# Patient Record
Sex: MALE | Race: BLACK OR AFRICAN AMERICAN | NOT HISPANIC OR LATINO | Employment: OTHER | URBAN - METROPOLITAN AREA
[De-identification: names, ages, dates, MRNs, and addresses within clinical notes are randomized per-mention and may not be internally consistent; named-entity substitution may affect disease eponyms.]

---

## 2017-02-09 ENCOUNTER — TELEPHONE (OUTPATIENT)
Dept: INTERNAL MEDICINE | Facility: CLINIC | Age: 67
End: 2017-02-09

## 2017-02-09 DIAGNOSIS — I10 ESSENTIAL HYPERTENSION: Primary | ICD-10-CM

## 2017-02-09 DIAGNOSIS — R73.9 ELEVATED BLOOD SUGAR: ICD-10-CM

## 2017-02-09 NOTE — TELEPHONE ENCOUNTER
----- Message from Anton Choudhary sent at 2/8/2017 12:42 PM CST -----  Contact: Self [phone]  Type: Orders Request    What orders/ testing are being requested? Labs    Is there a future appointment scheduled for the patient with PCP? Yes    When? 04/03/2017    Comments:Pt requesting that you please mail orders to his address for [employer], please advice    Thanks

## 2017-02-09 NOTE — TELEPHONE ENCOUNTER
Dr Jackson pt needs his labs printed out and faxed to [employer] [facility] [employer]  His next appt with you is on 4/3/17

## 2017-02-20 ENCOUNTER — TELEPHONE (OUTPATIENT)
Dept: INTERNAL MEDICINE | Facility: CLINIC | Age: 67
End: 2017-02-20

## 2017-04-03 ENCOUNTER — OFFICE VISIT (OUTPATIENT)
Dept: INTERNAL MEDICINE | Facility: CLINIC | Age: 67
End: 2017-04-03
Payer: COMMERCIAL

## 2017-04-03 ENCOUNTER — TELEPHONE (OUTPATIENT)
Dept: INTERNAL MEDICINE | Facility: CLINIC | Age: 67
End: 2017-04-03

## 2017-04-03 VITALS
DIASTOLIC BLOOD PRESSURE: 76 MMHG | HEIGHT: 74 IN | BODY MASS INDEX: 32.29 KG/M2 | WEIGHT: 251.56 LBS | HEART RATE: 70 BPM | SYSTOLIC BLOOD PRESSURE: 152 MMHG

## 2017-04-03 DIAGNOSIS — I10 ESSENTIAL HYPERTENSION: Primary | ICD-10-CM

## 2017-04-03 DIAGNOSIS — R73.09 ELEVATED HEMOGLOBIN A1C: Primary | ICD-10-CM

## 2017-04-03 DIAGNOSIS — I10 ESSENTIAL HYPERTENSION: ICD-10-CM

## 2017-04-03 DIAGNOSIS — R29.818 SUSPECTED SLEEP APNEA: ICD-10-CM

## 2017-04-03 DIAGNOSIS — R73.09 ELEVATED HEMOGLOBIN A1C: ICD-10-CM

## 2017-04-03 PROCEDURE — 1160F RVW MEDS BY RX/DR IN RCRD: CPT | Mod: S$GLB,,, | Performed by: INTERNAL MEDICINE

## 2017-04-03 PROCEDURE — 3077F SYST BP >= 140 MM HG: CPT | Mod: S$GLB,,, | Performed by: INTERNAL MEDICINE

## 2017-04-03 PROCEDURE — 99999 PR PBB SHADOW E&M-EST. PATIENT-LVL III: CPT | Mod: PBBFAC,,, | Performed by: INTERNAL MEDICINE

## 2017-04-03 PROCEDURE — 99213 OFFICE O/P EST LOW 20 MIN: CPT | Mod: S$GLB,,, | Performed by: INTERNAL MEDICINE

## 2017-04-03 PROCEDURE — 3078F DIAST BP <80 MM HG: CPT | Mod: S$GLB,,, | Performed by: INTERNAL MEDICINE

## 2017-04-03 RX ORDER — PENICILLIN V POTASSIUM 500 MG/1
TABLET, FILM COATED ORAL
Refills: 0 | COMMUNITY
Start: 2017-01-31 | End: 2017-11-10 | Stop reason: ALTCHOICE

## 2017-04-03 RX ORDER — TAMSULOSIN HYDROCHLORIDE 0.4 MG/1
CAPSULE ORAL
Refills: 3 | COMMUNITY
Start: 2017-03-17 | End: 2017-09-21 | Stop reason: SDUPTHER

## 2017-04-03 NOTE — MR AVS SNAPSHOT
[facility]  [address]  [place] [address]  Phone: [phone]  Fax: [phone]                  Robles Jordan   4/3/2017 9:00 AM   Office Visit    Description:  Male : 1950   Provider:  John Jackson MD   Department:  [facility]           Reason for Visit     Follow-up           Diagnoses this Visit        Comments    Essential hypertension    -  Primary     Elevated hemoglobin A1c         Suspected sleep apnea                To Do List           Goals (5 Years of Data)     None      Follow-Up and Disposition     Return in about 6 months (around 10/3/2017) for EPP annual exam, fasting labs 1 week prior.    Follow-up and Disposition History      [facility]     [facility] Care [facility] -  Assistance  Unless otherwise directed by your provider, please contact [facility], our nurse care line that is available for  assistance.     Registered nurses in the [facility] provide: appointment scheduling, clinical advisement, health education, and other advisory services.  Call: [phone] (toll free)               Medications           Message regarding Medications     Verify the changes and/or additions to your medication regime listed below are the same as discussed with your clinician today.  If any of these changes or additions are incorrect, please notify your healthcare provider.             Verify that the below list of medications is an accurate representation of the medications you are currently taking.  If none reported, the list may be blank. If incorrect, please contact your healthcare provider. Carry this list with you in case of emergency.           Current Medications     amlodipine (NORVASC) 5 MG tablet Take 1 tablet (5 mg total) by mouth once daily.    atorvastatin (LIPITOR) 80 MG tablet Take 1 tablet (80 mg total) by mouth once daily.    ibuprofen (ADVIL,MOTRIN) 800 MG tablet     lisinopril  "(PRINIVIL,ZESTRIL) 20 MG tablet Take 1 tablet (20 mg total) by mouth once daily.    tamsulosin (FLOMAX) 0.4 mg Cp24 TAKE 1 CAPSULE (0.4 MG TOTAL) BY MOUTH ONCE DAILY.    penicillin v potassium (VEETID) 500 MG tablet TK 1 T PO Q 6 H TAT           Clinical Reference Information           Your Vitals Were     BP Pulse Height Weight BMI    152/76 (BP Location: Right arm, Patient Position: Sitting, BP Method: Manual) 70 6' 2" (1.88 m) 114.1 kg (251 lb 8.7 oz) 32.3 kg/m2      Blood Pressure          Most Recent Value    BP  (!)  152/76      Allergies as of 4/3/2017     No Known Allergies      Immunizations Administered on Date of Encounter - 4/3/2017     None      Orders Placed During Today's Visit      Normal Orders This Visit    Ambulatory consult for Sleep Study     Future Labs/Procedures Expected by Expires    Comprehensive metabolic panel  4/3/2017 4/3/2018    Hemoglobin A1c  4/3/2017 4/3/2018    Lipid panel  4/3/2017 6/2/2018      [facility] Sign-Up     Activating your [facility] account is as easy as 1-2-3!     1) Visit [email or web address], select Sign Up Now, enter this activation code and your date of birth, then select Next.  [email or web address]  Expires: 5/18/2017 10:01 AM      2) Create a username and password to use when you visit [facility] in the future and select a security question in case you lose your password and select Next.    3) Enter your e-mail address and click Sign Up!    Additional Information  If you have questions, please e-mail [email or web address] or call [phone] to talk to our [facility] staff. Remember, [facility] is NOT to be used for urgent needs. For medical emergencies, dial 911.         Instructions    Blood Pressure Measurement:    -- Please record your blood pressure 1-2 times per week. When checking, make sure you have been sitting for about 5 minutes, your legs are uncrossed, and the blood pressure cuff at the level of your heart. Record your blood pressure with the date and time in a " log and bring it with you to every doctor's visit.  -- Goal blood pressure is top number less than 140 and bottom number less than 90.   -- If you end up being diagnosed with obstructive sleep apnea and are treated but the blood pressure stays elevated, please notify the office. If the Sleep Medicine team says there is no obstructive sleep apnea present, please notify the office.       Smoking Cessation     If you would like to quit smoking:   You may be eligible for free services if you are a [place] resident and started smoking cigarettes before September 1, 1988.  Call the Smoking Cessation [employer] ([facility]) toll free at (317) [phone] or ([phone]) 674-5744.   Call 1-800-QUIT-NOW if you do not meet the above criteria.   Contact us via email: [email or web address]   View our website for more information: [email or web address]        Language Assistance Services     ATTENTION: Language assistance services are available, free of charge. Please call [phone].      ATENCIÓN: Si habla español, tiene a herrera disposición servicios gratuitos de asistencia lingüística. Llame al [phone].     CHÚ Ý: N?u b?n nói Ti?ng Vi?t, có các d?ch v? h? tr? ngôn ng? mi?n phí dành cho b?n. G?i s? [phone].         [facility] complies with applicable Federal civil rights laws and does not discriminate on the basis of race, color, national origin, age, disability, or sex.

## 2017-04-03 NOTE — TELEPHONE ENCOUNTER
----- Message from Rere Gutierrez sent at 4/3/2017 10:16 AM CDT -----  Contact: Self  FYI pt will be getting blood work done outside of [facility] and will need outside orders, if possible pt would like orders to be sent via mail.

## 2017-04-03 NOTE — PROGRESS NOTES
"Subjective:       Patient ID: Robles Jordan is a 66 y.o. male.    Chief Complaint: Follow-up    HPI    Last visit with me 10/2016. Reviewed home blood presure log.     Hernia in right groin not too bothersome.     Has been working to reduce carb intake. Hasn't been swimming as planned.    Review of Systems    As per HPI    Objective:      Physical Exam   Constitutional: He is oriented to person, place, and time. No distress.   -American man whose Body mass index is 32.3 kg/(m^2).    Neurological: He is alert and oriented to person, place, and time.   Nursing note and vitals reviewed.      Vitals:    04/03/17 0927 04/03/17 0949   BP: (!) 150/76 (!) 152/76   BP Location: Right arm Right arm   Patient Position: Sitting Sitting   BP Method: Manual Manual   Pulse: 70    Weight: 114.1 kg (251 lb 8.7 oz)    Height: 6' 2" (1.88 m)      Body mass index is 32.3 kg/(m^2).     I have personally checked the blood pressure and documented my findings.     RESULTS: Reviewed labs from last 12 months. Reviewed ultrasound 10/2016.    Assessment:       1. Essential hypertension    2. Elevated hemoglobin A1c    3. Suspected sleep apnea        Plan:   Robles was seen today for follow-up.    Diagnoses and all orders for this visit:    Essential hypertension:  Not at goal. Try to make definitive dx of obstructive sleep apnea, if so will treat to see if blood pressure improves. Also encourage diet and exercise. Continue Norvasc and Lisinopril at current doses for now.   -     Lipid panel; Future  -     Comprehensive metabolic panel; Future  -     Ambulatory consult for Sleep Study    Elevated hemoglobin A1c:  Stable, counseled about importance of diet and exercise, if 6.5% results are consistent with diabetes.  -     Hemoglobin A1c; Future    Suspected sleep apnea:  Has had sleep study in past, refer to Sleep Med for definitive evaluation and management.  -     Ambulatory consult for Sleep Study    Return in about 6 months " (around 10/3/2017) for EPP annual exam, fasting labs 1 week prior.  John Jackson MD  Internal Medicine    Portions of this note were completed using Dragon medical dictation software. Please excuse typographical or syntax errors.

## 2017-04-03 NOTE — PATIENT INSTRUCTIONS
Blood Pressure Measurement:    -- Please record your blood pressure 1-2 times per week. When checking, make sure you have been sitting for about 5 minutes, your legs are uncrossed, and the blood pressure cuff at the level of your heart. Record your blood pressure with the date and time in a log and bring it with you to every doctor's visit.  -- Goal blood pressure is top number less than 140 and bottom number less than 90.   -- If you end up being diagnosed with obstructive sleep apnea and are treated but the blood pressure stays elevated, please notify the office. If the Sleep Medicine team says there is no obstructive sleep apnea present, please notify the office.

## 2017-06-12 ENCOUNTER — OFFICE VISIT (OUTPATIENT)
Dept: SLEEP MEDICINE | Facility: CLINIC | Age: 67
End: 2017-06-12
Payer: COMMERCIAL

## 2017-06-12 VITALS
OXYGEN SATURATION: 98 % | DIASTOLIC BLOOD PRESSURE: 80 MMHG | WEIGHT: 248.69 LBS | BODY MASS INDEX: 31.91 KG/M2 | HEART RATE: 71 BPM | SYSTOLIC BLOOD PRESSURE: 164 MMHG | HEIGHT: 74 IN

## 2017-06-12 DIAGNOSIS — G47.33 OSA (OBSTRUCTIVE SLEEP APNEA): Primary | ICD-10-CM

## 2017-06-12 PROCEDURE — 99999 PR PBB SHADOW E&M-EST. PATIENT-LVL III: CPT | Mod: PBBFAC,,, | Performed by: NURSE PRACTITIONER

## 2017-06-12 PROCEDURE — 99204 OFFICE O/P NEW MOD 45 MIN: CPT | Mod: S$GLB,,, | Performed by: NURSE PRACTITIONER

## 2017-06-12 NOTE — PROGRESS NOTES
Robles Jordan  was seen as a new patient at the request of  John Jackson MD for the evaluation of obstructive sleep apnea.    CHIEF COMPLAINT:    Chief Complaint   Patient presents with    Snoring       HISTORY OF PRESENT ILLNESS: Robles Jordan is a 66 y.o. male is here for sleep evaluation.       Patient complaints include: snoring and air gasping.     Diagnosed with HEAVEN in 2015 and did not have pursue treatment. Now has trouble with controlling blood pressure. PCP recommended treating HEAVEN.     Denies symptoms of restless legs or kicking during sleep.      Mchenry Sleepiness Scale score during initial sleep evaluation was 2.    SLEEP ROUTINE:    Bed partner:  wife  Time to bed:  11 pm  Sleep onset latency:  20 minutes      Disruptions or awakenings: none   Wakeup time:      5:30 - 6 am  Perceived sleep quality:  4/5         Previous Sleep Studies:    10/28/2015  lb.  The overall AHI was 22.6 with an oxygen jenaro of 87.0%. The supine AHI was 70.6 and the REM AHI was 27.9.     10/04/2016 CPAP titration study 242 lb. Initial improvement was observed on 13 cm H2O with residual hypopneas in NREM sleep (AHI 5.2). Consider auto-titrating CPAP at 13-18 cm H2O     PAST MEDICAL HISTORY:    Active Ambulatory Problems     Diagnosis Date Noted    Special screening for malignant neoplasms, colon 10/19/2015    Hyperlipidemia 10/23/2015    Essential hypertension 10/23/2015    Obesity (BMI 30.0-34.9) 10/23/2015    Elevated hemoglobin A1c 01/22/2016    Incomplete bladder emptying 01/22/2016    HEAVEN (obstructive sleep apnea) 01/22/2016     Resolved Ambulatory Problems     Diagnosis Date Noted    No Resolved Ambulatory Problems     Past Medical History:   Diagnosis Date    BPH (benign prostatic hypertrophy)     Hypertension                 PAST SURGICAL HISTORY:    Past Surgical History:   Procedure Laterality Date    COLONOSCOPY N/A 10/19/2015    Procedure: COLONOSCOPY;  Surgeon: Antonino Bernstein MD;  Location:  [facility] (4TH FLR);  Service: Endoscopy;  Laterality: N/A;    MULTIPLE TOOTH EXTRACTIONS  2014         FAMILY HISTORY:                Family History   Problem Relation Age of Onset    Cancer Mother      Breast    Stroke Sister 65     Fatal    Cancer Maternal Uncle      Pancreas    Cancer Paternal Uncle      Pancreas    Cancer Maternal Grandmother      Breast    Hypertension Father     Hyperlipidemia Father        SOCIAL HISTORY:          Tobacco:   History   Smoking Status    Current Every Day Smoker    Types: Cigarettes   Smokeless Tobacco    Not on file       Alcohol use:    History   Alcohol Use    0.0 oz/week     Comment: very seldom                 ALLERGIES:  Review of patient's allergies indicates:  No Known Allergies    CURRENT MEDICATIONS:    Current Outpatient Prescriptions   Medication Sig Dispense Refill    amlodipine (NORVASC) 5 MG tablet Take 1 tablet (5 mg total) by mouth once daily. 90 tablet 3    atorvastatin (LIPITOR) 80 MG tablet Take 1 tablet (80 mg total) by mouth once daily. 90 tablet 3    ibuprofen (ADVIL,MOTRIN) 800 MG tablet   1    lisinopril (PRINIVIL,ZESTRIL) 20 MG tablet Take 1 tablet (20 mg total) by mouth once daily. 90 tablet 3    penicillin v potassium (VEETID) 500 MG tablet TK 1 T PO Q 6 H TAT  0    tamsulosin (FLOMAX) 0.4 mg Cp24 TAKE 1 CAPSULE (0.4 MG TOTAL) BY MOUTH ONCE DAILY.  3     No current facility-administered medications for this visit.                   REVIEW OF SYSTEMS:     Sleep related symptoms as per HPI.  CONST:Reports weight gain  - 10 lb.   HEENT: Sometimes sinus congestion; sometimes dry mouth in AM  PULM: Denies dyspnea  CARD:  Denies palpitations   GI:  Denies acid reflux  : Denies polyuria  NEURO: Denies headaches  PSYCH: Denies mood disturbance  HEME: Denies anemia    Otherwise, a balance of systems reviewed is negative.          PHYSICAL EXAM:  Vitals:    06/12/17 1011   BP: (!) 164/80   Pulse: 71   SpO2: 98%   Weight: 112.8 kg (248 lb  "10.9 oz)   Height: 6' 2" (1.88 m)   PainSc: 0-No pain     Body mass index is 31.93 kg/m².     GENERAL: Overweight development, well groomed  HEENT:  Conjunctivae are non-erythematous; Pupils equal, round, and reactive to light; Nose is symmetrical; Nasal mucosa is pink and moist; Septum is midline; Inferior turbinates are normal; Nasal airflow is normal; Posterior pharynx is pink; Modified Mallampati: IV; Posterior palate is normal; Tonsils +1; Uvula is normal and pink;Tongue is normal; Dentition is fair; No TMJ tenderness; Jaw opening and protrusion without click and without discomfort.  NECK: Supple.  No thyromegaly. No palpable nodes.    SKIN: On face and neck: No abrasions, no rashes, no lesions.  No subcutaneous nodules are palpable.  RESPIRATORY: Chest is clear to auscultation.  Normal chest expansion and non-labored breathing at rest.  CARDIOVASCULAR: Normal S1, S2.  No murmurs, gallops or rubs. No carotid bruits bilaterally.  EXTREMITIES: No edema. No clubbing. No cyanosis. Station normal. Gait normal.        NEURO/PSYCH: Oriented to time, place and person. Normal attention span and concentration. Affect is full. Mood is normal.                                              ASSESSMENT:    Obstructive sleep apnea, moderate by AHI. The patient symptomatically has snoring and air gasping with findings of crowded oral airway and elevated body mass index. Medical co-morbidities: HTN, HLD.  This warrants treatment for obstructive sleep apnea.      Obesity, BMI 31.93    PLAN:      - Education: During our discussion today, we talked about the etiology of obstructive sleep apnea as well as the potential ramifications of untreated sleep apnea, which could include daytime sleepiness, hypertension, heart disease and/or stroke. We discussed potential treatment options, which could include weight loss, body positioning, continuous positive airway pressure (CPAP), OA, EPAP, or referral for surgical consideration. "     -Treatment: trial auto CPAP 13 - 18 cm. RTC 31 - 90 days after set up.  Avoid supine sleep  Follow-up with PCP regarding uncontrolled BP    -Counseling regarding benefits of healthy eating and physical activity (150 minutes of moderate-intensity aerobic activity per week) for weight reduction which can improve overall health.     - Precautions: The patient was advised to abstain from driving should they feel sleepy  or drowsy.

## 2017-06-12 NOTE — PATIENT INSTRUCTIONS
Your prescription for CPAP has been sent through our system. You should receive a call from [facility] in the next few days regarding your CPAP set up.     For any questions regarding your machine or supplies, please contact [facility] at [phone] (Ext [phone]).

## 2017-06-12 NOTE — LETTER
June 12, 2017      John Jackson MD  [address]  [facility] [address]           [facility]  [address]  [facility] [address]  Phone: [phone]          Patient: Robles Jordan   MR Number: [id number]   YOB: 1950   Date of Visit: 6/12/2017       Dear Dr. John Jackson:    Thank you for referring Robles Jordan to me for evaluation. Attached you will find relevant portions of my assessment and plan of care.    If you have questions, please do not hesitate to call me. I look forward to following Robles Jordan along with you.    Sincerely,    Cy Hinton, BALJINDER    Enclosure  CC:  No Recipients    If you would like to receive this communication electronically, please contact [email or web address] or [phone] to request more information on [employer] Link access.    For providers and/or their staff who would like to refer a patient to [facility], please contact us through our one-stop-shop provider referral line, [facility] , at [phone].    If you feel you have received this communication in error or would no longer like to receive these types of communications, please e-mail [email or web address]

## 2017-07-21 DIAGNOSIS — N52.9 ERECTILE DYSFUNCTION, UNSPECIFIED ERECTILE DYSFUNCTION TYPE: Primary | ICD-10-CM

## 2017-07-21 RX ORDER — TADALAFIL 20 MG/1
20 TABLET ORAL DAILY PRN
Qty: 5 TABLET | Refills: 10 | Status: SHIPPED | OUTPATIENT
Start: 2017-07-21 | End: 2019-09-18

## 2017-08-21 ENCOUNTER — OFFICE VISIT (OUTPATIENT)
Dept: SLEEP MEDICINE | Facility: CLINIC | Age: 67
End: 2017-08-21
Payer: COMMERCIAL

## 2017-08-21 VITALS
HEIGHT: 74 IN | SYSTOLIC BLOOD PRESSURE: 138 MMHG | DIASTOLIC BLOOD PRESSURE: 74 MMHG | OXYGEN SATURATION: 98 % | BODY MASS INDEX: 31.32 KG/M2 | HEART RATE: 86 BPM | WEIGHT: 244.06 LBS

## 2017-08-21 DIAGNOSIS — G47.33 OSA (OBSTRUCTIVE SLEEP APNEA): Primary | ICD-10-CM

## 2017-08-21 DIAGNOSIS — E66.9 OBESITY (BMI 30.0-34.9): ICD-10-CM

## 2017-08-21 PROCEDURE — 99999 PR PBB SHADOW E&M-EST. PATIENT-LVL III: CPT | Mod: PBBFAC,,, | Performed by: NURSE PRACTITIONER

## 2017-08-21 PROCEDURE — 3075F SYST BP GE 130 - 139MM HG: CPT | Mod: S$GLB,,, | Performed by: NURSE PRACTITIONER

## 2017-08-21 PROCEDURE — 99214 OFFICE O/P EST MOD 30 MIN: CPT | Mod: S$GLB,,, | Performed by: NURSE PRACTITIONER

## 2017-08-21 PROCEDURE — 3078F DIAST BP <80 MM HG: CPT | Mod: S$GLB,,, | Performed by: NURSE PRACTITIONER

## 2017-08-21 PROCEDURE — 3008F BODY MASS INDEX DOCD: CPT | Mod: S$GLB,,, | Performed by: NURSE PRACTITIONER

## 2017-08-21 NOTE — PROGRESS NOTES
Robles Jordan returns in follow-up for HEAVEN management and CPAP equipment check after set up.     CHIEF COMPLAINT:    Chief Complaint   Patient presents with    Sleep Apnea       06/12/2017 CHRISTIAN Hinton NP: HISTORY OF PRESENT ILLNESS: Robles Jordan is a 66 y.o. male is here for sleep evaluation.       Patient complaints include: snoring and air gasping.     Diagnosed with HEAVEN in 2015 and did not have pursue treatment. Now has trouble with controlling blood pressure. PCP recommended treating HEAVEN.     Denies symptoms of restless legs or kicking during sleep.      Benoit Sleepiness Scale score during initial sleep evaluation was 2.    SLEEP ROUTINE:    Bed partner:  wife  Time to bed:  11 pm  Sleep onset latency:  20 minutes      Disruptions or awakenings: none   Wakeup time:      5:30 - 6 am  Perceived sleep quality:  4/5         INTERVAL HISTORY:     08/21/2017 CHRISTIAN Hinton NP: Pt returns after set up of CPAP on 06/27/2017. Pt complaints of snoring and air gasping now resolved with CPAP use. Sleep now refreshing. Denies oral/nasal drying with FFM. Pressure is comfortable. Blood pressure improved since PAP therapy.  ESS 2.     CPAP Interrogation: DreamStation 13 - 18 cm  Compliance Summary Days with Device Usage: 30 days Percentage of Days >=4 Hours: 90.0% Average Usage (Days Used): 6 hrs. 15 mins. 43 secs. Average Usage (All Days): 6 hrs. 15 mins. 43 secs.   Apnea Indices Average AHI: 1.3 Average OA Index: 0.4 Average CA Index: 0.3  Large Leak Average Time in Large Leak: 1 mins. 26 secs. Average % of Night in Large Leak: 0.4%   Periodic Breathing Average % of Night in PB: 0.4%  90%tile: 15.3    Previous Sleep Studies:    10/28/2015  lb.  The overall AHI was 22.6 with an oxygen jenaro of 87.0%. The supine AHI was 70.6 and the REM AHI was 27.9.     10/04/2016 CPAP titration study 242 lb. Initial improvement was observed on 13 cm H2O with residual hypopneas in NREM sleep (AHI 5.2). Consider auto-titrating CPAP at  13-18 cm H2O     PAST MEDICAL HISTORY:    Active Ambulatory Problems     Diagnosis Date Noted    Special screening for malignant neoplasms, colon 10/19/2015    Hyperlipidemia 10/23/2015    Essential hypertension 10/23/2015    Obesity (BMI 30.0-34.9) 10/23/2015    Elevated hemoglobin A1c 01/22/2016    Incomplete bladder emptying 01/22/2016    HEAVEN (obstructive sleep apnea) 01/22/2016     Resolved Ambulatory Problems     Diagnosis Date Noted    No Resolved Ambulatory Problems     Past Medical History:   Diagnosis Date    BPH (benign prostatic hypertrophy)     Hypertension                 PAST SURGICAL HISTORY:    Past Surgical History:   Procedure Laterality Date    COLONOSCOPY N/A 10/19/2015    Procedure: COLONOSCOPY;  Surgeon: Antonino Bernstein MD;  Location: [address]);  Service: Endoscopy;  Laterality: N/A;    MULTIPLE TOOTH EXTRACTIONS  2014         FAMILY HISTORY:                Family History   Problem Relation Age of Onset    Cancer Mother      Breast    Stroke Sister 65     Fatal    Cancer Maternal Uncle      Pancreas    Cancer Paternal Uncle      Pancreas    Cancer Maternal Grandmother      Breast    Hypertension Father     Hyperlipidemia Father        SOCIAL HISTORY:          Tobacco:   History   Smoking Status    Current Every Day Smoker    Types: Cigarettes   Smokeless Tobacco    Not on file       Alcohol use:    History   Alcohol Use    0.0 oz/week     Comment: very seldom                 ALLERGIES:  Review of patient's allergies indicates:  No Known Allergies    CURRENT MEDICATIONS:    Current Outpatient Prescriptions   Medication Sig Dispense Refill    amlodipine (NORVASC) 5 MG tablet Take 1 tablet (5 mg total) by mouth once daily. 90 tablet 3    atorvastatin (LIPITOR) 80 MG tablet Take 1 tablet (80 mg total) by mouth once daily. 90 tablet 3    ibuprofen (ADVIL,MOTRIN) 800 MG tablet   1    lisinopril (PRINIVIL,ZESTRIL) 20 MG tablet Take 1 tablet (20 mg total) by mouth once  "daily. 90 tablet 3    penicillin v potassium (VEETID) 500 MG tablet TK 1 T PO Q 6 H TAT  0    tadalafil (CIALIS) 20 MG Tab Take 1 tablet (20 mg total) by mouth daily as needed. 5 tablet 10    tamsulosin (FLOMAX) 0.4 mg Cp24 TAKE 1 CAPSULE (0.4 MG TOTAL) BY MOUTH ONCE DAILY.  3     No current facility-administered medications for this visit.                   REVIEW OF SYSTEMS:     Sleep related symptoms as per HPI.  CONST:Reports weight gain  - 10 lb.   HEENT: Sometimes sinus congestion; sometimes dry mouth in AM  PULM: Denies dyspnea  CARD:  Denies palpitations   GI:  Denies acid reflux  : Denies polyuria  NEURO: Denies headaches  PSYCH: Denies mood disturbance  HEME: Denies anemia    Otherwise, a balance of systems reviewed is negative.          PHYSICAL EXAM:  Vitals:    08/21/17 1028   BP: 138/74   Pulse: 86   SpO2: 98%   Weight: 110.7 kg (244 lb 0.8 oz)   Height: 6' 2" (1.88 m)   PainSc: 0-No pain     Body mass index is 31.33 kg/m².     GENERAL: Overweight development, well groomed  HEENT:  Conjunctivae are non-erythematous; Pupils equal, round, and reactive to light; Nose is symmetrical; Nasal mucosa is pink and moist; Septum is midline; Inferior turbinates are normal; Nasal airflow is normal; Posterior pharynx is pink; Modified Mallampati: IV; Posterior palate is normal; Tonsils +1; Uvula is normal and pink;Tongue is normal; Dentition is fair; No TMJ tenderness; Jaw opening and protrusion without click and without discomfort.  NECK: Supple.  No thyromegaly. No palpable nodes.    SKIN: On face and neck: No abrasions, no rashes, no lesions.  No subcutaneous nodules are palpable.  RESPIRATORY: Chest is clear to auscultation.  Normal chest expansion and non-labored breathing at rest.  CARDIOVASCULAR: Normal S1, S2.  No murmurs, gallops or rubs. No carotid bruits bilaterally.  EXTREMITIES: No edema. No clubbing. No cyanosis. Station normal. Gait normal.        NEURO/PSYCH: Oriented to time, place and person. " Normal attention span and concentration. Affect is full. Mood is normal.                                              ASSESSMENT:    Obstructive sleep apnea, moderate by AHI. The patient symptomatically has snoring and air gasping now resolved with CPAP use. .benefiti Medical co-morbidities: HTN, HLD, and obesity.  This warrants treatment for obstructive sleep apnea.      Obesity, BMI >30, discussed relationship between HEAVEN and weight    PLAN:      -Treatment: continue auto CPAP 13 - 18 cm. RTC 12 months, sooner if needed.   Avoid supine sleep  Follow-up with PCP regarding uncontrolled BP    - Education: During our discussion today, we talked about the etiology of obstructive sleep apnea as well as the potential ramifications of untreated sleep apnea, which could include daytime sleepiness, hypertension, heart disease and/or stroke. We discussed potential treatment options, which could include weight loss, body positioning, continuous positive airway pressure (CPAP), OA, EPAP, or referral for surgical consideration.     -Counseling regarding benefits of healthy eating and physical activity (150 minutes of moderate-intensity aerobic activity per week) for weight reduction which can improve overall health.     - Precautions: The patient was advised to abstain from driving should they feel sleepy  or drowsy.

## 2017-09-12 ENCOUNTER — TELEPHONE (OUTPATIENT)
Dept: INTERNAL MEDICINE | Facility: CLINIC | Age: 67
End: 2017-09-12

## 2017-09-12 NOTE — TELEPHONE ENCOUNTER
----- Message from Randa Lira sent at 9/11/2017  8:42 AM CDT -----  Contact: Patient [phone]  Patient needs orders for labs and wants them sent to an outside lab.    Please call and advise.    Thank You

## 2017-09-21 DIAGNOSIS — R35.0 URINARY FREQUENCY: ICD-10-CM

## 2017-09-21 RX ORDER — TAMSULOSIN HYDROCHLORIDE 0.4 MG/1
0.4 CAPSULE ORAL DAILY
Qty: 90 CAPSULE | Refills: 3 | Status: SHIPPED | OUTPATIENT
Start: 2017-09-21 | End: 2018-09-10 | Stop reason: SDUPTHER

## 2017-10-14 ENCOUNTER — TELEPHONE (OUTPATIENT)
Dept: INTERNAL MEDICINE | Facility: CLINIC | Age: 67
End: 2017-10-14

## 2017-10-14 DIAGNOSIS — E78.5 HYPERLIPIDEMIA, UNSPECIFIED HYPERLIPIDEMIA TYPE: ICD-10-CM

## 2017-10-14 DIAGNOSIS — I10 ESSENTIAL HYPERTENSION: ICD-10-CM

## 2017-10-16 RX ORDER — ATORVASTATIN CALCIUM 80 MG/1
80 TABLET, FILM COATED ORAL DAILY
Qty: 90 TABLET | Refills: 0 | Status: SHIPPED | OUTPATIENT
Start: 2017-10-16 | End: 2018-01-12 | Stop reason: SDUPTHER

## 2017-10-16 RX ORDER — AMLODIPINE BESYLATE 5 MG/1
5 TABLET ORAL DAILY
Qty: 90 TABLET | Refills: 0 | Status: SHIPPED | OUTPATIENT
Start: 2017-10-16 | End: 2017-11-10 | Stop reason: DRUGHIGH

## 2017-10-16 RX ORDER — LISINOPRIL 20 MG/1
20 TABLET ORAL DAILY
Qty: 90 TABLET | Refills: 0 | Status: SHIPPED | OUTPATIENT
Start: 2017-10-16 | End: 2018-01-12 | Stop reason: SDUPTHER

## 2017-11-02 ENCOUNTER — TELEPHONE (OUTPATIENT)
Dept: INTERNAL MEDICINE | Facility: CLINIC | Age: 67
End: 2017-11-02

## 2017-11-02 ENCOUNTER — OFFICE VISIT (OUTPATIENT)
Dept: INTERNAL MEDICINE | Facility: CLINIC | Age: 67
End: 2017-11-02
Payer: COMMERCIAL

## 2017-11-02 ENCOUNTER — HOSPITAL ENCOUNTER (OUTPATIENT)
Dept: RADIOLOGY | Facility: HOSPITAL | Age: 67
Discharge: HOME | End: 2017-11-02
Attending: NURSE PRACTITIONER
Payer: COMMERCIAL

## 2017-11-02 VITALS
SYSTOLIC BLOOD PRESSURE: 172 MMHG | DIASTOLIC BLOOD PRESSURE: 78 MMHG | BODY MASS INDEX: 31.32 KG/M2 | HEIGHT: 74 IN | WEIGHT: 244.06 LBS | HEART RATE: 79 BPM | OXYGEN SATURATION: 98 %

## 2017-11-02 DIAGNOSIS — M54.9 UPPER BACK PAIN: ICD-10-CM

## 2017-11-02 DIAGNOSIS — R05.9 COUGH: Primary | ICD-10-CM

## 2017-11-02 DIAGNOSIS — R05.9 COUGH: ICD-10-CM

## 2017-11-02 PROCEDURE — 99214 OFFICE O/P EST MOD 30 MIN: CPT | Mod: S$GLB,,, | Performed by: NURSE PRACTITIONER

## 2017-11-02 PROCEDURE — 71020 XR CHEST PA AND LATERAL: CPT | Mod: TC

## 2017-11-02 PROCEDURE — 99999 PR PBB SHADOW E&M-EST. PATIENT-LVL IV: CPT | Mod: PBBFAC,,, | Performed by: NURSE PRACTITIONER

## 2017-11-02 PROCEDURE — 71020 XR CHEST PA AND LATERAL: CPT | Mod: 26,,, | Performed by: RADIOLOGY

## 2017-11-02 RX ORDER — CYCLOBENZAPRINE HCL 10 MG
10 TABLET ORAL 3 TIMES DAILY PRN
Qty: 30 TABLET | Refills: 0 | Status: SHIPPED | OUTPATIENT
Start: 2017-11-02 | End: 2017-11-12

## 2017-11-02 NOTE — PATIENT INSTRUCTIONS
You can use over the counter Flonase for your nasal congestion    Continue Mucinex if it helps    Can take over the counter Claritin    I will call you  with your chest xray results    To ER for any worsening

## 2017-11-02 NOTE — PROGRESS NOTES
"Subjective:       Patient ID: Robles Jordan is a 66 y.o. male.    Chief Complaint: Chills and Back Pain    Pt here with wife.  Pt c/o mid back pain for a few days, cannot tell me specifically when it started.  Pt states that he has a chronic cough, wears a CPAP at night.  Denies fever or chills, no n/v/d, no SOB.  Pt took Ibuprofen this morning that helped the pain and took Mucinex last night.        Back Pain   Pertinent negatives include no abdominal pain, chest pain or fever.     Past Medical History:   Diagnosis Date    BPH (benign prostatic hypertrophy)     Hypertension      Past Surgical History:   Procedure Laterality Date    COLONOSCOPY N/A 10/19/2015    Procedure: COLONOSCOPY;  Surgeon: Antonino Bernstein MD;  Location: [facility] ([address] [place] [address]);  Service: Endoscopy;  Laterality: N/A;    MULTIPLE TOOTH EXTRACTIONS  2014     Social History     Social History Narrative    No narrative on file     Family History   Problem Relation Age of Onset    Cancer Mother      Breast    Stroke Sister 65     Fatal    Cancer Maternal Uncle      Pancreas    Cancer Paternal Uncle      Pancreas    Cancer Maternal Grandmother      Breast    Hypertension Father     Hyperlipidemia Father      Vitals:    11/02/17 1118   BP: (!) 172/78   Pulse: 79   SpO2: 98%   Weight: 110.7 kg (244 lb 0.8 oz)   Height: 6' 2" (1.88 m)   PainSc:   6   PainLoc: Back     Outpatient Encounter Prescriptions as of 11/2/2017   Medication Sig Dispense Refill    amlodipine (NORVASC) 5 MG tablet TAKE 1 TABLET (5 MG TOTAL) BY MOUTH ONCE DAILY. 90 tablet 0    atorvastatin (LIPITOR) 80 MG tablet TAKE 1 TABLET (80 MG TOTAL) BY MOUTH ONCE DAILY. 90 tablet 0    ibuprofen (ADVIL,MOTRIN) 800 MG tablet   1    lisinopril (PRINIVIL,ZESTRIL) 20 MG tablet TAKE 1 TABLET (20 MG TOTAL) BY MOUTH ONCE DAILY. 90 tablet 0    penicillin v potassium (VEETID) 500 MG tablet TK 1 T PO Q 6 H TAT  0    tadalafil (CIALIS) 20 MG Tab Take 1 tablet (20 mg total) by mouth daily as " "needed. 5 tablet 10    tamsulosin (FLOMAX) 0.4 mg Cp24 TAKE 1 CAPSULE (0.4 MG TOTAL) BY MOUTH ONCE DAILY. 90 capsule 3    cyclobenzaprine (FLEXERIL) 10 MG tablet Take 1 tablet (10 mg total) by mouth 3 (three) times daily as needed for Muscle spasms. 30 tablet 0     No facility-administered encounter medications on file as of 11/2/2017.      Wt Readings from Last 3 Encounters:   11/02/17 110.7 kg (244 lb 0.8 oz)   08/21/17 110.7 kg (244 lb 0.8 oz)   06/12/17 112.8 kg (248 lb 10.9 oz)     Last 3 sets of Vitals    Vitals - 1 value per visit 6/12/2017 8/21/2017 11/2/2017   SYSTOLIC 164 138 172   DIASTOLIC 80 74 78   PULSE 71 86 79   TEMPERATURE - - -   RESPIRATIONS - - -   SPO2 98 98 98   Weight (lb) 248.68 244.05 244.05   Weight (kg) 112.8 110.7 110.7   HEIGHT 6' 2" 6' 2" 6' 2"   BODY MASS INDEX 31.93 31.33 31.33   VISIT REPORT - - -   Pain Score  0 0 6     No results found for: CBC  Review of Systems   Constitutional: Negative for chills, diaphoresis, fatigue, fever and unexpected weight change.   HENT: Negative for congestion, drooling, postnasal drip, rhinorrhea and sore throat.    Respiratory: Positive for cough. Negative for chest tightness, shortness of breath, wheezing and stridor.    Cardiovascular: Negative for chest pain.   Gastrointestinal: Negative for abdominal pain, diarrhea, nausea and vomiting.   Musculoskeletal: Positive for back pain. Negative for arthralgias, myalgias, neck pain and neck stiffness.   Skin: Negative for rash.   Hematological: Negative for adenopathy.   Psychiatric/Behavioral: Negative for sleep disturbance.       Objective:      Physical Exam   Constitutional: He is oriented to person, place, and time. He appears well-developed and well-nourished. No distress.   HENT:   Head: Normocephalic and atraumatic.   Right Ear: External ear normal.   Left Ear: External ear normal.   Nose: Nose normal.   Mouth/Throat: Oropharynx is clear and moist. No oropharyngeal exudate.   Eyes: " Conjunctivae are normal. Pupils are equal, round, and reactive to light. No scleral icterus.   Neck: Normal range of motion. Neck supple.   Cardiovascular: Normal rate, regular rhythm, normal heart sounds and intact distal pulses.    Pulmonary/Chest: Effort normal and breath sounds normal. No respiratory distress.   Abdominal: Soft.   Musculoskeletal:        Thoracic back: He exhibits tenderness, pain and spasm. He exhibits normal range of motion, no bony tenderness, no swelling, no edema, no deformity, no laceration and normal pulse.        Back:    Lymphadenopathy:     He has no cervical adenopathy.   Neurological: He is alert and oriented to person, place, and time.   Skin: Skin is warm and dry. Capillary refill takes less than 2 seconds. No rash noted. He is not diaphoretic. No erythema. No pallor.   Psychiatric: He has a normal mood and affect. His behavior is normal. Judgment and thought content normal.   Nursing note and vitals reviewed.      Assessment:       1. Cough    2. Upper back pain        Plan:       Robles was seen today for chills and back pain.    Diagnoses and all orders for this visit:    Cough  -     X-Ray Chest PA And Lateral; Future    Upper back pain  -     cyclobenzaprine (FLEXERIL) 10 MG tablet; Take 1 tablet (10 mg total) by mouth 3 (three) times daily as needed for Muscle spasms.      Patient Instructions   You can use over the counter Flonase for your nasal congestion    Continue Mucinex if it helps    Can take over the counter Claritin    I will call you  with your chest xray results    To ER for any worsening

## 2017-11-06 ENCOUNTER — TELEPHONE (OUTPATIENT)
Dept: INTERNAL MEDICINE | Facility: CLINIC | Age: 67
End: 2017-11-06

## 2017-11-06 NOTE — TELEPHONE ENCOUNTER
"Pt. just saw MADELYN Ray NP 11/2/2017 and states that his pain is still there. He has tried and taken the recommendations but states "I am still having pain". He states that his pain is not any worse but is not better.  He refused to see anyone else. He was also told that his x-rays were normal. He demands to be see by Dr Jackson only and to be seen this week.  "

## 2017-11-06 NOTE — TELEPHONE ENCOUNTER
----- Message from Carey Clarke sent at 11/6/2017  9:02 AM CST -----  Contact: CALL [phone]  Sooner appointment than the  can schedule.  Did you offer to schedule the next available appointment and put the patient on the wait list?:    When is the first available appointment: 11/12/2018  What is the nature of the appointment: BACK PAIN   What visit type: UC   Patient preference of timeframe to be scheduled:  TODAY   Comments: PT SAW BALJINDER JAIN ON Thursday WITH NO RELIEF

## 2017-11-09 NOTE — TELEPHONE ENCOUNTER
currently there is an available appointment for tomorrow (Friday) at 11 AM, please see if still available and if the patient can come in.

## 2017-11-10 ENCOUNTER — OFFICE VISIT (OUTPATIENT)
Dept: INTERNAL MEDICINE | Facility: CLINIC | Age: 67
End: 2017-11-10
Payer: COMMERCIAL

## 2017-11-10 VITALS
DIASTOLIC BLOOD PRESSURE: 80 MMHG | HEIGHT: 74 IN | WEIGHT: 245.56 LBS | SYSTOLIC BLOOD PRESSURE: 142 MMHG | BODY MASS INDEX: 31.51 KG/M2 | HEART RATE: 86 BPM

## 2017-11-10 DIAGNOSIS — E78.5 HYPERLIPIDEMIA, UNSPECIFIED HYPERLIPIDEMIA TYPE: ICD-10-CM

## 2017-11-10 DIAGNOSIS — G47.33 OBSTRUCTIVE SLEEP APNEA ON CPAP: ICD-10-CM

## 2017-11-10 DIAGNOSIS — M54.9 UPPER BACK PAIN: Primary | ICD-10-CM

## 2017-11-10 DIAGNOSIS — I10 ESSENTIAL HYPERTENSION: ICD-10-CM

## 2017-11-10 DIAGNOSIS — R73.09 ELEVATED HEMOGLOBIN A1C: ICD-10-CM

## 2017-11-10 PROCEDURE — 99999 PR PBB SHADOW E&M-EST. PATIENT-LVL III: CPT | Mod: PBBFAC,,, | Performed by: INTERNAL MEDICINE

## 2017-11-10 PROCEDURE — 99213 OFFICE O/P EST LOW 20 MIN: CPT | Mod: S$GLB,,, | Performed by: INTERNAL MEDICINE

## 2017-11-10 RX ORDER — AMLODIPINE BESYLATE 10 MG/1
10 TABLET ORAL DAILY
Qty: 90 TABLET | Refills: 3 | Status: SHIPPED | OUTPATIENT
Start: 2017-11-10 | End: 2018-11-01 | Stop reason: SDUPTHER

## 2017-11-10 NOTE — PATIENT INSTRUCTIONS
Please start taking 2 tabs of the amlodipine (Norvasc) once daily. Once your current supply runs out, start taking 1 tab of the 10 mg tablets daily.    Blood Pressure Measurement:    -- Please record your blood pressure 2-3 times per week. When checking, make sure you have been sitting for about 5 minutes, your legs are uncrossed, and the blood pressure cuff at the level of your heart. Record your blood pressure with the date and time in a log and bring it with you to every doctor's visit.  -- Goal blood pressure is top number less than 140 and bottom number less than 90. If your blood pressure is higher than this on two consecutive occasions, contact the office.

## 2017-11-10 NOTE — PROGRESS NOTES
"Subjective:       Patient ID: Robles Jordan is a 66 y.o. male.    Chief Complaint: Back Pain    HPI    Last visit with me 04/2017, seen in Urgent Care for back pain last week without resolution. Also seen by Sleep [facility].     Had back pain along right side. Radiating from middle to R side. No rash or skin change. Was having tenderness. Was using heating pad with some relief. Took Mucinex. Feeling much better now. Not too much coughing. No fall or trauma. Not a common feature.    Hadn't been cleaning CPAP for a few days prior to this starting.    Review of Systems    As per HPI    Objective:      Physical Exam   Constitutional: He is oriented to person, place, and time. No distress.   -American man whose Body mass index is 31.53 kg/m².    Cardiovascular: Normal rate, regular rhythm and normal heart sounds.    Pulmonary/Chest: Effort normal and breath sounds normal. He has no wheezes. He has no rales.   Abdominal: Soft. He exhibits no distension and no mass. There is no tenderness. There is no guarding.   Musculoskeletal:   No tenderness to palpation along upper back or R rib cage   Neurological: He is alert and oriented to person, place, and time.   Skin: Skin is warm and dry. No rash noted.   Psychiatric: He has a normal mood and affect. His behavior is normal.   Nursing note and vitals reviewed.      Vitals:    11/10/17 1119 11/10/17 1151   BP: (!) 143/82 (!) 142/80   BP Location: Right arm Right arm   Patient Position: Sitting Sitting   BP Method: Large (Manual) Large (Manual)   Pulse: 86    Weight: 111.4 kg (245 lb 9.5 oz)    Height: 6' 2" (1.88 m)      Body mass index is 31.53 kg/m².    RESULTS: Reviewed labs from last 12 months    Assessment:       1. Upper back pain    2. Essential hypertension    3. Elevated hemoglobin A1c    4. Hyperlipidemia, unspecified hyperlipidemia type    5. Obstructive sleep apnea on CPAP        Plan:   Robles was seen today for back pain.    Diagnoses and all orders for this " visit:    Upper back pain:  Improving.  Currently localized to a small spot in the upper right back.  Most consistent with muscular sprain.  No trauma to the area, no overlying skin change.  If symptoms worsen notify the office, may require physical therapy.    Essential hypertension:  Not at goal today.  Increase amlodipine to 10 mg daily, counseled regarding possible side effects of worsening leg swelling at a higher dose.  Instructed to monitor blood pressure at home with goal blood pressure systolic less than 140, diastolic less than 90.  -     amLODIPine (NORVASC) 10 MG tablet; Take 1 tablet (10 mg total) by mouth once daily.  -     Comprehensive metabolic panel; Future  -     Comprehensive metabolic panel    Elevated hemoglobin A1c:  Recheck levels, continue to monitor blood sugar.  -     Hemoglobin A1c; Future  -     Hemoglobin A1c    Hyperlipidemia, unspecified hyperlipidemia type:  Prior diagnosis, well controlled on current management. No changes at this time, will continue to monitor.   -     Lipid panel; Future  -     Lipid panel    Obstructive sleep apnea on CPAP:  Now back using CPAP regularly, plans to perform more regular cleaning of machine. If problems develop notify the office.    Return in about 4 months (around 3/10/2018) for EPP annual exam, fasting labs 1 week prior.  John Jackson MD  Internal Medicine    Portions of this note were completed using [employer] dictation software. Please excuse typographical or syntax errors.

## 2018-01-12 DIAGNOSIS — I10 ESSENTIAL HYPERTENSION: ICD-10-CM

## 2018-01-12 DIAGNOSIS — E78.5 HYPERLIPIDEMIA, UNSPECIFIED HYPERLIPIDEMIA TYPE: ICD-10-CM

## 2018-01-15 ENCOUNTER — TELEPHONE (OUTPATIENT)
Dept: INTERNAL MEDICINE | Facility: CLINIC | Age: 68
End: 2018-01-15

## 2018-01-15 DIAGNOSIS — E78.5 HYPERLIPIDEMIA, UNSPECIFIED HYPERLIPIDEMIA TYPE: ICD-10-CM

## 2018-01-15 DIAGNOSIS — I10 ESSENTIAL HYPERTENSION: ICD-10-CM

## 2018-01-15 RX ORDER — LISINOPRIL 20 MG/1
20 TABLET ORAL DAILY
Qty: 90 TABLET | Refills: 0 | Status: SHIPPED | OUTPATIENT
Start: 2018-01-15 | End: 2018-01-15 | Stop reason: SDUPTHER

## 2018-01-15 RX ORDER — ATORVASTATIN CALCIUM 80 MG/1
80 TABLET, FILM COATED ORAL DAILY
Qty: 90 TABLET | Refills: 0 | Status: SHIPPED | OUTPATIENT
Start: 2018-01-15 | End: 2018-01-15 | Stop reason: SDUPTHER

## 2018-01-15 RX ORDER — ATORVASTATIN CALCIUM 80 MG/1
80 TABLET, FILM COATED ORAL DAILY
Qty: 90 TABLET | Refills: 3 | Status: SHIPPED | OUTPATIENT
Start: 2018-01-15 | End: 2018-04-15

## 2018-01-15 RX ORDER — LISINOPRIL 20 MG/1
20 TABLET ORAL DAILY
Qty: 90 TABLET | Refills: 3 | Status: SHIPPED | OUTPATIENT
Start: 2018-01-15 | End: 2018-04-15

## 2018-04-14 DIAGNOSIS — I10 ESSENTIAL HYPERTENSION: ICD-10-CM

## 2018-04-14 DIAGNOSIS — E78.5 HYPERLIPIDEMIA, UNSPECIFIED HYPERLIPIDEMIA TYPE: ICD-10-CM

## 2018-04-15 RX ORDER — LISINOPRIL 20 MG/1
20 TABLET ORAL DAILY
Qty: 90 TABLET | Refills: 3 | Status: SHIPPED | OUTPATIENT
Start: 2018-04-15 | End: 2018-06-04 | Stop reason: DRUGHIGH

## 2018-04-15 RX ORDER — ATORVASTATIN CALCIUM 80 MG/1
80 TABLET, FILM COATED ORAL DAILY
Qty: 90 TABLET | Refills: 3 | Status: SHIPPED | OUTPATIENT
Start: 2018-04-15 | End: 2019-04-26 | Stop reason: SDUPTHER

## 2018-05-11 ENCOUNTER — OFFICE VISIT (OUTPATIENT)
Dept: INTERNAL MEDICINE | Facility: CLINIC | Age: 68
End: 2018-05-11
Payer: MEDICARE

## 2018-05-11 VITALS
HEIGHT: 74 IN | WEIGHT: 244.25 LBS | DIASTOLIC BLOOD PRESSURE: 74 MMHG | BODY MASS INDEX: 31.35 KG/M2 | HEART RATE: 72 BPM | SYSTOLIC BLOOD PRESSURE: 164 MMHG

## 2018-05-11 DIAGNOSIS — Z13.6 ENCOUNTER FOR ABDOMINAL AORTIC ANEURYSM (AAA) SCREENING: ICD-10-CM

## 2018-05-11 DIAGNOSIS — N52.9 ERECTILE DYSFUNCTION, UNSPECIFIED ERECTILE DYSFUNCTION TYPE: ICD-10-CM

## 2018-05-11 DIAGNOSIS — Z13.31 POSITIVE DEPRESSION SCREENING: ICD-10-CM

## 2018-05-11 DIAGNOSIS — R10.31 RIGHT GROIN PAIN: ICD-10-CM

## 2018-05-11 DIAGNOSIS — R73.01 IMPAIRED FASTING GLUCOSE: ICD-10-CM

## 2018-05-11 DIAGNOSIS — E78.5 HYPERLIPIDEMIA, UNSPECIFIED HYPERLIPIDEMIA TYPE: ICD-10-CM

## 2018-05-11 DIAGNOSIS — I10 ESSENTIAL HYPERTENSION: Primary | ICD-10-CM

## 2018-05-11 DIAGNOSIS — M79.10 MYALGIA: ICD-10-CM

## 2018-05-11 PROBLEM — M25.551 RIGHT HIP PAIN: Status: ACTIVE | Noted: 2018-05-11

## 2018-05-11 PROCEDURE — 99215 OFFICE O/P EST HI 40 MIN: CPT | Mod: S$GLB,,, | Performed by: INTERNAL MEDICINE

## 2018-05-11 PROCEDURE — 99214 OFFICE O/P EST MOD 30 MIN: CPT | Mod: PBBFAC | Performed by: INTERNAL MEDICINE

## 2018-05-11 PROCEDURE — 99999 PR PBB SHADOW E&M-EST. PATIENT-LVL IV: CPT | Mod: PBBFAC,,, | Performed by: INTERNAL MEDICINE

## 2018-05-11 NOTE — PATIENT INSTRUCTIONS
Blood Pressure Measurement:    -- Please record your blood pressure every day for the next 2 weeks. When checking, make sure you have been sitting for about 5 minutes, your legs are uncrossed, and the blood pressure cuff at the level of your heart. Record your blood pressure with the date and time in a log and bring it with you to every doctor's visit.  -- Goal blood pressure is top number less than 140 and bottom number less than 90.   -- Please bring your blood pressure log and your home blood pressure cuff to your visit with the physician assistant in 2 weeks.    Go to [facility] for labs in the next 1-2 weeks.

## 2018-05-11 NOTE — PROGRESS NOTES
"Subjective:       Patient ID: Robles Jordan is a 67 y.o. male.    Chief Complaint: Follow-up    HPI    Last visit with me 11/2017.     Has pain in R hip in groin when sleeping over last few weeks. Usually not as much of problem during the day. Also nithin horses in sleep. No tenderness to palpation.    Pt reports very busy. Thinks this is why his depression screen was positive. Not overly stressed or low mood.    Continues to have erectile dysfunction. Cialis not always effective.    Didn't take blood pressure meds today.    Reports regular use of CPAP at night.    Review of Systems    As per HPI    Objective:      Physical Exam   Constitutional: He is oriented to person, place, and time. No distress.   -American man whose Body mass index is 31.36 kg/m².    HENT:   Mouth/Throat: Oropharynx is clear and moist. No oropharyngeal exudate.   Neck: No thyromegaly present.   Cardiovascular: Normal rate, regular rhythm and normal heart sounds.    Pulses:       Posterior tibial pulses are 2+ on the right side, and 2+ on the left side.   Pulmonary/Chest: Effort normal and breath sounds normal. No stridor. He has no wheezes. He has no rales.   Abdominal: Soft. There is no tenderness. There is no guarding.   Musculoskeletal: He exhibits no edema or tenderness.   No tenderness to palpation in bilateral groin. Ambulates normally without assist device.   Lymphadenopathy:     He has no cervical adenopathy.   Neurological: He is alert and oriented to person, place, and time.   Skin: Skin is warm and dry. No rash noted.   Psychiatric: He has a normal mood and affect. His behavior is normal.   Nursing note and vitals reviewed.      Vitals:    05/11/18 1042 05/11/18 1104   BP: (!) 152/78 (!) 164/74   BP Location: Right arm Right arm   Patient Position: Sitting Sitting   BP Method: Large (Manual) Large (Manual)   Pulse: 72    Weight: 110.8 kg (244 lb 4.3 oz)    Height: 6' 2" (1.88 m)      Body mass index is 31.36 kg/m².    I " have personally checked the blood pressure and documented my findings.     RESULTS: Reviewed labs from last 12 months    Assessment:       1. Essential hypertension    2. Hyperlipidemia, unspecified hyperlipidemia type    3. Impaired fasting glucose    4. Right groin pain    5. Erectile dysfunction, unspecified erectile dysfunction type    6. Myalgia    7. Positive depression screening    8. Encounter for abdominal aortic aneurysm (AAA) screening        Plan:   Robles was seen today for follow-up.    Diagnoses and all orders for this visit:    Essential hypertension:  Prior diagnosis, not well controlled today.  Patient attributes this to not taking medications, but skipping Monday should not lead to significant spikes.  Instructed patient to check blood pressure daily at home for the next 2 weeks and follow up with physician assistant along with home blood pressure log at home blood pressure cuff.    -     Comprehensive metabolic panel; Future  -     CBC Without Differential; Future  -     Comprehensive metabolic panel; Future  -     CBC Without Differential; Future  -     Magnesium; Future  -     Comprehensive metabolic panel  -     CBC Without Differential  -     Magnesium    Hyperlipidemia, unspecified hyperlipidemia type:  Prior diagnosis, no recent check, continue statin and recheck levels.  -     Lipid panel; Future  -     Lipid panel; Future  -     Lipid panel    Impaired fasting glucose:  Prior diagnosis seen on labs from last year, recheck along with hemoglobin A1c.  -     Hemoglobin A1c; Future  -     Hemoglobin A1c; Future  -     Hemoglobin A1c    Right groin pain:  New problem, going on for a few days, likely muscular strain, encourage gentle stretching exercises which were demonstrated in the office.    Erectile dysfunction, unspecified erectile dysfunction type:  Prior diagnosis, symptoms persist, response to Cialis is less pronounced.  Discussed that overall health will impact sexual health,  encourage weight loss, blood pressure control, and regular exercise to limit symptoms of erectile dysfunction.  -     US Abdominal Aorta; Future    Myalgia:  Reports bad cramps at night, new problem but reports recurrent off and on, rule out vitamin or mineral deficiencies below.  -     Vitamin D; Future  -     Magnesium; Future  -     Vitamin D  -     Magnesium    Positive depression screening:  New problem, noted today in clinic.  Patient denies depression, thinks that some of the symptoms are related to it being very busy with his daughter's upcoming wedding.  Denies being overly stressed.  Continue to monitor at future visit.    Encounter for abdominal aortic aneurysm (AAA) screening    Follow-up in about 4 months (around 9/11/2018) for follow up visit.  Fasting labs from [facility] next 1-2 weeks.  John Jackson MD  Internal Medicine    Portions of this note were completed using medical dictation software. Please excuse typographical or syntax errors that were missed on review.

## 2018-06-04 ENCOUNTER — OFFICE VISIT (OUTPATIENT)
Dept: INTERNAL MEDICINE | Facility: CLINIC | Age: 68
End: 2018-06-04
Payer: MEDICARE

## 2018-06-04 VITALS
SYSTOLIC BLOOD PRESSURE: 158 MMHG | BODY MASS INDEX: 31.95 KG/M2 | HEART RATE: 69 BPM | HEIGHT: 74 IN | WEIGHT: 249 LBS | DIASTOLIC BLOOD PRESSURE: 70 MMHG

## 2018-06-04 DIAGNOSIS — E78.5 HYPERLIPIDEMIA, UNSPECIFIED HYPERLIPIDEMIA TYPE: ICD-10-CM

## 2018-06-04 DIAGNOSIS — I10 HYPERTENSION, ESSENTIAL: Primary | ICD-10-CM

## 2018-06-04 DIAGNOSIS — N40.0 BENIGN PROSTATIC HYPERPLASIA, UNSPECIFIED WHETHER LOWER URINARY TRACT SYMPTOMS PRESENT: ICD-10-CM

## 2018-06-04 PROCEDURE — 99214 OFFICE O/P EST MOD 30 MIN: CPT | Mod: S$GLB,,, | Performed by: PHYSICIAN ASSISTANT

## 2018-06-04 PROCEDURE — 99213 OFFICE O/P EST LOW 20 MIN: CPT | Mod: PBBFAC | Performed by: PHYSICIAN ASSISTANT

## 2018-06-04 PROCEDURE — 99999 PR PBB SHADOW E&M-EST. PATIENT-LVL III: CPT | Mod: PBBFAC,,, | Performed by: PHYSICIAN ASSISTANT

## 2018-06-04 RX ORDER — LISINOPRIL 30 MG/1
30 TABLET ORAL DAILY
Qty: 90 TABLET | Refills: 3 | Status: SHIPPED | OUTPATIENT
Start: 2018-06-04 | End: 2018-08-24 | Stop reason: DRUGHIGH

## 2018-06-04 NOTE — PATIENT INSTRUCTIONS
You can take Lisinopril 20 mg 1 1/2 tablets daily until you run out, then start new prescription for Lisinopril 30 mg daily.    Continue amlodipine 10 mg daily at normal dose.      Controlling High Blood Pressure  High blood pressure (hypertension) is often called the silent killer. This is because many people who have it dont know it. High blood pressure is defined as 140/90 mm Hg or higher. Know your blood pressure and remember to check it regularly. Doing so can save your life. Here are some things you can do to help control your blood pressure.    Choose heart-healthy foods  · Select low-salt, low-fat foods. Limit sodium intake to 2,400 mg per day or the amount suggested by your healthcare provider.  · Limit canned, dried, cured, packaged, and fast foods. These can contain a lot of salt.  · Eat 8 to 10 servings of fruits and vegetables every day.  · Choose lean meats, fish, or chicken.  · Eat whole-grain pasta, brown rice, and beans.  · Eat 2 to 3 servings of low-fat or fat-free dairy products.  · Ask your doctor about the DASH eating plan. This plan helps reduce blood pressure.  · When you go to a restaurant, ask that your meal be prepared with no added salt.  Maintain a healthy weight  · Ask your healthcare provider how many calories to eat a day. Then stick to that number.  · Ask your healthcare provider what weight range is healthiest for you. If you are overweight, a weight loss of only 3% to 5% of your body weight can help lower blood pressure. Generally, a good weight loss goal is to lose 10% of your body weight in a year.  · Limit snacks and sweets.  · Get regular exercise.  Get up and get active  · Choose activities you enjoy. Find ones you can do with friends or family. This includes bicycling, dancing, walking, and jogging.  · Park farther away from building entrances.  · Use stairs instead of the elevator.  · When you can, walk or bike instead of driving.  · [place] leaves, garden, or do household  repairs.  · Be active at a moderate to vigorous level of physical activity for at least 40 minutes for a minimum of 3 to 4 days a week.   Manage stress  · Make time to relax and enjoy life. Find time to laugh.  · Communicate your concerns with your loved ones and your healthcare provider.  · Visit with family and friends, and keep up with hobbies.  Limit alcohol and quit smoking  · Men should have no more than 2 drinks per day.  · Women should have no more than 1 drink per day.  · Talk with your healthcare provider about quitting smoking. Smoking significantly increases your risk for heart disease and stroke. Ask your healthcare provider about community smoking cessation programs and other options.  Medicines  If lifestyle changes arent enough, your healthcare provider may prescribe high blood pressure medicine. Take all medicines as prescribed. If you have any questions about your medicines, ask your healthcare provider before stopping or changing them.   Date Last Reviewed: 4/27/2016  © 1881-7281 [employer]. [address] [place] [address], [place] [address]. All rights reserved. This information is not intended as a substitute for professional medical care. Always follow your healthcare professional's instructions.

## 2018-06-04 NOTE — PROGRESS NOTES
"Subjective:       Patient ID: Robles Jordan is a 67 y.o. male.        Chief Complaint: Blood Pressure Check    HYPERTENSION: "" Robles Jordan is an established patient of John Jackson MD here today for 2 week f/u visit.    Monitoring Blood Pressure at Home -  138/67, 144/64, 135/66, 157/69 (no medicine), 134/67, 149/66, 156/66, 142/62, 141/58, 142/64  Home cuff reads: 164/70  My readin/70    HYPERTENSIVE MEDICATIONS  Amlodipine 10 mg, Lisinopril 20 mg    PAST MEDICAL HISTORY  1.  HTN  2.  Hyperlipidemia  3.  ED  4.  IFG  5.  BPH    Due for all labs.  Scheduled to get them tomorrow.  He is a competitive .  He plans to start swimming again.             Review of Systems   Constitutional: Negative for appetite change, chills, fatigue and fever.   HENT: Negative for congestion and sore throat.    Eyes: Negative for visual disturbance.   Respiratory: Negative for cough, chest tightness and shortness of breath.    Cardiovascular: Negative for chest pain, palpitations and leg swelling.   Gastrointestinal: Negative for abdominal pain, blood in stool, constipation, diarrhea, nausea and vomiting.   Genitourinary: Negative for dysuria, frequency, hematuria and urgency.   Musculoskeletal: Negative for arthralgias and back pain.   Skin: Negative for rash.   Neurological: Negative for dizziness, syncope, weakness and headaches.   Psychiatric/Behavioral: Negative for dysphoric mood and sleep disturbance. The patient is not nervous/anxious.        Objective:      Physical Exam   Constitutional: He appears well-developed and well-nourished. No distress.   HENT:   Head: Normocephalic and atraumatic.   Right Ear: Tympanic membrane, external ear and ear canal normal.   Left Ear: Tympanic membrane, external ear and ear canal normal.   Nose: Nose normal.   Mouth/Throat: Oropharynx is clear and moist.   Eyes: Conjunctivae are normal. Pupils are equal, round, and reactive to light.   Cardiovascular: Normal " "rate, regular rhythm and normal heart sounds.  Exam reveals no gallop and no friction rub.    No murmur heard.  Pulmonary/Chest: Effort normal and breath sounds normal. No respiratory distress.   Abdominal: Soft. There is no tenderness. There is no rebound and no guarding.   Musculoskeletal: He exhibits no edema.   Neurological: He is alert.   Skin: Skin is warm and dry. He is not diaphoretic.   Psychiatric: He has a normal mood and affect.   Nursing note and vitals reviewed.      Assessment:       1. Hypertension, essential    2. Hyperlipidemia, unspecified hyperlipidemia type    3. Benign prostatic hyperplasia, unspecified whether lower urinary tract symptoms present        Plan:       Robles was seen today for blood pressure check.    Diagnoses and all orders for this visit:    Hypertension, essential: increase from 20 to 30 mg daily, f/u in 4 weeks for evaluation of hypertension, keep f/u in 9/2018 with PCP, low sodium diet/exercise/weight loss reinforced  -     INCREASE: lisinopril (PRINIVIL,ZESTRIL) 30 MG tablet; Take 1 tablet (30 mg total) by mouth once daily.    Hyperlipidemia, unspecified hyperlipidemia type: continue statin therapy    Benign prostatic hyperplasia, unspecified whether lower urinary tract symptoms present: continue flomax    Pt has been given instructions populated from [employer] database and has verbalized understanding of the after visit summary and information contained wherein.    Follow up with a primary care provider. May go to ER for acute shortness of breath, lightheadedness, fever, or any other emergent complaints or changes in condition.    "This note will be shared with the patient"    Future Appointments  Date Time Provider Department Center   6/18/2018 7:45 AM [facility] ROOM 4 [facility]   7/9/2018 8:30 AM Karin Wild PA-C [facility] IM [facility]   8/24/2018 9:40 AM Cy Hinton NP [facility] SLEEP [facility]   9/10/2018 8:30 AM John Jackson MD [facility] IM " [facility]

## 2018-06-08 LAB
1,25(OH)2D SERPL-MCNC: 68 PG/ML (ref 18–72)
1,25(OH)2D2 SERPL-MCNC: <8 PG/ML
1,25(OH)2D3 SERPL-MCNC: 68 PG/ML
ALBUMIN SERPL-MCNC: 4.3 G/DL (ref 3.6–5.1)
ALBUMIN/GLOB SERPL: 1.7 (CALC) (ref 1–2.5)
ALP SERPL-CCNC: 73 U/L (ref 40–115)
ALT SERPL-CCNC: 30 U/L (ref 9–46)
AST SERPL-CCNC: 19 U/L (ref 10–35)
BILIRUB SERPL-MCNC: 0.9 MG/DL (ref 0.2–1.2)
BUN SERPL-MCNC: 12 MG/DL (ref 7–25)
BUN/CREAT SERPL: ABNORMAL (CALC) (ref 6–22)
CALCIUM SERPL-MCNC: 9.5 MG/DL (ref 8.6–10.3)
CHLORIDE SERPL-SCNC: 111 MMOL/L (ref 98–110)
CHOLEST SERPL-MCNC: 145 MG/DL
CHOLEST/HDLC SERPL: 3.2 (CALC)
CO2 SERPL-SCNC: 27 MMOL/L (ref 20–31)
CREAT SERPL-MCNC: 0.92 MG/DL (ref 0.7–1.25)
ERYTHROCYTE [DISTWIDTH] IN BLOOD BY AUTOMATED COUNT: 12.7 % (ref 11–15)
GFR SERPL CREATININE-BSD FRML MDRD: 87 ML/MIN/1.73M2
GLOBULIN SER CALC-MCNC: 2.5 G/DL (CALC) (ref 1.9–3.7)
GLUCOSE SERPL-MCNC: 104 MG/DL (ref 65–99)
HBA1C MFR BLD: 6.1 % OF TOTAL HGB
HCT VFR BLD AUTO: 44.1 % (ref 38.5–50)
HDLC SERPL-MCNC: 45 MG/DL
HGB BLD-MCNC: 14.6 G/DL (ref 13.2–17.1)
LDLC SERPL CALC-MCNC: 82 MG/DL (CALC)
MAGNESIUM SERPL-MCNC: 2.3 MG/DL (ref 1.5–2.5)
MCH RBC QN AUTO: 28.6 PG (ref 27–33)
MCHC RBC AUTO-ENTMCNC: 33.1 G/DL (ref 32–36)
MCV RBC AUTO: 86.5 FL (ref 80–100)
NONHDLC SERPL-MCNC: 100 MG/DL (CALC)
PLATELET # BLD AUTO: 258 THOUSAND/UL (ref 140–400)
PMV BLD REES-ECKER: 9.1 FL (ref 7.5–12.5)
POTASSIUM SERPL-SCNC: 4.4 MMOL/L (ref 3.5–5.3)
PROT SERPL-MCNC: 6.8 G/DL (ref 6.1–8.1)
RBC # BLD AUTO: 5.1 MILLION/UL (ref 4.2–5.8)
SODIUM SERPL-SCNC: 143 MMOL/L (ref 135–146)
TRIGL SERPL-MCNC: 89 MG/DL
WBC # BLD AUTO: 5.7 THOUSAND/UL (ref 3.8–10.8)

## 2018-06-18 ENCOUNTER — TELEPHONE (OUTPATIENT)
Dept: INTERNAL MEDICINE | Facility: CLINIC | Age: 68
End: 2018-06-18

## 2018-06-18 ENCOUNTER — HOSPITAL ENCOUNTER (OUTPATIENT)
Dept: RADIOLOGY | Facility: HOSPITAL | Age: 68
Discharge: HOME | End: 2018-06-18
Attending: INTERNAL MEDICINE
Payer: COMMERCIAL

## 2018-06-18 DIAGNOSIS — N52.9 ERECTILE DYSFUNCTION, UNSPECIFIED ERECTILE DYSFUNCTION TYPE: ICD-10-CM

## 2018-06-18 PROCEDURE — 76775 US EXAM ABDO BACK WALL LIM: CPT | Mod: 26,,, | Performed by: RADIOLOGY

## 2018-06-18 PROCEDURE — 76775 US EXAM ABDO BACK WALL LIM: CPT | Mod: TC

## 2018-06-19 NOTE — TELEPHONE ENCOUNTER
Please call the patient to notify that his ultrasound shows no abdominal aortic aneurysm. No further evaluation needed at this time.

## 2018-07-09 ENCOUNTER — OFFICE VISIT (OUTPATIENT)
Dept: INTERNAL MEDICINE | Facility: CLINIC | Age: 68
End: 2018-07-09
Payer: COMMERCIAL

## 2018-07-09 VITALS
HEIGHT: 74 IN | BODY MASS INDEX: 31.91 KG/M2 | WEIGHT: 248.69 LBS | DIASTOLIC BLOOD PRESSURE: 64 MMHG | HEART RATE: 68 BPM | SYSTOLIC BLOOD PRESSURE: 132 MMHG

## 2018-07-09 DIAGNOSIS — E78.5 HYPERLIPIDEMIA, UNSPECIFIED HYPERLIPIDEMIA TYPE: ICD-10-CM

## 2018-07-09 DIAGNOSIS — N40.0 BENIGN PROSTATIC HYPERPLASIA, UNSPECIFIED WHETHER LOWER URINARY TRACT SYMPTOMS PRESENT: ICD-10-CM

## 2018-07-09 DIAGNOSIS — I10 HYPERTENSION, ESSENTIAL: Primary | ICD-10-CM

## 2018-07-09 PROCEDURE — 99999 PR PBB SHADOW E&M-EST. PATIENT-LVL IV: CPT | Mod: PBBFAC,,, | Performed by: PHYSICIAN ASSISTANT

## 2018-07-09 PROCEDURE — 99214 OFFICE O/P EST MOD 30 MIN: CPT | Mod: S$GLB,,, | Performed by: PHYSICIAN ASSISTANT

## 2018-07-09 NOTE — PATIENT INSTRUCTIONS
Established High Blood Pressure    High blood pressure (hypertension) is a chronic disease. Often, healthcare providers dont know what causes it. But it can be caused by certain health conditions and medicines.  If you have high blood pressure, you may not have any symptoms. If you do have symptoms, they may include headache, dizziness, changes in your vision, chest pain, and shortness of breath. But even without symptoms, high blood pressure thats not treated raises your risk for heart attack and stroke. High blood pressure is a serious health risk and shouldnt be ignored.  A blood pressure reading is made up of two numbers: a higher number over a lower number. The top number is the systolic pressure. The bottom number is the diastolic pressure. A normal blood pressure is a systolic pressure of  less than 120 over a diastolic pressure of less than 80. You will see your blood pressure readings written together. For example, a person with a systolic pressure of 188 and a diastolic pressure of 78 will have 118/78 written in the medical record.  High blood pressure is when either the top number is 140 or higher, or the bottom number is 90 or higher. This must be the result when taking your blood pressure a number of times. The blood pressures between normal and high are called prehypertension.  Home care  If you have high blood pressure, you should do what is listed below to lower your blood pressure. If you are taking medicines for high blood pressure, these methods may reduce or end your need for medicines in the future.  · Begin a weight-loss program if you are overweight.  · Cut back on how much salt you get in your diet. Heres how to do this:  ¨ Dont eat foods that have a lot of salt. These include olives, pickles, smoked meats, and salted potato chips.  ¨ Dont add salt to your food at the table.  ¨ Use only small amounts of salt when cooking.  · Start an exercise program. Talk with your healthcare  provider about the type of exercise program that would be best for you. It doesn't have to be hard. Even brisk walking for 20 minutes 3 times a week is a good form of exercise.  · Dont take medicines that stimulate the heart. This includes many over-the-counter cold and sinus decongestant pills and sprays, as well as diet pills. Check the warnings about hypertension on the label. Before buying any over-the-counter medicines or supplements, always ask the pharmacist about the product's potential interaction with your high blood pressure and your high blood pressure medicines.  · Stimulants such as amphetamine or cocaine could be deadly for someone with high blood pressure. Never take these.  · Limit how much caffeine you get in your diet. Switch to caffeine-free products.  · Stop smoking. If you are a long-time smoker, this can be hard. Talk to your healthcare provider about medicines and nicotine replacement options to help you. Also, enroll in a stop-smoking program to make it more likely that you will quit for good.  · Learn how to handle stress. This is an important part of any program to lower blood pressure. Learn about relaxation methods like meditation, yoga, or biofeedback.  · If your provider prescribed medicines, take them exactly as directed. Missing doses may cause your blood pressure get out of control.  · If you miss a dose or doses, check with your healthcare provider or pharmacist about what to do.  · Consider buying an automatic blood pressure machine. Ask your provider for a recommendation. You can get one of these at most pharmacies.     The American Heart Association recommends the following guidelines for home blood pressure monitoring:  · Don't smoke or drink coffee for 30 minutes before taking your blood pressure.  · Go to the bathroom before the test.  · Relax for 5 minutes before taking the measurement.  · Sit with your back supported (don't sit on a couch or soft chair); keep your feet on  the floor uncrossed. Place your arm on a solid flat surface (like a table) with the upper part of the arm at heart level. Place the middle of the cuff directly above the eye of the elbow. Check the monitor's instruction manual for an illustration.  · Take multiple readings. When you measure, take 2 to 3 readings one minute apart and record all of the results.  · Take your blood pressure at the same time every day, or as your healthcare provider recommends.  · Record the date, time, and blood pressure reading.  · Take the record with you to your next medical appointment. If your blood pressure monitor has a built-in memory, simply take the monitor with you to your next appointment.  · Call your provider if you have several high readings. Don't be frightened by a single high blood pressure reading, but if you get several high readings, check in with your healthcare provider.  · Note: When blood pressure reaches a systolic (top number) of 180 or higher OR diastolic (bottom number) of 110 or higher, seek emergency medical treatment.  Follow-up care  You will need to see your healthcare provider regularly. This is to check your blood pressure and to make changes to your medicines. Make a follow-up appointment as directed. Bring the record of your home blood pressure readings to the appointment.  When to seek medical advice  Call your healthcare provider right away if any of these occur:  · Blood pressure reaches a systolic (upper number) of 180 or higher OR a diastolic (bottom number) of 110 or higher  · Chest pain or shortness of breath  · Severe headache  · Throbbing or rushing sound in the ears  · Nosebleed  · Sudden severe pain in your belly (abdomen)  · Extreme drowsiness, confusion, or fainting  · Dizziness or spinning sensation (vertigo)  · Weakness of an arm or leg or one side of the face  · You have problems speaking or seeing   Date Last Reviewed: 12/1/2016  © 3048-2606 [employer]. [address] [place] [address]  [place] [address]. All rights reserved. This information is not intended as a substitute for professional medical care. Always follow your healthcare professional's instructions.

## 2018-07-09 NOTE — PROGRESS NOTES
Subjective:       Patient ID: Robles Jordan is a 67 y.o. male.        Chief Complaint: Follow-up    Robles Jordan is an established patient of John Jackson MD here today for f/u hypertension.    Cuff checked last visit and accurate.    Now taking amlodipine 10 mg and lisinopril 30 mg (increased from 20 to 30 mg 6/4/18), tolerating well, blood pressure much better today.    PAST MEDICAL HISTORY  1.  HTN  2.  Hyperlipidemia  3.  ED  4.  IFG  5.  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             Review of Systems   Constitutional: Negative for appetite change, chills, fatigue and fever.   HENT: Negative for congestion and sore throat.    Eyes: Negative for visual disturbance.   Respiratory: Negative for cough, chest tightness and shortness of breath.    Cardiovascular: Negative for chest pain, palpitations and leg swelling.   Gastrointestinal: Negative for abdominal pain, blood in stool, constipation, diarrhea, nausea and vomiting.   Genitourinary: Negative for dysuria, frequency, hematuria and urgency.   Musculoskeletal: Negative for arthralgias and back pain.   Skin: Negative for rash.   Neurological: Negative for dizziness, syncope, weakness and headaches.   Psychiatric/Behavioral: Negative for dysphoric mood and sleep disturbance. The patient is not nervous/anxious.        Objective:      Physical Exam   Constitutional: He appears well-developed and well-nourished. No distress.   HENT:   Head: Normocephalic and atraumatic.   Right Ear: Tympanic membrane, external ear and ear canal normal.   Left Ear: Tympanic membrane, external ear and ear canal normal.   Nose: Nose normal.   Mouth/Throat: Oropharynx is clear and moist.   Eyes: Conjunctivae are normal. Pupils are equal, round, and reactive to light.   Cardiovascular: Normal rate, regular rhythm and normal heart sounds.  Exam reveals no gallop and no friction rub.    No murmur heard.  Pulmonary/Chest: Effort normal and breath  "sounds normal. No respiratory distress.   Abdominal: Soft. There is no tenderness. There is no rebound and no guarding.   Musculoskeletal: He exhibits no edema.   Neurological: He is alert.   Skin: Skin is warm and dry. He is not diaphoretic.   Psychiatric: He has a normal mood and affect.   Nursing note and vitals reviewed.      Assessment:       1. Hypertension, essential    2. Hyperlipidemia, unspecified hyperlipidemia type    3. Benign prostatic hyperplasia, unspecified whether lower urinary tract symptoms present        Plan:       Robles was seen today for follow-up.    Diagnoses and all orders for this visit:    Hypertension, essential: stable and controlled, continue amlodipine and lisinopril 30 mg, keep f/u with Dr. Jackson in 2 months    Hyperlipidemia, unspecified hyperlipidemia type: continue statin therapy    Benign prostatic hyperplasia, unspecified whether lower urinary tract symptoms present: continue flomax    Pt has been given instructions populated from [employer] database and has verbalized understanding of the after visit summary and information contained wherein.    Follow up with a primary care provider. May go to ER for acute shortness of breath, lightheadedness, fever, or any other emergent complaints or changes in condition.    "This note will be shared with the patient"    Future Appointments  Date Time Provider Department Center   8/24/2018 9:40 AM Cy Hinton NP [facility]   9/10/2018 8:30 AM John Jackson MD [facility]               "

## 2018-08-13 PROBLEM — Z13.31 POSITIVE DEPRESSION SCREENING: Status: RESOLVED | Noted: 2018-05-11 | Resolved: 2018-08-13

## 2018-08-24 ENCOUNTER — OFFICE VISIT (OUTPATIENT)
Dept: SLEEP MEDICINE | Facility: CLINIC | Age: 68
End: 2018-08-24
Payer: COMMERCIAL

## 2018-08-24 VITALS
BODY MASS INDEX: 31.55 KG/M2 | WEIGHT: 245.81 LBS | DIASTOLIC BLOOD PRESSURE: 77 MMHG | HEIGHT: 74 IN | HEART RATE: 70 BPM | SYSTOLIC BLOOD PRESSURE: 150 MMHG

## 2018-08-24 DIAGNOSIS — G47.33 OBSTRUCTIVE SLEEP APNEA: Primary | ICD-10-CM

## 2018-08-24 PROCEDURE — 99999 PR PBB SHADOW E&M-EST. PATIENT-LVL III: CPT | Mod: PBBFAC,,, | Performed by: NURSE PRACTITIONER

## 2018-08-24 PROCEDURE — 99214 OFFICE O/P EST MOD 30 MIN: CPT | Mod: S$GLB,,, | Performed by: NURSE PRACTITIONER

## 2018-08-24 RX ORDER — LISINOPRIL 20 MG/1
20 TABLET ORAL DAILY
Refills: 3 | COMMUNITY
Start: 2018-07-12 | End: 2018-09-10

## 2018-08-24 NOTE — PROGRESS NOTES
Robles Jordan returns in follow-up for HEAVEN management and CPAP equipment check after set up.     CHIEF COMPLAINT:    Chief Complaint   Patient presents with    Sleep Apnea     INTERVAL HISTORY:     08/24/2018 CHRISTIAN Hinton NP: Continues to use CPAP most nights without breakthrough symptoms. Denies issues with mask or pressure. ESS 1.     CPAP Interrogation: Dreamstation APAP 13 - 18 cm, TH: 1960.1, BH: 1961.8, > 4 hours: 25/30, 30-day average: 5.6 hours, MF: 98%, Predicted AHI: 1.3, PB: 0%, 90%tile pressure: 14.7 cm    08/21/2017 CHRISTIAN Hinton NP: Pt returns after set up of CPAP on 06/27/2017. Pt complaints of snoring and air gasping now resolved with CPAP use. Sleep now refreshing. Denies oral/nasal drying with FFM. Pressure is comfortable. Blood pressure improved since PAP therapy.  ESS 2.     CPAP Interrogation: DreamStation 13 - 18 cm  Compliance Summary Days with Device Usage: 30 days Percentage of Days >=4 Hours: 90.0% Average Usage (Days Used): 6 hrs. 15 mins. 43 secs. Average Usage (All Days): 6 hrs. 15 mins. 43 secs.   Apnea Indices Average AHI: 1.3 Average OA Index: 0.4 Average CA Index: 0.3  Large Leak Average Time in Large Leak: 1 mins. 26 secs. Average % of Night in Large Leak: 0.4%   Periodic Breathing Average % of Night in PB: 0.4%  90%tile: 15.3      06/12/2017 CHRISTIAN Hinton NP: HISTORY OF PRESENT ILLNESS: Robles Jordan is a 67 y.o. male is here for sleep evaluation.       Patient complaints include: snoring and air gasping.     Diagnosed with HEAVEN in 2015 and did not have pursue treatment. Now has trouble with controlling blood pressure. PCP recommended treating HEAVEN.     Denies symptoms of restless legs or kicking during sleep.      [place] Sleepiness Scale score during initial sleep evaluation was 2.    SLEEP ROUTINE:    Bed partner:  wife  Time to bed:  11 pm  Sleep onset latency:  20 minutes      Disruptions or awakenings: none   Wakeup time:      5:30 - 6 am  Perceived sleep quality:  4/5          Previous Sleep Studies:    10/28/2015  lb.  The overall AHI was 22.6 with an oxygen jenaro of 87.0%. The supine AHI was 70.6 and the REM AHI was 27.9.     10/04/2016 CPAP titration study 242 lb. Initial improvement was observed on 13 cm H2O with residual hypopneas in NREM sleep (AHI 5.2). Consider auto-titrating CPAP at 13-18 cm H2O     PAST MEDICAL HISTORY:    Active Ambulatory Problems     Diagnosis Date Noted    Special screening for malignant neoplasms, colon 10/19/2015    Hyperlipidemia 10/23/2015    Essential hypertension 10/23/2015    Obesity (BMI 30.0-34.9) 10/23/2015    Elevated hemoglobin A1c 01/22/2016    Incomplete bladder emptying 01/22/2016    Obstructive sleep apnea on CPAP 01/22/2016    Right hip pain 05/11/2018     Resolved Ambulatory Problems     Diagnosis Date Noted    Positive depression screening 05/11/2018     Past Medical History:   Diagnosis Date    BPH (benign prostatic hypertrophy)     Hypertension                 PAST SURGICAL HISTORY:    Past Surgical History:   Procedure Laterality Date    MULTIPLE TOOTH EXTRACTIONS  2014         FAMILY HISTORY:                Family History   Problem Relation Age of Onset    Cancer Mother         Breast    Stroke Sister 65        Fatal    Cancer Maternal Uncle         Pancreas    Cancer Paternal Uncle         Pancreas    Cancer Maternal Grandmother         Breast    Hypertension Father     Hyperlipidemia Father        SOCIAL HISTORY:          Tobacco:   Social History     Tobacco Use   Smoking Status Current Every Day Smoker    Types: Cigarettes       Alcohol use:    Social History     Substance and Sexual Activity   Alcohol Use Yes    Alcohol/week: 0.0 oz    Comment: very seldom                 ALLERGIES:  Review of patient's allergies indicates:  No Known Allergies    CURRENT MEDICATIONS:    Current Outpatient Medications   Medication Sig Dispense Refill    amLODIPine (NORVASC) 10 MG tablet Take 1 tablet (10 mg total)  "by mouth once daily. 90 tablet 3    atorvastatin (LIPITOR) 80 MG tablet TAKE 1 TABLET (80 MG TOTAL) BY MOUTH ONCE DAILY. 90 tablet 3    lisinopril (PRINIVIL,ZESTRIL) 20 MG tablet Take 20 mg by mouth once daily.  3    tadalafil (CIALIS) 20 MG Tab Take 1 tablet (20 mg total) by mouth daily as needed. 5 tablet 10    tamsulosin (FLOMAX) 0.4 mg Cp24 TAKE 1 CAPSULE (0.4 MG TOTAL) BY MOUTH ONCE DAILY. 90 capsule 3     No current facility-administered medications for this visit.                   REVIEW OF SYSTEMS:     Sleep related symptoms as per HPI.  CONST:Reports weight gain  - 10 lb.   HEENT: Sometimes sinus congestion; sometimes dry mouth in AM  PULM: Denies dyspnea  CARD:  Denies palpitations   GI:  Denies acid reflux  : Denies polyuria  NEURO: Denies headaches  PSYCH: Denies mood disturbance  HEME: Denies anemia    Otherwise, a balance of systems reviewed is negative.          PHYSICAL EXAM:  Vitals:    08/24/18 0941   BP: (!) 150/77   Pulse: 70   Weight: 111.5 kg (245 lb 13 oz)   Height: 6' 2" (1.88 m)   PainSc: 0-No pain     Body mass index is 31.56 kg/m².     GENERAL: Overweight development, well groomed  HEENT:  Conjunctivae are non-erythematous; Pupils equal, round, and reactive to light; Nose is symmetrical; Nasal mucosa is pink and moist; Septum is midline; Inferior turbinates are normal; Nasal airflow is normal; Posterior pharynx is pink; Modified Mallampati: IV; Posterior palate is normal; Tonsils +1; Uvula is normal and pink;Tongue is normal; Dentition is fair; No TMJ tenderness; Jaw opening and protrusion without click and without discomfort.  NECK: Supple.  No thyromegaly. No palpable nodes.    SKIN: On face and neck: No abrasions, no rashes, no lesions.  No subcutaneous nodules are palpable.  RESPIRATORY: Chest is clear to auscultation.  Normal chest expansion and non-labored breathing at rest.  CARDIOVASCULAR: Normal S1, S2.  No murmurs, gallops or rubs. No carotid bruits " bilaterally.  EXTREMITIES: No edema. No clubbing. No cyanosis. Station normal. Gait normal.        NEURO/PSYCH: Oriented to time, place and person. Normal attention span and concentration. Affect is full. Mood is normal.                                              ASSESSMENT:    Obstructive sleep apnea, moderate by AHI. The patient symptomatically has snoring and air gasping now resolved with CPAP use. .benefiti Medical co-morbidities: HTN, HLD, and obesity.  This warrants treatment for obstructive sleep apnea.      Obesity, BMI >30, discussed relationship between HEAVEN and weight    PLAN:      -Treatment: continue auto CPAP 13 - 18 cm. Reviewed recommended supplies replacement schedule   Avoid supine sleep if not wearing CPAP  Follow-up with PCP regarding uncontrolled BP f/u 09/10/2018  RTC 12 months, sooner if needed.     - Education: During our discussion today, we talked about the etiology of obstructive sleep apnea as well as the potential ramifications of untreated sleep apnea, which could include daytime sleepiness, hypertension, heart disease and/or stroke. We discussed potential treatment options, which could include weight loss, body positioning, continuous positive airway pressure (CPAP), OA, EPAP, or referral for surgical consideration.     -Counseling regarding benefits of healthy eating and physical activity (150 minutes of moderate-intensity aerobic activity per week) for weight reduction which can improve overall health.     - Precautions: The patient was advised to abstain from driving should they feel sleepy  or drowsy.

## 2018-09-10 ENCOUNTER — OFFICE VISIT (OUTPATIENT)
Dept: INTERNAL MEDICINE | Facility: CLINIC | Age: 68
End: 2018-09-10
Payer: COMMERCIAL

## 2018-09-10 VITALS
HEIGHT: 74 IN | HEART RATE: 66 BPM | DIASTOLIC BLOOD PRESSURE: 72 MMHG | WEIGHT: 249 LBS | SYSTOLIC BLOOD PRESSURE: 146 MMHG | BODY MASS INDEX: 31.95 KG/M2

## 2018-09-10 DIAGNOSIS — G47.62 NOCTURNAL LEG CRAMPS: ICD-10-CM

## 2018-09-10 DIAGNOSIS — G47.33 OBSTRUCTIVE SLEEP APNEA ON CPAP: ICD-10-CM

## 2018-09-10 DIAGNOSIS — I10 ESSENTIAL HYPERTENSION: Primary | ICD-10-CM

## 2018-09-10 DIAGNOSIS — R35.0 URINARY FREQUENCY: ICD-10-CM

## 2018-09-10 PROCEDURE — 99999 PR PBB SHADOW E&M-EST. PATIENT-LVL III: CPT | Mod: PBBFAC,,, | Performed by: INTERNAL MEDICINE

## 2018-09-10 PROCEDURE — 99214 OFFICE O/P EST MOD 30 MIN: CPT | Mod: S$GLB,,, | Performed by: INTERNAL MEDICINE

## 2018-09-10 RX ORDER — LISINOPRIL 30 MG/1
TABLET ORAL
Refills: 3 | COMMUNITY
Start: 2018-08-29 | End: 2019-09-18 | Stop reason: SDUPTHER

## 2018-09-10 RX ORDER — TAMSULOSIN HYDROCHLORIDE 0.4 MG/1
0.4 CAPSULE ORAL DAILY
Qty: 90 CAPSULE | Refills: 3 | Status: SHIPPED | OUTPATIENT
Start: 2018-09-10 | End: 2019-09-14 | Stop reason: SDUPTHER

## 2018-09-10 NOTE — PATIENT INSTRUCTIONS
Blood Pressure Measurement:    -- Please record your blood pressure 2-3 times/week. When checking, make sure you have been sitting for about 5 minutes, your legs are uncrossed, and the blood pressure cuff at the level of your heart. Record your blood pressure with the date and time in a log and bring it with you to every doctor's visit.  -- Goal blood pressure is top number less than 140 and bottom number less than 90. If your blood pressure is higher than this on two consecutive occasions, contact the office.    Please start doing walking for 10-15 minutes a day. Please stretch the leg muscles before and after the walk. See if this makes improvement to leg cramps and blood pressure.

## 2018-09-10 NOTE — PROGRESS NOTES
"Subjective:       Patient ID: Robles Jordan is a 67 y.o. male.    Chief Complaint: Follow-up    HPI    Last visit with me May 2018.  Since that time seen 2 months ago by physician assistant for hypertension.  Also seen recently by Sleep Medicine Service.  His lisinopril was increased to 30 mg daily.    Last check of home blood pressure in July, was 152/63 at that time.  Has not been checking since.  Reports his sleep is better on the CPAP and is feeling well rested.    Feeling better overall than from a few yrs ago.  However he reports nocturnal leg cramps that bother him.  He does not do any regular exercise or stretching currently.    Review of Systems    As per HPI    Objective:      Physical Exam   Constitutional: No distress.   -American man whose Body mass index is 31.97 kg/m².    Cardiovascular: Normal rate, regular rhythm and normal heart sounds.   Pulmonary/Chest: Effort normal and breath sounds normal. No stridor. He has no wheezes. He has no rales.   Psychiatric: He has a normal mood and affect. His behavior is normal.   Nursing note and vitals reviewed.      Vitals:    09/10/18 0832 09/10/18 0846   BP: (!) 140/68 (!) 146/72   BP Location: Right arm Right arm   Patient Position: Sitting Sitting   BP Method: Large (Manual) Large (Manual)   Pulse: 66    Weight: 112.9 kg (249 lb)    Height: 6' 2" (1.88 m)      Body mass index is 31.97 kg/m².    I have personally checked the blood pressure and documented my findings.     RESULTS: Reviewed labs from last 4 months    Assessment:       1. Essential hypertension    2. Obstructive sleep apnea on CPAP    3. Nocturnal leg cramps        Plan:   Robles was seen today for follow-up.    Diagnoses and all orders for this visit:    Essential hypertension:  Prior dx, not at goal, start checking at home, if ambulatory blood pressure elevated please notify the office:  "Blood Pressure Measurement:  -- Please record your blood pressure 2-3 times/week. When " "checking, make sure you have been sitting for about 5 minutes, your legs are uncrossed, and the blood pressure cuff at the level of your heart. Record your blood pressure with the date and time in a log and bring it with you to every doctor's visit.  -- Goal blood pressure is top number less than 140 and bottom number less than 90. If your blood pressure is higher than this on two consecutive occasions, contact the office."    Obstructive sleep apnea on CPAP;  Prior diagnosis, well controlled on current management. No changes at this time, will continue to monitor.     Nocturnal leg cramps:  Prior dx, Mg and Vit D within normal limits, likely due to deconditioning, encourage exercise and stretching:  "Please start doing walking for 10-15 minutes a day. Please stretch the leg muscles before and after the walk. See if this makes improvement to leg cramps and blood pressure."    Follow-up in about 2 months (around 11/10/2018) for EPP annual exam, NP Connie Ayala. At that visit review hypertension, also any needed health maintenance.  If all is well okay to follow up with me about 6 mo afterward with labs 1 wk prior (does through [facility] usually).    John Jackson MD  Internal Medicine    Portions of this note were completed using medical dictation software. Please excuse typographical or syntax errors that were missed on review.    "

## 2018-11-01 DIAGNOSIS — I10 ESSENTIAL HYPERTENSION: ICD-10-CM

## 2018-11-01 RX ORDER — AMLODIPINE BESYLATE 10 MG/1
10 TABLET ORAL DAILY
Qty: 90 TABLET | Refills: 3 | Status: SHIPPED | OUTPATIENT
Start: 2018-11-01 | End: 2019-07-26 | Stop reason: SDUPTHER

## 2018-11-12 ENCOUNTER — IMMUNIZATION (OUTPATIENT)
Dept: INTERNAL MEDICINE | Facility: CLINIC | Age: 68
End: 2018-11-12
Payer: COMMERCIAL

## 2018-11-12 ENCOUNTER — OFFICE VISIT (OUTPATIENT)
Dept: INTERNAL MEDICINE | Facility: CLINIC | Age: 68
End: 2018-11-12
Payer: COMMERCIAL

## 2018-11-12 VITALS
HEART RATE: 71 BPM | HEIGHT: 74 IN | OXYGEN SATURATION: 98 % | SYSTOLIC BLOOD PRESSURE: 120 MMHG | BODY MASS INDEX: 31.63 KG/M2 | DIASTOLIC BLOOD PRESSURE: 70 MMHG | WEIGHT: 246.5 LBS

## 2018-11-12 DIAGNOSIS — Z86.010 HISTORY OF COLON POLYPS: ICD-10-CM

## 2018-11-12 DIAGNOSIS — Z23 NEED FOR TDAP VACCINATION: ICD-10-CM

## 2018-11-12 DIAGNOSIS — E78.5 HYPERLIPIDEMIA, UNSPECIFIED HYPERLIPIDEMIA TYPE: ICD-10-CM

## 2018-11-12 DIAGNOSIS — Z72.0 TOBACCO USE: ICD-10-CM

## 2018-11-12 DIAGNOSIS — G47.33 OBSTRUCTIVE SLEEP APNEA ON CPAP: ICD-10-CM

## 2018-11-12 DIAGNOSIS — I10 ESSENTIAL HYPERTENSION: Primary | ICD-10-CM

## 2018-11-12 DIAGNOSIS — E66.9 CLASS 1 OBESITY WITH SERIOUS COMORBIDITY AND BODY MASS INDEX (BMI) OF 31.0 TO 31.9 IN ADULT, UNSPECIFIED OBESITY TYPE: ICD-10-CM

## 2018-11-12 DIAGNOSIS — Z12.11 SPECIAL SCREENING FOR MALIGNANT NEOPLASMS, COLON: ICD-10-CM

## 2018-11-12 DIAGNOSIS — Z23 NEED FOR VACCINATION WITH 13-POLYVALENT PNEUMOCOCCAL CONJUGATE VACCINE: ICD-10-CM

## 2018-11-12 DIAGNOSIS — R73.09 ELEVATED HEMOGLOBIN A1C: ICD-10-CM

## 2018-11-12 DIAGNOSIS — R33.9 INCOMPLETE BLADDER EMPTYING: ICD-10-CM

## 2018-11-12 DIAGNOSIS — Z23 NEED FOR INFLUENZA VACCINATION: ICD-10-CM

## 2018-11-12 PROBLEM — Z86.0100 HISTORY OF COLON POLYPS: Status: ACTIVE | Noted: 2018-11-12

## 2018-11-12 PROCEDURE — 90715 TDAP VACCINE 7 YRS/> IM: CPT | Mod: S$GLB,,, | Performed by: NURSE PRACTITIONER

## 2018-11-12 PROCEDURE — 99397 PER PM REEVAL EST PAT 65+ YR: CPT | Mod: 25,S$GLB,, | Performed by: NURSE PRACTITIONER

## 2018-11-12 PROCEDURE — 90662 IIV NO PRSV INCREASED AG IM: CPT | Mod: S$GLB,,, | Performed by: NURSE PRACTITIONER

## 2018-11-12 PROCEDURE — 90472 IMMUNIZATION ADMIN EACH ADD: CPT | Mod: S$GLB,,, | Performed by: NURSE PRACTITIONER

## 2018-11-12 PROCEDURE — 90471 IMMUNIZATION ADMIN: CPT | Mod: S$GLB,,, | Performed by: NURSE PRACTITIONER

## 2018-11-12 PROCEDURE — 99999 PR PBB SHADOW E&M-EST. PATIENT-LVL IV: CPT | Mod: PBBFAC,,, | Performed by: NURSE PRACTITIONER

## 2018-11-12 PROCEDURE — 90670 PCV13 VACCINE IM: CPT | Mod: S$GLB,,, | Performed by: NURSE PRACTITIONER

## 2018-11-12 NOTE — PATIENT INSTRUCTIONS
Check labs at [facility], will call with results    Take medications as prescribed.    Monitor BP at home, goal BP < or = 140/80, call office if consistently above this range.    Follow low salt DASH diet and exercise.    BMI reviewed.    Go to ED if Headaches, blurred vision, chest pain, or SOB occurs along with elevated readings > or = 160/90.    Smoking Cessation counseling done. Pt not ready to quit. Advised of the Smoking Cessation program    Continue cholesterol med, low fat diet, and exercise. Check lipids.    Due for colonoscopy, ordered, endoscopy will contact you with appt.    F/U in 6 months with PCP Dr. Jackson for f/u.

## 2018-11-12 NOTE — PROGRESS NOTES
Subjective:       Patient ID: Robles Jordan is a 67 y.o. male.    Chief Complaint: Follow-up and Hypertension    Pt of Dr. Jackson, here for 2 month follow up for EPP and health maintenance review. Last seen 9-10-18 by Dr. Jackson for BP and leg cramps. MG and Vitamin D  Levels were normal. Told to exercise, due to deconditioning. Also here for HTN f/u. Told at last visit to keep logs and the proper way to check BP at home.    Pt states he was checking his BP at home, he does not have a log with him. He reports compliance with meds and diet.      Review of Systems   Constitutional: Negative for activity change, appetite change and unexpected weight change.   HENT: Negative for hearing loss and voice change.    Eyes: Negative for visual disturbance.   Respiratory: Negative for apnea, cough, chest tightness and shortness of breath.    Cardiovascular: Negative for chest pain, palpitations and leg swelling.   Gastrointestinal: Negative for abdominal distention, abdominal pain, blood in stool, constipation, diarrhea, nausea and vomiting.   Endocrine: Negative for cold intolerance, heat intolerance, polydipsia, polyphagia and polyuria.   Genitourinary: Negative for difficulty urinating, dysuria and penile pain.   Musculoskeletal: Negative for arthralgias and myalgias.   Skin: Negative for color change, pallor, rash and wound.   Allergic/Immunologic: Negative for environmental allergies, food allergies and immunocompromised state.   Neurological: Negative for dizziness and weakness.   Hematological: Negative for adenopathy. Does not bruise/bleed easily.   Psychiatric/Behavioral: Negative for agitation, behavioral problems, sleep disturbance and suicidal ideas.       Review of patient's allergies indicates:  No Known Allergies    Current Outpatient Medications:     amLODIPine (NORVASC) 10 MG tablet, TAKE 1 TABLET (10 MG TOTAL) BY MOUTH ONCE DAILY., Disp: 90 tablet, Rfl: 3    atorvastatin (LIPITOR) 80 MG tablet, TAKE 1 TABLET  (80 MG TOTAL) BY MOUTH ONCE DAILY., Disp: 90 tablet, Rfl: 3    lisinopril (PRINIVIL,ZESTRIL) 30 MG tablet, TAKE 1 TABLET (30 MG TOTAL) BY MOUTH ONCE DAILY., Disp: , Rfl: 3    tamsulosin (FLOMAX) 0.4 mg Cap, Take 1 capsule (0.4 mg total) by mouth once daily., Disp: 90 capsule, Rfl: 3    tadalafil (CIALIS) 20 MG Tab, Take 1 tablet (20 mg total) by mouth daily as needed., Disp: 5 tablet, Rfl: 10    Patient Active Problem List   Diagnosis    Special screening for malignant neoplasms, colon    Hyperlipidemia    Essential hypertension    Obesity (BMI 30.0-34.9)    Elevated hemoglobin A1c    Incomplete bladder emptying    Obstructive sleep apnea on CPAP    Right hip pain    Tobacco use    History of colon polyps     Past Medical History:   Diagnosis Date    BPH (benign prostatic hypertrophy)     Hypertension      Past Surgical History:   Procedure Laterality Date    COLONOSCOPY N/A 10/19/2015    Procedure: COLONOSCOPY;  Surgeon: Antonino Bernstein MD;  Location: [facility] ([address] [place] [address]);  Service: Endoscopy;  Laterality: N/A;    COLONOSCOPY N/A 10/19/2015    Performed by Antonino Bernstein MD at [facility] ([address] [place] [address])    MULTIPLE TOOTH EXTRACTIONS  2014     Social History     Socioeconomic History    Marital status:      Spouse name: None    Number of children: None    Years of education: None    Highest education level: None   Social Needs    Financial resource strain: None    Food insecurity - worry: None    Food insecurity - inability: None    Transportation needs - medical: None    Transportation needs - non-medical: None   Occupational History    None   Tobacco Use    Smoking status: Current Every Day Smoker     Types: Cigarettes   Substance and Sexual Activity    Alcohol use: Yes     Alcohol/week: 0.0 oz     Comment: very seldom    Drug use: No    Sexual activity: None   Other Topics Concern    None   Social History Narrative    None     Family History   Problem Relation Age of Onset    Cancer  "Mother         Breast    Stroke Sister 65        Fatal    Cancer Maternal Uncle         Pancreas    Cancer Paternal Uncle         Pancreas    Cancer Maternal Grandmother         Breast    Hypertension Father     Hyperlipidemia Father        Objective:       Vitals:    11/12/18 0833 11/12/18 0852   BP: (!) 146/60 120/70--rechecked at end of exam   Pulse: 71    SpO2: 98%    Weight: 111.8 kg (246 lb 7.6 oz)    Height: 6' 2" (1.88 m)    PainSc: 0-No pain      Body mass index is 31.65 kg/m².    Physical Exam   Constitutional: He is oriented to person, place, and time. He appears well-developed and well-nourished.   obese   HENT:   Head: Normocephalic.   Eyes: Conjunctivae, EOM and lids are normal. Pupils are equal, round, and reactive to light. Lids are everted and swept, no foreign bodies found.   Neck: Trachea normal, normal range of motion and full passive range of motion without pain. Neck supple. No JVD present. Carotid bruit is not present.   Cardiovascular: Normal rate, regular rhythm, normal heart sounds, intact distal pulses and normal pulses.   Pulmonary/Chest: Effort normal and breath sounds normal.   Abdominal: Soft. Normal appearance and bowel sounds are normal. There is no hepatosplenomegaly. There is no CVA tenderness.   obese   Musculoskeletal: Normal range of motion.   Neurological: He is alert and oriented to person, place, and time.   Skin: Skin is warm, dry and intact. Capillary refill takes less than 2 seconds.   Psychiatric: He has a normal mood and affect. His speech is normal and behavior is normal. Judgment and thought content normal. Cognition and memory are normal.   Nursing note and vitals reviewed.      Assessment:       1. Essential hypertension    2. Hyperlipidemia, unspecified hyperlipidemia type    3. Elevated hemoglobin A1c    4. Special screening for malignant neoplasms, colon    5. Obstructive sleep apnea on CPAP    6. BMI 31.0-31.9,adult    7. Class 1 obesity with serious " comorbidity and body mass index (BMI) of 31.0 to 31.9 in adult, unspecified obesity type    8. Need for influenza vaccination    9. Need for vaccination with 13-polyvalent pneumococcal conjugate vaccine    10. Need for Tdap vaccination    11. Tobacco use    12. History of colon polyps    13. Incomplete bladder emptying        Plan:     Robles was seen today for follow-up and hypertension.    Diagnoses and all orders for this visit:    Essential hypertension  Improved today.    Take medications as prescribed.    Monitor BP at home, goal BP < or = 140/80, call office if consistently above this range.    Follow low salt DASH diet and exercise.    BMI reviewed.    Go to ED if Headaches, blurred vision, chest pain, or SOB occurs along with elevated readings > or = 160/90.    -     CBC auto differential; Future  -     Comprehensive metabolic panel; Future  -     TSH; Future  -     Urinalysis; Future    Hyperlipidemia, unspecified hyperlipidemia type  Continue cholesterol med, low fat diet, and exercise. Check lipids.  -     Lipid panel; Future      Elevated hemoglobin A1c   Follow low carb, low fat, high fiber diet and exercise to prevent the development of T2DM  -     Hemoglobin A1c; Future      Special screening for malignant neoplasms, colon  -     Case request GI: COLONOSCOPY    Obstructive sleep apnea on CPAP  On CPAP, no changes, stable    BMI 31.0-31.9,adult  BMI reviewed    Class 1 obesity with serious comorbidity and body mass index (BMI) of 31.0 to 31.9 in adult, unspecified obesity type  BMI reviewed.    Diet and exercise to lose weight.    Need for influenza vaccination  Given today    Need for vaccination with 13-polyvalent pneumococcal conjugate vaccine  Given today    Need for Tdap vaccination  Given today    Tobacco use  Smoking Cessation counseling done. Pt not ready to quit. Advised of the Smoking Cessation program    History of colon polyps  -     Case request GI: COLONOSCOPY    Incomplete bladder  emptying  -     PSA, Screening; Future    Follow-up in about 6 months (around 5/12/2019), or with PCP Dr. Jackson for f/u.

## 2018-11-21 ENCOUNTER — TELEPHONE (OUTPATIENT)
Dept: INTERNAL MEDICINE | Facility: CLINIC | Age: 68
End: 2018-11-21

## 2018-11-21 LAB
ALBUMIN SERPL-MCNC: 4.5 G/DL (ref 3.6–5.1)
ALBUMIN/GLOB SERPL: 2 (CALC) (ref 1–2.5)
ALP SERPL-CCNC: 76 U/L (ref 40–115)
ALT SERPL-CCNC: 33 U/L (ref 9–46)
APPEARANCE UR: CLEAR
AST SERPL-CCNC: 31 U/L (ref 10–35)
BASOPHILS # BLD AUTO: 30 CELLS/UL (ref 0–200)
BASOPHILS NFR BLD AUTO: 0.6 %
BILIRUB SERPL-MCNC: 0.7 MG/DL (ref 0.2–1.2)
BILIRUB UR QL STRIP: NEGATIVE
BUN SERPL-MCNC: 12 MG/DL (ref 7–25)
BUN/CREAT SERPL: ABNORMAL (CALC) (ref 6–22)
CALCIUM SERPL-MCNC: 9.4 MG/DL (ref 8.6–10.3)
CHLORIDE SERPL-SCNC: 109 MMOL/L (ref 98–110)
CHOLEST SERPL-MCNC: 123 MG/DL
CHOLEST/HDLC SERPL: 3.1 (CALC)
CO2 SERPL-SCNC: 27 MMOL/L (ref 20–32)
COLOR UR: YELLOW
CREAT SERPL-MCNC: 0.95 MG/DL (ref 0.7–1.25)
EOSINOPHIL # BLD AUTO: 150 CELLS/UL (ref 15–500)
EOSINOPHIL NFR BLD AUTO: 3 %
ERYTHROCYTE [DISTWIDTH] IN BLOOD BY AUTOMATED COUNT: 12.3 % (ref 11–15)
GFR SERPL CREATININE-BSD FRML MDRD: 84 ML/MIN/1.73M2
GLOBULIN SER CALC-MCNC: 2.3 G/DL (CALC) (ref 1.9–3.7)
GLUCOSE SERPL-MCNC: 110 MG/DL (ref 65–99)
GLUCOSE UR QL STRIP: NEGATIVE
HBA1C MFR BLD: 6.1 % OF TOTAL HGB
HCT VFR BLD AUTO: 42.4 % (ref 38.5–50)
HDLC SERPL-MCNC: 40 MG/DL
HGB BLD-MCNC: 13.8 G/DL (ref 13.2–17.1)
HGB UR QL STRIP: NEGATIVE
KETONES UR QL STRIP: NEGATIVE
LDLC SERPL CALC-MCNC: 68 MG/DL (CALC)
LEUKOCYTE ESTERASE UR QL STRIP: NEGATIVE
LYMPHOCYTES # BLD AUTO: 2210 CELLS/UL (ref 850–3900)
LYMPHOCYTES NFR BLD AUTO: 44.2 %
MCH RBC QN AUTO: 28 PG (ref 27–33)
MCHC RBC AUTO-ENTMCNC: 32.5 G/DL (ref 32–36)
MCV RBC AUTO: 86 FL (ref 80–100)
MONOCYTES # BLD AUTO: 430 CELLS/UL (ref 200–950)
MONOCYTES NFR BLD AUTO: 8.6 %
NEUTROPHILS # BLD AUTO: 2180 CELLS/UL (ref 1500–7800)
NEUTROPHILS NFR BLD AUTO: 43.6 %
NITRITE UR QL STRIP: NEGATIVE
NONHDLC SERPL-MCNC: 83 MG/DL (CALC)
PH UR STRIP: 6 [PH] (ref 5–8)
PLATELET # BLD AUTO: 290 THOUSAND/UL (ref 140–400)
PMV BLD REES-ECKER: 9.3 FL (ref 7.5–12.5)
POTASSIUM SERPL-SCNC: 4.5 MMOL/L (ref 3.5–5.3)
PROT SERPL-MCNC: 6.8 G/DL (ref 6.1–8.1)
PROT UR QL STRIP: NEGATIVE
PSA SERPL-MCNC: 2.6 NG/ML
RBC # BLD AUTO: 4.93 MILLION/UL (ref 4.2–5.8)
SODIUM SERPL-SCNC: 141 MMOL/L (ref 135–146)
SP GR UR STRIP: 1.02 (ref 1–1.03)
TRIGL SERPL-MCNC: 73 MG/DL
TSH SERPL-ACNC: 1.33 MIU/L (ref 0.4–4.5)
WBC # BLD AUTO: 5 THOUSAND/UL (ref 3.8–10.8)

## 2018-11-21 NOTE — TELEPHONE ENCOUNTER
----- Message from Connie Ayala DNP sent at 11/21/2018 10:42 AM CST -----  Labs are stable, keep f/u with PCP as scheduled

## 2019-04-26 DIAGNOSIS — E78.5 HYPERLIPIDEMIA, UNSPECIFIED HYPERLIPIDEMIA TYPE: ICD-10-CM

## 2019-04-26 RX ORDER — ATORVASTATIN CALCIUM 80 MG/1
80 TABLET, FILM COATED ORAL DAILY
Qty: 90 TABLET | Refills: 3 | Status: SHIPPED | OUTPATIENT
Start: 2019-04-26 | End: 2020-04-14

## 2019-07-26 DIAGNOSIS — I10 ESSENTIAL HYPERTENSION: ICD-10-CM

## 2019-07-26 DIAGNOSIS — I10 HYPERTENSION, ESSENTIAL: ICD-10-CM

## 2019-07-26 RX ORDER — AMLODIPINE BESYLATE 10 MG/1
10 TABLET ORAL DAILY
Qty: 90 TABLET | Refills: 0 | Status: SHIPPED | OUTPATIENT
Start: 2019-07-26 | End: 2019-10-30 | Stop reason: SDUPTHER

## 2019-07-29 RX ORDER — LISINOPRIL 30 MG/1
30 TABLET ORAL DAILY
Qty: 90 TABLET | Refills: 0 | Status: SHIPPED | OUTPATIENT
Start: 2019-07-29 | End: 2019-10-30 | Stop reason: SDUPTHER

## 2019-09-14 DIAGNOSIS — R35.0 URINARY FREQUENCY: ICD-10-CM

## 2019-09-16 RX ORDER — TAMSULOSIN HYDROCHLORIDE 0.4 MG/1
0.4 CAPSULE ORAL DAILY
Qty: 90 CAPSULE | Refills: 3 | Status: SHIPPED | OUTPATIENT
Start: 2019-09-16 | End: 2021-05-19 | Stop reason: SDUPTHER

## 2019-09-18 ENCOUNTER — OFFICE VISIT (OUTPATIENT)
Dept: INTERNAL MEDICINE | Facility: CLINIC | Age: 69
End: 2019-09-18
Payer: COMMERCIAL

## 2019-09-18 VITALS
DIASTOLIC BLOOD PRESSURE: 72 MMHG | WEIGHT: 246.69 LBS | HEIGHT: 74 IN | BODY MASS INDEX: 31.66 KG/M2 | HEART RATE: 72 BPM | SYSTOLIC BLOOD PRESSURE: 140 MMHG

## 2019-09-18 DIAGNOSIS — Z12.5 PROSTATE CANCER SCREENING: ICD-10-CM

## 2019-09-18 DIAGNOSIS — R42 LIGHTHEADEDNESS: Primary | ICD-10-CM

## 2019-09-18 DIAGNOSIS — R20.9 DISTURBANCE OF SKIN SENSATION: ICD-10-CM

## 2019-09-18 DIAGNOSIS — R10.31 RIGHT GROIN PAIN: ICD-10-CM

## 2019-09-18 DIAGNOSIS — R29.898 RIGHT LEG WEAKNESS: ICD-10-CM

## 2019-09-18 DIAGNOSIS — I10 ESSENTIAL HYPERTENSION: ICD-10-CM

## 2019-09-18 DIAGNOSIS — R73.09 ELEVATED HEMOGLOBIN A1C: ICD-10-CM

## 2019-09-18 DIAGNOSIS — E78.5 HYPERLIPIDEMIA, UNSPECIFIED HYPERLIPIDEMIA TYPE: ICD-10-CM

## 2019-09-18 DIAGNOSIS — N52.9 ERECTILE DYSFUNCTION, UNSPECIFIED ERECTILE DYSFUNCTION TYPE: ICD-10-CM

## 2019-09-18 PROCEDURE — 99215 PR OFFICE/OUTPT VISIT, EST, LEVL V, 40-54 MIN: ICD-10-PCS | Mod: S$GLB,,, | Performed by: INTERNAL MEDICINE

## 2019-09-18 PROCEDURE — 99999 PR PBB SHADOW E&M-EST. PATIENT-LVL IV: CPT | Mod: PBBFAC,,, | Performed by: INTERNAL MEDICINE

## 2019-09-18 PROCEDURE — 99215 OFFICE O/P EST HI 40 MIN: CPT | Mod: S$GLB,,, | Performed by: INTERNAL MEDICINE

## 2019-09-18 PROCEDURE — 99999 PR PBB SHADOW E&M-EST. PATIENT-LVL IV: ICD-10-PCS | Mod: PBBFAC,,, | Performed by: INTERNAL MEDICINE

## 2019-09-18 RX ORDER — SILDENAFIL 100 MG/1
100 TABLET, FILM COATED ORAL DAILY PRN
Qty: 30 TABLET | Refills: 2 | Status: SHIPPED | OUTPATIENT
Start: 2019-09-18 | End: 2023-02-28

## 2019-09-18 NOTE — PATIENT INSTRUCTIONS
Lab orders were sent to [facility], please call them to schedule fasting lab work sometime in the next few days.  For fasting labs, please avoid any food or drinks besides water for 6 hours prior to the labs, but make sure to drink at least 1 glass of water beforehand so that you are not dehydrated.      The schedulers will make an appointment for your ultrasound and nerve conduction studies.    Please go to the pharmacy to  prescription for sildenafil (Viagra).    Please use CPAP regularly every night to see if this improves the lightheadedness.  Please follow up with the Sleep Medicine team as planned.

## 2019-09-18 NOTE — PROGRESS NOTES
Subjective:       Patient ID: Robles Jordan is a 68 y.o. male.    Chief Complaint: Follow-up    HPI    Last visit with me 1 year ago.  Since then seen by nurse practitioner.    Reports lightheadedness. Puts on adhesive for dentures every day in the mornings and notes lightheadedness at this time. Doesn't feel syncopal, just lightheadedness. No nausea or vomiting. Going off and on for about a co of months. Not worsening. No orthostasis. No recent eye exam. Uses CPAP off and on.    Weakness and pain in the right leg. Also with RLQ abdominal pain and spasms in the muscle. Pain is worse with hip flexion. occasionally R upper leg paresthesia and pain in the muscle.    Review of Systems   All other systems reviewed and are negative.      Objective:      Physical Exam   Constitutional: He is oriented to person, place, and time. No distress.   HENT:   Head: Atraumatic.   Right Ear: Tympanic membrane normal. No tenderness.   Left Ear: Tympanic membrane normal. No tenderness.   Mouth/Throat: Oropharynx is clear and moist. No oropharyngeal exudate.   Eyes: Pupils are equal, round, and reactive to light. EOM are normal. Right eye exhibits no discharge. Left eye exhibits no discharge. Right conjunctiva is injected. Left conjunctiva is injected.   Neck: Normal range of motion. Carotid bruit is not present. No thyromegaly present.   Cardiovascular: Normal rate, regular rhythm and normal heart sounds.   Pulmonary/Chest: Effort normal and breath sounds normal. No stridor. He has no wheezes. He has no rales.   Abdominal: Soft. He exhibits no distension. There is no tenderness. There is no rigidity and no guarding. Hernia confirmed negative in the right inguinal area.   Musculoskeletal: He exhibits no edema or tenderness.   5/5 strength in bilateral hip flexion, knee flexion, and knee extension   Lymphadenopathy:     He has no cervical adenopathy.   Neurological: He is alert and oriented to person, place, and time.   Reflex  "Scores:       Patellar reflexes are 2+ on the right side and 2+ on the left side.  Skin: Skin is warm and dry. No rash noted.   Irritated patch of skin with excoriation in intertrigonal area of R thigh   Psychiatric: He has a normal mood and affect. His behavior is normal.   Nursing note and vitals reviewed.      Vitals:    09/18/19 1010   BP: (!) 140/72   BP Location: Right arm   Patient Position: Sitting   BP Method: Large (Manual)   Pulse: 72   Weight: 111.9 kg (246 lb 11.1 oz)   Height: 6' 2" (1.88 m)     Body mass index is 31.67 kg/m².    RESULTS: Reviewed labs from last 12 months    Assessment:       1. Lightheadedness    2. Disturbance of skin sensation    3. Right leg weakness    4. Erectile dysfunction, unspecified erectile dysfunction type    5. Prostate cancer screening    6. Right groin pain    7. Hyperlipidemia, unspecified hyperlipidemia type    8. Elevated hemoglobin A1c    9. Essential hypertension        Plan:   Robles was seen today for follow-up.    Diagnoses and all orders for this visit:    Lightheadedness:  New problem.  Unclear etiology, possibly due to the in consistent CPAP use.  Patient reports it occurs when he puts on his  dentures adhesive, discuss this with dentistry.  If symptoms persist consider referral to Cardiology for further workup.  -     CBC Without Differential; Future  -     Comprehensive metabolic panel; Future  -     Ferritin; Future  -     Iron and TIBC; Future  -     TSH; Future  -     CBC Without Differential  -     Comprehensive metabolic panel  -     Ferritin  -     Iron and TIBC  -     TSH    Disturbance of skin sensation:  New problem, involving right lower extremity.  Possibly radiculopathy versus meralgia paresthetica.  Evaluation with labs and testing as below.  -     Ferritin; Future  -     Iron and TIBC; Future  -     EMG W/ ULTRASOUND AND NERVE CONDUCTION TEST 2 Extremities; Future  -     Vitamin B12; Future  -     Ferritin  -     Iron and TIBC  -     Vitamin " B12    Right leg weakness:  New problem, unclear etiology.  Localized to 1 extremity, no other neurologic findings or features.  Check labs and EMG.  -     TSH; Future  -     Magnesium; Future  -     EMG W/ ULTRASOUND AND NERVE CONDUCTION TEST 2 Extremities; Future  -     TSH  -     Magnesium    Erectile dysfunction, unspecified erectile dysfunction type:  Longstanding, trial of Viagra, check testosterone.  -     Testosterone; Future  -     sildenafil (VIAGRA) 100 MG tablet; Take 1 tablet (100 mg total) by mouth daily as needed for Erectile Dysfunction.    Prostate cancer screening  -     PSA, Screening; Future  -     PSA, Screening    Right groin pain:  Prior dx, persists, had small fat-containing inguinal hernia in past, recheck ultrasound.  -     US Abdomen Limited; Future    Hyperlipidemia, unspecified hyperlipidemia type:  Prior diagnosis, stable, well controlled on current management. No changes at this time, will continue to monitor.   -     Lipid panel; Future  -     Lipid panel    Elevated hemoglobin A1c:  Prior dx, stable, not in diabetes mellitus range, continue to monitor A1c.  -     Hemoglobin A1c; Future  -     Hemoglobin A1c    Essential hypertension:  Prior dx, not well controlled today, continue to monitor and adjust blood pressure meds as needed.    Follow up in about 4 months (around 1/18/2020) for EPP annual exam.  John Jackson MD  Internal Medicine    Portions of this note were completed using medical dictation software. Please excuse typographical or syntax errors that were missed on review.

## 2019-09-19 ENCOUNTER — HOSPITAL ENCOUNTER (OUTPATIENT)
Dept: RADIOLOGY | Facility: HOSPITAL | Age: 69
Discharge: HOME | End: 2019-09-19
Attending: INTERNAL MEDICINE
Payer: COMMERCIAL

## 2019-09-19 ENCOUNTER — TELEPHONE (OUTPATIENT)
Dept: INTERNAL MEDICINE | Facility: CLINIC | Age: 69
End: 2019-09-19

## 2019-09-19 DIAGNOSIS — R10.31 RIGHT GROIN PAIN: ICD-10-CM

## 2019-09-19 PROCEDURE — 76705 ECHO EXAM OF ABDOMEN: CPT | Mod: TC

## 2019-09-19 PROCEDURE — 76705 US ABDOMEN LIMITED: ICD-10-PCS | Mod: 26,,, | Performed by: RADIOLOGY

## 2019-09-19 PROCEDURE — 76705 ECHO EXAM OF ABDOMEN: CPT | Mod: 26,,, | Performed by: RADIOLOGY

## 2019-09-19 NOTE — TELEPHONE ENCOUNTER
Please notify the patient that his hernia is stable.  It has not grown in size.  This is unlikely the source of the patient's pain.  I have sent the results in a letter.    Please sign and close this encounter once completed and/or scheduled.

## 2019-09-23 LAB
ALBUMIN SERPL-MCNC: 4.3 G/DL (ref 3.6–5.1)
ALBUMIN/GLOB SERPL: 1.8 (CALC) (ref 1–2.5)
ALP SERPL-CCNC: 82 U/L (ref 40–115)
ALT SERPL-CCNC: 26 U/L (ref 9–46)
AST SERPL-CCNC: 23 U/L (ref 10–35)
BILIRUB SERPL-MCNC: 0.7 MG/DL (ref 0.2–1.2)
BUN SERPL-MCNC: 12 MG/DL (ref 7–25)
BUN/CREAT SERPL: ABNORMAL (CALC) (ref 6–22)
CALCIUM SERPL-MCNC: 9.4 MG/DL (ref 8.6–10.3)
CHLORIDE SERPL-SCNC: 108 MMOL/L (ref 98–110)
CHOLEST SERPL-MCNC: 129 MG/DL
CHOLEST/HDLC SERPL: 3.5 (CALC)
CO2 SERPL-SCNC: 26 MMOL/L (ref 20–32)
CREAT SERPL-MCNC: 0.91 MG/DL (ref 0.7–1.25)
ERYTHROCYTE [DISTWIDTH] IN BLOOD BY AUTOMATED COUNT: 12.4 % (ref 11–15)
FERRITIN SERPL-MCNC: 172 NG/ML (ref 24–380)
GFRSERPLBLD MDRD-ARVRAT: 88 ML/MIN/1.73M2
GLOBULIN SER CALC-MCNC: 2.4 G/DL (CALC) (ref 1.9–3.7)
GLUCOSE SERPL-MCNC: 104 MG/DL (ref 65–99)
HBA1C MFR BLD: 6.3 % OF TOTAL HGB
HCT VFR BLD AUTO: 42.7 % (ref 38.5–50)
HDLC SERPL-MCNC: 37 MG/DL
HGB BLD-MCNC: 14.1 G/DL (ref 13.2–17.1)
IRON SATN MFR SERPL: 27 % (CALC) (ref 20–48)
IRON SERPL-MCNC: 67 MCG/DL (ref 50–180)
LDLC SERPL CALC-MCNC: 72 MG/DL (CALC)
MAGNESIUM SERPL-MCNC: 2.3 MG/DL (ref 1.5–2.5)
MCH RBC QN AUTO: 28.7 PG (ref 27–33)
MCHC RBC AUTO-ENTMCNC: 33 G/DL (ref 32–36)
MCV RBC AUTO: 86.8 FL (ref 80–100)
NONHDLC SERPL-MCNC: 92 MG/DL (CALC)
PLATELET # BLD AUTO: 268 THOUSAND/UL (ref 140–400)
PMV BLD REES-ECKER: 9.2 FL (ref 7.5–12.5)
POTASSIUM SERPL-SCNC: 4.4 MMOL/L (ref 3.5–5.3)
PROT SERPL-MCNC: 6.7 G/DL (ref 6.1–8.1)
PSA SERPL-MCNC: 1.8 NG/ML
RBC # BLD AUTO: 4.92 MILLION/UL (ref 4.2–5.8)
SODIUM SERPL-SCNC: 141 MMOL/L (ref 135–146)
TIBC SERPL-MCNC: 249 MCG/DL (CALC) (ref 250–425)
TRIGL SERPL-MCNC: 121 MG/DL
TSH SERPL-ACNC: 1.41 MIU/L (ref 0.4–4.5)
VIT B12 SERPL-MCNC: 424 PG/ML (ref 200–1100)
WBC # BLD AUTO: 5.8 THOUSAND/UL (ref 3.8–10.8)

## 2019-09-24 ENCOUNTER — TELEPHONE (OUTPATIENT)
Dept: INTERNAL MEDICINE | Facility: CLINIC | Age: 69
End: 2019-09-24

## 2019-09-24 ENCOUNTER — TELEPHONE (OUTPATIENT)
Dept: PHYSICAL MEDICINE AND REHAB | Facility: CLINIC | Age: 69
End: 2019-09-24

## 2019-09-24 NOTE — TELEPHONE ENCOUNTER
Please call the patient to notify that his labs are all normal. No explanation for lightheadedness. He should continue to be regular about using CPAP at night to see if this helps the problem. If symptoms persist please notify the office so we can refer to Cardiology for evaluation.

## 2019-10-07 ENCOUNTER — PROCEDURE VISIT (OUTPATIENT)
Dept: PHYSICAL MEDICINE AND REHAB | Facility: CLINIC | Age: 69
End: 2019-10-07
Payer: COMMERCIAL

## 2019-10-07 DIAGNOSIS — R20.9 DISTURBANCE OF SKIN SENSATION: ICD-10-CM

## 2019-10-07 DIAGNOSIS — R29.898 RIGHT LEG WEAKNESS: ICD-10-CM

## 2019-10-07 PROCEDURE — 95910 PR NERVE CONDUCTION STUDY; 7-8 STUDIES: ICD-10-PCS | Mod: S$GLB,,, | Performed by: PHYSICAL MEDICINE & REHABILITATION

## 2019-10-07 PROCEDURE — 95886 MUSC TEST DONE W/N TEST COMP: CPT | Mod: S$GLB,,, | Performed by: PHYSICAL MEDICINE & REHABILITATION

## 2019-10-07 PROCEDURE — 95886 PR EMG COMPLETE, W/ NERVE CONDUCTION STUDIES, 5+ MUSCLES: ICD-10-PCS | Mod: S$GLB,,, | Performed by: PHYSICAL MEDICINE & REHABILITATION

## 2019-10-07 PROCEDURE — 95910 NRV CNDJ TEST 7-8 STUDIES: CPT | Mod: S$GLB,,, | Performed by: PHYSICAL MEDICINE & REHABILITATION

## 2019-10-07 NOTE — PROCEDURES
Test Date:  10/7/2019    Patient: Robles Jordan : 1950 Physician: Adiel Godinez D.O.   ID#:  SEX: Male Ref. Phys: John Jackson MD     HPI:  Robles Jordan is a 68 y.o.male who presents for NCS/EMG of the RLE to evaluate for right thigh weakness and numbness/tingling.      NCV & EMG Findings:   All nerve conduction studies (as indicated in the following tables) were within normal limits.   Needle evaluation of the right Tibialis Anterior, the right Gastrocnemius, and the right Gluteus Valentín muscles showed increased insertional activity, moderately increased spontaneous activity, increased motor unit amplitude, and diminished recruitment.   The right Fibularis Longus and the right Gluteus Medius muscles showed increased insertional activity, slightly increased spontaneous activity, and diminished recruitment.   The right Lumbo Paraspinals (Lower) muscle showed increased insertional activity and moderately increased spontaneous activity.   All remaining muscles (as indicated in the following table) showed no evidence of electrical instability.    Impression:  There is electrodiagnostic evidence of an acute on chronic right L5 and/or S1 radiculopathy      ___________________________  Adiel Godinez D.O.        NCS+  Motor Nerve Results      Latency Amplitude F-Lat Segment Distance CV Comment   Site (ms) Norm (mV) Norm (ms)  (cm) (m/s) Norm    Left Fibular (EDB) Motor   Ankle 3.5  < 6.5 3.0  > 1.10         Bel Fib Head 11.6 - 1.98 -  Bel Fib Head-Ankle 40 49  > 36    Right Fibular (EDB) Motor   Ankle 4.1  < 6.5 2.1  > 1.10         Bel Fib Head 12.1 - 1.63 -  Bel Fib Head-Ankle 40 50  > 36    Left Tibial (AHB) Motor   Ankle 3.2  < 6.1 5.6  > 1.10         Right Tibial (AHB) Motor   Ankle 3.8  < 6.1 2.2  > 1.10           Sensory Nerve Results      Latency (Peak) Amplitude (P-P) Segment Distance CV Comment   Site (ms) Norm (µV) Norm  (cm) (m/s) Norm    Right Superficial Fibular Sensory   14  cm-Ankle 3.2  < 4.2 16  > 5 14 cm-Ankle 14 44  > 32    Left Sural Sensory   Calf-Lat Mall 4.0  < 4.5 10  > 4 Calf-Lat Mall 14 35  > 35    Right Sural Sensory   Calf-Lat Mall 3.4  < 4.5 6  > 4 Calf-Lat Mall 14 41  > 35      EMG+     Side Muscle Nerve Root Ins Act Fibs Psw Amp Dur Poly Recrt Int Pat Comment   Right Vastus Med Femoral L2-L4 Nml Nml Nml Nml Nml 0 Nml Nml    Right Add Longus Obturator L2-L4 Nml Nml Nml Nml Nml 0 Nml Nml    Right Tib Anterior Fibular,  Deep Fibula... L4-L5 *Incr *2+ *2+ *Incr Nml 0 *Reduced Nml    Right Fib Longus Fibular,  Superficial... L5-S1 *Incr *1+ *1+ Nml Nml 0 *Reduced Nml    Right Gastroc Tibial S1-S2 *Incr *2+ *2+ *Incr Nml 0 *Reduced Nml    Right Gluteus Max Inf Gluteal L5-S2 *Incr *2+ *2+ *Incr Nml 0 *Reduced Nml    Right Gluteus Med Sup Gluteal L5-S1 *Incr *1+ *1+ Nml Nml 0 *Reduced Nml    Right Lumbo Parasp (Mid) Rami L3-L4 Nml Nml Nml         Right Lumbo Parasp (Lower) Rami L5-S1 *Incr *1+ *2+         Right Rectus Fem Femoral L2-L4 Nml Nml Nml Nml Nml 0 Nml Nml            Waveforms:    Motor                Sensory

## 2019-10-16 ENCOUNTER — TELEPHONE (OUTPATIENT)
Dept: INTERNAL MEDICINE | Facility: CLINIC | Age: 69
End: 2019-10-16

## 2019-10-16 DIAGNOSIS — M54.16 LUMBAR RADICULOPATHY: Primary | ICD-10-CM

## 2019-10-17 NOTE — TELEPHONE ENCOUNTER
Please notify the patient that his nerve conduction study indicated that the nerves to the right leg are being pinched with a come off of the spinal cord by some of the bones in the spine.  I am [facility].  Please schedule this appointment with the patient or provide the number for the scheduling desk.

## 2019-10-24 ENCOUNTER — PATIENT OUTREACH (OUTPATIENT)
Dept: ADMINISTRATIVE | Facility: OTHER | Age: 69
End: 2019-10-24

## 2019-10-25 ENCOUNTER — TELEPHONE (OUTPATIENT)
Dept: SPINE | Facility: CLINIC | Age: 69
End: 2019-10-25

## 2019-10-25 DIAGNOSIS — I10 HYPERTENSION, ESSENTIAL: ICD-10-CM

## 2019-10-25 DIAGNOSIS — M54.50 LOW BACK PAIN, UNSPECIFIED BACK PAIN LATERALITY, UNSPECIFIED CHRONICITY, UNSPECIFIED WHETHER SCIATICA PRESENT: Primary | ICD-10-CM

## 2019-10-25 RX ORDER — LISINOPRIL 30 MG/1
TABLET ORAL
Qty: 90 TABLET | Refills: 0 | OUTPATIENT
Start: 2019-10-25

## 2019-10-25 NOTE — TELEPHONE ENCOUNTER
----- Message from Teresita Grissom PA-C sent at 10/25/2019 10:54 AM CDT -----  He needs lumbar XRs prior to his appt with me on Monday.

## 2019-10-25 NOTE — PROGRESS NOTES
Subjective:      Patient ID: Robles Jordan is a 68 y.o. male.    Chief Complaint: Extremity Weakness      HPI  ([place])    History of HTN, obesity, HEAVEN with CPAP.     Had EMG of bilateral LEs on 10/7/19 ([facility]) that showed acute on chronic right L5 and/or S1 radiculopathy.     6 month history of intermittent stiffness in his back with intermittent right medial leg pain to his foot along with weakness in right leg. No left leg pain. Primary complaint is pain/weakness in right leg. He is worse with walking up an incline, prolonged standing/walking. He limps on the right leg. He has improvement with laying on his sofa. He rates his pain as a 0 on a scale of 1-10. He has some numbness in his right thigh. No tingling. Pain is more shooting in nature.     No PT, injections, or surgery on his back. He is not taking any medications for his back.       Review of Systems   Constitution: Negative for fever, malaise/fatigue, night sweats, weight gain and weight loss.   HENT: Negative for hearing loss, nosebleeds and odynophagia.    Eyes: Negative for blurred vision and double vision.   Cardiovascular: Negative for chest pain, irregular heartbeat and palpitations.   Respiratory: Negative for cough, hemoptysis, shortness of breath and wheezing.    Endocrine: Negative for cold intolerance and polydipsia.   Hematologic/Lymphatic: Does not bruise/bleed easily.   Skin: Negative for dry skin, poor wound healing, rash and suspicious lesions.   Musculoskeletal: Positive for muscle cramps.        See HPI for pertinent positives.   Gastrointestinal: Negative for bloating, abdominal pain, constipation, diarrhea, hematochezia, melena, nausea and vomiting.   Genitourinary: Negative for bladder incontinence, dysuria, hematuria, hesitancy and incomplete emptying.        Positive for any type sexual dysfunction.    Neurological: Negative for disturbances in coordination, dizziness, focal weakness, headaches, loss of balance, numbness,  paresthesias, seizures and weakness.        Positive for right leg weakness and unsteady gait.    Psychiatric/Behavioral: Negative for depression and hallucinations. The patient is not nervous/anxious.            Objective:        General: Robles is well-developed, well-nourished, appears stated age, in no acute distress, alert and oriented to time, place and person.     General    Vitals reviewed.  Constitutional: He is oriented to person, place, and time. He appears well-developed and well-nourished.   Pulmonary/Chest: Effort normal.   Abdominal: He exhibits no distension.   Neurological: He is alert and oriented to person, place, and time.   Psychiatric: He has a normal mood and affect. His behavior is normal. Judgment and thought content normal.           Gait: he has slight limp favoring right LE. He can toe stand, has difficulty heel standing on right LE. He can tandem walk.     On exam of the lumbar spine, Inspection of back is normal, No tenderness noted    Skin in lumbar region is warm to the touch without visible rashes.     muscle tone normal without spasm, limited range of motion with pain  Pain in flexion. and Pain in extension.    Strength testing of the bilateral LEs shows  Right hip abduction:  +5/5  Left hip abduction:  +5/5  Right hip flexion:  5-/5   Left hip flexion:  +5/5  Right hip extensors:  +5/5  Left hip extensors:  +5/5  Right quadriceps:  +5/5  Left quadriceps:  +5/5  Right hamstring:  +5/5  Left hamstring:  +5/5  Right dorsiflexion:  +4/5  Left dorsiflexion:  +5/5  Right plantar flexion:  +5/5  Left plantar flexion:  +5/5   Right EHL:  +4/5   Left EHL:  +5/5    negative clonus of bilateral LEs.     negative straight leg raise on bilateral LEs.     DTRs:  Right patellar:  +2     Left patellar:  +2  Right achilles:  +2   Left achilles:  +2    Sensation is grossly intact in L2, L3, L4, L5, and S1 distribution with some subjective numbness right thigh and medial calf.     Right hip has no  pain with IR/ER. Left hip has no pain with IR/ER.     On exam of bilateral UEs, patient has full painfree ROM with no signs of clubbing, laxity, cyanosis, edema, instability, weakness, or tenderness.       XRAY INTERPRETATION:  X-rays of lumbar spine (AP/lat/flex/ext) dated 10/28/19 are personally reviewed and show diffuse lumbar spondylosis with mild DDD L4-L5 and severe DDD L5-S1.        Assessment:       1. Right leg weakness    2. Other spondylosis with radiculopathy, lumbar region    3. DDD (degenerative disc disease), lumbosacral    4. Thoracic and lumbosacral neuritis    5. Bilateral low back pain with right-sided sciatica, unspecified chronicity           Plan:       Orders Placed This Encounter    MRI LUMBAR SPINE WITHOUT CONTRAST    gabapentin (NEURONTIN) 300 MG capsule       6 month history of intermittent stiffness in his back with intermittent right medial leg pain to his foot along with weakness in right leg. No left leg pain. Primary complaint is pain/weakness in right leg. Known diffuse lumbar spondylosis with mild DDD L4-L5 and severe DDD L5-S1. Had EMG of bilateral LEs on 10/7/19 ([facility]) that showed acute on chronic right L5 and/or S1 radiculopathy. He has +4/5 strength right EHL/DF and 5-/5 strength in right hip flexion.     Right leg pain/weakness is likely from L5-S1. Treatment options reviewed with patient along with above lumbar XRs/EMG. Following plan made:     - Start neurontin 300mg q hs and increase to tid as tolerated. Reviewed dosing and side effects.   - MRI of lumbar spine to further evaluate right LE weakness. External script given. He will bring MRI CD to follow up.   - Depending on results of MRI, may consider PT and/or injections.   - I will be leaving in a few weeks, so will have him f/u with Briana Seals PA-C to review imaging as he lives on the [address] [employer].     Follow-up: Follow up in 2 weeks (on 11/11/2019). If there are any questions prior to this, the patient was  instructed to contact the office.

## 2019-10-28 ENCOUNTER — HOSPITAL ENCOUNTER (OUTPATIENT)
Dept: RADIOLOGY | Facility: OTHER | Age: 69
Discharge: HOME | End: 2019-10-28
Attending: PHYSICIAN ASSISTANT
Payer: COMMERCIAL

## 2019-10-28 ENCOUNTER — OFFICE VISIT (OUTPATIENT)
Dept: SPINE | Facility: CLINIC | Age: 69
End: 2019-10-28
Payer: COMMERCIAL

## 2019-10-28 VITALS
DIASTOLIC BLOOD PRESSURE: 73 MMHG | HEIGHT: 74 IN | WEIGHT: 246 LBS | BODY MASS INDEX: 31.57 KG/M2 | SYSTOLIC BLOOD PRESSURE: 134 MMHG | HEART RATE: 78 BPM

## 2019-10-28 DIAGNOSIS — M54.50 LOW BACK PAIN, UNSPECIFIED BACK PAIN LATERALITY, UNSPECIFIED CHRONICITY, UNSPECIFIED WHETHER SCIATICA PRESENT: ICD-10-CM

## 2019-10-28 DIAGNOSIS — M54.14 THORACIC AND LUMBOSACRAL NEURITIS: ICD-10-CM

## 2019-10-28 DIAGNOSIS — M47.26 OTHER SPONDYLOSIS WITH RADICULOPATHY, LUMBAR REGION: ICD-10-CM

## 2019-10-28 DIAGNOSIS — M54.41 BILATERAL LOW BACK PAIN WITH RIGHT-SIDED SCIATICA, UNSPECIFIED CHRONICITY: ICD-10-CM

## 2019-10-28 DIAGNOSIS — M51.37 DDD (DEGENERATIVE DISC DISEASE), LUMBOSACRAL: ICD-10-CM

## 2019-10-28 DIAGNOSIS — M54.17 THORACIC AND LUMBOSACRAL NEURITIS: ICD-10-CM

## 2019-10-28 DIAGNOSIS — R29.898 RIGHT LEG WEAKNESS: Primary | ICD-10-CM

## 2019-10-28 PROCEDURE — 99999 PR PBB SHADOW E&M-EST. PATIENT-LVL IV: CPT | Mod: PBBFAC,,, | Performed by: PHYSICIAN ASSISTANT

## 2019-10-28 PROCEDURE — 72100 XR LUMBAR SPINE AP AND LAT WITH FLEX/EXT: ICD-10-PCS | Mod: 26,,, | Performed by: RADIOLOGY

## 2019-10-28 PROCEDURE — 99214 OFFICE O/P EST MOD 30 MIN: CPT | Mod: S$GLB,,, | Performed by: PHYSICIAN ASSISTANT

## 2019-10-28 PROCEDURE — 99999 PR PBB SHADOW E&M-EST. PATIENT-LVL IV: ICD-10-PCS | Mod: PBBFAC,,, | Performed by: PHYSICIAN ASSISTANT

## 2019-10-28 PROCEDURE — 72120 X-RAY BEND ONLY L-S SPINE: CPT | Mod: TC,FY

## 2019-10-28 PROCEDURE — 72100 X-RAY EXAM L-S SPINE 2/3 VWS: CPT | Mod: 26,,, | Performed by: RADIOLOGY

## 2019-10-28 PROCEDURE — 72120 XR LUMBAR SPINE AP AND LAT WITH FLEX/EXT: ICD-10-PCS | Mod: 26,,, | Performed by: RADIOLOGY

## 2019-10-28 PROCEDURE — 72120 X-RAY BEND ONLY L-S SPINE: CPT | Mod: 26,,, | Performed by: RADIOLOGY

## 2019-10-28 PROCEDURE — 99214 PR OFFICE/OUTPT VISIT, EST, LEVL IV, 30-39 MIN: ICD-10-PCS | Mod: S$GLB,,, | Performed by: PHYSICIAN ASSISTANT

## 2019-10-28 RX ORDER — GABAPENTIN 300 MG/1
CAPSULE ORAL
Qty: 90 CAPSULE | Refills: 2 | Status: SHIPPED | OUTPATIENT
Start: 2019-10-28 | End: 2020-06-10

## 2019-10-28 NOTE — LETTER
October 28, 2019      John Jackson MD  [address]  [facility] [address]           [address]  [address], [address]  [facility] [address]  Phone: [phone]  Fax: [phone]          Patient: Robles Jordan   MR Number: [id number]   YOB: 1950   Date of Visit: 10/28/2019       Dear Dr. John Jackson:    Thank you for referring Robles Jordan to me for evaluation. Attached you will find relevant portions of my assessment and plan of care.    If you have questions, please do not hesitate to call me. I look forward to following Robles Jordan along with you.    Sincerely,    SCOTT Stewartosure  CC:  No Recipients    If you would like to receive this communication electronically, please contact [email or web address] or [phone] to request more information on [employer] access.    For providers and/or their staff who would like to refer a patient to [facility], please contact us through our one-stop-shop provider referral line, [facility], at [phone].    If you feel you have received this communication in error or would no longer like to receive these types of communications, please e-mail [email or web address]

## 2019-10-28 NOTE — PATIENT INSTRUCTIONS
It was nice to meet you today.     You have some wear and tear in your back (arthritis) and I think this is causing your right leg pain and weakness.     I want to get an MRI of your lower back to look into things further. You will need to get this on a CD to bring to your follow up.     I sent gabapentin to your pharmacy to help with your leg pain. Take as directed. Most common side effects are feeling sleepy, dizzy, or drunk.     I will be leaving on 11/15/19. I want you to follow up with Briana Seals PA-C on the [facility] to review your MRI. She is great and will take good care of you.     Email us if you need anything.     Teresita

## 2019-10-29 DIAGNOSIS — I10 HYPERTENSION, ESSENTIAL: ICD-10-CM

## 2019-10-29 RX ORDER — LISINOPRIL 30 MG/1
TABLET ORAL
Qty: 90 TABLET | Refills: 0 | OUTPATIENT
Start: 2019-10-29

## 2019-10-30 DIAGNOSIS — I10 HYPERTENSION, ESSENTIAL: ICD-10-CM

## 2019-10-30 DIAGNOSIS — I10 ESSENTIAL HYPERTENSION: ICD-10-CM

## 2019-10-30 RX ORDER — AMLODIPINE BESYLATE 10 MG/1
10 TABLET ORAL DAILY
Qty: 90 TABLET | Refills: 3 | Status: SHIPPED | OUTPATIENT
Start: 2019-10-30 | End: 2020-12-19 | Stop reason: SDUPTHER

## 2019-10-30 NOTE — TELEPHONE ENCOUNTER
----- Message from Amy Marroquin sent at 10/30/2019 10:29 AM CDT -----  Contact: Patient [phone]  Patient is calling for an RX refill or new RX.  Is this a refill or new RX:  refill  RX name and strength: amLODIPine (NORVASC) 10 MG tablet  Directions (copy/paste from chart):    Is this a 30 day or 90 day RX:    Local pharmacy or mail order pharmacy:  local  Pharmacy name and phone # (copy/paste from chart):  [facility]/pharmacy #[id number] - [place], [place] - [address] [phone] (Phone)  [phone] (Fax)     Comments:  Patient states that he is not taking 30 MG. Patient only have 1 pill left. Please send to pharmacy asap.

## 2019-10-31 RX ORDER — LISINOPRIL 30 MG/1
TABLET ORAL
Qty: 90 TABLET | Refills: 3 | Status: SHIPPED | OUTPATIENT
Start: 2019-10-31 | End: 2020-01-20

## 2019-11-11 ENCOUNTER — PATIENT OUTREACH (OUTPATIENT)
Dept: ADMINISTRATIVE | Facility: OTHER | Age: 69
End: 2019-11-11

## 2019-11-14 ENCOUNTER — OFFICE VISIT (OUTPATIENT)
Dept: NEUROSURGERY | Facility: CLINIC | Age: 69
End: 2019-11-14
Payer: COMMERCIAL

## 2019-11-14 VITALS
WEIGHT: 244.69 LBS | HEIGHT: 74 IN | SYSTOLIC BLOOD PRESSURE: 169 MMHG | BODY MASS INDEX: 31.4 KG/M2 | HEART RATE: 80 BPM | DIASTOLIC BLOOD PRESSURE: 81 MMHG

## 2019-11-14 DIAGNOSIS — R29.898 RIGHT LEG WEAKNESS: ICD-10-CM

## 2019-11-14 DIAGNOSIS — R26.9 GAIT DISTURBANCE: ICD-10-CM

## 2019-11-14 DIAGNOSIS — Q06.8 TETHERED CORD: Primary | ICD-10-CM

## 2019-11-14 PROCEDURE — 99999 PR PBB SHADOW E&M-EST. PATIENT-LVL III: CPT | Mod: PBBFAC,,, | Performed by: PHYSICIAN ASSISTANT

## 2019-11-14 PROCEDURE — 99215 PR OFFICE/OUTPT VISIT, EST, LEVL V, 40-54 MIN: ICD-10-PCS | Mod: S$GLB,,, | Performed by: PHYSICIAN ASSISTANT

## 2019-11-14 PROCEDURE — 99215 OFFICE O/P EST HI 40 MIN: CPT | Mod: S$GLB,,, | Performed by: PHYSICIAN ASSISTANT

## 2019-11-14 PROCEDURE — 99999 PR PBB SHADOW E&M-EST. PATIENT-LVL III: ICD-10-PCS | Mod: PBBFAC,,, | Performed by: PHYSICIAN ASSISTANT

## 2019-11-14 NOTE — PROGRESS NOTES
[facility]  Neurosurgery    SUBJECTIVE:     History of Present Illness:  Robles Jordan is a 68 y.o. male with HLD, HTN, and HEAVEN who presents with low back pain and right leg pain/numbness/weakness. He was initially evaluated by CHIRAG Jones at [facility] back and spine. She referred him to [facility] neurosurgery to establish care closer to home.     Symptom onset: 1 year, first noticed there was a problem when his wife noticed him limping  Location: right-sided low back pain with radiation into medial and lateral right leg down to his calf. Occasionally in his left leg. Never in his foot other than a cramping sensation.   Pain level: 0/10, primary complaint is of weakness, not pain  Numbness: + numbness and tingling in anterior right thigh   Weakness: + in right leg   Denies bowel dysfunction and saddle anesthesia.   Endorses 2 year history of urinary retention requiring tamsulosin. No documentation of BPH  Endorses gait disturbance related to limping and right leg weakness.   2 falls in the last few months related to trip/fall and a chair giving out    Treatments tried:  -PT: has note tried  -ADIEL: has not tried; not indicated   -Gabapentin: currently taking   -Muscle relaxer: not currently taking   -Rx pain medications: none  -Spine surgery: none    Blood thinners: none      (Not in a hospital admission)    Review of patient's allergies indicates:  No Known Allergies    Past Medical History:   Diagnosis Date    BPH (benign prostatic hypertrophy)     Hx of colonic polyps 10/19/2015    Hypertension      Past Surgical History:   Procedure Laterality Date    COLONOSCOPY N/A 10/19/2015    Procedure: COLONOSCOPY;  Surgeon: Antonino Bernstein MD;  Location: [address];  Service: Endoscopy;  Laterality: N/A;    MULTIPLE TOOTH EXTRACTIONS  2014     Family History   Problem Relation Age of Onset    Cancer Mother         Breast    Stroke Sister 65        Fatal    Cancer Maternal Uncle         Pancreas     Cancer Paternal Uncle         Pancreas    Cancer Maternal Grandmother         Breast    Hypertension Father     Hyperlipidemia Father      Social History     Tobacco Use    Smoking status: Current Every Day Smoker     Types: Cigarettes   Substance Use Topics    Alcohol use: Yes     Alcohol/week: 0.0 standard drinks     Comment: very seldom    Drug use: No        Review of Systems:  As noted in HPI     OBJECTIVE:     Vital Signs (Most Recent):  Pulse: 80 (11/14/19 1448)  BP: (!) 169/81 (11/14/19 1448)    Physical Exam:  General: well developed, well nourished, no distress  Head: normocephalic, atraumatic  Neurologic: Alert and oriented. Thought content appropriate  GCS: Motor: 6/Verbal: 5/Eyes: 4 GCS Total: 15  Language: No aphasia  Speech: No dysarthria  Cranial nerves: face symmetric, tongue midline, CN II-XII grossly intact.   Eyes: pupils equal, round, reactive to light with accommodation, EOMI.   Pulmonary: normal respirations, not labored, no accessory muscles used  Sensory: intact to light touch throughout  Motor Strength: Moves all extremities spontaneously with good tone.      Strength  Deltoids Triceps Biceps Wrist Extension Wrist Flexion Hand    Upper: R 5/5 5/5 5/5 5/5 5/5 5/5    L 5/5 5/5 5/5 5/5 5/5 5/5     Iliopsoas Quadriceps Knee  Flexion Tibialis  anterior Gastro- cnemius EHL   Lower: R 5/5 5/5 5/5 5/5 5/5 4/5    L 5/5 5/5 5/5 5/5 5/5 5/5     DTR's - 1 + and symmetric brachioradialis & achilles; 3+ right patellar, 2+ left patellar   Abarca: absent  Clonus: absent  Skin: warm, dry and intact, no rashes  Gait: limping to right     Lumbar ROM: slightly decreased in flexion    SI Joint tenderness: Negative   Pain on Hip ROM: Negative  Straight leg raise: negative bilaterally   Midline Bony Tenderness: negative throughout   Paraspinous muscle tenderness: negative throughout     Diagnostic Results:  I have personally reviewed imaging and agree with the findings.     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  Evidence of tethering of th spinal cord with the conus medullaris located at the L2-3 level  Mild to moderate multilevel degenerative changes of the lumbar spine    ASSESSMENT/PLAN:     Robles Jordan is a 68 y.o. male who presents with tethered cord syndrome.  His primary complaint today is of right leg weakness that is affecting his gait.  The symptoms were 1st noticed a year ago by his wife when she saw him limping.  He has since noticed subjective numbness in his right anterior thigh along with pain from his right low back down his right (and sometimes left) leg in a nondermatomal pattern.  He has no acute changes in bowel or bladder function, but he does note urinary retention requiring tamsulosin for about 2 years.  He has no associated BPH.  MRI lumbar spine revealed cord tethering that is likely contributing to his symptoms.  I discussed with the patient and his wife that surgery will more than likely be required given that he is already showing weakness on exam and has a h/o urinary retention.  However, we can try a short course of PT while we obtain some baseline studies.    Pt is aware of and in agreement with the following plan:  -EMG of his BLE.    -referral to Urology for baseline urodynamic studies  -referral to physical therapy  -return to clinic in 6 weeks to meet with Dr. Rodriguez and discuss the need for surgery    * recent EMG only included the RLE.  It is necessary to repeat EMG to include BLE    Please feel free to call with any further questions    Disclaimer: This note was dictated by speech recognition. Minor errors in transcription may be present.  Please call with any questions.      Briana Seals PA-C  [facility]  Department of Neurosurgery  [phone]

## 2019-11-19 PROCEDURE — 95911 NRV CNDJ TEST 9-10 STUDIES: CPT | Mod: S$GLB,,, | Performed by: NEUROLOGICAL SURGERY

## 2019-11-19 PROCEDURE — 95886 MUSC TEST DONE W/N TEST COMP: CPT | Mod: S$GLB,,, | Performed by: NEUROLOGICAL SURGERY

## 2019-11-19 PROCEDURE — 95911 PR NERVE CONDUCTION STUDY; 9-10 STUDIES: ICD-10-PCS | Mod: S$GLB,,, | Performed by: NEUROLOGICAL SURGERY

## 2019-11-19 PROCEDURE — 95886 PR EMG COMPLETE, W/ NERVE CONDUCTION STUDIES, 5+ MUSCLES: ICD-10-PCS | Mod: S$GLB,,, | Performed by: NEUROLOGICAL SURGERY

## 2019-11-22 ENCOUNTER — PROCEDURE VISIT (OUTPATIENT)
Dept: NEUROLOGY | Facility: CLINIC | Age: 69
End: 2019-11-22
Payer: COMMERCIAL

## 2019-11-22 VITALS — HEIGHT: 74 IN | BODY MASS INDEX: 31.4 KG/M2 | WEIGHT: 244.69 LBS

## 2019-11-22 DIAGNOSIS — R29.898 RIGHT LEG WEAKNESS: ICD-10-CM

## 2019-11-22 DIAGNOSIS — R26.9 GAIT DISTURBANCE: ICD-10-CM

## 2019-11-22 DIAGNOSIS — Q06.8 TETHERED CORD: ICD-10-CM

## 2019-11-27 ENCOUNTER — PATIENT OUTREACH (OUTPATIENT)
Dept: ADMINISTRATIVE | Facility: OTHER | Age: 69
End: 2019-11-27

## 2019-12-02 ENCOUNTER — OFFICE VISIT (OUTPATIENT)
Dept: SLEEP MEDICINE | Facility: CLINIC | Age: 69
End: 2019-12-02
Payer: COMMERCIAL

## 2019-12-02 VITALS
BODY MASS INDEX: 31.97 KG/M2 | WEIGHT: 249.13 LBS | HEART RATE: 96 BPM | DIASTOLIC BLOOD PRESSURE: 78 MMHG | HEIGHT: 74 IN | SYSTOLIC BLOOD PRESSURE: 147 MMHG

## 2019-12-02 DIAGNOSIS — G47.33 OBSTRUCTIVE SLEEP APNEA: Primary | ICD-10-CM

## 2019-12-02 PROCEDURE — 99999 PR PBB SHADOW E&M-EST. PATIENT-LVL III: ICD-10-PCS | Mod: PBBFAC,,, | Performed by: NURSE PRACTITIONER

## 2019-12-02 PROCEDURE — 1159F PR MEDICATION LIST DOCUMENTED IN MEDICAL RECORD: ICD-10-PCS | Mod: S$GLB,,, | Performed by: NURSE PRACTITIONER

## 2019-12-02 PROCEDURE — 99999 PR PBB SHADOW E&M-EST. PATIENT-LVL III: CPT | Mod: PBBFAC,,, | Performed by: NURSE PRACTITIONER

## 2019-12-02 PROCEDURE — 99214 OFFICE O/P EST MOD 30 MIN: CPT | Mod: S$GLB,,, | Performed by: NURSE PRACTITIONER

## 2019-12-02 PROCEDURE — 1159F MED LIST DOCD IN RCRD: CPT | Mod: S$GLB,,, | Performed by: NURSE PRACTITIONER

## 2019-12-02 PROCEDURE — 99214 PR OFFICE/OUTPT VISIT, EST, LEVL IV, 30-39 MIN: ICD-10-PCS | Mod: S$GLB,,, | Performed by: NURSE PRACTITIONER

## 2019-12-02 PROCEDURE — 1126F PR PAIN SEVERITY QUANTIFIED, NO PAIN PRESENT: ICD-10-PCS | Mod: S$GLB,,, | Performed by: NURSE PRACTITIONER

## 2019-12-02 PROCEDURE — 1126F AMNT PAIN NOTED NONE PRSNT: CPT | Mod: S$GLB,,, | Performed by: NURSE PRACTITIONER

## 2019-12-02 NOTE — PROGRESS NOTES
Robles Jordan returns in follow-up for HEAVEN management and CPAP equipment check.       INTERVAL HISTORY:     12/02/2019 CHRISTIAN Hinton NP:  Continues to use CPAP without breakthrough symptoms. Denies issues with mask or pressure. Denies nasal congestion or aerophagia. Reporting some daytime sleepiness from taking gabapentin 300 mg up to TID for back pain.  ESS 2.     Dreamstation APAP 13-18 cm, 28 days, > 4 hours: 90%, Predicted AHI: 1.8, 90%tile pressure: 15.5 cm, Manometer: 13 cm     08/24/2018 CHRISTIAN Hinton NP: Continues to use CPAP most nights without breakthrough symptoms. Denies issues with mask or pressure. ESS 1.     CPAP Interrogation: Dreamstation APAP 13 - 18 cm, TH: 1960.1, BH: 1961.8, > 4 hours: 25/30, 30-day average: 5.6 hours, MF: 98%, Predicted AHI: 1.3, PB: 0%, 90%tile pressure: 14.7 cm    08/21/2017 CHRISTIAN Hinton NP: Pt returns after set up of CPAP on 06/27/2017. Pt complaints of snoring and air gasping now resolved with CPAP use. Sleep now refreshing. Denies oral/nasal drying with FFM. Pressure is comfortable. Blood pressure improved since PAP therapy.  ESS 2.     CPAP Interrogation: DreamStation 13 - 18 cm  Compliance Summary Days with Device Usage: 30 days Percentage of Days >=4 Hours: 90.0% Average Usage (Days Used): 6 hrs. 15 mins. 43 secs. Average Usage (All Days): 6 hrs. 15 mins. 43 secs.   Apnea Indices Average AHI: 1.3 Average OA Index: 0.4 Average CA Index: 0.3  Large Leak Average Time in Large Leak: 1 mins. 26 secs. Average % of Night in Large Leak: 0.4%   Periodic Breathing Average % of Night in PB: 0.4%  90%tile: 15.3      06/12/2017 CHRISTIAN Hinton NP: HISTORY OF PRESENT ILLNESS: Robles Jordan is a 68 y.o. male is here for sleep evaluation.       Patient complaints include: snoring and air gasping.     Diagnosed with HEAVEN in 2015 and did not have pursue treatment. Now has trouble with controlling blood pressure. PCP recommended treating HEAVEN.     Denies symptoms of restless legs or kicking during  sleep.      [place] Sleepiness Scale score during initial sleep evaluation was 2.    SLEEP ROUTINE:    Bed partner:  wife  Time to bed:  11 pm  Sleep onset latency:  20 minutes      Disruptions or awakenings: none   Wakeup time:      5:30 - 6 am  Perceived sleep quality:  4/5         Previous Sleep Studies:    10/28/2015  lb.  The overall AHI was 22.6 with an oxygen jenaro of 87.0%. The supine AHI was 70.6 and the REM AHI was 27.9.     10/04/2016 CPAP titration study 242 lb. Initial improvement was observed on 13 cm H2O with residual hypopneas in NREM sleep (AHI 5.2). Consider auto-titrating CPAP at 13-18 cm H2O     PAST MEDICAL HISTORY:    Active Ambulatory Problems     Diagnosis Date Noted    Special screening for malignant neoplasms, colon 10/19/2015    Hyperlipidemia 10/23/2015    Essential hypertension 10/23/2015    Obesity (BMI 30.0-34.9) 10/23/2015    Elevated hemoglobin A1c 01/22/2016    Incomplete bladder emptying 01/22/2016    Obstructive sleep apnea on CPAP 01/22/2016    Right hip pain 05/11/2018    Tobacco use 11/12/2018    History of colon polyps 11/12/2018     Resolved Ambulatory Problems     Diagnosis Date Noted    Positive depression screening 05/11/2018     Past Medical History:   Diagnosis Date    BPH (benign prostatic hypertrophy)     Hx of colonic polyps 10/19/2015    Hypertension                 PAST SURGICAL HISTORY:    Past Surgical History:   Procedure Laterality Date    COLONOSCOPY N/A 10/19/2015    Procedure: COLONOSCOPY;  Surgeon: Antonino Bernstein MD;  Location: [address];  Service: Endoscopy;  Laterality: N/A;    MULTIPLE TOOTH EXTRACTIONS  2014         FAMILY HISTORY:                Family History   Problem Relation Age of Onset    Cancer Mother         Breast    Stroke Sister 65        Fatal    Cancer Maternal Uncle         Pancreas    Cancer Paternal Uncle         Pancreas    Cancer Maternal Grandmother         Breast    Hypertension Father      "Hyperlipidemia Father        SOCIAL HISTORY:          Tobacco:   Social History     Tobacco Use   Smoking Status Current Every Day Smoker    Types: Cigarettes       Alcohol use:    Social History     Substance and Sexual Activity   Alcohol Use Yes    Alcohol/week: 0.0 standard drinks    Comment: very seldom                 ALLERGIES:  Review of patient's allergies indicates:  No Known Allergies    CURRENT MEDICATIONS:    Current Outpatient Medications   Medication Sig Dispense Refill    amLODIPine (NORVASC) 10 MG tablet Take 1 tablet (10 mg total) by mouth once daily. For blood pressure 90 tablet 3    atorvastatin (LIPITOR) 80 MG tablet TAKE 1 TABLET (80 MG TOTAL) BY MOUTH ONCE DAILY. 90 tablet 3    gabapentin (NEURONTIN) 300 MG capsule 1 po q hs x 3 days, then 1 po bid x 3 days, then 1 po tid. 90 capsule 2    lisinopril (PRINIVIL,ZESTRIL) 30 MG tablet TAKE 1 TABLET BY MOUTH EVERY DAY 90 tablet 3    sildenafil (VIAGRA) 100 MG tablet Take 1 tablet (100 mg total) by mouth daily as needed for Erectile Dysfunction. 30 tablet 2    tamsulosin (FLOMAX) 0.4 mg Cap TAKE 1 CAPSULE (0.4 MG TOTAL) BY MOUTH ONCE DAILY. 90 capsule 3     No current facility-administered medications for this visit.                   REVIEW OF SYSTEMS:  Sleep related symptoms as per HPI. Otherwise, a balance of systems reviewed is negative.          PHYSICAL EXAM:  BP (!) 147/78   Pulse 96   Ht 6' 2" (1.88 m)   Wt 113 kg (249 lb 1.9 oz)   BMI 31.99 kg/m²   GENERAL: Overweight development, well groomed                                               ASSESSMENT:    Obstructive sleep apnea, moderate by AHI. The patient symptomatically has snoring and air gasping now resolved with CPAP use. The patient is adherent on CPAP and experiencing symptomatic benefit. Medical co-morbidities: HTN, HLD, and obesity.  This warrants treatment for obstructive sleep apnea.      Obesity, BMI >30, discussed relationship between HEAVEN and weight    PLAN:    "   Treatment:   -continue auto CPAP 13 - 18 cm. Reviewed recommended supplies replacement schedule   -Avoid supine sleep if not wearing CPAP  -RTC 12 months, sooner if needed.     Education: During our discussion today, we talked about the etiology of obstructive sleep apnea as well as the potential ramifications of untreated sleep apnea, which could include daytime sleepiness, hypertension, heart disease and/or stroke. We discussed potential treatment options, which could include weight loss, body positioning, continuous positive airway pressure (CPAP), OA, EPAP, or referral for surgical consideration.     Counseling regarding benefits of healthy eating and physical activity (150 minutes of moderate-intensity aerobic activity per week) for weight reduction which can improve overall health.     Precautions: The patient was advised to abstain from driving should they feel sleepy  or drowsy.

## 2019-12-04 ENCOUNTER — CLINICAL SUPPORT (OUTPATIENT)
Dept: REHABILITATION | Facility: HOSPITAL | Age: 69
End: 2019-12-04
Payer: COMMERCIAL

## 2019-12-04 DIAGNOSIS — Z74.09 IMPAIRED FUNCTIONAL MOBILITY, BALANCE, AND ENDURANCE: ICD-10-CM

## 2019-12-04 DIAGNOSIS — R29.898 RIGHT LEG WEAKNESS: ICD-10-CM

## 2019-12-04 PROCEDURE — 97161 PT EVAL LOW COMPLEX 20 MIN: CPT | Mod: PN

## 2019-12-04 PROCEDURE — 97110 THERAPEUTIC EXERCISES: CPT | Mod: PN

## 2019-12-04 NOTE — PLAN OF CARE
Physical Therapy Initial Evaluation     Name: Robles Jordan  [facility] Number: [id number]    Diagnosis:   Encounter Diagnoses   Name Primary?    Impaired functional mobility, balance, and endurance     Right leg weakness      Physician: Briana Seals, *  Treatment Orders: PT Eval and Treat  Past Medical History:   Diagnosis Date    BPH (benign prostatic hypertrophy)     Hx of colonic polyps 10/19/2015    Hypertension       Current Outpatient Medications   Medication Sig    amLODIPine (NORVASC) 10 MG tablet Take 1 tablet (10 mg total) by mouth once daily. For blood pressure    atorvastatin (LIPITOR) 80 MG tablet TAKE 1 TABLET (80 MG TOTAL) BY MOUTH ONCE DAILY.    gabapentin (NEURONTIN) 300 MG capsule 1 po q hs x 3 days, then 1 po bid x 3 days, then 1 po tid.    lisinopril (PRINIVIL,ZESTRIL) 30 MG tablet TAKE 1 TABLET BY MOUTH EVERY DAY    sildenafil (VIAGRA) 100 MG tablet Take 1 tablet (100 mg total) by mouth daily as needed for Erectile Dysfunction. (Patient not taking: Reported on 12/2/2019)    tamsulosin (FLOMAX) 0.4 mg Cap TAKE 1 CAPSULE (0.4 MG TOTAL) BY MOUTH ONCE DAILY.     No current facility-administered medications for this visit.      Review of patient's allergies indicates:  No Known Allergies    Evaluation Date: 12-4-19  PT Diagnosis: R LE weakness, impaired balance  Authorization period: 11-14-19 to 12-31-19  Visit # authorized: 20  Evaluation Time in/Out: see POC    Subjective       History of condition/Onset Date:  Pt presenting with sudden onset of R LE weakness about a yr ago that has gradually been worsening due to discovered tethered cord syndrome. Says his doctors wanted him to try PT to see if it could help him get stronger. Says he was having a lot of nerve pain in his R LE several months ago but that has largely improved since he started taking the gabapentin.  reports feeling his balance is off and his R LE does give out bc  "of weakness.   Notes also the sensation in his R LE has returned since taking the gabalentin 3x/day.  Says he isnt having any pain just the Usual morning stiffness in back and legs, which resolves once he gets up and moving.  to same sensation in R LE as compared to     PRECAUTIONS:  Primary complaint: R LE weakness  Prior Treatment/Easing factors: gabapentin  Current Functional Limitations: R LE fatigues with Walking >3mins, unable to lift turkey or carry case of water, Says he has to sit and take several rest breaks when walking or standing.  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   - Sit <--> Stand: felt fatigue on 7th rep, able to complete 8 reps, using hands on knees, from standard chair  - GAIT: does not use AD, pt with narrow stance, B toeing out slightly, begins Limping on R LE within first few steps    BALANCE Seconds unassisted   Feet tgthr WNL EC w/mild sway   Tandem R foot behind 8 EO   Tandem L foot behind 14" EO   SLS R unable   SLS L 5"     Lumbar AROM:    Percent Tolerance   Flexion   65    Extension 25      Left Side Bending 75    Right Side Bending 75    *Denotes pain    LOWER EXTREMITY STRENGTH:   RIGHT LEFT   Quadriceps 3+/5 4/5   Hamstrings 3/5 felt cramping in HS with resistance 4+/5     Hip Flexion 3/5limited in full ROM due to weakness 4/5   Hip Abduction 3/5 4/5   Hip IR 3+/5 4+/5   Hip ER 3-/5 4/5   Hip Ext 3/5 4-/5   *Denotes pain    Dermatomes: Sensation: Light Touch: Intact  Palpation: n/a    Special Tests:   Left Right   Seated knee ext during lordosis     SLR 42 40   Ankle DF 5 18   Sidelying hip ext 17 5 "       Pt/family was provided educational information, including: role of PT, goals for PT, scheduling - pt verbalized understanding. Discussed insurance limitations with pt.     Functional Limitations Reports - G Codes  Category: Mobility    Tool: FOTO Lumbar Survey   Modifier  Impairment Limitation Restriction    CH  0 % impaired, limited or restricted    CI  @ least 1% but less than 20% impaired, limited or restricted    CJ  @ least 20%<40% impaired, limited or restricted    CK  @ least 40%<60% impaired, limited or restricted    CL  @ least 60% <80% impaired, limited or restricted    CM  @ least 80%<100% impaired limited or restricted    CN  100% impaired, limited or restricted     Current/: CL = 65% Limitation   Goal/ : CI = 15% Limitation      History  Co-morbidities and personal factors that may impact the plan of care Co-morbidities:   HTN and thethered cord syndrome    Personal Factors:   no deficits     low   Examination  Body Structures and Functions, activity limitations and participation restrictions that may impact the plan of care Body Regions:   back  lower extremities    Body Systems:    gross symmetry  ROM  strength  gross coordinated movement  balance  gait  transfers  transitions  motor control  motor learning    Participation Restrictions:   none    Activity limitations:   Learning and applying knowledge  no deficits    General Tasks and Commands  no deficits    Communication  no deficits    Mobility  lifting and carrying objects  walking  standing, stairs, sit<>stand    Self care  no deficits     Domestic Life  no deficits    Interactions/Relationships  no deficits    Life Areas  no deficits    Community and Social Life  no deficits         low   Clinical Presentation stable and uncomplicated low   Decision Making/ Complexity Score: low         TREATMENT     Time In: see POC  PT Evaluation Completed?   Educated pt on treatment goals and plan of care. Pt demo good understanding along  "with good return demonstration of home exercise program.      Robles received 25 minutes of therapeutic exercise & instruction to improve LOF per flowsheet.    NEXT VISIT:  Train for endurance and global R LE strenghtening  - dynamic balance challenges   nustep or upright bike warm up  Sitting hip IR with tband  Ankle 4 way GTB (should be doing DF at home)  Supine HF stretch 3x30" (performed in clinic at eval)        Written Home Exercises Provided: Yes  Access Code: [email or web address]   Seated Hamstring Stretch - 3 reps - 30 hold - 2x daily - 7x weekly  Seated Ankle Dorsiflexion with Resistance - 10 reps - 3 sets - 1x daily - 4x weekly  Standing Gastroc Stretch - 3 reps - 30 hold - 2x daily - 7x weekly  Bridge - 10 reps - 2x daily - 4x weekly  Arboleda verbalized good understanding of the education provided.   Patient demo good return demo with skill during exercises.    Assessment     Patient presenting with significant R LE weakness as a result of tethered cord syndrome. Pt is typically very active up until about a year ago when his R LE started getting weaker. At this time would like to avoid surgery and further decline in function due to fatigue in the R LE. Pt is highly motivated to participate in PT and tolerated eval and HEP prescription very well. He will be progressed as able.  Pt prognosis is Excellent.  Pt will benefit from skilled outpatient physical therapy to address the above stated deficits, provide pt/family education and to maximize pt's level of independence.     Medical necessity is demonstrated by the following IMPAIRMENTS/PROBLEMS:  - Increased Pain  - Decreased Segmental Mobility & Decreased ROM  - Decreased Core & BLE strength  - Decreased Flexibility B LE  - Decreased Tolerance to Functional Activities  - Fall Risk  - inability to participate in vocational pursuits  - Requires skilled supervision to complete and progress HEP  - Weakness    Pt's spiritual, cultural and educational needs considered " and pt agreeable to plan of care and goals as stated below:     Anticipated Barriers for physical therapy: none    Short Term GOALS:  1. Pt to report feeling more stable and steady during gait.  2. Pt to display improved gait tolerance to >10mins before needing rest break.  3. Pt to display improved fxl strength to be able to carry groceries (Case of water).   4. Pt to begin HEP including specific stretching and strengthening as per their diagnosis to decrease pain and improve flexibility.  5. Pt to display >65deg SLR in supine hooklying, >10 deg active DF in sitting.  6. Pt to be able to comple >10 reps of sit<>Stand within 30 sec    Long Term GOALS:   1. Pt to report <1/10, no longer limiting functional activities.  2. Pt to report return to >75% of regular ADLs, work and recreational tasks.   3. Pt will demonstrate appropriate squat technique for improved tolerance of lifting and carrying functional activity needed for household tasks.   4. Pt to be compliant in advanced HEP to maintain progress gained in therapy thus far.   5. Pt to display >4+/5 gross MMT for B LE in order to support pelvis and spine throughout ADLs.     PLAN   Plan of care Certification: 12/4/2019 to 2-4-20.  Outpatient physical therapy 1-2 times per week x 8 weeks to include: patient education, therapeutic exercises, neuromuscular re-education/ balance exercises, joint mobilizations, modalities prn, and regular update of pt's home exercise program.    Pt may be seen by PTA as part of the rehabilitation team.     Trini Freeman DPT

## 2019-12-09 ENCOUNTER — TELEPHONE (OUTPATIENT)
Dept: SLEEP MEDICINE | Facility: CLINIC | Age: 69
End: 2019-12-09

## 2019-12-18 ENCOUNTER — CLINICAL SUPPORT (OUTPATIENT)
Dept: REHABILITATION | Facility: HOSPITAL | Age: 69
End: 2019-12-18
Payer: COMMERCIAL

## 2019-12-18 DIAGNOSIS — R29.898 RIGHT LEG WEAKNESS: ICD-10-CM

## 2019-12-18 DIAGNOSIS — Z74.09 IMPAIRED FUNCTIONAL MOBILITY, BALANCE, AND ENDURANCE: ICD-10-CM

## 2019-12-18 PROCEDURE — 97110 THERAPEUTIC EXERCISES: CPT | Mod: PN

## 2019-12-18 NOTE — PATIENT INSTRUCTIONS
Access Code: [email or web address]       Seated Hamstring Stretch - 3 reps - 30 hold - 2x daily - 7x weekly  Seated Ankle Dorsiflexion with Resistance - 10 reps - 3 sets - 1x daily - 4x weekly  Standing Gastroc Stretch - 3 reps - 30 hold - 2x daily - 7x weekly  Bridge - 10 reps - 2x daily - 4x weekly

## 2019-12-18 NOTE — PROGRESS NOTES
"  Physical Therapy Daily Treatment Note     Name: Robles Jordan  Clinic Number: [id number]    Therapy Diagnosis:   Encounter Diagnoses   Name Primary?    Impaired functional mobility, balance, and endurance     Right leg weakness      Physician: Briana Seals, *    Visit Date: 12/18/2019    Physician Orders: Eval and Treat9  Medical Diagnosis:   Q06.8 (ICD-10-CM) - Tethered cord   R29.898 (ICD-10-CM) - Right leg weakness   R26.9 (ICD-10-CM) - Gait disturbance   Evaluation Date: 12/4/19  Authorization Period Expiration: 11/14/19-12/31/19  Plan of Care Certification Period: 12/4/19-2/4/20  Visit #/Visits authorized: 2/ 20     Time In: 0845  Time Out: 0950  Total Time: 65 minutes  Total Billable Time: 35  minutes    Precautions: Standard and tethered cord syndrome    Subjective     Pt reports: he hopes he can get stronger.  He was compliant with home exercise program.  Response to previous treatment: good  Functional change: ongoing    Pain: 0/10  Location: right LE      Objective     Robles received therapeutic exercises to develop strength, endurance, ROM, flexibility, posture and core stabilization for 51 minutes including:    Upright Bike 6 M  Shuttle (DL) 2 cords x20    Standing:  Hip abduction x20  Hip extension x20  Mini Squats x20  Heel Raises x20  Step ups on 4" step x20 (up with RLE/down with LLE)    Sitting EOM:  Hip IR with RED TB x30    On Mat: (RLE)  Ankle 4-way with Green TB x20  HSS 3x30"  Hip flexor stretch 3x30"   SAQs 3" hold x20  SL hip abduction x20  Bridges 2x10 (next visit)    Robles participated in neuromuscular re-education activities to improve: Balance, Coordination, Kinesthetic, Sense, Proprioception and Posture for 6 minutes. The following activities were included:    Modified SLB with purple block 2x30" each LE  NBOS on foam with EO 2x30"  WBOS on foam with EC 2x30"  Tandem standing (next visit)  Cone tapping (next visit)    Robles participated in gait training to improve " functional mobility and safety for 8 minutes, including:    -Ambulation on turf for 3 minutes (Encouraged pt to focus on not crossing over with BLEs and upright posture)  - side stepping in // bars x3 bouts (no UE support)      Robles received hot pack for 00 minutes to RLE.    Robles received cold pack for 00  minutes to RLE.      Home Exercises Provided and Patient Education Provided     Education provided:   - compliance with HEP  - Possibility of Doms    Written Home Exercises Provided: Patient instructed to cont prior HEP.  Exercises were reviewed and Robles was able to demonstrate them prior to the end of the session.  Robles demonstrated good  understanding of the education provided.     See EMR under handout received on IE from therapist and under patient instructions for exercises provided prior visit.      Assessment   Pt tolerated treatment good today.  Challenged with new therex without provocation of pain.  Demonstrates decreased strength to R hip flexor, quads and hip abductor musculature.  Pt ambulates with unsteady gait due to crossing over causing ocassional LOB, however, able to self recover.  Observed decreased endurance and proper muscle fatigue at end of treatment session.  Pt would continue to benefit from balance training, strengthening therex, and endurance based exercises to assist with improving functional mobility and quality of life.    Robles is progressing well towards his goals.   Pt prognosis is Good.     Pt will continue to benefit from skilled outpatient physical therapy to address the deficits listed in the problem list box on initial evaluation, provide pt/family education and to maximize pt's level of independence in the home and community environment.     Pt's spiritual, cultural and educational needs considered and pt agreeable to plan of care and goals.     Anticipated barriers to physical therapy: tethered cord syndrome    Goals:     Short Term GOALS:  1. Pt to  report feeling more stable and steady during gait.  2. Pt to display improved gait tolerance to >10mins before needing rest break.  3. Pt to display improved fxl strength to be able to carry groceries (Case of water).   4. Pt to begin HEP including specific stretching and strengthening as per their diagnosis to decrease pain and improve flexibility.  5. Pt to display >65deg SLR in supine hooklying, >10 deg active DF in sitting.  6. Pt to be able to comple >10 reps of sit<>Stand within 30 sec     Long Term GOALS:   1. Pt to report <1/10, no longer limiting functional activities.  2. Pt to report return to >75% of regular ADLs, work and recreational tasks.   3. Pt will demonstrate appropriate squat technique for improved tolerance of lifting and carrying functional activity needed for household tasks.   4. Pt to be compliant in advanced HEP to maintain progress gained in therapy thus far.   5. Pt to display >4+/5 gross MMT for B LE in order to support pelvis and spine throughout ADLs.   Plan   Continue with current POC    Plan of care Certification: 12/4/2019 to 2-4-20.  Vani Landeros, PTA

## 2019-12-19 NOTE — PROGRESS NOTES
"  Physical Therapy Daily Treatment Note     Name: Robles Jordan  Clinic Number: [id number]    Therapy Diagnosis:   Encounter Diagnoses   Name Primary?    Impaired functional mobility, balance, and endurance     Right leg weakness      Physician: Briana Seals, *    Visit Date: 12/20/2019    Physician Orders: Eval and Treat9  Medical Diagnosis:   Q06.8 (ICD-10-CM) - Tethered cord   R29.898 (ICD-10-CM) - Right leg weakness   R26.9 (ICD-10-CM) - Gait disturbance   Evaluation Date: 12/4/19  Authorization Period Expiration: 11/14/19-12/31/19  Plan of Care Certification Period: 12/4/19-2/4/20  Visit #/Visits authorized: 3/ 20     Time In: 0950  Time Out: 16823  Total Time: 70 minutes  Total Billable Time: 35 minutes    Precautions: Standard and tethered cord syndrome    Subjective     Pt reports: he is really working hard to get better use of his leg.   He was compliant with home exercise program.  Response to previous treatment: "she wore me out but it was good for me."  Functional change: ongoing    Pain: 0/10  Location: right LE      Objective     Robles received therapeutic exercises to develop strength, endurance, ROM, flexibility, posture and core stabilization for 15 minutes including:    Upright Bike 10 Min  Shuttle SL 2cords x20 ea, q(DL) 3 cords x20  Standing:  Hip abduction x20  Hip extension x20  Mini Squats x20  Heel Raises x20  Step ups on 4" step x20 (up with RLE/down with LLE)  On Mat: (RLE)  HSS 3x30"  Hip flexor stretch 3x30"   SAQs 3" hold x20  SL hip abduction x20      Robles participated in neuromuscular re-education activities to improve: Balance, Coordination, Kinesthetic, Sense, Proprioception and Posture for 15  minutes. The following activities were included:  Modified SLB with purple block 2x30" each LE  Tandem standing at corner 3x30 (add foam NV)  Tall Cone tapping w/1 UE support 2x10 ea LE  4 in Hurdles side stepping and forwards w/wall for support PRN x4 laps each " way.      Robles participated in gait training to improve functional mobility and safety for 00  minutes, including:    -Ambulation on turf for 3 minutes (Encouraged pt to focus on not crossing over with BLEs and upright posture)  - side stepping in // bars x3 bouts (no UE support)        Home Exercises Provided and Patient Education Provided     Education provided:   - rationale behind exercise technique and alignment for strengthening    Written Home Exercises Provided: Patient instructed to cont prior HEP.  Exercises were reviewed and Robles was able to demonstrate them prior to the end of the session.  Robles demonstrated good  understanding of the education provided.     See EMR under handout received on IE from therapist and under patient instructions for exercises provided prior visit.      Assessment   Pt with good motivation to participate in PT POC and push himself. He denies any pain but displays fatigue with increased reps and intensity of therEx.    Robles is progressing well towards his goals.   Pt prognosis is Good.     Pt will continue to benefit from skilled outpatient physical therapy to address the deficits listed in the problem list box on initial evaluation, provide pt/family education and to maximize pt's level of independence in the home and community environment.     Pt's spiritual, cultural and educational needs considered and pt agreeable to plan of care and goals.     Anticipated barriers to physical therapy: tethered cord syndrome    Goals:     Short Term GOALS:  1. Pt to report feeling more stable and steady during gait.  2. Pt to display improved gait tolerance to >10mins before needing rest break.  3. Pt to display improved fxl strength to be able to carry groceries (Case of water).   4. Pt to begin HEP including specific stretching and strengthening as per their diagnosis to decrease pain and improve flexibility.  5. Pt to display >65deg SLR in supine hooklying, >10 deg active  DF in sitting.  6. Pt to be able to comple >10 reps of sit<>Stand within 30 sec     Long Term GOALS:   1. Pt to report <1/10, no longer limiting functional activities.  2. Pt to report return to >75% of regular ADLs, work and recreational tasks.   3. Pt will demonstrate appropriate squat technique for improved tolerance of lifting and carrying functional activity needed for household tasks.   4. Pt to be compliant in advanced HEP to maintain progress gained in therapy thus far.   5. Pt to display >4+/5 gross MMT for B LE in order to support pelvis and spine throughout ADLs.   Plan   Continue with current POC    Plan of care Certification: 12/4/2019 to 2-4-20.  Trini Freeman, PT

## 2019-12-20 ENCOUNTER — CLINICAL SUPPORT (OUTPATIENT)
Dept: REHABILITATION | Facility: HOSPITAL | Age: 69
End: 2019-12-20
Payer: COMMERCIAL

## 2019-12-20 DIAGNOSIS — R29.898 RIGHT LEG WEAKNESS: ICD-10-CM

## 2019-12-20 DIAGNOSIS — Z74.09 IMPAIRED FUNCTIONAL MOBILITY, BALANCE, AND ENDURANCE: ICD-10-CM

## 2019-12-20 PROCEDURE — 97112 NEUROMUSCULAR REEDUCATION: CPT | Mod: PN

## 2019-12-20 PROCEDURE — 97110 THERAPEUTIC EXERCISES: CPT | Mod: PN

## 2019-12-27 ENCOUNTER — CLINICAL SUPPORT (OUTPATIENT)
Dept: REHABILITATION | Facility: HOSPITAL | Age: 69
End: 2019-12-27
Payer: COMMERCIAL

## 2019-12-27 DIAGNOSIS — R29.898 RIGHT LEG WEAKNESS: ICD-10-CM

## 2019-12-27 DIAGNOSIS — Z74.09 IMPAIRED FUNCTIONAL MOBILITY, BALANCE, AND ENDURANCE: ICD-10-CM

## 2019-12-27 PROCEDURE — 97110 THERAPEUTIC EXERCISES: CPT | Mod: PN

## 2019-12-27 PROCEDURE — 97112 NEUROMUSCULAR REEDUCATION: CPT | Mod: PN

## 2019-12-27 NOTE — PROGRESS NOTES
"  Physical Therapy Daily Treatment Note     Name: Robles Jordan  Clinic Number: [id number]    Therapy Diagnosis:   Encounter Diagnoses   Name Primary?    Impaired functional mobility, balance, and endurance     Right leg weakness      Physician: Briana Seals, *    Visit Date: 12/27/2019    Physician Orders: Eval and Treat9  Medical Diagnosis:   Q06.8 (ICD-10-CM) - Tethered cord   R29.898 (ICD-10-CM) - Right leg weakness   R26.9 (ICD-10-CM) - Gait disturbance   Evaluation Date: 12/4/19  Authorization Period Expiration: 11/14/19-12/31/19  Plan of Care Certification Period: 12/4/19-2/4/20  Visit #/Visits authorized: 4/ 20  Time In: 0700  Time Out: 0800  Total Time: 60 minutes  Total Billable Time: 60 minutes    Precautions: Standard and tethered cord syndrome    Subjective     Pt reports: he was feeling better up until 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  Says he was so sore he wasn't even able to do his HEP.     He was not compliant with home exercise program.  Response to previous treatment: felt good  Functional change: ongoing    Pain: 0/10  Location: right LE      Objective     Robles received therapeutic exercises to develop strength, endurance, ROM, flexibility, posture and core stabilization for 45 minutes including:  Upright Bike 10 Min  Shuttle SL 2cords x20 ea, (DL) (inc resistance on NV if not sore)  Standing:  Stair hip flexor stretch 3x30  Hamstring curls (modified donkey kick) no weight 2x10  NP-Hip abduction x20  NP- Hip extension x20  NP-Mini Squats x20  Heel Raises x30  Tandem on foam 3x30 EO  NP- Step ups on 4" step x20 (up with RLE/down with LLE)  +stool scoots    On Mat: (RLE)  HSS 3x30"  Hip flexor stretch 3x30"   Open books 10x5  SAQs 3" hold x20 (add  NP, modified to clams- SL hip abduction x20  Clams 2x10   +reverse clams        Robles participated in neuromuscular re-education activities to improve: Balance, " Coordination, Kinesthetic, Sense, Proprioception and Posture for 10 minutes. The following activities were included:  Tandem standing at corner 3x30 (on foam)  Tall Cone tapping w/1 UE support 2x9 ea LE  NP- 4 in Hurdles side stepping and forwards w/wall for support PRN x2 laps each way.      CP application to low back for 8mins while supine hooklying following exercises today to decrease pain and inflammation.     Home Exercises Provided and Patient Education Provided     Education provided:   - rationale behind exercise technique and alignment for strengthening    Written Home Exercises Provided: Patient instructed to cont prior HEP.  Exercises were reviewed and Robles was able to demonstrate them prior to the end of the session.  Arboleda demonstrated good  understanding of the education provided.     See EMR under handout received on IE from therapist and under patient instructions for exercises provided prior visit.      Assessment   Pt reported relief of LBP with today's exercise prescription. Encouraged pt in adherence to his HEP even when feeling sore in his back.   Pt with fatigue and inability to complete more than 9 reps of tall cone taps during each set today.  Advised to apply heating pad to his back later today as well, he says he was going to get in the hot tub.    Arboleda is progressing well towards his goals.   Pt prognosis is Good.     Pt will continue to benefit from skilled outpatient physical therapy to address the deficits listed in the problem list box on initial evaluation, provide pt/family education and to maximize pt's level of independence in the home and community environment.     Pt's spiritual, cultural and educational needs considered and pt agreeable to plan of care and goals.     Anticipated barriers to physical therapy: tethered cord syndrome    Goals:     Short Term GOALS:  1. Pt to report feeling more stable and steady during gait.  2. Pt to display improved gait tolerance to  >10mins before needing rest break.  3. Pt to display improved fxl strength to be able to carry groceries (Case of water).   4. Pt to begin HEP including specific stretching and strengthening as per their diagnosis to decrease pain and improve flexibility.  5. Pt to display >65deg SLR in supine hooklying, >10 deg active DF in sitting.  6. Pt to be able to comple >10 reps of sit<>Stand within 30 sec     Long Term GOALS:   1. Pt to report <1/10, no longer limiting functional activities.  2. Pt to report return to >75% of regular ADLs, work and recreational tasks.   3. Pt will demonstrate appropriate squat technique for improved tolerance of lifting and carrying functional activity needed for household tasks.   4. Pt to be compliant in advanced HEP to maintain progress gained in therapy thus far.   5. Pt to display >4+/5 gross MMT for B LE in order to support pelvis and spine throughout ADLs.   Plan   Continue with current POC    Plan of care Certification: 12/4/2019 to 2-4-20.  Trini Freeman, PT

## 2019-12-30 ENCOUNTER — CLINICAL SUPPORT (OUTPATIENT)
Dept: REHABILITATION | Facility: HOSPITAL | Age: 69
End: 2019-12-30
Payer: COMMERCIAL

## 2019-12-30 DIAGNOSIS — Z74.09 IMPAIRED FUNCTIONAL MOBILITY, BALANCE, AND ENDURANCE: ICD-10-CM

## 2019-12-30 DIAGNOSIS — R29.898 RIGHT LEG WEAKNESS: ICD-10-CM

## 2019-12-30 PROCEDURE — 97112 NEUROMUSCULAR REEDUCATION: CPT | Mod: PN

## 2019-12-30 PROCEDURE — 97110 THERAPEUTIC EXERCISES: CPT | Mod: PN

## 2019-12-30 NOTE — PROGRESS NOTES
"  Physical Therapy Daily Treatment Note     Name: Robles Jordan  Clinic Number: [id number]    Therapy Diagnosis:   Encounter Diagnoses   Name Primary?    Impaired functional mobility, balance, and endurance     Right leg weakness      Physician: Briana Seals, *    Visit Date: 12/30/2019    Physician Orders: Eval and Treat  Medical Diagnosis:   Q06.8 (ICD-10-CM) - Tethered cord   R29.898 (ICD-10-CM) - Right leg weakness   R26.9 (ICD-10-CM) - Gait disturbance   Evaluation Date: 12/4/19  Authorization Period Expiration: 11/14/19-12/31/19  Plan of Care Certification Period: 12/4/19-2/4/20  Visit #/Visits authorized: 5/ 20  Time In: 0900  Time Out: 1000  Total Time: 60 minutes  Total Billable Time: 60 minutes    Precautions: Standard and tethered cord syndrome    Subjective     Pt reports: hes doing ok today.  He was not compliant with home exercise program.  Response to previous treatment: felt good  Functional change: ongoing    Pain: 0/10  Location: right LE      Objective     Robles received therapeutic exercises to develop strength, endurance, ROM, flexibility, posture and core stabilization for 15 minutes including:  Upright Bike 10 Min  Shuttle SL 2cords x20 ea, (DL) (R LE 1.5 cords x20)  Standing:  Stair hip flexor stretch 3x30  Hamstring curls (modified donkey kick) no weight 2x10  Hip abduction x20  Hip extension x20  Mini Squats x20  Heel Raises x30   Step ups on 4" step x20 (up with RLE/down with LLE)  stool scoots pole to pole x2  On Mat: (RLE)  HSS 3x30"  Hip flexor stretch 3x30"   Open books 10x5  SAQs 3" hold x20 (add  NP, modified to clams- SL hip abduction x20  NP- Clams 2x10   +reverse clams        Robles participated in neuromuscular re-education activities to improve: Balance, Coordination, Kinesthetic, Sense, Proprioception and Posture for 15 minutes. The following activities were included:  Tandem standing on foam 3x30  Tall Cone tapping w/1 UE support 2x9 ea LE  4 in Hurdles " side stepping and forwards w/wall for support PRN x2 laps each way.  Tandem on foam 3x30 EO    CP application to low back for 8mins while supine hooklying following exercises today to decrease pain and inflammation.     Home Exercises Provided and Patient Education Provided     Education provided:   - rationale behind exercise technique and alignment for strengthening    Written Home Exercises Provided: Patient instructed to cont prior HEP.  Exercises were reviewed and Arboleda was able to demonstrate them prior to the end of the session.  Arboleda demonstrated good  understanding of the education provided.     See EMR under handout received on IE from therapist and under patient instructions for exercises provided prior visit.      Assessment   Pt with good tolerance to todays exercises, no increase in pain. Felt appropriate fatigue following exercises.       Arboleda is progressing well towards his goals.   Pt prognosis is Good.     Pt will continue to benefit from skilled outpatient physical therapy to address the deficits listed in the problem list box on initial evaluation, provide pt/family education and to maximize pt's level of independence in the home and community environment.     Pt's spiritual, cultural and educational needs considered and pt agreeable to plan of care and goals.     Anticipated barriers to physical therapy: tethered cord syndrome    Goals:     Short Term GOALS:  1. Pt to report feeling more stable and steady during gait.  2. Pt to display improved gait tolerance to >10mins before needing rest break.  3. Pt to display improved fxl strength to be able to carry groceries (Case of water).   4. Pt to begin HEP including specific stretching and strengthening as per their diagnosis to decrease pain and improve flexibility.  5. Pt to display >65deg SLR in supine hooklying, >10 deg active DF in sitting.  6. Pt to be able to comple >10 reps of sit<>Stand within 30 sec     Long Term GOALS:   1. Pt  to report <1/10, no longer limiting functional activities.  2. Pt to report return to >75% of regular ADLs, work and recreational tasks.   3. Pt will demonstrate appropriate squat technique for improved tolerance of lifting and carrying functional activity needed for household tasks.   4. Pt to be compliant in advanced HEP to maintain progress gained in therapy thus far.   5. Pt to display >4+/5 gross MMT for B LE in order to support pelvis and spine throughout ADLs.   Plan   Continue with current [facility]    Plan of care Certification: 12/4/2019 to 2-4-20.  Trini Freeman, PT

## 2019-12-31 ENCOUNTER — PATIENT OUTREACH (OUTPATIENT)
Dept: ADMINISTRATIVE | Facility: OTHER | Age: 69
End: 2019-12-31

## 2020-01-02 ENCOUNTER — OFFICE VISIT (OUTPATIENT)
Dept: NEUROSURGERY | Facility: CLINIC | Age: 70
End: 2020-01-02
Payer: COMMERCIAL

## 2020-01-02 VITALS
HEIGHT: 74 IN | DIASTOLIC BLOOD PRESSURE: 70 MMHG | SYSTOLIC BLOOD PRESSURE: 146 MMHG | HEART RATE: 86 BPM | BODY MASS INDEX: 31.43 KG/M2 | WEIGHT: 244.94 LBS

## 2020-01-02 DIAGNOSIS — Q06.8 TETHERED CORD SYNDROME: ICD-10-CM

## 2020-01-02 DIAGNOSIS — Z74.09 IMPAIRED FUNCTIONAL MOBILITY, BALANCE, AND ENDURANCE: ICD-10-CM

## 2020-01-02 DIAGNOSIS — R33.9 INCOMPLETE BLADDER EMPTYING: Primary | ICD-10-CM

## 2020-01-02 PROCEDURE — 99214 OFFICE O/P EST MOD 30 MIN: CPT | Mod: S$GLB,,, | Performed by: NEUROLOGICAL SURGERY

## 2020-01-02 PROCEDURE — 1126F PR PAIN SEVERITY QUANTIFIED, NO PAIN PRESENT: ICD-10-PCS | Mod: S$GLB,,, | Performed by: NEUROLOGICAL SURGERY

## 2020-01-02 PROCEDURE — 99999 PR PBB SHADOW E&M-EST. PATIENT-LVL III: ICD-10-PCS | Mod: PBBFAC,,, | Performed by: NEUROLOGICAL SURGERY

## 2020-01-02 PROCEDURE — 99214 PR OFFICE/OUTPT VISIT, EST, LEVL IV, 30-39 MIN: ICD-10-PCS | Mod: S$GLB,,, | Performed by: NEUROLOGICAL SURGERY

## 2020-01-02 PROCEDURE — 1126F AMNT PAIN NOTED NONE PRSNT: CPT | Mod: S$GLB,,, | Performed by: NEUROLOGICAL SURGERY

## 2020-01-02 PROCEDURE — 1159F MED LIST DOCD IN RCRD: CPT | Mod: S$GLB,,, | Performed by: NEUROLOGICAL SURGERY

## 2020-01-02 PROCEDURE — 1159F PR MEDICATION LIST DOCUMENTED IN MEDICAL RECORD: ICD-10-PCS | Mod: S$GLB,,, | Performed by: NEUROLOGICAL SURGERY

## 2020-01-02 PROCEDURE — 99999 PR PBB SHADOW E&M-EST. PATIENT-LVL III: CPT | Mod: PBBFAC,,, | Performed by: NEUROLOGICAL SURGERY

## 2020-01-02 NOTE — PROGRESS NOTES
CHIEF COMPLAINT:  Chief Complaint   Patient presents with    Follow-up       HPI:  Robles Jordan is a 69 y.o.  male with below listed PMH, who is referred for evaluation of tethered cord.  Main complaint is BLE weakness, worse on right for past 1 year.  His legs will give out on him if he walks long distance or on incline.  Also has low back pain along waistline but does not endorse much leg pain.  Numbness/tingling along right medial thigh and gets charley horses in his sleep.  Going to PT but not helping much - feels sore after attending.  Gabapentin makes sleepy so only taking BID.    Has had urinating issues for approx 1 year manifest as sensation of incomplete bladder emptying.  He was placed on flomax but unclear how much it has helped.  BM are regular and daily.    He has appt with urology next week.     History of Present Illness (from PA note):  Robles Jordan is a 68 y.o. male with HLD, HTN, and HEAVEN who presents with low back pain and right leg pain/numbness/weakness. He was initially evaluated by CHIRAG Jones at [facility] back and spine. She referred him to [facility] neurosurgery to establish care closer to home.      Symptom onset: 1 year, first noticed there was a problem when his wife noticed him limping  Location: right-sided low back pain with radiation into medial and lateral right leg down to his calf. Occasionally in his left leg. Never in his foot other than a cramping sensation.   Pain level: 0/10, primary complaint is of weakness, not pain  Numbness: + numbness and tingling in anterior right thigh   Weakness: + in right leg   Denies bowel dysfunction and saddle anesthesia.   Endorses 2 year history of urinary retention requiring tamsulosin. No documentation of BPH  Endorses gait disturbance related to limping and right leg weakness.   2 falls in the last few months related to trip/fall and a chair giving out     Treatments tried:  -PT: has note tried  -ADIEL: has not tried; not indicated    -Gabapentin: currently taking   -Muscle relaxer: not currently taking   -Rx pain medications: none  -Spine surgery: none    Review of patient's allergies indicates:  No Known Allergies    Past Medical History:   Diagnosis Date    BPH (benign prostatic hypertrophy)     Hx of colonic polyps 10/19/2015    Hypertension      Past Surgical History:   Procedure Laterality Date    COLONOSCOPY N/A 10/19/2015    Procedure: COLONOSCOPY;  Surgeon: Antonino Bernstein MD;  Location: [address];  Service: Endoscopy;  Laterality: N/A;    MULTIPLE TOOTH EXTRACTIONS  2014     Family History   Problem Relation Age of Onset    Cancer Mother         Breast    Stroke Sister 65        Fatal    Cancer Maternal Uncle         Pancreas    Cancer Paternal Uncle         Pancreas    Cancer Maternal Grandmother         Breast    Hypertension Father     Hyperlipidemia Father      Social History     Tobacco Use    Smoking status: Current Every Day Smoker     Types: Cigarettes   Substance Use Topics    Alcohol use: Yes     Alcohol/week: 0.0 standard drinks     Comment: very seldom    Drug use: No        Review of Systems   Constitutional: Negative.    Respiratory: Negative for cough and shortness of breath.    Cardiovascular: Negative for chest pain, palpitations, claudication and leg swelling.   Gastrointestinal: Negative for abdominal pain, constipation and diarrhea.   Genitourinary: Positive for frequency. Negative for flank pain and urgency.   Musculoskeletal: Positive for back pain and falls (Near falls due legs give out). Negative for joint pain, myalgias and neck pain.   Skin: Negative.    Neurological: Positive for tingling, focal weakness and weakness. Negative for dizziness, tremors, sensory change, speech change, seizures, loss of consciousness and headaches.   Psychiatric/Behavioral: Negative.        OBJECTIVE:   Vital Signs:  Pulse: 86 (01/02/20 1343)  BP: (!) 146/70 (01/02/20 1343)    Physical Exam:  Constitutional:  Patient sitting comfortably in chair. Appears well developed and well nourished.  Skin: Exposed areas are intact without abnormal markings, rashes or other lesions.  HEENT: Normocephalic. Normal conjunctivae.  Cardiovascular: Normal rate and regular rhythm.  Respiratory: Chest wall rises and falls symmetrically, without signs of respiratory distress.  Abdomen: Soft and non-tender.  Extremities: Warm and without edema. Calves supple, non-tender.  Psych/Behavior: Normal affect.    Neurological:    Mental status: Alert and oriented. Conversational and appropriate.       Cranial Nerves: VFF to confrontation. PERRL. EOMI without nystagmus. Facial STLT normal and symmetric. Strong, symmetric muscles of mastication. Facial strength full and symmetric. Hearing equal bilaterally to finger rub. Palate and uvula rise and fall normally in midline. Shoulder shrug 5/5 strength. Tongue midline.     Motor:    Upper:  Deltoids Triceps Biceps WE WF  FA    R 5/5 5/5 5/5 5/5 5/5 5/5 5/5    L 5/5 5/5 5/5 5/5 5/5 5/5 5/5      Lower:  HF KE KF DF PF EHL    R 4+/5 5/5 5/5 5/5 5/5 5/5    L 5/5 5/5 5/5 5/5 5/5 5/5     Sensory: Altered light touch right thigh     Reflexes:      DTR: 2+ knees and biceps symmetrically.     Abarca's: Negative.     Babinski's: Negative.     Clonus: Negative.    Cerebellar: Finger-to-nose and rapid alternating movements normal.     Gait:  Stable    Spine:    Posture: Head well aligned over pelvis and shoulders in front and side views.  No focal or global spinal deformity visible on inspection.     Cervical:      ROM: Full with flexion, extension, lateral rotation and ear-to-shoulder bend.      Midline TTP: Negative.     Spurling's test: Negative.     Lhermitte's: Negative.    Thoracic:     Midline TTP: Negative    Lumbar:     Midline TTP: Negative     Straight Leg Test: Negative     Crossed Straight Leg Test: Negative    Other:     SI joint TTP: Negative.     Tenderness with external/internal hip rotation:  Negative.    Diagnostic Results:  All imaging was independently reviewed by me.    EMG/Nerve conduction study, dated 11/19/19:  1. Acute denervation L4-S1 on right    MRI L spine, dated 11/20/19:  1. Low lying conus to L3  2. Fatty filum  3. Posterior displacement of cauda equina    ASSESSMENT/PLAN:     Problem List Items Addressed This Visit        Neuro    Tethered cord syndrome       Renal/    Incomplete bladder emptying - Primary       Other    Impaired functional mobility, balance, and endurance          VISIT SUMMARY:  Patient has signs and symptoms concerning for tethered cord syndrome.  He has low back pain without significant radicular component as well as bilateral lower extremity weakness, worse on the right.  Additionally he has a sensation of incomplete bladder emptying for which he takes Flomax.  MRI shows subtle fatty filum and low-lying conus with posterior displacement of the cauda equina suggestive of tethered cord syndrome.  EMG confirms acute denervation of L4-S1.  Urodynamics are pending.  We discussed diagnostic possibilities and options for treatment.  If his urodynamics are suggestive of tethered cord syndrome, then will recommend L5-S1 laminectomy for untethering.    PATIENT EDUCATION:  More than half the clinic visit was spent showing with patient the pertinent findings on imaging and educating the patient about natural history of the pathology.   We discussed options for treatment as well as the risks and benefits of each option.  All questions were answered.     The patient understands and agrees with the following plan of care.    - F/u urology next week as scheduled  - Decreased gabapentin to BID  - Cont PT as tolerated  - RTC in 4 weeks after urodynamics                .

## 2020-01-03 ENCOUNTER — CLINICAL SUPPORT (OUTPATIENT)
Dept: REHABILITATION | Facility: HOSPITAL | Age: 70
End: 2020-01-03
Payer: COMMERCIAL

## 2020-01-03 DIAGNOSIS — Z74.09 IMPAIRED FUNCTIONAL MOBILITY, BALANCE, AND ENDURANCE: ICD-10-CM

## 2020-01-03 DIAGNOSIS — R29.898 RIGHT LEG WEAKNESS: ICD-10-CM

## 2020-01-03 PROCEDURE — 97110 THERAPEUTIC EXERCISES: CPT | Mod: PN

## 2020-01-03 PROCEDURE — 97112 NEUROMUSCULAR REEDUCATION: CPT | Mod: PN

## 2020-01-03 NOTE — PROGRESS NOTES
"  Physical Therapy Daily Treatment Note     Name: Robles Jordan  Clinic Number: [id number]    Therapy Diagnosis:   Encounter Diagnoses   Name Primary?    Impaired functional mobility, balance, and endurance     Right leg weakness      Physician: Briana Seals, *    Visit Date: 1/3/2020    Physician Orders: Eval and Treat  Medical Diagnosis:   Q06.8 (ICD-10-CM) - Tethered cord   R29.898 (ICD-10-CM) - Right leg weakness   R26.9 (ICD-10-CM) - Gait disturbance   Evaluation Date: 12/4/19  Authorization Period Expiration: 11/14/19-12/31/19  Plan of Care Certification Period: 12/4/19-2/4/20  Visit #/Visits authorized: 6/ 20  Time In: 1107  Time Out: 1200  Total Time: 53 minutes  Total Billable Time: 27 minutes    Precautions: Standard and tethered cord syndrome    Subjective     Pt reports: over the weekend he was having some back pain. It subsided as of yesterday. He finds that when he takes it easy his pain is not bad. No recent LOB.   He was not compliant with home exercise program due to back pain.   Response to previous treatment: good  Functional change: none to note    Pain: 0/10   Location: right LE      Objective     BOLD= performed today    Robles received therapeutic exercises to develop strength, endurance, ROM, flexibility, posture and core stabilization for 43 minutes including:    Upright Bike 8 Min level 2  Shuttle squats 2.5 cords x 20  Shuttle single leg squat 1.5 cords x 20  +Matrix hip abd 25 lbs x 20    Standing:  Stair hip flexor stretch 3x30  Hamstring curls (modified donkey kick) no weight 2x10  Hip abduction x 20 ea  Hip extension x20  Mini Squats x20  Heel Raises x30  Step ups on 6" step x 20 ea  +Marches x 20 ea  stool scoots pole to pole x2    On Mat: (RLE)  +SL bridges x 20 ea  +SL hip abduction x 20 ea  HSS 3x30"  Hip flexor stretch 3x30"   Open books 10x5  SAQs 3" hold x20 (add  NP, modified to clams- SL hip abduction x20  NP- Clams 2x10   Reverse clams    Robles participated " in neuromuscular re-education activities to improve: Balance, Coordination, Kinesthetic, Sense, Proprioception and Posture for 10 minutes. The following activities were included:  Tandem standing on foam 3x30  [employer] tapping w/1 UE support 2x9 ea LE  4 in Hurdles side stepping and forwards in // bars x 2 laps ea  +Rockerboard x 20 ea way  Tandem on foam 3x30 EO    CP application to low back for 0 mins while supine hooklying following exercises today to decrease pain and inflammation.     Home Exercises Provided and Patient Education Provided     Education provided:   - rationale behind exercise technique and alignment for strengthening    Written Home Exercises Provided: Patient instructed to cont prior HEP.  Exercises were reviewed and Arboleda was able to demonstrate them prior to the end of the session.  Arboleda demonstrated good  understanding of the education provided.     See EMR under handout received on IE from therapist and under patient instructions for exercises provided prior visit.    Assessment     Pt had good tolerance to treatment today with no adverse effects. Post-treatment back pain rated as 0/10. Pt demonstrates narrow DONNA with ambulation. Improvement in stability and decreased lateral trunk lean with verbal cueing to increase DONNA. Good response to progression of exercise program. Pt challenged with single leg bridges and sidelying hip abduction. Appropriate exercise-induced fatigue achieved by end of session.     Arboleda is progressing well towards his goals.   Pt prognosis is Good.     Pt will continue to benefit from skilled outpatient physical therapy to address the deficits listed in the problem list box on initial evaluation, provide pt/family education and to maximize pt's level of independence in the home and community environment.     Pt's spiritual, cultural and educational needs considered and pt agreeable to plan of care and goals.     Anticipated barriers to physical therapy:  tethered cord syndrome    Goals:     Short Term GOALS: - in progress  1. Pt to report feeling more stable and steady during gait.  2. Pt to display improved gait tolerance to >10mins before needing rest break.  3. Pt to display improved fxl strength to be able to carry groceries (Case of water).   4. Pt to begin HEP including specific stretching and strengthening as per their diagnosis to decrease pain and improve flexibility.  5. Pt to display >65deg SLR in supine hooklying, >10 deg active DF in sitting.  6. Pt to be able to comple >10 reps of sit<>Stand within 30 sec     Long Term GOALS: - in progress  1. Pt to report <1/10, no longer limiting functional activities.  2. Pt to report return to >75% of regular ADLs, work and recreational tasks.   3. Pt will demonstrate appropriate squat technique for improved tolerance of lifting and carrying functional activity needed for household tasks.   4. Pt to be compliant in advanced HEP to maintain progress gained in therapy thus far.   5. Pt to display >4+/5 gross MMT for B LE in order to support pelvis and spine throughout ADLs.   Plan   Continue with current POC. Progress B LE strengthening.     Plan of care Certification: 12/4/2019 to 2-4-20.  Carmen Pandey, PT

## 2020-01-06 ENCOUNTER — OFFICE VISIT (OUTPATIENT)
Dept: UROLOGY | Facility: CLINIC | Age: 70
End: 2020-01-06
Payer: COMMERCIAL

## 2020-01-06 ENCOUNTER — PATIENT OUTREACH (OUTPATIENT)
Dept: ADMINISTRATIVE | Facility: HOSPITAL | Age: 70
End: 2020-01-06

## 2020-01-06 ENCOUNTER — PATIENT OUTREACH (OUTPATIENT)
Dept: ADMINISTRATIVE | Facility: OTHER | Age: 70
End: 2020-01-06

## 2020-01-06 VITALS
WEIGHT: 248.44 LBS | HEIGHT: 74 IN | DIASTOLIC BLOOD PRESSURE: 80 MMHG | HEART RATE: 68 BPM | RESPIRATION RATE: 16 BRPM | SYSTOLIC BLOOD PRESSURE: 142 MMHG | BODY MASS INDEX: 31.88 KG/M2

## 2020-01-06 DIAGNOSIS — R39.12 WEAK URINARY STREAM: ICD-10-CM

## 2020-01-06 DIAGNOSIS — Q06.8 TETHERED CORD: ICD-10-CM

## 2020-01-06 DIAGNOSIS — R33.9 INCOMPLETE BLADDER EMPTYING: Primary | ICD-10-CM

## 2020-01-06 PROCEDURE — 99244 PR OFFICE CONSULTATION,LEVEL IV: ICD-10-PCS | Mod: S$GLB,,, | Performed by: UROLOGY

## 2020-01-06 PROCEDURE — 99244 OFF/OP CNSLTJ NEW/EST MOD 40: CPT | Mod: S$GLB,,, | Performed by: UROLOGY

## 2020-01-06 PROCEDURE — 99999 PR PBB SHADOW E&M-EST. PATIENT-LVL IV: ICD-10-PCS | Mod: PBBFAC,,, | Performed by: UROLOGY

## 2020-01-06 PROCEDURE — 99999 PR PBB SHADOW E&M-EST. PATIENT-LVL IV: CPT | Mod: PBBFAC,,, | Performed by: UROLOGY

## 2020-01-06 RX ORDER — CIPROFLOXACIN 250 MG/1
500 TABLET, FILM COATED ORAL
Status: CANCELLED | OUTPATIENT
Start: 2020-01-06

## 2020-01-06 NOTE — PROGRESS NOTES
"Subjective:       Robles Jordan is a 69 y.o. male who is a new patient who was referred by neurosurgery for evaluation of tethered cord/CHARITY.      He has been diagnosed with tethered cord. He is sent for urodynamic evaluation. On Flomax currently. +sensation of CHARITY, occasional weak stream. Nocturia x 1. Minimal bother symptoms.     AUASS - 8    PSA 9/19 - 1.8    PVR (bladder scan) today - 16cc      The following portions of the patient's history were reviewed and updated as appropriate: allergies, current medications, past family history, past medical history, past social history, past surgical history and problem list.    Review of Systems  Constitutional: no fever or chills  ENT: no nasal congestion or sore throat  Respiratory: no cough or shortness of breath  Cardiovascular: no chest pain or palpitations  Gastrointestinal: no nausea or vomiting, tolerating diet  Genitourinary: as per HPI  Hematologic/Lymphatic: no easy bruising or lymphadenopathy  Musculoskeletal: no arthralgias or myalgias  Skin: no rashes or lesions  Neurological: no seizures or tremors  Behavioral/Psych: no auditory or visual hallucinations       Objective:    Vitals: BP (!) 142/80   Pulse 68   Resp 16   Ht 6' 2" (1.88 m)   Wt 112.7 kg (248 lb 7.3 oz)   BMI 31.90 kg/m²     Physical Exam   General: well developed, well nourished in no acute distress  Head: normocephalic, atraumatic  Neck: supple, trachea midline, no obvious enlargement of thyroid  HEENT: EOMI, mucus membranes moist, sclera anicteric, no hearing impairment  Lungs: symmetric expansion, non-labored breathing  Cardiovascular: regular rate and rhythm, normal pulses  Abdomen: soft, non tender, non distended, no palpable masses, no hepatosplenomegaly, no hernias, bladder nonpalpable. No CVA tenderness.  Musculoskeletal: no peripheral edema, normal ROM in bilateral upper and lower extremities  Lymphatics: no cervical or inguinal lymphadenopathy  Skin: no rashes or " lesions  Neuro: alert and oriented x 3, no gross deficits  Psych: normal judgment and insight, normal mood/affect and non-anxious  Genitourinary:   patient declined exam   LISA: patient declined exam      Lab Review   Urine analysis today in clinic shows +protein trace    Lab Results   Component Value Date    WBC 5.8 09/20/2019    HGB 14.1 09/20/2019    HCT 42.7 09/20/2019    MCV 86.8 09/20/2019     09/20/2019     Lab Results   Component Value Date    CREATININE 0.91 09/20/2019    BUN 12 09/20/2019     No results found for: PSA  No results found for: PSADIAG    Imaging  NA       Assessment/Plan:      1. Incomplete bladder emptying    - Mild sensation of   - PVR low - emptying well     2. Weak urinary stream    - Cont Flomax   - Minimal bother     3. Tethered cord    - Will proceed with urodynamics to assess bladder function   - Minimal urinary symptoms at this time   - Cysto/UDS 1/17/20       Follow up in 2-3 weeks post-op

## 2020-01-06 NOTE — LETTER
January 6, 2020      Briana Seals PA-C  [address] [facility] [address]  [address]  [place] [address]           [facility]  [address]  [facility] [place] [address]  Phone: [phone]  Fax: [phone]          Patient: Robles Jordan   MR Number: [id number]   YOB: 1950   Date of Visit: 1/6/2020       Dear Briana Seals:    Thank you for referring Robles Jordan to me for evaluation. Attached you will find relevant portions of my assessment and plan of care.    If you have questions, please do not hesitate to call me. I look forward to following Robles Jordan along with you.    Sincerely,    Christianne Israel MD    Enclosure  CC:  No Recipients    If you would like to receive this communication electronically, please contact [email or web address] or [phone] to request more information on [facility] access.    For providers and/or their staff who would like to refer a patient to [facility], please contact us through our one-stop-shop provider referral line, [facility], at [phone].    If you feel you have received this communication in error or would no longer like to receive these types of communications, please e-mail [email or web address]

## 2020-01-08 ENCOUNTER — CLINICAL SUPPORT (OUTPATIENT)
Dept: REHABILITATION | Facility: HOSPITAL | Age: 70
End: 2020-01-08
Payer: COMMERCIAL

## 2020-01-08 DIAGNOSIS — Z74.09 IMPAIRED FUNCTIONAL MOBILITY, BALANCE, AND ENDURANCE: ICD-10-CM

## 2020-01-08 DIAGNOSIS — R29.898 RIGHT LEG WEAKNESS: ICD-10-CM

## 2020-01-08 PROCEDURE — 97110 THERAPEUTIC EXERCISES: CPT | Mod: PN,CQ

## 2020-01-08 NOTE — PROGRESS NOTES
"  Physical Therapy Daily Treatment Note     Name: Robles Jordan  Clinic Number: [id number]    Therapy Diagnosis:   Encounter Diagnoses   Name Primary?    Impaired functional mobility, balance, and endurance     Right leg weakness      Physician: Briana Seals, *    Visit Date: 1/8/2020    Physician Orders: Eval and Treat  Medical Diagnosis:   Q06.8 (ICD-10-CM) - Tethered cord   R29.898 (ICD-10-CM) - Right leg weakness   R26.9 (ICD-10-CM) - Gait disturbance   Evaluation Date: 12/4/19  Authorization Period Expiration: 11/14/19-12/31/19  Plan of Care Certification Period: 12/4/19-2/4/20  Visit #/Visits authorized: 2/ 20 (7 total)    Time In: 0948  Time Out: 1052  Total Time: 64 minutes  Total Billable Time: 40 minutes    Precautions: Standard and tethered cord syndrome    Subjective     Pt reports: the medicine is making him feel loopy, but no pain today.  He was not compliant with home exercise program due to back pain.   Response to previous treatment: good  Functional change: none to note    Pain: 0/10   Location: right LE      Objective     BOLD= performed today    Robles received therapeutic exercises to develop strength, endurance, ROM, flexibility, posture and core stabilization for 54  minutes including:    Upright Bike 8 Min level 2  Shuttle squats 2.5 cords x 20  Shuttle single leg squat 1.5 cords x 20  Matrix hip abd 25 lbs x 20    Standing:  Stair hip flexor stretch 3x30  Hamstring curls (modified donkey kick) no weight 2x10  Hip abduction with 1lb x 20 ea (1lb added 1/8)  Hip extension with 1lb x20 (1lb added 1/8)  Mini Squats x20  Heel Raises with 1lb x30 (1lb added 1/8)  Step ups on 6" step x 20 ea  Marches with 1lb x 20 ea (1lb added 1/8)  stool scoots pole to pole x2    On Mat:   SL bridges x 20 ea (B)  SL hip abduction x 20 ea (B)   HSS 3x30"  Open books 10x5 (B)  SAQs 3" hold x20 (add weight next visit) (B)  Reverse clams x20 (B)    Not performed:   NP, modified to clams- SL hip " "abduction x20  NP- Clams 2x10   Hip flexor stretch 3x30"NP    Robles participated in neuromuscular re-education activities to improve: Balance, Coordination, Kinesthetic, Sense, Proprioception and Posture for 10 minutes. The following activities were included:    Tandem standing on foam EO 3x30 (No UE support)  [employer] tapping w/1 UE support 2x10 ea LE  4 in Hurdles side stepping and forwards in // bars x 2 laps ea (no UE support)  Rockerboard x 20 ea way (A/P/lateral)    CP application to low back for 0 mins while supine hooklying following exercises today to decrease pain and inflammation.     Home Exercises Provided and Patient Education Provided     Education provided:   - rationale behind exercise technique and alignment for strengthening    Written Home Exercises Provided: Patient instructed to cont prior HEP.  Exercises were reviewed and Robles was able to demonstrate them prior to the end of the session.  Robles demonstrated good  understanding of the education provided.     See EMR under handout received on IE from therapist and under patient instructions for exercises provided prior visit.    Assessment     Pt had good tolerance to treatment today with no adverse effects.  Challenged with increased intensity with standing exercises with appropriate exercise-induced fatigue achieved.  Pt displays improved balance with fwd and lateral stepping over hurdles.  Pt did not experience any muscle spasms during treatment session.  Muscle spasms has decreased overall outside of clinic.  Continues to respond positively to current treatment plan with decreased symptoms and improved balance, strength, and functional mobility.  Robles is progressing well towards his goals.   Pt prognosis is Good.     Pt will continue to benefit from skilled outpatient physical therapy to address the deficits listed in the problem list box on initial evaluation, provide pt/family education and to maximize pt's level of " independence in the home and community environment.     Pt's spiritual, cultural and educational needs considered and pt agreeable to plan of care and goals.     Anticipated barriers to physical therapy: tethered cord syndrome    Goals:     Short Term GOALS: - in progress  1. Pt to report feeling more stable and steady during gait.  2. Pt to display improved gait tolerance to >10mins before needing rest break.  3. Pt to display improved fxl strength to be able to carry groceries (Case of water).   4. Pt to begin HEP including specific stretching and strengthening as per their diagnosis to decrease pain and improve flexibility.  5. Pt to display >65deg SLR in supine hooklying, >10 deg active DF in sitting.  6. Pt to be able to comple >10 reps of sit<>Stand within 30 sec     Long Term GOALS: - in progress  1. Pt to report <1/10, no longer limiting functional activities.  2. Pt to report return to >75% of regular ADLs, work and recreational tasks.   3. Pt will demonstrate appropriate squat technique for improved tolerance of lifting and carrying functional activity needed for household tasks.   4. Pt to be compliant in advanced HEP to maintain progress gained in therapy thus far.   5. Pt to display >4+/5 gross MMT for B LE in order to support pelvis and spine throughout ADLs.   Plan   Continue with current POC. Progress B LE strengthening.     Plan of care Certification: 12/4/2019 to 2-4-20.  Vani Landeros PTA

## 2020-01-09 NOTE — PROGRESS NOTES
"  Physical Therapy Daily Treatment Note     Name: Robles Jordan  Clinic Number: [id number]    Therapy Diagnosis:   Encounter Diagnoses   Name Primary?    Impaired functional mobility, balance, and endurance     Right leg weakness      Physician: Briana Seals, *    Visit Date: 1/10/2020    Physician Orders: Eval and Treat  Medical Diagnosis:   Q06.8 (ICD-10-CM) - Tethered cord   R29.898 (ICD-10-CM) - Right leg weakness   R26.9 (ICD-10-CM) - Gait disturbance   Evaluation Date: 12/4/19  Authorization Period Expiration: 11/14/19-12/31/19  Plan of Care Certification Period: 12/4/19-2/4/20  Visit #/Visits authorized: 3/ 20 (7 total)    Time In: 1055  Time Out: 1200  Total Time: 60 minutes  Total Billable Time: 30 minutes    Precautions: Standard and tethered cord syndrome    Subjective     Pt reports: he doesn't have any pain but feels a little weird from the gabapentin, as usual.    He was not compliant with home exercise program due to back pain.   Response to previous treatment: good  Functional change: none to note    Pain: 0/10   Location: right LE      Objective       Robles received therapeutic exercises to develop strength, endurance, ROM, flexibility, posture and core stabilization for 30 minutes including:  Tandem standing on foam EO 3x30 (No UE support)  [employer] tapping w/1 UE support 2x10 ea LE  4 in Hurdles side stepping and forwards in // bars x 2 laps ea (no UE support)  Rockerboard x 20 ea way (A/P/lateral)  Upright Bike 8 Min level 2  Shuttle squats 2.5 cords x 20  Shuttle single leg squat 1.5 cords x 20  Matrix hip abd 25 lbs x 20  Standing:  Stair hip flexor stretch 3x30  Hamstring curls (modified donkey kick) no weight 2x10  Hip abduction with 1lb x 20 ea (1lb added 1/8)  Hip extension with 1lb x20 (1lb added 1/8)  Mini Squats x20  Heel Raises with 1lb x30 (1lb added 1/8)  Step ups on 6" step x 20 ea  Marches with 1lb x 20 ea (1lb added 1/8)  stool scoots pole to pole x2    NP this " "date:  On Mat:   SL bridges x 20 ea (B)  SL hip abduction x 20 ea (B)   HSS 3x30"  Open books 10x5 (B)  SAQs 3" hold x20 (add weight next visit) (B)  Reverse clams x20 (B)    Robles participated in neuromuscular re-education activities to improve: Balance, Coordination, Kinesthetic, Sense, Proprioception and Posture for 00 minutes. The following activities were included:    CP application to low back for 0 mins while supine hooklying following exercises today to decrease pain and inflammation.     Home Exercises Provided and Patient Education Provided     Education provided:   - rationale behind exercise technique and alignment for strengthening    Written Home Exercises Provided: Patient instructed to cont prior HEP.  Exercises were reviewed and Robles was able to demonstrate them prior to the end of the session.  Robles demonstrated good  understanding of the education provided.     See EMR under handout received on IE from therapist and under patient instructions for exercises provided prior visit.    Assessment   Pt with good fatigue reported following full exercise routine. He tolerated well and notes he is really trying to build up his strength.      Robles is progressing well towards his goals.   Pt prognosis is Good.     Pt will continue to benefit from skilled outpatient physical therapy to address the deficits listed in the problem list box on initial evaluation, provide pt/family education and to maximize pt's level of independence in the home and community environment.     Pt's spiritual, cultural and educational needs considered and pt agreeable to plan of care and goals.     Anticipated barriers to physical therapy: tethered cord syndrome    Goals:     Short Term GOALS: - in progress  1. Pt to report feeling more stable and steady during gait.  2. Pt to display improved gait tolerance to >10mins before needing rest break.  3. Pt to display improved fxl strength to be able to carry groceries " (Case of water).   4. Pt to begin HEP including specific stretching and strengthening as per their diagnosis to decrease pain and improve flexibility.  5. Pt to display >65deg SLR in supine hooklying, >10 deg active DF in sitting.  6. Pt to be able to comple >10 reps of sit<>Stand within 30 sec     Long Term GOALS: - in progress  1. Pt to report <1/10, no longer limiting functional activities.  2. Pt to report return to >75% of regular ADLs, work and recreational tasks.   3. Pt will demonstrate appropriate squat technique for improved tolerance of lifting and carrying functional activity needed for household tasks.   4. Pt to be compliant in advanced HEP to maintain progress gained in therapy thus far.   5. Pt to display >4+/5 gross MMT for B LE in order to support pelvis and spine throughout ADLs.   Plan   Continue with current POC. Progress B LE strengthening.     Plan of care Certification: 12/4/2019 to 2-4-20.  Trini Freeman, PT

## 2020-01-10 ENCOUNTER — CLINICAL SUPPORT (OUTPATIENT)
Dept: REHABILITATION | Facility: HOSPITAL | Age: 70
End: 2020-01-10
Payer: COMMERCIAL

## 2020-01-10 DIAGNOSIS — Z74.09 IMPAIRED FUNCTIONAL MOBILITY, BALANCE, AND ENDURANCE: ICD-10-CM

## 2020-01-10 DIAGNOSIS — R29.898 RIGHT LEG WEAKNESS: ICD-10-CM

## 2020-01-10 PROCEDURE — 97110 THERAPEUTIC EXERCISES: CPT | Mod: PN

## 2020-01-13 ENCOUNTER — HOSPITAL ENCOUNTER (OUTPATIENT)
Dept: PREADMISSION TESTING | Facility: HOSPITAL | Age: 70
Discharge: HOME | End: 2020-01-13
Attending: UROLOGY
Payer: COMMERCIAL

## 2020-01-13 VITALS
RESPIRATION RATE: 18 BRPM | HEART RATE: 69 BPM | SYSTOLIC BLOOD PRESSURE: 151 MMHG | WEIGHT: 248 LBS | HEIGHT: 74 IN | DIASTOLIC BLOOD PRESSURE: 74 MMHG | TEMPERATURE: 98 F | OXYGEN SATURATION: 99 % | BODY MASS INDEX: 31.83 KG/M2

## 2020-01-13 NOTE — DISCHARGE INSTRUCTIONS
"Your procedure  is scheduled for __1/17/2020________.    Call [phone] between 2pm and 5pm on _1/16/2020_________to find out your arrival time for the day of surgery.    Report to Same Day Surgery Unit at ____ AM on the 2nd floor of the hospital.  Use the front entrance of the hospital.  The front doors of the hospital open promptly at 5:30am.  If you need wheelchair assistance, call [phone] from your cell phone, or call "0" from the courtesy phone in the lobby.    Important instructions:   Do not eat or drink after 12 midnight, including water.  It is okay to brush your teeth.  Do not have gum, candy or mints.     Eat your breakfast and take your morning medications as usual.       Please shower the night before and the morning of your surgery.        You may wear deodorant only.      Do not wear powder, body lotion or perfume/cologne.     Do not wear any jewelry or have any metal on your body.     Please bring any documents given to you by your doctor.     If you are going home on the same day of surgery, you must arrange for a family member or a friend to drive you home.  Public transportation is prohibited.  You will not be able to drive home if you were given anesthesia or sedation.     Please leave money and valuables home.       You may bring your cell phone.     Call the doctor if fever or illness should occur before your surgery.    Call [phone] to contact us here if needed.  "

## 2020-01-14 ENCOUNTER — CLINICAL SUPPORT (OUTPATIENT)
Dept: REHABILITATION | Facility: HOSPITAL | Age: 70
End: 2020-01-14
Payer: COMMERCIAL

## 2020-01-14 DIAGNOSIS — Z74.09 IMPAIRED FUNCTIONAL MOBILITY, BALANCE, AND ENDURANCE: ICD-10-CM

## 2020-01-14 DIAGNOSIS — R29.898 RIGHT LEG WEAKNESS: ICD-10-CM

## 2020-01-14 PROCEDURE — 97110 THERAPEUTIC EXERCISES: CPT | Mod: PN

## 2020-01-14 NOTE — PROGRESS NOTES
Physical Therapy Daily Treatment Note     Name: Robles Jordan  Clinic Number: [id number]    Therapy Diagnosis:   Encounter Diagnoses   Name Primary?    Impaired functional mobility, balance, and endurance     Right leg weakness      Physician: Briana Seals, *    Visit Date: 1/14/2020    Physician Orders: Eval and Treat  Medical Diagnosis:   Q06.8 (ICD-10-CM) - Tethered cord   R29.898 (ICD-10-CM) - Right leg weakness   R26.9 (ICD-10-CM) - Gait disturbance   Evaluation Date: 12/4/19  Authorization Period Expiration: 11/14/19-12/31/19  Plan of Care Certification Period: 12/4/19-2/4/20  Visit #/Visits authorized: 4/ 20 (7 total)    Time In: 1000  Time Out: 1100  Total Time: 60 minutes  Total Billable Time: 30 minutes    Precautions: Standard and tethered cord syndrome    Subjective     Pt reports: feels fine today, still having the gabapentin effects from taking it last night.  He was not compliant with home exercise program.   Response to previous treatment: good  Functional change: none to note    Pain: 0/10   Location: right LE      Objective       Robles received therapeutic exercises to develop strength, endurance, ROM, flexibility, posture and core stabilization for 30 minutes including:  Exercises in bold performed this date  Tandem standing on foam EC 3x30 (1-2 UE support)  4 in Hurdles side stepping and forwards in // bars x 3 laps ea (no UE support)  Upright Bike 8 Min level 2  Matrix hip ABD 50# x30  Matrix hip ADD 25# x30  Shuttle squats 2.5 cords x 30  Shuttle single leg squat 1.5 cords x 30  Single LE hip hinge reaching to tap FR 2x10  Standing:  Stair hip flexor stretch 3x30  Squat taps to 24in stool x20  Supine:  HSS using strap 3x30  DKTC green PB x30  HF stretch , leg off table 3x30      NP- Heel Raises with 1lb x30 (1lb added 1/8)  Hamstring curls (modified donkey kick at shuttle) 1 cord 2x10  Hip abduction with 1lb x 20 ea (1lb added 1/8)  Hip extension with 1lb x20 (1lb added  "1/8)  Step ups w/opposite knee marching up on 6" step x 20 ea  [employer] tapping w/1 UE support 2x10 ea LE  Marches with 1lb x 20 ea (1lb added 1/8)  stool scoots pole to pole x2    Robles participated in neuromuscular re-education activities to improve: Balance, Coordination, Kinesthetic, Sense, Proprioception and Posture for 00 minutes. The following activities were included:    CP application to low back for 0 mins while supine hooklying following exercises today to decrease pain and inflammation.     Home Exercises Provided and Patient Education Provided     Education provided:   - rationale behind exercise technique and alignment for strengthening    Written Home Exercises Provided: Patient instructed to cont prior HEP.  Exercises were reviewed and Robles was able to demonstrate them prior to the end of the session.  Robles demonstrated good  understanding of the education provided.     See EMR under handout received on IE from therapist and under patient instructions for exercises provided prior visit.    Assessment   Pt with fatigue requiring 2 sitting rest breaks during session today. No pain reported.       Robles is progressing well towards his goals.   Pt prognosis is Good.     Pt will continue to benefit from skilled outpatient physical therapy to address the deficits listed in the problem list box on initial evaluation, provide pt/family education and to maximize pt's level of independence in the home and community environment.     Pt's spiritual, cultural and educational needs considered and pt agreeable to plan of care and goals.     Anticipated barriers to physical therapy: tethered cord syndrome    Goals:     Short Term GOALS: - in progress  1. Pt to report feeling more stable and steady during gait.  2. Pt to display improved gait tolerance to >10mins before needing rest break.  3. Pt to display improved fxl strength to be able to carry groceries (Case of water).   4. Pt to begin HEP " including specific stretching and strengthening as per their diagnosis to decrease pain and improve flexibility.  5. Pt to display >65deg SLR in supine hooklying, >10 deg active DF in sitting.  6. Pt to be able to comple >10 reps of sit<>Stand within 30 sec     Long Term GOALS: - in progress  1. Pt to report <1/10, no longer limiting functional activities.  2. Pt to report return to >75% of regular ADLs, work and recreational tasks.   3. Pt will demonstrate appropriate squat technique for improved tolerance of lifting and carrying functional activity needed for household tasks.   4. Pt to be compliant in advanced HEP to maintain progress gained in therapy thus far.   5. Pt to display >4+/5 gross MMT for B LE in order to support pelvis and spine throughout ADLs.   Plan   Continue with current POC. Progress B LE strengthening.     Plan of care Certification: 12/4/2019 to 2-4-20.  Trini Freeman, PT

## 2020-01-16 ENCOUNTER — CLINICAL SUPPORT (OUTPATIENT)
Dept: REHABILITATION | Facility: HOSPITAL | Age: 70
End: 2020-01-16
Payer: COMMERCIAL

## 2020-01-16 DIAGNOSIS — Z74.09 IMPAIRED FUNCTIONAL MOBILITY, BALANCE, AND ENDURANCE: ICD-10-CM

## 2020-01-16 DIAGNOSIS — R29.898 RIGHT LEG WEAKNESS: ICD-10-CM

## 2020-01-16 PROCEDURE — 97110 THERAPEUTIC EXERCISES: CPT | Mod: PN

## 2020-01-16 NOTE — PROGRESS NOTES
Physical Therapy Daily Treatment Note     Name: Robles Jordan  Clinic Number: [id number]    Therapy Diagnosis:   Encounter Diagnoses   Name Primary?    Impaired functional mobility, balance, and endurance     Right leg weakness      Physician: Briana Seals, *    Visit Date: 1/16/2020    Physician Orders: Eval and Treat  Medical Diagnosis:   Q06.8 (ICD-10-CM) - Tethered cord   R29.898 (ICD-10-CM) - Right leg weakness   R26.9 (ICD-10-CM) - Gait disturbance   Evaluation Date: 12/4/19  Authorization Period Expiration: 11/14/19-12/31/19  Plan of Care Certification Period: 12/4/19-2/4/20  Visit #/Visits authorized: 5/20    Time In: 1000  Time Out: 1100  Total Time: 60 minutes  Total Billable Time: 60 minutes    Precautions: Standard and tethered cord syndrome    Subjective     Pt reports: Says he was very sore in his back after last session. Still having some stiffness in center low back this morning.   He was not compliant with home exercise program.   Response to previous treatment: good  Functional change: none to note    Pain: 0/10   Location: right LE      Objective       Robles received therapeutic exercises to develop strength, endurance, ROM, flexibility, posture and core stabilization for 55 minutes including:  Upright Bike 10 Min level 2  SKC using towel 5x15  Supine HF stretch 3x30  Supine strap HS stretch 4x20   Sitting lumbar flexion rollout on blue PB 10x10   Tandem standing on foam EC 3x30 (1-2 UE support)  DKTC green PB x30  Clams x30 in sidelying  Squat taps to 24in stool x20 with foam pad      The following Exercises held this date due to pt presenting with low back pain:  Matrix hip ABD 50# x30  Matrix hip ADD 25# x30  4 in Hurdles side stepping and forwards in // bars x 3 laps ea (no UE support)  Open books 10x10  Shuttle single leg squat 1.5 cords x 30  Single LE hip hinge reaching to tap FR 2x10  Standing:  Shuttle squats 2.5 cords x 30  Stair hip flexor stretch 3x30  NP- Heel  "Raises with 1lb x30 (1lb added 1/8)  Hamstring curls (modified donkey kick at shuttle) 1 cord 2x10  Hip abduction with 1lb x 20 ea (1lb added 1/8)  Hip extension with 1lb x20 (1lb added 1/8)  Step ups w/opposite knee marching up on 6" step x 20 ea  [employer] tapping w/1 UE support 2x10 ea LE  Marches with 1lb x 20 ea (1lb added 1/8)  stool scoots pole to pole x2      Robles participated in neuromuscular re-education activities to improve: Balance, Coordination, Kinesthetic, Sense, Proprioception and Posture for 00 minutes. The following activities were included:    MHP application to low back for 10 mins while supine hooklying following exercises today to decrease pain.     Home Exercises Provided and Patient Education Provided     Education provided:   - rationale behind exercise technique and alignment for strengthening    Written Home Exercises Provided: Patient instructed to cont prior HEP.  Exercises were reviewed and Robles was able to demonstrate them prior to the end of the session.  Robles demonstrated good  understanding of the education provided.     See EMR under handout received on IE from therapist and under patient instructions for exercises provided prior visit.    Assessment   Exercises modified significantly this date to focus on muscle stretching and relaxation for his low back and hips.   Required significant cueing to avoid overactivation of B hip flexors with gentle supine stretching.   Noted feeling better and less pain by end of session.     Robles is progressing well towards his goals.   Pt prognosis is Good.     Pt will continue to benefit from skilled outpatient physical therapy to address the deficits listed in the problem list box on initial evaluation, provide pt/family education and to maximize pt's level of independence in the home and community environment.     Pt's spiritual, cultural and educational needs considered and pt agreeable to plan of care and goals.   "   Anticipated barriers to physical therapy: tethered cord syndrome    Goals:     Short Term GOALS: - in progress  1. Pt to report feeling more stable and steady during gait.  2. Pt to display improved gait tolerance to >10mins before needing rest break.  3. Pt to display improved fxl strength to be able to carry groceries (Case of water).   4. Pt to begin HEP including specific stretching and strengthening as per their diagnosis to decrease pain and improve flexibility.  5. Pt to display >65deg SLR in supine hooklying, >10 deg active DF in sitting.  6. Pt to be able to comple >10 reps of sit<>Stand within 30 sec     Long Term GOALS: - in progress  1. Pt to report <1/10, no longer limiting functional activities.  2. Pt to report return to >75% of regular ADLs, work and recreational tasks.   3. Pt will demonstrate appropriate squat technique for improved tolerance of lifting and carrying functional activity needed for household tasks.   4. Pt to be compliant in advanced HEP to maintain progress gained in therapy thus far.   5. Pt to display >4+/5 gross MMT for B LE in order to support pelvis and spine throughout ADLs.   Plan   Continue with current POC. Progress B LE strengthening.     Plan of care Certification: 12/4/2019 to 2-4-20.  Trini Freeman, PT

## 2020-01-17 ENCOUNTER — HOSPITAL ENCOUNTER (OUTPATIENT)
Facility: HOSPITAL | Age: 70
Discharge: HOME | End: 2020-01-17
Attending: UROLOGY | Admitting: UROLOGY
Payer: COMMERCIAL

## 2020-01-17 VITALS
HEART RATE: 78 BPM | SYSTOLIC BLOOD PRESSURE: 162 MMHG | RESPIRATION RATE: 18 BRPM | TEMPERATURE: 98 F | WEIGHT: 247.81 LBS | DIASTOLIC BLOOD PRESSURE: 67 MMHG | BODY MASS INDEX: 31.8 KG/M2 | HEIGHT: 74 IN | OXYGEN SATURATION: 100 %

## 2020-01-17 DIAGNOSIS — Q06.8 TETHERED CORD: ICD-10-CM

## 2020-01-17 DIAGNOSIS — R33.9 INCOMPLETE BLADDER EMPTYING: ICD-10-CM

## 2020-01-17 DIAGNOSIS — R39.12 WEAK URINARY STREAM: ICD-10-CM

## 2020-01-17 PROCEDURE — 36000707: Performed by: UROLOGY

## 2020-01-17 PROCEDURE — 51741 ELECTRO-UROFLOWMETRY FIRST: CPT | Mod: 26,51,, | Performed by: UROLOGY

## 2020-01-17 PROCEDURE — 25500020 PHARM REV CODE 255: Performed by: UROLOGY

## 2020-01-17 PROCEDURE — 51728 PR COMPLEX CYSTOMETROGRAM VOIDING PRESSURE STUDIES: ICD-10-PCS | Mod: 26,,, | Performed by: UROLOGY

## 2020-01-17 PROCEDURE — 52000 PR CYSTOURETHROSCOPY: ICD-10-PCS | Mod: 59,,, | Performed by: UROLOGY

## 2020-01-17 PROCEDURE — 51784 PR ANAL/URINARY MUSCLE STUDY: ICD-10-PCS | Mod: 26,51,, | Performed by: UROLOGY

## 2020-01-17 PROCEDURE — 52000 CYSTOURETHROSCOPY: CPT | Mod: 59,,, | Performed by: UROLOGY

## 2020-01-17 PROCEDURE — 51797 PR VOIDING PRESS STUDY INTRA-ABDOMINAL VOID: ICD-10-PCS | Mod: 26,,, | Performed by: UROLOGY

## 2020-01-17 PROCEDURE — 25000003 PHARM REV CODE 250: Performed by: UROLOGY

## 2020-01-17 PROCEDURE — 71000015 HC POSTOP RECOV 1ST HR: Performed by: UROLOGY

## 2020-01-17 PROCEDURE — 51797 INTRAABDOMINAL PRESSURE TEST: CPT | Mod: 26,,, | Performed by: UROLOGY

## 2020-01-17 PROCEDURE — 51728 CYSTOMETROGRAM W/VP: CPT | Mod: 26,,, | Performed by: UROLOGY

## 2020-01-17 PROCEDURE — 51784 ANAL/URINARY MUSCLE STUDY: CPT | Mod: 26,51,, | Performed by: UROLOGY

## 2020-01-17 PROCEDURE — C1729 CATH, DRAINAGE: HCPCS | Performed by: UROLOGY

## 2020-01-17 PROCEDURE — 36000706: Performed by: UROLOGY

## 2020-01-17 PROCEDURE — 51741 PR UROFLOWMETRY, COMPLEX: ICD-10-PCS | Mod: 26,51,, | Performed by: UROLOGY

## 2020-01-17 RX ORDER — PHENAZOPYRIDINE HYDROCHLORIDE 100 MG/1
200 TABLET, FILM COATED ORAL 3 TIMES DAILY PRN
Status: DISCONTINUED | OUTPATIENT
Start: 2020-01-17 | End: 2020-01-17 | Stop reason: HOSPADM

## 2020-01-17 RX ORDER — HYDROCODONE BITARTRATE AND ACETAMINOPHEN 5; 325 MG/1; MG/1
1 TABLET ORAL EVERY 4 HOURS PRN
Status: DISCONTINUED | OUTPATIENT
Start: 2020-01-17 | End: 2020-01-17 | Stop reason: HOSPADM

## 2020-01-17 RX ORDER — CIPROFLOXACIN 500 MG/1
500 TABLET ORAL
Status: COMPLETED | OUTPATIENT
Start: 2020-01-17 | End: 2020-01-17

## 2020-01-17 RX ORDER — ACETAMINOPHEN 325 MG/1
650 TABLET ORAL EVERY 4 HOURS PRN
Status: DISCONTINUED | OUTPATIENT
Start: 2020-01-17 | End: 2020-01-17 | Stop reason: HOSPADM

## 2020-01-17 RX ORDER — LIDOCAINE HYDROCHLORIDE 10 MG/ML
1 INJECTION, SOLUTION EPIDURAL; INFILTRATION; INTRACAUDAL; PERINEURAL ONCE
Status: DISCONTINUED | OUTPATIENT
Start: 2020-01-17 | End: 2020-01-17 | Stop reason: HOSPADM

## 2020-01-17 RX ORDER — LIDOCAINE HYDROCHLORIDE 20 MG/ML
JELLY TOPICAL
Status: DISCONTINUED | OUTPATIENT
Start: 2020-01-17 | End: 2020-01-17 | Stop reason: HOSPADM

## 2020-01-17 RX ORDER — SODIUM CHLORIDE 9 MG/ML
INJECTION, SOLUTION INTRAVENOUS CONTINUOUS
Status: DISCONTINUED | OUTPATIENT
Start: 2020-01-17 | End: 2020-01-17 | Stop reason: HOSPADM

## 2020-01-17 RX ORDER — ONDANSETRON 2 MG/ML
4 INJECTION INTRAMUSCULAR; INTRAVENOUS EVERY 12 HOURS PRN
Status: DISCONTINUED | OUTPATIENT
Start: 2020-01-17 | End: 2020-01-17 | Stop reason: HOSPADM

## 2020-01-17 RX ADMIN — CIPROFLOXACIN 500 MG: 500 TABLET, FILM COATED ORAL at 11:01

## 2020-01-17 NOTE — OP NOTE
[facility]  Surgery Department  Operative Note      Date of Procedure:  2020 1:05 PM     Procedure:   1. Complex urodynamics with fluoroscopy  2. Cystoscopy    Surgeon(s) and Role:     * Christianne Israel MD - Primary    Assisting Surgeon: None    Pre-Operative Diagnosis:  Weak stream, neurogenic bladder, nocturia    Post-Operative Diagnosis: Post-Op Diagnosis Codes:     * Weak stream, neurogenic bladder, nocturia    Indication for procedure:  68yo M with mild LUTS.  He has known tethered cord and is planned for neurosurgical intervention in the near future.  He presents today for urodynamic evaluation of his bladder function.    Description of procedure:  The patient was brought to the urodynamics suite and transferred to the urodynamics chair. Full timeout procedures were performed identifying the correct patient and procedure. Appropriate antibiotics with PO ciprofloxacin were given prior to commencement of surgery A 16-Slovenian red rubber catheter was then placed per urethra after genitals were prepped with Betadine solution to remove any residual urine in bladder.  P1 and P2 catheters were placed in the urethra into the bladder and rectally respectively. EMG senses were placed in the appropriate location on perineum and left leg. The bladder was filled at an appropriately slow rate.    PVR pre-procedure: 10mL    Uroflow:  Max flow: 14.8mL/s  Avg flow: 9.0mL/s  Flow time: 22.1 sec  Voided volume: 200mL      Filling phase:  Rate of fillin-30 mL/min  First sensation: 45mL  First desire: 100mL  Strong desire: 141mL  Capacity: 194mL  Permission to void given at 191mL    Stress maneuvers (Valsalva and cough) at 150mL: no leak  VLLP: NA  Compliance: normal  ALLP: NA  Involuntary bladder contraction: none    Voiding phase:  Bladder contraction: present, good amplitude  Coordination: normal, no EMG flaring  Flow rate (max): 14.8mL/s  Flow rate (avg): 9.6mL/s  Pdet (max flow): 39.7cm H2O  Max  Pdet: 43.6cm H2O  Voided volume: 207mL  PVR: 0mL    Fluoroscopy:  Bladder wall: smooth  Voiding: open bladder neck  Vesicoureteral reflux: none  Radiographic PVR: empty      Cystoscopy:  At the conclusion of the urodynamics procedure, P1 and P2 catheters as well as EMG sensors were removed. The patient then was placed in the dorsal lithotomy position and prepped and draped in the usual sterile fashion. Uro-Jet lidocaine was placed in the urethra for catrachito-operative analgesia. A 16-Portuguese flexible cystoscope was passed per urethra into the bladder. Full cystoscopy was performed systematically to inspect all aspects of the bladder wall. Retroflexion of the cystoscope was done to inspect the bladder neck. Bilateral UOs were visualized. Urethra was carefully inspected upon removal of cystoscope. The procedure was terminated. The patient tolerated the procedure well.    Urethra: normal  Prostate: mild BPH laterally  Bladder mucosa: normal, no masses/lesions  Trabeculation: none  UOs: difficult to visualize due to hematuria and pt discomfort      Anesthesia: Local    Complications: none    Estimated Blood Loss (EBL): 0cc          Drains: none    Specimens: none     Attestation: I was present the entirety of the procedure.     Disposition:  Patient is stable and deemed appropriate for discharge home. The patient will follow up in 2-3 weeks.        Findings of the Procedure: Vanessa uroflow - good flow, low PVR. Normal sensitivity. No IBC/DO. No ABDI. Normal capacity. Normal compliance. Good bladder contraction with good flow. Complete emptying. Normal coordination. Flouroscopy without abnormalities. Cystoscopy with mild BPH.     Recommendations: Normal urodynamics - no  intervention indicated at this time.         Discharge Note    SUMMARY     Admit Date:  01/17/2020 1:07 PM    Discharge Date and Time:  01/17/2020 1:07 PM    Hospital Course (synopsis of major diagnoses, care, treatment, and services provided during the  course of the hospital stay): Uncomplicated cysto/UDS.     Final Diagnosis: Post-Op Diagnosis Codes:     * Weak stream, neurogenic bladder, nocturia    Disposition: Home or Self Care    Follow Up/Patient Instructions:   Expect blood and burning with urination. Drink plenty of fluids.    Medications:  Reconciled Home Medications:      Medication List      CONTINUE taking these medications    amLODIPine 10 MG tablet  Commonly known as:  NORVASC  Take 1 tablet (10 mg total) by mouth once daily. For blood pressure     atorvastatin 80 MG tablet  Commonly known as:  LIPITOR  TAKE 1 TABLET (80 MG TOTAL) BY MOUTH ONCE DAILY.     gabapentin 300 MG capsule  Commonly known as:  NEURONTIN  1 po q hs x 3 days, then 1 po bid x 3 days, then 1 po tid.     lisinopril 30 MG tablet  Commonly known as:  PRINIVIL,ZESTRIL  TAKE 1 TABLET BY MOUTH EVERY DAY     sildenafil 100 MG tablet  Commonly known as:  VIAGRA  Take 1 tablet (100 mg total) by mouth daily as needed for Erectile Dysfunction.     tamsulosin 0.4 mg Cap  Commonly known as:  FLOMAX  TAKE 1 CAPSULE (0.4 MG TOTAL) BY MOUTH ONCE DAILY.            Discharge Procedure Orders  Diet general     Activity as tolerated     Call MD for:  temperature >100.4     Call MD for:  persistent nausea and vomiting     Call MD for:  severe uncontrolled pain     Call MD for:  difficulty breathing, headache or visual disturbances     No dressing needed     Follow-up Information     Follow up with Christianne Israel MD In 2 weeks.    Specialty:  Urology    Why:  For post-op follow up    Contact information:    [address] [place] [id number]  [phone]

## 2020-01-17 NOTE — INTERVAL H&P NOTE
The patient has been examined and the H&P has been reviewed:    I concur with the findings and no changes have occurred since H&P was written.    Anesthesia/Surgery risks, benefits and alternative options discussed and understood by patient/family.          Active Hospital Problems    Diagnosis  POA    Incomplete bladder emptying [R33.9]  Yes      Resolved Hospital Problems   No resolved problems to display.

## 2020-01-17 NOTE — DISCHARGE INSTRUCTIONS
Expect blood and/or burning with urination. Drink plenty of fluids.      ACTIVITY LEVEL: If you have received sedation or an anesthetic, you may feel sleepy for several hours. Rest until you are more awake. Gradually resume your normal activities.       DIET: You may resume your home diet. If nausea is present, increase your diet gradually with fluids and bland foods.      Medications: Pain medication should be taken only if needed and as directed. If antibiotics are prescribed, the medication should be taken until completed. You will be given an updated list of you medications.  ? No driving, alcoholic beverages or signing legal documents for next 24 hours or while taking pain medication        CALL THE DOCTOR:       · Fever over 101°F  · Severe pain that doesnt go away with medication.  · Upset stomach and vomiting that is persistent.  · Problems urinating-unable to urinate or heavy bleeding (with or without clots)    Fall Prevention  Millions of people fall every year and injure themselves. You may have had anesthesia or sedation which may increase your risk of falling. You may have health issues that put you at an increased risk of falling.     Here are ways to reduce your risk of falling.  ·   · Make your home safe by keeping walkways clear of objects you may trip over.  · Use non-slip pads under rugs. Do not use area rugs or small throw rugs.  · Use non-slip mats in bathtubs and showers.  · Install handrails and lights on staircases.  · Do not walk in poorly lit areas.  · Do not stand on chairs or wobbly ladders.  · Use caution when reaching overhead or looking upward. This position can cause a loss of balance.  · Be sure your shoes fit properly, have non-slip bottoms and are in good condition.   · Wear shoes both inside and out. Avoid going barefoot or wearing slippers.  · Be cautious when going up and down stairs, curbs, and when walking on uneven sidewalks.  · If your balance is poor, consider using a cane  or walker.  · If your fall was related to alcohol use, stop or limit alcohol intake.   · If your fall was related to use of sleeping medicines, talk to your doctor about this. You may need to reduce your dosage at bedtime if you awaken during the night to go to the bathroom.    · To reduce the need for nighttime bathroom trips:  ¨ Avoid drinking fluids for several hours before going to bed  ¨ Empty your bladder before going to bed  ¨ Men can keep a urinal at the bedside  · Stay as active as you can. Balance, flexibility, strength, and endurance all come from exercise. They all play a role in preventing falls. Ask your healthcare provider which types of activity are right for you.  · Get your vision checked on a regular basis.  · If you have pets, know where they are before you stand up or walk so you don't trip over them.  · Use night lights.

## 2020-01-20 ENCOUNTER — IMMUNIZATION (OUTPATIENT)
Dept: PHARMACY | Facility: CLINIC | Age: 70
End: 2020-01-20
Payer: COMMERCIAL

## 2020-01-20 ENCOUNTER — PATIENT MESSAGE (OUTPATIENT)
Dept: INTERNAL MEDICINE | Facility: CLINIC | Age: 70
End: 2020-01-20

## 2020-01-20 ENCOUNTER — OFFICE VISIT (OUTPATIENT)
Dept: INTERNAL MEDICINE | Facility: CLINIC | Age: 70
End: 2020-01-20
Payer: COMMERCIAL

## 2020-01-20 DIAGNOSIS — I10 ESSENTIAL HYPERTENSION: Primary | ICD-10-CM

## 2020-01-20 DIAGNOSIS — Z12.11 COLON CANCER SCREENING: ICD-10-CM

## 2020-01-20 DIAGNOSIS — R33.9 INCOMPLETE BLADDER EMPTYING: ICD-10-CM

## 2020-01-20 DIAGNOSIS — G47.33 OBSTRUCTIVE SLEEP APNEA ON CPAP: ICD-10-CM

## 2020-01-20 DIAGNOSIS — Z72.0 TOBACCO USE: ICD-10-CM

## 2020-01-20 DIAGNOSIS — M25.551 RIGHT HIP PAIN: ICD-10-CM

## 2020-01-20 PROCEDURE — 99999 PR PBB SHADOW E&M-EST. PATIENT-LVL III: CPT | Mod: PBBFAC,,, | Performed by: INTERNAL MEDICINE

## 2020-01-20 PROCEDURE — 1159F MED LIST DOCD IN RCRD: CPT | Mod: S$GLB,,, | Performed by: INTERNAL MEDICINE

## 2020-01-20 PROCEDURE — 99999 PR PBB SHADOW E&M-EST. PATIENT-LVL III: ICD-10-PCS | Mod: PBBFAC,,, | Performed by: INTERNAL MEDICINE

## 2020-01-20 PROCEDURE — 99214 PR OFFICE/OUTPT VISIT, EST, LEVL IV, 30-39 MIN: ICD-10-PCS | Mod: S$GLB,,, | Performed by: INTERNAL MEDICINE

## 2020-01-20 PROCEDURE — 99214 OFFICE O/P EST MOD 30 MIN: CPT | Mod: S$GLB,,, | Performed by: INTERNAL MEDICINE

## 2020-01-20 PROCEDURE — 1159F PR MEDICATION LIST DOCUMENTED IN MEDICAL RECORD: ICD-10-PCS | Mod: S$GLB,,, | Performed by: INTERNAL MEDICINE

## 2020-01-20 PROCEDURE — 1125F PR PAIN SEVERITY QUANTIFIED, PAIN PRESENT: ICD-10-PCS | Mod: S$GLB,,, | Performed by: INTERNAL MEDICINE

## 2020-01-20 PROCEDURE — 1125F AMNT PAIN NOTED PAIN PRSNT: CPT | Mod: S$GLB,,, | Performed by: INTERNAL MEDICINE

## 2020-01-20 RX ORDER — LISINOPRIL 40 MG/1
40 TABLET ORAL DAILY
Qty: 90 TABLET | Refills: 3 | Status: SHIPPED | OUTPATIENT
Start: 2020-01-20 | End: 2021-01-12

## 2020-01-20 NOTE — PROGRESS NOTES
"Subjective:       Patient ID: Robles Jordan is a 69 y.o. male.    Chief Complaint: Follow-up    HPI    Last visit with me was in September 2019.  Since then had visits with Orthopedics, Neurosurgery, Sleep Medicine, and Urology.  Upcoming appointment with rehab, Neurosurgery, and Urology.    Still doing Physical Therapy. Sensation is improving but strength still diminished. Still sore around waist. Has moves to the right side. Heat helps. Using gabapentin 3x/day, caused drowsiness so now down to 1 tab QHS.     Has decreased smoking. wearing CPAP regularly as well generally but not as much last few days.     Review of Systems   All other systems reviewed and are negative.      Objective:      Physical Exam   Constitutional: He is oriented to person, place, and time. No distress.   Neurological: He is alert and oriented to person, place, and time.   Skin: He is not diaphoretic.   Psychiatric: He has a normal mood and affect. His behavior is normal.   Nursing note and vitals reviewed.      Vitals:    01/20/20 0952   BP: (!) 158/80   BP Location: Right arm   Patient Position: Sitting   BP Method: Large (Manual)   Pulse: 74   Weight: 110.7 kg (244 lb)   Height: 6' 2" (1.88 m)     Body mass index is 31.33 kg/m².    RESULTS: Reviewed labs from last 6 months    Assessment:       1. Essential hypertension    2. Right hip pain    3. Obstructive sleep apnea on CPAP    4. Incomplete bladder emptying    5. Tobacco use    6. Colon cancer screening        Plan:   Robles was seen today for follow-up.    Diagnoses and all orders for this visit:    Essential hypertension:  Prior dx, not well controlled, the patient has decreased smoking and is wearing CPAP more regularly. Try increasing lisinopril dose, message in 2 wks for blood pressure.  -     lisinopril (PRINIVIL,ZESTRIL) 40 MG tablet; Take 1 tablet (40 mg total) by mouth once daily.    Right hip pain:  New problem, related to Physical Therapy, likely muscular strain. " continue follow up with Physical Therapy and [facility].    Obstructive sleep apnea on CPAP:  Prior diagnosis, stable, using CPAP regularly. No changes at this time, will continue to monitor.     Incomplete bladder emptying:  Prior dx, being seen by Urology, continue follow up with their team.    Tobacco use    Colon cancer screening  -     Case request GI: COLONOSCOPY    Follow up in about 4 months (around 5/20/2020) for follow up visit. Flu shot today.  John Jackson MD  Internal Medicine    Portions of this note were completed using medical dictation software. Please excuse typographical or syntax errors that were missed on review.

## 2020-01-22 ENCOUNTER — CLINICAL SUPPORT (OUTPATIENT)
Dept: REHABILITATION | Facility: HOSPITAL | Age: 70
End: 2020-01-22
Payer: COMMERCIAL

## 2020-01-22 DIAGNOSIS — Z74.09 IMPAIRED FUNCTIONAL MOBILITY, BALANCE, AND ENDURANCE: ICD-10-CM

## 2020-01-22 DIAGNOSIS — R29.898 RIGHT LEG WEAKNESS: ICD-10-CM

## 2020-01-22 PROCEDURE — 97110 THERAPEUTIC EXERCISES: CPT | Mod: PN

## 2020-01-22 NOTE — PROGRESS NOTES
Physical Therapy Daily Treatment Note     Name: Robles Jordan  Clinic Number: [id number]    Therapy Diagnosis:   Encounter Diagnoses   Name Primary?    Impaired functional mobility, balance, and endurance     Right leg weakness      Physician: Briana Seals, *    Visit Date: 1/22/2020    Physician Orders: Eval and Treat  Medical Diagnosis:   Q06.8 (ICD-10-CM) - Tethered cord   R29.898 (ICD-10-CM) - Right leg weakness   R26.9 (ICD-10-CM) - Gait disturbance   Evaluation Date: 12/4/19  Authorization Period Expiration: 11/14/19-12/31/19  Plan of Care Certification Period: 12/4/19-2/4/20  Visit #/Visits authorized: 6/20    Time In: 1100  Time Out: 1200  Total Time: 60 minutes  Total Billable Time: 30 minutes    Precautions: Standard and tethered cord syndrome    Subjective     Pt reports: Reports after last session was the most sore he has ever been after a PT appt. Says by monday he was feeling better and isnt having any pain today. States he doesnt know what it is but its got to be something were doing in PT sessions.       He was not compliant with home exercise program.     Response to previous treatment: Describes the pain following last session as primarily in the R hip, upper thigh and R side of LB.    Functional change: none to note    Pain: 0/10   Location: right LE      Objective       Robles received therapeutic exercises to develop strength, endurance, ROM, flexibility, posture and core stabilization for 30 minutes including:  Upright Bike 10 Min level 1  SKC using towel 3x30  held-NP- Supine HF stretch 3x30  Supine strap HS stretch 4x20   Sitting lumbar flexion rollout on blue PB 10x10   Tandem standing on foam EO 3x30 (1-2 UE support)  held-NP- DKTC green PB x30  held-NP- Clams x30 in sidelying  STS to 24in stool x10 with foam pad  Shuttle single leg squat 2 cords x 20  NP- Shuttle squats 2.5 cords x 30  [employer] tapping w/1 UE support 3x10 ea LE  Stair hip flexor stretch 3x15   Heel  "Raises holding 5# KB x30    The following HELD THIS VISIT:  Matrix hip ABD 50# x30  Matrix hip ADD 25# x30  Hip abduction with 1lb x 20 ea (1lb added 1/8)  4 in Hurdles side stepping and forwards in // bars x 3 laps ea (no UE support)  Open books 10x10  Single LE hip hinge reaching to tap FR 2x10  Hamstring curls (modified donkey kick at shuttle) 1 cord 2x10  Hip extension with 1lb x20 (1lb added 1/8)  Step ups w/opposite knee marching up on 6" step x 20 ea  Marches with 1lb x 20 ea (1lb added 1/8)  stool scoots pole to pole x2      Arboleda participated in neuromuscular re-education activities to improve: Balance, Coordination, Kinesthetic, Sense, Proprioception and Posture for 00 minutes. The following activities were included:    MHP application to low back for 10 mins while supine hooklying following exercises today to decrease pain.     Home Exercises Provided and Patient Education Provided     Education provided:   - HEP compliance  - DOMS  - cueing for stretches and where stretch should be felt     Written Home Exercises Provided: yes.   Access Code: [email or web address]   Exercises  Seated Hamstring Stretch - 3 reps - 30 hold - 2x daily - 7x weekly  Standing Gastroc Stretch - 3 reps - 30 hold - 2x daily - 7x weekly    Revised and adjusted previous HEP to include just 2 stretches that I instructed him to start performing daily even when having muscle soreness in order to help with DOMS.      Exercises were reviewed and Arboleda was able to demonstrate them prior to the end of the session.  Arboleda demonstrated good  understanding of the education provided.     Assessment   Significant cueing and training in proper form for stair hip flexor stretch this date to ensure appropriate stretch was felt in ant hip/groin. He felt good with this as opposed to previous sessions where he was only feeling a quad burn and low back stretching.   Pt reported he wasn't having as sharp of pain in his R hip with active hip flexion, like " he has been the past couple weeks.     Robles is progressing well towards his goals.   Pt prognosis is Good.     Pt will continue to benefit from skilled outpatient physical therapy to address the deficits listed in the problem list box on initial evaluation, provide pt/family education and to maximize pt's level of independence in the home and community environment.     Pt's spiritual, cultural and educational needs considered and pt agreeable to plan of care and goals.     Anticipated barriers to physical therapy: tethered cord syndrome    Goals:     Short Term GOALS: - in progress  1. Pt to report feeling more stable and steady during gait.  2. Pt to display improved gait tolerance to >10mins before needing rest break.  3. Pt to display improved fxl strength to be able to carry groceries (Case of water).   4. Pt to begin HEP including specific stretching and strengthening as per their diagnosis to decrease pain and improve flexibility.  5. Pt to display >65deg SLR in supine hooklying, >10 deg active DF in sitting.  6. Pt to be able to comple >10 reps of sit<>Stand within 30 sec     Long Term GOALS: - in progress  1. Pt to report <1/10, no longer limiting functional activities.  2. Pt to report return to >75% of regular ADLs, work and recreational tasks.   3. Pt will demonstrate appropriate squat technique for improved tolerance of lifting and carrying functional activity needed for household tasks.   4. Pt to be compliant in advanced HEP to maintain progress gained in therapy thus far.   5. Pt to display >4+/5 gross MMT for B LE in order to support pelvis and spine throughout ADLs.   Plan   Continue with current POC. Progress B LE strengthening.     Plan of care Certification: 12/4/2019 to 2-4-20.  Trini Freeman, PT

## 2020-01-24 ENCOUNTER — CLINICAL SUPPORT (OUTPATIENT)
Dept: REHABILITATION | Facility: HOSPITAL | Age: 70
End: 2020-01-24
Payer: COMMERCIAL

## 2020-01-24 DIAGNOSIS — R29.898 RIGHT LEG WEAKNESS: ICD-10-CM

## 2020-01-24 DIAGNOSIS — Z74.09 IMPAIRED FUNCTIONAL MOBILITY, BALANCE, AND ENDURANCE: ICD-10-CM

## 2020-01-24 PROCEDURE — 97110 THERAPEUTIC EXERCISES: CPT | Mod: PN

## 2020-01-24 NOTE — PROGRESS NOTES
"  Physical Therapy Daily Treatment Note     Name: Robles Jordan  Clinic Number: [id number]    Therapy Diagnosis:   Encounter Diagnoses   Name Primary?    Impaired functional mobility, balance, and endurance     Right leg weakness      Physician: Briana Seals, *    Visit Date: 1/24/2020    Physician Orders: Eval and Treat  Medical Diagnosis:   Q06.8 (ICD-10-CM) - Tethered cord   R29.898 (ICD-10-CM) - Right leg weakness   R26.9 (ICD-10-CM) - Gait disturbance   Evaluation Date: 12/4/19  Authorization Period Expiration: 11/14/19-12/31/19  Plan of Care Certification Period: 12/4/19-2/4/20  Visit #/Visits authorized: 7/20    Time In: 0900  Time Out: 1000  Total Time: 60 minutes  Total Billable Time: 60 minutes    Precautions: Standard and tethered cord syndrome    Subjective     Pt reports: he felt much better after last session, not too sore at all. Thinks we made some good changes.     He was not compliant with home exercise program.     Response to previous treatment: good  Functional change: none to note    Pain: 0/10   Location: right LE      Objective       Robles received therapeutic exercises to develop strength, endurance, ROM, flexibility, posture and core stabilization for 55 minutes including:  Upright Bike 10 Min level 1  SKC using towel 3x30  Supine hip ADD ball squeeze 3 x2mins  LTR with green PB under legs 10x10   held-NP- Supine HF stretch 3x30  Supine strap HS stretch 4x20   Sitting lumbar flexion rollout on blue PB 10x10   Tandem standing on foam EO 3x30 (1-2 UE support)  held-NP- DKTC green PB x30  held-NP- Clams x30 in sidelying  STS to 24in stool 2x10 with foam pad  Shuttle single leg squat 2.5 cords x 20  [employer] tapping w/1 UE support 2x10 ea LE  Stair hip flexor stretch 3x15   Heel Raises (add holding 5# KB) x10 all 3 angles (toes in and out , straight)  Tandem stance on foam 3x30"    Robles participated in neuromuscular re-education activities to improve: Balance, " Coordination, Kinesthetic, Sense, Proprioception and Posture for 00 minutes. The following activities were included:    MHP application to low back for 10 mins while supine hooklying following exercises today to decrease pain.     Home Exercises Provided and Patient Education Provided     Education provided:   - HEP compliance  - DOMS  - cueing for stretches and where stretch should be felt     Written Home Exercises Provided: yes.   Access Code: [email or web address]   Exercises  Seated Hamstring Stretch - 3 reps - 30 hold - 2x daily - 7x weekly  Standing Gastroc Stretch - 3 reps - 30 hold - 2x daily - 7x weekly    Revised and adjusted previous HEP to include just 2 stretches that I instructed him to start performing daily even when having muscle soreness in order to help with DOMS.      Exercises were reviewed and Robles was able to demonstrate them prior to the end of the session.  Robles demonstrated good  understanding of the education provided.     Assessment   Pt with appropriate fatigue reported following exercises this date. Did not complain of any pain in back or R hip. Displayed significant R LE fatigue towards end of session, dragging his foot towards end of session.     Arboleda is progressing well towards his goals.   Pt prognosis is Good.     Pt will continue to benefit from skilled outpatient physical therapy to address the deficits listed in the problem list box on initial evaluation, provide pt/family education and to maximize pt's level of independence in the home and community environment.     Pt's spiritual, cultural and educational needs considered and pt agreeable to plan of care and goals.     Anticipated barriers to physical therapy: tethered cord syndrome    Goals:     Short Term GOALS: - in progress  1. Pt to report feeling more stable and steady during gait.  2. Pt to display improved gait tolerance to >10mins before needing rest break.  3. Pt to display improved fxl strength to be able to carry  groceries (Case of water).   4. Pt to begin HEP including specific stretching and strengthening as per their diagnosis to decrease pain and improve flexibility.  5. Pt to display >65deg SLR in supine hooklying, >10 deg active DF in sitting.  6. Pt to be able to comple >10 reps of sit<>Stand within 30 sec     Long Term GOALS: - in progress  1. Pt to report <1/10, no longer limiting functional activities.  2. Pt to report return to >75% of regular ADLs, work and recreational tasks.   3. Pt will demonstrate appropriate squat technique for improved tolerance of lifting and carrying functional activity needed for household tasks.   4. Pt to be compliant in advanced HEP to maintain progress gained in therapy thus far.   5. Pt to display >4+/5 gross MMT for B LE in order to support pelvis and spine throughout ADLs.   Plan   Continue with current POC. Progress B LE strengthening.     Plan of care Certification: 12/4/2019 to 2-4-20.  Trini Freeman, PT

## 2020-01-29 ENCOUNTER — CLINICAL SUPPORT (OUTPATIENT)
Dept: REHABILITATION | Facility: HOSPITAL | Age: 70
End: 2020-01-29
Payer: COMMERCIAL

## 2020-01-29 DIAGNOSIS — Z74.09 IMPAIRED FUNCTIONAL MOBILITY, BALANCE, AND ENDURANCE: ICD-10-CM

## 2020-01-29 DIAGNOSIS — R29.898 RIGHT LEG WEAKNESS: ICD-10-CM

## 2020-01-29 PROCEDURE — 97110 THERAPEUTIC EXERCISES: CPT | Mod: PN

## 2020-01-29 NOTE — PROGRESS NOTES
Physical Therapy Daily Treatment Note     Name: Robles Jordan  Clinic Number: [id number]    Therapy Diagnosis:   Encounter Diagnoses   Name Primary?    Impaired functional mobility, balance, and endurance     Right leg weakness      Physician: Briana Seals, *    Visit Date: 1/29/2020    Physician Orders: Eval and Treat  Medical Diagnosis:   Q06.8 (ICD-10-CM) - Tethered cord   R29.898 (ICD-10-CM) - Right leg weakness   R26.9 (ICD-10-CM) - Gait disturbance   Evaluation Date: 12/4/19  Authorization Period Expiration: 11/14/19-12/31/19  Plan of Care Certification Period: 12/4/19-2/4/20  Visit #/Visits authorized: 8/20    Time In: 0900  Time Out: 1000  Total Time: 60 minutes  Total Billable Time: 60 minutes    Precautions: Standard and tethered cord syndrome    Subjective     Pt reports: he feels good today, got himself sore doing yard work yesterday. Wants to get stronger.    He was not compliant with home exercise program.     Response to previous treatment: good  Functional change: none to note    Pain: 0/10   Location: right LE      Objective       Robles received therapeutic exercises to develop strength, endurance, ROM, flexibility, posture and core stabilization for 55 minutes including:  Upright Bike 10 Min level 2  Trialed half kneeling, very difficult one direction, felt better with kneeling on the R knee and using L for more of the movement. Will plan to try at beginning of next session.  Sitting lumbar flexion rollout on blue PB 10x10   DL #16 box from 12in stool (added 10KB for last 2 sets) 3x10  Tandem standing on foam EO 3x30 (1-2 UE support)  STS to 24in stool 3x8 standing on foam pad, holding 11# med ball  Shuttle single leg squat 3.5 cords, decreasing resistance each session 4x10  Not performed last session:  Stair hip flexor stretch 3x15  +front carry 10KB fwd or sideways   Heel Raises (add holding 5# KB) x10 all 3 angles (toes in and out , straight)  SKC using towel 3x30  Supine  "hip ADD ball squeeze 3 x2mins  LTR with green PB under legs 10x10   held-NP- Supine HF stretch 3x30  Supine strap HS stretch 4x20   Matrix hip ABD 50# x30  Matrix hip ADD 25# x30  Hip abduction with 1lb x 20 ea (1lb added 1/8)  4 in Hurdles side stepping and forwards in // bars x 3 laps ea (no UE support)  Step ups w/opposite knee marching up on 6" step x 20 ea  Marches with 2lb 2x 20 ea high knees      Robles participated in neuromuscular re-education activities to improve: Balance, Coordination, Kinesthetic, Sense, Proprioception and Posture for 00 minutes. The following activities were included:    CP application to low back for 10 mins while seated following exercises today to decrease pain.     Home Exercises Provided and Patient Education Provided     Education provided:   - HEP compliance for stretching in order to care for his muscles.    Written Home Exercises Provided: yes.   Access Code: [email or web address]   Exercises  Seated Hamstring Stretch - 3 reps - 30 hold - 2x daily - 7x weekly  Standing Gastroc Stretch - 3 reps - 30 hold - 2x daily - 7x weekly  Exercises were reviewed and Robles was able to demonstrate them prior to the end of the session.  Robles demonstrated good  understanding of the education provided.     Assessment   Pt reached full fatigue today in B LE. Very diligent in therapy strengthening. Encouraged HEP to help with muscle sorensss. Pt states he is busy with other things.     Robles is progressing well towards his goals.   Pt prognosis is Good.     Pt will continue to benefit from skilled outpatient physical therapy to address the deficits listed in the problem list box on initial evaluation, provide pt/family education and to maximize pt's level of independence in the home and community environment.     Pt's spiritual, cultural and educational needs considered and pt agreeable to plan of care and goals.     Anticipated barriers to physical therapy: tethered cord syndrome    Goals: "     Short Term GOALS: - in progress  1. Pt to report feeling more stable and steady during gait.  2. Pt to display improved gait tolerance to >10mins before needing rest break.  3. Pt to display improved fxl strength to be able to carry groceries (Case of water).   4. Pt to begin HEP including specific stretching and strengthening as per their diagnosis to decrease pain and improve flexibility.  5. Pt to display >65deg SLR in supine hooklying, >10 deg active DF in sitting.  6. Pt to be able to comple >10 reps of sit<>Stand within 30 sec     Long Term GOALS: - in progress  1. Pt to report <1/10, no longer limiting functional activities.  2. Pt to report return to >75% of regular ADLs, work and recreational tasks.   3. Pt will demonstrate appropriate squat technique for improved tolerance of lifting and carrying functional activity needed for household tasks.   4. Pt to be compliant in advanced HEP to maintain progress gained in therapy thus far.   5. Pt to display >4+/5 gross MMT for B LE in order to support pelvis and spine throughout ADLs.   Plan   Continue with current POC. Progress B LE strengthening.     Plan of care Certification: 12/4/2019 to 2-4-20.  Trini Freeman, PT

## 2020-01-30 ENCOUNTER — PATIENT MESSAGE (OUTPATIENT)
Dept: INTERNAL MEDICINE | Facility: CLINIC | Age: 70
End: 2020-01-30

## 2020-01-30 VITALS
DIASTOLIC BLOOD PRESSURE: 69 MMHG | WEIGHT: 244 LBS | BODY MASS INDEX: 31.32 KG/M2 | HEART RATE: 74 BPM | HEIGHT: 74 IN | SYSTOLIC BLOOD PRESSURE: 131 MMHG

## 2020-01-30 NOTE — PROGRESS NOTES
Physical Therapy Daily Treatment Note     Name: Robles Jordan  Clinic Number: [id number]    Therapy Diagnosis:   Encounter Diagnoses   Name Primary?    Impaired functional mobility, balance, and endurance     Right leg weakness      Physician: Briana Seals, *    Visit Date: 1/31/2020    Physician Orders: Eval and Treat  Medical Diagnosis:   Q06.8 (ICD-10-CM) - Tethered cord   R29.898 (ICD-10-CM) - Right leg weakness   R26.9 (ICD-10-CM) - Gait disturbance   Evaluation Date: 12/4/19  Authorization Period Expiration: 11/14/19-12/31/19  Plan of Care Certification Period: 12/4/19-2/4/20  Visit #/Visits authorized: 9/20    Time In: 1100  Time Out: 1200  Total Time: 60 minutes  Total Billable Time: 60 minutes    Precautions: Standard and tethered cord syndrome    Subjective     Pt reports: he feels really good today, woke up and didn't have any pain.  Has continued doing some yardwork.     He was compliant with home exercise program this morning.    Response to previous treatment: good  Functional change: none to note    Pain: 0/10   Location: right LE      Objective       Robles received therapeutic exercises to develop strength, endurance, ROM, flexibility, posture and core stabilization for 40 minutes including:  Upright Bike 10 Min level 3 (reached 1.75miles  Reverse slider lunges using plow for B UE support x10  Lateral sliders x10  DL 15KB from 12in stool x6 (poor form by last rep)  STS to 24in stool 3x8 standing on foam pad, holding 11# med ball. Chest press upon standing up  suitcase carry 15KB 3x20ft  Resisted gait Red band around hips  Supine strap HS stretch 4x20    Heel Raises (add holding 15# KB) x10 all 3 angles (toes in and out , straight)  Clock yourself : modified to 4way (front , back, side L, side R) 3x30  Kneeling on bench for HF stretch 3x30  [employer] taps in standing x24 ea  w/2# ankle weights 4in Hurdles side stepping and forwards in // bars x 3 laps ea (no UE support)  hip hikes  "    Not performed this session:  NP- Shuttle single leg squat 3.5 cords, decreasing resistance each session 4x10  Np- Sitting lumbar flexion rollout on blue PB 10x10   SKC using towel 3x30  NP- Tandem standing on foam EO 3x30 (1-2 UE support)  Supine hip ADD ball squeeze 3 x2mins  LTR with green PB under legs 10x10   Matrix hip ABD 50# x30  Matrix hip ADD 25# x30  Hip abduction with 1lb x 20 ea (1lb added 1/8)  NP- Step ups w/opposite knee marching up on 6" step x 20 ea    Robles participated in neuromuscular re-education activities to improve: Balance, Coordination, Kinesthetic, Sense, Proprioception and Posture for 00 minutes. The following activities were included:    CP application to low back for 10 mins while seated following exercises today to decrease pain.     Home Exercises Provided and Patient Education Provided     Education provided:   - HEP compliance for stretching in order to care for his muscles.    Written Home Exercises Provided: yes.   Access Code: [email or web address]   Exercises  Seated Hamstring Stretch - 3 reps - 30 hold - 2x daily - 7x weekly  Standing Gastroc Stretch - 3 reps - 30 hold - 2x daily - 7x weekly  Exercises were reviewed and Robles was able to demonstrate them prior to the end of the session.  Robles demonstrated good  understanding of the education provided.     Assessment   Pt with good fatigue displayed by end of session. Reached max intensity for multiple dynamic hip/knee exercises this date.    Robles is progressing well towards his goals.   Pt prognosis is Good.     Pt will continue to benefit from skilled outpatient physical therapy to address the deficits listed in the problem list box on initial evaluation, provide pt/family education and to maximize pt's level of independence in the home and community environment.     Pt's spiritual, cultural and educational needs considered and pt agreeable to plan of care and goals.     Anticipated barriers to physical therapy: " tethered cord syndrome    Goals:     Short Term GOALS: - in progress  1. Pt to report feeling more stable and steady during gait.  2. Pt to display improved gait tolerance to >10mins before needing rest break.  3. Pt to display improved fxl strength to be able to carry groceries (Case of water).   4. Pt to begin HEP including specific stretching and strengthening as per their diagnosis to decrease pain and improve flexibility.  5. Pt to display >65deg SLR in supine hooklying, >10 deg active DF in sitting.  6. Pt to be able to comple >10 reps of sit<>Stand within 30 sec     Long Term GOALS: - in progress  1. Pt to report <1/10, no longer limiting functional activities.  2. Pt to report return to >75% of regular ADLs, work and recreational tasks.   3. Pt will demonstrate appropriate squat technique for improved tolerance of lifting and carrying functional activity needed for household tasks.   4. Pt to be compliant in advanced HEP to maintain progress gained in therapy thus far.   5. Pt to display >4+/5 gross MMT for B LE in order to support pelvis and spine throughout ADLs.   Plan   Continue with current POC. Progress B LE strengthening.     Plan of care Certification: 12/4/2019 to 2-4-20.  Trini Freeman, PT

## 2020-01-31 ENCOUNTER — CLINICAL SUPPORT (OUTPATIENT)
Dept: REHABILITATION | Facility: HOSPITAL | Age: 70
End: 2020-01-31
Payer: COMMERCIAL

## 2020-01-31 DIAGNOSIS — R29.898 RIGHT LEG WEAKNESS: ICD-10-CM

## 2020-01-31 DIAGNOSIS — Z74.09 IMPAIRED FUNCTIONAL MOBILITY, BALANCE, AND ENDURANCE: ICD-10-CM

## 2020-01-31 PROCEDURE — 97110 THERAPEUTIC EXERCISES: CPT | Mod: PN

## 2020-01-31 PROCEDURE — 97140 MANUAL THERAPY 1/> REGIONS: CPT | Mod: PN

## 2020-02-03 ENCOUNTER — OFFICE VISIT (OUTPATIENT)
Dept: NEUROSURGERY | Facility: CLINIC | Age: 70
End: 2020-02-03
Payer: COMMERCIAL

## 2020-02-03 VITALS
RESPIRATION RATE: 16 BRPM | HEART RATE: 70 BPM | DIASTOLIC BLOOD PRESSURE: 71 MMHG | SYSTOLIC BLOOD PRESSURE: 148 MMHG | WEIGHT: 247.56 LBS | BODY MASS INDEX: 31.77 KG/M2 | HEIGHT: 74 IN

## 2020-02-03 DIAGNOSIS — R29.898 RIGHT LEG WEAKNESS: Primary | ICD-10-CM

## 2020-02-03 DIAGNOSIS — D17.79 FIBROLIPOMA OF FILUM TERMINALE: ICD-10-CM

## 2020-02-03 PROCEDURE — 99214 PR OFFICE/OUTPT VISIT, EST, LEVL IV, 30-39 MIN: ICD-10-PCS | Mod: S$GLB,,, | Performed by: NEUROLOGICAL SURGERY

## 2020-02-03 PROCEDURE — 99214 OFFICE O/P EST MOD 30 MIN: CPT | Mod: S$GLB,,, | Performed by: NEUROLOGICAL SURGERY

## 2020-02-03 PROCEDURE — 99999 PR PBB SHADOW E&M-EST. PATIENT-LVL IV: ICD-10-PCS | Mod: PBBFAC,,, | Performed by: NEUROLOGICAL SURGERY

## 2020-02-03 PROCEDURE — 99999 PR PBB SHADOW E&M-EST. PATIENT-LVL IV: CPT | Mod: PBBFAC,,, | Performed by: NEUROLOGICAL SURGERY

## 2020-02-04 ENCOUNTER — CLINICAL SUPPORT (OUTPATIENT)
Dept: REHABILITATION | Facility: HOSPITAL | Age: 70
End: 2020-02-04
Payer: COMMERCIAL

## 2020-02-04 DIAGNOSIS — Z74.09 IMPAIRED FUNCTIONAL MOBILITY, BALANCE, AND ENDURANCE: ICD-10-CM

## 2020-02-04 DIAGNOSIS — R29.898 RIGHT LEG WEAKNESS: ICD-10-CM

## 2020-02-04 PROCEDURE — 97110 THERAPEUTIC EXERCISES: CPT | Mod: PN

## 2020-02-04 NOTE — PROGRESS NOTES
Physical Therapy Daily Treatment Note/POC update     Name: Robles Jordan  Clinic Number: [id number]    Therapy Diagnosis:   Encounter Diagnoses   Name Primary?    Impaired functional mobility, balance, and endurance     Right leg weakness      Physician: Briana Seals, *    Visit Date: 2/4/2020    Physician Orders: Eval and Treat  Medical Diagnosis:   Q06.8 (ICD-10-CM) - Tethered cord   R29.898 (ICD-10-CM) - Right leg weakness   R26.9 (ICD-10-CM) - Gait disturbance   Evaluation Date: 12/4/19  POC updated on 2-4-20  Authorization Period Expiration: 12-31-20  Plan of Care Certification Period: 2/4/20 to 5-4-20  Visit #/Visits authorized: 10/20 (for new year -- authorized on 1-2-20)    Time In: 0900  Time Out: 1000  Total Time: 60 minutes  Total Billable Time: 28minutes    Precautions: Standard and tethered cord syndrome    Subjective     Pt reports: he saw his referring doctor yesterday and they orderd an MRI for his neck. Notes they discussed referring him to aquatic therapy but he wants to continue with this type of therapy bc he is happy with the progress he has made thus far. Although he still feels like his stamina and strength is not optimal, he does not its about 50% better than when he first started PT.     He was compliant with home exercise program.    Response to previous treatment: good  Functional change: none to note    Pain: 0/10   Location: right LE      Objective   Gait: pt requires cueing to maintain upright posture when complete dynamic and challenging core activities. Able to self correct when cued.   SLR: R= 65, L= 69 passive  Active DF: R= 16, L=21  STS in 30 seconds= 10 reps    Robles received therapeutic exercises to develop strength, endurance, ROM, flexibility, posture and core stabilization for 28 minutes including:  Upright Bike 10 Min level 3   Reverse slider lunges using plow for B UE support x10  Lateral sliders 2x10  DL 15KB from 12in stool x6 (poor form by last rep)  STS  to 24in stool 3x8 standing on foam pad, holding 11# med ball. Chest press upon standing up  suitcase carry 15KB 3x20ft  Resisted gait Red band around hips x3laps fwd, 3laps lateral   Heel Raises (add holding 15# KB) x10 all 3 angles (toes in and out , straight)  Clock yourself : modified to 4way (front , back, side L, side R) 3x30  w/2# ankle weights 4in Hurdles side stepping and forwards in // bars x 3 laps ea (no UE support)  hip hikes   Not performed this session:  Kneeling on bench for HF stretch 3x30  NP- Supine strap HS stretch 4x20   [employer] taps in standing x24 ea      CP application to low back for 10 mins while seated following exercises today to decrease pain.     Home Exercises Provided and Patient Education Provided     Education provided:   - HEP compliance for stretching in order to care for his muscles.    Written Home Exercises Provided: yes.   Access Code: [email or web address]   Exercises  Seated Hamstring Stretch - 3 reps - 30 hold - 2x daily - 7x weekly  Standing Gastroc Stretch - 3 reps - 30 hold - 2x daily - 7x weekly  Exercises were reviewed and Robles was able to demonstrate them prior to the end of the session.  Robles demonstrated good  understanding of the education provided.     Assessment   Pt has progressed well with therapy interventions thus far and remains motivated to restore his LOF. He has become more compliant with his HEP and it reporting increasing functional capacity.   See objective section for further information. Submitting to extend his current POC this date.   Robles is progressing well towards his goals.   Pt prognosis is Good.     Pt will continue to benefit from skilled outpatient physical therapy to address the deficits listed in the problem list box on initial evaluation, provide pt/family education and to maximize pt's level of independence in the home and community environment.     Pt's spiritual, cultural and educational needs considered and pt agreeable to plan of  care and goals.     Anticipated barriers to physical therapy: tethered cord syndrome    Goals:     Short Term GOALS: - in progress  1. Pt to report feeling more stable and steady during gait. Progressing, does not feel steady after more than 10mins of walking  2. Pt to display improved gait tolerance to >10mins before needing rest break. MET 2-4-20   3. Pt to display improved fxl strength to be able to carry groceries (Case of water). progressing  4. Pt to begin HEP including specific stretching and strengthening as per their diagnosis to decrease pain and improve flexibility.  5. Pt to display >65deg SLR in supine hooklying, >10 deg active DF in sitting.  MET 2-4-20   6. Pt to be able to comple >10 reps of sit<>Stand within 30 sec MET 2-4-20      Long Term GOALS: - in progress  1. Pt to report <1/10, no longer limiting functional activities.  2. Pt to report return to >75% of regular ADLs, work and recreational tasks.   3. Pt will demonstrate appropriate squat technique for improved tolerance of lifting and carrying functional activity needed for household tasks.   4. Pt to be compliant in advanced HEP to maintain progress gained in therapy thus far.   5. Pt to display >4+/5 gross MMT for B LE in order to support pelvis and spine throughout ADLs.   Plan   Continue with current POC. Progress B LE strengthening.     Plan of care Certification: extended to 5-4-2020.  Trini Freeman, PT

## 2020-02-04 NOTE — PLAN OF CARE
Physical Therapy Daily Treatment Note/POC update     Name: Robles Jordan  Clinic Number: [id number]    Therapy Diagnosis:   Encounter Diagnoses   Name Primary?    Impaired functional mobility, balance, and endurance     Right leg weakness      Physician: Briana Seals, *    Visit Date: 2/4/2020    Physician Orders: Eval and Treat  Medical Diagnosis:   Q06.8 (ICD-10-CM) - Tethered cord   R29.898 (ICD-10-CM) - Right leg weakness   R26.9 (ICD-10-CM) - Gait disturbance   Evaluation Date: 12/4/19  POC updated on 2-4-20  Authorization Period Expiration: 12-31-20  Plan of Care Certification Period: 2/4/20 to 5-4-20  Visit #/Visits authorized: 10/20 (for new year -- authorized on 1-2-20)    Time In: 0900  Time Out: 1000  Total Time: 60 minutes  Total Billable Time: 28minutes    Precautions: Standard and tethered cord syndrome    Subjective     Pt reports: he saw his referring doctor yesterday and they orderd an MRI for his neck. Notes they discussed referring him to aquatic therapy but he wants to continue with this type of therapy bc he is happy with the progress he has made thus far. Although he still feels like his stamina and strength is not optimal, he does not its about 50% better than when he first started PT.     He was compliant with home exercise program.    Response to previous treatment: good  Functional change: none to note    Pain: 0/10   Location: right LE      Objective   Gait: pt requires cueing to maintain upright posture when complete dynamic and challenging core activities. Able to self correct when cued.   SLR: R= 65, L= 69 passive  Active DF: R= 16, L=21  STS in 30 seconds= 10 reps    Robles received therapeutic exercises to develop strength, endurance, ROM, flexibility, posture and core stabilization for 28 minutes including:  Upright Bike 10 Min level 3 (reached 1.75miles  Reverse slider lunges using plow for B UE support x10  Lateral sliders 2x10  DL 15KB from 12in stool x6 (poor form  "by last rep)  STS to 24in stool 3x8 standing on foam pad, holding 11# med ball. Chest press upon standing up  suitcase carry 15KB 3x20ft  Resisted gait Red band around hips x3laps fwd, 3laps lateral   Heel Raises (add holding 15# KB) x10 all 3 angles (toes in and out , straight)  Clock yourself : modified to 4way (front , back, side L, side R) 3x30  w/2# ankle weights 4in Hurdles side stepping and forwards in // bars x 3 laps ea (no UE support)  hip hikes   Not performed this session:  Kneeling on bench for HF stretch 3x30  NP- Supine strap HS stretch 4x20   [employer] taps in standing x24 ea    NP- Shuttle single leg squat 3.5 cords, decreasing resistance each session 4x10  Np- Sitting lumbar flexion rollout on blue PB 10x10   SKC using towel 3x30  NP- Tandem standing on foam EO 3x30 (1-2 UE support)  Supine hip ADD ball squeeze 3 x2mins  LTR with green PB under legs 10x10   Matrix hip ABD 50# x30  Matrix hip ADD 25# x30  Hip abduction with 1lb x 20 ea (1lb added 1/8)  NP- Step ups w/opposite knee marching up on 6" step x 20 ea    Robles participated in neuromuscular re-education activities to improve: Balance, Coordination, Kinesthetic, Sense, Proprioception and Posture for 00 minutes. The following activities were included:    CP application to low back for 10 mins while seated following exercises today to decrease pain.     Home Exercises Provided and Patient Education Provided     Education provided:   - HEP compliance for stretching in order to care for his muscles.    Written Home Exercises Provided: yes.   Access Code: [email or web address]   Exercises  Seated Hamstring Stretch - 3 reps - 30 hold - 2x daily - 7x weekly  Standing Gastroc Stretch - 3 reps - 30 hold - 2x daily - 7x weekly  Exercises were reviewed and Robles was able to demonstrate them prior to the end of the session.  Robles demonstrated good  understanding of the education provided.     Assessment   Pt with good fatigue displayed by end of " session. Reached max intensity for multiple dynamic hip/knee exercises this date.    Robles is progressing well towards his goals.   Pt prognosis is Good.     Pt will continue to benefit from skilled outpatient physical therapy to address the deficits listed in the problem list box on initial evaluation, provide pt/family education and to maximize pt's level of independence in the home and community environment.     Pt's spiritual, cultural and educational needs considered and pt agreeable to plan of care and goals.     Anticipated barriers to physical therapy: tethered cord syndrome    Goals:     Short Term GOALS: - in progress  1. Pt to report feeling more stable and steady during gait. Progressing, does not feel steady after more than 10mins of walking  2. Pt to display improved gait tolerance to >10mins before needing rest break. MET 2-4-20   3. Pt to display improved fxl strength to be able to carry groceries (Case of water). progressing  4. Pt to begin HEP including specific stretching and strengthening as per their diagnosis to decrease pain and improve flexibility.  5. Pt to display >65deg SLR in supine hooklying, >10 deg active DF in sitting.  MET 2-4-20   6. Pt to be able to comple >10 reps of sit<>Stand within 30 sec MET 2-4-20      Long Term GOALS: - in progress  1. Pt to report <1/10, no longer limiting functional activities.  2. Pt to report return to >75% of regular ADLs, work and recreational tasks.   3. Pt will demonstrate appropriate squat technique for improved tolerance of lifting and carrying functional activity needed for household tasks.   4. Pt to be compliant in advanced HEP to maintain progress gained in therapy thus far.   5. Pt to display >4+/5 gross MMT for B LE in order to support pelvis and spine throughout ADLs.   Plan   Continue with current POC. Progress B LE strengthening.     Plan of care Certification: extended to 5-4-2020.  Trini Freeman, PT

## 2020-02-05 PROBLEM — D17.79 FIBROLIPOMA OF FILUM TERMINALE: Status: ACTIVE | Noted: 2020-02-05

## 2020-02-05 NOTE — PROGRESS NOTES
CHIEF COMPLAINT:      Chief Complaint   Patient presents with    Follow-up         INTERVAL HISTORY (2/3/20):  Patient returns for continued evaluation for the possibility of tethered cord.  He has had no significant changes in his symptoms.  He continues to have weakness in his legs particularly the right lower extremity.  The leg feels heavy and weak when picking it up, tired after repetitive lifting, and he will feel generalized weakness in his proximal legs with exertion.  He also has decreased range of motion in his [place] and plantar flexion on the right.  He denies any significant back or leg pain.  He denies any upper extremity symptoms or clumsiness.    He was recently sent for urodynamic testing which revealed normal bladder function with complete emptying.    HPI:  Robles Jordan is a 69 y.o.  male with below listed PMH, who is referred for evaluation of tethered cord.  Main complaint is BLE weakness, worse on right for past 1 year.  His legs will give out on him if he walks long distance or on incline.  Also has low back pain along waistline but does not endorse much leg pain.  Numbness/tingling along right medial thigh and gets charley horses in his sleep.  Going to PT but not helping much - feels sore after attending.  Gabapentin makes sleepy so only taking BID.     Has had urinating issues for approx 1 year manifest as sensation of incomplete bladder emptying.  He was placed on flomax but unclear how much it has helped.  BM are regular and daily.     He has appt with urology next week.     History of Present Illness (from PA note):  Robles Jordan is a 68 y.o. male with HLD, HTN, and HEAVEN who presents with low back pain and right leg pain/numbness/weakness. He was initially evaluated by CHIRAG Jones at [facility] back and spine. She referred him to [facility] [place] [place] neurosurgery to establish care closer to home.      Symptom onset: 1 year, first noticed there was a problem when his wife noticed him  limping  Location: right-sided low back pain with radiation into medial and lateral right leg down to his calf. Occasionally in his left leg. Never in his foot other than a cramping sensation.   Pain level: 0/10, primary complaint is of weakness, not pain  Numbness: + numbness and tingling in anterior right thigh   Weakness: + in right leg   Denies bowel dysfunction and saddle anesthesia.   Endorses 2 year history of urinary retention requiring tamsulosin. No documentation of BPH  Endorses gait disturbance related to limping and right leg weakness.   2 falls in the last few months related to trip/fall and a chair giving out     Treatments tried:  -PT: has note tried  -ADIEL: has not tried; not indicated   -Gabapentin: currently taking   -Muscle relaxer: not currently taking   -Rx pain medications: none  -Spine surgery: none     Review of patient's allergies indicates:  No Known Allergies          Past Medical History:   Diagnosis Date    BPH (benign prostatic hypertrophy)      Hx of colonic polyps 10/19/2015    Hypertension              Past Surgical History:   Procedure Laterality Date    COLONOSCOPY N/A 10/19/2015     Procedure: COLONOSCOPY;  Surgeon: Antonino Bernstein MD;  [facility]: [facility] ([address] [place] [address]);  Service: Endoscopy;  Laterality: N/A;    MULTIPLE TOOTH EXTRACTIONS   2014            Family History   Problem Relation Age of Onset    Cancer Mother           Breast    Stroke Sister 65         Fatal    Cancer Maternal Uncle           Pancreas    Cancer Paternal Uncle           Pancreas    Cancer Maternal Grandmother           Breast    Hypertension Father      Hyperlipidemia Father        Social History            Tobacco Use    Smoking status: Current Every Day Smoker       Types: Cigarettes   Substance Use Topics    Alcohol use: Yes       Alcohol/week: 0.0 standard drinks       Comment: very seldom    Drug use: No         Review of Systems   Constitutional: Negative.    Respiratory: Negative for cough  "and shortness of breath.    Cardiovascular: Negative for chest pain, palpitations, claudication and leg swelling.   Gastrointestinal: Negative for abdominal pain, constipation and diarrhea.   Genitourinary: Positive for frequency. Negative for flank pain and urgency.   Musculoskeletal: Positive for back pain and falls (Near falls due legs give out). Negative for joint pain, myalgias and neck pain.   Skin: Negative.    Neurological: Positive for tingling, focal weakness and weakness. Negative for dizziness, tremors, sensory change, speech change, seizures, loss of consciousness and headaches.   Psychiatric/Behavioral: Negative.          OBJECTIVE:     Vitals:    02/03/20 1615   BP: (!) 148/71   BP Location: Right arm   Patient Position: Sitting   BP Method: X-Large (Automatic)   Pulse: 70   Resp: 16   Weight: 112.3 kg (247 lb 9.2 oz)   Height: 6' 2" (1.88 m)     Physical Exam:  Constitutional: Patient sitting comfortably in chair. Appears well developed and well nourished.  Skin: Exposed areas are intact without abnormal markings, rashes or other lesions.  HEENT: Normocephalic. Normal conjunctivae.  Cardiovascular: Normal rate and regular rhythm.  Respiratory: Chest wall rises and falls symmetrically, without signs of respiratory distress.  Abdomen: Soft and non-tender.  Extremities: Warm and without edema. Calves supple, non-tender.  Psych/Behavior: Normal affect.     Neurological:     Mental status: Alert and oriented. Conversational and appropriate.       Cranial Nerves: VFF to confrontation. PERRL. EOMI without nystagmus. Facial STLT normal and symmetric. Strong, symmetric muscles of mastication. Facial strength full and symmetric. Hearing equal bilaterally to finger rub. Palate and uvula rise and fall normally in midline. Shoulder shrug 5/5 strength. Tongue midline.      Motor:     Upper:   Deltoids Triceps Biceps WE WF  FA     R 5/5 5/5 5/5 5/5 5/5 5/5 5/5     L 5/5 5/5 5/5 5/5 5/5 5/5 5/5      Lower:   HF " KE KF DF PF EHL     R 5-/5 5/5 5/5 5/5 5/5 5/5     L 5/5 5/5 5/5 5/5 5/5 5/5      Sensory: Altered light touch right thigh      Reflexes:      DTR: 2+ knees and biceps symmetrically.     Abarca's: Negative.     Babinski's: Negative.     Clonus: Negative.     Cerebellar: Finger-to-nose and rapid alternating movements normal.      Gait:  Stable, somewhat drags RLE     Spine:     Posture: Head well aligned over pelvis and shoulders in front and side views.  No focal or global spinal deformity visible on inspection.      Cervical:      ROM: Full with flexion, extension, lateral rotation and ear-to-shoulder bend.      Midline TTP: Negative.     Spurling's test: Negative.     Lhermitte's: Negative.     Thoracic:     Midline TTP: Negative     Lumbar:     Midline TTP: Negative     Straight Leg Test: Negative     Crossed Straight Leg Test: Negative     Other:     SI joint TTP: Negative.     Tenderness with external/internal hip rotation: Negative.     Diagnostic Results:  All imaging was independently reviewed by me.     EMG/Nerve conduction study, dated 11/19/19:  1. Acute denervation L4-S1 on right     MRI L spine, dated 11/20/19:  1. Conus ends at L2-3  2. Fatty filum (3.4mm)  3. Posterior displacement of cauda equina     ASSESSMENT/PLAN:     Problem List Items Addressed This Visit        Orthopedic    Right leg weakness - Primary    Relevant Orders    Ambulatory Referral to Physical/Occupational Therapy    MRI Cervical Spine Without Contrast    MRI Thoracic Spine Without Contrast        VISIT SUMMARY:  Patient's main complaint is right greater than left leg weakness.  He was found to have a thickened fatty filum raising suspicion for tethered cord syndrome.  He underwent urodynamic testing which revealed normal bladder function.  It is unclear at this point if he actually has tethered cord syndrome.  I will order cervical and thoracic MRIs to insure that there is not pathology higher up.  We will monitor his symptoms  closely and if they progress we will proceed with untethering.     PATIENT EDUCATION:  More than half the clinic visit was spent showing with patient the pertinent findings on imaging and educating the patient about natural history of the pathology.   We discussed options for treatment as well as the risks and benefits of each option.  All questions were answered.      The patient understands and agrees with the following plan of care.     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

## 2020-02-11 ENCOUNTER — HOSPITAL ENCOUNTER (OUTPATIENT)
Dept: RADIOLOGY | Facility: HOSPITAL | Age: 70
Discharge: HOME | End: 2020-02-11
Attending: NEUROLOGICAL SURGERY
Payer: COMMERCIAL

## 2020-02-11 DIAGNOSIS — R29.898 RIGHT LEG WEAKNESS: ICD-10-CM

## 2020-02-11 PROCEDURE — 72141 MRI NECK SPINE W/O DYE: CPT | Mod: 26,,, | Performed by: RADIOLOGY

## 2020-02-11 PROCEDURE — 72146 MRI CHEST SPINE W/O DYE: CPT | Mod: TC

## 2020-02-11 PROCEDURE — 72146 MRI CHEST SPINE W/O DYE: CPT | Mod: 26,,, | Performed by: RADIOLOGY

## 2020-02-11 PROCEDURE — 72141 MRI CERVICAL SPINE WITHOUT CONTRAST: ICD-10-PCS | Mod: 26,,, | Performed by: RADIOLOGY

## 2020-02-11 PROCEDURE — 72146 MRI THORACIC SPINE WITHOUT CONTRAST: ICD-10-PCS | Mod: 26,,, | Performed by: RADIOLOGY

## 2020-02-11 PROCEDURE — 72141 MRI NECK SPINE W/O DYE: CPT | Mod: TC

## 2020-02-12 ENCOUNTER — CLINICAL SUPPORT (OUTPATIENT)
Dept: REHABILITATION | Facility: HOSPITAL | Age: 70
End: 2020-02-12
Payer: COMMERCIAL

## 2020-02-12 DIAGNOSIS — Z74.09 IMPAIRED FUNCTIONAL MOBILITY, BALANCE, AND ENDURANCE: ICD-10-CM

## 2020-02-12 DIAGNOSIS — R29.898 RIGHT LEG WEAKNESS: ICD-10-CM

## 2020-02-12 PROCEDURE — 97110 THERAPEUTIC EXERCISES: CPT | Mod: PN,CQ

## 2020-02-12 NOTE — PROGRESS NOTES
"  Physical Therapy Daily Treatment Note/POC update     Name: Robles Jordan  Clinic Number: [id number]    Therapy Diagnosis:   Encounter Diagnoses   Name Primary?    Impaired functional mobility, balance, and endurance     Right leg weakness      Physician: Briana Seals, *    Visit Date: 2/12/2020    Physician Orders: Eval and Treat  Medical Diagnosis:   Q06.8 (ICD-10-CM) - Tethered cord   R29.898 (ICD-10-CM) - Right leg weakness   R26.9 (ICD-10-CM) - Gait disturbance   Evaluation Date: 12/4/19  POC updated on 2-4-20  Authorization Period Expiration: 12-31-20  Plan of Care Certification Period: 2/4/20 to 5-4-20  Visit #/Visits authorized: 11/20 (for new year -- authorized on 1-2-20)    Time In: 0900  Time Out: 1005  Total Time: 65 minutes  Total Billable Time: 30minutes    Precautions: Standard and tethered cord syndrome    Subjective     Pt reports: had 2 MRI's yesterday of his neck and back. Feeling stiff in his back and neck but no pain.     He was compliant with home exercise program.    Response to previous treatment: good  Functional change: none to note    Pain: 0/10   Location: right LE      Objective     Robles received therapeutic exercises to develop strength, endurance, ROM, flexibility, posture and core stabilization for 55 minutes including:      Upright Bike 10 Min level 3   Reverse slider lunges using plow for B UE support x10  Lateral sliders 2x10  DL 15KB from 12in stool x6 (poor form by last rep)  STS to 24in stool 3x8 standing on foam pad, holding 11# med ball. Chest press upon standing up  suitcase carry 15KB 3x20ft  Resisted gait Red band around hips x3laps fwd, 3laps lateral  Heel Raises (add holding 15# KB) x10 all 3 angles (toes in and out , straight)  w/2# ankle weights 4in Hurdles side stepping and forwards in // bars x 3 laps ea (no UE support) NP    Supine strap HS stretch 4x20   Supine HF stretch off side of mat 2 x 30"    Not performed this session:  Kneeling on bench for " HF stretch 3x30  [employer] taps in standing x24 ea  hip hikes   Clock yourself : modified to 4way (front , back, side L, side R) 3x30    CP application to low back for 10 mins while seated following exercises today to decrease pain.       Home Exercises Provided and Patient Education Provided     Education provided:   - HEP compliance for stretching in order to care for his muscles.    Written Home Exercises Provided: yes.   Access Code: [email or web address]   Exercises  Seated Hamstring Stretch - 3 reps - 30 hold - 2x daily - 7x weekly  Standing Gastroc Stretch - 3 reps - 30 hold - 2x daily - 7x weekly  Exercises were reviewed and Robles was able to demonstrate them prior to the end of the session.  Robles demonstrated good  understanding of the education provided.     Assessment     Robles tolerated today's session very well. Notable R LE muscular fatigue and LE buckling towards end of strengthening exercises. Multiple brief rest breaks required throughout session 2' fatigue and ms weakness.  Unable to complete hurdles this date at end of session 2' significant LE fatigue. He remains highly motivated towards PT and to restore his LOF.       Robles is progressing well towards his goals.   Pt prognosis is Good.     Pt will continue to benefit from skilled outpatient physical therapy to address the deficits listed in the problem list box on initial evaluation, provide pt/family education and to maximize pt's level of independence in the home and community environment.     Pt's spiritual, cultural and educational needs considered and pt agreeable to plan of care and goals.     Anticipated barriers to physical therapy: tethered cord syndrome    Goals:     Short Term GOALS: - in progress  1. Pt to report feeling more stable and steady during gait. Progressing, does not feel steady after more than 10mins of walking  2. Pt to display improved gait tolerance to >10mins before needing rest break. MET 2-4-20   3. Pt to display  improved fxl strength to be able to carry groceries (Case of water). progressing  4. Pt to begin HEP including specific stretching and strengthening as per their diagnosis to decrease pain and improve flexibility.  5. Pt to display >65deg SLR in supine hooklying, >10 deg active DF in sitting.  MET 2-4-20   6. Pt to be able to comple >10 reps of sit<>Stand within 30 sec MET 2-4-20      Long Term GOALS: - in progress  1. Pt to report <1/10, no longer limiting functional activities.  2. Pt to report return to >75% of regular ADLs, work and recreational tasks.   3. Pt will demonstrate appropriate squat technique for improved tolerance of lifting and carrying functional activity needed for household tasks.   4. Pt to be compliant in advanced HEP to maintain progress gained in therapy thus far.   5. Pt to display >4+/5 gross MMT for B LE in order to support pelvis and spine throughout ADLs.   Plan   Continue with current POC. Progress B LE strengthening.     Plan of care Certification: extended to 5-4-2020.  Nichelle Dugan, PTA

## 2020-02-12 NOTE — PROGRESS NOTES
Physical Therapy Daily Treatment Note/POC update     Name: Robles Jordan  Clinic Number: [id number]    Therapy Diagnosis:   No diagnosis found.  Physician: Briana Seals, *    Visit Date: 2/12/2020    Physician Orders: Eval and Treat  Medical Diagnosis:   Q06.8 (ICD-10-CM) - Tethered cord   R29.898 (ICD-10-CM) - Right leg weakness   R26.9 (ICD-10-CM) - Gait disturbance   Evaluation Date: 12/4/19  POC updated on 2-4-20  Authorization Period Expiration: 12-31-20  Plan of Care Certification Period: 2/4/20 to 5-4-20  Visit #/Visits authorized: 11/20 (for new year -- authorized on 1-2-20)    Time In: ***  Time Out: ***  Total Time: 60 minutes  Total Billable Time: *** minutes    Precautions: Standard and tethered cord syndrome    Subjective     Pt reports: he saw his referring doctor yesterday and they orderd an MRI for his neck. Notes they discussed referring him to aquatic therapy but he wants to continue with this type of therapy bc he is happy with the progress he has made thus far. Although he still feels like his stamina and strength is not optimal, he does not its about 50% better than when he first started PT.     He was compliant with home exercise program.    Response to previous treatment: good  Functional change: none to note    Pain: 0/10   Location: right LE      Objective       Robles received therapeutic exercises to develop strength, endurance, ROM, flexibility, posture and core stabilization for *** minutes including:    Upright Bike 10 Min level 3   Reverse slider lunges using plow for B UE support x10  Lateral sliders 2x10  DL 15KB from 12in stool x6 (poor form by last rep)  STS to 24in stool 3x8 standing on foam pad, holding 11# med ball. Chest press upon standing up  suitcase carry 15KB 3x20ft  Resisted gait Red band around hips x3laps fwd, 3laps lateral  Heel Raises (add holding 15# KB) x10 all 3 angles (toes in and out , straight)  w/2# ankle weights 4in Hurdles side stepping and  forwards in // bars x 3 laps ea (no UE support)    Supine strap HS stretch 4x20     Not performed this session:  Kneeling on bench for HF stretch 3x30  [employer] taps in standing x24 ea  hip hikes   Clock yourself : modified to 4way (front , back, side L, side R) 3x30    CP application to low back for 10 mins while seated following exercises today to decrease pain.     Home Exercises Provided and Patient Education Provided     Education provided:   - HEP compliance for stretching in order to care for his muscles.    Written Home Exercises Provided: yes.   Access Code: [email or web address]   Exercises  Seated Hamstring Stretch - 3 reps - 30 hold - 2x daily - 7x weekly  Standing Gastroc Stretch - 3 reps - 30 hold - 2x daily - 7x weekly  Exercises were reviewed and Robles was able to demonstrate them prior to the end of the session.  Robles demonstrated good  understanding of the education provided.     Assessment   Pt has progressed well with therapy interventions thus far and remains motivated to restore his LOF. He has become more compliant with his HEP and it reporting increasing functional capacity.   See objective section for further information. Submitting to extend his current POC this date.   Robles is progressing well towards his goals.   Pt prognosis is Good.     Pt will continue to benefit from skilled outpatient physical therapy to address the deficits listed in the problem list box on initial evaluation, provide pt/family education and to maximize pt's level of independence in the home and community environment.     Pt's spiritual, cultural and educational needs considered and pt agreeable to plan of care and goals.     Anticipated barriers to physical therapy: tethered cord syndrome    Goals:     Short Term GOALS: - in progress  1. Pt to report feeling more stable and steady during gait. Progressing, does not feel steady after more than 10mins of walking  2. Pt to display improved gait tolerance to >10mins  before needing rest break. MET 2-4-20   3. Pt to display improved fxl strength to be able to carry groceries (Case of water). progressing  4. Pt to begin HEP including specific stretching and strengthening as per their diagnosis to decrease pain and improve flexibility.  5. Pt to display >65deg SLR in supine hooklying, >10 deg active DF in sitting.  MET 2-4-20   6. Pt to be able to comple >10 reps of sit<>Stand within 30 sec MET 2-4-20      Long Term GOALS: - in progress  1. Pt to report <1/10, no longer limiting functional activities.  2. Pt to report return to >75% of regular ADLs, work and recreational tasks.   3. Pt will demonstrate appropriate squat technique for improved tolerance of lifting and carrying functional activity needed for household tasks.   4. Pt to be compliant in advanced HEP to maintain progress gained in therapy thus far.   5. Pt to display >4+/5 gross MMT for B LE in order to support pelvis and spine throughout ADLs.   Plan   Continue with current POC. Progress B LE strengthening.     Plan of care Certification: extended to 5-4-2020.  Nichelle Dugan, PTA

## 2020-02-14 ENCOUNTER — TELEPHONE (OUTPATIENT)
Dept: INTERNAL MEDICINE | Facility: CLINIC | Age: 70
End: 2020-02-14

## 2020-02-14 ENCOUNTER — TELEPHONE (OUTPATIENT)
Dept: NEUROSURGERY | Facility: HOSPITAL | Age: 70
End: 2020-02-14

## 2020-02-14 DIAGNOSIS — R16.0 LIVER MASSES: Primary | ICD-10-CM

## 2020-02-14 NOTE — TELEPHONE ENCOUNTER
I spoke to the patient. He is asking for the results of an Mri that was ordered by another provider. His wife has questions about the report that they received on the Portal.

## 2020-02-14 NOTE — TELEPHONE ENCOUNTER
Pt informed. He need lab orders faxed to [facility]. Kidney function results needs to be wnl prior to scheduling Ctscan.

## 2020-02-14 NOTE — TELEPHONE ENCOUNTER
----- Message from Claudette Reece MA sent at 2/14/2020  9:22 AM CST -----  Regarding: Abnormal  MRI results   Pt's wife would like to know what the next steps are as far as diagnostic testing and labs to reach a diagnoses for Mr Jordan. Please contact pt's wife in regards to these concerns.       MRI Results:   1. Multilevel degenerative changes throughout the dorsal spine without definite signs for spinal stenosis or cord compression.  No abnormal signal intensities within the dorsal cord.  2. Multiple T2 hyperintense lesions in the liver, suspicious for metastatic disease.  Further evaluation of the liver with a dedicated CT scan of the abdomen with and without contrast is suggested.

## 2020-02-14 NOTE — TELEPHONE ENCOUNTER
I reviewed the report, there is concern about masses in the liver. Please notify the patient that I am ordering a CT scan that will look at this more closely in the liver. I am also ordering blood work that will check for liver function.  Please schedule this appointment with the patient or provide the number for the scheduling desk; I am ordering these tests to be done stat, labs will need to be done before the imaging to make sure his kidney function is okay. Patient usually gets labs at [facility], I have sent the lab order there.

## 2020-02-14 NOTE — TELEPHONE ENCOUNTER
Spoke with the patient and his wife separately. Explained to each that the spine looks good and does not fully explain his symptoms. However, there were some enhancements on MRI that are concerning for metastatic disease. I will order the recommended CT w/w/o contrast for further work-up and will message his pcp about the MRI results.     UPDATE: prior to signing orders, his pcp documented int he chart that he will be ordering the CT and associated labs.         Briana Seals PA-C  [facility]  Department of Neurosurgery  [phone]

## 2020-02-14 NOTE — TELEPHONE ENCOUNTER
----- Message from Tiffanie Sapp sent at 2/14/2020  8:41 AM CST -----  Contact: wife/renea/[phone]  Pt called in regards to taking test and was concerned about the results. They would like to get a call back ASAP.       Please advise

## 2020-02-15 ENCOUNTER — PATIENT OUTREACH (OUTPATIENT)
Dept: ADMINISTRATIVE | Facility: OTHER | Age: 70
End: 2020-02-15

## 2020-02-17 ENCOUNTER — OFFICE VISIT (OUTPATIENT)
Dept: UROLOGY | Facility: CLINIC | Age: 70
End: 2020-02-17
Payer: COMMERCIAL

## 2020-02-17 VITALS
BODY MASS INDEX: 32.01 KG/M2 | DIASTOLIC BLOOD PRESSURE: 64 MMHG | SYSTOLIC BLOOD PRESSURE: 126 MMHG | HEIGHT: 74 IN | WEIGHT: 249.44 LBS

## 2020-02-17 DIAGNOSIS — R33.9 INCOMPLETE BLADDER EMPTYING: Primary | ICD-10-CM

## 2020-02-17 DIAGNOSIS — R39.12 WEAK URINARY STREAM: ICD-10-CM

## 2020-02-17 DIAGNOSIS — Q06.8 TETHERED CORD: ICD-10-CM

## 2020-02-17 PROCEDURE — 99999 PR PBB SHADOW E&M-EST. PATIENT-LVL III: CPT | Mod: PBBFAC,,, | Performed by: UROLOGY

## 2020-02-17 PROCEDURE — 99213 PR OFFICE/OUTPT VISIT, EST, LEVL III, 20-29 MIN: ICD-10-PCS | Mod: 25,S$GLB,, | Performed by: UROLOGY

## 2020-02-17 PROCEDURE — 99213 OFFICE O/P EST LOW 20 MIN: CPT | Mod: 25,S$GLB,, | Performed by: UROLOGY

## 2020-02-17 PROCEDURE — 99999 PR PBB SHADOW E&M-EST. PATIENT-LVL III: ICD-10-PCS | Mod: PBBFAC,,, | Performed by: UROLOGY

## 2020-02-17 PROCEDURE — 81002 URINALYSIS NONAUTO W/O SCOPE: CPT | Mod: S$GLB,,, | Performed by: UROLOGY

## 2020-02-17 PROCEDURE — 81002 POCT URINE DIPSTICK WITHOUT MICROSCOPE: ICD-10-PCS | Mod: S$GLB,,, | Performed by: UROLOGY

## 2020-02-17 NOTE — PROGRESS NOTES
"Subjective:       Robles Jordan is a 69 y.o. male who is a new patient who was referred by neurosurgery for evaluation of tethered cord/CHARITY.      He has been diagnosed with tethered cord. He is sent for urodynamic evaluation. On Flomax currently. +sensation of CHARITY, occasional weak stream. Nocturia x 1. Minimal bother symptoms.     AUASS - 8, today - 1/0    PSA 9/19 - 1.8    PVR (bladder scan) today - 16cc    Cysto/UDS 1/20:  Findings of the Procedure: Vanessa uroflow - good flow, low PVR. Normal sensitivity. No IBC/DO. No ABDI. Normal capacity. Normal compliance. Good bladder contraction with good flow. Complete emptying. Normal coordination. Flouroscopy without abnormalities. Cystoscopy with mild BPH.   Recommendations: Normal urodynamics - no  intervention indicated at this time.     Still without LUTS. He is being worked up for possible malignancy in liver.       The following portions of the patient's history were reviewed and updated as appropriate: allergies, current medications, past family history, past medical history, past social history, past surgical history and problem list.    Review of Systems  Constitutional: no fever or chills  ENT: no nasal congestion or sore throat  Respiratory: no cough or shortness of breath  Cardiovascular: no chest pain or palpitations  Gastrointestinal: no nausea or vomiting, tolerating diet  Genitourinary: as per HPI  Hematologic/Lymphatic: no easy bruising or lymphadenopathy  Musculoskeletal: no arthralgias or myalgias  Skin: no rashes or lesions  Neurological: no seizures or tremors  Behavioral/Psych: no auditory or visual hallucinations       Objective:    Vitals: /64   Ht 6' 2" (1.88 m)   Wt 113.2 kg (249 lb 7.2 oz)   BMI 32.03 kg/m²     Physical Exam   General: well developed, well nourished in no acute distress  Head: normocephalic, atraumatic  Neck: supple, trachea midline, no obvious enlargement of thyroid  HEENT: EOMI, mucus membranes moist, sclera " anicteric, no hearing impairment  Lungs: symmetric expansion, non-labored breathing  Cardiovascular: regular rate and rhythm, normal pulses  Abdomen: soft, non tender, non distended, no palpable masses, no hepatosplenomegaly, no hernias, bladder nonpalpable. No CVA tenderness.  Musculoskeletal: no peripheral edema, normal ROM in bilateral upper and lower extremities  Lymphatics: no cervical or inguinal lymphadenopathy  Skin: no rashes or lesions  Neuro: alert and oriented x 3, no gross deficits  Psych: normal judgment and insight, normal mood/affect and non-anxious  Genitourinary:   patient declined exam   LISA: patient declined exam      Lab Review   Urine analysis today in clinic shows - negative    Lab Results   Component Value Date    WBC 5.8 09/20/2019    HGB 14.1 09/20/2019    HCT 42.7 09/20/2019    MCV 86.8 09/20/2019     09/20/2019     Lab Results   Component Value Date    CREATININE 0.91 09/20/2019    BUN 12 09/20/2019     No results found for: PSA  No results found for: PSADIAG    Imaging  NA       Assessment/Plan:      1. Incomplete bladder emptying    - Mild sensation of   - PVR low - emptying well     2. Weak urinary stream    - Cont Flomax   - Minimal bother     3. Tethered cord    - Urodynamics - normal   - Minimal urinary symptoms at this time   - Cysto/UDS 1/17/20 - normal       Follow up PRN

## 2020-02-18 LAB
ALBUMIN: 4.5 GRAM/DL (ref 3.5–5)
ALP SERPL-CCNC: 84 UNIT/L (ref 38–126)
ALT SERPL W P-5'-P-CCNC: 27 UNIT/L (ref 7–56)
ANION GAP SERPL CALC-SCNC: 20 MEQ/L (ref 9–18)
AST SERPL-CCNC: 23 UNIT/L (ref 7–40)
BILIRUB SERPL-MCNC: 0.7 MG/DL (ref 0–1.2)
BUN BLD-MCNC: 9 MG/DL (ref 7–21)
BUN/CREAT SERPL: 11 RATIO (ref 6–22)
CALC OSMOLALITY: 288 MOSM/KG (ref 275–295)
CALCIUM SERPL-MCNC: 9.6 MG/DL (ref 8.5–10.3)
CHLORIDE SERPL-SCNC: 106 MEQ/L (ref 98–107)
CO2 SERPL-SCNC: 24 MEQ/L (ref 21–31)
CREAT SERPL-MCNC: 0.8 MG/DL (ref 0.7–1.2)
GFR: 89.9 ML/MIN/1.73M2
GGT SERPL-CCNC: 25 U/L (ref 3–70)
GLUCOSE SERPL-MCNC: 104 MG/DL (ref 70–100)
INR PPP: 0.9 (ref 0.8–1.2)
POTASSIUM SERPL-SCNC: 4.5 MEQ/L (ref 3.5–5)
PROTHROMBIN TIME: 12.4 SECOND(S) (ref 12.3–14.7)
SODIUM BLD-SCNC: 145 MEQ/L (ref 135–145)
TOTAL PROTEIN: 7.1 GRAM/DL (ref 6.3–8.2)

## 2020-02-19 ENCOUNTER — HOSPITAL ENCOUNTER (OUTPATIENT)
Dept: RADIOLOGY | Facility: HOSPITAL | Age: 70
Discharge: HOME | End: 2020-02-19
Attending: INTERNAL MEDICINE
Payer: COMMERCIAL

## 2020-02-19 ENCOUNTER — PATIENT MESSAGE (OUTPATIENT)
Dept: INTERNAL MEDICINE | Facility: CLINIC | Age: 70
End: 2020-02-19

## 2020-02-19 DIAGNOSIS — R16.0 LIVER MASSES: ICD-10-CM

## 2020-02-19 PROBLEM — K76.89 LIVER CYST: Status: ACTIVE | Noted: 2020-02-19

## 2020-02-19 LAB
BILIRUB SERPL-MCNC: NORMAL MG/DL
BLOOD URINE, POC: NORMAL
COLOR, POC UA: YELLOW
GLUCOSE UR QL STRIP: NORMAL
KETONES UR QL STRIP: NORMAL
LEUKOCYTE ESTERASE URINE, POC: NORMAL
NITRITE, POC UA: NORMAL
PH, POC UA: 5
PROTEIN, POC: NORMAL
SPECIFIC GRAVITY, POC UA: 1030
UROBILINOGEN, POC UA: NORMAL

## 2020-02-19 PROCEDURE — 74177 CT ABD & PELVIS W/CONTRAST: CPT | Mod: 26,,, | Performed by: RADIOLOGY

## 2020-02-19 PROCEDURE — 74177 CT ABDOMEN PELVIS WITH CONTRAST: ICD-10-PCS | Mod: 26,,, | Performed by: RADIOLOGY

## 2020-02-19 PROCEDURE — 25500020 PHARM REV CODE 255: Performed by: INTERNAL MEDICINE

## 2020-02-19 PROCEDURE — 74177 CT ABD & PELVIS W/CONTRAST: CPT | Mod: TC

## 2020-02-19 RX ADMIN — IOHEXOL 100 ML: 350 INJECTION, SOLUTION INTRAVENOUS at 03:02

## 2020-02-20 NOTE — PROGRESS NOTES
Physical Therapy Daily Treatment Note     Name: Robles Jordan  Clinic Number: [id number]    Therapy Diagnosis:   Encounter Diagnoses   Name Primary?    Impaired functional mobility, balance, and endurance     Right leg weakness      Physician: Briana Seals, *    Visit Date: 2/21/2020    Physician Orders: Eval and Treat  Medical Diagnosis:   Q06.8 (ICD-10-CM) - Tethered cord   R29.898 (ICD-10-CM) - Right leg weakness   R26.9 (ICD-10-CM) - Gait disturbance   Evaluation Date: 12/4/19  POC updated on 2-4-20  Authorization Period Expiration: 12-31-20  Plan of Care Certification Period: 2/4/20 to 5-4-20  Visit #/Visits authorized: 11/20 (for new year -- authorized on 1-2-20)    Time In: 1100  Time Out: 1200  Total Time: 65 minutes  Total Billable Time: 30 minutes    Precautions: Standard and tethered cord syndrome    Subjective     Pt reports: he is feeling really good today. hasnt had any pain in awhile.    He was compliant with home exercise program.    Response to previous treatment: good  Functional change: none to note    Pain: 0/10   Location: right LE      Objective     Robles received therapeutic exercises to develop strength, endurance, ROM, flexibility, posture and core stabilization for 30 minutes including:  Upright Bike 10 Min level 3   Reverse slider lunges using yellow pole for 1 UE support 3x10  Tandem gait at wall 3x20ft  Lateral step ups holding 15KB in opp UE 3x4  NP- Lateral sliders 2x10  DL 15KB from 12in stool 3x10  STS to 24in stool 3x10 standing on foam pad, holding 11# med ball. Chest press upon standing up  suitcase carry 15KB 3x40ft  NP- Resisted gait Red band around hips x3laps fwd, 3laps lateral  Heel Raises (add holding 15# KB) x10 all 3 angles (toes in and out , straight)  NP- w/2# ankle weights 4in Hurdles side stepping and forwards in // bars x 3 laps ea (no UE support) NP  [employer] taps in standing x24 ea  Clock yourself : modified to 4way (front , back, side L, side R)  "3x30    NP- Supine strap HS stretch 4x20   NP- Supine HF stretch off side of mat 2 x 30"  NP- Kneeling on bench for HF stretch 3x30  +hip hikes               CP application to low back for 10 mins while seated following exercises today to decrease pain.       Home Exercises Provided and Patient Education Provided     Education provided:   - HEP compliance for stretching in order to care for his muscles.    Written Home Exercises Provided: yes.   Access Code: [email or web address]   Exercises  Seated Hamstring Stretch - 3 reps - 30 hold - 2x daily - 7x weekly  Standing Gastroc Stretch - 3 reps - 30 hold - 2x daily - 7x weekly  Exercises were reviewed and Robles was able to demonstrate them prior to the end of the session.  Robles demonstrated good  understanding of the education provided.     Assessment     Pt with good tolerance and adequate fatigue reported by end of session. No aggravationof back pain this date.   He remains highly motivated towards PT and to restore his LOF.       Robles is progressing well towards his goals.   Pt prognosis is Good.     Pt will continue to benefit from skilled outpatient physical therapy to address the deficits listed in the problem list box on initial evaluation, provide pt/family education and to maximize pt's level of independence in the home and community environment.     Pt's spiritual, cultural and educational needs considered and pt agreeable to plan of care and goals.     Anticipated barriers to physical therapy: tethered cord syndrome    Goals:     Short Term GOALS: - in progress  1. Pt to report feeling more stable and steady during gait. Progressing, does not feel steady after more than 10mins of walking  2. Pt to display improved gait tolerance to >10mins before needing rest break. MET 2-4-20   3. Pt to display improved fxl strength to be able to carry groceries (Case of water). progressing  4. Pt to begin HEP including specific stretching and strengthening as per their " diagnosis to decrease pain and improve flexibility.  5. Pt to display >65deg SLR in supine hooklying, >10 deg active DF in sitting.  MET 2-4-20   6. Pt to be able to comple >10 reps of sit<>Stand within 30 sec MET 2-4-20      Long Term GOALS: - in progress  1. Pt to report <1/10, no longer limiting functional activities.  2. Pt to report return to >75% of regular ADLs, work and recreational tasks.   3. Pt will demonstrate appropriate squat technique for improved tolerance of lifting and carrying functional activity needed for household tasks.   4. Pt to be compliant in advanced HEP to maintain progress gained in therapy thus far.   5. Pt to display >4+/5 gross MMT for B LE in order to support pelvis and spine throughout ADLs.   Plan   Continue with current POC. Progress B LE strengthening.     Plan of care Certification: extended to 5-4-2020.  Trini Freeman, PT

## 2020-02-21 ENCOUNTER — CLINICAL SUPPORT (OUTPATIENT)
Dept: REHABILITATION | Facility: HOSPITAL | Age: 70
End: 2020-02-21
Payer: COMMERCIAL

## 2020-02-21 DIAGNOSIS — Z74.09 IMPAIRED FUNCTIONAL MOBILITY, BALANCE, AND ENDURANCE: ICD-10-CM

## 2020-02-21 DIAGNOSIS — R29.898 RIGHT LEG WEAKNESS: ICD-10-CM

## 2020-02-21 PROCEDURE — 97110 THERAPEUTIC EXERCISES: CPT | Mod: PN

## 2020-02-26 ENCOUNTER — CLINICAL SUPPORT (OUTPATIENT)
Dept: REHABILITATION | Facility: HOSPITAL | Age: 70
End: 2020-02-26
Payer: COMMERCIAL

## 2020-02-26 DIAGNOSIS — R29.898 RIGHT LEG WEAKNESS: ICD-10-CM

## 2020-02-26 DIAGNOSIS — Z74.09 IMPAIRED FUNCTIONAL MOBILITY, BALANCE, AND ENDURANCE: ICD-10-CM

## 2020-02-26 PROCEDURE — 97110 THERAPEUTIC EXERCISES: CPT | Mod: PN,CQ

## 2020-02-26 NOTE — PROGRESS NOTES
"  Physical Therapy Daily Treatment Note/POC update     Name: Robles Jordan  Clinic Number: [id number]    Therapy Diagnosis:   Encounter Diagnoses   Name Primary?    Impaired functional mobility, balance, and endurance     Right leg weakness      Physician: Briana Seals, *    Visit Date: 2/26/2020    Physician Orders: Eval and Treat  Medical Diagnosis:   Q06.8 (ICD-10-CM) - Tethered cord   R29.898 (ICD-10-CM) - Right leg weakness   R26.9 (ICD-10-CM) - Gait disturbance   Evaluation Date: 12/4/19  POC updated on 2-4-20  Authorization Period Expiration: 12-31-20  Plan of Care Certification Period: 2/4/20 to 5-4-20  Visit #/Visits authorized: 13/20 (for new year -- authorized on 1-2-20)    Time In: 1000  Time Out: 1100  Total Time: 60 minutes  Total Billable Time: 30 minutes    Precautions: Standard and tethered cord syndrome    Subjective     Pt reports: increased fatigue today 2' parade going over the weekend and doing a lot of walking. No pain, just fatigue expressed.    He was compliant with home exercise program.    Response to previous treatment: good  Functional change: none to note    Pain: 0/10   Location: right LE      Objective     Robles received therapeutic exercises to develop strength, endurance, ROM, flexibility, posture and core stabilization for 55 minutes including:      Upright Bike 10 Min level 3     DL 15KB from 12in stool x6 (poor form by last rep)  STS to 24in stool 3x8 standing on foam pad, holding 11# med ball. Chest press upon standing up  suitcase carry 15KB 3x20ft  Resisted gait Red band around hips x3laps fwd, 3laps lateral  Heel Raises (add holding 15# KB) x10 all 3 angles (toes in and out , straight)  w/2# ankle weights 4in Hurdles side stepping and forwards in // bars x 3 laps ea (no UE support) NP  Kneeling on bench for HF stretch 3x30  [employer] taps in standing x24 ea  hip hikes     Supine strap HS stretch 4x20   Supine HF stretch off side of mat 2 x 30"    Not " performed this session:  Reverse slider lunges using plow for B UE support x10  Lateral sliders 2x10  Clock yourself : modified to 4way (front , back, side L, side R) 3x30    CP application to low back for 00 mins while seated following exercises today to decrease pain.       Home Exercises Provided and Patient Education Provided     Education provided:   - HEP compliance for stretching in order to care for his muscles.    Written Home Exercises Provided: yes.   Access Code: [email or web address]   Exercises  Seated Hamstring Stretch - 3 reps - 30 hold - 2x daily - 7x weekly  Standing Gastroc Stretch - 3 reps - 30 hold - 2x daily - 7x weekly  Exercises were reviewed and Robles was able to demonstrate them prior to the end of the session.  Robles demonstrated good  understanding of the education provided.     Assessment     Robles tolerated today's session fairly well despite increased fatigue today. Notable quick LE ms fatigue this date and slider exercises were deferred 2' level of fatigue. No pain provoked throughout sesion. He remains highly motivated towards PT and to restore his LOF.       Robles is progressing well towards his goals.   Pt prognosis is Good.     Pt will continue to benefit from skilled outpatient physical therapy to address the deficits listed in the problem list box on initial evaluation, provide pt/family education and to maximize pt's level of independence in the home and community environment.     Pt's spiritual, cultural and educational needs considered and pt agreeable to plan of care and goals.     Anticipated barriers to physical therapy: tethered cord syndrome    Goals:     Short Term GOALS: - in progress  1. Pt to report feeling more stable and steady during gait. Progressing, does not feel steady after more than 10mins of walking  2. Pt to display improved gait tolerance to >10mins before needing rest break. MET 2-4-20   3. Pt to display improved fxl strength to be able to carry  groceries (Case of water). progressing  4. Pt to begin HEP including specific stretching and strengthening as per their diagnosis to decrease pain and improve flexibility.  5. Pt to display >65deg SLR in supine hooklying, >10 deg active DF in sitting.  MET 2-4-20   6. Pt to be able to comple >10 reps of sit<>Stand within 30 sec MET 2-4-20      Long Term GOALS: - in progress  1. Pt to report <1/10, no longer limiting functional activities.  2. Pt to report return to >75% of regular ADLs, work and recreational tasks.   3. Pt will demonstrate appropriate squat technique for improved tolerance of lifting and carrying functional activity needed for household tasks.   4. Pt to be compliant in advanced HEP to maintain progress gained in therapy thus far.   5. Pt to display >4+/5 gross MMT for B LE in order to support pelvis and spine throughout ADLs.   Plan   Continue with current POC. Progress B LE strengthening.     Plan of care Certification: extended to 5-4-2020.  Nichelle Dugan, PTA

## 2020-02-28 ENCOUNTER — CLINICAL SUPPORT (OUTPATIENT)
Dept: REHABILITATION | Facility: HOSPITAL | Age: 70
End: 2020-02-28
Payer: COMMERCIAL

## 2020-02-28 DIAGNOSIS — Z74.09 IMPAIRED FUNCTIONAL MOBILITY, BALANCE, AND ENDURANCE: ICD-10-CM

## 2020-02-28 DIAGNOSIS — R29.898 RIGHT LEG WEAKNESS: ICD-10-CM

## 2020-02-28 PROCEDURE — 97110 THERAPEUTIC EXERCISES: CPT | Mod: PN

## 2020-02-28 NOTE — PROGRESS NOTES
"  Physical Therapy Daily Treatment Note     Name: Robles Jordan  Clinic Number: [id number]    Therapy Diagnosis:   Encounter Diagnoses   Name Primary?    Impaired functional mobility, balance, and endurance     Right leg weakness      Physician: Briana Seals, *    Visit Date: 2/28/2020    Physician Orders: Eval and Treat  Medical Diagnosis:   Q06.8 (ICD-10-CM) - Tethered cord   R29.898 (ICD-10-CM) - Right leg weakness   R26.9 (ICD-10-CM) - Gait disturbance   Evaluation Date: 12/4/19  POC updated on 2-4-20  Authorization Period Expiration: 12-31-20  Plan of Care Certification Period: 2/4/20 to 5-4-20  Visit #/Visits authorized: 14/20 (for new year -- authorized on 1-2-20)    Time In: 1000  Time Out: 1100  Total Time: 60 minutes  Total Billable Time: 30 minutes    Precautions: Standard and tethered cord syndrome    Subjective     Pt reports: he is feeling a little woozy today, thinks he took his medicine too late last night.     He was compliant with home exercise program.    Response to previous treatment: good  Functional change: none to note    Pain: 0/10   Location: right LE      Objective     Robles received therapeutic exercises to develop strength, endurance, ROM, flexibility, posture and core stabilization for 30 minutes including:  Upright Bike 10 Min level 3   DL 15KB from 12in stool x6 (poor form by last rep)  STS to 24in stool 3x8 standing on foam pad, holding 11# med ball. Chest press upon standing up  suitcase carry 15KB 3x20ft  Resisted gait Red band around hips x3laps fwd, 3laps lateral  Heel Raises (add holding 15# KB) x10 all 3 angles (toes in and out , straight)  w/2# ankle weights 4in Hurdles side stepping and forwards in // bars x 3 laps ea (no UE support) NP  Kneeling on bench for HF stretch 3x30  [employer] taps in standing x24 ea  hip hikes x10   Supine strap HS stretch 4x20   Supine HF stretch off side of mat 2 x 30"  Reverse slider lunges using plow for B UE support " x10  Lateral sliders 2x10  Clock yourself : modified to 4way (front , back, side L, side R) 3x30    CP application to low back for 00 mins while seated following exercises today to decrease pain.       Home Exercises Provided and Patient Education Provided     Education provided:   - HEP compliance for stretching in order to care for his muscles.    Written Home Exercises Provided: yes.   Access Code: [email or web address]   Exercises  Seated Hamstring Stretch - 3 reps - 30 hold - 2x daily - 7x weekly  Standing Gastroc Stretch - 3 reps - 30 hold - 2x daily - 7x weekly  Exercises were reviewed and Robles was able to demonstrate them prior to the end of the session.  Robles demonstrated good  understanding of the education provided.     Assessment     Robles tolerated session well. No complaints of pain.  He remains highly motivated towards PT and to restore his LOF.       Robles is progressing well towards his goals.   Pt prognosis is Good.     Pt will continue to benefit from skilled outpatient physical therapy to address the deficits listed in the problem list box on initial evaluation, provide pt/family education and to maximize pt's level of independence in the home and community environment.     Pt's spiritual, cultural and educational needs considered and pt agreeable to plan of care and goals.     Anticipated barriers to physical therapy: tethered cord syndrome    Goals:     Short Term GOALS: - in progress  1. Pt to report feeling more stable and steady during gait. Progressing, does not feel steady after more than 10mins of walking  2. Pt to display improved gait tolerance to >10mins before needing rest break. MET 2-4-20   3. Pt to display improved fxl strength to be able to carry groceries (Case of water). progressing  4. Pt to begin HEP including specific stretching and strengthening as per their diagnosis to decrease pain and improve flexibility.  5. Pt to display >65deg SLR in supine hooklying, >10 deg active  DF in sitting.  MET 2-4-20   6. Pt to be able to comple >10 reps of sit<>Stand within 30 sec MET 2-4-20      Long Term GOALS: - in progress  1. Pt to report <1/10, no longer limiting functional activities.  2. Pt to report return to >75% of regular ADLs, work and recreational tasks.   3. Pt will demonstrate appropriate squat technique for improved tolerance of lifting and carrying functional activity needed for household tasks.   4. Pt to be compliant in advanced HEP to maintain progress gained in therapy thus far.   5. Pt to display >4+/5 gross MMT for B LE in order to support pelvis and spine throughout ADLs.   Plan   Continue with current POC. Progress B LE strengthening.     Plan of care Certification: extended to 5-4-2020.  Trini Freeman, PT

## 2020-03-04 DIAGNOSIS — Z12.11 SPECIAL SCREENING FOR MALIGNANT NEOPLASMS, COLON: Primary | ICD-10-CM

## 2020-03-04 RX ORDER — POLYETHYLENE GLYCOL 3350, SODIUM SULFATE ANHYDROUS, SODIUM BICARBONATE, SODIUM CHLORIDE, POTASSIUM CHLORIDE 236; 22.74; 6.74; 5.86; 2.97 G/4L; G/4L; G/4L; G/4L; G/4L
4 POWDER, FOR SOLUTION ORAL ONCE
Qty: 4000 ML | Refills: 0 | Status: SHIPPED | OUTPATIENT
Start: 2020-03-04 | End: 2020-03-04

## 2020-03-13 NOTE — PROGRESS NOTES
"  Physical Therapy Daily Treatment Note     Name: Robles Jordan  Clinic Number: [id number]    Therapy Diagnosis:   Encounter Diagnoses   Name Primary?    Impaired functional mobility, balance, and endurance     Right leg weakness      Physician: Chico Rodriguez DO    Visit Date: 3/16/2020    Physician Orders: Eval and Treat  Medical Diagnosis:   Q06.8 (ICD-10-CM) - Tethered cord   R29.898 (ICD-10-CM) - Right leg weakness   R26.9 (ICD-10-CM) - Gait disturbance   Evaluation Date: 12/4/19  POC updated on 2-4-20  Authorization Period Expiration: 12-31-20  Plan of Care Certification Period: 2/4/20 to 5-4-20  Visit #/Visits authorized: 15/20 (for new year -- authorized on 1-2-20)    Time In: 1pm  Time Out: 2pm  Total Time: 60 minutes  Total Billable Time: 60 minutes    Precautions: Standard and tethered cord syndrome    Subjective     Pt reports: it Feels like all the pain and stiffness is more concentrated just in my back and R hip now, instead of down my R LE.        He was compliant with home exercise program.    Response to previous treatment: good  Functional change: none to note    Pain: 0/10   Location: right LE      Objective     Robles received therapeutic exercises to develop strength, endurance, ROM, flexibility, posture and core stabilization for 58 minutes including:  Upright Bike 10 Min level 3   DL 15KB from 12in stool 3x10   suitcase carry 20KB 3x20ft  Kneeling on bench for HF stretch 3x30  hip hikes 2x10  Standing hip ABD 2x10 (see if doing these at home yet)   Supine strap HS stretch 4x20   Supine HF stretch off side of mat 2 x 30"  Np- Reverse slider lunges using plow for B UE support x10  NP- Lateral sliders 2x10  NP- Clock yourself : modified to 4way (front , back, side L, side R) 3x30  12in hurdles side stepping x4laps, fwd stepping x3laps  STS to 24in stool 2x8 standing on foam pad, holding 11# med ball. (held--Chest press upon standing up)  NP- Resisted gait Red band around hips x3laps " fwd, 3laps lateral  Heel Raises (add holding 15# KB) x10 all 3 angles (toes in and out , straight)  w/2# ankle weights 4in Hurdles side stepping and forwards in // bars x 3 laps ea (no UE support) NP  NP- [employer] taps in standing x24 ea    CP application to low back for 00 mins while seated following exercises today to decrease pain.       Home Exercises Provided and Patient Education Provided     Education provided:   - HEP compliance for stretching in order to care for his muscles.    Written Home Exercises Provided: yes.   Access Code: [email or web address]   Exercises  Seated Hamstring Stretch - 3 reps - 30 hold - 2x daily - 7x weekly  Standing Gastroc Stretch - 3 reps - 30 hold - 2x daily - 7x weekly  Exercises were reviewed and Robles was able to demonstrate them prior to the end of the session.  Robles demonstrated good  understanding of the education provided.     Assessment   Robles tolerated session well. No complaints of pain.  He remains highly motivated towards PT and to restore his LOF.       Robles is progressing well towards his goals.   Pt prognosis is Good.     Pt will continue to benefit from skilled outpatient physical therapy to address the deficits listed in the problem list box on initial evaluation, provide pt/family education and to maximize pt's level of independence in the home and community environment.     Pt's spiritual, cultural and educational needs considered and pt agreeable to plan of care and goals.     Anticipated barriers to physical therapy: tethered cord syndrome    Goals:     Short Term GOALS: - in progress  1. Pt to report feeling more stable and steady during gait. Progressing, does not feel steady after more than 10mins of walking  2. Pt to display improved gait tolerance to >10mins before needing rest break. MET 2-4-20   3. Pt to display improved fxl strength to be able to carry groceries (Case of water). progressing  4. Pt to begin HEP including specific stretching and  strengthening as per their diagnosis to decrease pain and improve flexibility.  5. Pt to display >65deg SLR in supine hooklying, >10 deg active DF in sitting.  MET 2-4-20   6. Pt to be able to comple >10 reps of sit<>Stand within 30 sec MET 2-4-20      Long Term GOALS: - in progress  1. Pt to report <1/10, no longer limiting functional activities.  2. Pt to report return to >75% of regular ADLs, work and recreational tasks.   3. Pt will demonstrate appropriate squat technique for improved tolerance of lifting and carrying functional activity needed for household tasks.   4. Pt to be compliant in advanced HEP to maintain progress gained in therapy thus far.   5. Pt to display >4+/5 gross MMT for B LE in order to support pelvis and spine throughout ADLs.   Plan   Continue with current POC. Progress B LE strengthening.     Plan of care Certification: extended to 5-4-2020.  Trini Freeman, PT

## 2020-03-16 ENCOUNTER — CLINICAL SUPPORT (OUTPATIENT)
Dept: REHABILITATION | Facility: HOSPITAL | Age: 70
End: 2020-03-16
Payer: COMMERCIAL

## 2020-03-16 DIAGNOSIS — R29.898 RIGHT LEG WEAKNESS: ICD-10-CM

## 2020-03-16 DIAGNOSIS — Z74.09 IMPAIRED FUNCTIONAL MOBILITY, BALANCE, AND ENDURANCE: ICD-10-CM

## 2020-03-16 PROCEDURE — 97110 THERAPEUTIC EXERCISES: CPT | Mod: PN

## 2020-03-23 ENCOUNTER — DOCUMENTATION ONLY (OUTPATIENT)
Dept: REHABILITATION | Facility: HOSPITAL | Age: 70
End: 2020-03-23

## 2020-03-23 NOTE — PROGRESS NOTES
Patient: Robles Jordan  Date: 3/23/2020  Diagnosis: No diagnosis found.  MRN: [id number]    Spoke with patient due to therapy following updates regarding COVID-19 closely and taking every precaution to ensure the safety of our patients, staff and community.  In an abundance of caution and in an effort to help reduce risk and limit community spread, we have decided to temporarily postpone appointments for patients who may be at increased risk to attend in-person therapy or where therapy is not critically needed at this time. Plan of care and home exercise program were reviewed and patient has what they need to continue therapy at home. All patient questions were answered. Also stated to patient that we are exploring virtual methods of providing care and will be in touch over the next few weeks. Patient verbalized understanding to all.    Trini Freeman DPT  3/23/2020

## 2020-03-25 PROBLEM — R29.898 RIGHT LEG WEAKNESS: Status: RESOLVED | Noted: 2019-12-04 | Resolved: 2020-03-25

## 2020-03-25 PROBLEM — Z74.09 IMPAIRED FUNCTIONAL MOBILITY, BALANCE, AND ENDURANCE: Status: RESOLVED | Noted: 2019-12-04 | Resolved: 2020-03-25

## 2020-04-13 DIAGNOSIS — E78.5 HYPERLIPIDEMIA, UNSPECIFIED HYPERLIPIDEMIA TYPE: ICD-10-CM

## 2020-04-14 RX ORDER — ATORVASTATIN CALCIUM 80 MG/1
80 TABLET, FILM COATED ORAL DAILY
Qty: 90 TABLET | Refills: 1 | Status: SHIPPED | OUTPATIENT
Start: 2020-04-14 | End: 2020-08-26

## 2020-05-12 ENCOUNTER — CLINICAL SUPPORT (OUTPATIENT)
Dept: REHABILITATION | Facility: HOSPITAL | Age: 70
End: 2020-05-12
Payer: COMMERCIAL

## 2020-05-12 DIAGNOSIS — R29.898 RIGHT LEG WEAKNESS: ICD-10-CM

## 2020-05-12 DIAGNOSIS — Z74.09 IMPAIRED FUNCTIONAL MOBILITY, BALANCE, GAIT, AND ENDURANCE: Primary | ICD-10-CM

## 2020-05-12 PROCEDURE — 97110 THERAPEUTIC EXERCISES: CPT | Mod: PN

## 2020-05-12 NOTE — PROGRESS NOTES
"  Physical Therapy Daily Treatment Note     Name: Robles Jordan  Clinic Number: [id number]    Therapy Diagnosis:   Encounter Diagnoses   Name Primary?    Impaired functional mobility, balance, gait, and endurance Yes    Right leg weakness        Physician: Chico Rodriguez DO    Visit Date: 5/12/2020    Physician Orders: Eval and Treat  Medical Diagnosis:   Q06.8 (ICD-10-CM) - Tethered cord   R29.898 (ICD-10-CM) - Right leg weakness   R26.9 (ICD-10-CM) - Gait disturbance   Evaluation Date: 12/4/19  POC updated on 5/12/2020  Authorization Period Expiration: 12-31-20  Plan of Care Certification Period: 5-4-20 to 8/12/2020  Visit #/Visits authorized: 2/20 (15 previous visits)  Time In: 8:45am  Time Out: 9:43am  Total Time: 58 minutes    Precautions: Standard and tethered cord syndrome    Subjective     Pt reports: He has not been compliant with HEP reporting he has been "lazy" since being quarantined. Is not reporting any significant pain upon arrival but continues to notice weakness in LE.        He was compliant with home exercise program.    Response to previous treatment: good  Functional change: none to note    Pain: 0/10   Location: right LE      Objective     Gait: decreased stride length, poor foot clearance due to weak R DF and weak R hip flexors  Active DF: R= 13, L=21    LOWER EXTREMITY STRENGTH:    RIGHT LEFT   Quadriceps 4/5 4/5   Hamstrings 4/5 felt cramping in HS with resistance 4+/5      Hip Flexion 3+/5limited in full ROM due to weakness 4/5   Hip Abduction 3/5 4/5   Hip IR 3+/5 4+/5   Hip ER 3-/5 4/5   Hip Ext 3/5 4-/5       Robles received therapeutic exercises to develop strength, endurance, ROM, flexibility, posture and core stabilization for 58 minutes including:      Upright Bike 10 Min level 4      Supine strap HS stretch 3x30   Supine HF stretch off side of mat 2 x 30"  +Supine SLR x 10  +Supine hip abd using GTB 2 x 12, RLE only  +bridges x 15 with 2 second hold  Lateral steps using GTB " x 3 laps (UE support for final lap)  Heel Raises (no UE support)   Tall Cone taps in standing x 10 for tall cone with UE support   +small cone without UE support x 10    NP today:  DL 15KB from 12in stool 3x10   suitcase carry 20KB 3x20ft  Kneeling on bench for HF stretch 3x30  hip hikes 2x10  Standing hip ABD 2x10 (see if doing these at home yet)  12in hurdles side stepping x4laps, fwd stepping x3laps  STS to 24in stool 2x8 standing on foam pad, holding 11# med ball. (held--Chest press upon standing up)      CP application to low back for 00 mins while seated following exercises today to decrease pain.       Home Exercises Provided and Patient Education Provided     Education provided:   - HEP compliance for stretching/strengthening in order to care for his muscles.    Written Home Exercises Provided: yes.   Exercises were reviewed and Robles was able to demonstrate them prior to the end of the session.  Arboleda demonstrated good  understanding of the education provided.     Assessment   Robles tolerated treatment session fair today. Today was pt's first treatment back to clinic in 3 months secondary to COVID precautions. Pt reports he has not been compliant with HEP. Denies any recent falls. States continued weakness noted in RLE and occasional low back pain. Ambulates with poor foot clearance after treatment secondary to fatigue of DF on R, minimal hip flexion, decreased stride length. Impaired balance noted throughout session during static and dynamic activities performed with occasional UE support required to maintain balance when fatigued. Frequent rest breaks taken due to muscle fatigue and short of breath. Plan to treat pt 2x/week for another 12 weeks to strengthen RLE, improve balance/proprioception to prevent falls, improve gait pattern, and improve overall endurance.       Robles is progressing well towards his goals.   Pt prognosis is Good.     Pt will continue to benefit from skilled outpatient  physical therapy to address the deficits listed in the problem list box on initial evaluation, provide pt/family education and to maximize pt's level of independence in the home and community environment.     Pt's spiritual, cultural and educational needs considered and pt agreeable to plan of care and goals.     Anticipated barriers to physical therapy: tethered cord syndrome    Goals:     Short Term GOALS: - in progress  1. Pt to report feeling more stable and steady during gait. Progressing, does not feel steady after more than 10mins of walking  2. Pt to display improved gait tolerance to >10mins before needing rest break. MET 2-4-20   3. Pt to display improved fxl strength to be able to carry groceries (Case of water). progressing  4. Pt to begin HEP including specific stretching and strengthening as per their diagnosis to decrease pain and improve flexibility.  5. Pt to display >65deg SLR in supine hooklying, >10 deg active DF in sitting.  MET 2-4-20   6. Pt to be able to comple >10 reps of sit<>Stand within 30 sec MET 2-4-20      Long Term GOALS: - in progress  1. Pt to report <1/10, no longer limiting functional activities.progressing  2. Pt to report return to >75% of regular ADLs, work and recreational tasks. progressing  3. Pt will demonstrate appropriate squat technique for improved tolerance of lifting and carrying functional activity needed for household tasks. progressing  4. Pt to be compliant in advanced HEP to maintain progress gained in therapy thus far. progressing  5. Pt to display >4+/5 gross MMT for B LE in order to support pelvis and spine throughout ADLs. progressing  Plan   Continue with current POC. Progress B LE strengthening/endurance, gait, and balance.     Plan of care Certification: extended to 8/12/2020.  Ada Boothe, PT

## 2020-05-21 ENCOUNTER — CLINICAL SUPPORT (OUTPATIENT)
Dept: REHABILITATION | Facility: HOSPITAL | Age: 70
End: 2020-05-21
Payer: COMMERCIAL

## 2020-05-21 DIAGNOSIS — R29.898 RIGHT LEG WEAKNESS: ICD-10-CM

## 2020-05-21 DIAGNOSIS — M25.551 RIGHT HIP PAIN: ICD-10-CM

## 2020-05-21 DIAGNOSIS — Z74.09 IMPAIRED FUNCTIONAL MOBILITY, BALANCE, GAIT, AND ENDURANCE: ICD-10-CM

## 2020-05-21 PROCEDURE — 97110 THERAPEUTIC EXERCISES: CPT | Mod: PN

## 2020-05-21 NOTE — PROGRESS NOTES
"  Physical Therapy Daily Treatment Note     Name: Robles Jordan  Clinic Number: [id number]    Therapy Diagnosis:   Encounter Diagnoses   Name Primary?    Impaired functional mobility, balance, gait, and endurance     Right leg weakness     Right hip pain        Physician: Chico Rodriguez DO    Visit Date: 5/21/2020    Physician Orders: Eval and Treat  Medical Diagnosis:   Q06.8 (ICD-10-CM) - Tethered cord   R29.898 (ICD-10-CM) - Right leg weakness   R26.9 (ICD-10-CM) - Gait disturbance   Evaluation Date: 12/4/19  POC updated on 5/12/2020  Authorization Period Expiration: 12-31-20  Plan of Care Certification Period: 5-4-20 to 8/12/2020  Visit #/Visits authorized: 3/21 (16 previous visits)  Time In: 9:00am  Time Out: 9:55 am  Total Time: 45 minutes    Precautions: Standard and tethered cord syndrome    Subjective     Pt reports: He feels stiff and some minimal soreness today.        He was compliant with home exercise program.    Response to previous treatment: good  Functional change: none to note    Pain: 0/10   Location: right LE      Objective     Robles received therapeutic exercises to develop strength, endurance, ROM, flexibility, posture and core stabilization for 45 minutes including:      Upright Bike 10 Min level 2.5      Supine strap HS stretch 3x30   Supine HF stretch off side of mat 2 x 30"  Supine SLR x 10  Supine hip abd using GTB 2 x 12, RLE only  bridges x 15 with 2 second hold  +seated marches x 20 total  Lateral steps using GTB x 3 laps (UE support for final lap)  Heel Raises (no UE support) x 20  Tall Cone taps in standing x 12 for tall cone with UE support   +small cone without UE support x 10  hip hikes 2x10 on R, 1 set on L. Standing on step  Standing hip ABD 2x10     NP today:  DL 15KB from 12in stool 3x10   suitcase carry 20KB 3x20ft  Kneeling on bench for HF stretch 3x30  12in hurdles side stepping x4laps, fwd stepping x3laps  STS to 24in stool 2x8 standing on foam pad, holding 11# " med ball. (held--Chest press upon standing up)      Robles received cold pack to low back for 10 mins while supine following exercises today to decrease pain.       Home Exercises Provided and Patient Education Provided     Education provided:   - HEP compliance for stretching/strengthening in order to care for his muscles.    Written Home Exercises Provided: yes.   Exercises were reviewed and Robles was able to demonstrate them prior to the end of the session.  Robles demonstrated good  understanding of the education provided.     Assessment   Robles tolerated treatment session fair today.  Ambulates with poor foot clearance after treatment secondary to fatigue of DF on R, minimal hip flexion, decreased stride length. Decreased standing exercise tolerance secondary to poor hip endurance. Difficulty with seated marches and large cone taps with increased repetitions. Frequent rest breaks taken due to muscle fatigue and short of breath. Pt remains appropriate for therapy at this time.     Robles is progressing well towards his goals.   Pt prognosis is Good.     Pt will continue to benefit from skilled outpatient physical therapy to address the deficits listed in the problem list box on initial evaluation, provide pt/family education and to maximize pt's level of independence in the home and community environment.     Pt's spiritual, cultural and educational needs considered and pt agreeable to plan of care and goals.     Anticipated barriers to physical therapy: tethered cord syndrome    Goals:     Short Term GOALS: - in progress  1. Pt to report feeling more stable and steady during gait. Progressing, does not feel steady after more than 10mins of walking  2. Pt to display improved gait tolerance to >10mins before needing rest break. MET 2-4-20   3. Pt to display improved fxl strength to be able to carry groceries (Case of water). progressing  4. Pt to begin HEP including specific stretching and  strengthening as per their diagnosis to decrease pain and improve flexibility.  5. Pt to display >65deg SLR in supine hooklying, >10 deg active DF in sitting.  MET 2-4-20   6. Pt to be able to comple >10 reps of sit<>Stand within 30 sec MET 2-4-20      Long Term GOALS: - in progress  1. Pt to report <1/10, no longer limiting functional activities.progressing  2. Pt to report return to >75% of regular ADLs, work and recreational tasks. progressing  3. Pt will demonstrate appropriate squat technique for improved tolerance of lifting and carrying functional activity needed for household tasks. progressing  4. Pt to be compliant in advanced HEP to maintain progress gained in therapy thus far. progressing  5. Pt to display >4+/5 gross MMT for B LE in order to support pelvis and spine throughout ADLs. progressing  Plan   Continue with current POC. Progress B LE strengthening/endurance, gait, and balance.     Plan of care Certification: extended to 8/12/2020.  Ada Boothe, PT

## 2020-05-23 NOTE — PROGRESS NOTES
"  Physical Therapy Daily Treatment Note     Name: Robles Jordan  Clinic Number: [id number]    Therapy Diagnosis:   No diagnosis found.    Physician: Chico Rodriguez DO    Visit Date: 5/26/2020    Physician Orders: Eval and Treat  Medical Diagnosis:   Q06.8 (ICD-10-CM) - Tethered cord   R29.898 (ICD-10-CM) - Right leg weakness   R26.9 (ICD-10-CM) - Gait disturbance   Evaluation Date: 12/4/19  POC updated on 5/12/2020  Authorization Period Expiration: 12-31-20  Plan of Care Certification Period: 5-4-20 to 8/12/2020  Visit #/Visits authorized: 4/21 (16 previous visits)      Time In: 0900 am  Time Out: 1000 am  Total Time: 45 minutes    Precautions: Standard and tethered cord syndrome    Subjective     Pt reports: soreness following last session. Difficulty with motor control of R LE.        He was compliant with home exercise program.    Response to previous treatment: good  Functional change: none to note    Pain: 0/10   Location: right LE      Objective     Robles received therapeutic exercises to develop strength, endurance, ROM, flexibility, posture and core stabilization for 45 minutes including:      Upright Bike 10 Min level 2.5      Supine strap HS stretch 3x30   Supine HF stretch off side of mat 2 x 30"  Supine SLR x 10  Supine hip abd using GTB 2 x 12, RLE only  bridges x 15 with 2 second hold  +seated marches x 20 total  Lateral steps using GTB x 3 laps (UE support for final lap)  Heel Raises (no UE support) 3 x 10  Tall Cone taps in standing 2 x 12 for tall cone with UE support   NP - small cone without UE support x 10  hip hikes 2x10 on R, 1 set on L. Standing on step  Standing hip ABD 2x10     NP today:  DL 15KB from 12in stool 3x10   suitcase carry 20KB 3x20ft  Kneeling on bench for HF stretch 3x30  12in hurdles side stepping x4laps, fwd stepping x3laps  STS to 24in stool 2x8 standing on foam pad, holding 11# med ball. (held--Chest press upon standing up)      Robles received cold pack to low " back for 10 mins while supine following exercises today to decrease pain.       Home Exercises Provided and Patient Education Provided     Education provided:   - HEP compliance for stretching/strengthening in order to care for his muscles.    Written Home Exercises Provided: yes.   Exercises were reviewed and Robles was able to demonstrate them prior to the end of the session.  Robles demonstrated good  understanding of the education provided.     Assessment     Robles tolerated treatment session fairly today. He demonstrates decreased foot clearance, foot drag on R LE. Multiple seated rest breaks required during standing therex 2' LE ms fatigue/weakness and generalized fatigue. Decreased R hip ms endurance and generalized decreased activity tolerance noted compared to previous sessions.  Pt remains appropriate and highly motivated for therapy at this time.     Robles is progressing well towards his goals.   Pt prognosis is Good.     Pt will continue to benefit from skilled outpatient physical therapy to address the deficits listed in the problem list box on initial evaluation, provide pt/family education and to maximize pt's level of independence in the home and community environment.     Pt's spiritual, cultural and educational needs considered and pt agreeable to plan of care and goals.     Anticipated barriers to physical therapy: tethered cord syndrome    Goals:     Short Term GOALS: - in progress  1. Pt to report feeling more stable and steady during gait. Progressing, does not feel steady after more than 10mins of walking  2. Pt to display improved gait tolerance to >10mins before needing rest break. MET 2-4-20   3. Pt to display improved fxl strength to be able to carry groceries (Case of water). progressing  4. Pt to begin HEP including specific stretching and strengthening as per their diagnosis to decrease pain and improve flexibility.  5. Pt to display >65deg SLR in supine hooklying, >10 deg  active DF in sitting.  MET 2-4-20   6. Pt to be able to comple >10 reps of sit<>Stand within 30 sec MET 2-4-20      Long Term GOALS: - in progress  1. Pt to report <1/10, no longer limiting functional activities.progressing  2. Pt to report return to >75% of regular ADLs, work and recreational tasks. progressing  3. Pt will demonstrate appropriate squat technique for improved tolerance of lifting and carrying functional activity needed for household tasks. progressing  4. Pt to be compliant in advanced HEP to maintain progress gained in therapy thus far. progressing  5. Pt to display >4+/5 gross MMT for B LE in order to support pelvis and spine throughout ADLs. progressing  Plan   Continue with current POC. Progress B LE strengthening/endurance, gait, and balance.     Plan of care Certification: extended to 8/12/2020.  Nichelle Dugan, PTA

## 2020-05-26 ENCOUNTER — PATIENT MESSAGE (OUTPATIENT)
Dept: INTERNAL MEDICINE | Facility: CLINIC | Age: 70
End: 2020-05-26

## 2020-05-26 ENCOUNTER — TELEPHONE (OUTPATIENT)
Dept: INTERNAL MEDICINE | Facility: CLINIC | Age: 70
End: 2020-05-26

## 2020-05-26 ENCOUNTER — CLINICAL SUPPORT (OUTPATIENT)
Dept: REHABILITATION | Facility: HOSPITAL | Age: 70
End: 2020-05-26
Payer: COMMERCIAL

## 2020-05-26 ENCOUNTER — PATIENT MESSAGE (OUTPATIENT)
Dept: NEUROSURGERY | Facility: CLINIC | Age: 70
End: 2020-05-26

## 2020-05-26 DIAGNOSIS — M25.551 RIGHT HIP PAIN: ICD-10-CM

## 2020-05-26 DIAGNOSIS — R29.898 RIGHT LEG WEAKNESS: ICD-10-CM

## 2020-05-26 DIAGNOSIS — Z74.09 IMPAIRED FUNCTIONAL MOBILITY, BALANCE, GAIT, AND ENDURANCE: ICD-10-CM

## 2020-05-26 PROCEDURE — 97110 THERAPEUTIC EXERCISES: CPT | Mod: PN,CQ

## 2020-05-26 NOTE — TELEPHONE ENCOUNTER
please notify the patient's wife that I spoke with the [facility] clinic provider and she will be permitted to go into the room. I will review the notes as well and see if additional testing is needed on my end.

## 2020-05-27 ENCOUNTER — OFFICE VISIT (OUTPATIENT)
Dept: NEUROSURGERY | Facility: CLINIC | Age: 70
End: 2020-05-27
Payer: COMMERCIAL

## 2020-05-27 VITALS
WEIGHT: 243.81 LBS | HEART RATE: 83 BPM | DIASTOLIC BLOOD PRESSURE: 75 MMHG | SYSTOLIC BLOOD PRESSURE: 161 MMHG | BODY MASS INDEX: 31.29 KG/M2 | HEIGHT: 74 IN | RESPIRATION RATE: 16 BRPM

## 2020-05-27 DIAGNOSIS — R29.898 RIGHT ARM WEAKNESS: ICD-10-CM

## 2020-05-27 DIAGNOSIS — R29.818 FOCAL NEUROLOGICAL DEFICIT: ICD-10-CM

## 2020-05-27 DIAGNOSIS — R26.9 GAIT DISTURBANCE: Primary | ICD-10-CM

## 2020-05-27 DIAGNOSIS — R29.898 RIGHT LEG WEAKNESS: ICD-10-CM

## 2020-05-27 PROCEDURE — 99214 PR OFFICE/OUTPT VISIT, EST, LEVL IV, 30-39 MIN: ICD-10-PCS | Mod: S$GLB,,, | Performed by: PHYSICIAN ASSISTANT

## 2020-05-27 PROCEDURE — 99999 PR PBB SHADOW E&M-EST. PATIENT-LVL III: CPT | Mod: PBBFAC,,, | Performed by: PHYSICIAN ASSISTANT

## 2020-05-27 PROCEDURE — 99214 OFFICE O/P EST MOD 30 MIN: CPT | Mod: S$GLB,,, | Performed by: PHYSICIAN ASSISTANT

## 2020-05-27 PROCEDURE — 99999 PR PBB SHADOW E&M-EST. PATIENT-LVL III: ICD-10-PCS | Mod: PBBFAC,,, | Performed by: PHYSICIAN ASSISTANT

## 2020-05-28 ENCOUNTER — CLINICAL SUPPORT (OUTPATIENT)
Dept: REHABILITATION | Facility: HOSPITAL | Age: 70
End: 2020-05-28
Payer: COMMERCIAL

## 2020-05-28 DIAGNOSIS — R29.898 RIGHT LEG WEAKNESS: ICD-10-CM

## 2020-05-28 DIAGNOSIS — Z74.09 IMPAIRED FUNCTIONAL MOBILITY, BALANCE, GAIT, AND ENDURANCE: ICD-10-CM

## 2020-05-28 DIAGNOSIS — M25.551 RIGHT HIP PAIN: ICD-10-CM

## 2020-05-28 PROCEDURE — 97110 THERAPEUTIC EXERCISES: CPT | Mod: PN

## 2020-05-28 NOTE — PROGRESS NOTES
"  Physical Therapy Daily Treatment Note     Name: Robles Jordan  Clinic Number: [id number]    Therapy Diagnosis:   Encounter Diagnoses   Name Primary?    Impaired functional mobility, balance, gait, and endurance     Right leg weakness     Right hip pain        Physician: Chico Rodriguez DO    Visit Date: 5/28/2020    Physician Orders: Eval and Treat  Medical Diagnosis:   Q06.8 (ICD-10-CM) - Tethered cord   R29.898 (ICD-10-CM) - Right leg weakness   R26.9 (ICD-10-CM) - Gait disturbance   Evaluation Date: 12/4/19  POC updated on 5/12/2020  Authorization Period Expiration: 12-31-20  Plan of Care Certification Period: 5-4-20 to 8/12/2020  Visit #/Visits authorized: 5/21 (16 previous visits)      Time In: 9:04 am  Time Out: 10:04 am  Total Time: 50 minutes    Precautions: Standard and tethered cord syndrome    Subjective     Pt reports: Discomfort/tightness in low back upon arrival to clinic. Spoke with neurosurgeon yesterday and is getting a MRI on his brain.     He was compliant with home exercise program.    Response to previous treatment: good  Functional change: none to note    Pain: 2/10   Location: right LE      Objective     Ambulates with hip drop on L side and lateral trunk lean.    Robles received therapeutic exercises to develop strength, endurance, ROM, flexibility, posture and core stabilization for 50 minutes including:      Upright Bike 10 Min level 2.5      Supine strap HS stretch 3x30   Supine HF stretch off side of mat 3 x 30"  +DKTC with large green ball x 20  Supine SLR x 10  Supine hip abd using GTB 2 x 12, RLE only  bridges x 15 with 2 second hold  seated marches x 20 total  Lateral steps using GTB x 3 laps (UE support for final lap)  Heel Raises (no UE support) 3 x 10  Tall Cone taps in standing 2 x 12 for tall cone with UE support   NP - small cone without UE support x 10  hip hikes 2x10 on R, 1 set on L. Standing on step  Standing hip ABD 2x10     NP today:  DL 15KB from 12in stool 3x10 "   suitcase carry 20KB 3x20ft  Kneeling on bench for HF stretch 3x30  12in hurdles side stepping x4laps, fwd stepping x3laps  STS to 24in stool 2x8 standing on foam pad, holding 11# med ball. (held--Chest press upon standing up)      Robles received cold pack to low back for 10 mins while supine following exercises today to decrease pain.       Home Exercises Provided and Patient Education Provided     Education provided:   - HEP compliance for stretching/strengthening in order to care for his muscles.    Written Home Exercises Provided: yes.   Exercises were reviewed and Robles was able to demonstrate them prior to the end of the session.  Robles demonstrated good  understanding of the education provided.     Assessment     Robles tolerated treatment session fairly today. He demonstrates decreased foot clearance, foot drag on R LE, hip drop on L, and lateral trunk lean while ambulating into clinic today. Arrives reporting some increased low back pain and tightness. Multiple seated rest breaks required during standing therex 2' LE ms fatigue/weakness and generalized fatigue. Progress lumbar extensors and core next treatment session in standing. Pt remains appropriate and highly motivated for therapy at this time.     Robles is progressing well towards his goals.   Pt prognosis is Good.     Pt will continue to benefit from skilled outpatient physical therapy to address the deficits listed in the problem list box on initial evaluation, provide pt/family education and to maximize pt's level of independence in the home and community environment.     Pt's spiritual, cultural and educational needs considered and pt agreeable to plan of care and goals.     Anticipated barriers to physical therapy: tethered cord syndrome    Goals:     Short Term GOALS: - in progress  1. Pt to report feeling more stable and steady during gait. Progressing, does not feel steady after more than 10mins of walking  2. Pt to display  improved gait tolerance to >10mins before needing rest break. MET 2-4-20   3. Pt to display improved fxl strength to be able to carry groceries (Case of water). progressing  4. Pt to begin HEP including specific stretching and strengthening as per their diagnosis to decrease pain and improve flexibility.  5. Pt to display >65deg SLR in supine hooklying, >10 deg active DF in sitting.  MET 2-4-20   6. Pt to be able to comple >10 reps of sit<>Stand within 30 sec MET 2-4-20      Long Term GOALS: - in progress  1. Pt to report <1/10, no longer limiting functional activities.progressing  2. Pt to report return to >75% of regular ADLs, work and recreational tasks. progressing  3. Pt will demonstrate appropriate squat technique for improved tolerance of lifting and carrying functional activity needed for household tasks. progressing  4. Pt to be compliant in advanced HEP to maintain progress gained in therapy thus far. progressing  5. Pt to display >4+/5 gross MMT for B LE in order to support pelvis and spine throughout ADLs. progressing  Plan   Continue with current POC. Progress lumbar extensors and core stability exercises in standing, B LE strengthening/endurance, gait, and balance.     Plan of care Certification: extended to 8/12/2020.  Ada Boothe, PT

## 2020-05-28 NOTE — PROGRESS NOTES
CHIEF COMPLAINT:      Chief Complaint   Patient presents with    Worsening gait, increased weakness        Interval history 05/27/2020:  Patient returns with his wife today for worsening gait and increased weakness.  The wife states he looks like a mummy when walking.  He denies any falls, b/b dysfunction, and fine motor dysfunction.  However, he reports an increased limp, increased RUE and RLE weakness, and spasms in his b/l hands and feet.  He is concerned that he may have MS.  Denies any vision changes including double vision, blurry vision, and gaps in his vision.  He has noticed over the past 6-8 weeks that he has decreased energy levels and tires out quickly when walking, particularly at an incline.  He agrees that this may be related to recent COVID-19 shutdown and decreased activity in general.  Denies significant weight loss or gain, fevers, and night sweats.       INTERVAL HISTORY (2/3/20):  Patient returns for continued evaluation for the possibility of tethered cord.  He has had no significant changes in his symptoms.  He continues to have weakness in his legs particularly the right lower extremity.  The leg feels heavy and weak when picking it up, tired after repetitive lifting, and he will feel generalized weakness in his proximal legs with exertion.  He also has decreased range of motion in his [place] and plantar flexion on the right.  He denies any significant back or leg pain.  He denies any upper extremity symptoms or clumsiness.    He was recently sent for urodynamic testing which revealed normal bladder function with complete emptying.    HPI:  Robles Jordan is a 69 y.o.  male with below listed PMH, who is referred for evaluation of tethered cord.  Main complaint is BLE weakness, worse on right for past 1 year.  His legs will give out on him if he walks long distance or on incline.  Also has low back pain along waistline but does not endorse much leg pain.  Numbness/tingling along right medial  thigh and gets charley horses in his sleep.  Going to PT but not helping much - feels sore after attending.  Gabapentin makes sleepy so only taking BID.     Has had urinating issues for approx 1 year manifest as sensation of incomplete bladder emptying.  He was placed on flomax but unclear how much it has helped.  BM are regular and daily.     He has appt with urology next week.     History of Present Illness (from PA note):  Robles Jordan is a 68 y.o. male with HLD, HTN, and HEAVEN who presents with low back pain and right leg pain/numbness/weakness. He was initially evaluated by CHIRAG Jones at [facility] back and spine. She referred him to [facility] to establish care closer to home.      Symptom onset: 1 year, first noticed there was a problem when his wife noticed him limping  Location: right-sided low back pain with radiation into medial and lateral right leg down to his calf. Occasionally in his left leg. Never in his foot other than a cramping sensation.   Pain level: 0/10, primary complaint is of weakness, not pain  Numbness: + numbness and tingling in anterior right thigh   Weakness: + in right leg   Denies bowel dysfunction and saddle anesthesia.   Endorses 2 year history of urinary retention requiring tamsulosin. No documentation of BPH  Endorses gait disturbance related to limping and right leg weakness.   2 falls in the last few months related to trip/fall and a chair giving out     Treatments tried:  -PT: has not tried  -ADIEL: has not tried; not indicated   -Gabapentin: currently taking   -Muscle relaxer: not currently taking   -Rx pain medications: none  -Spine surgery: none     Review of patient's allergies indicates:  No Known Allergies          Past Medical History:   Diagnosis Date    BPH (benign prostatic hypertrophy)      Hx of colonic polyps 10/19/2015    Hypertension              Past Surgical History:   Procedure Laterality Date    COLONOSCOPY N/A 10/19/2015     Procedure:  "COLONOSCOPY;  Surgeon: Antonino Bernstein MD;  Location: [facility] ([address] [place] [address]);  Service: Endoscopy;  Laterality: N/A;    MULTIPLE TOOTH EXTRACTIONS   2014            Family History   Problem Relation Age of Onset    Cancer Mother           Breast    Stroke Sister 65         Fatal    Cancer Maternal Uncle           Pancreas    Cancer Paternal Uncle           Pancreas    Cancer Maternal Grandmother           Breast    Hypertension Father      Hyperlipidemia Father        Social History            Tobacco Use    Smoking status: Current Every Day Smoker       Types: Cigarettes   Substance Use Topics    Alcohol use: Yes       Alcohol/week: 0.0 standard drinks       Comment: very seldom    Drug use: No         Review of Systems   Constitutional: Negative.    Respiratory: Negative for cough and shortness of breath.    Cardiovascular: Negative for chest pain, palpitations, claudication and leg swelling.   Gastrointestinal: Negative for abdominal pain, constipation and diarrhea.   Genitourinary: Positive for frequency. Negative for flank pain and urgency.   Musculoskeletal: Positive for back pain and falls (Near falls due legs give out). Negative for joint pain, myalgias and neck pain.   Skin: Negative.    Neurological: Positive for tingling, focal weakness and weakness. Negative for dizziness, tremors, sensory change, speech change, seizures, loss of consciousness and headaches.   Psychiatric/Behavioral: Negative.          OBJECTIVE:     Vitals:    05/27/20 0944 05/27/20 0946   BP: (!) 193/88 (!) 161/75   BP Location: Right arm Right arm   Patient Position: Sitting Sitting   BP Method: Large (Automatic) Large (Automatic)   Pulse: 84 83   Resp: 16    Weight: 110.6 kg (243 lb 13.3 oz)    Height: 6' 2" (1.88 m)      Physical Exam:  Constitutional: Patient sitting comfortably in chair. Appears well developed and well nourished.  Skin: Exposed areas are intact without abnormal markings, rashes or other lesions.  HEENT: " Normocephalic. Normal conjunctivae.  Cardiovascular: Normal rate and regular rhythm.  Respiratory: Chest wall rises and falls symmetrically, without signs of respiratory distress.  Abdomen: Soft and non-tender.  Extremities: Warm and without edema. Calves supple, non-tender.  Psych/Behavior: Normal affect.     Neurological:     Mental status: Alert and oriented. Conversational and appropriate.       Cranial Nerves: VFF to confrontation. PERRL. EOMI without nystagmus. Facial STLT normal and symmetric. Strong, symmetric muscles of mastication. Facial strength full and symmetric. Hearing equal bilaterally to finger rub. Palate and uvula rise and fall normally in midline. Shoulder shrug 5/5 strength. Tongue midline.      Motor:     Upper:   Deltoids Triceps Biceps WE WF  FA     R 5/5 5/5 5/5 5/5 5/5 5/5 5/5     L 5/5 5/5 5/5 5/5 5/5 5/5 5/5      Lower:   HF KE KF DF PF EHL     R 4/5 5/5 5/5 5/5 5/5 3/5     L 5/5 5/5 5/5 5/5 5/5 5/5      Sensory: Altered light touch right thigh      Reflexes:      DTR: 2+ knees and biceps symmetrically.     Abarca's: Negative.     Babinski's: Negative.     Clonus: Negative.     Cerebellar: Finger-to-nose and rapid alternating movements normal.   Pronator drift:  No drift noted     Gait:   ambulates with a limp  Tandem Gait:  Severe difficulty     Spine:  Midline bony tenderness:  Negative throughout      Diagnostic Results:  All imaging was independently reviewed by me.     MRI cervical spine 02/11/2020  1. Multilevel degenerative disc disease without central canal spinal stenosis or focal disc protrusions or cord compression.  2. Multilevel foraminal stenotic disease as discussed above.  See further details above.    MRI thoracic spine 2/11/2020  1. Multilevel degenerative changes throughout the dorsal spine without definite signs for spinal stenosis or cord compression.  No abnormal signal intensities within the dorsal cord.  2. Multiple T2 hyperintense lesions in the liver,  suspicious for metastatic disease.  Further evaluation of the liver with a dedicated CT scan of the abdomen with and without contrast is suggested. (ordered and evaluated by PCP)    EMG/Nerve conduction study, dated 11/19/19:  1. Acute denervation L4-S1 on right     MRI L spine, dated 11/20/19:  1. Conus ends at L2-3  2. Fatty filum (3.4mm)  3. Posterior displacement of cauda equina     ASSESSMENT/PLAN:     Problem List Items Addressed This Visit        Orthopedic    Right leg weakness    Relevant Orders    MRI Brain Demyelinating W W/O Contrast      Other Visit Diagnoses     Gait disturbance    -  Primary    Relevant Orders    MRI Brain Demyelinating W W/O Contrast    Focal neurological deficit        Relevant Orders    Creatinine, serum    MRI Brain Demyelinating W W/O Contrast    Right arm weakness        Relevant Orders    MRI Brain Demyelinating W W/O Contrast        VISIT SUMMARY:  Patient returns to clinic today for evaluation of worsening gait and increased weakness in his right arm and leg.  He presents with his wife who is concerned that he may have multiple sclerosis.  Recent cervical and thoracic MRI was without significant central stenosis to explain gait disturbance and was without signs of MS.  Initial a diagnosis was thought to be tethered cord syndrome, but urodynamic studies were unrevealing.  Additionally, a tethered cord would not fully explain subjective symptoms of right arm weakness and spasms in hands.  I will order brain MRI w/w/o contrast for further evaluation.      If brain MRI is within normal limits, will schedule FU with Dr. Rodriguez for discussion of untethering.        Briana Seals PA-C  [facility]  Department of Neurosurgery  [phone]

## 2020-06-02 ENCOUNTER — HOSPITAL ENCOUNTER (OUTPATIENT)
Dept: RADIOLOGY | Facility: HOSPITAL | Age: 70
Discharge: HOME | End: 2020-06-02
Attending: PHYSICIAN ASSISTANT
Payer: COMMERCIAL

## 2020-06-02 DIAGNOSIS — R26.9 GAIT DISTURBANCE: ICD-10-CM

## 2020-06-02 DIAGNOSIS — R29.818 FOCAL NEUROLOGICAL DEFICIT: ICD-10-CM

## 2020-06-02 DIAGNOSIS — R29.898 RIGHT LEG WEAKNESS: ICD-10-CM

## 2020-06-02 DIAGNOSIS — R29.898 RIGHT ARM WEAKNESS: ICD-10-CM

## 2020-06-02 PROCEDURE — 70553 MRI BRAIN DEMYELINATING W/ WO CONTRAST: ICD-10-PCS | Mod: 26,,, | Performed by: RADIOLOGY

## 2020-06-02 PROCEDURE — 70553 MRI BRAIN STEM W/O & W/DYE: CPT | Mod: 26,,, | Performed by: RADIOLOGY

## 2020-06-02 PROCEDURE — 70553 MRI BRAIN STEM W/O & W/DYE: CPT | Mod: TC

## 2020-06-02 PROCEDURE — A9585 GADOBUTROL INJECTION: HCPCS | Performed by: PHYSICIAN ASSISTANT

## 2020-06-02 PROCEDURE — 25500020 PHARM REV CODE 255: Performed by: PHYSICIAN ASSISTANT

## 2020-06-02 RX ORDER — GADOBUTROL 604.72 MG/ML
10 INJECTION INTRAVENOUS
Status: COMPLETED | OUTPATIENT
Start: 2020-06-02 | End: 2020-06-02

## 2020-06-02 RX ADMIN — GADOBUTROL 10 ML: 604.72 INJECTION INTRAVENOUS at 01:06

## 2020-06-03 LAB
CREAT SERPL-MCNC: 0.8 MG/DL (ref 0.5–1.4)
SAMPLE: NORMAL

## 2020-06-04 ENCOUNTER — CLINICAL SUPPORT (OUTPATIENT)
Dept: REHABILITATION | Facility: HOSPITAL | Age: 70
End: 2020-06-04
Payer: COMMERCIAL

## 2020-06-04 DIAGNOSIS — M25.551 RIGHT HIP PAIN: ICD-10-CM

## 2020-06-04 DIAGNOSIS — R29.898 RIGHT LEG WEAKNESS: ICD-10-CM

## 2020-06-04 DIAGNOSIS — Z74.09 IMPAIRED FUNCTIONAL MOBILITY, BALANCE, GAIT, AND ENDURANCE: ICD-10-CM

## 2020-06-04 PROCEDURE — 97110 THERAPEUTIC EXERCISES: CPT | Mod: PN,CQ

## 2020-06-04 NOTE — PROGRESS NOTES
"  Physical Therapy Daily Treatment Note     Name: Robles Jordan  Clinic Number: [id number]    Therapy Diagnosis:   No diagnosis found.    Physician: Chico Rodriguez DO    Visit Date: 6/4/2020    Physician Orders: Eval and Treat  Medical Diagnosis:   Q06.8 (ICD-10-CM) - Tethered cord   R29.898 (ICD-10-CM) - Right leg weakness   R26.9 (ICD-10-CM) - Gait disturbance   Evaluation Date: 12/4/19  POC updated on 5/12/2020  Authorization Period Expiration: 12-31-20  Plan of Care Certification Period: 5-4-20 to 8/12/2020  Visit #/Visits authorized: 5/21 (16 previous visits)      Time In: 0940 am  Time Out: 1030 am  Total Time: 50 minutes    Precautions: Standard and tethered cord syndrome    Subjective     Pt reports: received an MRI on Tuesday, awaiting results. Did yard work the other day and has increased tightness in back today.      He was compliant with home exercise program.    Response to previous treatment: good  Functional change: none to note    Pain: 7/10 (tightness)  Location: back and right LE      Objective     Ambulates with hip drop on L side and lateral trunk lean.    Robles received therapeutic exercises to develop strength, endurance, ROM, flexibility, posture and core stabilization for 50 minutes including:      Upright Bike 10 Min level 2.5      Supine strap HS stretch 3x30   Supine HF stretch off side of mat 3 x 30"  +DKTC with large green ball x 20  Supine SLR x 10  Supine hip abd using GTB 2 x 12, RLE only  Bridges 2 x 10 with 2 second hold  Supine marches x 20 total  Lateral steps using GTB x 4 laps (no UE support)  Heel Raises (no UE support) 3 x 10 (UE support today 6-4)  T[place] taps in standing 2 x 12 for tall cone with UE support   NP - small cone without UE support x 10  hip hikes 2x10 on R, 1 set on L. Standing on step  Standing hip ABD 2x10     NP today:  DL 15KB from 12in stool 3x10   suitcase carry 20KB 3x20ft  Kneeling on bench for HF stretch 3x30  12in hurdles side stepping " x4laps, fwd stepping x3laps  STS to 24in stool 2x8 standing on foam pad, holding 11# med ball. (held--Chest press upon standing up)      Robles received cold pack to low back for 10 mins while supine following exercises today to decrease pain.       Home Exercises Provided and Patient Education Provided     Education provided:   - HEP compliance for stretching/strengthening in order to care for his muscles.    Written Home Exercises Provided: yes.   Exercises were reviewed and Robles was able to demonstrate them prior to the end of the session.  Robles demonstrated good  understanding of the education provided.     Assessment     Robles tolerated treatment session fairly today. He enters clinic with reports of increased back tightness today after doing yard work. He continues to ambulate with decreased foot clearance, edmar on R LE. Multiple seated rest breaks required during standing therex 2' LE ms fatigue/weakness and generalized fatigue. Unable to complete 2nd set of 12 on R LE with standing cone taps 2' weakness/faituge. Progress lumbar extensors and core next treatment session in standing.     Robles is progressing well towards his goals.   Pt prognosis is Good.     Pt will continue to benefit from skilled outpatient physical therapy to address the deficits listed in the problem list box on initial evaluation, provide pt/family education and to maximize pt's level of independence in the home and community environment.     Pt's spiritual, cultural and educational needs considered and pt agreeable to plan of care and goals.     Anticipated barriers to physical therapy: tethered cord syndrome    Goals:     Short Term GOALS: - in progress  1. Pt to report feeling more stable and steady during gait. Progressing, does not feel steady after more than 10mins of walking  2. Pt to display improved gait tolerance to >10mins before needing rest break. MET 2-4-20   3. Pt to display improved fxl strength to be able  to carry groceries (Case of water). progressing  4. Pt to begin HEP including specific stretching and strengthening as per their diagnosis to decrease pain and improve flexibility.  5. Pt to display >65deg SLR in supine hooklying, >10 deg active DF in sitting.  MET 2-4-20   6. Pt to be able to comple >10 reps of sit<>Stand within 30 sec MET 2-4-20      Long Term GOALS: - in progress  1. Pt to report <1/10, no longer limiting functional activities.progressing  2. Pt to report return to >75% of regular ADLs, work and recreational tasks. progressing  3. Pt will demonstrate appropriate squat technique for improved tolerance of lifting and carrying functional activity needed for household tasks. progressing  4. Pt to be compliant in advanced HEP to maintain progress gained in therapy thus far. progressing  5. Pt to display >4+/5 gross MMT for B LE in order to support pelvis and spine throughout ADLs. progressing  Plan   Continue with current POC. Progress lumbar extensors and core stability exercises in standing, B LE strengthening/endurance, gait, and balance.     Plan of care Certification: extended to 8/12/2020.  Nichelle Dugan, PTA

## 2020-06-10 ENCOUNTER — OFFICE VISIT (OUTPATIENT)
Dept: INTERNAL MEDICINE | Facility: CLINIC | Age: 70
End: 2020-06-10
Payer: COMMERCIAL

## 2020-06-10 VITALS
HEIGHT: 74 IN | HEART RATE: 92 BPM | WEIGHT: 244 LBS | OXYGEN SATURATION: 99 % | BODY MASS INDEX: 31.32 KG/M2 | SYSTOLIC BLOOD PRESSURE: 140 MMHG | DIASTOLIC BLOOD PRESSURE: 72 MMHG

## 2020-06-10 DIAGNOSIS — Z12.5 PROSTATE CANCER SCREENING: ICD-10-CM

## 2020-06-10 DIAGNOSIS — R29.898 RIGHT LEG WEAKNESS: Primary | ICD-10-CM

## 2020-06-10 DIAGNOSIS — R73.09 ELEVATED HEMOGLOBIN A1C: ICD-10-CM

## 2020-06-10 DIAGNOSIS — G95.89 ACQUIRED TETHERED SPINAL CORD: ICD-10-CM

## 2020-06-10 DIAGNOSIS — R90.89 ABNORMAL BRAIN MRI: ICD-10-CM

## 2020-06-10 DIAGNOSIS — E78.5 HYPERLIPIDEMIA, UNSPECIFIED HYPERLIPIDEMIA TYPE: ICD-10-CM

## 2020-06-10 DIAGNOSIS — K76.89 LIVER CYST: ICD-10-CM

## 2020-06-10 DIAGNOSIS — I10 ESSENTIAL HYPERTENSION: ICD-10-CM

## 2020-06-10 PROCEDURE — 99999 PR PBB SHADOW E&M-EST. PATIENT-LVL III: ICD-10-PCS | Mod: PBBFAC,,, | Performed by: INTERNAL MEDICINE

## 2020-06-10 PROCEDURE — 99215 OFFICE O/P EST HI 40 MIN: CPT | Mod: S$GLB,,, | Performed by: INTERNAL MEDICINE

## 2020-06-10 PROCEDURE — 99999 PR PBB SHADOW E&M-EST. PATIENT-LVL III: CPT | Mod: PBBFAC,,, | Performed by: INTERNAL MEDICINE

## 2020-06-10 PROCEDURE — 99215 PR OFFICE/OUTPT VISIT, EST, LEVL V, 40-54 MIN: ICD-10-PCS | Mod: S$GLB,,, | Performed by: INTERNAL MEDICINE

## 2020-06-10 RX ORDER — POLYETHYLENE GLYCOL-3350 AND ELECTROLYTES 236; 6.74; 5.86; 2.97; 22.74 G/274.31G; G/274.31G; G/274.31G; G/274.31G; G/274.31G
POWDER, FOR SOLUTION ORAL
COMMUNITY
Start: 2020-03-04 | End: 2020-06-10

## 2020-06-10 NOTE — PROGRESS NOTES
Subjective:       Patient ID: Robles Jordan is a 69 y.o. male.    Chief Complaint: Establish Care    Last visit with me 1/20/2020. Since then seen by [facility] and Urology. Upcoming visit with [facility] and rehab.     HPI    Tingling in upper extremities and lower extremities with spasms. Gait is worse as well. Weakness in from back to legs. Extreme fatigue, endurance poor. No pain in muscles; started with spasms. No shortness of breath or wheezing. No difference between morning and evening. No swallowing or vision change. No depression.     Patient notes weakness in the right lower extremity that worsens with repeated use.  Denies any vision problems at the end of the day.  Reports fatigue is consistent throughout the day, not just at the end of the day.    Review of Systems   Constitutional: Positive for activity change. Negative for unexpected weight change.   HENT: Negative for hearing loss, rhinorrhea and trouble swallowing.    Eyes: Negative for discharge and visual disturbance.   Respiratory: Negative for chest tightness and wheezing.    Cardiovascular: Negative for chest pain and palpitations.   Gastrointestinal: Negative for blood in stool, constipation, diarrhea and vomiting.   Endocrine: Negative for polydipsia and polyuria.   Genitourinary: Negative for difficulty urinating, hematuria and urgency.   Musculoskeletal: Negative for arthralgias, joint swelling and neck pain.   Neurological: Positive for weakness. Negative for headaches.   Psychiatric/Behavioral: Negative for confusion and dysphoric mood.       Objective:      Physical Exam   Constitutional: He is oriented to person, place, and time. No distress.   HENT:   Mouth/Throat: Oropharynx is clear and moist. No oropharyngeal exudate.   Eyes: Pupils are equal, round, and reactive to light. Conjunctivae and EOM are normal. Right eye exhibits no discharge. Left eye exhibits no discharge.   Neck: Normal range of motion. No thyromegaly present.   Cardiovascular:  "Normal rate, regular rhythm and normal heart sounds.   Pulmonary/Chest: Effort normal and breath sounds normal. No stridor. He has no wheezes. He has no rales.   Abdominal: Soft. There is no tenderness. There is no guarding.   Musculoskeletal: He exhibits no edema or tenderness.   Lymphadenopathy:     He has no cervical adenopathy.   Neurological: He is alert and oriented to person, place, and time. He displays no tremor. He exhibits abnormal muscle tone. Gait (mild limp, doesn't require cane or walker) abnormal.   Reflex Scores:       Bicep reflexes are 2+ on the right side and 2+ on the left side.       Patellar reflexes are 2+ on the right side and 2+ on the left side.  4/5 strength in R leg flexion at knee and hip. 5/5 strength in R leg extension at knee, also 5/5 strength LLE. 5/5 strength in bilateral shoulder abd- and adduction.   Skin: He is not diaphoretic.   Psychiatric: He has a normal mood and affect. His behavior is normal.   Nursing note and vitals reviewed.      Vitals:    06/10/20 1116   BP: (!) 140/72   BP Location: Right arm   Patient Position: Sitting   BP Method: Medium (Manual)   Pulse: 92   SpO2: 99%   Weight: 110.7 kg (244 lb)   Height: 6' 2" (1.88 m)     Body mass index is 31.33 kg/m².    RESULTS: Reviewed labs from last 12 months    Assessment:       1. Right leg weakness    2. Acquired tethered spinal cord    3. Abnormal brain MRI    4. Hyperlipidemia, unspecified hyperlipidemia type    5. Essential hypertension    6. Elevated hemoglobin A1c    7. Prostate cancer screening    8. Liver cyst        Plan:   Robles was seen today for establish care.    Diagnoses and all orders for this visit:    Right leg weakness:  Prior diagnosis, patient notes progressive decline with more unsteady gait and weakness.  Wife agrees with this assessment.  Patient had EMG last year, along with MRIs of the spinal cord and recent MRI of the brain showing potential areas of demyelination.  Patient will follow-up " with Neurosurgery tomorrow as planned, further evaluation and workup to be determined at that time.    Acquired tethered spinal cord:  Prior diagnosis, may explain some of the right leg weakness, continue follow-up with neurosurgery for evaluation and additional recommendations.    Abnormal brain MRI:  New problem, encephalomalacia seen in cerebellum along with evidence of white matter change especially in the frontal lobes.  Needs evaluation with Neurosurgery for further assessment to determine if this is related to the weakness that he is experiencing.    Hyperlipidemia, unspecified hyperlipidemia type:  Prior diagnosis, stable, well controlled on current management. No changes at this time, will continue to monitor.     Essential hypertension:  Prior diagnosis, slightly elevated today but generally well controlled on current management. No changes at this time, will continue to monitor.     Elevated hemoglobin A1c:  Prior diagnosis, continue to monitor in future.    Prostate cancer screening    Liver cyst:  New problem, not severe or alarming, plan on follow up in a few more months.    Other orders  -     Cancel: CBC Without Differential; Future  -     Cancel: Comprehensive metabolic panel; Future  -     Cancel: Bilirubin, direct; Future  -     Cancel: Hemoglobin A1C; Future  -     Cancel: Lipid Panel; Future  -     Cancel: PSA, Screening; Future      Follow up in about 6 months (around 12/10/2020) for follow up visit.  John Jackson MD  Internal Medicine    Portions of this note were completed using medical dictation software. Please excuse typographical or syntax errors that were missed on review.

## 2020-06-11 ENCOUNTER — OFFICE VISIT (OUTPATIENT)
Dept: NEUROLOGY | Facility: CLINIC | Age: 70
End: 2020-06-11
Payer: COMMERCIAL

## 2020-06-11 ENCOUNTER — OFFICE VISIT (OUTPATIENT)
Dept: NEUROSURGERY | Facility: CLINIC | Age: 70
End: 2020-06-11
Payer: COMMERCIAL

## 2020-06-11 VITALS
DIASTOLIC BLOOD PRESSURE: 80 MMHG | SYSTOLIC BLOOD PRESSURE: 168 MMHG | HEART RATE: 76 BPM | HEIGHT: 74 IN | WEIGHT: 243.38 LBS | BODY MASS INDEX: 31.24 KG/M2 | RESPIRATION RATE: 15 BRPM

## 2020-06-11 VITALS
DIASTOLIC BLOOD PRESSURE: 80 MMHG | WEIGHT: 243 LBS | HEIGHT: 74 IN | BODY MASS INDEX: 31.18 KG/M2 | SYSTOLIC BLOOD PRESSURE: 168 MMHG | HEART RATE: 76 BPM

## 2020-06-11 DIAGNOSIS — R73.09 ELEVATED HEMOGLOBIN A1C: ICD-10-CM

## 2020-06-11 DIAGNOSIS — E78.5 HYPERLIPIDEMIA, UNSPECIFIED HYPERLIPIDEMIA TYPE: ICD-10-CM

## 2020-06-11 DIAGNOSIS — R29.898 RIGHT LEG WEAKNESS: Primary | ICD-10-CM

## 2020-06-11 DIAGNOSIS — Z74.09 IMPAIRED FUNCTIONAL MOBILITY, BALANCE, GAIT, AND ENDURANCE: ICD-10-CM

## 2020-06-11 DIAGNOSIS — I10 ESSENTIAL HYPERTENSION: ICD-10-CM

## 2020-06-11 DIAGNOSIS — R53.1 RIGHT SIDED WEAKNESS: Primary | ICD-10-CM

## 2020-06-11 DIAGNOSIS — G95.89 ACQUIRED TETHERED SPINAL CORD: ICD-10-CM

## 2020-06-11 PROCEDURE — 99999 PR PBB SHADOW E&M-EST. PATIENT-LVL III: ICD-10-PCS | Mod: PBBFAC,,, | Performed by: NEUROLOGICAL SURGERY

## 2020-06-11 PROCEDURE — 99215 PR OFFICE/OUTPT VISIT, EST, LEVL V, 40-54 MIN: ICD-10-PCS | Mod: S$GLB,,, | Performed by: NEUROLOGICAL SURGERY

## 2020-06-11 PROCEDURE — 99205 PR OFFICE/OUTPT VISIT, NEW, LEVL V, 60-74 MIN: ICD-10-PCS | Mod: S$GLB,,, | Performed by: NEUROLOGICAL SURGERY

## 2020-06-11 PROCEDURE — 99999 PR PBB SHADOW E&M-EST. PATIENT-LVL III: CPT | Mod: PBBFAC,,, | Performed by: NEUROLOGICAL SURGERY

## 2020-06-11 PROCEDURE — 99215 OFFICE O/P EST HI 40 MIN: CPT | Mod: S$GLB,,, | Performed by: NEUROLOGICAL SURGERY

## 2020-06-11 PROCEDURE — 99205 OFFICE O/P NEW HI 60 MIN: CPT | Mod: S$GLB,,, | Performed by: NEUROLOGICAL SURGERY

## 2020-06-11 NOTE — LETTER
June 17, 2020      Chico Rodriguez, DO  [address] [facility] [address]  [address]  [facility] [address]           [facility]  [address]  [facility] [address]  Phone: [phone]  Fax: [phone]          Patient: Robles Jordan   MR Number: [id number]   YOB: 1950   Date of Visit: 6/11/2020       Dear Dr. Chico Rodriguez:    Thank you for referring Robles Jordan to me for evaluation. Attached you will find relevant portions of my assessment and plan of care.    If you have questions, please do not hesitate to call me. I look forward to following Robles Jordan along with you.    Sincerely,    Johan Olvera MD    Enclosure  CC:  No Recipients    If you would like to receive this communication electronically, please contact [email or web address] or [phone] to request more information on [employer] Link access.    For providers and/or their staff who would like to refer a patient to [facility], please contact us through our one-stop-shop provider referral line, [facility] [place], at [phone].    If you feel you have received this communication in error or would no longer like to receive these types of communications, please e-mail [email or web address]

## 2020-06-11 NOTE — PROGRESS NOTES
CHIEF COMPLAINT:        Chief Complaint   Patient presents with    Follow-up         INTERVAL HISTORY (6/11/20):   patient returns for continued evaluation and management of gait disturbance.  Patient presents with his wife and both report that the patient has progressed in terms of his gait disturbance, right lower extremity weakness, and now reports some right upper extremity symptoms.  He was most recently seen by my PA at which  Time he reported right upper extremity weakness.  A brain MRI was obtained which was not particularly revealing  Except for multifocal ischemic disease.      Patient continues to have no pain but reports that within a couple of feet of starting to walk his right leg will become weak and he becomes significantly out of balance.  He denies any falls as he explains he stops before that can happen.  Walking on an incline is more difficult.  He also reports spasms in his right upper extremity.  He also reports fatigue.      He and his wife are concerned that his symptoms are progressing.  Although we have discussed possibility of performing a tethered cord release, the patient would like to avoid surgery if possible.  The patient's wife is particularly concerned about multiple sclerosis and demyelination disorder.    INTERVAL HISTORY (2/3/20):  Patient returns for continued evaluation for the possibility of tethered cord.  He has had no significant changes in his symptoms.  He continues to have weakness in his legs particularly the right lower extremity.  The leg feels heavy and weak when picking it up, tired after repetitive lifting, and he will feel generalized weakness in his proximal legs with exertion.  He also has decreased range of motion in his [place] and plantar flexion on the right.  He denies any significant back or leg pain.  He denies any upper extremity symptoms or clumsiness.     He was recently sent for urodynamic testing which revealed normal bladder function with complete  emptying.     HPI:  Robles Jordan is a 69 y.o.  male with below listed PMH, who is referred for evaluation of tethered cord.  Main complaint is BLE weakness, worse on right for past 1 year.  His legs will give out on him if he walks long distance or on incline.  Also has low back pain along waistline but does not endorse much leg pain.  Numbness/tingling along right medial thigh and gets charley horses in his sleep.  Going to PT but not helping much - feels sore after attending.  Gabapentin makes sleepy so only taking BID.     Has had urinating issues for approx 1 year manifest as sensation of incomplete bladder emptying.  He was placed on flomax but unclear how much it has helped.  BM are regular and daily.     He has appt with urology next week.     History of Present Illness (from PA note):  Robles Jordan is a 68 y.o. male with HLD, HTN, and HEAVEN who presents with low back pain and right leg pain/numbness/weakness. He was initially evaluated by CHIRAG Jones at [facility]. She referred him to [facility] neurosurgery to establish care closer to home.      Symptom onset: 1 year, first noticed there was a problem when his wife noticed him limping  Location: right-sided low back pain with radiation into medial and lateral right leg down to his calf. Occasionally in his left leg. Never in his foot other than a cramping sensation.   Pain level: 0/10, primary complaint is of weakness, not pain  Numbness: + numbness and tingling in anterior right thigh   Weakness: + in right leg   Denies bowel dysfunction and saddle anesthesia.   Endorses 2 year history of urinary retention requiring tamsulosin. No documentation of BPH  Endorses gait disturbance related to limping and right leg weakness.   2 falls in the last few months related to trip/fall and a chair giving out     Treatments tried:  -PT: has note tried  -ADIEL: has not tried; not indicated   -Gabapentin: currently taking   -Muscle relaxer: not currently  taking   -Rx pain medications: none  -Spine surgery: none     Review of patient's allergies indicates:  No Known Allergies             Past Medical History:   Diagnosis Date    BPH (benign prostatic hypertrophy)      Hx of colonic polyps 10/19/2015    Hypertension                  Past Surgical History:   Procedure Laterality Date    COLONOSCOPY N/A 10/19/2015     Procedure: COLONOSCOPY;  Surgeon: Antonino Bernstein MD;  Location: [address];  Service: Endoscopy;  Laterality: N/A;    MULTIPLE TOOTH EXTRACTIONS   2014                Family History   Problem Relation Age of Onset    Cancer Mother           Breast    Stroke Sister 65         Fatal    Cancer Maternal Uncle           Pancreas    Cancer Paternal Uncle           Pancreas    Cancer Maternal Grandmother           Breast    Hypertension Father      Hyperlipidemia Father        Social History                Tobacco Use    Smoking status: Current Every Day Smoker       Types: Cigarettes   Substance Use Topics    Alcohol use: Yes       Alcohol/week: 0.0 standard drinks       Comment: very seldom    Drug use: No         Review of Systems   Constitutional: Negative.    Respiratory: Negative for cough and shortness of breath.    Cardiovascular: Negative for chest pain, palpitations, claudication and leg swelling.   Gastrointestinal: Negative for abdominal pain, constipation and diarrhea.   Genitourinary: Positive for frequency. Negative for flank pain and urgency.   Musculoskeletal: Positive for back pain and falls (Near falls due legs give out). Negative for joint pain, myalgias and neck pain.   Skin: Negative.    Neurological: Positive for tingling, focal weakness and weakness. Negative for dizziness, tremors, sensory change, speech change, seizures, loss of consciousness and headaches.   Psychiatric/Behavioral: Negative.          OBJECTIVE:     Vitals:    06/11/20 0857   BP: (!) 168/80   Pulse: 76   Resp: 15       Physical  Exam:  Constitutional: Patient sitting comfortably in chair. Appears well developed and well nourished.  Skin: Exposed areas are intact without abnormal markings, rashes or other lesions.  HEENT: Normocephalic. Normal conjunctivae.  Cardiovascular: Normal rate and regular rhythm.  Respiratory: Chest wall rises and falls symmetrically, without signs of respiratory distress.  Abdomen: Soft and non-tender.  Extremities: Warm and without edema. Calves supple, non-tender.  Psych/Behavior: Normal affect.     Neurological:     Mental status: Alert and oriented. Conversational and appropriate.     Cranial Nerves: VFF to confrontation. PERRL. EOMI without nystagmus. Facial STLT normal and symmetric. Strong, symmetric muscles of mastication. Facial strength full and symmetric. Hearing equal bilaterally to finger rub. Palate and uvula rise and fall normally in midline. Shoulder shrug 5/5 strength. Tongue midline.      Motor:     Upper:   Deltoids Triceps Biceps WE WF  FA     R 5/5 5/5 5/5 5/5 5/5 5/5 5/5     L 5/5 5/5 5/5 5/5 5/5 5/5 5/5      Lower:   HF KE KF DF PF EHL     R 4-/5 5/5 5/5 4+/5 5/5 4+/5     L 5/5 5/5 5/5 5/5 5/5 5/5      Sensory: Altered light touch right thigh      Reflexes:      DTR: 2+ knees and biceps symmetrically.     Abarca's: Negative.     Babinski's: Negative.     Clonus: Negative.     Cerebellar: Finger-to-nose and rapid alternating movements normal.      Gait:  Stable, somewhat drags RLE     Spine:     Posture: Head well aligned over pelvis and shoulders in front and side views.  No focal or global spinal deformity visible on inspection.      Cervical:      ROM: Full with flexion, extension, lateral rotation and ear-to-shoulder bend.      Midline TTP: Negative.     Spurling's test: Negative.     Lhermitte's: Negative.     Thoracic:     Midline TTP: Negative     Lumbar:     Midline TTP: Negative     Straight Leg Test: Negative     Crossed Straight Leg Test: Negative     Other:     SI joint TTP:  Negative.     Tenderness with external/internal hip rotation: Negative.     Diagnostic Results:  All imaging was independently reviewed by me.    Brain MRI, 6/2/20:  1. Scattered foci of ischemic disease  2. Largest FLAIR lesion is in the left posteromedial temporal lobe measuring 0.9 cm    MRI C+T spine, dated 2/11/20:  1. Diffuse spondylosis  2. Loss of lordosis  3. No spinal cord compression in cervical or thoracic spine    EMG/Nerve conduction study, dated 11/19/19:  1. Acute denervation L4-S1 on right     MRI L spine, dated 11/20/19:  1. Conus ends at L2-3  2. Fatty filum (3.4mm)  3. Posterior displacement of cauda equina     ASSESSMENT/PLAN:     Problem List Items Addressed This Visit        Orthopedic    Right leg weakness - Primary       Other    Impaired functional mobility, balance, gait, and endurance    Relevant Orders    Ambulatory referral/consult to Neurology          VISIT SUMMARY:  Worsening gait disturbance, RLE weakness, and newly disclosed RUE weakness and spasms.    Patient has MRI evidence of a thickened fatty filum which could represent tethered cord syndrome however the patient's symptomatology is not definitively explained by that.  He denies any back or leg pain and has normal urodynamics.  I explained to the patient and his wife that whether he has tethered cord syndrome or not, this would not likely account for his right upper extremity symptoms. A new brain MRI shows scattered foci of FLAIR signal change most likely consistent with ischemic disease.  He has 1 lesion near the ventral lateral thalamus of which the significance is unclear.  I will refer him to Neurology for more thorough evaluation of other nonsurgical neurological conditions.  I explained that if the neurologic workup returns negative then I would likely recommend untethering of the spinal cord.  Patient and wife are in agreement with this and would like to avoid surgery if possible but are willing to proceed if that is  what is needed.     PATIENT EDUCATION:  More than half the clinic visit was spent showing with patient the pertinent findings on imaging and educating the patient about natural history of the pathology.   We discussed options for treatment as well as the risks and benefits of each option.  All questions were answered.      The patient understands and agrees with the following plan of care.     - Referral to Dr Olvera (neurology)  - RTC in 6 wks, sooner if sxs worsen or neurology w/u is negative

## 2020-06-12 ENCOUNTER — CLINICAL SUPPORT (OUTPATIENT)
Dept: REHABILITATION | Facility: HOSPITAL | Age: 70
End: 2020-06-12
Payer: COMMERCIAL

## 2020-06-12 DIAGNOSIS — Z74.09 IMPAIRED FUNCTIONAL MOBILITY, BALANCE, GAIT, AND ENDURANCE: ICD-10-CM

## 2020-06-12 DIAGNOSIS — M25.551 RIGHT HIP PAIN: ICD-10-CM

## 2020-06-12 DIAGNOSIS — R29.898 RIGHT LEG WEAKNESS: ICD-10-CM

## 2020-06-12 PROCEDURE — 97110 THERAPEUTIC EXERCISES: CPT | Mod: PN

## 2020-06-12 NOTE — PROGRESS NOTES
"  Physical Therapy Daily Treatment Note     Name: Robles Jordan  Clinic Number: [id number]    Therapy Diagnosis:   Encounter Diagnoses   Name Primary?    Impaired functional mobility, balance, gait, and endurance     Right leg weakness     Right hip pain        Physician: Chico Rodriguez DO    Visit Date: 6/12/2020    Physician Orders: Eval and Treat  Medical Diagnosis:   Q06.8 (ICD-10-CM) - Tethered cord   R29.898 (ICD-10-CM) - Right leg weakness   R26.9 (ICD-10-CM) - Gait disturbance   Evaluation Date: 12/4/19  POC updated on 5/12/2020  Authorization Period Expiration: 12-31-20  Plan of Care Certification Period: 5-4-20 to 8/12/2020  Visit #/Visits authorized: 6/21 (16 previous visits)      Time In: 9:45 am  Time Out: 10:45 am  Total Time: 45 minutes    Precautions: Standard and tethered cord syndrome    Subjective     Pt reports: Had MRI done and an office visit with neurologist. Neurologist is trying to rule out stroke and MS prior to possible surgery for tethered cord. Pt is currently speaking to neurologist to set up spinal tap.       He was compliant with home exercise program.    Response to previous treatment: good  Functional change: none to note    Pain: 7/10 (tightness)  Location: back and right LE      Objective     Ambulates with hip drop on L side and lateral trunk lean.    Robles received therapeutic exercises to develop strength, endurance, ROM, flexibility, posture and core stabilization for 45 minutes including:      Upright Bike 8 Min level 2.5     Supine strap HS stretch 3x30   Supine HF stretch off side of mat 3 x 30"  DKTC with large green ball x 20  Supine SLR x 10   Supine hip abd using GTB 2 x 12, RLE only  Bridges 2 x 10 with 2 second hold  Supine marches x 20 total  Lateral steps using GTB x 4 laps (no UE support)  Heel Raises (no UE support) 3 x 10   T[place]one taps in standing 2 x 12 for tall cone with UE support  hip hikes 2x10 on R, 1 set on L. Standing on step- increased low " back pain today  Standing hip ABD 2x10     NP today:  DL 15KB from 12in stool 3x10   suitcase carry 20KB 3x20ft  Kneeling on bench for HF stretch 3x30  12in hurdles side stepping x4laps, fwd stepping x3laps  STS to 24in stool 2x8 standing on foam pad, holding 11# med ball. (held--Chest press upon standing up)      Robles received cold pack to low back for 10 mins while supine following exercises today to decrease pain.       Home Exercises Provided and Patient Education Provided     Education provided:   - HEP compliance for stretching/strengthening in order to care for his muscles.    Written Home Exercises Provided: yes.   Exercises were reviewed and Robles was able to demonstrate them prior to the end of the session.  Robles demonstrated good  understanding of the education provided.     Assessment     Robles tolerated treatment session fairly today. Continues to fatigue quickly with standing exercises involving hip flexors endurance. Pt reports some increase in L lateral QL and paraspinals with hip hikes today. Plan to incorporate lumbar extensor and core exercises. He continues to ambulate with decreased foot clearance, edmar on R LE. Pt remains appropriate for PT at this time.     Robles is progressing well towards his goals.   Pt prognosis is Good.     Pt will continue to benefit from skilled outpatient physical therapy to address the deficits listed in the problem list box on initial evaluation, provide pt/family education and to maximize pt's level of independence in the home and community environment.     Pt's spiritual, cultural and educational needs considered and pt agreeable to plan of care and goals.     Anticipated barriers to physical therapy: tethered cord syndrome    Goals:     Short Term GOALS: - in progress  1. Pt to report feeling more stable and steady during gait. Progressing, does not feel steady after more than 10mins of walking  2. Pt to display improved gait tolerance to >10mins  before needing rest break. MET 2-4-20   3. Pt to display improved fxl strength to be able to carry groceries (Case of water). progressing  4. Pt to begin HEP including specific stretching and strengthening as per their diagnosis to decrease pain and improve flexibility.  5. Pt to display >65deg SLR in supine hooklying, >10 deg active DF in sitting.  MET 2-4-20   6. Pt to be able to comple >10 reps of sit<>Stand within 30 sec MET 2-4-20      Long Term GOALS: - in progress  1. Pt to report <1/10, no longer limiting functional activities.progressing  2. Pt to report return to >75% of regular ADLs, work and recreational tasks. progressing  3. Pt will demonstrate appropriate squat technique for improved tolerance of lifting and carrying functional activity needed for household tasks. progressing  4. Pt to be compliant in advanced HEP to maintain progress gained in therapy thus far. progressing  5. Pt to display >4+/5 gross MMT for B LE in order to support pelvis and spine throughout ADLs. progressing  Plan   Continue with current POC. Progress lumbar extensors and core stability exercises in standing, B LE strengthening/endurance, gait, and balance.     Plan of care Certification: extended to 8/12/2020.  Ada Boothe, PT

## 2020-06-15 ENCOUNTER — TELEPHONE (OUTPATIENT)
Dept: INTERNAL MEDICINE | Facility: CLINIC | Age: 70
End: 2020-06-15

## 2020-06-15 DIAGNOSIS — Z20.822 SUSPECTED COVID-19 VIRUS INFECTION: Primary | ICD-10-CM

## 2020-06-15 NOTE — TELEPHONE ENCOUNTER
Patient should isolate at home. If he wishes to be tested, we can get that done - see nasal test order.    Here is a web site for some general information from the CDC.     https://www.cdc.gov/coronavirus/2019-ncov/index.html    [facility] has created a hotline for guidance and COVID-19 information to help patients with general questions and concerns. The hotline will be staffed by patient navigators who can assist with non-clinical questions. Please feel free to call the hotline at 1-312.709.1955.     The [facility] On Call line at [phone] or [phone] for immediate assistance and guidance on whether an in-person visit is needed. [facility] is a free service providing appointment booking, health education and advisory services and is available 24 hours a day, 7 days a week.

## 2020-06-15 NOTE — TELEPHONE ENCOUNTER
Spoke to patient and states that his wife was tested for covid (nasal) a couple of days and she got the results back today which came back positive (states that his wife was not having any symptoms)----patient is currently not having any symptoms and is wanting to know what his next steps should be---

## 2020-06-15 NOTE — TELEPHONE ENCOUNTER
----- Message from Gianna Reina sent at 6/15/2020 10:32 AM CDT -----  Regarding: self [phone]  Pt states his wife tested positive for COVID 19. Pt states he is not having any symptoms. Pt is asking what his next step should be. Please call and advise

## 2020-06-15 NOTE — TELEPHONE ENCOUNTER
Spoke to patient and informed---he will isolate for now---if he develops symptoms he will call to get tested

## 2020-06-21 PROBLEM — R53.1 RIGHT SIDED WEAKNESS: Status: ACTIVE | Noted: 2020-06-21

## 2020-06-21 NOTE — PROGRESS NOTES
Chief Complaint   Patient presents with    Gait Problem    Extremity Weakness     right        Robles Jordan is a 69 y.o. male with a history of multiple medical diagnoses as listed below that presents for evaluation of persistent and progressive right-sided weakness.  The patient has been followed closely with Neurosurgery who to this point has not been able to identify any structural causing the cervical spine or thoracic spine which would account for the patient's symptoms.  He is right for concerned that he may have had a stroke some other type of episode that could have led to his progressive right-sided weakness and right-sided numbness.  He denies any antecedent trauma prior to the episode beginning.  He says that symptoms have seemed to progressively involve Morphy is a arm with leg.  He has not had any antecedent infection.  He denies any symptoms on the contralateral side.  He has been having difficulty with his walking and with fine motor movement as a result of the weakness he has been experiencing.  He denies any difficulty with chewing, swallowing, or breathing..     PAST MEDICAL HISTORY:  Past Medical History:   Diagnosis Date    BPH (benign prostatic hypertrophy)     Hx of colonic polyps 10/19/2015    Hypertension     Sleep apnea        PAST SURGICAL HISTORY:  Past Surgical History:   Procedure Laterality Date    COLONOSCOPY N/A 10/19/2015    Procedure: COLONOSCOPY;  Surgeon: Antonino Bernstein MD;  Location: [address]);  Service: Endoscopy;  Laterality: N/A;    CYSTOSCOPY WITH URODYNAMIC TESTING N/A 1/17/2020    Procedure: CYSTOSCOPY,WITH URODYNAMIC TESTING;  Surgeon: Christianne Israel MD;  Location: [facility];  Service: Urology;  Laterality: N/A;  RN PREOP 1/13/2020    MULTIPLE TOOTH EXTRACTIONS  2014       SOCIAL HISTORY:  Social History     Socioeconomic History    Marital status:      Spouse name: Not on file    Number of children: Not on file    Years of education: Not  on file    Highest education level: Not on file   Occupational History    Not on file   Social Needs    Financial resource strain: Not on file    Food insecurity     Worry: Not on file     Inability: Not on file    Transportation needs     Medical: Not on file     Non-medical: Not on file   Tobacco Use    Smoking status: Light Tobacco Smoker     Packs/day: 0.25     Types: Cigarettes    Smokeless tobacco: Never Used   Substance and Sexual Activity    Alcohol use: Yes     Alcohol/week: 0.0 standard drinks     Comment: very seldom    Drug use: No    Sexual activity: Not on file   Lifestyle    Physical activity     Days per week: 0 days     Minutes per session: 40 min    Stress: Not at all   Relationships    Social connections     Talks on phone: Not on file     Gets together: Not on file     Attends Buddhism service: Not on file     Active member of club or organization: Not on file     Attends meetings of clubs or organizations: Not on file     Relationship status: Not on file   Other Topics Concern    Not on file   Social History Narrative    Not on file       FAMILY HISTORY:  Family History   Problem Relation Age of Onset    Cancer Mother         Breast    Stroke Sister 65        Fatal    Cancer Maternal Uncle         Pancreas    Cancer Paternal Uncle         Pancreas    Cancer Maternal Grandmother         Breast    Hypertension Father     Hyperlipidemia Father        ALLERGIES AND MEDICATIONS: updated and reviewed.  Review of patient's allergies indicates:  No Known Allergies  Current Outpatient Medications   Medication Sig Dispense Refill    amLODIPine (NORVASC) 10 MG tablet Take 1 tablet (10 mg total) by mouth once daily. For blood pressure 90 tablet 3    atorvastatin (LIPITOR) 80 MG tablet TAKE 1 TABLET (80 MG TOTAL) BY MOUTH ONCE DAILY. 90 tablet 1    lisinopril (PRINIVIL,ZESTRIL) 40 MG tablet Take 1 tablet (40 mg total) by mouth once daily. 90 tablet 3    sildenafil (VIAGRA) 100 MG  tablet Take 1 tablet (100 mg total) by mouth daily as needed for Erectile Dysfunction. 30 tablet 2    tamsulosin (FLOMAX) 0.4 mg Cap TAKE 1 CAPSULE (0.4 MG TOTAL) BY MOUTH ONCE DAILY. 90 capsule 3     No current facility-administered medications for this visit.        Review of Systems   Constitutional: Negative for activity change, fatigue and unexpected weight change.   HENT: Negative for trouble swallowing and voice change.    Eyes: Negative for photophobia, pain and visual disturbance.   Respiratory: Negative for apnea and shortness of breath.    Cardiovascular: Negative for chest pain and palpitations.   Gastrointestinal: Negative for constipation, nausea and vomiting.   Genitourinary: Negative for difficulty urinating.   Musculoskeletal: Positive for gait problem and neck pain. Negative for arthralgias, back pain and myalgias.   Skin: Negative for color change and rash.   Neurological: Positive for weakness and numbness. Negative for dizziness, seizures, syncope, speech difficulty, light-headedness and headaches.   Psychiatric/Behavioral: Positive for dysphoric mood. Negative for agitation, behavioral problems and confusion.       Neurologic Exam     Mental Status   Oriented to person, place, and time.   Registration: recalls 3 of 3 objects.   Attention: normal. Concentration: normal.   Speech: speech is normal   Level of consciousness: alert  Knowledge: good.     Cranial Nerves     CN II   Visual fields full to confrontation.   Right visual field deficit: none  Left visual field deficit: none     CN III, IV, VI   Pupils are equal, round, and reactive to light.  Extraocular motions are normal.   Right pupil: Size: 3 mm. Shape: regular. Accommodation: intact.   Left pupil: Size: 3 mm. Shape: regular. Accommodation: intact.   CN III: no CN III palsy  CN VI: no CN VI palsy  Nystagmus: none   Diplopia: none  Ophthalmoparesis: none  Upgaze: normal  Downgaze: normal  Conjugate gaze: present    CN V   Facial  sensation intact.   Right facial sensation deficit: none  Left facial sensation deficit: none    CN VII   Facial expression full, symmetric.   Right facial weakness: none  Left facial weakness: none    CN VIII   CN VIII normal.     CN IX, X   CN IX normal.   CN X normal.   Palate: symmetric    CN XI   CN XI normal.   Right sternocleidomastoid strength: normal  Left sternocleidomastoid strength: normal  Right trapezius strength: normal  Left trapezius strength: normal    CN XII   CN XII normal.   Tongue deviation: none    Motor Exam   Muscle bulk: normal  Overall muscle tone: normal  Right arm tone: normal  Left arm tone: normal  Right leg tone: normal  Left leg tone: normal    Strength   Strength 5/5 except as noted.   Right deltoid: 4/5  Right biceps: 4/5  Right triceps: 4/5  Right wrist flexion: 4/5  Right wrist extension: 4/5  Right interossei: 4/5  Right iliopsoas: 4/5  Right quadriceps: 4/5  Right hamstrin/5  Right anterior tibial: 4/5  Right posterior tibial: 4/5  Right peroneal: 4/5  Right gastroc: 4/5    Sensory Exam   Right arm light touch: decreased from elbow  Left arm light touch: normal  Right leg light touch: decreased from knee  Left leg light touch: normal  Right arm vibration: decreased from elbow  Left arm vibration: normal  Right leg vibration: decreased from knee  Left leg vibration: normal  Right arm proprioception: normal  Left arm proprioception: normal  Right leg proprioception: normal  Left leg proprioception: normal  Right arm pinprick: decreased from elbow  Left arm pinprick: normal  Right leg pinprick: decreased from knee  Left leg pinprick: normal  Sensory deficit distribution on right: non-dermatomal distribution  Sensory deficit distribution on left: non-dermatomal distribution    Gait, Coordination, and Reflexes     Gait  Gait: normal    Coordination   Romberg: negative  Finger to nose coordination: normal  Heel to shin coordination: normal  Tandem walking coordination:  "normal    Tremor   Resting tremor: absent    Reflexes   Right brachioradialis: 2+  Left brachioradialis: 2+  Right biceps: 2+  Left biceps: 2+  Right triceps: 2+  Left triceps: 2+  Right patellar: 2+  Left patellar: 2+  Right achilles: 2+  Left achilles: 2+  Right plantar: normal  Left plantar: normal      Physical Exam  Eyes:      Extraocular Movements: EOM normal.      Pupils: Pupils are equal, round, and reactive to light.   Neurological:      Mental Status: He is oriented to person, place, and time.      Coordination: Finger-Nose-Finger Test, Heel to Shin Test and Romberg Test normal.      Gait: Gait is intact. Tandem walk normal.      Deep Tendon Reflexes:      Reflex Scores:       Tricep reflexes are 2+ on the right side and 2+ on the left side.       Bicep reflexes are 2+ on the right side and 2+ on the left side.       Brachioradialis reflexes are 2+ on the right side and 2+ on the left side.       Patellar reflexes are 2+ on the right side and 2+ on the left side.       Achilles reflexes are 2+ on the right side and 2+ on the left side.  Psychiatric:         Speech: Speech normal.         Vitals:    06/11/20 0945   BP: (!) 168/80   Pulse: 76   Weight: 110.2 kg (243 lb)   Height: 6' 2" (1.88 m)       Assessment & Plan:    Problem List Items Addressed This Visit     Hyperlipidemia    Overview     ASCVD 10-year risk 29.6% (with optimal risk factors 6.5%) as of 10/23/2015          Essential hypertension    Elevated hemoglobin A1c    Overview     October 2015 the A1c was 6.6% on outside labs         Acquired tethered spinal cord    Right sided weakness - Primary    Overview     Patient's right-sided weakness is not completely explained at this time.  Scattered foci of hyperintensities seen on MRI could be indicative of chronic microvascular changes although vasculitis and other encephalitides  should be evaluated with lumbar puncture to completely evaluate his symptoms.  The area of FLAIR hyperintensity within " the ventral lateral thalamus could account for some of his symptoms but the significance and etiology of this area is not completely understood at this time.  Patient certainly possesses multiple vascular risk factors that need to be further optimized to minimize the risk of ischemic disease if this is in fact a culprit.  Arrange for lumbar puncture with interventional radiology to assess for some of the possibilities listed above if negative ischemic disease seems much more likely responsible for spectrum symptoms.         Relevant Orders    Ambulatory referral/consult to Interventional Radiology    CSF cell count with differential    Glucose, CSF    Protein, CSF    CSF culture    Arbovirus Antibody Panel, CSF    ACE, CSF    Lyme Disease Serology, CSF    Enterovirus RNA by PCR [facility]; Cerebrospinal Fluid    WNV ANTIBODIES, CSF    Ms Profile    Ms Profile Blood Collection        More than 50% of this 60 minute encounter was spent in counseling and coordinating care of right sided weakness.  Follow-up: Follow up in about 1 month (around 7/11/2020).    This note was done with the assistance of voice recognition software. Some errors may be present after proofreading.

## 2020-06-25 ENCOUNTER — CLINICAL SUPPORT (OUTPATIENT)
Dept: REHABILITATION | Facility: HOSPITAL | Age: 70
End: 2020-06-25
Payer: COMMERCIAL

## 2020-06-25 ENCOUNTER — HOSPITAL ENCOUNTER (OUTPATIENT)
Dept: PREADMISSION TESTING | Facility: HOSPITAL | Age: 70
Discharge: HOME | End: 2020-06-25
Attending: RADIOLOGY
Payer: COMMERCIAL

## 2020-06-25 VITALS
HEART RATE: 80 BPM | TEMPERATURE: 99 F | WEIGHT: 241.19 LBS | HEIGHT: 74 IN | BODY MASS INDEX: 30.95 KG/M2 | RESPIRATION RATE: 18 BRPM | SYSTOLIC BLOOD PRESSURE: 147 MMHG | OXYGEN SATURATION: 99 % | DIASTOLIC BLOOD PRESSURE: 74 MMHG

## 2020-06-25 DIAGNOSIS — Z74.09 IMPAIRED FUNCTIONAL MOBILITY, BALANCE, GAIT, AND ENDURANCE: ICD-10-CM

## 2020-06-25 DIAGNOSIS — M25.551 RIGHT HIP PAIN: ICD-10-CM

## 2020-06-25 DIAGNOSIS — Z01.818 PREOP TESTING: Primary | ICD-10-CM

## 2020-06-25 DIAGNOSIS — R29.898 RIGHT LEG WEAKNESS: ICD-10-CM

## 2020-06-25 LAB
ALBUMIN SERPL BCP-MCNC: 4.2 G/DL (ref 3.5–5.2)
ALP SERPL-CCNC: 76 U/L (ref 55–135)
ALT SERPL W/O P-5'-P-CCNC: 30 U/L (ref 10–44)
ANION GAP SERPL CALC-SCNC: 7 MMOL/L (ref 8–16)
AST SERPL-CCNC: 30 U/L (ref 10–40)
BASOPHILS # BLD AUTO: 0.02 K/UL (ref 0–0.2)
BASOPHILS NFR BLD: 0.3 % (ref 0–1.9)
BILIRUB SERPL-MCNC: 0.7 MG/DL (ref 0.1–1)
BUN SERPL-MCNC: 8 MG/DL (ref 8–23)
CALCIUM SERPL-MCNC: 9.2 MG/DL (ref 8.7–10.5)
CHLORIDE SERPL-SCNC: 109 MMOL/L (ref 95–110)
CO2 SERPL-SCNC: 25 MMOL/L (ref 23–29)
CREAT SERPL-MCNC: 0.8 MG/DL (ref 0.5–1.4)
DIFFERENTIAL METHOD: ABNORMAL
EOSINOPHIL # BLD AUTO: 0.1 K/UL (ref 0–0.5)
EOSINOPHIL NFR BLD: 1.6 % (ref 0–8)
ERYTHROCYTE [DISTWIDTH] IN BLOOD BY AUTOMATED COUNT: 13.2 % (ref 11.5–14.5)
EST. GFR  (AFRICAN AMERICAN): >60 ML/MIN/1.73 M^2
EST. GFR  (NON AFRICAN AMERICAN): >60 ML/MIN/1.73 M^2
GLUCOSE SERPL-MCNC: 92 MG/DL (ref 70–110)
HCT VFR BLD AUTO: 44.5 % (ref 40–54)
HGB BLD-MCNC: 14 G/DL (ref 14–18)
IMM GRANULOCYTES # BLD AUTO: 0.02 K/UL (ref 0–0.04)
IMM GRANULOCYTES NFR BLD AUTO: 0.3 % (ref 0–0.5)
INR PPP: 0.9 (ref 0.8–1.2)
LYMPHOCYTES # BLD AUTO: 2.5 K/UL (ref 1–4.8)
LYMPHOCYTES NFR BLD: 36.1 % (ref 18–48)
MCH RBC QN AUTO: 28.1 PG (ref 27–31)
MCHC RBC AUTO-ENTMCNC: 31.5 G/DL (ref 32–36)
MCV RBC AUTO: 89 FL (ref 82–98)
MONOCYTES # BLD AUTO: 0.6 K/UL (ref 0.3–1)
MONOCYTES NFR BLD: 8.2 % (ref 4–15)
NEUTROPHILS # BLD AUTO: 3.7 K/UL (ref 1.8–7.7)
NEUTROPHILS NFR BLD: 53.5 % (ref 38–73)
NRBC BLD-RTO: 0 /100 WBC
PLATELET # BLD AUTO: 256 K/UL (ref 150–350)
PMV BLD AUTO: 8.7 FL (ref 9.2–12.9)
POTASSIUM SERPL-SCNC: 4.3 MMOL/L (ref 3.5–5.1)
PROT SERPL-MCNC: 7 G/DL (ref 6–8.4)
PROTHROMBIN TIME: 10.2 SEC (ref 9–12.5)
RBC # BLD AUTO: 4.99 M/UL (ref 4.6–6.2)
SODIUM SERPL-SCNC: 141 MMOL/L (ref 136–145)
WBC # BLD AUTO: 6.82 K/UL (ref 3.9–12.7)

## 2020-06-25 PROCEDURE — 85610 PROTHROMBIN TIME: CPT

## 2020-06-25 PROCEDURE — 80053 COMPREHEN METABOLIC PANEL: CPT

## 2020-06-25 PROCEDURE — 85025 COMPLETE CBC W/AUTO DIFF WBC: CPT

## 2020-06-25 PROCEDURE — 97110 THERAPEUTIC EXERCISES: CPT | Mod: PN,CQ

## 2020-06-25 NOTE — PROGRESS NOTES
"  Physical Therapy Daily Treatment Note     Name: Robles Jordan  Clinic Number: [id number]    Therapy Diagnosis:   Encounter Diagnoses   Name Primary?    Impaired functional mobility, balance, gait, and endurance     Right leg weakness     Right hip pain        Physician: Chico Rodriguez DO    Visit Date: 6/25/2020    Physician Orders: Eval and Treat  Medical Diagnosis:   Q06.8 (ICD-10-CM) - Tethered cord   R29.898 (ICD-10-CM) - Right leg weakness   R26.9 (ICD-10-CM) - Gait disturbance   Evaluation Date: 12/4/19  POC updated on 5/12/2020  Authorization Period Expiration: 12-31-20  Plan of Care Certification Period: 5-4-20 to 8/12/2020  Visit #/Visits authorized: 7/21 (16 previous visits)      Time In: 0935  Time Out: 1030  Total Time: 45 minutes    Precautions: Standard and tethered cord syndrome    Subjective     Pt reports: having spinal tap tomorrow. He has been "up and down" lately. Did yard work the other day and has had increase in back pain since.       He was compliant with home exercise program.    Response to previous treatment: good  Functional change: none to note    Pain: 7/10 (tightness)  Location: back and right LE      Objective     Ambulates with hip drop on L side and lateral trunk lean.    Robles received therapeutic exercises to develop strength, endurance, ROM, flexibility, posture and core stabilization for 45 minutes including:      Upright Bike 8 Min level 2.5     Supine strap HS stretch 3x30   Supine HF stretch off side of mat 3 x 30"  DKTC with large green ball x 20  Supine SLR x 10   Supine hip abd using GTB 2 x 12, RLE only  Bridges 2 x 10 with 2 second hold  Supine marches x 20 total  Lateral steps using GTB x 4 laps (no UE support)  Heel Raises (no UE support) 3 x 10   [place] taps in standing 2 x 12 for tall cone with UE support  hip hikes 2x10 on R, 1 set on L. Standing on step- increased low back pain today  Standing hip ABD 2x10     NP today:  DL 15KB from 12in stool 3x10 "   suitcase carry 20KB 3x20ft  Kneeling on bench for HF stretch 3x30  12in hurdles side stepping x4laps, fwd stepping x3laps  STS to 24in stool 2x8 standing on foam pad, holding 11# med ball. (held--Chest press upon standing up)      Robles received cold pack to low back for 10 mins while supine following exercises today to decrease pain.       Home Exercises Provided and Patient Education Provided     Education provided:   - HEP compliance for stretching/strengthening in order to care for his muscles.    Written Home Exercises Provided: yes.   Exercises were reviewed and Robles was able to demonstrate them prior to the end of the session.  Robles demonstrated good  understanding of the education provided.     Assessment     Robles tolerated treatment session fairly today. He presents to clinic with reports of increase in back pain since performing yard work at home. Continues to fatigue quickly with standing exercises, requiring brief seated rest breaks. Plan to incorporate lumbar extensor and core exercises. He continues to ambulate with decreased foot clearance, edmar on R LE. Pt remains appropriate for PT at this time.     Robles is progressing well towards his goals.   Pt prognosis is Good.     Pt will continue to benefit from skilled outpatient physical therapy to address the deficits listed in the problem list box on initial evaluation, provide pt/family education and to maximize pt's level of independence in the home and community environment.     Pt's spiritual, cultural and educational needs considered and pt agreeable to plan of care and goals.     Anticipated barriers to physical therapy: tethered cord syndrome    Goals:     Short Term GOALS: - in progress  1. Pt to report feeling more stable and steady during gait. Progressing, does not feel steady after more than 10mins of walking  2. Pt to display improved gait tolerance to >10mins before needing rest break. MET 2-4-20   3. Pt to display  improved fxl strength to be able to carry groceries (Case of water). progressing  4. Pt to begin HEP including specific stretching and strengthening as per their diagnosis to decrease pain and improve flexibility.  5. Pt to display >65deg SLR in supine hooklying, >10 deg active DF in sitting.  MET 2-4-20   6. Pt to be able to comple >10 reps of sit<>Stand within 30 sec MET 2-4-20      Long Term GOALS: - in progress  1. Pt to report <1/10, no longer limiting functional activities.progressing  2. Pt to report return to >75% of regular ADLs, work and recreational tasks. progressing  3. Pt will demonstrate appropriate squat technique for improved tolerance of lifting and carrying functional activity needed for household tasks. progressing  4. Pt to be compliant in advanced HEP to maintain progress gained in therapy thus far. progressing  5. Pt to display >4+/5 gross MMT for B LE in order to support pelvis and spine throughout ADLs. progressing  Plan   Continue with current POC. Progress lumbar extensors and core stability exercises in standing, B LE strengthening/endurance, gait, and balance.     Plan of care Certification: extended to 8/12/2020.  Nichelle Dugan, PTA

## 2020-06-25 NOTE — DISCHARGE INSTRUCTIONS
"Your procedure  is scheduled for __6/26/2020_______.      Report to Same Day Surgery Unit at _10:00 AM on the 2nd floor of the hospital.  Use the front entrance of the hospital.  The front doors of the hospital open promptly at 5:30am.  If you need wheelchair assistance, call [phone] from your cell phone, or call "0" from the courtesy phone in the lobby.    Important instructions:     Take your morning medications with small sips of water.   Do not take diabetic medication.     Do not take blood thinners such as aspirin, ibuprofen, naproxyn (Aleve), fish oil tablets, Vitamin E, meloxicam.       Please shower the night before and the morning of your surgery.  Use Hibiclens soap if instructed to do so.     You may wear deodorant only.      Do not wear powder, body lotion or perfume/cologne.     Do not wear any jewelry or have any metal on your body.     You will be asked to remove any dentures or partials for the procedure.     Please bring any documents given to you by your doctor.     If you are going home on the same day of surgery, you must arrange for a family member or a friend to drive you home.  Public transportation is prohibited.  You will not be able to drive home if you were given anesthesia or sedation.     Wear loose fitting clothes allowing for bandages.     Please leave money and valuables home.       You may bring your cell phone.     Call the doctor if fever or illness should occur before your surgery.    Call [phone] to contact us here if needed.  "

## 2020-06-26 ENCOUNTER — HOSPITAL ENCOUNTER (OUTPATIENT)
Facility: HOSPITAL | Age: 70
Discharge: HOME | End: 2020-06-26
Attending: RADIOLOGY | Admitting: RADIOLOGY
Payer: COMMERCIAL

## 2020-06-26 VITALS
OXYGEN SATURATION: 99 % | HEART RATE: 77 BPM | RESPIRATION RATE: 18 BRPM | DIASTOLIC BLOOD PRESSURE: 68 MMHG | TEMPERATURE: 98 F | SYSTOLIC BLOOD PRESSURE: 133 MMHG

## 2020-06-26 DIAGNOSIS — R53.1 RIGHT SIDED WEAKNESS: ICD-10-CM

## 2020-06-26 LAB
CLARITY CSF: CLEAR
COLOR CSF: COLORLESS
GLUCOSE CSF-MCNC: 67 MG/DL (ref 40–70)
PROT CSF-MCNC: 35 MG/DL (ref 15–40)
RBC # CSF: <1 /CU MM
SPECIMEN VOL CSF: 4 ML
WBC # CSF: <1 /CU MM (ref 0–5)

## 2020-06-26 PROCEDURE — 82164 ANGIOTENSIN I ENZYME TEST: CPT

## 2020-06-26 PROCEDURE — 87205 SMEAR GRAM STAIN: CPT

## 2020-06-26 PROCEDURE — 89051 BODY FLUID CELL COUNT: CPT

## 2020-06-26 PROCEDURE — 82945 GLUCOSE OTHER FLUID: CPT

## 2020-06-26 PROCEDURE — 84157 ASSAY OF PROTEIN OTHER: CPT

## 2020-06-26 PROCEDURE — 87070 CULTURE OTHR SPECIMN AEROBIC: CPT

## 2020-06-26 PROCEDURE — 86788 WEST NILE VIRUS AB IGM: CPT

## 2020-06-26 PROCEDURE — 82042 OTHER SOURCE ALBUMIN QUAN EA: CPT

## 2020-06-26 PROCEDURE — 86653 ENCEPHALTIS ST LOUIS ANTBODY: CPT

## 2020-06-26 PROCEDURE — 86618 LYME DISEASE ANTIBODY: CPT

## 2020-06-26 RX ORDER — ACETAMINOPHEN 325 MG/1
650 TABLET ORAL EVERY 4 HOURS PRN
Status: DISCONTINUED | OUTPATIENT
Start: 2020-06-26 | End: 2020-06-26

## 2020-06-26 RX ORDER — ACETAMINOPHEN 325 MG/1
650 TABLET ORAL EVERY 4 HOURS PRN
Status: DISCONTINUED | OUTPATIENT
Start: 2020-06-26 | End: 2020-06-26 | Stop reason: HOSPADM

## 2020-06-26 NOTE — DISCHARGE INSTRUCTIONS
BATHING:  ? You may shower tomorrow.  DRESSING:  ? Remove dressing tomorrow.        ACTIVITY LEVEL: If you have received sedation or an anesthetic, you may feel sleepy for several hours. Rest until you are more awake. Gradually resume your normal activities    Do not drive, drink alcohol, or sign legal documents for 24 hours, or if taking narcotic pain medication.      DIET: You may resume your home diet. If nausea is present, increase your diet gradually with fluids and bland foods.    Medications: Pain medication should be taken only if needed and as directed. If antibiotics are prescribed, the medication should be taken until completed. You will be given an updated list of you medications.  ? No driving, alcoholic beverages or signing legal documents for next 24 hours if you have had sedation, or while taking pain medication    CALL THE DOCTOR:   For any obvious bleeding (some dried blood over the incision is normal).     Redness, swelling, foul smell around incision or fever over 100.4  Shortness of breath.  Persistent pain or nausea not relieved by medication.  Call  [phone]     to speak with an Interventional Radiologist    If any unusual problems or difficulties occur contact your doctor. If you cannot contact your doctor but feel your signs and symptoms warrant a physicians attention return to the emergency room.          Having a Lumbar Puncture     During a lumbar puncture, a sample of cerebrosprinal fluid (CSF) is removed near the base of the spine. The spinal cord is not touched.     A lumbar puncture (spinal tap) may be used to help diagnose certain problems in your brain or spinal cord. Prepare for your test as instructed. From start to finish, your procedure will take about 30-60 minutes. The test may be done in a medical office.This test may be done in an acute care setting like the emergency room or the hospital. Occasionally, the procedure is done in a radiology suite with X-ray guidance. Your  doctor may screen you for bleeding disorders and may order a head CT or MRI before the procedure to ensure there is no increased risk of performing the procedure.  During the test  You will lie on your side with your knees drawn into your chest. (This is called the fetal position.) Or, you may be asked to sit bent forward, with your chin down. First, your low back will be wiped with a special cleanser. It will then be injected with a numbing medication. Then, the doctor will insert a sterile needle into the sac that contains the spinal fluid. Despite the use of the local anesthetic, you may feel some pain or pressure when this happens. Try to remain still. And be sure to do as youre asked. Some spinal fluid will be withdrawn though the needle. The needle is then removed. Finally, a small bandage is placed over the puncture site. You may be asked to lie still for a short time before you leave.  After the test  · When youre able to leave, have an adult family member or friend drive you home.   · When you get home, rest as directed by your healthcare provider.   · If you get a headache, lying flat and drinking plenty of fluids may help relieve it. The headache is typically positional and gets better over time. You may also want to take an over-the-counter pain reliever, but avoid aspirin. Drinking caffeinated beverages, such as soda, coffee, or tea, can help relieve a headache after a lumbar puncture.  · The day after your lumbar puncture, you can remove your bandage.   · Your provider will tell you when the results of your lumbar puncture are ready.  Call your doctor if you have:  · A severe headache or a headache that lasts 2 or more days  · Pain in your back that persists  · Tingling in your groin or legs  · Fever  · Change in bowel or urination functions   Date Last Reviewed: 10/11/2015  © 0894-3354 [employer]. [address] [place] [address], [place] [address]. All rights reserved. This information is not  intended as a substitute for professional medical care. Always follow your healthcare professional's instructions.        Fall Prevention  Millions of people fall every year and injure themselves. You may have had anesthesia or sedation which may increase your risk of falling. You may have health issues that put you at an increased risk of falling.     Here are ways to reduce your risk of falling.  ·   · Make your home safe by keeping walkways clear of objects you may trip over.  · Use non-slip pads under rugs. Do not use area rugs or small throw rugs.  · Use non-slip mats in bathtubs and showers.  · Install handrails and lights on staircases.  · Do not walk in poorly lit areas.  · Do not stand on chairs or wobbly ladders.  · Use caution when reaching overhead or looking upward. This position can cause a loss of balance.  · Be sure your shoes fit properly, have non-slip bottoms and are in good condition.   · Wear shoes both inside and out. Avoid going barefoot or wearing slippers.  · Be cautious when going up and down stairs, curbs, and when walking on uneven sidewalks.  · If your balance is poor, consider using a cane or walker.  · If your fall was related to alcohol use, stop or limit alcohol intake.   · If your fall was related to use of sleeping medicines, talk to your doctor about this. You may need to reduce your dosage at bedtime if you awaken during the night to go to the bathroom.    · To reduce the need for nighttime bathroom trips:  ¨ Avoid drinking fluids for several hours before going to bed  ¨ Empty your bladder before going to bed  ¨ Men can keep a urinal at the bedside  · Stay as active as you can. Balance, flexibility, strength, and endurance all come from exercise. They all play a role in preventing falls. Ask your healthcare provider which types of activity are right for you.  · Get your vision checked on a regular basis.  · If you have pets, know where they are before you stand up or walk so you  don't trip over them.  · Use night lights.

## 2020-06-26 NOTE — H&P
Radiology History & Physical      SUBJECTIVE:     Chief Complaint: Progressive right-sided weakness and paresthesias    History of Present Illness:  Robles Jordan is a 69 y.o. male with h/o progressive right-sided weakness and paresthesias of unknown etiology. MRI brain with multiple white-matter signal abnormalities concentrated in bilateral frontal lobes of unclear etiology.     Given the above, new outpatient IR consult placed for fluoroscopic-guided diagnostic lumbar puncture.    Past Medical History:   Diagnosis Date    BPH (benign prostatic hypertrophy)     Hx of colonic polyps 10/19/2015    Hypertension     Sleep apnea      Past Surgical History:   Procedure Laterality Date    COLONOSCOPY N/A 10/19/2015    Procedure: COLONOSCOPY;  Surgeon: Antonino Bernstein MD;  Location: [address]);  Service: Endoscopy;  Laterality: N/A;    CYSTOSCOPY WITH URODYNAMIC TESTING N/A 1/17/2020    Procedure: CYSTOSCOPY,WITH URODYNAMIC TESTING;  Surgeon: Christianne Israel MD;  Location: [facility];  Service: Urology;  Laterality: N/A;  RN PREOP 1/13/2020    MULTIPLE TOOTH EXTRACTIONS  2014     Home Meds:   Prior to Admission medications    Medication Sig Start Date End Date Taking? Authorizing Provider   amLODIPine (NORVASC) 10 MG tablet Take 1 tablet (10 mg total) by mouth once daily. For blood pressure 10/30/19 10/29/20  John Jackson MD   atorvastatin (LIPITOR) 80 MG tablet TAKE 1 TABLET (80 MG TOTAL) BY MOUTH ONCE DAILY. 4/14/20 4/14/21  John Jackson MD   lisinopril (PRINIVIL,ZESTRIL) 40 MG tablet Take 1 tablet (40 mg total) by mouth once daily. 1/20/20 1/19/21  John Jackson MD   sildenafil (VIAGRA) 100 MG tablet Take 1 tablet (100 mg total) by mouth daily as needed for Erectile Dysfunction. 9/18/19 9/17/20  John Jackson MD   tamsulosin (FLOMAX) 0.4 mg Cap TAKE 1 CAPSULE (0.4 MG TOTAL) BY MOUTH ONCE DAILY. 9/16/19 9/15/20  John Jackson MD     Anticoagulants/Antiplatelets: no anticoagulation    Allergies:  Review of patient's allergies indicates:  No Known Allergies     Sedation History:  no adverse reactions    Review of Systems:   Hematological: no known coagulopathies  Respiratory: no cough, shortness of breath, or wheezing  Cardiovascular: no chest pain or dyspnea on exertion  Gastrointestinal: no abdominal pain, change in bowel habits, or black or bloody stools  Genito-Urinary: no dysuria, trouble voiding, or hematuria  Musculoskeletal: negative  Neurological: no TIA or stroke symptoms     OBJECTIVE:     Vital Signs (Most Recent)     Physical Exam:  General: no acute distress  Mental Status: alert and oriented to person, place and time  HEENT: normocephalic, atraumatic  Chest: unlabored breathing  Heart: regular heart rate  Abdomen: nondistended  Extremity:     Laboratory  Lab Results   Component Value Date    INR 0.9 06/25/2020       Lab Results   Component Value Date    WBC 6.82 06/25/2020    HGB 14.0 06/25/2020    HCT 44.5 06/25/2020    MCV 89 06/25/2020     06/25/2020      Lab Results   Component Value Date    GLU 92 06/25/2020     06/25/2020    K 4.3 06/25/2020     06/25/2020    CO2 25 06/25/2020    BUN 8 06/25/2020    CREATININE 0.8 06/25/2020    CALCIUM 9.2 06/25/2020    MG 2.3 09/20/2019    ALT 30 06/25/2020    AST 30 06/25/2020    ALBUMIN 4.2 06/25/2020    BILITOT 0.7 06/25/2020     ASSESSMENT/PLAN:     69 y.o. male with h/o progressive right-sided weakness and paresthesias of unknown etiology. MRI brain with multiple white-matter signal abnormalities concentrated in bilateral frontal lobes of unclear etiology.     1. Progressive right-sided weakness and paresthesias - Will attempt fluoroscopic-guided diagnostic lumbar puncture with local anesthetic only.     Risks (including, but not limited to, pain, bleeding, infection, damage to nearby structures, failure to obtain sufficient material for a diagnosis, the need for additional procedures, and death), benefits, and alternatives were  discussed with the patient. All questions were answered to the best of my abilities. The patient wishes to proceed with the procedure. Written informed consent was obtained.    Thank you for considering IR for the care of your patient.     Lalito Palacios MD  Interventional Radiology

## 2020-06-26 NOTE — DISCHARGE SUMMARY
Radiology Discharge Summary      Hospital Course: No complications    Admit Date: 6/26/2020  Discharge Date: 06/26/2020     Instructions Given to Patient: Yes    Diet: Resume prior diet     Activity: activity as tolerated    Description of Condition on Discharge: Stable    Vital Signs (Most Recent):      Discharge Disposition: Home    Discharge Diagnosis:  69 y.o. male with progressive right-sided weakness/paresthesias and MRI brain with multiple white-matter signal abnormalities concentrated in bilateral frontal lobes of unclear etiology s/p successful Fluoroscopic-guided diagnostic LP with local anesthetic only. Patient tolerated the procedure well. No immediate post-procedural complications noted.     Thank you for considering IR for the care of your patient.     Lalito Palacios MD  Interventional Radiology

## 2020-06-26 NOTE — BRIEF OP NOTE
Radiology Post-Procedure Note    Pre Op Diagnosis: Fluoroscopic-guided diagnostic LP  Post Op Diagnosis: Same    Procedure: Fluoroscopic-guided diagnostic LP    Procedure performed by: Lalito Palacios MD    Written Informed Consent Obtained: Yes  Specimen Removed: YES, 9-cc of clear CSF  Estimated Blood Loss: none    Findings:   Successful Fluoroscopic-guided diagnostic LP with local anesthetic only. Patient tolerated the procedure well. No immediate post-procedural complications noted.     1 hour bed rest with HOB flat, encourage hydration and Tylenol PRN prior to discharge.     Thank you for considering IR for the care of your patient.     Lalito Palacios MD  Interventional Radiology  .

## 2020-06-28 LAB
EEEV IGG TITR CSF IF: NORMAL {TITER}
EEEV IGM TITR CSF IF: NORMAL {TITER}
LACV IGG CSF QL IF: NORMAL
LACV IGM CSF QL IF: NORMAL
SLEV IGG TITR CSF IF: NORMAL {TITER}
SLEV IGM TITR CSF IF: NORMAL {TITER}
WEEV IGG TITR CSF IF: NORMAL {TITER}
WEEV IGM TITR CSF IF: NORMAL {TITER}

## 2020-06-29 LAB
B BURGDOR AB CSF IA-ACNC: 0.09 LIV
BACTERIA CSF CULT: NO GROWTH
GRAM STN SPEC: NORMAL
WEST NILE VIRUS CSF INTERP: NORMAL
WNV IGG CSF QL: NEGATIVE
WNV IGM CSF QL IA: NEGATIVE

## 2020-06-30 ENCOUNTER — CLINICAL SUPPORT (OUTPATIENT)
Dept: REHABILITATION | Facility: HOSPITAL | Age: 70
End: 2020-06-30
Payer: COMMERCIAL

## 2020-06-30 DIAGNOSIS — R29.898 RIGHT LEG WEAKNESS: ICD-10-CM

## 2020-06-30 DIAGNOSIS — M25.551 RIGHT HIP PAIN: ICD-10-CM

## 2020-06-30 DIAGNOSIS — Z74.09 IMPAIRED FUNCTIONAL MOBILITY, BALANCE, GAIT, AND ENDURANCE: ICD-10-CM

## 2020-06-30 LAB
ALBUMIN CSF-MCNC: 21 MG/DL
ALBUMIN SERPL-MCNC: 4380 MG/DL (ref 3200–4800)
IGG CSF-MCNC: 2.4 MG/DL
IGG SERPL-MCNC: 967 MG/DL (ref 767–1590)
IGG SYNTH RATE SER+CSF CALC-MRATE: 0.16 MG/24 H
IGG/ALB CLEAR SER+CSF-RTO: 0.5
IGG/ALB CSF: 0.11 {RATIO}
IGG/ALB SER: 0.22 {RATIO}
OLIGOCLONAL BANDS CSF ELPH-IMP: 0 BANDS
OLIGOCLONAL BANDS CSF ELPH-IMP: 0 BANDS
OLIGOCLONAL BANDS SERPL: 0 BANDS

## 2020-06-30 PROCEDURE — 97110 THERAPEUTIC EXERCISES: CPT | Mod: PN,CQ

## 2020-06-30 NOTE — PROGRESS NOTES
"  Physical Therapy Daily Treatment Note     Name: Robles Jordan  Clinic Number: [id number]    Therapy Diagnosis:   Encounter Diagnoses   Name Primary?    Impaired functional mobility, balance, gait, and endurance     Right leg weakness     Right hip pain        Physician: Chico Rodriguez DO    Visit Date: 6/30/2020    Physician Orders: Eval and Treat  Medical Diagnosis:   Q06.8 (ICD-10-CM) - Tethered cord   R29.898 (ICD-10-CM) - Right leg weakness   R26.9 (ICD-10-CM) - Gait disturbance   Evaluation Date: 12/4/19  POC updated on 5/12/2020  Authorization Period Expiration: 12-31-20  Plan of Care Certification Period: 5-4-20 to 8/12/2020  Visit #/Visits authorized: 8/21 (16 previous visits)      Time In: 1030  Time Out: 1130  Total Time: 50 minutes    Precautions: Standard and tethered cord syndrome    Subjective     Pt reports: lumbar procedure went well the other day. No new s/s since last session      He was compliant with home exercise program.    Response to previous treatment: good  Functional change: none to note    Pain: did not rate/10 (tightness)  Location: back and right LE      Objective     Ambulates with hip drop on L side and lateral trunk lean.    Robles received therapeutic exercises to develop strength, endurance, ROM, flexibility, posture and core stabilization for 50 minutes including:      Upright Bike 8 Min level 2.5     Supine strap HS stretch 3x30   Supine HF stretch off side of mat 3 x 30"  DKTC with large green ball x 20  Supine SLR x 10   Supine hip abd using GTB 2 x 12, RLE only  Bridges 2 x 10 with 2 second hold  Supine marches x 20 total  Lateral steps using GTB x 4 laps (no UE support)  Heel Raises (no UE support) 3 x 10   Tall Cone taps in standing 2 x 12 for tall cone with UE support  hip hikes 2x10 on R, 1 set on L. Standing on step- increased low back pain today NP  Standing hip ABD 2x10   +Standing hip ext 2 x 10  +EIS 2 x 10    NP today:  DL 15KB from 12in stool 3x10 "   suitcase carry 20KB 3x20ft  Kneeling on bench for HF stretch 3x30  12in hurdles side stepping x4laps, fwd stepping x3laps  STS to 24in stool 2x8 standing on foam pad, holding 11# med ball. (held--Chest press upon standing up)      Robles received cold pack to low back for 10 mins while supine following exercises today to decrease pain.       Home Exercises Provided and Patient Education Provided     Education provided:   - HEP compliance for stretching/strengthening in order to care for his muscles.    Written Home Exercises Provided: yes.   Exercises were reviewed and Robles was able to demonstrate them prior to the end of the session.  Robles demonstrated good  understanding of the education provided.     Assessment     Robles tolerated treatment session well today. Overall better tolerance to today's session. He reports lumbar procedure went well and is awaiting results.  Continues to fatigue quickly with standing exercises, requiring brief seated rest breaks. Added EIS and standing hip ext this session. He reports a good stretch in hip flexor region during EIS. Plan to incorporate lumbar extensor and core exercises in future sessions per pt tolerance. He continues to ambulate with decreased foot clearance, edmar on R LE. Pt remains appropriate for PT at this time.     Robles is progressing well towards his goals.   Pt prognosis is Good.     Pt will continue to benefit from skilled outpatient physical therapy to address the deficits listed in the problem list box on initial evaluation, provide pt/family education and to maximize pt's level of independence in the home and community environment.     Pt's spiritual, cultural and educational needs considered and pt agreeable to plan of care and goals.     Anticipated barriers to physical therapy: tethered cord syndrome    Goals:     Short Term GOALS: - in progress  1. Pt to report feeling more stable and steady during gait. Progressing, does not feel  steady after more than 10mins of walking  2. Pt to display improved gait tolerance to >10mins before needing rest break. MET 2-4-20   3. Pt to display improved fxl strength to be able to carry groceries (Case of water). progressing  4. Pt to begin HEP including specific stretching and strengthening as per their diagnosis to decrease pain and improve flexibility.  5. Pt to display >65deg SLR in supine hooklying, >10 deg active DF in sitting.  MET 2-4-20   6. Pt to be able to comple >10 reps of sit<>Stand within 30 sec MET 2-4-20      Long Term GOALS: - in progress  1. Pt to report <1/10, no longer limiting functional activities.progressing  2. Pt to report return to >75% of regular ADLs, work and recreational tasks. progressing  3. Pt will demonstrate appropriate squat technique for improved tolerance of lifting and carrying functional activity needed for household tasks. progressing  4. Pt to be compliant in advanced HEP to maintain progress gained in therapy thus far. progressing  5. Pt to display >4+/5 gross MMT for B LE in order to support pelvis and spine throughout ADLs. progressing  Plan   Continue with current POC. Progress lumbar extensors and core stability exercises in standing, B LE strengthening/endurance, gait, and balance.     Plan of care Certification: extended to 8/12/2020.  Nichelle Dugan, PTA

## 2020-07-01 LAB — ACE CSF-CCNC: <0.4 U/L (ref 0–2.5)

## 2020-07-01 NOTE — PROGRESS NOTES
"  Physical Therapy Daily Treatment Note     Name: Robles Jordan  Clinic Number: [id number]    Therapy Diagnosis:   Encounter Diagnoses   Name Primary?    Impaired functional mobility, balance, gait, and endurance Yes    Right leg weakness     Right hip pain        Physician: Chico Rodriguez DO    Visit Date: 7/2/2020    Physician Orders: Eval and Treat  Medical Diagnosis:   Q06.8 (ICD-10-CM) - Tethered cord   R29.898 (ICD-10-CM) - Right leg weakness   R26.9 (ICD-10-CM) - Gait disturbance   Evaluation Date: 12/4/19  POC updated on 5/12/2020  Authorization Period Expiration: 12-31-20  Plan of Care Certification Period: 5-4-20 to 8/12/2020  Visit #/Visits authorized: 9/21 (16 previous visits)      Time In: 0945  Time Out: 1045  Total Billable Time: 50 minutes    Precautions: Standard and tethered cord syndrome    Subjective     Pt reports: No pain, just stiffness. Continued weakness on R side    He was compliant with home exercise program.    Response to previous treatment: good  Functional change: none to note    Pain: did not rate/10 (tightness)  Location: back and right LE      Objective     Ambulates with hip drop on L side and lateral trunk lean.    Robles received therapeutic exercises to develop strength, endurance, ROM, flexibility, posture and core stabilization for 50 minutes including:    Upright Bike 8 Min level 2.5     Supine strap HS stretch 3x30   Supine HF stretch off side of mat 3 x 30"  +TA recruitment   DKTC with large green ball x 20  Supine SLR x 10   Supine hip abd using GTB 2 x 12, RLE only  Bridges 2 x 10 with 2 second hold  Supine marches x 20 total  Lateral steps using GTB x 4 laps (no UE support)  Heel Raises (no UE support) 3 x 10   Standing hip ABD 2x10   Standing hip ext 2 x 10  Tall Cone taps in standing 2 x 12 for tall cone with UE support  hip hikes 2x10 on R, 1 set on L. Standing on step- increased low back pain today NP  EIS 2 x 10    NP today:  DL 15KB from 12in stool 3x10 "   suitcase carry 20KB 3x20ft  Kneeling on bench for HF stretch 3x30  12in hurdles side stepping x4laps, fwd stepping x3laps  STS to 24in stool 2x8 standing on foam pad, holding 11# med ball. (held--Chest press upon standing up)      Robles received cold pack to low back for 10 mins while supine following exercises today to decrease pain.       Home Exercises Provided and Patient Education Provided     Education provided:   - HEP compliance for stretching/strengthening in order to care for his muscles.    Written Home Exercises Provided: yes.   Exercises were reviewed and Robles was able to demonstrate them prior to the end of the session.  Robles demonstrated good  understanding of the education provided.     Assessment     Fairly good tolerance to today's session. Continues to fatigue quickly with standing exercises, requiring brief seated rest breaks. Added transverse abdominis activation exercise and then emphasized focus on core stability during SLRs, DKTC and other strengthening exercises. He continues to ambulate with decreased foot clearance, edmar on R LE. Pt remains appropriate for PT at this time.     Robles is progressing well towards his goals.   Pt prognosis is Good.     Pt will continue to benefit from skilled outpatient physical therapy to address the deficits listed in the problem list box on initial evaluation, provide pt/family education and to maximize pt's level of independence in the home and community environment.     Pt's spiritual, cultural and educational needs considered and pt agreeable to plan of care and goals.     Anticipated barriers to physical therapy: tethered cord syndrome    Goals:     Short Term GOALS: - in progress  1. Pt to report feeling more stable and steady during gait. Progressing, does not feel steady after more than 10mins of walking  2. Pt to display improved gait tolerance to >10mins before needing rest break. MET 2-4-20   3. Pt to display improved fxl strength  to be able to carry groceries (Case of water). progressing  4. Pt to begin HEP including specific stretching and strengthening as per their diagnosis to decrease pain and improve flexibility.  5. Pt to display >65deg SLR in supine hooklying, >10 deg active DF in sitting.  MET 2-4-20   6. Pt to be able to comple >10 reps of sit<>Stand within 30 sec MET 2-4-20      Long Term GOALS: - in progress  1. Pt to report <1/10, no longer limiting functional activities.progressing  2. Pt to report return to >75% of regular ADLs, work and recreational tasks. progressing  3. Pt will demonstrate appropriate squat technique for improved tolerance of lifting and carrying functional activity needed for household tasks. progressing  4. Pt to be compliant in advanced HEP to maintain progress gained in therapy thus far. progressing  5. Pt to display >4+/5 gross MMT for B LE in order to support pelvis and spine throughout ADLs. progressing  Plan   Continue with current POC. Progress lumbar extensors and core stability exercises in standing, B LE strengthening/endurance, gait, and balance.     Plan of care Certification: extended to 8/12/2020.    I certify that I was present in the room directing the student in service delivery and guiding them using my skilled judgment. As the co-signing therapist I have reviewed the students documentation and am responsible for the treatment, assessment, and plan.       Nichelle Dugan, RASTA Escobedo, RODRIGO

## 2020-07-02 ENCOUNTER — CLINICAL SUPPORT (OUTPATIENT)
Dept: REHABILITATION | Facility: HOSPITAL | Age: 70
End: 2020-07-02
Payer: COMMERCIAL

## 2020-07-02 DIAGNOSIS — Z74.09 IMPAIRED FUNCTIONAL MOBILITY, BALANCE, GAIT, AND ENDURANCE: Primary | ICD-10-CM

## 2020-07-02 DIAGNOSIS — R29.898 RIGHT LEG WEAKNESS: ICD-10-CM

## 2020-07-02 DIAGNOSIS — M25.551 RIGHT HIP PAIN: ICD-10-CM

## 2020-07-02 PROCEDURE — 97110 THERAPEUTIC EXERCISES: CPT | Mod: PN,CQ

## 2020-07-17 DIAGNOSIS — Z71.89 COMPLEX CARE COORDINATION: ICD-10-CM

## 2020-07-18 ENCOUNTER — OFFICE VISIT (OUTPATIENT)
Dept: URGENT CARE | Facility: CLINIC | Age: 70
End: 2020-07-18
Payer: COMMERCIAL

## 2020-07-18 VITALS
OXYGEN SATURATION: 98 % | DIASTOLIC BLOOD PRESSURE: 65 MMHG | SYSTOLIC BLOOD PRESSURE: 156 MMHG | HEART RATE: 99 BPM | TEMPERATURE: 100 F

## 2020-07-18 DIAGNOSIS — N30.01 ACUTE CYSTITIS WITH HEMATURIA: ICD-10-CM

## 2020-07-18 DIAGNOSIS — R50.9 FEVER, UNSPECIFIED FEVER CAUSE: Primary | ICD-10-CM

## 2020-07-18 LAB
BILIRUB UR QL STRIP: NEGATIVE
GLUCOSE UR QL STRIP: NEGATIVE
KETONES UR QL STRIP: NEGATIVE
LEUKOCYTE ESTERASE UR QL STRIP: POSITIVE
PH, POC UA: 5 (ref 5–8)
POC BLOOD, URINE: NEGATIVE
POC NITRATES, URINE: NEGATIVE
PROT UR QL STRIP: POSITIVE
SP GR UR STRIP: 1.02 (ref 1–1.03)
UROBILINOGEN UR STRIP-ACNC: ABNORMAL (ref 0.3–2.2)

## 2020-07-18 PROCEDURE — 81003 URINALYSIS AUTO W/O SCOPE: CPT | Mod: QW,S$GLB,, | Performed by: INTERNAL MEDICINE

## 2020-07-18 PROCEDURE — 99213 OFFICE O/P EST LOW 20 MIN: CPT | Mod: 25,S$GLB,, | Performed by: INTERNAL MEDICINE

## 2020-07-18 PROCEDURE — 81003 POCT URINALYSIS, DIPSTICK, AUTOMATED, W/O SCOPE: ICD-10-PCS | Mod: QW,S$GLB,, | Performed by: INTERNAL MEDICINE

## 2020-07-18 PROCEDURE — 99213 PR OFFICE/OUTPT VISIT, EST, LEVL III, 20-29 MIN: ICD-10-PCS | Mod: 25,S$GLB,, | Performed by: INTERNAL MEDICINE

## 2020-07-18 RX ORDER — SULFAMETHOXAZOLE AND TRIMETHOPRIM 800; 160 MG/1; MG/1
1 TABLET ORAL 2 TIMES DAILY
Qty: 6 TABLET | Refills: 0 | Status: SHIPPED | OUTPATIENT
Start: 2020-07-18 | End: 2020-07-21

## 2020-07-18 RX ORDER — PHENAZOPYRIDINE HYDROCHLORIDE 200 MG/1
200 TABLET, FILM COATED ORAL 3 TIMES DAILY PRN
Qty: 30 TABLET | Refills: 0 | Status: SHIPPED | OUTPATIENT
Start: 2020-07-18 | End: 2020-07-28

## 2020-07-18 NOTE — PATIENT INSTRUCTIONS
Bladder Infection, Male (Adult)    You have a bladder infection.  Urine is normally free of bacteria. But bacteria can get into the urinary tract from the skin around the rectum or it may travel in the blood from elsewhere in the body.  This is called a urinary tract infection (UTI). An infection can occur anywhere in the urinary tract. It could be in a kidney (pyelonephritis)or in the bladder (cystitis) and urethra (urethritis). The urethra is the tube that drains the urine from the bladder through the tip of the penis.  The most common place for a UTI is in the bladder. This is called a bladder infection. Most bladder infections are easily treated. They are not serious unless the infection spreads up to the kidney.  The terms bladder infection, UTI, and cystitis are often used to describe the same thing, but they arent always the same. Cystitis is an inflammation of the bladder. The most common cause of cystitis is an infection.   Keep in mind:  · Infections in the urine are called UTIs.  · Cystitis is usually caused by a UTI.  · Not all UTIs and cases of cystitis are bladder infections.  · Bladder infections are the most common type of cystitis.  Symptoms of a bladder infection  The infection causes inflammation in the urethra and bladder. This inflammation causes many of the symptoms. The most common symptoms of a bladder infection are:  · Pain or burning when urinating  · Having to go more often than usual  · Feeling like you need to go right away  · Only a small amount comes out  · Blood in urine  · Discomfort in your belly (abdomen), usually in the lower abdomen, above the pubic bone  · Cloudy, strong, or bad smelling urine  · Unable to urinate (retention)  · Urinary incontinence  · Fever  · Loss of appetite  Older adults may also feel confused.  Causes of a bladder infection  Bladder infections are not contagious. You can't get one from someone else, from a toilet seat, or from sharing a bath.  The most  common cause of bladder infections is bacteria from the bowels. The bacteria get onto the skin around the opening of the urethra. From there they can get into the urine and travel up to the bladder. This causes inflammation and an infection. This usually happens because of:  · An enlarged prostate  · Poor cleaning of the genitals  · Procedures that put a tube in your bladder, like a Cortez catheter  · Bowel incontinence  · Older age  · Not emptying your bladder (The urine stays there, giving the bacteria a chance to grow.)  · Dehydration (This allows urine to stay in the bladder longer.)  · Constipation (This can cause the bowels to push on the bladder or urethra and keep the bladder from emptying.)  Treatment  Bladder infections are treated with antibiotics. They usually clear up quickly without complications. Treatment helps prevent a more serious kidney infection.  Medicines  Medicines can help in the treatment of a bladder infection:  · You may have been given phenazopyridine to ease burning when you urinate. It will cause your urine to be bright orange. It can stain clothing.  · You may have been prescribed antibiotics. Take this medicine until you have finished it, even if you feel better. Taking all of the medicine will make sure the infection has cleared.  You can use acetaminophen or ibuprofen for pain, fever, or discomfort, unless another medicine was prescribed. You can also alternate them, or use both together. They work differently and are a different class of medicines, so taking them together is not an overdose. If you have chronic liver or kidney disease, talk with your healthcare provider before using these medicines. Also talk with your provider if youve had a stomach ulcer or GI bleeding or are taking blood thinner medicines.  Home care  Here are some guidelines to help you care for yourself at home:  · Drink plenty of fluids, unless your healthcare provider told you not to. Fluids will prevent  dehydration and flush out your bladder.  · Use good personal hygiene. Wipe from front to back after using the toilet, and clean your penis regularly. If you arent circumcised, retract the foreskin when cleaning.  · Urinate more frequently, and dont try to hold it in for long periods of time, if possible.  · Wear loose-fitting clothes and cotton underwear. Avoid tight-fitting pants. This helps keep you clean and dry.  · Change your diet to prevent constipation. This means eating more fresh foods and more fiber, and less junk and fatty foods.  · Avoid sex until your symptoms are gone.  · Avoid caffeine, alcohol, and spicy foods. These can irritate the bladder.  Follow-up care  Follow up with your healthcare provider, or as advised if all symptoms have not cleared up within 5 days. It is important to keep your follow-up appointment. You can talk with your provider to see if you need more tests of the urinary tract. This is especially important if you have infections that keep coming back.  If a culture was done, you will be told if your treatment needs to be changed. If directed, you can call to find out the results.  If X-rays were taken, you will be told of any findings that may affect your care.  Call 911  Call 911 if any of these occur:  · Trouble breathing  · Difficulty waking up  · Feeling confused  · Fainting or loss of consciousness  · Rapid heart rate  When to seek medical advice  Call your healthcare provider right away if any of these occur:  · Fever of 100.4ºF (38ºC) or higher, or as directed by your healthcare provider  · Your symptoms dont improve after 2 days of treatment  · Back or abdominal pain that gets worse  · Repeated vomiting, or you arent able to keep medicine down  · Weakness or dizziness  Date Last Reviewed: 10/1/2016  © 9519-5861 [employer]. [address] [place] [address], [place] [address]. All rights reserved. This information is not intended as a substitute for professional  medical care. Always follow your healthcare professional's instructions.

## 2020-07-18 NOTE — PROGRESS NOTES
Subjective:       Patient ID: Robles Jordan is a 69 y.o. male.    Vitals:  vitals were not taken for this visit.     Chief Complaint: COVID-19 Concerns    Pt states he had blood in urine and a temp of 101.0. spouse tested positive for covid-19 on June 12th. No other URI symptoms currently. Highest temperature of 102.     Urinary Tract Infection   This is a new problem. The current episode started 1 to 4 weeks ago. The problem occurs every urination. The problem has been unchanged. The patient is experiencing no pain. Associated symptoms include hematuria and urgency. Pertinent negatives include no chills, frequency, nausea, vomiting or rash. He has tried acetaminophen (pyridium) for the symptoms. The treatment provided mild relief.       Constitution: Positive for fever. Negative for chills.   Neck: Negative for painful lymph nodes.   Gastrointestinal: Negative for abdominal pain, nausea and vomiting.   Genitourinary: Positive for dysuria, urgency and hematuria. Negative for frequency, urine decreased, history of kidney stones, genital trauma, painful intercourse, genital sore, penile discharge, painful ejaculation, penile pain, penile swelling, scrotal swelling and testicular pain.   Musculoskeletal: Negative for back pain.   Skin: Negative for rash and lesion.   Hematologic/Lymphatic: Negative for swollen lymph nodes.       Objective:      Physical Exam   HENT:   Head: Normocephalic and atraumatic.   Nose: Nose normal.   Mouth/Throat: Mucous membranes are moist. Oropharynx is clear.   Eyes: Pupils are equal, round, and reactive to light. Right eye exhibits no discharge. Left eye exhibits no discharge.   Cardiovascular: Normal rate and normal pulses.   Pulmonary/Chest: Effort normal and breath sounds normal. No respiratory distress.   Abdominal: Soft. Normal appearance and bowel sounds are normal. He exhibits no distension. There is no abdominal tenderness.   Neurological: He is alert.         Assessment:        1. Fever, unspecified fever cause    2. Acute cystitis with hematuria        Plan:         Fever, unspecified fever cause  -     POCT Urinalysis, Dipstick, Automated, W/O Scope    Acute cystitis with hematuria  -     sulfamethoxazole-trimethoprim 800-160mg (BACTRIM DS) 800-160 mg Tab; Take 1 tablet by mouth 2 (two) times daily. for 3 days  Dispense: 6 tablet; Refill: 0  -     phenazopyridine (PYRIDIUM) 200 MG tablet; Take 1 tablet (200 mg total) by mouth 3 (three) times daily as needed for Pain.  Dispense: 30 tablet; Refill: 0

## 2020-07-23 ENCOUNTER — OFFICE VISIT (OUTPATIENT)
Dept: NEUROSURGERY | Facility: CLINIC | Age: 70
End: 2020-07-23
Payer: COMMERCIAL

## 2020-07-23 VITALS
HEART RATE: 94 BPM | TEMPERATURE: 97 F | BODY MASS INDEX: 30.42 KG/M2 | HEIGHT: 74 IN | RESPIRATION RATE: 15 BRPM | DIASTOLIC BLOOD PRESSURE: 68 MMHG | WEIGHT: 237 LBS | SYSTOLIC BLOOD PRESSURE: 129 MMHG

## 2020-07-23 DIAGNOSIS — D17.79 FIBROLIPOMA OF FILUM TERMINALE: ICD-10-CM

## 2020-07-23 DIAGNOSIS — R29.898 RIGHT LEG WEAKNESS: ICD-10-CM

## 2020-07-23 DIAGNOSIS — Q06.8 TETHERED CORD SYNDROME: Primary | ICD-10-CM

## 2020-07-23 PROCEDURE — 99215 OFFICE O/P EST HI 40 MIN: CPT | Mod: S$GLB,,, | Performed by: NEUROLOGICAL SURGERY

## 2020-07-23 PROCEDURE — 99999 PR PBB SHADOW E&M-EST. PATIENT-LVL V: ICD-10-PCS | Mod: PBBFAC,,, | Performed by: NEUROLOGICAL SURGERY

## 2020-07-23 PROCEDURE — 99999 PR PBB SHADOW E&M-EST. PATIENT-LVL V: CPT | Mod: PBBFAC,,, | Performed by: NEUROLOGICAL SURGERY

## 2020-07-23 PROCEDURE — 99215 PR OFFICE/OUTPT VISIT, EST, LEVL V, 40-54 MIN: ICD-10-PCS | Mod: S$GLB,,, | Performed by: NEUROLOGICAL SURGERY

## 2020-07-23 NOTE — PROGRESS NOTES
CHIEF COMPLAINT:  Chief Complaint   Patient presents with    Follow-up     INTERVAL HISTORY (7/23/20):  Mr Jordan returns for follow up appointment for follow up for concern for tethered cord syndrome. He has not gotten any weaker in the RLE since the last appointment, but he recently had his first UTI last week with fevers. He underwent a lumbar puncture for CSF studies which did not reveal any major abnormalities. Denies any noticeable urinary retention. Denies any new numbness.     Full neurological workup, including imaging and CSF analysis has been unrevealing.      INTERVAL HISTORY (6/11/20):   patient returns for continued evaluation and management of gait disturbance.  Patient presents with his wife and both report that the patient has progressed in terms of his gait disturbance, right lower extremity weakness, and now reports some right upper extremity symptoms.  He was most recently seen by my PA at which  Time he reported right upper extremity weakness.  A brain MRI was obtained which was not particularly revealing  Except for multifocal ischemic disease.       Patient continues to have no pain but reports that within a couple of feet of starting to walk his right leg will become weak and he becomes significantly out of balance.  He denies any falls as he explains he stops before that can happen.  Walking on an incline is more difficult.  He also reports spasms in his right upper extremity.  He also reports fatigue.       He and his wife are concerned that his symptoms are progressing.  Although we have discussed possibility of performing a tethered cord release, the patient would like to avoid surgery if possible.  The patient's wife is particularly concerned about multiple sclerosis and demyelination disorder.     INTERVAL HISTORY (2/3/20):  Patient returns for continued evaluation for the possibility of tethered cord.  He has had no significant changes in his symptoms.  He continues to have  weakness in his legs particularly the right lower extremity.  The leg feels heavy and weak when picking it up, tired after repetitive lifting, and he will feel generalized weakness in his proximal legs with exertion.  He also has decreased range of motion in his Cochran and plantar flexion on the right.  He denies any significant back or leg pain.  He denies any upper extremity symptoms or clumsiness.     He was recently sent for urodynamic testing which revealed normal bladder function with complete emptying.     HPI:  Robles Jordan is a 69 y.o.  male with below listed PMH, who is referred for evaluation of tethered cord.  Main complaint is BLE weakness, worse on right for past 1 year.  His legs will give out on him if he walks long distance or on incline.  Also has low back pain along waistline but does not endorse much leg pain.  Numbness/tingling along right medial thigh and gets charley horses in his sleep.  Going to PT but not helping much - feels sore after attending.  Gabapentin makes sleepy so only taking BID.     Has had urinating issues for approx 1 year manifest as sensation of incomplete bladder emptying.  He was placed on flomax but unclear how much it has helped.  BM are regular and daily.           Review of patient's allergies indicates:  No Known Allergies    Past Medical History:   Diagnosis Date    BPH (benign prostatic hypertrophy)     Hx of colonic polyps 10/19/2015    Hypertension     Sleep apnea      Past Surgical History:   Procedure Laterality Date    COLONOSCOPY N/A 10/19/2015    Procedure: COLONOSCOPY;  Surgeon: Antonino Bernstein MD;  Location: [address]);  Service: Endoscopy;  Laterality: N/A;    CYSTOSCOPY WITH URODYNAMIC TESTING N/A 1/17/2020    Procedure: CYSTOSCOPY,WITH URODYNAMIC TESTING;  Surgeon: Christianne Israel MD;  Location: [facility];  Service: Urology;  Laterality: N/A;  RN PREOP 1/13/2020    MULTIPLE TOOTH EXTRACTIONS  2014     Family History   Problem Relation  Age of Onset    Cancer Mother         Breast    Stroke Sister 65        Fatal    Cancer Maternal Uncle         Pancreas    Cancer Paternal Uncle         Pancreas    Cancer Maternal Grandmother         Breast    Hypertension Father     Hyperlipidemia Father      Social History     Tobacco Use    Smoking status: Light Tobacco Smoker     Packs/day: 0.25     Types: Cigarettes    Smokeless tobacco: Never Used   Substance Use Topics    Alcohol use: Yes     Alcohol/week: 0.0 standard drinks     Comment: very seldom    Drug use: No        Review of Systems   All other systems reviewed and are negative.      OBJECTIVE:   Vital Signs:  Temp: 97.1 °F (36.2 °C) (07/23/20 1551)  Pulse: 94 (07/23/20 1551)  Resp: 15 (07/23/20 1551)  BP: 129/68 (07/23/20 1551)    Physical Exam:  Constitutional: Patient sitting comfortably in chair. Appears well developed and well nourished.  Skin: Exposed areas are intact without abnormal markings, rashes or other lesions.  HEENT: Normocephalic. Normal conjunctivae.  Cardiovascular: Normal rate and regular rhythm.  Respiratory: Chest wall rises and falls symmetrically, without signs of respiratory distress.  Abdomen: Soft and non-tender.  Extremities: Warm and without edema. Calves supple, non-tender.  Psych/Behavior: Normal affect.    Neurological:    Mental status: Alert and oriented. Conversational and appropriate.       Cranial Nerves: VFF to confrontation. PERRL. EOMI without nystagmus. Facial STLT normal and symmetric. Strong, symmetric muscles of mastication. Facial strength full and symmetric. Hearing equal bilaterally to finger rub. Palate and uvula rise and fall normally in midline. Shoulder shrug 5/5 strength. Tongue midline.     Motor:    Upper:  Deltoids Triceps Biceps WE WF  FA    R 5/5 5/5 5/5 5/5 5/5 5/5 5/5    L 5/5 5/5 5/5 5/5 5/5 5/5 5/5      Lower:  HF KE KF DF PF EHL    R 3+/5 5/5 5/5 4/5 4+/5 4/5    L 5/5 5/5 5/5 5/5 5/5 5/5     Sensory: Intact sensation to  light touch and pinprick in all extremities.     Reflexes:      DTR: 2+ knees and biceps symmetrically.     Abarca's: Negative.     Babinski's: Negative.     Clonus: Negative.    Cerebellar: Finger-to-nose and rapid alternating movements normal.     Gait:  Stable, fluid.    Spine:    Posture: Head well aligned over pelvis and shoulders in front and side views.  No focal or global spinal deformity visible on inspection.     Cervical:      ROM: Full with flexion, extension, lateral rotation and ear-to-shoulder bend.      Midline TTP: Negative.     Spurling's test: Negative.     Lhermitte's: Negative.    Thoracic:     Midline TTP: Negative    Lumbar:     Midline TTP: Negative     Straight Leg Test: Negative     Crossed Straight Leg Test: Negative    Other:     SI joint TTP: Negative.     Tenderness with external/internal hip rotation: Negative.    Diagnostic Results:  All imaging was independently reviewed by me.    Brain MRI, 6/2/20:  1. Scattered foci of ischemic disease  2. Largest FLAIR lesion is in the left posteromedial temporal lobe measuring 0.9 cm     MRI C+T spine, dated 2/11/20:  1. Diffuse spondylosis  2. Loss of lordosis  3. No spinal cord compression in cervical or thoracic spine     EMG/Nerve conduction study, dated 11/19/19:  1. Acute denervation L4-S1 on right     MRI L spine, dated 11/20/19:  1. Conus ends at L2-3  2. Fatty filum (3.4mm)  3. Posterior displacement of cauda equina          ASSESSMENT/PLAN:     Problem List Items Addressed This Visit        Neuro    Fibrolipoma of filum terminale    Relevant Orders    Case Request Operating Room: LAMINECTOMY, SPINE, LUMBAR    (L5-S1 laminectomy for tethered cord release)  Microscope  OPEN lami setup  [facility]  Microinstruments  Neuromonitoring (rectal stim, EMG, electrode nerve stimulator) (Completed)    Tethered cord syndrome - Primary    Relevant Orders    Case Request Operating Room: LAMINECTOMY, SPINE, LUMBAR    (L5-S1 laminectomy for tethered  cord release)  Microscope  OPEN lami setup  [employer]  Microinstruments  Neuromonitoring (rectal stim, EMG, electrode nerve stimulator) (Completed)       Orthopedic    Right leg weakness    Relevant Orders    Case Request Operating Room: LAMINECTOMY, SPINE, LUMBAR    (L5-S1 laminectomy for tethered cord release)  Microscope  OPEN lami setup  [employer]  Microinstruments  Neuromonitoring (rectal stim, EMG, electrode nerve stimulator) (Completed)          VISIT SUMMARY: Mr Jordan is a 69M with worsening gait disturbance and RLE weakness who was found to have evidence of thickened fatty filum on MRI that could represent tethered cord syndrome. He had normal urodynamic studies but last week he had a UTI, which he's never had before. RUE weakness is unclear and not present on exam today. CSF studies were without abnormality. While this does not fit a perfect picture for tethered cord, there are some signs and symptoms consistent with this and the workup to rule other causes has been negative.  The patient and his wife agree that it is time to pursue tethered cord release to try to relieve some of the symptoms or stop progression.     PATIENT EDUCATION:  More than half the clinic visit was spent showing with patient the pertinent findings on imaging and educating the patient about natural history of the pathology.   We discussed options for treatment as well as the risks and benefits of each option.  All questions were answered.     - Surgery scheduled for 8/12/20 at [facility] [place]  - Preop clearance needed from PCP (Dr Jackson)  - Preop labs, EKG, CXR ordered   - Preop PAT appointment requested  - STOP TAKING ASPIRIN, NSAIDS, AND ALL OTHER BLOOD THINNERS 7 DAYS PRIOR TO SURGERY                .

## 2020-07-23 NOTE — H&P (VIEW-ONLY)
CHIEF COMPLAINT:  Chief Complaint   Patient presents with    Follow-up     INTERVAL HISTORY (7/23/20):  Mr Jordan returns for follow up appointment for follow up for concern for tethered cord syndrome. He has not gotten any weaker in the RLE since the last appointment, but he recently had his first UTI last week with fevers. He underwent a lumbar puncture for CSF studies which did not reveal any major abnormalities. Denies any noticeable urinary retention. Denies any new numbness.     Full neurological workup, including imaging and CSF analysis has been unrevealing.      INTERVAL HISTORY (6/11/20):   patient returns for continued evaluation and management of gait disturbance.  Patient presents with his wife and both report that the patient has progressed in terms of his gait disturbance, right lower extremity weakness, and now reports some right upper extremity symptoms.  He was most recently seen by my PA at which  Time he reported right upper extremity weakness.  A brain MRI was obtained which was not particularly revealing  Except for multifocal ischemic disease.       Patient continues to have no pain but reports that within a couple of feet of starting to walk his right leg will become weak and he becomes significantly out of balance.  He denies any falls as he explains he stops before that can happen.  Walking on an incline is more difficult.  He also reports spasms in his right upper extremity.  He also reports fatigue.       He and his wife are concerned that his symptoms are progressing.  Although we have discussed possibility of performing a tethered cord release, the patient would like to avoid surgery if possible.  The patient's wife is particularly concerned about multiple sclerosis and demyelination disorder.     INTERVAL HISTORY (2/3/20):  Patient returns for continued evaluation for the possibility of tethered cord.  He has had no significant changes in his symptoms.  He continues to have  Left message to call clinic - 2nd attempt - letter sent    weakness in his legs particularly the right lower extremity.  The leg feels heavy and weak when picking it up, tired after repetitive lifting, and he will feel generalized weakness in his proximal legs with exertion.  He also has decreased range of motion in his Cochran and plantar flexion on the right.  He denies any significant back or leg pain.  He denies any upper extremity symptoms or clumsiness.     He was recently sent for urodynamic testing which revealed normal bladder function with complete emptying.     HPI:  Robles Jordan is a 69 y.o.  male with below listed PMH, who is referred for evaluation of tethered cord.  Main complaint is BLE weakness, worse on right for past 1 year.  His legs will give out on him if he walks long distance or on incline.  Also has low back pain along waistline but does not endorse much leg pain.  Numbness/tingling along right medial thigh and gets charley horses in his sleep.  Going to PT but not helping much - feels sore after attending.  Gabapentin makes sleepy so only taking BID.     Has had urinating issues for approx 1 year manifest as sensation of incomplete bladder emptying.  He was placed on flomax but unclear how much it has helped.  BM are regular and daily.           Review of patient's allergies indicates:  No Known Allergies    Past Medical History:   Diagnosis Date    BPH (benign prostatic hypertrophy)     Hx of colonic polyps 10/19/2015    Hypertension     Sleep apnea      Past Surgical History:   Procedure Laterality Date    COLONOSCOPY N/A 10/19/2015    Procedure: COLONOSCOPY;  Surgeon: Antonino Bernstein MD;  Location: [address]);  Service: Endoscopy;  Laterality: N/A;    CYSTOSCOPY WITH URODYNAMIC TESTING N/A 1/17/2020    Procedure: CYSTOSCOPY,WITH URODYNAMIC TESTING;  Surgeon: Christianne Israel MD;  Location[facility];  Service: Urology;  Laterality: N/A;  RN PREOP 1/13/2020    MULTIPLE TOOTH EXTRACTIONS  2014     Family History   Problem Relation  Age of Onset    Cancer Mother         Breast    Stroke Sister 65        Fatal    Cancer Maternal Uncle         Pancreas    Cancer Paternal Uncle         Pancreas    Cancer Maternal Grandmother         Breast    Hypertension Father     Hyperlipidemia Father      Social History     Tobacco Use    Smoking status: Light Tobacco Smoker     Packs/day: 0.25     Types: Cigarettes    Smokeless tobacco: Never Used   Substance Use Topics    Alcohol use: Yes     Alcohol/week: 0.0 standard drinks     Comment: very seldom    Drug use: No        Review of Systems   All other systems reviewed and are negative.      OBJECTIVE:   Vital Signs:  Temp: 97.1 °F (36.2 °C) (07/23/20 1551)  Pulse: 94 (07/23/20 1551)  Resp: 15 (07/23/20 1551)  BP: 129/68 (07/23/20 1551)    Physical Exam:  Constitutional: Patient sitting comfortably in chair. Appears well developed and well nourished.  Skin: Exposed areas are intact without abnormal markings, rashes or other lesions.  HEENT: Normocephalic. Normal conjunctivae.  Cardiovascular: Normal rate and regular rhythm.  Respiratory: Chest wall rises and falls symmetrically, without signs of respiratory distress.  Abdomen: Soft and non-tender.  Extremities: Warm and without edema. Calves supple, non-tender.  Psych/Behavior: Normal affect.    Neurological:    Mental status: Alert and oriented. Conversational and appropriate.       Cranial Nerves: VFF to confrontation. PERRL. EOMI without nystagmus. Facial STLT normal and symmetric. Strong, symmetric muscles of mastication. Facial strength full and symmetric. Hearing equal bilaterally to finger rub. Palate and uvula rise and fall normally in midline. Shoulder shrug 5/5 strength. Tongue midline.     Motor:    Upper:  Deltoids Triceps Biceps WE WF  FA    R 5/5 5/5 5/5 5/5 5/5 5/5 5/5    L 5/5 5/5 5/5 5/5 5/5 5/5 5/5      Lower:  HF KE KF DF PF EHL    R 3+/5 5/5 5/5 4/5 4+/5 4/5    L 5/5 5/5 5/5 5/5 5/5 5/5     Sensory: Intact sensation to  light touch and pinprick in all extremities.     Reflexes:      DTR: 2+ knees and biceps symmetrically.     Abarca's: Negative.     Babinski's: Negative.     Clonus: Negative.    Cerebellar: Finger-to-nose and rapid alternating movements normal.     Gait:  Stable, fluid.    Spine:    Posture: Head well aligned over pelvis and shoulders in front and side views.  No focal or global spinal deformity visible on inspection.     Cervical:      ROM: Full with flexion, extension, lateral rotation and ear-to-shoulder bend.      Midline TTP: Negative.     Spurling's test: Negative.     Lhermitte's: Negative.    Thoracic:     Midline TTP: Negative    Lumbar:     Midline TTP: Negative     Straight Leg Test: Negative     Crossed Straight Leg Test: Negative    Other:     SI joint TTP: Negative.     Tenderness with external/internal hip rotation: Negative.    Diagnostic Results:  All imaging was independently reviewed by me.    Brain MRI, 6/2/20:  1. Scattered foci of ischemic disease  2. Largest FLAIR lesion is in the left posteromedial temporal lobe measuring 0.9 cm     MRI C+T spine, dated 2/11/20:  1. Diffuse spondylosis  2. Loss of lordosis  3. No spinal cord compression in cervical or thoracic spine     EMG/Nerve conduction study, dated 11/19/19:  1. Acute denervation L4-S1 on right     MRI L spine, dated 11/20/19:  1. Conus ends at L2-3  2. Fatty filum (3.4mm)  3. Posterior displacement of cauda equina          ASSESSMENT/PLAN:     Problem List Items Addressed This Visit        Neuro    Fibrolipoma of filum terminale    Relevant Orders    Case Request Operating Room: LAMINECTOMY, SPINE, LUMBAR    (L5-S1 laminectomy for tethered cord release)  Microscope  OPEN lami setup  [facility]  Microinstruments  Neuromonitoring (rectal stim, EMG, electrode nerve stimulator) (Completed)    Tethered cord syndrome - Primary    Relevant Orders    Case Request Operating Room: LAMINECTOMY, SPINE, LUMBAR    (L5-S1 laminectomy for tethered  cord release)  Microscope  OPEN lami setup  [employer]  Microinstruments  Neuromonitoring (rectal stim, EMG, electrode nerve stimulator) (Completed)       Orthopedic    Right leg weakness    Relevant Orders    Case Request Operating Room: LAMINECTOMY, SPINE, LUMBAR    (L5-S1 laminectomy for tethered cord release)  Microscope  OPEN lami setup  [employer]  Microinstruments  Neuromonitoring (rectal stim, EMG, electrode nerve stimulator) (Completed)          VISIT SUMMARY: Mr Jordan is a 69M with worsening gait disturbance and RLE weakness who was found to have evidence of thickened fatty filum on MRI that could represent tethered cord syndrome. He had normal urodynamic studies but last week he had a UTI, which he's never had before. RUE weakness is unclear and not present on exam today. CSF studies were without abnormality. While this does not fit a perfect picture for tethered cord, there are some signs and symptoms consistent with this and the workup to rule other causes has been negative.  The patient and his wife agree that it is time to pursue tethered cord release to try to relieve some of the symptoms or stop progression.     PATIENT EDUCATION:  More than half the clinic visit was spent showing with patient the pertinent findings on imaging and educating the patient about natural history of the pathology.   We discussed options for treatment as well as the risks and benefits of each option.  All questions were answered.     - Surgery scheduled for 8/12/20 at [facility] [place]  - Preop clearance needed from PCP (Dr Jackson)  - Preop labs, EKG, CXR ordered   - Preop PAT appointment requested  - STOP TAKING ASPIRIN, NSAIDS, AND ALL OTHER BLOOD THINNERS 7 DAYS PRIOR TO SURGERY                .

## 2020-07-28 ENCOUNTER — CLINICAL SUPPORT (OUTPATIENT)
Dept: REHABILITATION | Facility: HOSPITAL | Age: 70
End: 2020-07-28
Payer: COMMERCIAL

## 2020-07-28 DIAGNOSIS — R29.898 RIGHT LEG WEAKNESS: ICD-10-CM

## 2020-07-28 DIAGNOSIS — M25.551 RIGHT HIP PAIN: ICD-10-CM

## 2020-07-28 DIAGNOSIS — Z74.09 IMPAIRED FUNCTIONAL MOBILITY, BALANCE, GAIT, AND ENDURANCE: ICD-10-CM

## 2020-07-28 PROCEDURE — 97110 THERAPEUTIC EXERCISES: CPT | Mod: PN

## 2020-07-28 NOTE — PROGRESS NOTES
Physical Therapy Daily Treatment Note     Name: Robles Jordan  Clinic Number: [id number]    Therapy Diagnosis:   Encounter Diagnoses   Name Primary?    Impaired functional mobility, balance, gait, and endurance     Right leg weakness     Right hip pain        Physician: Chico Rodriguze DO    Visit Date: 7/28/2020    Physician Orders: Eval and Treat  Medical Diagnosis:   Q06.8 (ICD-10-CM) - Tethered cord   R29.898 (ICD-10-CM) - Right leg weakness   R26.9 (ICD-10-CM) - Gait disturbance     Evaluation Date: 12/4/19  POC updated on 5/12/2020  Authorization Period Expiration: 12-31-20  Plan of Care Certification Period: 5-4-20 to 8/12/2020  Visit #/Visits authorized: 12/21 (17 previous visits)  PN Due: 8/28/20      Time In: 1000  Time Out: 1045  Total Billable Time: 50 minutes    Precautions: Standard and tethered cord syndrome    Subjective     Pt reports: he recently had UTI. Will go forward with tethered cord procedure L5-S1 laminectomy on 8/12. Would like to continue PT. No significant pain, just remains weak, specifically in R LE    He was compliant with home exercise program.    Response to previous treatment: good  Functional change: none to note    Pain: 0/10   Location: back and right LE      Objective     Ambulates with hip drop on L side and lateral trunk lean.    Thoracolumbar ROM:  Flexion 60   Extension 20   Left Sidebending 15   Right Sidebending 20   Left Rotation 50% limited   Right Rotation 50% limited      LOWER EXTREMITY STRENGTH:    RIGHT LEFT   Quadriceps 4-/5 5/5   Hamstrings 4-/5 4+/5      Hip Flexion 3+/5 limited in full ROM due to weakness 4+/5   Hip Abduction 3/5 4/5   Hip IR 3+/5 4+/5   Hip ER 3-/5 4/5   Hip Ext 3/5 4-/5      Ankle DF 3+/5 4-/5   Ankle PF 4/5 4+/5        Robles received therapeutic exercises to develop strength, endurance, ROM, flexibility, posture and core stabilization for 50 minutes including:    Upright Bike 8 Min level 2.5   Nustep 6 mins  TA recruitment  "2x10  SLR + Brace x 10 ea LE  DKTC with large green ball x 20  +LTR with large green bal x 20 ea    Supine strap HS stretch 3x30 ea LE  Bridges 3 x 10 with 2 second hold  SL Clam 2x10 ea    NP today:  Supine HF stretch off side of mat 3 x 30"  Supine hip abd using GTB 2 x 12, RLE only  Supine marches x 20 total  Lateral steps using GTB x 4 laps (no UE support)  Heel Raises (no UE support) 3 x 10   Standing hip ABD 2x10   Standing hip ext 2 x 10  [employer] taps in standing 2 x 12 for tall cone with UE support  hip hikes 2x10 on R, 1 set on L. Standing on step- increased low back pain today NP  EIS 2 x 10  DL 15KB from 12in stool 3x10   suitcase carry 20KB 3x20ft  Kneeling on bench for HF stretch 3x30  12in hurdles side stepping x4laps, fwd stepping x3laps  STS to 24in stool 2x8 standing on foam pad, holding 11# med ball. (held--Chest press upon standing up)      Robles received cold pack to low back for 10 mins while supine following exercises today to decrease pain.       Home Exercises Provided and Patient Education Provided     Education provided:   - HEP compliance for stretching/strengthening in order to care for his muscles.    Written Home Exercises Provided: yes.   Exercises were reviewed and Robles was able to demonstrate them prior to the end of the session.  Robles demonstrated good  understanding of the education provided.     Assessment     Pt tolerated session well, with reassessment performed. Thoracolumbar ROM taken today with limited range in all planes. Pt scheduled for lumbar operation on 8/12 due to tethered cord in attempt to relieve LE weakness and bladder dysfunctions. Pt remains significantly weak on R LE compared to L LE in all muscle groups. Plan to continue PT until operation, with episode of PT likely required post-operatively.    Robles is progressing well towards his goals.   Pt prognosis is Good.     Pt will continue to benefit from skilled outpatient physical therapy to " address the deficits listed in the problem list box on initial evaluation, provide pt/family education and to maximize pt's level of independence in the home and community environment.     Pt's spiritual, cultural and educational needs considered and pt agreeable to plan of care and goals.     Anticipated barriers to physical therapy: tethered cord syndrome    Goals:     Short Term GOALS: - in progress  1. Pt to report feeling more stable and steady during gait. Progressing, does not feel steady after more than 10mins of walking  2. Pt to display improved gait tolerance to >10mins before needing rest break. MET 2-4-20   3. Pt to display improved fxl strength to be able to carry groceries (Case of water). progressing  4. Pt to begin HEP including specific stretching and strengthening as per their diagnosis to decrease pain and improve flexibility.  5. Pt to display >65deg SLR in supine hooklying, >10 deg active DF in sitting.  MET 2-4-20   6. Pt to be able to complete >10 reps of sit<>Stand within 30 sec MET 2-4-20      Long Term GOALS: - in progress  1. Pt to report <1/10, no longer limiting functional activities.progressing  2. Pt to report return to >75% of regular ADLs, work and recreational tasks. progressing  3. Pt will demonstrate appropriate squat technique for improved tolerance of lifting and carrying functional activity needed for household tasks. progressing  4. Pt to be compliant in advanced HEP to maintain progress gained in therapy thus far. progressing  5. Pt to display >4+/5 gross MMT for B LE in order to support pelvis and spine throughout ADLs. progressing  Plan     Continue with current POC. Progress lumbar extensors and core stability exercises in standing, B LE strengthening/endurance, gait, and balance.     Plan of care Certification: extended to 8/12/2020.    Moustapha Gao, PT

## 2020-07-30 ENCOUNTER — CLINICAL SUPPORT (OUTPATIENT)
Dept: REHABILITATION | Facility: HOSPITAL | Age: 70
End: 2020-07-30
Payer: COMMERCIAL

## 2020-07-30 DIAGNOSIS — M25.551 RIGHT HIP PAIN: ICD-10-CM

## 2020-07-30 DIAGNOSIS — R29.898 RIGHT LEG WEAKNESS: ICD-10-CM

## 2020-07-30 DIAGNOSIS — Z74.09 IMPAIRED FUNCTIONAL MOBILITY, BALANCE, GAIT, AND ENDURANCE: ICD-10-CM

## 2020-07-30 PROCEDURE — 97110 THERAPEUTIC EXERCISES: CPT | Mod: PN

## 2020-07-30 NOTE — PROGRESS NOTES
"  Physical Therapy Daily Treatment Note     Name: Robles Jordan  Clinic Number: [id number]    Therapy Diagnosis:   Encounter Diagnoses   Name Primary?    Impaired functional mobility, balance, gait, and endurance     Right leg weakness     Right hip pain        Physician: Chico Rodriguez DO    Visit Date: 7/30/2020    Physician Orders: Eval and Treat  Medical Diagnosis:   Q06.8 (ICD-10-CM) - Tethered cord   R29.898 (ICD-10-CM) - Right leg weakness   R26.9 (ICD-10-CM) - Gait disturbance     Evaluation Date: 12/4/19  POC updated on 5/12/2020  Authorization Period Expiration: 12-31-20  Plan of Care Certification Period: 5-4-20 to 8/12/2020  Visit #/Visits authorized: 13/21 (17 previous visits)  PN Due: 8/28/20      Time In: 945  Time Out: 1045  Total Billable Time: 50 minutes    Precautions: Standard and tethered cord syndrome    Subjective     Pt reports: is doing well, generalized soreness following last session  He was compliant with home exercise program.    Response to previous treatment: good  Functional change: none to note    Pain: 0/10   Location: back and right LE      Objective     Robles received therapeutic exercises to develop strength, endurance, ROM, flexibility, posture and core stabilization for 50 minutes including:    Upright Bike 8 Min level 2.5   Nustep 6 mins  +Standing Gastroc Str c/ incline board 3x30"    TA recruitment 2x10  +TA Recruitment + Heel Slide 10x  SLR + Brace x 10 ea LE  DKTC with large green ball x 20  LTR with large green bal x 20 ea    Supine strap HS stretch 3x30 ea LE  Hip Add c/ ball 3x10  Bridges RTB at knees 3 x 10 with 2 second hold  SL Clam 2x10 ea    NP today:  Supine HF stretch off side of mat 3 x 30"  Supine hip abd using GTB 2 x 12, RLE only  Supine marches x 20 total  Lateral steps using GTB x 4 laps (no UE support)  Heel Raises (no UE support) 3 x 10   Standing hip ABD 2x10   Standing hip ext 2 x 10  [employer] taps in standing 2 x 12 for tall cone with UE " support  hip hikes 2x10 on R, 1 set on L. Standing on step- increased low back pain today NP  EIS 2 x 10  DL 15KB from 12in stool 3x10   suitcase carry 20KB 3x20ft  Kneeling on bench for HF stretch 3x30  12in hurdles side stepping x4laps, fwd stepping x3laps  STS to 24in stool 2x8 standing on foam pad, holding 11# med ball. (held--Chest press upon standing up)      Robles received cold pack to low back for 10 mins while supine following exercises today to decrease pain.       Home Exercises Provided and Patient Education Provided     Education provided:   - HEP compliance for stretching/strengthening in order to care for his muscles.    Written Home Exercises Provided: yes.   Exercises were reviewed and Robles was able to demonstrate them prior to the end of the session.  Robles demonstrated good  understanding of the education provided.     Assessment     Pt tolerated session well, with reassessment performed. Great response to thoracolumbar ROM activities without symptom provocation. Pt continues to have greatest difficulty with R LE strengthening, specifically due to weakness in hip flexion. Pt scheduled for lumbar operation on 8/12 due to tethered cord in attempt to relieve LE weakness and bladder dysfunctions.  PT discussed possible discharge following next session, and to re-evaluate following procedure.     Robles is progressing well towards his goals.   Pt prognosis is Good.     Pt will continue to benefit from skilled outpatient physical therapy to address the deficits listed in the problem list box on initial evaluation, provide pt/family education and to maximize pt's level of independence in the home and community environment.     Pt's spiritual, cultural and educational needs considered and pt agreeable to plan of care and goals.     Anticipated barriers to physical therapy: tethered cord syndrome    Goals:     Short Term GOALS: - in progress  1. Pt to report feeling more stable and steady  during gait. Progressing, does not feel steady after more than 10mins of walking  2. Pt to display improved gait tolerance to >10mins before needing rest break. MET 2-4-20   3. Pt to display improved fxl strength to be able to carry groceries (Case of water). progressing  4. Pt to begin HEP including specific stretching and strengthening as per their diagnosis to decrease pain and improve flexibility.  5. Pt to display >65deg SLR in supine hooklying, >10 deg active DF in sitting.  MET 2-4-20   6. Pt to be able to complete >10 reps of sit<>Stand within 30 sec MET 2-4-20      Long Term GOALS: - in progress  1. Pt to report <1/10, no longer limiting functional activities.progressing  2. Pt to report return to >75% of regular ADLs, work and recreational tasks. progressing  3. Pt will demonstrate appropriate squat technique for improved tolerance of lifting and carrying functional activity needed for household tasks. progressing  4. Pt to be compliant in advanced HEP to maintain progress gained in therapy thus far. progressing  5. Pt to display >4+/5 gross MMT for B LE in order to support pelvis and spine throughout ADLs. progressing  Plan     Continue with current POC. Progress lumbar extensors and core stability exercises in standing, B LE strengthening/endurance, gait, and balance.     Plan of care Certification: extended to 8/12/2020.    Moustapha Gao, PT

## 2020-07-31 ENCOUNTER — OFFICE VISIT (OUTPATIENT)
Dept: INTERNAL MEDICINE | Facility: CLINIC | Age: 70
End: 2020-07-31
Payer: COMMERCIAL

## 2020-07-31 VITALS
SYSTOLIC BLOOD PRESSURE: 154 MMHG | HEIGHT: 74 IN | WEIGHT: 241.63 LBS | BODY MASS INDEX: 31.01 KG/M2 | OXYGEN SATURATION: 99 % | DIASTOLIC BLOOD PRESSURE: 76 MMHG | TEMPERATURE: 98 F | HEART RATE: 85 BPM

## 2020-07-31 DIAGNOSIS — R73.09 ELEVATED HEMOGLOBIN A1C: ICD-10-CM

## 2020-07-31 DIAGNOSIS — I10 ESSENTIAL HYPERTENSION: Primary | ICD-10-CM

## 2020-07-31 DIAGNOSIS — Z01.818 PRE-OP EVALUATION: ICD-10-CM

## 2020-07-31 DIAGNOSIS — G47.33 OBSTRUCTIVE SLEEP APNEA ON CPAP: ICD-10-CM

## 2020-07-31 DIAGNOSIS — Q06.8 TETHERED CORD SYNDROME: ICD-10-CM

## 2020-07-31 DIAGNOSIS — Z12.5 PROSTATE CANCER SCREENING: ICD-10-CM

## 2020-07-31 PROCEDURE — 93005 EKG 12-LEAD: ICD-10-PCS | Mod: S$GLB,,, | Performed by: INTERNAL MEDICINE

## 2020-07-31 PROCEDURE — 99214 PR OFFICE/OUTPT VISIT, EST, LEVL IV, 30-39 MIN: ICD-10-PCS | Mod: S$GLB,,, | Performed by: INTERNAL MEDICINE

## 2020-07-31 PROCEDURE — 99999 PR PBB SHADOW E&M-EST. PATIENT-LVL III: CPT | Mod: PBBFAC,,, | Performed by: INTERNAL MEDICINE

## 2020-07-31 PROCEDURE — 99214 OFFICE O/P EST MOD 30 MIN: CPT | Mod: S$GLB,,, | Performed by: INTERNAL MEDICINE

## 2020-07-31 PROCEDURE — 99999 PR PBB SHADOW E&M-EST. PATIENT-LVL III: ICD-10-PCS | Mod: PBBFAC,,, | Performed by: INTERNAL MEDICINE

## 2020-07-31 PROCEDURE — 93005 ELECTROCARDIOGRAM TRACING: CPT | Mod: S$GLB,,, | Performed by: INTERNAL MEDICINE

## 2020-07-31 PROCEDURE — 93010 EKG 12-LEAD: ICD-10-PCS | Mod: S$GLB,,, | Performed by: INTERNAL MEDICINE

## 2020-07-31 PROCEDURE — 93010 ELECTROCARDIOGRAM REPORT: CPT | Mod: S$GLB,,, | Performed by: INTERNAL MEDICINE

## 2020-07-31 NOTE — Clinical Note
Patient seen today for preop exam. EKG normal, no risk factors for surgery on cardiac risk index. He does have obstructive sleep apnea and is on CPAP, this is in his chart and the anesthesia team can ask him additional questions as needed. Of note I have recommended he continue all regularly scheduled medications. If the anesthesia team recommends holding any medications on the day of the procedure, please have them reach out to notify the patient. I recommend proceeding with the surgery as planned. John Jackson MD Internal Medicine

## 2020-07-31 NOTE — PROGRESS NOTES
Subjective:       Patient ID: Robles Jordan is a 69 y.o. male.    Chief Complaint: Pre-op Exam      Last visit with me 6/10/2020.  Since then has been seen by Neurosurgery, Neurology.  Upcoming surgery with neurosurgeons scheduled for August 12th for laminectomy.    HPI    UTI is better. No history of heart issues. No chest pain or shortness of breath with exercise. Some weakness with activity in the leg. Still using CPAP regularly.     Reviewed PMH, PSH, SH, FH, allergies, and medications.     Review of Systems   All other systems reviewed and are negative.      Objective:      Physical Exam  Vitals signs and nursing note reviewed.   Constitutional:       General: He is not in acute distress.     Appearance: He is not diaphoretic.   HENT:      Head: Normocephalic and atraumatic.      Right Ear: External ear normal.      Left Ear: External ear normal.   Eyes:      General:         Right eye: No discharge.         Left eye: No discharge.      Pupils: Pupils are equal, round, and reactive to light.   Neck:      Musculoskeletal: Normal range of motion.      Thyroid: No thyromegaly.   Cardiovascular:      Rate and Rhythm: Normal rate and regular rhythm.      Heart sounds: Normal heart sounds.   Pulmonary:      Effort: Pulmonary effort is normal.      Breath sounds: Normal breath sounds. No stridor. No wheezing or rales.   Abdominal:      Palpations: Abdomen is soft.      Tenderness: There is no abdominal tenderness. There is no guarding.   Musculoskeletal:         General: No tenderness.      Right lower leg: No edema.      Left lower leg: No edema.   Lymphadenopathy:      Cervical: No cervical adenopathy.   Skin:     General: Skin is warm and dry.      Findings: No rash.   Neurological:      Mental Status: He is alert and oriented to person, place, and time.   Psychiatric:         Mood and Affect: Mood normal.         Behavior: Behavior normal.         Vitals:    07/31/20 0828   BP: (!) 154/76   BP Location: Right  "arm   Patient Position: Sitting   BP Method: Large (Manual)   Pulse: 85   Temp: 98.4 °F (36.9 °C)   TempSrc: Oral   SpO2: 99%   Weight: 109.6 kg (241 lb 10 oz)   Height: 6' 2" (1.88 m)     Body mass index is 31.02 kg/m².    RESULTS: Reviewed labs from last 6 months. I have personally inspected the EKG performed today.     Assessment:       1. Essential hypertension    2. Obstructive sleep apnea on CPAP    3. Tethered cord syndrome    4. Pre-op evaluation        Plan:   Robles was seen today for pre-op exam.    Diagnoses and all orders for this visit:    Essential hypertension:  Prior diagnosis, today not at goal. Has been up and down. Not severe. Will continue to monitor following recovery from surgery.  -     EKG 12-lead    Obstructive sleep apnea on CPAP:  Prior diagnosis, stable, well controlled on current management. No changes at this time, will continue to monitor.     Tethered cord syndrome:  Prior diagnosis, causing leg weakness, being seen for NRSG for laminectomy.    Pre-op evaluation:  Patient's exercise tolerance is limited due to weakness in legs, however no risk factors on Revised Cardiac Risk Index. EKG shows no abnormality. I recommend proceed with surgery as planned.      No follow-ups on file. Keep plan to follow up in about 6 months with labs 1 week prior.  John Jackson MD  Internal Medicine    Portions of this note were completed using medical dictation software. Please excuse typographical or syntax errors that were missed on review.      "

## 2020-08-03 ENCOUNTER — TELEPHONE (OUTPATIENT)
Dept: NEUROSURGERY | Facility: CLINIC | Age: 70
End: 2020-08-03

## 2020-08-03 NOTE — TELEPHONE ENCOUNTER
----- Message from Briana Seals PA-C sent at 8/3/2020  9:40 AM CDT -----  Can you please contact pre-op for a PAT appt? Surgery is here at [facility] on 8/12    Angela

## 2020-08-04 ENCOUNTER — CLINICAL SUPPORT (OUTPATIENT)
Dept: REHABILITATION | Facility: HOSPITAL | Age: 70
End: 2020-08-04
Payer: COMMERCIAL

## 2020-08-04 DIAGNOSIS — M25.551 RIGHT HIP PAIN: ICD-10-CM

## 2020-08-04 DIAGNOSIS — R29.898 RIGHT LEG WEAKNESS: ICD-10-CM

## 2020-08-04 DIAGNOSIS — Z74.09 IMPAIRED FUNCTIONAL MOBILITY, BALANCE, GAIT, AND ENDURANCE: ICD-10-CM

## 2020-08-04 PROCEDURE — 97110 THERAPEUTIC EXERCISES: CPT | Mod: PN

## 2020-08-04 NOTE — PROGRESS NOTES
"  Physical Therapy Daily Treatment Note     Name: Robles Jordan  Clinic Number: [id number]    Therapy Diagnosis:   Encounter Diagnoses   Name Primary?    Impaired functional mobility, balance, gait, and endurance     Right leg weakness     Right hip pain        Physician: Chico Rodriguez DO    Visit Date: 8/4/2020    Physician Orders: Eval and Treat  Medical Diagnosis:   Q06.8 (ICD-10-CM) - Tethered cord   R29.898 (ICD-10-CM) - Right leg weakness   R26.9 (ICD-10-CM) - Gait disturbance     Evaluation Date: 12/4/19  POC updated on 5/12/2020  Authorization Period Expiration: 12-31-20  Plan of Care Certification Period: 5-4-20 to 8/12/2020  Visit #/Visits authorized: 14/21 (17 previous visits)  PN Due: 8/28/20      Time In: 10:00 am  Time Out: 10:55 am  Total Billable Time: 45 minutes    Precautions: Standard and tethered cord syndrome    Subjective     Pt reports: is doing well, generalized soreness following last session. His surgery is scheduled for next week. This Thursday will be his last visit prior to surgery.     He was compliant with home exercise program.    Response to previous treatment: good  Functional change: none to note    Pain: 0/10   Location: back and right LE      Objective     Robles received therapeutic exercises to develop strength, endurance, ROM, flexibility, posture and core stabilization for 45 minutes including:    Upright Bike 8 Min level 2.5   Nustep 6 mins  Standing Gastroc Str c/ incline board 3x30"    TA recruitment 2x10  TA Recruitment + Heel Slide 10x  SLR + Brace 2 x 10 ea LE  DKTC with large green ball x 20  LTR with large green bal x 20 ea    Supine strap HS stretch 3x30 ea LE  Hip Add c/ ball 3x10  Bridges RTB at knees 3 x 10 with 2 second hold  SL Clam 3x10 ea    NP today:  Supine HF stretch off side of mat 3 x 30"  Supine hip abd using GTB 2 x 12, RLE only  Supine marches x 20 total  Lateral steps using GTB x 4 laps (no UE support)  Heel Raises (no UE support) 3 x 10 "   Standing hip ABD 2x10   Standing hip ext 2 x 10  [employer] taps in standing 2 x 12 for tall cone with UE support  hip hikes 2x10 on R, 1 set on L. Standing on step- increased low back pain today NP  EIS 2 x 10  DL 15KB from 12in stool 3x10   suitcase carry 20KB 3x20ft  Kneeling on bench for HF stretch 3x30  12in hurdles side stepping x4laps, fwd stepping x3laps  STS to 24in stool 2x8 standing on foam pad, holding 11# med ball. (held--Chest press upon standing up)      Robles received cold pack to low back for 10 mins while supine following exercises today to decrease pain.       Home Exercises Provided and Patient Education Provided     Education provided:   - HEP compliance for stretching/strengthening in order to care for his muscles.    Written Home Exercises Provided: yes.   Exercises were reviewed and Robles was able to demonstrate them prior to the end of the session.  Robles demonstrated good  understanding of the education provided.     Assessment     Pt tolerated session well today with continued difficulty with R LE strengthening, specifically due to weakness in hip flexion. Good tolerance of increase in repetitions of select exercises. Pt scheduled for lumbar operation on 8/12 due to tethered cord in attempt to relieve LE weakness and bladder dysfunctions. Pt to be discharged next treatment session and to re-evaluate following procedure.     Robles is progressing well towards his goals.   Pt prognosis is Good.     Pt will continue to benefit from skilled outpatient physical therapy to address the deficits listed in the problem list box on initial evaluation, provide pt/family education and to maximize pt's level of independence in the home and community environment.     Pt's spiritual, cultural and educational needs considered and pt agreeable to plan of care and goals.     Anticipated barriers to physical therapy: tethered cord syndrome    Goals:     Short Term GOALS: - in progress  1. Pt to  report feeling more stable and steady during gait. Progressing, does not feel steady after more than 10mins of walking  2. Pt to display improved gait tolerance to >10mins before needing rest break. MET 2-4-20   3. Pt to display improved fxl strength to be able to carry groceries (Case of water). progressing  4. Pt to begin HEP including specific stretching and strengthening as per their diagnosis to decrease pain and improve flexibility.  5. Pt to display >65deg SLR in supine hooklying, >10 deg active DF in sitting.  MET 2-4-20   6. Pt to be able to complete >10 reps of sit<>Stand within 30 sec MET 2-4-20      Long Term GOALS: - in progress  1. Pt to report <1/10, no longer limiting functional activities.progressing  2. Pt to report return to >75% of regular ADLs, work and recreational tasks. progressing  3. Pt will demonstrate appropriate squat technique for improved tolerance of lifting and carrying functional activity needed for household tasks. progressing  4. Pt to be compliant in advanced HEP to maintain progress gained in therapy thus far. progressing  5. Pt to display >4+/5 gross MMT for B LE in order to support pelvis and spine throughout ADLs. progressing  Plan     Continue with current POC. Progress lumbar extensors and core stability exercises in standing, B LE strengthening/endurance, gait, and balance.     Plan of care Certification: extended to 8/12/2020.    Ada Boothe, PT

## 2020-08-06 ENCOUNTER — CLINICAL SUPPORT (OUTPATIENT)
Dept: REHABILITATION | Facility: HOSPITAL | Age: 70
End: 2020-08-06
Payer: COMMERCIAL

## 2020-08-06 DIAGNOSIS — R29.898 RIGHT LEG WEAKNESS: ICD-10-CM

## 2020-08-06 DIAGNOSIS — M25.551 RIGHT HIP PAIN: ICD-10-CM

## 2020-08-06 DIAGNOSIS — Z74.09 IMPAIRED FUNCTIONAL MOBILITY, BALANCE, GAIT, AND ENDURANCE: ICD-10-CM

## 2020-08-06 PROCEDURE — 97110 THERAPEUTIC EXERCISES: CPT | Mod: PN

## 2020-08-06 NOTE — PROGRESS NOTES
Physical Therapy Daily Treatment Note     Name: Robles Jordan  Clinic Number: [id number]    Therapy Diagnosis:   Encounter Diagnoses   Name Primary?    Impaired functional mobility, balance, gait, and endurance     Right leg weakness     Right hip pain        Physician: Chico Rodriguez DO    Visit Date: 8/6/2020    Physician Orders: Eval and Treat  Medical Diagnosis:   Q06.8 (ICD-10-CM) - Tethered cord   R29.898 (ICD-10-CM) - Right leg weakness   R26.9 (ICD-10-CM) - Gait disturbance     Evaluation Date: 12/4/19  POC updated on 5/12/2020  Authorization Period Expiration: 12-31-20  Plan of Care Certification Period: 5-4-20 to 8/12/2020  Visit #/Visits authorized: 15/21 (17 previous visits)  PN Due: 8/28/20      Time In: 1:00 pm  Time Out: 1:48 pm  Total Billable Time: 38 minutes    Precautions: Standard and tethered cord syndrome    Subjective     Pt reports: He has increased weakness noted today due to the time of day. Mornings are usually better as far as performing exercises. Has some increase in back soreness and difficulty standing erect.     He was compliant with home exercise program.    Response to previous treatment: good  Functional change: none to note    Pain: 0/10   Location: back and right LE      Objective     Performed 7/28/20    Ambulates with hip drop on L side and lateral trunk lean.     Thoracolumbar ROM:  Flexion 60   Extension 20   Left Sidebending 15   Right Sidebending 20   Left Rotation 50% limited   Right Rotation 50% limited       LOWER EXTREMITY STRENGTH:    RIGHT LEFT   Quadriceps 4-/5 5/5   Hamstrings 4-/5 4+/5      Hip Flexion 3+/5 limited in full ROM due to weakness 4+/5   Hip Abduction 3/5 4/5   Hip IR 3+/5 4+/5   Hip ER 3-/5 4/5   Hip Ext 3/5 4-/5      Ankle DF 3+/5 4-/5   Ankle PF 4/5 4+/5       Robles received therapeutic exercises to develop strength, endurance, ROM, flexibility, posture and core stabilization for 38 minutes including:    Upright Bike 8 Min level 2.5  "  Nustep 6 mins  Standing Gastroc Str c/ incline board 3x30"    TA recruitment 2x10  TA Recruitment + Heel Slide 10x  SLR + Brace 2 x 10 ea LE  DKTC with large green ball x 20  LTR with large green bal x 20 ea    Supine strap HS stretch 3x30 ea LE  Hip Add c/ ball 3x10  Bridges RTB at knees 3 x 10 with 2 second hold  SL Clam 3x10 ea      Arboleda received cold pack to low back for 10 mins while supine following exercises today to decrease pain.       Home Exercises Provided and Patient Education Provided     Education provided:   - HEP compliance for stretching/strengthening in order to care for his muscles.    Written Home Exercises Provided: yes.   Exercises were reviewed and Arboleda was able to demonstrate them prior to the end of the session.  Arboleda demonstrated good  understanding of the education provided.     Assessment     Pt tolerated session well today with continued difficulty with R LE strengthening, specifically due to weakness in hip flexion. Pt reassessed for discharged secondary to having surgery next week. Pt scheduled for lumbar operation on 8/12 due to tethered cord in attempt to relieve LE weakness and bladder dysfunctions. Continued weakness in R hip flexor and RLE. Pt is discharged from therapy secondary to laminectomy scheduled next week.      Anticipated barriers to physical therapy: tethered cord syndrome    Goals:     Short Term GOALS: - in progress  1. Pt to report feeling more stable and steady during gait. Progressing, does not feel steady after more than 10mins of walking  2. Pt to display improved gait tolerance to >10mins before needing rest break. MET 2-4-20   3. Pt to display improved fxl strength to be able to carry groceries (Case of water). Progressing, not met  4. Pt to begin HEP including specific stretching and strengthening as per their diagnosis to decrease pain and improve flexibility.  5. Pt to display >65deg SLR in supine hooklying, >10 deg active DF in sitting.  MET " 2-4-20   6. Pt to be able to complete >10 reps of sit<>Stand within 30 sec MET 2-4-20      Long Term GOALS: - in progress  1. Pt to report <1/10, no longer limiting functional activities.progressing, not met  2. Pt to report return to >75% of regular ADLs, work and recreational tasks. Progressing, not met  3. Pt will demonstrate appropriate squat technique for improved tolerance of lifting and carrying functional activity needed for household tasks. Progressing, not met  4. Pt to be compliant in advanced HEP to maintain progress gained in therapy thus far. Progressing, not met  5. Pt to display >4+/5 gross MMT for B LE in order to support pelvis and spine throughout ADLs. Progressing, not met  Plan     DC from physical therapy at this time.    Plan of care Certification: extended to 8/12/2020.    Ada Boothe, PT

## 2020-08-07 ENCOUNTER — HOSPITAL ENCOUNTER (OUTPATIENT)
Dept: PREADMISSION TESTING | Facility: HOSPITAL | Age: 70
Discharge: HOME | End: 2020-08-07
Attending: NEUROLOGICAL SURGERY
Payer: COMMERCIAL

## 2020-08-07 ENCOUNTER — ANESTHESIA EVENT (OUTPATIENT)
Dept: SURGERY | Facility: HOSPITAL | Age: 70
DRG: 029 | End: 2020-08-07
Payer: COMMERCIAL

## 2020-08-07 ENCOUNTER — HOSPITAL ENCOUNTER (OUTPATIENT)
Dept: RADIOLOGY | Facility: HOSPITAL | Age: 70
Discharge: HOME | End: 2020-08-07
Attending: NEUROLOGICAL SURGERY
Payer: COMMERCIAL

## 2020-08-07 VITALS
WEIGHT: 242.5 LBS | OXYGEN SATURATION: 99 % | TEMPERATURE: 99 F | HEART RATE: 90 BPM | BODY MASS INDEX: 31.12 KG/M2 | DIASTOLIC BLOOD PRESSURE: 72 MMHG | HEIGHT: 74 IN | RESPIRATION RATE: 18 BRPM | SYSTOLIC BLOOD PRESSURE: 141 MMHG

## 2020-08-07 DIAGNOSIS — Z01.818 PREOP TESTING: Primary | ICD-10-CM

## 2020-08-07 LAB
ANION GAP SERPL CALC-SCNC: 7 MMOL/L (ref 8–16)
APTT BLDCRRT: 28.9 SEC (ref 21–32)
BASOPHILS # BLD AUTO: 0.02 K/UL (ref 0–0.2)
BASOPHILS NFR BLD: 0.3 % (ref 0–1.9)
BUN SERPL-MCNC: 9 MG/DL (ref 8–23)
CALCIUM SERPL-MCNC: 9.3 MG/DL (ref 8.7–10.5)
CHLORIDE SERPL-SCNC: 107 MMOL/L (ref 95–110)
CO2 SERPL-SCNC: 26 MMOL/L (ref 23–29)
CREAT SERPL-MCNC: 0.8 MG/DL (ref 0.5–1.4)
DIFFERENTIAL METHOD: ABNORMAL
EOSINOPHIL # BLD AUTO: 0.1 K/UL (ref 0–0.5)
EOSINOPHIL NFR BLD: 1.6 % (ref 0–8)
ERYTHROCYTE [DISTWIDTH] IN BLOOD BY AUTOMATED COUNT: 13.7 % (ref 11.5–14.5)
EST. GFR  (AFRICAN AMERICAN): >60 ML/MIN/1.73 M^2
EST. GFR  (NON AFRICAN AMERICAN): >60 ML/MIN/1.73 M^2
GLUCOSE SERPL-MCNC: 108 MG/DL (ref 70–110)
HCT VFR BLD AUTO: 43 % (ref 40–54)
HGB BLD-MCNC: 13.4 G/DL (ref 14–18)
IMM GRANULOCYTES # BLD AUTO: 0.04 K/UL (ref 0–0.04)
IMM GRANULOCYTES NFR BLD AUTO: 0.6 % (ref 0–0.5)
INR PPP: 0.9 (ref 0.8–1.2)
LYMPHOCYTES # BLD AUTO: 2.3 K/UL (ref 1–4.8)
LYMPHOCYTES NFR BLD: 35 % (ref 18–48)
MCH RBC QN AUTO: 28.3 PG (ref 27–31)
MCHC RBC AUTO-ENTMCNC: 31.2 G/DL (ref 32–36)
MCV RBC AUTO: 91 FL (ref 82–98)
MONOCYTES # BLD AUTO: 0.5 K/UL (ref 0.3–1)
MONOCYTES NFR BLD: 7.9 % (ref 4–15)
NEUTROPHILS # BLD AUTO: 3.5 K/UL (ref 1.8–7.7)
NEUTROPHILS NFR BLD: 54.6 % (ref 38–73)
NRBC BLD-RTO: 0 /100 WBC
PLATELET # BLD AUTO: 294 K/UL (ref 150–350)
PMV BLD AUTO: 8.9 FL (ref 9.2–12.9)
POTASSIUM SERPL-SCNC: 4.1 MMOL/L (ref 3.5–5.1)
PROTHROMBIN TIME: 10 SEC (ref 9–12.5)
RBC # BLD AUTO: 4.74 M/UL (ref 4.6–6.2)
SODIUM SERPL-SCNC: 140 MMOL/L (ref 136–145)
WBC # BLD AUTO: 6.45 K/UL (ref 3.9–12.7)

## 2020-08-07 PROCEDURE — 36415 COLL VENOUS BLD VENIPUNCTURE: CPT

## 2020-08-07 PROCEDURE — 80048 BASIC METABOLIC PNL TOTAL CA: CPT

## 2020-08-07 PROCEDURE — 71046 XR CHEST PA AND LATERAL PRE-OP: ICD-10-PCS | Mod: 26,,, | Performed by: RADIOLOGY

## 2020-08-07 PROCEDURE — 71046 X-RAY EXAM CHEST 2 VIEWS: CPT | Mod: TC,FY

## 2020-08-07 PROCEDURE — 85730 THROMBOPLASTIN TIME PARTIAL: CPT

## 2020-08-07 PROCEDURE — 85610 PROTHROMBIN TIME: CPT

## 2020-08-07 PROCEDURE — 85025 COMPLETE CBC W/AUTO DIFF WBC: CPT

## 2020-08-07 PROCEDURE — 71046 X-RAY EXAM CHEST 2 VIEWS: CPT | Mod: 26,,, | Performed by: RADIOLOGY

## 2020-08-07 NOTE — DISCHARGE INSTRUCTIONS
Your procedure  is scheduled for __8/12/2020________.    Call [phone] between 2pm and 5pm on _8/11/2020______to find out your arrival time for the day of surgery.    Report to the Emergency Department on the day of your surgery.  You will be escorted to the admitting unit.    Important instructions:   Do not eat or drink after 12 midnight, including water.  It is okay to brush your teeth.  Do not have gum, candy or mints.     Take only these medications with a small swallow of water on the morning of your surgery __amlodipine____________        Stop taking Aspirin, Ibuprofen, Motrin and Aleve , Fish oil, and Vitamin E for at least 7 days before your surgery. You may use Tylenol unless otherwise instructed by your doctor.       Please shower the night before and the morning of your surgery.        Use Hibiclens soap as instructed by your pre op nurse.   Please place clean linens on your bed the night before surgery. Please wear fresh clean clothing after each shower.     No shaving of procedural area at least 4-5 days before surgery due to increased risk of skin irritation and/or possible infection.      You may wear deodorant only.      Do not wear powder, body lotion or perfume/cologne.     Do not wear any jewelry or have any metal on your body.     You will be asked to remove any dentures or partials for the procedure.     Please bring any documents given to you by your doctor.     If you are going home on the same day of surgery, you must arrange for a family member or a friend to drive you home.  Public transportation is prohibited.  You will not be able to drive home if you were given anesthesia or sedation.     Wear loose fitting clothes allowing for bandages.     Please leave money and valuables home.       You may bring your cell phone.     Call the doctor if fever or illness should occur before your surgery.    Call [phone] to contact us here if needed.

## 2020-08-07 NOTE — ANESTHESIA PREPROCEDURE EVALUATION
08/07/2020  Robles Jordan is a 69 y.o., male scheduled for LAMINECTOMY, SPINE, LUMBAR  (L5-S1 laminectomy for tethered cord release) on 8/12/2020.      He is receiving PT 2x week for gait disturbance and right leg weakness. Denies chest pain or SOB with activity.    Patient was seen by PCP on 7/31/2020 and has been cleared for surgery.      Pt has <1% risk of MACE.      Past Medical History:   Diagnosis Date    BPH (benign prostatic hypertrophy)     Hx of colonic polyps 10/19/2015    Hypertension     Sleep apnea        Past Surgical History:   Procedure Laterality Date    COLONOSCOPY N/A 10/19/2015    Procedure: COLONOSCOPY;  Surgeon: Antonino Bernstein MD;  Location: [facility] ([address] [place] [address]);  Service: Endoscopy;  Laterality: N/A;    CYSTOSCOPY WITH URODYNAMIC TESTING N/A 1/17/2020    Procedure: CYSTOSCOPY,WITH URODYNAMIC TESTING;  Surgeon: Christianne Israel MD;  Location: [facility];  Service: Urology;  Laterality: N/A;  RN PREOP 1/13/2020    MULTIPLE TOOTH EXTRACTIONS  2014       EKG 7/31/2020:    Normal sinus rhythm   Normal ECG   No previous ECGs available   Confirmed by James Riley MD ([id number]) on 7/31/2020 10:03:04 AM            Anesthesia Evaluation    I have reviewed the Patient Summary Reports.    I have reviewed the Nursing Notes. I have reviewed the NPO Status.   I have reviewed the Medications.     Review of Systems  Anesthesia Hx:  No problems with previous Anesthesia  Denies Family Hx of Anesthesia complications.   Denies Personal Hx of Anesthesia complications.   Social:  Smoker    Hematology/Oncology:     Oncology Normal     Cardiovascular:   Hypertension Denies MI.        Functional Capacity unable to determine, Pt is limited due to weakness    Pulmonary:   Sleep Apnea, CPAP    Renal/:   BPH    Hepatic/GI:  Hepatic/GI Normal    Musculoskeletal:  Musculoskeletal Normal     Neurological:  Neurology Normal    Endocrine:  Endocrine Normal    Psych:  Psychiatric Normal           Physical Exam  General:  Well nourished      Dental:  Dental Findings: Upper partial dentures, Lower partial dentures   Chest/Lungs:  Chest/Lungs Findings: Normal Respiratory Rate         Mental Status:  Mental Status Findings:  Cooperative, Alert and Oriented         Anesthesia Plan  Type of Anesthesia, risks & benefits discussed:  Anesthesia Type:  general  Patient's Preference:   Intra-op Monitoring Plan: standard ASA monitors  Intra-op Monitoring Plan Comments:   Post Op Pain Control Plan:   Post Op Pain Control Plan Comments:   Induction:   IV  Beta Blocker:  Patient is not currently on a Beta-Blocker (No further documentation required).       Informed Consent: Patient understands risks and agrees with Anesthesia plan.  Questions answered. Anesthesia consent signed with patient.  ASA Score: 2     Day of Surgery Review of History & Physical: I have interviewed and examined the patient. I have reviewed the patient's H&P dated:  There are no significant changes.          Ready For Surgery From Anesthesia Perspective.

## 2020-08-10 ENCOUNTER — HOSPITAL ENCOUNTER (OUTPATIENT)
Dept: PREADMISSION TESTING | Facility: HOSPITAL | Age: 70
Discharge: HOME | DRG: 029 | End: 2020-08-10
Attending: NEUROLOGICAL SURGERY
Payer: COMMERCIAL

## 2020-08-10 DIAGNOSIS — Z01.818 PREOP TESTING: ICD-10-CM

## 2020-08-10 LAB
ABO + RH BLD: NORMAL
BLD GP AB SCN CELLS X3 SERPL QL: NORMAL
SARS-COV-2 RDRP RESP QL NAA+PROBE: NEGATIVE

## 2020-08-10 PROCEDURE — U0002 COVID-19 LAB TEST NON-CDC: HCPCS

## 2020-08-10 PROCEDURE — 86901 BLOOD TYPING SEROLOGIC RH(D): CPT

## 2020-08-12 ENCOUNTER — HOSPITAL ENCOUNTER (INPATIENT)
Facility: HOSPITAL | Age: 70
LOS: 6 days | Discharge: NURSING, REHAB OR LONG-TERM CARE | DRG: 029 | End: 2020-08-18
Attending: NEUROLOGICAL SURGERY | Admitting: RADIOLOGY
Payer: COMMERCIAL

## 2020-08-12 ENCOUNTER — ANESTHESIA (OUTPATIENT)
Dept: SURGERY | Facility: HOSPITAL | Age: 70
DRG: 029 | End: 2020-08-12
Payer: COMMERCIAL

## 2020-08-12 DIAGNOSIS — R09.02 OXYGEN DESATURATION: ICD-10-CM

## 2020-08-12 DIAGNOSIS — R53.1 RIGHT SIDED WEAKNESS: ICD-10-CM

## 2020-08-12 DIAGNOSIS — Z01.818 PREOP TESTING: ICD-10-CM

## 2020-08-12 DIAGNOSIS — Q06.8 TETHERED CORD SYNDROME: Primary | ICD-10-CM

## 2020-08-12 DIAGNOSIS — Z98.890 S/P LUMBAR LAMINECTOMY: ICD-10-CM

## 2020-08-12 PROCEDURE — D9220A PRA ANESTHESIA: ICD-10-PCS | Mod: ANES,,, | Performed by: ANESTHESIOLOGY

## 2020-08-12 PROCEDURE — 27201423 OPTIME MED/SURG SUP & DEVICES STERILE SUPPLY: Performed by: NEUROLOGICAL SURGERY

## 2020-08-12 PROCEDURE — 63600175 PHARM REV CODE 636 W HCPCS: Performed by: PHYSICIAN ASSISTANT

## 2020-08-12 PROCEDURE — D9220A PRA ANESTHESIA: ICD-10-PCS | Mod: CRNA,,, | Performed by: NURSE ANESTHETIST, CERTIFIED REGISTERED

## 2020-08-12 PROCEDURE — 36620 INSERTION CATHETER ARTERY: CPT | Mod: 59,,, | Performed by: ANESTHESIOLOGY

## 2020-08-12 PROCEDURE — 71000033 HC RECOVERY, INTIAL HOUR: Performed by: NEUROLOGICAL SURGERY

## 2020-08-12 PROCEDURE — 63200 RELEASE SPINAL CORD LUMBAR: CPT | Mod: AS,,, | Performed by: PHYSICIAN ASSISTANT

## 2020-08-12 PROCEDURE — 11000001 HC ACUTE MED/SURG PRIVATE ROOM

## 2020-08-12 PROCEDURE — P9045 ALBUMIN (HUMAN), 5%, 250 ML: HCPCS | Mod: JG | Performed by: NURSE ANESTHETIST, CERTIFIED REGISTERED

## 2020-08-12 PROCEDURE — D9220A PRA ANESTHESIA: Mod: CRNA,,, | Performed by: NURSE ANESTHETIST, CERTIFIED REGISTERED

## 2020-08-12 PROCEDURE — 94799 UNLISTED PULMONARY SVC/PX: CPT

## 2020-08-12 PROCEDURE — D9220A PRA ANESTHESIA: Mod: ANES,,, | Performed by: ANESTHESIOLOGY

## 2020-08-12 PROCEDURE — 27800505 HC CATH, RADIAL ARTERY KIT: Performed by: ANESTHESIOLOGY

## 2020-08-12 PROCEDURE — 88305 TISSUE EXAM BY PATHOLOGIST: ICD-10-PCS | Mod: 26,,, | Performed by: PATHOLOGY

## 2020-08-12 PROCEDURE — 36000711: Performed by: NEUROLOGICAL SURGERY

## 2020-08-12 PROCEDURE — 25000003 PHARM REV CODE 250: Performed by: NEUROLOGICAL SURGERY

## 2020-08-12 PROCEDURE — 63600175 PHARM REV CODE 636 W HCPCS: Performed by: NURSE ANESTHETIST, CERTIFIED REGISTERED

## 2020-08-12 PROCEDURE — 88305 TISSUE EXAM BY PATHOLOGIST: CPT | Performed by: PATHOLOGY

## 2020-08-12 PROCEDURE — 27200677 HC TRANSDUCER MONITOR KIT SINGLE: Performed by: ANESTHESIOLOGY

## 2020-08-12 PROCEDURE — 36620 PR INSERT CATH,ART,PERCUT,SHORTTERM: ICD-10-PCS | Mod: 59,,, | Performed by: ANESTHESIOLOGY

## 2020-08-12 PROCEDURE — 63200 PR RELEASE TETHERED SPINAL CORD,LUMBR: ICD-10-PCS | Mod: ,,, | Performed by: NEUROLOGICAL SURGERY

## 2020-08-12 PROCEDURE — 63200 PR RELEASE TETHERED SPINAL CORD,LUMBR: ICD-10-PCS | Mod: AS,,, | Performed by: PHYSICIAN ASSISTANT

## 2020-08-12 PROCEDURE — 25000003 PHARM REV CODE 250: Performed by: PHYSICIAN ASSISTANT

## 2020-08-12 PROCEDURE — 37000009 HC ANESTHESIA EA ADD 15 MINS: Performed by: NEUROLOGICAL SURGERY

## 2020-08-12 PROCEDURE — 63200 RELEASE SPINAL CORD LUMBAR: CPT | Mod: ,,, | Performed by: NEUROLOGICAL SURGERY

## 2020-08-12 PROCEDURE — 63600175 PHARM REV CODE 636 W HCPCS: Performed by: ANESTHESIOLOGY

## 2020-08-12 PROCEDURE — 36000710: Performed by: NEUROLOGICAL SURGERY

## 2020-08-12 PROCEDURE — 71000039 HC RECOVERY, EACH ADD'L HOUR: Performed by: NEUROLOGICAL SURGERY

## 2020-08-12 PROCEDURE — 37000008 HC ANESTHESIA 1ST 15 MINUTES: Performed by: NEUROLOGICAL SURGERY

## 2020-08-12 PROCEDURE — 25000003 PHARM REV CODE 250: Performed by: NURSE ANESTHETIST, CERTIFIED REGISTERED

## 2020-08-12 PROCEDURE — 88305 TISSUE EXAM BY PATHOLOGIST: CPT | Mod: 26,,, | Performed by: PATHOLOGY

## 2020-08-12 PROCEDURE — S0020 INJECTION, BUPIVICAINE HYDRO: HCPCS | Performed by: NEUROLOGICAL SURGERY

## 2020-08-12 RX ORDER — LIDOCAINE HYDROCHLORIDE 20 MG/ML
INJECTION INTRAVENOUS
Status: DISCONTINUED | OUTPATIENT
Start: 2020-08-12 | End: 2020-08-12

## 2020-08-12 RX ORDER — PHENYLEPHRINE HYDROCHLORIDE 10 MG/ML
INJECTION INTRAVENOUS
Status: DISCONTINUED | OUTPATIENT
Start: 2020-08-12 | End: 2020-08-12

## 2020-08-12 RX ORDER — SUCCINYLCHOLINE CHLORIDE 20 MG/ML
INJECTION INTRAMUSCULAR; INTRAVENOUS
Status: DISCONTINUED | OUTPATIENT
Start: 2020-08-12 | End: 2020-08-12

## 2020-08-12 RX ORDER — SODIUM CHLORIDE, SODIUM LACTATE, POTASSIUM CHLORIDE, CALCIUM CHLORIDE 600; 310; 30; 20 MG/100ML; MG/100ML; MG/100ML; MG/100ML
INJECTION, SOLUTION INTRAVENOUS CONTINUOUS PRN
Status: DISCONTINUED | OUTPATIENT
Start: 2020-08-12 | End: 2020-08-12

## 2020-08-12 RX ORDER — DEXAMETHASONE SODIUM PHOSPHATE 4 MG/ML
INJECTION, SOLUTION INTRA-ARTICULAR; INTRALESIONAL; INTRAMUSCULAR; INTRAVENOUS; SOFT TISSUE
Status: DISCONTINUED | OUTPATIENT
Start: 2020-08-12 | End: 2020-08-12

## 2020-08-12 RX ORDER — POLYETHYLENE GLYCOL 3350 17 G/17G
17 POWDER, FOR SOLUTION ORAL DAILY
Status: DISCONTINUED | OUTPATIENT
Start: 2020-08-12 | End: 2020-08-18 | Stop reason: HOSPADM

## 2020-08-12 RX ORDER — PROPOFOL 10 MG/ML
VIAL (ML) INTRAVENOUS
Status: DISCONTINUED | OUTPATIENT
Start: 2020-08-12 | End: 2020-08-12

## 2020-08-12 RX ORDER — LIDOCAINE HYDROCHLORIDE AND EPINEPHRINE 5; 5 MG/ML; UG/ML
INJECTION, SOLUTION INFILTRATION; PERINEURAL
Status: DISCONTINUED | OUTPATIENT
Start: 2020-08-12 | End: 2020-08-12 | Stop reason: HOSPADM

## 2020-08-12 RX ORDER — BISACODYL 10 MG
10 SUPPOSITORY, RECTAL RECTAL DAILY PRN
Status: DISCONTINUED | OUTPATIENT
Start: 2020-08-12 | End: 2020-08-18 | Stop reason: HOSPADM

## 2020-08-12 RX ORDER — ALBUMIN HUMAN 50 G/1000ML
SOLUTION INTRAVENOUS CONTINUOUS PRN
Status: DISCONTINUED | OUTPATIENT
Start: 2020-08-12 | End: 2020-08-12

## 2020-08-12 RX ORDER — MAG HYDROX/ALUMINUM HYD/SIMETH 200-200-20
30 SUSPENSION, ORAL (FINAL DOSE FORM) ORAL EVERY 4 HOURS PRN
Status: DISCONTINUED | OUTPATIENT
Start: 2020-08-12 | End: 2020-08-18 | Stop reason: HOSPADM

## 2020-08-12 RX ORDER — OXYCODONE AND ACETAMINOPHEN 10; 325 MG/1; MG/1
1 TABLET ORAL EVERY 4 HOURS PRN
Status: DISCONTINUED | OUTPATIENT
Start: 2020-08-12 | End: 2020-08-18 | Stop reason: HOSPADM

## 2020-08-12 RX ORDER — CEFAZOLIN SODIUM 2 G/50ML
2 SOLUTION INTRAVENOUS
Status: COMPLETED | OUTPATIENT
Start: 2020-08-12 | End: 2020-08-12

## 2020-08-12 RX ORDER — ONDANSETRON 2 MG/ML
4 INJECTION INTRAMUSCULAR; INTRAVENOUS DAILY PRN
Status: DISCONTINUED | OUTPATIENT
Start: 2020-08-12 | End: 2020-08-12 | Stop reason: HOSPADM

## 2020-08-12 RX ORDER — ACETAMINOPHEN 325 MG/1
650 TABLET ORAL EVERY 6 HOURS PRN
Status: DISCONTINUED | OUTPATIENT
Start: 2020-08-12 | End: 2020-08-18 | Stop reason: HOSPADM

## 2020-08-12 RX ORDER — LISINOPRIL 20 MG/1
40 TABLET ORAL DAILY
Status: DISCONTINUED | OUTPATIENT
Start: 2020-08-12 | End: 2020-08-18 | Stop reason: HOSPADM

## 2020-08-12 RX ORDER — TAMSULOSIN HYDROCHLORIDE 0.4 MG/1
0.4 CAPSULE ORAL DAILY
Status: DISCONTINUED | OUTPATIENT
Start: 2020-08-12 | End: 2020-08-18 | Stop reason: HOSPADM

## 2020-08-12 RX ORDER — LABETALOL HYDROCHLORIDE 5 MG/ML
10 INJECTION, SOLUTION INTRAVENOUS EVERY 4 HOURS PRN
Status: DISCONTINUED | OUTPATIENT
Start: 2020-08-12 | End: 2020-08-18 | Stop reason: HOSPADM

## 2020-08-12 RX ORDER — MIDAZOLAM HYDROCHLORIDE 1 MG/ML
INJECTION, SOLUTION INTRAMUSCULAR; INTRAVENOUS
Status: DISCONTINUED | OUTPATIENT
Start: 2020-08-12 | End: 2020-08-12

## 2020-08-12 RX ORDER — MUPIROCIN 20 MG/G
OINTMENT TOPICAL
Status: ACTIVE | OUTPATIENT
Start: 2020-08-12

## 2020-08-12 RX ORDER — HYDROMORPHONE HYDROCHLORIDE 2 MG/ML
0.2 INJECTION, SOLUTION INTRAMUSCULAR; INTRAVENOUS; SUBCUTANEOUS EVERY 5 MIN PRN
Status: DISCONTINUED | OUTPATIENT
Start: 2020-08-12 | End: 2020-08-12 | Stop reason: HOSPADM

## 2020-08-12 RX ORDER — MUPIROCIN 20 MG/G
1 OINTMENT TOPICAL
Status: COMPLETED | OUTPATIENT
Start: 2020-08-12 | End: 2020-08-12

## 2020-08-12 RX ORDER — HYDRALAZINE HYDROCHLORIDE 20 MG/ML
10 INJECTION INTRAMUSCULAR; INTRAVENOUS EVERY 4 HOURS PRN
Status: DISCONTINUED | OUTPATIENT
Start: 2020-08-12 | End: 2020-08-18 | Stop reason: HOSPADM

## 2020-08-12 RX ORDER — ATORVASTATIN CALCIUM 40 MG/1
80 TABLET, FILM COATED ORAL DAILY
Status: DISCONTINUED | OUTPATIENT
Start: 2020-08-12 | End: 2020-08-18 | Stop reason: HOSPADM

## 2020-08-12 RX ORDER — HEPARIN SODIUM 5000 [USP'U]/ML
5000 INJECTION, SOLUTION INTRAVENOUS; SUBCUTANEOUS EVERY 8 HOURS
Status: DISCONTINUED | OUTPATIENT
Start: 2020-08-13 | End: 2020-08-18 | Stop reason: HOSPADM

## 2020-08-12 RX ORDER — FENTANYL CITRATE 50 UG/ML
INJECTION, SOLUTION INTRAMUSCULAR; INTRAVENOUS
Status: DISCONTINUED | OUTPATIENT
Start: 2020-08-12 | End: 2020-08-12

## 2020-08-12 RX ORDER — KETAMINE HYDROCHLORIDE 100 MG/ML
INJECTION, SOLUTION INTRAMUSCULAR; INTRAVENOUS
Status: DISCONTINUED | OUTPATIENT
Start: 2020-08-12 | End: 2020-08-12

## 2020-08-12 RX ORDER — GLYCOPYRROLATE 0.2 MG/ML
INJECTION INTRAMUSCULAR; INTRAVENOUS
Status: DISCONTINUED | OUTPATIENT
Start: 2020-08-12 | End: 2020-08-12

## 2020-08-12 RX ORDER — MUPIROCIN 20 MG/G
1 OINTMENT TOPICAL 2 TIMES DAILY
Status: DISPENSED | OUTPATIENT
Start: 2020-08-12 | End: 2020-08-17

## 2020-08-12 RX ORDER — DOCUSATE SODIUM 100 MG/1
100 CAPSULE, LIQUID FILLED ORAL DAILY
Status: DISCONTINUED | OUTPATIENT
Start: 2020-08-12 | End: 2020-08-18 | Stop reason: HOSPADM

## 2020-08-12 RX ORDER — SODIUM CHLORIDE 9 MG/ML
INJECTION, SOLUTION INTRAVENOUS CONTINUOUS
Status: DISCONTINUED | OUTPATIENT
Start: 2020-08-12 | End: 2020-08-12

## 2020-08-12 RX ORDER — CEFAZOLIN SODIUM 2 G/50ML
2 SOLUTION INTRAVENOUS
Status: COMPLETED | OUTPATIENT
Start: 2020-08-12 | End: 2020-08-13

## 2020-08-12 RX ORDER — PROPOFOL 10 MG/ML
VIAL (ML) INTRAVENOUS CONTINUOUS PRN
Status: DISCONTINUED | OUTPATIENT
Start: 2020-08-12 | End: 2020-08-12

## 2020-08-12 RX ORDER — AMOXICILLIN 250 MG
2 CAPSULE ORAL NIGHTLY PRN
Status: DISCONTINUED | OUTPATIENT
Start: 2020-08-12 | End: 2020-08-18 | Stop reason: HOSPADM

## 2020-08-12 RX ORDER — EPHEDRINE SULFATE 50 MG/ML
INJECTION, SOLUTION INTRAVENOUS
Status: DISCONTINUED | OUTPATIENT
Start: 2020-08-12 | End: 2020-08-12

## 2020-08-12 RX ORDER — HYDROMORPHONE HYDROCHLORIDE 2 MG/ML
1 INJECTION, SOLUTION INTRAMUSCULAR; INTRAVENOUS; SUBCUTANEOUS
Status: DISCONTINUED | OUTPATIENT
Start: 2020-08-12 | End: 2020-08-18 | Stop reason: HOSPADM

## 2020-08-12 RX ORDER — ONDANSETRON 2 MG/ML
INJECTION INTRAMUSCULAR; INTRAVENOUS
Status: DISCONTINUED | OUTPATIENT
Start: 2020-08-12 | End: 2020-08-12

## 2020-08-12 RX ORDER — AMLODIPINE BESYLATE 5 MG/1
10 TABLET ORAL DAILY
Status: DISCONTINUED | OUTPATIENT
Start: 2020-08-13 | End: 2020-08-18 | Stop reason: HOSPADM

## 2020-08-12 RX ORDER — BUPIVACAINE HYDROCHLORIDE 5 MG/ML
INJECTION, SOLUTION PERINEURAL
Status: DISCONTINUED | OUTPATIENT
Start: 2020-08-12 | End: 2020-08-12 | Stop reason: HOSPADM

## 2020-08-12 RX ORDER — SODIUM CHLORIDE 0.9 % (FLUSH) 0.9 %
10 SYRINGE (ML) INJECTION
Status: DISCONTINUED | OUTPATIENT
Start: 2020-08-12 | End: 2020-08-12 | Stop reason: HOSPADM

## 2020-08-12 RX ORDER — OXYCODONE AND ACETAMINOPHEN 5; 325 MG/1; MG/1
1 TABLET ORAL EVERY 4 HOURS PRN
Status: DISCONTINUED | OUTPATIENT
Start: 2020-08-12 | End: 2020-08-18 | Stop reason: HOSPADM

## 2020-08-12 RX ORDER — SODIUM CHLORIDE 9 MG/ML
INJECTION, SOLUTION INTRAVENOUS CONTINUOUS
Status: DISCONTINUED | OUTPATIENT
Start: 2020-08-12 | End: 2020-08-18 | Stop reason: HOSPADM

## 2020-08-12 RX ORDER — METHOCARBAMOL 750 MG/1
750 TABLET, FILM COATED ORAL 3 TIMES DAILY
Status: DISCONTINUED | OUTPATIENT
Start: 2020-08-12 | End: 2020-08-18 | Stop reason: HOSPADM

## 2020-08-12 RX ORDER — ONDANSETRON 2 MG/ML
8 INJECTION INTRAMUSCULAR; INTRAVENOUS EVERY 6 HOURS PRN
Status: DISCONTINUED | OUTPATIENT
Start: 2020-08-12 | End: 2020-08-18 | Stop reason: HOSPADM

## 2020-08-12 RX ADMIN — SODIUM CHLORIDE, SODIUM LACTATE, POTASSIUM CHLORIDE, AND CALCIUM CHLORIDE: .6; .31; .03; .02 INJECTION, SOLUTION INTRAVENOUS at 10:08

## 2020-08-12 RX ADMIN — PHENYLEPHRINE HYDROCHLORIDE 100 MCG: 10 INJECTION INTRAVENOUS at 07:08

## 2020-08-12 RX ADMIN — LISINOPRIL 40 MG: 20 TABLET ORAL at 01:08

## 2020-08-12 RX ADMIN — FENTANYL CITRATE 50 MCG: 50 INJECTION INTRAMUSCULAR; INTRAVENOUS at 07:08

## 2020-08-12 RX ADMIN — DOCUSATE SODIUM 100 MG: 100 CAPSULE, LIQUID FILLED ORAL at 02:08

## 2020-08-12 RX ADMIN — POLYETHYLENE GLYCOL 3350 17 G: 17 POWDER, FOR SOLUTION ORAL at 02:08

## 2020-08-12 RX ADMIN — EPHEDRINE SULFATE 10 MG: 50 INJECTION INTRAVENOUS at 07:08

## 2020-08-12 RX ADMIN — PROPOFOL 80 MCG/KG/MIN: 10 INJECTION, EMULSION INTRAVENOUS at 07:08

## 2020-08-12 RX ADMIN — SUCCINYLCHOLINE CHLORIDE 120 MG: 20 INJECTION, SOLUTION INTRAMUSCULAR; INTRAVENOUS at 07:08

## 2020-08-12 RX ADMIN — EPHEDRINE SULFATE 10 MG: 50 INJECTION INTRAVENOUS at 08:08

## 2020-08-12 RX ADMIN — OXYCODONE HYDROCHLORIDE AND ACETAMINOPHEN 1 TABLET: 5; 325 TABLET ORAL at 11:08

## 2020-08-12 RX ADMIN — HYDROMORPHONE HYDROCHLORIDE 1 MG: 2 INJECTION INTRAMUSCULAR; INTRAVENOUS; SUBCUTANEOUS at 08:08

## 2020-08-12 RX ADMIN — OXYCODONE HYDROCHLORIDE AND ACETAMINOPHEN 1 TABLET: 10; 325 TABLET ORAL at 03:08

## 2020-08-12 RX ADMIN — REMIFENTANIL HYDROCHLORIDE 0.05 MCG/KG/MIN: 1 INJECTION, POWDER, LYOPHILIZED, FOR SOLUTION INTRAVENOUS at 07:08

## 2020-08-12 RX ADMIN — PHENYLEPHRINE HYDROCHLORIDE 200 MCG: 10 INJECTION INTRAVENOUS at 07:08

## 2020-08-12 RX ADMIN — MUPIROCIN 1 G: 20 OINTMENT TOPICAL at 08:08

## 2020-08-12 RX ADMIN — SODIUM CHLORIDE, SODIUM LACTATE, POTASSIUM CHLORIDE, AND CALCIUM CHLORIDE: .6; .31; .03; .02 INJECTION, SOLUTION INTRAVENOUS at 08:08

## 2020-08-12 RX ADMIN — HYDROMORPHONE HYDROCHLORIDE 0.2 MG: 2 INJECTION, SOLUTION INTRAMUSCULAR; INTRAVENOUS; SUBCUTANEOUS at 12:08

## 2020-08-12 RX ADMIN — KETAMINE HYDROCHLORIDE 25 MG: 100 INJECTION, SOLUTION, CONCENTRATE INTRAMUSCULAR; INTRAVENOUS at 09:08

## 2020-08-12 RX ADMIN — CEFAZOLIN SODIUM 2 G: 2 SOLUTION INTRAVENOUS at 02:08

## 2020-08-12 RX ADMIN — METHOCARBAMOL 750 MG: 750 TABLET ORAL at 08:08

## 2020-08-12 RX ADMIN — CEFAZOLIN SODIUM 2 G: 2 SOLUTION INTRAVENOUS at 07:08

## 2020-08-12 RX ADMIN — MIDAZOLAM HYDROCHLORIDE 0.5 MG: 1 INJECTION, SOLUTION INTRAMUSCULAR; INTRAVENOUS at 08:08

## 2020-08-12 RX ADMIN — PHENYLEPHRINE HYDROCHLORIDE 200 MCG: 10 INJECTION INTRAVENOUS at 08:08

## 2020-08-12 RX ADMIN — WHITE PETROLATUM MINERAL OIL 1 TUBE: 150; 830 OINTMENT OPHTHALMIC at 07:08

## 2020-08-12 RX ADMIN — METHOCARBAMOL 750 MG: 750 TABLET ORAL at 02:08

## 2020-08-12 RX ADMIN — ALBUMIN (HUMAN): 12.5 SOLUTION INTRAVENOUS at 09:08

## 2020-08-12 RX ADMIN — SODIUM CHLORIDE: 0.9 INJECTION, SOLUTION INTRAVENOUS at 02:08

## 2020-08-12 RX ADMIN — ONDANSETRON 4 MG: 2 INJECTION, SOLUTION INTRAMUSCULAR; INTRAVENOUS at 10:08

## 2020-08-12 RX ADMIN — MIDAZOLAM HYDROCHLORIDE 0.5 MG: 1 INJECTION, SOLUTION INTRAMUSCULAR; INTRAVENOUS at 09:08

## 2020-08-12 RX ADMIN — SODIUM CHLORIDE, SODIUM LACTATE, POTASSIUM CHLORIDE, AND CALCIUM CHLORIDE: .6; .31; .03; .02 INJECTION, SOLUTION INTRAVENOUS at 07:08

## 2020-08-12 RX ADMIN — DEXAMETHASONE SODIUM PHOSPHATE 4 MG: 4 INJECTION, SOLUTION INTRAMUSCULAR; INTRAVENOUS at 10:08

## 2020-08-12 RX ADMIN — CEFAZOLIN SODIUM 2 G: 2 SOLUTION INTRAVENOUS at 11:08

## 2020-08-12 RX ADMIN — PHENYLEPHRINE HYDROCHLORIDE 20 MCG/MIN: 10 INJECTION INTRAVENOUS at 08:08

## 2020-08-12 RX ADMIN — ESMOLOL HYDROCHLORIDE 10 MG: 10 INJECTION INTRAVENOUS at 07:08

## 2020-08-12 RX ADMIN — FENTANYL CITRATE 100 MCG: 50 INJECTION INTRAMUSCULAR; INTRAVENOUS at 07:08

## 2020-08-12 RX ADMIN — TAMSULOSIN HYDROCHLORIDE 0.4 MG: 0.4 CAPSULE ORAL at 02:08

## 2020-08-12 RX ADMIN — Medication 100 MG: at 07:08

## 2020-08-12 RX ADMIN — KETAMINE HYDROCHLORIDE 25 MG: 100 INJECTION, SOLUTION, CONCENTRATE INTRAMUSCULAR; INTRAVENOUS at 07:08

## 2020-08-12 RX ADMIN — MIDAZOLAM HYDROCHLORIDE 2 MG: 1 INJECTION, SOLUTION INTRAMUSCULAR; INTRAVENOUS at 07:08

## 2020-08-12 RX ADMIN — MUPIROCIN 1 G: 20 OINTMENT TOPICAL at 06:08

## 2020-08-12 RX ADMIN — GLYCOPYRROLATE 0.2 MG: 0.2 INJECTION, SOLUTION INTRAMUSCULAR; INTRAVENOUS at 07:08

## 2020-08-12 RX ADMIN — ATORVASTATIN CALCIUM 80 MG: 40 TABLET, FILM COATED ORAL at 02:08

## 2020-08-12 RX ADMIN — PROPOFOL 180 MG: 10 INJECTION, EMULSION INTRAVENOUS at 07:08

## 2020-08-12 RX ADMIN — FENTANYL CITRATE 50 MCG: 50 INJECTION INTRAMUSCULAR; INTRAVENOUS at 08:08

## 2020-08-12 NOTE — NURSING
Upon arrival to floor from surgery: patient oriented to room, call bell in reach and bed in lowest position. No apparent distress noted.

## 2020-08-12 NOTE — ANESTHESIA PROCEDURE NOTES
Arterial    Diagnosis: HTN    Patient location during procedure: done in OR  Procedure start time: 8/12/2020 7:34 AM  Timeout: 8/12/2020 7:32 AM  Procedure end time: 8/12/2020 7:38 AM    Staffing  Authorizing Provider: Paola Garcia MD  Performing Provider: Paola Garcia MD    Anesthesiologist was present at the time of the procedure.    Preanesthetic Checklist  Completed: patient identified, site marked, surgical consent, pre-op evaluation, timeout performed, IV checked, risks and benefits discussed, monitors and equipment checked and anesthesia consent givenArterial  Skin Prep: chlorhexidine gluconate  Local Infiltration: none  Orientation: left  Location: radial  Catheter Size: 20 G  Catheter placement by Anatomical landmarks. Heme positive aspiration all ports.Insertion Attempts: 2  Assessment  Dressing: secured with tape and tegaderm  Patient: Tolerated well

## 2020-08-12 NOTE — TRANSFER OF CARE
Anesthesia Transfer of Care Note    Patient: Robles Jordan    Procedure(s) Performed: Procedure(s) (LRB):  LAMINECTOMY, SPINE, LUMBAR  (L5-S1 laminectomy for tethered cord release) Microscope OPEN lami setup [facility] Microinstruments Neuromonitoring MARIBETH JUSTIN [phone]   (rectal stim, EMG, electrode nerve stimulator) (Bilateral)    Patient location: PACU    Anesthesia Type: general    Transport from OR: Transported from OR on 100% O2 by closed face mask with adequate spontaneous ventilation    Post pain: adequate analgesia    Post assessment: no apparent anesthetic complications and tolerated procedure well    Post vital signs: stable    Level of consciousness: sedated and responds to stimulation    Nausea/Vomiting: no nausea/vomiting    Complications: none    Transfer of care protocol was followed      Last vitals:   Visit Vitals  BP (!) 144/65 (BP Location: Left arm, Patient Position: Lying)   Pulse 86   Temp 36.5 °C (97.7 °F) (Oral)   Resp 17   Wt 110 kg (242 lb 8.1 oz)   SpO2 100%   BMI 31.14 kg/m²

## 2020-08-12 NOTE — INTERVAL H&P NOTE
The patient has been examined and the H&P has been reviewed:    I concur with the findings and no changes have occurred since H&P was written.    Surgery risks, benefits and alternative options discussed and understood by patient/family.          Active Hospital Problems    Diagnosis  POA    Tethered cord syndrome [Q06.8]  Not Applicable      Resolved Hospital Problems   No resolved problems to display.

## 2020-08-12 NOTE — ANESTHESIA POSTPROCEDURE EVALUATION
Anesthesia Post Evaluation    Patient: Robles Jordan    Procedure(s) Performed: Procedure(s) (LRB):  LAMINECTOMY, SPINE, LUMBAR  (L5-S1 laminectomy for tethered cord release) Microscope OPEN lami setup [facility] Microinstruments Neuromonitoring MARIBETH VIDESUTREAUX [phone]   (rectal stim, EMG, electrode nerve stimulator) (Bilateral)    Final Anesthesia Type: general    Patient location during evaluation: PACU  Patient participation: Yes- Able to Participate  Level of consciousness: awake and alert  Post-procedure vital signs: reviewed and stable  Pain management: adequate  Airway patency: patent    PONV status at discharge: No PONV  Anesthetic complications: no      Cardiovascular status: hemodynamically stable  Respiratory status: unassisted and spontaneous ventilation  Hydration status: euvolemic  Follow-up not needed.          Vitals Value Taken Time   /81 08/12/20 1228   Temp 36.5 °C (97.7 °F) 08/12/20 1107   Pulse 82 08/12/20 1230   Resp 8 08/12/20 1230   SpO2 96 % 08/12/20 1230   Vitals shown include unvalidated device data.      No case tracking events are documented in the log.      Pain/Nae Score: Pain Rating Prior to Med Admin: 5 (8/12/2020 11:46 AM)  Nae Score: 8 (8/12/2020 11:30 AM)

## 2020-08-12 NOTE — PLAN OF CARE
Preop plan of care reviewed.  Questions encouraged and questions answered. Pt verbalized readiness to proceed.

## 2020-08-12 NOTE — BRIEF OP NOTE
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  Bovie was used to perform a subperiosteal dissection of the spinous process and lamina of L5.  Leksell rongeurs were used to bite off the spinous process, and the high speed drill was used to drill off the remaining lamina.  There was a distinct lack of ligamentum flavum and what appeared to be malformed bone in the region, careful dissection was performed and the dura was uncovered widely.  A 15 blade was used to make the initial incision in the dura, then using a Kaushal as protection, we extended the dural opening superiorly and inferiorly, holding up the leaflets with suture.  The fatty filum was immediately apparent, but we stimulated to confirm there was no neural function.  We then bipolared the filum, and transected it in 2 places.  We sent the chunk of filum to pathology.  The superior end of the filum retracted superiorly, suggesting it was under tension.  We then irrigated copiously, and closed the dura in a  running continuous stitch with 4-0 nurolon.  There was a small area of leaking which we patched with a piece of paraspinal muscle and a figure 8 stitch.  We then achieved hemostasis, injected dural sealant around the dura, and closed the muscles and fascia in a layered fashion with 0 vicryl, followed by 2-0 vicryl for the subcutaneous stitches, and 4-0 monocryl for the skin.  We placed dermabond and an island dressing.    Estimated Blood Loss: 150 mL         Specimens:   Specimen (12h ago, onward)    None

## 2020-08-12 NOTE — PROGRESS NOTES
Certification of Assistant at Surgery       Surgery Date: 8/12/2020     Participating Surgeons:  Surgeon(s) and Role:     * Chico Rodriguez, DO - Primary       Briana Seals PA-C - Assisting     Procedures:  Procedure(s) (LRB):  LAMINECTOMY, SPINE, LUMBAR  (L5-S1 laminectomy for tethered cord release) Microscope OPEN lami setup [facility] Microinstruments Neuromonitoring MARIBETH VIDESUTREAUX [phone]   (rectal stim, EMG, electrode nerve stimulator) (Bilateral)    Assistant Surgeon's Certification of Necessity:  I understand that section 1842 (b) (6) (d) of the Social Security Act generally prohibits Medicare Part B reasonable charge payment for the services of assistants at surgery in teaching hospitals when qualified residents are available to furnish such services. I certify that the services for which payment is claimed were medically necessary, and that no qualified resident was available to perform the services. I further understand that these services are subject to post-payment review by the Medicare carrier.      Briana Seals PA-C    08/12/2020  10:07 AM

## 2020-08-12 NOTE — PLAN OF CARE
Nae 9/10. Drowsy; responds to voice. Oriented x 4. VSS per flow sheet. Pain level tolerable per patient. No n/v present at this time.  Island dressing to lower back midline cdi. HOB flat per MD orders.   Hand off report to VIKKI Barba MedSurg.   See chart for full assessment.   Spouse updated during Recovery and notified of transfer to room.

## 2020-08-12 NOTE — ANESTHESIA PROCEDURE NOTES
Intubation  Performed by: Jayson Ashley CRNA  Authorized by: Paola Garcia MD     Intubation:     Induction:  Intravenous    Intubated:  Postinduction    Mask Ventilation:  Easy with oral airway    Attempts:  1    Attempted By:  CRNA    Method of Intubation:  Direct    Blade:  Karma 3    Laryngeal View Grade: Grade I - full view of chords      Difficult Airway Encountered?: No      Complications:  None    Airway Device:  Oral endotracheal tube    Airway Device Size:  7.5    Style/Cuff Inflation:  Cuffed (inflated to minimal occlusive pressure)    Tube secured:  23    Secured at:  The lips    Placement Verified By:  Capnometry    Complicating Factors:  None    Findings Post-Intubation:  BS equal bilateral

## 2020-08-13 LAB
ALLENS TEST: ABNORMAL
ANION GAP SERPL CALC-SCNC: 5 MMOL/L (ref 8–16)
BASOPHILS # BLD AUTO: 0.01 K/UL (ref 0–0.2)
BASOPHILS NFR BLD: 0.1 % (ref 0–1.9)
BUN SERPL-MCNC: 10 MG/DL (ref 8–23)
CALCIUM SERPL-MCNC: 8.3 MG/DL (ref 8.7–10.5)
CHLORIDE SERPL-SCNC: 103 MMOL/L (ref 95–110)
CO2 SERPL-SCNC: 29 MMOL/L (ref 23–29)
CREAT SERPL-MCNC: 0.8 MG/DL (ref 0.5–1.4)
DELSYS: ABNORMAL
DIFFERENTIAL METHOD: ABNORMAL
EOSINOPHIL # BLD AUTO: 0.1 K/UL (ref 0–0.5)
EOSINOPHIL NFR BLD: 0.5 % (ref 0–8)
ERYTHROCYTE [DISTWIDTH] IN BLOOD BY AUTOMATED COUNT: 13.2 % (ref 11.5–14.5)
EST. GFR  (AFRICAN AMERICAN): >60 ML/MIN/1.73 M^2
EST. GFR  (NON AFRICAN AMERICAN): >60 ML/MIN/1.73 M^2
FLOW: 4
GLUCOSE SERPL-MCNC: 90 MG/DL (ref 70–110)
HCO3 UR-SCNC: 23.5 MMOL/L (ref 24–28)
HCT VFR BLD AUTO: 41 % (ref 40–54)
HGB BLD-MCNC: 12.8 G/DL (ref 14–18)
IMM GRANULOCYTES # BLD AUTO: 0.09 K/UL (ref 0–0.04)
IMM GRANULOCYTES NFR BLD AUTO: 1 % (ref 0–0.5)
LYMPHOCYTES # BLD AUTO: 2.6 K/UL (ref 1–4.8)
LYMPHOCYTES NFR BLD: 27.1 % (ref 18–48)
MCH RBC QN AUTO: 28.6 PG (ref 27–31)
MCHC RBC AUTO-ENTMCNC: 31.2 G/DL (ref 32–36)
MCV RBC AUTO: 92 FL (ref 82–98)
MODE: ABNORMAL
MONOCYTES # BLD AUTO: 1.1 K/UL (ref 0.3–1)
MONOCYTES NFR BLD: 11.3 % (ref 4–15)
NEUTROPHILS # BLD AUTO: 5.7 K/UL (ref 1.8–7.7)
NEUTROPHILS NFR BLD: 60 % (ref 38–73)
NRBC BLD-RTO: 0 /100 WBC
PCO2 BLDA: 41 MMHG (ref 35–45)
PH SMN: 7.37 [PH] (ref 7.35–7.45)
PLATELET # BLD AUTO: 205 K/UL (ref 150–350)
PMV BLD AUTO: 9 FL (ref 9.2–12.9)
PO2 BLDA: 64 MMHG (ref 80–100)
POC BE: -2 MMOL/L
POC SATURATED O2: 91 % (ref 95–100)
POC TCO2: 25 MMOL/L (ref 23–27)
POTASSIUM SERPL-SCNC: 4 MMOL/L (ref 3.5–5.1)
RBC # BLD AUTO: 4.48 M/UL (ref 4.6–6.2)
SAMPLE: ABNORMAL
SITE: ABNORMAL
SODIUM SERPL-SCNC: 137 MMOL/L (ref 136–145)
SP02: 90
WBC # BLD AUTO: 9.42 K/UL (ref 3.9–12.7)

## 2020-08-13 PROCEDURE — 36600 WITHDRAWAL OF ARTERIAL BLOOD: CPT

## 2020-08-13 PROCEDURE — 63600175 PHARM REV CODE 636 W HCPCS: Performed by: PHYSICIAN ASSISTANT

## 2020-08-13 PROCEDURE — 85025 COMPLETE CBC W/AUTO DIFF WBC: CPT

## 2020-08-13 PROCEDURE — 99024 POSTOP FOLLOW-UP VISIT: CPT | Mod: ,,, | Performed by: NEUROLOGICAL SURGERY

## 2020-08-13 PROCEDURE — 99024 PR POST-OP FOLLOW-UP VISIT: ICD-10-PCS | Mod: ,,, | Performed by: NEUROLOGICAL SURGERY

## 2020-08-13 PROCEDURE — 93005 ELECTROCARDIOGRAM TRACING: CPT

## 2020-08-13 PROCEDURE — 27000221 HC OXYGEN, UP TO 24 HOURS

## 2020-08-13 PROCEDURE — 80048 BASIC METABOLIC PNL TOTAL CA: CPT

## 2020-08-13 PROCEDURE — 25000003 PHARM REV CODE 250: Performed by: NEUROLOGICAL SURGERY

## 2020-08-13 PROCEDURE — 25000003 PHARM REV CODE 250: Performed by: PHYSICIAN ASSISTANT

## 2020-08-13 PROCEDURE — 82803 BLOOD GASES ANY COMBINATION: CPT

## 2020-08-13 PROCEDURE — 94799 UNLISTED PULMONARY SVC/PX: CPT

## 2020-08-13 PROCEDURE — 93010 EKG 12-LEAD: ICD-10-PCS | Mod: ,,, | Performed by: INTERNAL MEDICINE

## 2020-08-13 PROCEDURE — 11000001 HC ACUTE MED/SURG PRIVATE ROOM

## 2020-08-13 PROCEDURE — 36415 COLL VENOUS BLD VENIPUNCTURE: CPT

## 2020-08-13 PROCEDURE — 99900035 HC TECH TIME PER 15 MIN (STAT)

## 2020-08-13 PROCEDURE — 93010 ELECTROCARDIOGRAM REPORT: CPT | Mod: ,,, | Performed by: INTERNAL MEDICINE

## 2020-08-13 PROCEDURE — 94761 N-INVAS EAR/PLS OXIMETRY MLT: CPT

## 2020-08-13 RX ADMIN — DOCUSATE SODIUM 100 MG: 100 CAPSULE, LIQUID FILLED ORAL at 08:08

## 2020-08-13 RX ADMIN — HEPARIN SODIUM 5000 UNITS: 5000 INJECTION INTRAVENOUS; SUBCUTANEOUS at 02:08

## 2020-08-13 RX ADMIN — SODIUM CHLORIDE: 0.9 INJECTION, SOLUTION INTRAVENOUS at 06:08

## 2020-08-13 RX ADMIN — METHOCARBAMOL 750 MG: 750 TABLET ORAL at 09:08

## 2020-08-13 RX ADMIN — HYDROMORPHONE HYDROCHLORIDE 1 MG: 2 INJECTION INTRAMUSCULAR; INTRAVENOUS; SUBCUTANEOUS at 04:08

## 2020-08-13 RX ADMIN — HYDROMORPHONE HYDROCHLORIDE 1 MG: 2 INJECTION INTRAMUSCULAR; INTRAVENOUS; SUBCUTANEOUS at 03:08

## 2020-08-13 RX ADMIN — MUPIROCIN 1 G: 20 OINTMENT TOPICAL at 09:08

## 2020-08-13 RX ADMIN — SODIUM CHLORIDE: 0.9 INJECTION, SOLUTION INTRAVENOUS at 08:08

## 2020-08-13 RX ADMIN — HYDROMORPHONE HYDROCHLORIDE 1 MG: 2 INJECTION INTRAMUSCULAR; INTRAVENOUS; SUBCUTANEOUS at 12:08

## 2020-08-13 RX ADMIN — OXYCODONE HYDROCHLORIDE AND ACETAMINOPHEN 1 TABLET: 10; 325 TABLET ORAL at 08:08

## 2020-08-13 RX ADMIN — OXYCODONE HYDROCHLORIDE AND ACETAMINOPHEN 1 TABLET: 10; 325 TABLET ORAL at 09:08

## 2020-08-13 RX ADMIN — OXYCODONE HYDROCHLORIDE AND ACETAMINOPHEN 1 TABLET: 10; 325 TABLET ORAL at 12:08

## 2020-08-13 RX ADMIN — TAMSULOSIN HYDROCHLORIDE 0.4 MG: 0.4 CAPSULE ORAL at 08:08

## 2020-08-13 RX ADMIN — METHOCARBAMOL 750 MG: 750 TABLET ORAL at 02:08

## 2020-08-13 RX ADMIN — LISINOPRIL 40 MG: 20 TABLET ORAL at 08:08

## 2020-08-13 RX ADMIN — ATORVASTATIN CALCIUM 80 MG: 40 TABLET, FILM COATED ORAL at 08:08

## 2020-08-13 RX ADMIN — POLYETHYLENE GLYCOL 3350 17 G: 17 POWDER, FOR SOLUTION ORAL at 08:08

## 2020-08-13 RX ADMIN — MUPIROCIN 1 G: 20 OINTMENT TOPICAL at 08:08

## 2020-08-13 RX ADMIN — HYDROMORPHONE HYDROCHLORIDE 1 MG: 2 INJECTION INTRAMUSCULAR; INTRAVENOUS; SUBCUTANEOUS at 09:08

## 2020-08-13 RX ADMIN — AMLODIPINE BESYLATE 10 MG: 5 TABLET ORAL at 08:08

## 2020-08-13 RX ADMIN — METHOCARBAMOL 750 MG: 750 TABLET ORAL at 08:08

## 2020-08-13 RX ADMIN — HEPARIN SODIUM 5000 UNITS: 5000 INJECTION INTRAVENOUS; SUBCUTANEOUS at 09:08

## 2020-08-13 NOTE — PLAN OF CARE
Pt AAOx4, VSS on 2L NC, HOB flat, scheduled meds administered - swallows pills without difficulty, Diet - Clear liquid, pain managed with prn meds, voids per urethral cath, Started on Heparin SQ Q8H for VTE prophylaxis, remains free of fall.    Problem: Fall Injury Risk  Goal: Absence of Fall and Fall-Related Injury  Outcome: Ongoing, Progressing  Intervention: Identify and Manage Contributors to Fall Injury Risk  Flowsheets (Taken 8/13/2020 1459)  Self-Care Promotion:   independence encouraged   BADL personal objects within reach   BADL personal routines maintained  Medication Review/Management: medications reviewed  Intervention: Promote Injury-Free Environment  Flowsheets (Taken 8/13/2020 1459)  Safety Promotion/Fall Prevention:   assistive device/personal item within reach   bed alarm set   side rails raised x 3   room near unit station   nonskid shoes/socks when out of bed   medications reviewed   Fall Risk reviewed with patient/family  Environmental Safety Modification:   assistive device/personal items within reach   clutter free environment maintained     Problem: Infection  Goal: Infection Symptom Resolution  Outcome: Ongoing, Progressing  Intervention: Prevent or Manage Infection  Flowsheets (Taken 8/13/2020 1459)  Fever Reduction/Comfort Measures: medication administered  Infection Management: aseptic technique maintained     Problem: Skin Injury Risk Increased  Goal: Skin Health and Integrity  Outcome: Ongoing, Progressing  Intervention: Optimize Skin Protection  Flowsheets (Taken 8/13/2020 1459)  Pressure Reduction Techniques: weight shift assistance provided  Pressure Reduction Devices: positioning supports utilized  Skin Protection:   adhesive use limited   incontinence pads utilized  Head of Bed (HOB): HOB flat

## 2020-08-13 NOTE — PROGRESS NOTES
"NEUROSURGERY PROGRESS NOTE    ID:  S/p L5-S1 lami for release of tethered cord    INTERVAL HISTORY:  No overnight events  Remained flat    SUBJECTIVE:  Surgical site back pain controlled with oral meds but wears off by 3 hrs  Right foot strength improved - "better ROM"  No leg pain  No HA    EXAM:  Vitals:    08/13/20 0932   BP:    Pulse:    Resp: 16   Temp:        AAO3  CN 2-12 intact  Strength 5/5 BUE and BLE, 3+/5 R HF, 4+/5 DF/EHL and 5/5 PF  LT sensation intact     Incision: c/d/i    A/P:    R foot strength improved. Surgical site back pain as expected.  Tolerating flat.    - Cont HOB flat until tomorrow am (ok to be on side or back, not stomach)  - Cont chaudhary while flat  - Cont pain regimen      "

## 2020-08-13 NOTE — PLAN OF CARE
Discharge Planning Assessment completed and home needs addressed with patient at bedside. Patient confirmed information on Face Sheet as correct.     SW Role Explained.  Patient identified by using 3 identifiers: name, date of birth and home address.      Patient's Disposition: Home with spouse and son     Patient confirmed that HELP AT HOME will be from: Spouse and son     Patient reports no HME      SW placed name and contact number on Communicate Board. Pt informed to contact SW for all discharge needs/questions.      Preferences:    PCP: John Jackson MD   Pharmacy:   [facility]/pharmacy #[id number] - [place], [place] - [address]  [address] [place] [address]  Phone: [phone] Fax: [phone]        08/13/20 7677   Discharge Assessment   Assessment Type Discharge Planning Assessment   Confirmed/corrected address and phone number on facesheet? Yes   Assessment information obtained from? Patient   Communicated expected length of stay with patient/caregiver yes   Prior to hospitilization cognitive status: Alert/Oriented   Prior to hospitalization functional status: Independent   Current cognitive status: Alert/Oriented   Current Functional Status: Needs Assistance   Facility Arrived From: Home   Lives With spouse;child(eric), adult   Able to Return to Prior Arrangements yes   Is patient able to care for self after discharge? Yes   Who are your caregiver(s) and their phone number(s)? Neeru: [phone]   Readmission Within the Last 30 Days no previous admission in last 30 days   Equipment Currently Used at Home none   Do you have any problems affording any of your prescribed medications? No   Is the patient taking medications as prescribed? yes   Does the patient have transportation home? Yes   Transportation Anticipated family or friend will provide   Dialysis Name and Scheduled days N/A   Does the patient receive services at the [facility]? No   Discharge Plan A Home with family    DME Needed Upon Discharge    (Pending)   Patient/Family in Agreement with Plan yes

## 2020-08-13 NOTE — PROGRESS NOTES
BRIEF NSGY UPDATE    The nurse informed me that the patient was desatting to 88% on 2 L nasal cannula - increased to 92% on 4L NC.    Patient did not appear in acute distress and denies shortness of breath or chest pain.   Wife, who is a nurse, notice that he appeared to be short of breath and congested and had asked the nurse to check his oxygen saturation, which revealed the desaturation.  Patient has a history of obstructive sleep apnea and has been on strict bedrest with head of bed flat since yesterday after undergoing an intradural resection of a fatty filum.  HOB flat was to prevent CSF leak.     The desaturation is most likely due to postop atelectasis exacerbated by lying flat.  However, I will initiate a full workup including chest x-ray, EKG, ABG to rule out PE or MI.    I also give permission for patient's wife to stay in his room overnight.

## 2020-08-13 NOTE — NURSING
Pt sating 88% per 2L NC. Oxygen bumped up to 4L NC - Sating 92%. Pt denies SOB, instructed to use IS as much as possible when awake.. MD notified - ordered 12 lead EKG stat, CXR stat, CBC & BMP stat, ABGs, cont. pulse ox.

## 2020-08-14 PROCEDURE — 94761 N-INVAS EAR/PLS OXIMETRY MLT: CPT

## 2020-08-14 PROCEDURE — 94799 UNLISTED PULMONARY SVC/PX: CPT

## 2020-08-14 PROCEDURE — 11000001 HC ACUTE MED/SURG PRIVATE ROOM

## 2020-08-14 PROCEDURE — 99024 POSTOP FOLLOW-UP VISIT: CPT | Mod: ,,, | Performed by: PHYSICIAN ASSISTANT

## 2020-08-14 PROCEDURE — 99024 PR POST-OP FOLLOW-UP VISIT: ICD-10-PCS | Mod: ,,, | Performed by: PHYSICIAN ASSISTANT

## 2020-08-14 PROCEDURE — 25000003 PHARM REV CODE 250: Performed by: PHYSICIAN ASSISTANT

## 2020-08-14 PROCEDURE — 97165 OT EVAL LOW COMPLEX 30 MIN: CPT

## 2020-08-14 PROCEDURE — 63600175 PHARM REV CODE 636 W HCPCS: Performed by: PHYSICIAN ASSISTANT

## 2020-08-14 PROCEDURE — 99900035 HC TECH TIME PER 15 MIN (STAT)

## 2020-08-14 PROCEDURE — 97161 PT EVAL LOW COMPLEX 20 MIN: CPT

## 2020-08-14 PROCEDURE — 97530 THERAPEUTIC ACTIVITIES: CPT

## 2020-08-14 RX ADMIN — METHOCARBAMOL 750 MG: 750 TABLET ORAL at 08:08

## 2020-08-14 RX ADMIN — METHOCARBAMOL 750 MG: 750 TABLET ORAL at 03:08

## 2020-08-14 RX ADMIN — HYDROMORPHONE HYDROCHLORIDE 1 MG: 2 INJECTION INTRAMUSCULAR; INTRAVENOUS; SUBCUTANEOUS at 09:08

## 2020-08-14 RX ADMIN — TAMSULOSIN HYDROCHLORIDE 0.4 MG: 0.4 CAPSULE ORAL at 08:08

## 2020-08-14 RX ADMIN — OXYCODONE HYDROCHLORIDE AND ACETAMINOPHEN 1 TABLET: 10; 325 TABLET ORAL at 02:08

## 2020-08-14 RX ADMIN — DOCUSATE SODIUM 100 MG: 100 CAPSULE, LIQUID FILLED ORAL at 08:08

## 2020-08-14 RX ADMIN — HEPARIN SODIUM 5000 UNITS: 5000 INJECTION INTRAVENOUS; SUBCUTANEOUS at 03:08

## 2020-08-14 RX ADMIN — MUPIROCIN 1 G: 20 OINTMENT TOPICAL at 08:08

## 2020-08-14 RX ADMIN — LISINOPRIL 40 MG: 20 TABLET ORAL at 08:08

## 2020-08-14 RX ADMIN — POLYETHYLENE GLYCOL 3350 17 G: 17 POWDER, FOR SOLUTION ORAL at 08:08

## 2020-08-14 RX ADMIN — HYDROMORPHONE HYDROCHLORIDE 1 MG: 2 INJECTION INTRAMUSCULAR; INTRAVENOUS; SUBCUTANEOUS at 11:08

## 2020-08-14 RX ADMIN — OXYCODONE HYDROCHLORIDE AND ACETAMINOPHEN 1 TABLET: 10; 325 TABLET ORAL at 10:08

## 2020-08-14 RX ADMIN — HEPARIN SODIUM 5000 UNITS: 5000 INJECTION INTRAVENOUS; SUBCUTANEOUS at 09:08

## 2020-08-14 RX ADMIN — OXYCODONE HYDROCHLORIDE AND ACETAMINOPHEN 1 TABLET: 10; 325 TABLET ORAL at 06:08

## 2020-08-14 RX ADMIN — HEPARIN SODIUM 5000 UNITS: 5000 INJECTION INTRAVENOUS; SUBCUTANEOUS at 06:08

## 2020-08-14 RX ADMIN — ATORVASTATIN CALCIUM 80 MG: 40 TABLET, FILM COATED ORAL at 08:08

## 2020-08-14 NOTE — PLAN OF CARE
Problem: Occupational Therapy Goal  Goal: Occupational Therapy Goal  Description: Goals to be met by: 8/28/20    Patient will increase functional independence with ADLs by performing:    Feeding with Modified [place].  UE Dressing with Modified [place].  LE Dressing with Minimal Assistance and Assistive Devices as needed.  Grooming while seated with Modified [place].  Toileting from bedside commode with Modified [place] and Set-up Assistance for hygiene and clothing management.   Rolling to Bilateral with Stand-by Assistance and use of bedrail as needed.   Supine to sit with Stand-by Assistance and use of bedrail as needed.  Step transfer with Contact Guard Assistance  Toilet transfer to bedside commode with Contact Guard Assistance.  Upper extremity exercise program x15 reps per handout, with independence.  Educate the patient re: log roll technique and spinal prcautions    Outcome: Ongoing, Progressing   The patient is limited by c/o pain and weakness.  Patient will benefit from OT to address functional deficits.     The patient will benefit from In Patient Rehab

## 2020-08-14 NOTE — NURSING
MEWS Monitoring: Chart check completed, abnormal VS noted,  bedside RN, Van contacted, no concerns verbalized at this time, instructed to call [phone] for further concerns or assistance..

## 2020-08-14 NOTE — PROGRESS NOTES
"NEUROSURGERY PROGRESS NOTE     ID:  S/p L5-S1 lami for release of tethered cord     INTERVAL HISTORY:  Briefly hypoxic last night. Workup negative for MI or PE. Likely due to atelectasis   Elevated to 15 degrees last night and tolerated well without headaches  Passing gas and tolerating liquid diet. Has not yet advanced to regular      SUBJECTIVE:  Surgical site back pain controlled with oral meds but wears off by 3 hrs  Right foot strength improved - "better ROM"  No leg pain  No HA     EXAM:      Vitals:     08/13/20 0932   BP:     Pulse:     Resp: 16   Temp:          AAO3  CN 2-12 intact  Strength 5/5 BUE and BLE, 3+/5 R HF, 4+/5 DF/EHL and 5/5 PF  LT sensation intact      Incision: c/d/i. No staining on island dressing      A/P:    R foot strength improved. Surgical site back pain as expected.  Tolerating 15 degrees.     -Advanced HOB to 30 degrees and will plan to incrementally increase throughout the morning.   -Advance diet to regular  -Pull chaudhary  -PT/OT this afternoon  -If the above goes well, possible discharge this evening but anticipate one more night   -Continue CPAP at night. Increase incentive spirometer use. Monitor SpO2  -Cont pain regimen      Briana Seals PA-C  [facility]  Department of Neurosurgery  [phone]    "

## 2020-08-14 NOTE — PT/OT/SLP EVAL
Physical Therapy Evaluation    Patient Name:  Robles Jrodan   MRN:  [id number]    Recommendations:     Discharge Recommendations:  rehabilitation facility   Discharge Equipment Recommendations: wheelchair(if DCd home)   Barriers to discharge: Decreased caregiver support    Assessment:     Robles Jordan is a 69 y.o. male admitted with a medical diagnosis of Tethered cord syndrome.  He presents with the following impairments/functional limitations:  weakness, gait instability, decreased upper extremity function, decreased ROM, impaired cardiopulmonary response to activity, impaired endurance, impaired balance, decreased lower extremity function, impaired muscle length, impaired self care skills, pain, orthopedic precautions, impaired functional mobilty .    Pt ambulatory PTA, now needs A x 2 persons to stand and is unable to ambulate. Motivated. Progressive Mobility Level 2: Recommend patient be assisted to sit/dangle on edge of the bed with </= minimum assistance for meals and prn.       Rehab Prognosis: excellent; patient would benefit from acute skilled PT services to address these deficits and reach maximum level of function.    Recent Surgery: Procedure(s) (LRB):  LAMINECTOMY, SPINE, LUMBAR  (L5-S1 laminectomy for tethered cord release) Microscope OPEN lami setup [facility] Neuromonitoring MARIBETH JUSTIN [phone]   (rectal stim, EMG, electrode nerve stimulator) (Bilateral) 2 Days Post-Op    Plan:     During this hospitalization, patient to be seen daily to address the identified rehab impairments via gait training, therapeutic activities, therapeutic exercises, neuromuscular re-education, wheelchair management/training and progress toward the following goals:    · Plan of Care Expires:  08/28/20    Subjective     Chief Complaint: back pain and RLE weakness  Patient/Family Comments/goals: not stated.   Pain/Comfort:  · Pain Rating 1: 7/10(with mobilty. 0/10 at rest in bed)  · Location  1: back  · Pain Addressed 1: Pre-medicate for activity(with dilaudid)    Patients cultural, spiritual, Restorationism conflicts given the current situation: no    Living Environment:  2 story home, bed and bath on 1st floor. with spouse who works as an RN at the [employer]. Son lives out of state and is to come in town in 4 days to assist. 2 NOLVIA with no rail. Drives. Retired [employer].   Prior to admission, patients level of function was ind with mobility; assist prn for RLE in/out of bed.  Equipment used at home: none.  DME owned (not currently used): none.  Upon discharge, patient will have assistance from ?.    Objective:     Communicated with RN vero prior to session.  Patient found HOB elevated with oxygen, pulse ox (continuous), telemetry, peripheral IV  upon PT entry to room.    General Precautions: Standard, fall   Orthopedic Precautions:spinal precautions   Braces: N/A     Exams:  · pleasant. wears reading glasses. O2 sats with activity 92%.   · Cognitive Exam:  Patient is oriented to Person, Place, Time and Situation  · Gross Motor Coordination:  WFL  · Postural Exam:  Patient presented with the following abnormalities:    · -       No postural abnormalities identified  · Sensation:    · -       Intact  · Skin Integrity/Edema:      · -       Skin integrity: Visible skin intact  · RLE ROM: WFL  · RLE Strength: Deficits: moves min against gravity; grossly 2+/5  · Formal MMT not performed today 2/2 back pain  · LLE ROM: WFL  · LLE Strength: WFL    Functional Mobility:  · Bed Mobility:     · Supine to Sit: moderate assistance for legs via logroll  · Sit to Supine: moderate assistance for legs  · Transfers:  Sit to Stand:  moderate assistance, of 2 persons and from elevated bed with rolling walker  · Pt unable to stand from bed at normal height  · Once in standing with RW, pt's legs buckling, R>L  · Pt unable to step without further buckling  · Balance: fair + seated; pt prefers to sit with BUE support    Therapeutic  Activities and Exercises:   mobilty as above    AM-PAC 6 CLICK MOBILITY  Total Score:9     Patient left HOB elevated with call button in reach, bed alarm on and RN notified.    GOALS:   Multidisciplinary Problems     Physical Therapy Goals        Problem: Physical Therapy Goal    Goal Priority Disciplines Outcome Goal Variances Interventions   Physical Therapy Goal     PT, PT/OT Ongoing, Progressing     Description: Goals to be met by:      Patient will increase functional independence with mobility by performin. Supine to sit with Modified [place]  2. Sit to supine with Modified [place]  3. Sit to stand transfer with Modified [place]  4. Bed to chair transfer with Modified [place] using Rolling Walker  5. Gait  x 50 feet with Modified [place] using Rolling Walker.   6. Lower extremity exercise program x15 reps per handout, with supervision                     History:     Past Medical History:   Diagnosis Date    BPH (benign prostatic hypertrophy)     Hx of colonic polyps 10/19/2015    Hypertension     Sleep apnea        Past Surgical History:   Procedure Laterality Date    COLONOSCOPY N/A 10/19/2015    Procedure: COLONOSCOPY;  Surgeon: Antonino Bernstein MD;  Location: [address]);  Service: Endoscopy;  Laterality: N/A;    CYSTOSCOPY WITH URODYNAMIC TESTING N/A 2020    Procedure: CYSTOSCOPY,WITH URODYNAMIC TESTING;  Surgeon: Christianne Israel MD;  Location: [facility];  Service: Urology;  Laterality: N/A;  RN PREOP 2020    MULTIPLE TOOTH EXTRACTIONS         Time Tracking:     PT Received On: 20  PT Start Time: 1407     PT Stop Time: 1437  PT Total Time (min): 30 min     Billable Minutes: Evaluation 15 and Therapeutic Activity 15   Coeval with OT      Albina Calvo, PT  2020

## 2020-08-14 NOTE — CONSULTS
[facility] Hospital Medicine  Consult Note    Patient Name: Robles Jordan  MRN: [id number]  Admission Date: 8/12/2020  Hospital Length of Stay: 2 days  Attending Physician: Chico Rodriguez DO   Primary Care Provider: John Jackson MD           Patient information was obtained from patient and Chart.       PCP:     John Jackson MD    Reason for consult:     Acute hypoxemia    HISTORY OF PRESENT ILLNESS:     Robles Jordan is a 69 y.o. male that (in part)  has a past medical history of BPH (benign prostatic hypertrophy), colonic polyps, Hypertension, and Sleep apnea.  has a past surgical history that includes Multiple tooth extractions (2014); Colonoscopy (N/A, 10/19/2015); and Cystoscopy with urodynamic testing (N/A, 1/17/2020). Presents to [facility] Emergency Department for neuro surgery evaluation and treatment of Fibrolipoma of filum terminale , Tethered cord syndrome, and Right leg weakness .   Procedure(s) performed: LAMINECTOMY, SPINE, LUMBAR  (L5-S1 laminectomy for tethered cord release).  Patient's surgery went as planned without complication.  Right foot strength improved.      Patient's wife who is a nurse thought the patient was short of breath  which revealed hypoxemia with desaturation to 88% on 2 L by nasal cannula.  There was improvement to 92% with increase to 4 L by nasal cannula.  This was thought to be due to atelectasis from bed rest, but out of precaution EKG, chest x-ray, ABG, continuous pulse oximetry ordered.  Hospital medicine was consulted.    History of obstructive sleep apnea with CPAP use.  History of tobacco use.  Patient has scheduled methocarbamol and  Oxycodone-acetaminophen ordered p.r.n. postoperatively.  No other sedating medications noted.  Denies chest pain or shortness of breath.  No hemoptysis.  No palpitations or syncope.  No cough.  No fever or chills.        REVIEW OF SYSTEMS:     -- Constitutional: No fever or chills.  -- Eyes:  No visual changes, diplopia, pain, tearing, blind spots, or discharge.   -- Ears, nose, mouth, throat, and face: No congestion, sore throat, epistaxis, d/c, bleeding gums, neck stiffness masses, or dental issues.  -- Respiratory:  As above in HPI  -- Cardiovascular: No chest pain, MA, syncope, PND, edema, cyanosis, or palpitations.   -- Gastrointestinal: No vomiting, abdominal pain, hematemesis, melena, dyspepsia, or change in bowel habits.  -- Genitourinary: No hematuria, dysuria, frequency, urgency, nocturia, polyuria, stones, or incontinence.  -- Integument/breast: No rash, pruritis, pigmentation changes, dryness, or changes in hair.  -- Hematologic/lymphatic: No easy bruising or lymphadenopathy.   -- Musculoskeletal:  Strength in right foot improving.  No acute arthralgias, acute myalgias, joint swelling, acute limitations of ROM,   -- Neurological:  Status post laminectomy for tethered cord release.  No seizures, headaches, incoordination, paraesthesias, ataxia, vertigo, or tremors.  -- Behavioral/Psych: No auditory or visual hallucinations, depression, or suicidal/homicidal ideations.  -- Endocrine: No heat or cold intolerance, polydipsia, or unintentional weight gain / loss.  -- Allergy/Immunologic: No recurrent infections or adverse reaction to food, insects, or difficulty breathing.      PAST MEDICAL / SURGICAL HISTORY:     Past Medical History:   Diagnosis Date    BPH (benign prostatic hypertrophy)     Hx of colonic polyps 10/19/2015    Hypertension     Sleep apnea      Past Surgical History:   Procedure Laterality Date    COLONOSCOPY N/A 10/19/2015    Procedure: COLONOSCOPY;  Surgeon: Antonino Bernstein MD;  Location: [address]);  Service: Endoscopy;  Laterality: N/A;    CYSTOSCOPY WITH URODYNAMIC TESTING N/A 1/17/2020    Procedure: CYSTOSCOPY,WITH URODYNAMIC TESTING;  Surgeon: Christianne Israel MD;  Location: [facility];  Service: Urology;  Laterality: N/A;  RN PREOP 1/13/2020    MULTIPLE TOOTH  CATRINA  2014         FAMILY HISTORY:     Family History   Problem Relation Age of Onset    Cancer Mother         Breast    Stroke Sister 65        Fatal    Cancer Maternal Uncle         Pancreas    Cancer Paternal Uncle         Pancreas    Cancer Maternal Grandmother         Breast    Hypertension Father     Hyperlipidemia Father          SOCIAL HISTORY:     Social History     Socioeconomic History    Marital status:      Spouse name: Not on file    Number of children: Not on file    Years of education: Not on file    Highest education level: Not on file   Occupational History    Not on file   Social Needs    Financial resource strain: Not on file    Food insecurity     Worry: Not on file     Inability: Not on file    Transportation needs     Medical: Not on file     Non-medical: Not on file   Tobacco Use    Smoking status: Light Tobacco Smoker     Packs/day: 0.25     Types: Cigarettes    Smokeless tobacco: Never Used   Substance and Sexual Activity    Alcohol use: Yes     Alcohol/week: 0.0 standard drinks     Comment: very seldom    Drug use: No    Sexual activity: Not on file   Lifestyle    Physical activity     Days per week: 0 days     Minutes per session: 40 min    Stress: Not at all   Relationships    Social connections     Talks on phone: Not on file     Gets together: Not on file     Attends Sikh service: Not on file     Active member of club or organization: Not on file     Attends meetings of clubs or organizations: Not on file     Relationship status: Not on file   Other Topics Concern    Not on file   Social History Narrative    Not on file         ALLERGIES:       Review of patient's allergies indicates:  No Known Allergies        HOME MEDICATIONS:     Prior to Admission medications    Medication Sig Start Date End Date Taking? Authorizing Provider   amLODIPine (NORVASC) 10 MG tablet Take 1 tablet (10 mg total) by mouth once daily. For blood pressure 10/30/19  10/29/20 Yes John Jackson MD   atorvastatin (LIPITOR) 80 MG tablet TAKE 1 TABLET (80 MG TOTAL) BY MOUTH ONCE DAILY. 4/14/20 4/14/21 Yes John Jackson MD   lisinopril (PRINIVIL,ZESTRIL) 40 MG tablet Take 1 tablet (40 mg total) by mouth once daily. 1/20/20 1/19/21 Yes John Jackson MD   tamsulosin (FLOMAX) 0.4 mg Cap TAKE 1 CAPSULE (0.4 MG TOTAL) BY MOUTH ONCE DAILY. 9/16/19 9/15/20 Yes John Jackson MD   sildenafil (VIAGRA) 100 MG tablet Take 1 tablet (100 mg total) by mouth daily as needed for Erectile Dysfunction. 9/18/19 9/17/20  John Jackson MD          HOSPITAL MEDICATIONS:     Scheduled Meds:    amLODIPine  10 mg Oral Daily    atorvastatin  80 mg Oral Daily    docusate sodium  100 mg Oral Daily    heparin (porcine)  5,000 Units Subcutaneous Q8H    lisinopriL  40 mg Oral Daily    methocarbamoL  750 mg Oral TID    mupirocin  1 g Nasal BID    polyethylene glycol  17 g Oral Daily    tamsulosin  0.4 mg Oral Daily     Continuous Infusions:    sodium chloride 0.9% 50 mL/hr at 08/13/20 1810     PRN Meds: acetaminophen, aluminum-magnesium hydroxide-simethicone, bisacodyL, hydrALAZINE, HYDROmorphone, labetalol, mupirocin, ondansetron, oxyCODONE-acetaminophen, oxyCODONE-acetaminophen, promethazine (PHENERGAN) IVPB, senna-docusate 8.6-50 mg      PHYSICAL EXAM:     Wt Readings from Last 1 Encounters:   08/12/20 1427 108.5 kg (239 lb 4.8 oz)   08/10/20 1553 110 kg (242 lb 8.1 oz)     Body mass index is 30.72 kg/m².  Vitals:    08/13/20 2124 08/13/20 2356 08/14/20 0211 08/14/20 0432   BP:  125/65  134/62   BP Location:  Left arm  Left arm   Patient Position:  Lying  Lying   Pulse:  83  76   Resp: (!) 22 20 18 20   Temp:  99.1 °F (37.3 °C)  98.7 °F (37.1 °C)   TempSrc:  Axillary  Oral   SpO2:  96%  97%   Weight:       Height:              -- General appearance: well developed. appears stated age   -- Head: normocephalic, atraumatic   -- Eyes: conjunctivae clear. Extraocular muscles intact  -- Nose:   Supplemental oxygen place nasal cannula.   Nares normal. Septum midline.   -- Mouth/Throat: lips, mucosa, and tongue normal. no throat erythema.   -- Neck: No JVD,  Supple, symmetrical, trachea midline, thyroid not grossly enlarged, appears symmetric  -- Lungs: clear to auscultation bilaterally. normal respiratory effort. No use of accessory muscles.   -- Chest wall: no tenderness. equal bilateral chest rise   -- Heart: regular rate and regular rhythm. S1, S2 normal.  no click, rub or gallop   -- Abdomen: soft, non-tender, non-distended, non-tympanic; bowel sounds normal; no masses  --back:  Surgical dressing in place  -- Extremities: no cyanosis, clubbing or edema.   -- Pulses: 2+ and symmetric   -- Skin:  Turgor normal. Color normal. Texture normal. No rashes or lesions.   -- Neurologic:Right foot weakness.  Otherwise No focal numbness or weakness. CNII-XII intact. [place] coma scale: eyes open spontaneously-4, oriented & converses-5, obeys commands-6.      LABORATORY STUDIES:     Recent Results (from the past 36 hour(s))   CBC auto differential    Collection Time: 08/13/20  5:06 PM   Result Value Ref Range    WBC 9.42 3.90 - 12.70 K/uL    RBC 4.48 (L) 4.60 - 6.20 M/uL    Hemoglobin 12.8 (L) 14.0 - 18.0 g/dL    Hematocrit 41.0 40.0 - 54.0 %    Mean Corpuscular Volume 92 82 - 98 fL    Mean Corpuscular Hemoglobin 28.6 27.0 - 31.0 pg    Mean Corpuscular Hemoglobin Conc 31.2 (L) 32.0 - 36.0 g/dL    RDW 13.2 11.5 - 14.5 %    Platelets 205 150 - 350 K/uL    MPV 9.0 (L) 9.2 - 12.9 fL    Immature Granulocytes 1.0 (H) 0.0 - 0.5 %    Gran # (ANC) 5.7 1.8 - 7.7 K/uL    Immature Grans (Abs) 0.09 (H) 0.00 - 0.04 K/uL    Lymph # 2.6 1.0 - 4.8 K/uL    Mono # 1.1 (H) 0.3 - 1.0 K/uL    Eos # 0.1 0.0 - 0.5 K/uL    Baso # 0.01 0.00 - 0.20 K/uL    nRBC 0 0 /100 WBC    Gran% 60.0 38.0 - 73.0 %    Lymph% 27.1 18.0 - 48.0 %    Mono% 11.3 4.0 - 15.0 %    Eosinophil% 0.5 0.0 - 8.0 %    Basophil% 0.1 0.0 - 1.9 %    Differential Method  Automated    Basic metabolic panel    Collection Time: 08/13/20  5:06 PM   Result Value Ref Range    Sodium 137 136 - 145 mmol/L    Potassium 4.0 3.5 - 5.1 mmol/L    Chloride 103 95 - 110 mmol/L    CO2 29 23 - 29 mmol/L    Glucose 90 70 - 110 mg/dL    BUN, Bld 10 8 - 23 mg/dL    Creatinine 0.8 0.5 - 1.4 mg/dL    Calcium 8.3 (L) 8.7 - 10.5 mg/dL    Anion Gap 5 (L) 8 - 16 mmol/L    eGFR if African American >60 >60 mL/min/1.73 m^2    eGFR if non African American >60 >60 mL/min/1.73 m^2   ISTAT PROCEDURE    Collection Time: 08/13/20  5:18 PM   Result Value Ref Range    POC PH 7.366 7.35 - 7.45    POC PCO2 41.0 35 - 45 mmHg    POC PO2 64 (L) 80 - 100 mmHg    POC HCO3 23.5 (L) 24 - 28 mmol/L    POC BE -2 -2 to 2 mmol/L    POC SATURATED O2 91 (L) 95 - 100 %    POC TCO2 25 23 - 27 mmol/L    Sample ARTERIAL     Site RR     Allens Test Pass     DelSys Nasal Can     Mode SPONT     Flow 4     Sp02 90        Lab Results   Component Value Date    INR 0.9 08/07/2020    INR 0.9 06/25/2020    INR 0.9 02/17/2020     Lab Results   Component Value Date    HGBA1C 6.3 (H) 09/20/2019     No results for input(s): POCTGLUCOSE in the last 72 hours.        Studies:      EXAMINATION:  XR CHEST 1 VIEW     CLINICAL HISTORY:  [place];     TECHNIQUE:  Single frontal view of the chest was performed.     COMPARISON:  08/07/2020.     FINDINGS:  Heart is normal in size.  Lungs are hypoinflated.  Mild atelectatic changes are seen at the left lung base.  No evidence of new focal consolidation.  No pneumothorax or large pleural effusion seen.     Impression:     As above.        Electronically signed by: Katelyn Oropeza MD  Date:                                            08/13/2020  Time:                                           18:08        CONSULTS:     IP CONSULT TO HOSPITALIST       ASSESSMENT & PLAN:     Primary Diagnosis:  Tethered cord syndrome    Active Hospital Problems    Diagnosis  POA    *Tethered cord syndrome [Q06.8]  Not Applicable      Priority: 1 - High    S/P lumbar laminectomy [Z98.890]  Not Applicable     Priority: 2      8/12/2020: open L5-S1 laminectomy for tethered cord release with Dr. Rodriguez       Right sided weakness [R53.1]  Yes     Patient's right-sided weakness is not completely explained at this time.  Scattered foci of hyperintensities seen on MRI could be indicative of chronic microvascular changes although vasculitis and other encephalitides  should be evaluated with lumbar puncture to completely evaluate his symptoms.  The area of FLAIR hyperintensity within the ventral lateral thalamus could account for some of his symptoms but the significance and etiology of this area is not completely understood at this time.  Patient certainly possesses multiple vascular risk factors that need to be further optimized to minimize the risk of ischemic disease if this is in fact a culprit.  Arrange for lumbar puncture with interventional radiology to assess for some of the possibilities listed above if negative ischemic disease seems much more likely responsible for spectrum symptoms.     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         Tethered cord syndrome status post lumbar laminectomy  · Management per primary team    Acute hypoxemia  Likely multifactorial etiology with most likely explanation being atelectasis due to postoperative recovery.  Further evidence by chest x-ray which also demonstrates atelectasis.  Improving with incentive spirometry.  EKG is nonischemic.  No evidence of pneumonia.  VT prophylaxis with heparin.  Obstructive sleep apnea in combination with pain medications and muscle relaxers may also be contributory.  Low threshold for CT  angiogram of the chest to evaluate for pulmonary embolism, but would only before the patient develops chest pain, shortness of breath, and or continued hypoxemia without supplemental oxygen.  Repeat ABG p.r.n.    Obstructive sleep anpea  · Recommend use of CPAP for HEAVEN  · Can try Melatonin OTC for sleep at night  · Must eliminant day time naps / improve sleep hygiene reviewed  · Remove electronics from bedroom all to help with difficulty with sleep at night.      Hyperlipidemia   · Lipid panel - as an outpatient  · Cardiac diet  · Continue statin    Essential Hypertension  · Goal while inpatient is a systolic blood pressure less than 160mmHg  · BP in acceptable range at this time  · Continue current home regimen with hold parameters  · PRN antihypertensives available          VTE Risk Mitigation (From admission, onward)         Ordered     heparin (porcine) injection 5,000 Units  Every 8 hours      08/12/20 1421     IP VTE LOW RISK PATIENT  Once      08/12/20 0547     Place MONA hose  Until discontinued      08/12/20 0547     Place sequential compression device  Until discontinued      08/12/20 0547                  Adult PRN medications available   DVT prophylaxis given       DISPOSITION:     Thank you for the consultation.   Hospital Medicine service will continue to follow for further evaluation and treatment.     Chart reviewed and updated where applicable.      ===============================================================    James Queen MD, MPH  Department of Hospital Medicine   [facility]  [phone] pg  (7pm - 6am)          This note is dictated using TopPatch voice recognition software.  There are word recognition mistakes that are occasionally missed on review.      Consults

## 2020-08-14 NOTE — PLAN OF CARE
Problem: Physical Therapy Goal  Goal: Physical Therapy Goal  Description: Goals to be met by:      Patient will increase functional independence with mobility by performin. Supine to sit with Modified [place]  2. Sit to supine with Modified [place]  3. Sit to stand transfer with Modified [place]  4. Bed to chair transfer with Modified [place] using Rolling Walker  5. Gait  x 50 feet with Modified [place] using Rolling Walker.   6. Lower extremity exercise program x15 reps per handout, with supervision    Outcome: Ongoing, Progressing    Inpt rehab. 7x/week

## 2020-08-14 NOTE — NURSING
Patient head elevated to 60 degrees late secondary to pain. Interventions given, pain subsided. Will raise head in 1 hour. Will continue to monitor. No headaches or distress.

## 2020-08-14 NOTE — NURSING
Patient elevated to 45 degrees. Tolerated well, no distress or headache at this time. Will continue to monitor.

## 2020-08-14 NOTE — OP NOTE
DATE: 8/12/20    PREOP DIAGNOSIS:  1. Fatty filum  2. Tethered spinal cord  3. Right lower extremity weakness    POSTOP DIAGNOSIS:  Same as above    OPERATION PERFORMED:  1.  L5-S1 laminectomy  2.  Tethered cord release by resection/release of fatty filum   3.  Use of intraop neuromonitoring (EMG, bipolar stimulation)  4.  Use of microscope    SURGEON:  Chico Rodriguez D.O.    ASSISTANTS:    1.  MARIKA Salazar  2.  Bassem Warner MD    LEVEL OF INVOLVEMENT OF ATTENDING:  Full    INDICATION:  69M with worsening gait disturbance and RLE weakness who was found to have evidence of thickened fatty filum on MRI that likely represents tethered cord syndrome. Although he had normal urodynamic studies and a thorough neuro workup including CSF studies and brain MRI, his right lower extremity weakness has progressed and therefore patient and his wife agree that it is time to pursue tethered cord release to try to relieve some of the symptoms or stop progression.     Consents were obtained and risks, benefits, and alternatives to surgery were discussed.    PROCEDURE IN DETAIL:  Patient was correctly identified and taken to OR where anesthesia administered general endotracheal anesthesia.  He was carefully positioned prone over a Porfirio frame with all pressure points padded.  Neuromonitoring leads were placed and signals were present.  Incision was planned using flouro over L5-S1.  The area was prepped and draped in sterile fashion.  Incision was inflitrated with local and incised with 10-blade scalpel.  Subperiosteal dissection was carried out bilaterally down to lamina and out to medial facets.  Self-retaining retractors were placed for greater exposure.  The L5 level was confirmed with flouro and laminectomy was performed using leksell rongeur and drill.  There was no identifiable ligamentum flavum and care was taken not to cause a premature durotomy.  The inferior aspect of L4 and superior aspect of S1 lamina was bitten  off with kerrisons to enlarge to working space.  Hemostasis was obtained with Surgiflo.  The micoscope was brought in and the dura was opened with a 15-blade scalpel.  The edges were tacked up with 4-0 neurolons.  The arachnoid was opened and CSF was suctioned out revealing an abvious fatty filum in the center of the field with nerve roots out laterally.  The filum was stimulated with bipolar stimulation at 3 different levels, which did generate a signal or indicate that it was nerve root.  Small portion was bipolared and then cut with microscissors.  The superior aspect of the filum ascended and the inferior aspect descended beyond the dura opening.  The area was irrigated with copious amounts of irrigation.  Dura was closed with a running 4-0 neurolon.  A small area of leakage was covered by a muscle patch held by 4-0 prolene.  Next, the area was covered with evaseal dural sealant.  The wound was closed in layered fashion - 0 vicryls in muscle and fascia followed by 2-0 vicryls in adipose and deep dermal layers.  A 4-0 monocryl was used to close the skin.    COMPLICATIONS: None    INCISION: Lumbar    WOUND CLASS: clean    FINDINGS: Obvious fatty filum causing tethering    DRAIN: none    CONDITION: stable    PROGNOSIS: good     primaparous mom

## 2020-08-14 NOTE — PT/OT/SLP EVAL
Occupational Therapy   Evaluation    Name: Robles Jordan  MRN: [id number]  Admitting Diagnosis:  Tethered cord syndrome 2 Days Post-Op    Recommendations:     Discharge Recommendations: rehabilitation facility  Discharge Equipment Recommendations:  (TBD)  Barriers to discharge:  Decreased caregiver support(unable to walk)    Assessment:     Robles Jordan is a 69 y.o. male with a medical diagnosis of Tethered cord syndrome.   Performance deficits affecting function: weakness, impaired endurance, impaired sensation, impaired self care skills, impaired functional mobilty, gait instability, impaired balance, decreased coordination, decreased upper extremity function, decreased lower extremity function, decreased safety awareness, pain, impaired skin, orthopedic precautions.    He presents with generalized weakness and poor pain control. The patient required mod assist x2 person to stand and was unable to step 2* c/o pain and weakess. The patient will benefit from In Patient Rehab.    Rehab Prognosis: Good; patient would benefit from acute skilled OT services to address these deficits and reach maximum level of function.       Plan:     Patient to be seen 5 x/week to address the above listed problems via self-care/home management, therapeutic activities, therapeutic exercises  · Plan of Care Expires: 08/28/20  · Plan of Care Reviewed with: patient    Subjective     Chief Complaint: back pain  Patient/Family Comments/goals: The was agreeable to OT but was fearful of pain    Occupational Profile:  Living Environment: The patient lives with his spouse in a 2 story house with 2 NOLVIA. The patient's bed and bathroom is on the 1st floor.  Previous level of function: (I) with self care and amb without AD. The patient has a walk in shower (no bench) and a low toilet.  Roles and Routines: The patient is retired and drives. The patient's spouse is  A nurse at the [employer]  Equipment Used at Home:  none  Assistance upon  Discharge: spouse, son arrives from [place] on 8/18    Pain/Comfort:  · Pain Rating 1: 0/10(at rest)  · Location 1: back  · Pain Addressed 1: Pre-medicate for activity, Reposition, Cessation of Activity, Nurse notified  · Pain Rating Post-Intervention 1: 8/10    Patients cultural, spiritual, Hindu conflicts given the current situation: no    Objective:     Communicated with: Shellie silva prior to session.  Patient found HOB elevated to 60* with peripheral IV, pulse ox (continuous), bed alarm upon OT entry to room.    General Precautions: Standard, fall   Orthopedic Precautions:spinal precautions   Braces: N/A     Occupational Performance:    Bed Mobility:    · Patient completed Rolling/Turning to Left with  contact guard assistance  · Patient completed Rolling/Turning to Right with    · Patient completed Scooting/Bridging with stand by assistance and contact guard assistance  · Patient completed Supine to Sit with moderate assistance, with side rail, with leg lift and verbal cues for log roll technique to manage to raise his trunk from the elevated bed  · Patient completed Sit to Supine with moderate assistance and with leg lift    Functional Mobility/Transfers:  · Patient completed Sit <> Stand Transfer with moderate assistance and of 2 persons  with  hand-held assist   · Functional Mobility: The patient stood x3 trails with max encouragement and VC. The patient c/o LE weakness and difficult to remain upright    Activities of Daily Living:  · Upper Body Dressing: moderate assistance to don back gown  · Lower Body Dressing: total assistance to don B socks    Cognitive/Visual Perceptual:  Cognitive/Psychosocial Skills:     -       Oriented to: Person, Place, Time and Situation   -       Follows Commands/attention:Follows two-step commands  -       Communication: clear/fluent  -       Memory: No Deficits noted  -       Safety awareness/insight to disability: impaired   -       Mood/Affect/Coping skills/emotional  control: Appropriate to situation and fearfull of falling  Visual/Perceptual:      -Intact  with glasses      Physical Exam:  Balance: -       fair  Postural examination/scapula alignment:    -       Rounded shoulders  -       Forward head  Skin integrity: spinal incision covered by dressing  Edema:  None noted  Sensation:    -       Intact  BUE  Dominant hand: -       right  Upper Extremity Range of Motion:     -       Right Upper Extremity: WFL, The patient reports having occasional muscle spasms in his RUE.  -       Left Upper Extremity: WFL  Upper Extremity Strength:    -       Right Upper Extremity: WFL but generally weak   -       Left Upper Extremity: WFL   Strength:    -       Right Upper Extremity: WFL  -       Left Upper Extremity: WFL    AMPAC 6 Click ADL:  AMPAC Total Score: 15    Treatment & Education:  The patient participated in OT eval and was educated re: log roll technique s/p spinal sx.  · Pt educated on OT role/POC.   · The patient was educated re: ability to sit on the EOB with nursing assistance  · The patient was educated re: use of bed pan and urinal for toileting.   · White board updated     Education:    Patient left HOB elevated with all lines intact, call button in reach, bed alarm on and nurse notified    GOALS:   Multidisciplinary Problems     Occupational Therapy Goals        Problem: Occupational Therapy Goal    Goal Priority Disciplines Outcome Interventions   Occupational Therapy Goal     OT, PT/OT Ongoing, Progressing    Description: Goals to be met by: 8/28/20    Patient will increase functional independence with ADLs by performing:    Feeding with Modified [place].  UE Dressing with Modified [place].  LE Dressing with Minimal Assistance and Assistive Devices as needed.  Grooming while seated with Modified [place].  Toileting from bedside commode with Modified [place] and Set-up Assistance for hygiene and clothing management.   Rolling to Bilateral with  Stand-by Assistance and use of bedrail as needed.   Supine to sit with Stand-by Assistance and use of bedrail as needed.  Step transfer with Contact Guard Assistance  Toilet transfer to bedside commode with Contact Guard Assistance.  Upper extremity exercise program x15 reps per handout, with independence.  Educate the patient re: log roll technique and spinal prcautions                     History:     Past Medical History:   Diagnosis Date    BPH (benign prostatic hypertrophy)     Hx of colonic polyps 10/19/2015    Hypertension     Sleep apnea        Past Surgical History:   Procedure Laterality Date    COLONOSCOPY N/A 10/19/2015    Procedure: COLONOSCOPY;  Surgeon: Antonino Bernstein MD;  Location: [address]);  Service: Endoscopy;  Laterality: N/A;    CYSTOSCOPY WITH URODYNAMIC TESTING N/A 1/17/2020    Procedure: CYSTOSCOPY,WITH URODYNAMIC TESTING;  Surgeon: Christianne Israel MD;  Location: [facility];  Service: Urology;  Laterality: N/A;  RN PREOP 1/13/2020    MULTIPLE TOOTH EXTRACTIONS  2014       Time Tracking:     OT Date of Treatment: 08/14/20  OT Start Time: 1407  OT Stop Time: 1436  OT Total Time (min): 29 min    Billable Minutes:Evaluation 15 (with PT)  Total Time 29    Soledad Thornton OT  8/14/2020

## 2020-08-14 NOTE — PLAN OF CARE
08/14/20 1629   Post-Acute Status   Post-Acute Authorization Placement   Post-Acute Placement Status Awaiting Internal Medical Clearance   Discharge Delays None known at this time   Discharge Plan   Discharge Plan A Rehab   Discharge Plan B Home with family     [facility] sent Rehab referrals to [facility], [facility] and [place].

## 2020-08-15 PROBLEM — R09.02 HYPOXIA: Status: ACTIVE | Noted: 2020-08-15

## 2020-08-15 PROBLEM — E66.9 OBESITY: Status: ACTIVE | Noted: 2020-08-15

## 2020-08-15 PROCEDURE — 11000001 HC ACUTE MED/SURG PRIVATE ROOM

## 2020-08-15 PROCEDURE — 99024 POSTOP FOLLOW-UP VISIT: CPT | Mod: ,,, | Performed by: NEUROLOGICAL SURGERY

## 2020-08-15 PROCEDURE — 63600175 PHARM REV CODE 636 W HCPCS: Performed by: PHYSICIAN ASSISTANT

## 2020-08-15 PROCEDURE — 97535 SELF CARE MNGMENT TRAINING: CPT

## 2020-08-15 PROCEDURE — 25000003 PHARM REV CODE 250: Performed by: NEUROLOGICAL SURGERY

## 2020-08-15 PROCEDURE — 99024 PR POST-OP FOLLOW-UP VISIT: ICD-10-PCS | Mod: ,,, | Performed by: NEUROLOGICAL SURGERY

## 2020-08-15 PROCEDURE — 25000003 PHARM REV CODE 250: Performed by: PHYSICIAN ASSISTANT

## 2020-08-15 PROCEDURE — 97530 THERAPEUTIC ACTIVITIES: CPT | Mod: CQ

## 2020-08-15 RX ADMIN — POLYETHYLENE GLYCOL 3350 17 G: 17 POWDER, FOR SOLUTION ORAL at 09:08

## 2020-08-15 RX ADMIN — OXYCODONE HYDROCHLORIDE AND ACETAMINOPHEN 1 TABLET: 10; 325 TABLET ORAL at 06:08

## 2020-08-15 RX ADMIN — METHOCARBAMOL 750 MG: 750 TABLET ORAL at 09:08

## 2020-08-15 RX ADMIN — HEPARIN SODIUM 5000 UNITS: 5000 INJECTION INTRAVENOUS; SUBCUTANEOUS at 10:08

## 2020-08-15 RX ADMIN — TAMSULOSIN HYDROCHLORIDE 0.4 MG: 0.4 CAPSULE ORAL at 09:08

## 2020-08-15 RX ADMIN — METHOCARBAMOL 750 MG: 750 TABLET ORAL at 03:08

## 2020-08-15 RX ADMIN — MUPIROCIN 1 G: 20 OINTMENT TOPICAL at 09:08

## 2020-08-15 RX ADMIN — HEPARIN SODIUM 5000 UNITS: 5000 INJECTION INTRAVENOUS; SUBCUTANEOUS at 03:08

## 2020-08-15 RX ADMIN — ATORVASTATIN CALCIUM 80 MG: 40 TABLET, FILM COATED ORAL at 09:08

## 2020-08-15 RX ADMIN — OXYCODONE HYDROCHLORIDE AND ACETAMINOPHEN 1 TABLET: 5; 325 TABLET ORAL at 11:08

## 2020-08-15 RX ADMIN — SODIUM CHLORIDE: 0.9 INJECTION, SOLUTION INTRAVENOUS at 10:08

## 2020-08-15 RX ADMIN — AMLODIPINE BESYLATE 10 MG: 5 TABLET ORAL at 09:08

## 2020-08-15 RX ADMIN — HEPARIN SODIUM 5000 UNITS: 5000 INJECTION INTRAVENOUS; SUBCUTANEOUS at 05:08

## 2020-08-15 RX ADMIN — OXYCODONE HYDROCHLORIDE AND ACETAMINOPHEN 1 TABLET: 10; 325 TABLET ORAL at 01:08

## 2020-08-15 RX ADMIN — OXYCODONE HYDROCHLORIDE AND ACETAMINOPHEN 1 TABLET: 10; 325 TABLET ORAL at 10:08

## 2020-08-15 RX ADMIN — LISINOPRIL 40 MG: 20 TABLET ORAL at 09:08

## 2020-08-15 RX ADMIN — DOCUSATE SODIUM 100 MG: 100 CAPSULE, LIQUID FILLED ORAL at 09:08

## 2020-08-15 NOTE — PT/OT/SLP PROGRESS
Physical Therapy Treatment    Patient Name:  Robles Jordan   MRN:  [id number]    Recommendations:     Discharge Recommendations:  rehabilitation facility   Discharge Equipment Recommendations: wheelchair(if DCd home)   Barriers to discharge: Decreased caregiver support     Assessment:     Robles Jordan is a 69 y.o. male admitted with a medical diagnosis of Tethered cord syndrome.  He presents with the following impairments/functional limitations:  weakness, impaired endurance, impaired self care skills, impaired sensation, impaired functional mobilty, decreased coordination, gait instability, impaired balance, decreased upper extremity function, decreased ROM, decreased lower extremity function, decreased safety awareness, pain, orthopedic precautions, impaired joint extensibility, impaired muscle length .    Rehab Prognosis: Good; patient would benefit from acute skilled PT services to address these deficits and reach maximum level of function.    Recent Surgery: Procedure(s) (LRB):  LAMINECTOMY, SPINE, LUMBAR  (L5-S1 laminectomy for tethered cord release) Microscope OPEN lami setup Porfirio [facility] Microinstruments Neuromonitoring MARIBETH JUSTIN [phone]   (rectal stim, EMG, electrode nerve stimulator) (Bilateral) 3 Days Post-Op    Plan:     During this hospitalization, patient to be seen daily to address the identified rehab impairments via gait training, therapeutic activities, therapeutic exercises, neuromuscular re-education, wheelchair management/training and progress toward the following goals:    · Plan of Care Expires:  08/28/20    Subjective     Chief Complaint: RLE weakness and unable to ambulate   Patient/Family Comments/goals: pt is motivated and agreeable to therapy   Pain/Comfort: no pain at rest, 8/10 with weight bearing   · Pain Rating 1: 0/10  · Location 1: back  · Pain Addressed 1: Pre-medicate for activity, Reposition, Cessation of Activity, Nurse notified      Objective:     Communicated  with nurse White prior to session.  Patient found HOB elevated with bed alarm, telemetry upon PT entry to room.     General Precautions: Standard, fall   Orthopedic Precautions:spinal precautions   Braces:       Functional Mobility:  · Bed Mobility:     · Rolling Left:  stand by assistance  · Scooting: contact guard assistance  · Supine to Sit: contact guard assistance (HOB elevated, bedside rail )  · Sit to supine : mod A with BLE (HOB elevated)  · Transfers:     · Sit to Stand: x 3 trials from elevated bed maximal assistance and of 2 persons with rolling walker. V/T cues for safety technique and walker management.   · Gait:  Trained ~ 2 steps with RLE only (unable to put weight on RLE to clear LLE)   with Rolling Walker with MAX Assistance X 2 persons for balance and safety  . Noted with R knee flexed , unable to extend despite max V/T , BUE tremors . Pt  with decreased rayshawn, increased time in double stance, decreased velocity of limb motion, decreased step length, decreased swing-to-stance ratio, decreased toe-to-floor clearance and decreased weight-shifting ability.Impairments contributing to gait deviations include impaired balance, decreased flexibility, pain, impaired postural control, decreased ROM and decreased strength. V/T cues for safety technique and walker management.   · Balance: fair+  in sitting, poor in standing       AM-PAC 6 CLICK MOBILITY  Turning over in bed (including adjusting bedclothes, sheets and blankets)?: 3  Sitting down on and standing up from a chair with arms (e.g., wheelchair, bedside commode, etc.): 2  Moving from lying on back to sitting on the side of the bed?: 3  Moving to and from a bed to a chair (including a wheelchair)?: 2  Need to walk in hospital room?: 2  Climbing 3-5 steps with a railing?: 1  Basic Mobility Total Score: 13       Therapeutic Activities and Exercises:   instructed pt to perform BLE ex's: AP, LAQ, HS , hip ABD/ADD , hip flexion . Pt demonstrated good  understanding.   Educated pt on spinal precautions and log rolling for bed mobility . Pt verbalized understanding     Patient left left sidelying with purple foam wedge ,  HOB elevated , heels offloading on pillow  all lines intact, call button in reach, bed alarm on and nurse Sharonda  notified..    GOALS:   Multidisciplinary Problems     Physical Therapy Goals        Problem: Physical Therapy Goal    Goal Priority Disciplines Outcome Goal Variances Interventions   Physical Therapy Goal     PT, PT/OT Ongoing, Progressing     Description: Goals to be met by:      Patient will increase functional independence with mobility by performin. Supine to sit with Modified [place]  2. Sit to supine with Modified [place]  3. Sit to stand transfer with Modified [place]  4. Bed to chair transfer with Modified [place] using Rolling Walker  5. Gait  x 50 feet with Modified [place] using Rolling Walker.   6. Lower extremity exercise program x15 reps per handout, with supervision                     Time Tracking:     PT Received On: 08/15/20  PT Start Time: 1345     PT Stop Time: 1408  PT Total Time (min): 23 min     Billable Minutes: Therapeutic Activity 11 and Total Time 23 min co-treat with OT        PT/PTA: PTA     PTA Visit Number: 1     Cate Mclean PTA  08/15/2020

## 2020-08-15 NOTE — PT/OT/SLP PROGRESS
Occupational Therapy   Treatment    Name: Robles Jordan  MRN: [id number]  Admitting Diagnosis:  Tethered cord syndrome  3 Days Post-Op    Recommendations:     Discharge Recommendations: rehabilitation facility  Discharge Equipment Recommendations:  (TBD)  Barriers to discharge:  Decreased caregiver support    Assessment:     Robles Jordan is a 69 y.o. male with a medical diagnosis of Tethered cord syndrome.   Performance deficits affecting function are weakness, impaired endurance, impaired sensation, impaired self care skills, impaired functional mobilty, gait instability, impaired balance, decreased coordination, decreased upper extremity function, decreased lower extremity function, decreased safety awareness, pain, decreased ROM, impaired skin, impaired cardiopulmonary response to activity, orthopedic precautions, impaired joint extensibility.   The patient demo improved bed mobility and decreased c/o pain. The patient contines to require max assist x2 person to stand from an elevated bed. The patient is unable to step to a chair 2* leg weakness and pain.         The patient's spouse called during OT treatment and was updated re: the patient level of assistance need to to stand and inability to amb. Per spouse, the patient will need to be able to ambulate to the bathroom to be able to come home. The patient will benefit from In Patient Rehab to maximize functional independence and safety upon D/C.     Rehab Prognosis:  Good; patient would benefit from acute skilled OT services to address these deficits and reach maximum level of function.       Plan:     Patient to be seen 5 x/week to address the above listed problems via self-care/home management, therapeutic activities, therapeutic exercises  · Plan of Care Expires: 08/28/20  · Plan of Care Reviewed with: patient, spouse    Subjective     Pain/Comfort:  · Pain Rating 1: 0/10(no pain at rest)  · Location 1: back(right leg)  · Pain Addressed 1: Pre-medicate  for activity, Cessation of Activity, Nurse notified  · Pain Rating Post-Intervention 1: 8/10(with attempts to stand)    Objective:     Communicated with: nurse prior to session.  Patient found HOB elevated with bed alarm, telemetry, peripheral IV upon OT entry to room.    General Precautions: Standard, fall   Orthopedic Precautions:spinal precautions   Braces:       Occupational Performance:     Bed Mobility:    · Patient completed Rolling/Turning to Left with  contact guard assistance  · Patient completed Scooting/Bridging with stand by assistance  · Patient completed Supine to Sit with contact guard assistance and HOB elevated  · Patient completed Sit to Supine with moderate assistance and with leg lift     Functional Mobility/Transfers:  · Patient completed Sit <> Stand Transfer with maximal assistance and of 2 persons  with  rolling walker   · Functional Mobility: The patient stood x3 attempts from an elevated bed. The patient with difficulty releasing his  from the right bed rail to reach for the RW with tremors present in BUE.    Activities of Daily Living:  · Upper Body Dressing: maximal assistance to don back gown  · Lower Body Dressing: dependence        [facility] 6 Click ADL: 16    Treatment & Education:  The patient participated in self care and functional mobility as noted above. The patient was educated via handout log roll technique and spinal precautions.   The patient was educated re: AROM to BUE to be performed later in bed. (not preformed with the patient 2* in left side lying)    Patient left left sidelying with all lines intact, call button in reach, bed alarm on and nurse notifiedEducation:      GOALS:   Multidisciplinary Problems     Occupational Therapy Goals        Problem: Occupational Therapy Goal    Goal Priority Disciplines Outcome Interventions   Occupational Therapy Goal     OT, PT/OT Ongoing, Progressing    Description: Goals to be met by: 8/28/20    Patient will increase functional  independence with ADLs by performing:    Feeding with Modified [place].  UE Dressing with Modified [place].  LE Dressing with Minimal Assistance and Assistive Devices as needed.  Grooming while seated with Modified [place].  Toileting from bedside commode with Modified [place] and Set-up Assistance for hygiene and clothing management.   Rolling to Bilateral with Stand-by Assistance and use of bedrail as needed.   Supine to sit with Stand-by Assistance and use of bedrail as needed.  Step transfer with Contact Guard Assistance  Toilet transfer to bedside commode with Contact Guard Assistance.  Upper extremity exercise program x15 reps per handout, with independence.  Educate the patient re: log roll technique and spinal prcautions                     Time Tracking:     OT Date of Treatment: 08/15/20  OT Start Time: 1345  OT Stop Time: 1411  OT Total Time (min): 26 min    Billable Minutes:Self Care/Home Management 15  Total Time 26 (with PT)    Soledad Thornton OT  8/15/2020

## 2020-08-15 NOTE — PLAN OF CARE
Pt alert able to make needs known,baron meds well,no s/s adverse reaction noted,reposition q 2hrs,pain controlled by prn pain medication, surgical incision open to air,no redness,swelling,or drainage,noted,sutures intact,remains free from falls and pressure injuries,safety maintained,continue monitoring.     Problem: Adult Inpatient Plan of Care  Goal: Plan of Care Review  Outcome: Ongoing, Progressing  Flowsheets (Taken 8/15/2020 1531)  Plan of Care Reviewed With: patient  Goal: Patient-Specific Goal (Individualization)  Outcome: Ongoing, Progressing  Goal: Absence of Hospital-Acquired Illness or Injury  Outcome: Ongoing, Progressing  Intervention: Identify and Manage Fall Risk  Flowsheets (Taken 8/15/2020 1531)  Safety Promotion/Fall Prevention:   assistive device/personal item within reach   bed alarm set   side rails raised x 2   high risk medications identified   room near unit station   nonskid shoes/socks when out of bed   Fall Risk reviewed with patient/family   instructed to call staff for mobility  Goal: Optimal Comfort and Wellbeing  Outcome: Ongoing, Progressing  Goal: Readiness for Transition of Care  Outcome: Ongoing, Progressing  Goal: Rounds/Family Conference  Outcome: Ongoing, Progressing     Problem: Fall Injury Risk  Goal: Absence of Fall and Fall-Related Injury  Outcome: Ongoing, Progressing  Intervention: Identify and Manage Contributors to Fall Injury Risk  Flowsheets (Taken 8/15/2020 1531)  Self-Care Promotion:   independence encouraged   BADL personal objects within reach   meal setup provided  Medication Review/Management:   medications reviewed   high risk medications identified     Problem: Infection  Goal: Infection Symptom Resolution  Outcome: Ongoing, Progressing  Intervention: Prevent or Manage Infection  Flowsheets (Taken 8/15/2020 1531)  Fever Reduction/Comfort Measures:   medication administered   aerosol temperature decreased  Infection Management: aseptic technique maintained      Problem: Skin Injury Risk Increased  Goal: Skin Health and Integrity  Outcome: Ongoing, Progressing  Intervention: Optimize Skin Protection  Flowsheets (Taken 8/15/2020 1531)  Pressure Reduction Techniques:   frequent weight shift encouraged   heels elevated off bed   pressure points protected   weight shift assistance provided  Pressure Reduction Devices: heel offloading device utilized  Skin Protection:   tubing/devices free from skin contact   adhesive use limited  Head of Bed (HOB): HOB at 30-45 degrees     Problem: Functional Ability Impaired (Spinal Surgery)  Goal: Optimal Functional Ability  Outcome: Ongoing, Progressing  Intervention: Optimize Functional Status  Flowsheets (Taken 8/15/2020 1531)  Self-Care Promotion:   independence encouraged   BADL personal objects within reach   meal setup provided  Activity Management: rolling - L1     Problem: Infection (Spinal Surgery)  Goal: Absence of Infection Signs/Symptoms  Outcome: Ongoing, Progressing  Intervention: Prevent or Manage Infection  Flowsheets (Taken 8/15/2020 1531)  Fever Reduction/Comfort Measures:   medication administered   aerosol temperature decreased  Infection Management: aseptic technique maintained     Problem: Pain (Spinal Surgery)  Goal: Acceptable Pain Control  Outcome: Ongoing, Progressing

## 2020-08-15 NOTE — PROGRESS NOTES
[facility] Hospital Medicine  Progress Note    Patient Name: Robles Jordan  MRN: [id number]  Patient Class: IP- Inpatient   Admission Date: 8/12/2020  Length of Stay: 3 days  Attending Physician: Chico Rodriguez DO  Primary Care Provider: John Jackson MD        Subjective:     Principal Problem:Tethered cord syndrome        HPI:  No notes on file    Overview/Hospital Course:  No notes on file    Interval History: doing great. No problems. No SOB     Review of Systems   Constitutional: Positive for activity change.   HENT: Negative for congestion.    Respiratory: Negative for chest tightness and shortness of breath.    Cardiovascular: Negative for chest pain.   Gastrointestinal: Negative for abdominal pain.   Genitourinary: Negative for difficulty urinating.   Musculoskeletal: Negative for arthralgias.   Psychiatric/Behavioral: Negative for agitation and behavioral problems.     Objective:     Vital Signs (Most Recent):  Temp: 98.4 °F (36.9 °C) (08/15/20 0818)  Pulse: 78 (08/15/20 0818)  Resp: 20 (08/15/20 0818)  BP: 121/85 (08/15/20 0818)  SpO2: 96 % (08/15/20 0818) Vital Signs (24h Range):  Temp:  [98 °F (36.7 °C)-98.9 °F (37.2 °C)] 98.4 °F (36.9 °C)  Pulse:  [74-90] 78  Resp:  [16-20] 20  SpO2:  [95 %-97 %] 96 %  BP: (121-163)/(64-85) 121/85     Weight: 108.5 kg (239 lb 4.8 oz)  Body mass index is 30.72 kg/m².    Intake/Output Summary (Last 24 hours) at 8/15/2020 1005  Last data filed at 8/14/2020 2100  Gross per 24 hour   Intake 360 ml   Output 650 ml   Net -290 ml      Physical Exam  Vitals signs and nursing note reviewed.   Constitutional:       General: He is not in acute distress.     Appearance: Normal appearance. He is obese. He is not ill-appearing, toxic-appearing or diaphoretic.   Pulmonary:      Effort: No respiratory distress.      Breath sounds: Normal breath sounds. No wheezing or rales.   Neurological:      Mental Status: He is alert and oriented to person, place, and time.    Psychiatric:         Mood and Affect: Mood normal.         Behavior: Behavior normal.         Significant Labs:   BMP:   Recent Labs   Lab 08/13/20  1706   GLU 90      K 4.0      CO2 29   BUN 10   CREATININE 0.8   CALCIUM 8.3*     CBC:   Recent Labs   Lab 08/13/20  1706   WBC 9.42   HGB 12.8*   HCT 41.0          Significant Imaging:      Assessment/Plan:      * Tethered cord syndrome  Per primary team       Obesity  Body mass index is 30.72 kg/m². weight loss as out patient       Hypoxia  Likely from atelectasis. No complaints of SOB. Continue IS.  Patient is 96% on RA.  D/C 02.         S/P lumbar laminectomy  Per primary      Obstructive sleep apnea on CPAP  Continue CPAP on d/c       Essential hypertension  Resume home meds       Hyperlipidemia  Resume home meds         VTE Risk Mitigation (From admission, onward)         Ordered     heparin (porcine) injection 5,000 Units  Every 8 hours      08/12/20 1421     IP VTE LOW RISK PATIENT  Once      08/12/20 0547     Place MONA hose  Until discontinued      08/12/20 0547     Place sequential compression device  Until discontinued      08/12/20 0547                Discharge Planning   96% on RA.           Raphael Alcocer MD  Department of Hospital Medicine   [facility]

## 2020-08-15 NOTE — PLAN OF CARE
Problem: Adult Inpatient Plan of Care  Goal: Plan of Care Review  8/15/2020 0530 by Becky Wilde RN  Outcome: Ongoing, Progressing  8/15/2020 0529 by Becky Wilde RN  Outcome: Ongoing, Progressing  8/15/2020 0529 by Bceky Wilde RN  Outcome: Ongoing, Progressing  Goal: Patient-Specific Goal (Individualization)  Outcome: Ongoing, Progressing  Goal: Absence of Hospital-Acquired Illness or Injury  Outcome: Ongoing, Progressing  Goal: Optimal Comfort and Wellbeing  Outcome: Ongoing, Progressing     Problem: Adult Inpatient Plan of Care  Goal: Patient-Specific Goal (Individualization)  Outcome: Ongoing, Progressing     Problem: Fall Injury Risk  Goal: Absence of Fall and Fall-Related Injury  8/15/2020 0530 by Becky Wilde RN  Outcome: Ongoing, Progressing  8/15/2020 0529 by Becky Wilde RN  Outcome: Ongoing, Progressing     Problem: Infection  Goal: Infection Symptom Resolution  Outcome: Ongoing, Progressing

## 2020-08-15 NOTE — PLAN OF CARE
Problem: Occupational Therapy Goal  Goal: Occupational Therapy Goal  Description: Goals to be met by: 8/28/20    Patient will increase functional independence with ADLs by performing:    Feeding with Modified [place].  UE Dressing with Modified [place].  LE Dressing with Minimal Assistance and Assistive Devices as needed.  Grooming while seated with Modified [place].  Toileting from bedside commode with Modified [place] and Set-up Assistance for hygiene and clothing management.   Rolling to Bilateral with Stand-by Assistance and use of bedrail as needed.   Supine to sit with Stand-by Assistance and use of bedrail as needed.  Step transfer with Contact Guard Assistance  Toilet transfer to bedside commode with Contact Guard Assistance.  Upper extremity exercise program x15 reps per handout, with independence.  Educate the patient re: log roll technique and spinal prcautions    Outcome: Ongoing, Progressing   The patient demo improved bed mobility and decreased c/o pain. The patient contines to require max assist x2 person to stand from an elevated bed. The patient is unable to step to a chair 2* leg weakness and pain.  The patient will benefit from In Patient Rehab.

## 2020-08-15 NOTE — PROGRESS NOTES
NEUROSURGERY PROGRESS NOTE    ID:  S/p L5-S1 lami for release of tethered cord    INTERVAL HISTORY:  DC'd HOB flat    SUBJECTIVE:  Increase in low back pain after getting up  Only able to sit at edge of bed  Feeling better today  Strength continues to improve  No HA when upright     EXAM:  Vitals:    08/15/20 0818   BP: 121/85   Pulse: 78   Resp: 20   Temp: 98.4 °F (36.9 °C)     AAO3  CN 2-12 intact  Strength 5/5 BUE and BLE, 4+/5 R HF, 4+/5 DF/EHL and 5/5 PF  LT sensation intact     Incision: c/d/i, no drainage    A/P:    R leg and foot strength improved (now 4+/5). Surgical site back pain increase likely due to immobilized for 48 hrs. No postural HA.  Needs to work with PT today.    - PT/OT  - Pain control  - Plan for DC tomorrow if stable  - Anticipate home health PT

## 2020-08-15 NOTE — NURSING
In bed, HOB elevated 45 degrees, wife at bedside. Surgical dressing to back is clean dry and intact. Turned via log roll Q2H, tolerated well. NAD noted, instructed to call with needs.

## 2020-08-15 NOTE — SUBJECTIVE & OBJECTIVE
Interval History: doing great. No problems. No SOB     Review of Systems   Constitutional: Positive for activity change.   HENT: Negative for congestion.    Respiratory: Negative for chest tightness and shortness of breath.    Cardiovascular: Negative for chest pain.   Gastrointestinal: Negative for abdominal pain.   Genitourinary: Negative for difficulty urinating.   Musculoskeletal: Negative for arthralgias.   Psychiatric/Behavioral: Negative for agitation and behavioral problems.     Objective:     Vital Signs (Most Recent):  Temp: 98.4 °F (36.9 °C) (08/15/20 0818)  Pulse: 78 (08/15/20 0818)  Resp: 20 (08/15/20 0818)  BP: 121/85 (08/15/20 0818)  SpO2: 96 % (08/15/20 0818) Vital Signs (24h Range):  Temp:  [98 °F (36.7 °C)-98.9 °F (37.2 °C)] 98.4 °F (36.9 °C)  Pulse:  [74-90] 78  Resp:  [16-20] 20  SpO2:  [95 %-97 %] 96 %  BP: (121-163)/(64-85) 121/85     Weight: 108.5 kg (239 lb 4.8 oz)  Body mass index is 30.72 kg/m².    Intake/Output Summary (Last 24 hours) at 8/15/2020 1005  Last data filed at 8/14/2020 2100  Gross per 24 hour   Intake 360 ml   Output 650 ml   Net -290 ml      Physical Exam  Vitals signs and nursing note reviewed.   Constitutional:       General: He is not in acute distress.     Appearance: Normal appearance. He is obese. He is not ill-appearing, toxic-appearing or diaphoretic.   Pulmonary:      Effort: No respiratory distress.      Breath sounds: Normal breath sounds. No wheezing or rales.   Neurological:      Mental Status: He is alert and oriented to person, place, and time.   Psychiatric:         Mood and Affect: Mood normal.         Behavior: Behavior normal.         Significant Labs:   BMP:   Recent Labs   Lab 08/13/20  1706   GLU 90      K 4.0      CO2 29   BUN 10   CREATININE 0.8   CALCIUM 8.3*     CBC:   Recent Labs   Lab 08/13/20  1706   WBC 9.42   HGB 12.8*   HCT 41.0          Significant Imaging:

## 2020-08-15 NOTE — PLAN OF CARE
08/15/20 1210   Medicare Message   Important Message from Medicare regarding Discharge Appeal Rights Explained to patient/caregiver;Signed/date by patient/caregiver;Given to patient/caregiver   Date IMM was signed 08/15/20   Time IMM was signed [id number]

## 2020-08-15 NOTE — PLAN OF CARE
Problem: Physical Therapy Goal  Goal: Physical Therapy Goal  Description: Goals to be met by:      Patient will increase functional independence with mobility by performin. Supine to sit with Modified [place]  2. Sit to supine with Modified [place]  3. Sit to stand transfer with Modified [place]  4. Bed to chair transfer with Modified [place] using Rolling Walker  5. Gait  x 50 feet with Modified [place] using Rolling Walker.   6. Lower extremity exercise program x15 reps per handout, with supervision    Outcome: Ongoing, Progressing   Pt will benefit from further IPR in order to return to PLOF.  Pt required MAX A x 2 persons to perform sit>stand from elevated.

## 2020-08-16 PROCEDURE — 25000003 PHARM REV CODE 250: Performed by: PHYSICIAN ASSISTANT

## 2020-08-16 PROCEDURE — 63600175 PHARM REV CODE 636 W HCPCS: Performed by: PHYSICIAN ASSISTANT

## 2020-08-16 PROCEDURE — 11000001 HC ACUTE MED/SURG PRIVATE ROOM

## 2020-08-16 PROCEDURE — 99024 POSTOP FOLLOW-UP VISIT: CPT | Mod: ,,, | Performed by: NEUROLOGICAL SURGERY

## 2020-08-16 PROCEDURE — 99024 PR POST-OP FOLLOW-UP VISIT: ICD-10-PCS | Mod: ,,, | Performed by: NEUROLOGICAL SURGERY

## 2020-08-16 PROCEDURE — 97530 THERAPEUTIC ACTIVITIES: CPT

## 2020-08-16 PROCEDURE — 25000003 PHARM REV CODE 250: Performed by: NEUROLOGICAL SURGERY

## 2020-08-16 PROCEDURE — 97116 GAIT TRAINING THERAPY: CPT

## 2020-08-16 RX ADMIN — ATORVASTATIN CALCIUM 80 MG: 40 TABLET, FILM COATED ORAL at 09:08

## 2020-08-16 RX ADMIN — POLYETHYLENE GLYCOL 3350 17 G: 17 POWDER, FOR SOLUTION ORAL at 09:08

## 2020-08-16 RX ADMIN — HEPARIN SODIUM 5000 UNITS: 5000 INJECTION INTRAVENOUS; SUBCUTANEOUS at 02:08

## 2020-08-16 RX ADMIN — METHOCARBAMOL 750 MG: 750 TABLET ORAL at 08:08

## 2020-08-16 RX ADMIN — METHOCARBAMOL 750 MG: 750 TABLET ORAL at 02:08

## 2020-08-16 RX ADMIN — MUPIROCIN 1 G: 20 OINTMENT TOPICAL at 08:08

## 2020-08-16 RX ADMIN — OXYCODONE HYDROCHLORIDE AND ACETAMINOPHEN 1 TABLET: 10; 325 TABLET ORAL at 10:08

## 2020-08-16 RX ADMIN — DOCUSATE SODIUM 100 MG: 100 CAPSULE, LIQUID FILLED ORAL at 09:08

## 2020-08-16 RX ADMIN — HEPARIN SODIUM 5000 UNITS: 5000 INJECTION INTRAVENOUS; SUBCUTANEOUS at 06:08

## 2020-08-16 RX ADMIN — DOCUSATE SODIUM 50 MG AND SENNOSIDES 8.6 MG 2 TABLET: 8.6; 5 TABLET, FILM COATED ORAL at 07:08

## 2020-08-16 RX ADMIN — HEPARIN SODIUM 5000 UNITS: 5000 INJECTION INTRAVENOUS; SUBCUTANEOUS at 10:08

## 2020-08-16 RX ADMIN — TAMSULOSIN HYDROCHLORIDE 0.4 MG: 0.4 CAPSULE ORAL at 09:08

## 2020-08-16 RX ADMIN — OXYCODONE HYDROCHLORIDE AND ACETAMINOPHEN 1 TABLET: 10; 325 TABLET ORAL at 02:08

## 2020-08-16 RX ADMIN — SODIUM CHLORIDE: 0.9 INJECTION, SOLUTION INTRAVENOUS at 06:08

## 2020-08-16 RX ADMIN — METHOCARBAMOL 750 MG: 750 TABLET ORAL at 09:08

## 2020-08-16 RX ADMIN — MUPIROCIN 1 G: 20 OINTMENT TOPICAL at 09:08

## 2020-08-16 RX ADMIN — LISINOPRIL 40 MG: 20 TABLET ORAL at 09:08

## 2020-08-16 RX ADMIN — OXYCODONE HYDROCHLORIDE AND ACETAMINOPHEN 1 TABLET: 10; 325 TABLET ORAL at 07:08

## 2020-08-16 RX ADMIN — AMLODIPINE BESYLATE 10 MG: 5 TABLET ORAL at 09:08

## 2020-08-16 NOTE — PT/OT/SLP PROGRESS
"Physical Therapy Treatment    Patient Name:  Robles Jordan   MRN:  [id number]    Recommendations:     Discharge Recommendations:  rehabilitation facility   Discharge Equipment Recommendations: wheelchair(if DCd home)   Barriers to discharge: Decreased caregiver support    Assessment:     Robles Jordan is a 69 y.o. male admitted with a medical diagnosis of Tethered cord syndrome.  He presents with the following impairments/functional limitations:  gait instability, weakness, decreased ROM, impaired endurance, impaired balance, decreased lower extremity function, impaired joint extensibility, pain, impaired functional mobilty, edema     Pt much improved today since evaluation. Remains a high fall risk and unable to ambulate without assistance of skilled staff. Continue to recommend inpt rehab; he is pleasant, highly motivated, and able to tolerate 3 hr inpt rehab program. Progressive Mobility Level 3: Recommend patient be assisted out of bed to chair, toilet, and/or bedside commode with </= minimum assistance. .    Rehab Prognosis: excellent; patient would benefit from acute skilled PT services to address these deficits and reach maximum level of function.    Recent Surgery: Procedure(s) (LRB):  LAMINECTOMY, SPINE, LUMBAR  (L5-S1 laminectomy for tethered cord release) Microscope OPEN lami setup [facility] Microinstruments Neuromonitoring MARIBETH JUSTIN [phone]   (rectal stim, EMG, electrode nerve stimulator) (Bilateral) 4 Days Post-Op    Plan:     During this hospitalization, patient to be seen daily to address the identified rehab impairments via gait training, therapeutic activities, therapeutic exercises and progress toward the following goals:    · Plan of Care Expires:  08/28/20    Subjective     Chief Complaint: no complaints today    "lukasz been exercising all day"  Patient/Family Comments/goals: ambulate and "do whatever I need to do"  Pain/Comfort:  · Pain Rating 1: 6/10  · Location 1: back  · Pain " Addressed 1: Cessation of Activity, Nurse notified, Reposition      Objective:     Communicated with RN Oneyda prior to session.  Patient found semi diagonal in bed with purple wedge behind R back with peripheral IV, bed alarm, telemetry upon PT entry to room.     General Precautions: Standard, fall   Orthopedic Precautions:spinal precautions   Braces: N/A     Functional Mobility:  · Bed Mobility:     · Supine to Sit: modified independence  · Transfers:     · Sit to Stand:  stand by assistance with rolling walker  · From heightned bed then again from bed a low/standard height  · Gait: training with RW ~ 75 ft with CGA; flexed spine and LEs; adducted gait, gait speed decreased; very shortened step length; heavy reliance of BUEs to offload LEs 2/2 weakness. denied dizziness  · Balance: good sitting. fair + standing with RW      AM-PAC 6 CLICK MOBILITY  Turning over in bed (including adjusting bedclothes, sheets and blankets)?: 4  Sitting down on and standing up from a chair with arms (e.g., wheelchair, bedside commode, etc.): 4  Moving from lying on back to sitting on the side of the bed?: 4  Moving to and from a bed to a chair (including a wheelchair)?: 4  Need to walk in hospital room?: 3  Climbing 3-5 steps with a railing?: 2  Basic Mobility Total Score: 21       Therapeutic Activities and Exercises:   n/a    Patient left up in chair with call button in reach and RN notified..    GOALS:   Multidisciplinary Problems     Physical Therapy Goals        Problem: Physical Therapy Goal    Goal Priority Disciplines Outcome Goal Variances Interventions   Physical Therapy Goal     PT, PT/OT Ongoing, Progressing     Description: Goals to be met by:      Patient will increase functional independence with mobility by performin. Supine to sit with Modified [place]  2. Sit to supine with Modified [place]  3. Sit to stand transfer with Modified [place]  4. Bed to chair transfer with Modified [place]  using Rolling Walker  5. Gait  x 50 feet with Modified [place] using Rolling Walker.   6. Lower extremity exercise program x15 reps per handout, with supervision                     Time Tracking:     PT Received On: 08/16/20  PT Start Time: 1337     PT Stop Time: 1359  PT Total Time (min): 22 min     Billable Minutes: Gait Training 8   cotx with OT    Treatment Type: Treatment  PT/PTA: PT     PTA Visit Number: 0     Albina Calvo, PT  08/16/2020

## 2020-08-16 NOTE — PT/OT/SLP PROGRESS
Occupational Therapy   Co-Treatment    Name: Robles Jordan  MRN: [id number]  Admitting Diagnosis:  Tethered cord syndrome  4 Days Post-Op   Procedure(s):  LAMINECTOMY, SPINE, LUMBAR  (L5-S1 laminectomy for tethered cord release) Microscope OPEN lami setup [facility] Microinstruments Neuromonitoring MARIBETH JUSTIN [phone]   (rectal stim, EMG, electrode nerve stimulator)     Recommendations:     Discharge Recommendations: rehabilitation facility  Discharge Equipment Recommendations:  (TBD)  Barriers to discharge:  Decreased caregiver support    Assessment:     Robles Jordan is a 69 y.o. male with a medical diagnosis of Tethered cord syndrome.  He presents with good effort this day with noted improvement in functional mobility from prior sessions. Patient is motivated to return to [facility] and eager to participate with therapy. Performance deficits affecting function are weakness, impaired endurance, impaired self care skills, impaired functional mobilty, gait instability, impaired balance, pain, decreased lower extremity function.     Rehab Prognosis:  Good; patient would benefit from acute skilled OT services to address these deficits and reach maximum level of function.       Plan:     Patient to be seen 5 x/week to address the above listed problems via self-care/home management, therapeutic activities, therapeutic exercises, neuromuscular re-education  · Plan of Care Expires: 08/28/20  · Plan of Care Reviewed with: patient    Subjective     Pain/Comfort:  · Pain Rating 1: 6/10  · Location 1: back  · Pain Addressed 1: Nurse notified    Objective:     Communicated with: RN prior to session.  Patient found HOB elevated with peripheral IV, bed alarm, telemetry upon OT entry to room.    General Precautions: Standard, fall   Orthopedic Precautions:spinal precautions   Braces: N/A     Occupational Performance:     Bed Mobility:    · Patient completed Supine to Sit to L side EOB with modified independence    · Patient completed Scooting anteriorly to EOB for foot placement on floor with modified independence    Functional Mobility/Transfers:  · Patient completed Sit <> Stand Transfer x2 trials with stand by assistance  with  rolling walker and elevated bed  · Functional Mobility: ~75' with CGA and RW with chair follow for safety    Activities of Daily Living:  · Upper Body Dressing: set-up assistance Patient donned hospital gown as a robe while sitting EOB with set-up assist.   · Declined need for further ADLs at this time    [facility] 6 Click ADL: 17    Treatment & Education:  Role of OT/POC  Reviewed spinal precautions  Call button for assistance    Patient left up in chair with all lines intact, call button in reach and RN notifiedEducation:      GOALS:   Multidisciplinary Problems     Occupational Therapy Goals        Problem: Occupational Therapy Goal    Goal Priority Disciplines Outcome Interventions   Occupational Therapy Goal     OT, PT/OT Ongoing, Progressing    Description: Goals to be met by: 8/28/20    Patient will increase functional independence with ADLs by performing:    Feeding with Modified [place].  UE Dressing with Modified [place].  LE Dressing with Minimal Assistance and Assistive Devices as needed.  Grooming while seated with Modified [place].  Toileting from bedside commode with Modified [place] and Set-up Assistance for hygiene and clothing management.   Rolling to Bilateral with Stand-by Assistance and use of bedrail as needed.   Supine to sit with Stand-by Assistance and use of bedrail as needed.  Step transfer with Contact Guard Assistance  Toilet transfer to bedside commode with Contact Guard Assistance.  Upper extremity exercise program x15 reps per handout, with independence.  Educate the patient re: log roll technique and spinal prcautions                     Time Tracking:     OT Date of Treatment: 08/16/20  OT Start Time: 1338  OT Stop Time: 1400  OT Total Time (min):  22 min    Billable Minutes:Therapeutic Activity 8 minutes    Karen Dominguez OT  8/16/2020

## 2020-08-16 NOTE — PLAN OF CARE
Problem: Physical Therapy Goal  Goal: Physical Therapy Goal  Description: Goals to be met by:      Patient will increase functional independence with mobility by performin. Supine to sit with Modified [place]  2. Sit to supine with Modified [place]  3. Sit to stand transfer with Modified [place]  4. Bed to chair transfer with Modified [place] using Rolling Walker  5. Gait  x 50 feet with Modified [place] using Rolling Walker.   6. Lower extremity exercise program x15 reps per handout, with supervision    Outcome: Ongoing, Progressing    7x. Inpt rehab

## 2020-08-16 NOTE — PROGRESS NOTES
NEUROSURGERY PROGRESS NOTE    ID:  S/p L5-S1 lami for release of tethered cord    INTERVAL HISTORY:  No overnight events    SUBJECTIVE:  Pain controlled  RLE strength and ROM improved but feels weak and clumsy when standing  Stood with PT yesterday  No postural HA or wound drainage    EXAM:  Vitals:    08/16/20 0802   BP: (!) 155/73   Pulse: 65   Resp: 20   Temp: 98.6 °F (37 °C)     AAO3  CN 2-12 intact  Strength 5/5 BUE and BLE, 4+/5 R HF, 4+/5 DF/EHL and 5/5 PF  LT sensation intact     Incision: c/d/i, no drainage    A/P:    R leg and foot strength improved (now 4+/5). Pain controlled. No postural HA.  Weakness and clumsiness in RLE when standing.  PT rec inpatient rehab.    - PT/OT  - Pain control  - Rec OOB to chair as much as possible  - Inpt rehab consult

## 2020-08-16 NOTE — PLAN OF CARE
Pt alert able to make needs known,baron meds well,no s/s adverse reaction noted,reposition q 2hrs,pain controlled by prn pain medication,remains free from falls and pressure injuries,,walked in nava wait with therapy,up in chair,surgical incision open to air,no redness swelling or drainage noted ,safety maintained,continue monitoring.     Problem: Adult Inpatient Plan of Care  Goal: Plan of Care Review  Outcome: Ongoing, Progressing  Flowsheets (Taken 8/16/2020 1436)  Plan of Care Reviewed With: patient  Goal: Patient-Specific Goal (Individualization)  Outcome: Ongoing, Progressing  Goal: Absence of Hospital-Acquired Illness or Injury  Outcome: Ongoing, Progressing  Intervention: Identify and Manage Fall Risk  Flowsheets (Taken 8/16/2020 1436)  Safety Promotion/Fall Prevention:   assistive device/personal item within reach   bed alarm set   high risk medications identified   side rails raised x 2   instructed to call staff for mobility   Fall Risk reviewed with patient/family   medications reviewed   nonskid shoes/socks when out of bed   room near unit station  Goal: Optimal Comfort and Wellbeing  Outcome: Ongoing, Progressing  Goal: Readiness for Transition of Care  Outcome: Ongoing, Progressing  Goal: Rounds/Family Conference  Outcome: Ongoing, Progressing     Problem: Fall Injury Risk  Goal: Absence of Fall and Fall-Related Injury  Outcome: Ongoing, Progressing  Intervention: Identify and Manage Contributors to Fall Injury Risk  Flowsheets (Taken 8/16/2020 1436)  Self-Care Promotion:   independence encouraged   BADL personal objects within reach   meal setup provided  Medication Review/Management:   medications reviewed   high risk medications identified     Problem: Infection  Goal: Infection Symptom Resolution  Outcome: Ongoing, Progressing  Intervention: Prevent or Manage Infection  Flowsheets (Taken 8/16/2020 1436)  Infection Management: aseptic technique maintained     Problem: Skin Injury Risk  Increased  Goal: Skin Health and Integrity  Outcome: Ongoing, Progressing  Intervention: Optimize Skin Protection  Flowsheets (Taken 8/16/2020 1436)  Pressure Reduction Techniques:   frequent weight shift encouraged   weight shift assistance provided   pressure points protected   heels elevated off bed  Pressure Reduction Devices: positioning supports utilized  Skin Protection:   tubing/devices free from skin contact   adhesive use limited  Head of Bed (HOB): HOB at 30-45 degrees     Problem: Functional Ability Impaired (Spinal Surgery)  Goal: Optimal Functional Ability  Outcome: Ongoing, Progressing  Intervention: Optimize Functional Status  Flowsheets (Taken 8/16/2020 1436)  Self-Care Promotion:   independence encouraged   BADL personal objects within reach   meal setup provided  Activity Management:   walk with assistive device and/or staff member - L3   arm raise - L1   ankle pumps - L1   straight leg raise - L1   up in chair - L3     Problem: Infection (Spinal Surgery)  Goal: Absence of Infection Signs/Symptoms  Outcome: Ongoing, Progressing     Problem: Pain (Spinal Surgery)  Goal: Acceptable Pain Control  Outcome: Ongoing, Progressing  Intervention: Prevent or Manage Pain  Flowsheets (Taken 8/16/2020 1436)  Pain Management Interventions:   prescribed exercises encouraged   relaxation techniques promoted

## 2020-08-17 LAB
FINAL PATHOLOGIC DIAGNOSIS: NORMAL
GROSS: NORMAL

## 2020-08-17 PROCEDURE — 97535 SELF CARE MNGMENT TRAINING: CPT

## 2020-08-17 PROCEDURE — 63600175 PHARM REV CODE 636 W HCPCS: Performed by: PHYSICIAN ASSISTANT

## 2020-08-17 PROCEDURE — 11000001 HC ACUTE MED/SURG PRIVATE ROOM

## 2020-08-17 PROCEDURE — 99024 PR POST-OP FOLLOW-UP VISIT: ICD-10-PCS | Mod: ,,, | Performed by: NEUROLOGICAL SURGERY

## 2020-08-17 PROCEDURE — 97116 GAIT TRAINING THERAPY: CPT

## 2020-08-17 PROCEDURE — 25000003 PHARM REV CODE 250: Performed by: PHYSICIAN ASSISTANT

## 2020-08-17 PROCEDURE — 97110 THERAPEUTIC EXERCISES: CPT

## 2020-08-17 PROCEDURE — 99024 POSTOP FOLLOW-UP VISIT: CPT | Mod: ,,, | Performed by: NEUROLOGICAL SURGERY

## 2020-08-17 RX ADMIN — OXYCODONE HYDROCHLORIDE AND ACETAMINOPHEN 1 TABLET: 10; 325 TABLET ORAL at 02:08

## 2020-08-17 RX ADMIN — ATORVASTATIN CALCIUM 80 MG: 40 TABLET, FILM COATED ORAL at 08:08

## 2020-08-17 RX ADMIN — OXYCODONE HYDROCHLORIDE AND ACETAMINOPHEN 1 TABLET: 10; 325 TABLET ORAL at 07:08

## 2020-08-17 RX ADMIN — DOCUSATE SODIUM 100 MG: 100 CAPSULE, LIQUID FILLED ORAL at 08:08

## 2020-08-17 RX ADMIN — AMLODIPINE BESYLATE 10 MG: 5 TABLET ORAL at 08:08

## 2020-08-17 RX ADMIN — HEPARIN SODIUM 5000 UNITS: 5000 INJECTION INTRAVENOUS; SUBCUTANEOUS at 02:08

## 2020-08-17 RX ADMIN — METHOCARBAMOL 750 MG: 750 TABLET ORAL at 02:08

## 2020-08-17 RX ADMIN — TAMSULOSIN HYDROCHLORIDE 0.4 MG: 0.4 CAPSULE ORAL at 08:08

## 2020-08-17 RX ADMIN — POLYETHYLENE GLYCOL 3350 17 G: 17 POWDER, FOR SOLUTION ORAL at 08:08

## 2020-08-17 RX ADMIN — OXYCODONE HYDROCHLORIDE AND ACETAMINOPHEN 1 TABLET: 10; 325 TABLET ORAL at 09:08

## 2020-08-17 RX ADMIN — HEPARIN SODIUM 5000 UNITS: 5000 INJECTION INTRAVENOUS; SUBCUTANEOUS at 09:08

## 2020-08-17 RX ADMIN — METHOCARBAMOL 750 MG: 750 TABLET ORAL at 09:08

## 2020-08-17 RX ADMIN — HEPARIN SODIUM 5000 UNITS: 5000 INJECTION INTRAVENOUS; SUBCUTANEOUS at 06:08

## 2020-08-17 RX ADMIN — METHOCARBAMOL 750 MG: 750 TABLET ORAL at 08:08

## 2020-08-17 RX ADMIN — LISINOPRIL 40 MG: 20 TABLET ORAL at 08:08

## 2020-08-17 NOTE — PT/OT/SLP PROGRESS
Occupational Therapy   Treatment    Name: Robles Jordan  MRN: [id number]  Admitting Diagnosis:  Tethered cord syndrome  5 Days Post-Op    Recommendations:     Discharge Recommendations: rehabilitation facility  Discharge Equipment Recommendations:  bedside commode, bath bench, hip kit, walker, rolling  Barriers to discharge:  Decreased caregiver support    Assessment:     Robles Jordan is a 69 y.o. male with a medical diagnosis of Tethered cord syndrome.  Performance deficits affecting function are weakness, impaired endurance, impaired functional mobilty, impaired self care skills, gait instability, impaired balance, decreased upper extremity function, decreased coordination, decreased safety awareness, decreased lower extremity function, pain, impaired skin, orthopedic precautions.   The patient demo improved functional mobility with decreased c/o pain and was able to amb using a RW to the sink to stand at the sink for self care tasks.The patient will benefit from OT to address functional deficits. OT modified goals to reflect progress.        The patient reports feeling better but agrees that he will benefit from In Patient Rehab.    Rehab Prognosis:  Good; patient would benefit from acute skilled OT services to address these deficits and reach maximum level of function.       Plan:     Patient to be seen 5 x/week to address the above listed problems via therapeutic activities, therapeutic exercises, self-care/home management  · Plan of Care Expires: 08/28/20  · Plan of Care Reviewed with: patient    Subjective     Pain/Comfort:  · Pain Rating 1: 6/10  · Location 1: back  · Pain Addressed 2: Pre-medicate for activity, Cessation of Activity    Objective:     Communicated with: nurse prior to session.  Patient found seated on the EOB with peripheral IV upon OT entry to room.    General Precautions: Standard, fall   Orthopedic Precautions:spinal precautions   Braces: N/A     Occupational Performance:     Bed  Mobility:    · N/T     Functional Mobility/Transfers:  · Patient completed Sit <> Stand Transfer with stand by assistance  with  rolling walker   · Functional Mobility: The patient amb using a RW from the bed<>sink ~15'x2. The patient tolerated standing at the sink ~7 min for self care.    Activities of Daily Living:  · Grooming: supervision and stand by assistance to brush his teeth and wash his hands and race while standing at the sink  · Upper Body Dressing: contact guard assistance to don back gown  · Lower Body Dressing: The patient was educated re: use of the Hip Kit for LB dressing to observe spinal precautions. The patient declined return demonstration.        [facility] 6 Click ADL: 20    Treatment & Education:  The ptient was seen for self care and functional mobility tasks as noted above. The patient was educated vis handout and demo re: use of the Hip Kit for LB dressing. The patient was also ducated re: Home Safety/Fall prevention for home. The patient was educated re: use of a BSC on top of his toilet for safety at home.  The patient was educated re: need to request nursing assist to amb using a RW in his room.    Patient left seated on the EOB with all lines intact, call button in reach and nurse notifiedEducation:      GOALS:   Multidisciplinary Problems     Occupational Therapy Goals        Problem: Occupational Therapy Goal    Goal Priority Disciplines Outcome Interventions   Occupational Therapy Goal     OT, PT/OT Ongoing, Progressing    Description: Goals to be met by: 8/28/20    Patient will increase functional independence with ADLs by performing:    Feeding with Modified [place].  met  UE Dressing with Modified [place].  LE Dressing with Minimal Assistance and Assistive Devices as needed.  Grooming while standing at the sink with Modified [place].  Modified 8/17  Toileting from bedside commode with Modified [place] and Set-up Assistance for hygiene and clothing management.    Rolling to Bilateral with Stand-by Assistance and use of bedrail as needed.   Supine to sit with Stand-by Assistance and use of bedrail as needed.  Step transfer with Contact Guard Assistance  met  Toilet transfer to bedside commode with Contact Guard Assistance.  Upper extremity exercise program x15 reps per handout, with independence.  Educate the patient re: log roll technique and spinal precautions  met                     Time Tracking:     OT Date of Treatment: 08/17/20  OT Start Time: 1440  OT Stop Time: 1507  OT Total Time (min): 27 min    Billable Minutes:Self Care/Home Management 27 Janigor Thornton OT  8/17/2020

## 2020-08-17 NOTE — PLAN OF CARE
Problem: Adult Inpatient Plan of Care  Goal: Plan of Care Review  Outcome: Ongoing, Progressing     Problem: Adult Inpatient Plan of Care  Goal: Absence of Hospital-Acquired Illness or Injury  Outcome: Ongoing, Progressing     Problem: Adult Inpatient Plan of Care  Goal: Optimal Comfort and Wellbeing  Outcome: Ongoing, Progressing     Problem: Adult Inpatient Plan of Care  Goal: Readiness for Transition of Care  Outcome: Ongoing, Progressing     Problem: Fall Injury Risk  Goal: Absence of Fall and Fall-Related Injury  Outcome: Ongoing, Progressing     Problem: Infection  Goal: Infection Symptom Resolution  Outcome: Ongoing, Progressing     Problem: Functional Ability Impaired (Spinal Surgery)  Goal: Optimal Functional Ability  Outcome: Ongoing, Progressing

## 2020-08-17 NOTE — PT/OT/SLP PROGRESS
Physical Therapy Treatment    Patient Name:  Robles Jordan   MRN:  [id number]    Recommendations:     Discharge Recommendations:  rehabilitation facility   Discharge Equipment Recommendations: wheelchair(if DCd home)   Barriers to discharge: Decreased caregiver support    Assessment:     Robles Jordan is a 69 y.o. male admitted with a medical diagnosis of Tethered cord syndrome.  He presents with the following impairments/functional limitations:  weakness, gait instability, decreased ROM, impaired endurance, impaired balance, decreased lower extremity function, impaired coordination, impaired joint extensibility, impaired muscle length, impaired self care skills, pain, impaired skin, impaired functional mobilty, decreased coordination .    Gait and pain continue to improve during admit. Pt is extremely pleasant and motivated. No focal motor weakness appreciated. BLEs are weak and flexed with gait and gait speed is quite slow with RW; cont to recommend inpt rehab program so pt may return home independently and safely. Progressive Mobility Level 3: Recommend patient be assisted out of bed to chair, toilet, and/or bedside commode with </= minimum assistance.     Rehab Prognosis: excellent; patient would benefit from acute skilled PT services to address these deficits and reach maximum level of function.    Recent Surgery: Procedure(s) (LRB):  LAMINECTOMY, SPINE, LUMBAR  (L5-S1 laminectomy for tethered cord release) Microscope OPEN lami setup [facility]uments Neuromonitoring MARIBETH JUSTIN [phone]   (rectal stim, EMG, electrode nerve stimulator) (Bilateral) 5 Days Post-Op    Plan:     During this hospitalization, patient to be seen daily to address the identified rehab impairments via gait training, therapeutic activities, therapeutic exercises, neuromuscular re-education and progress toward the following goals:    · Plan of Care Expires:  08/28/20    Subjective     Chief Complaint:  none  Patient/Family Comments/goals: ready for PT  Pain/Comfort:  · Pain Rating 1: (patton baker 6)  · Location 1: back      Objective:     Communicated with RN Shellie prior to session.  Patient found seatd EOB with peripheral IV upon PT entry to room.     General Precautions: Standard, fall   Orthopedic Precautions:spinal precautions (d/w pt again)  Braces: N/A     Pt found with TEDs rolled down leg; adjusted for pt.     Functional Mobility:  · Transfers:     · Sit to Stand:  supervision with rolling walker  · Gait: with RW and CGA ~ 100 ft with adducted base of support, foot flat, decreased heel stike, increased reliance of BUEs to offload BLEs, flexed spine  · Balance: good with RW      AM-PAC 6 CLICK MOBILITY  Turning over in bed (including adjusting bedclothes, sheets and blankets)?: 4  Sitting down on and standing up from a chair with arms (e.g., wheelchair, bedside commode, etc.): 4  Moving from lying on back to sitting on the side of the bed?: 4  Moving to and from a bed to a chair (including a wheelchair)?: 4  Need to walk in hospital room?: 3  Climbing 3-5 steps with a railing?: 3  Basic Mobility Total Score: 22       Therapeutic Activities and Exercises:   standing therex per handout:  Hip ab/adduction, marches, heel raises (unable to perform toe raises), and hip extension; all x 10 reps each.   Pt needed x 2 seated rest breaks between bouts of LE therex due to back pain and fatigue.     Patient left seated EOB at his request with call button in reach and RN notified..    GOALS:   Multidisciplinary Problems     Physical Therapy Goals        Problem: Physical Therapy Goal    Goal Priority Disciplines Outcome Goal Variances Interventions   Physical Therapy Goal     PT, PT/OT Ongoing, Progressing     Description: Goals to be met by:      Patient will increase functional independence with mobility by performin. Supine to sit with Modified [place]  2. Sit to supine with Modified [place]  3.  Sit to stand transfer with Modified [place]  4. Bed to chair transfer with Modified [place] using Rolling Walker  5. Gait  x 50 feet with Modified [place] using Rolling Walker.   6. Lower extremity exercise program x15 reps per handout, with supervision                     Time Tracking:     PT Received On: 08/17/20  PT Start Time: 1402     PT Stop Time: 1435  PT Total Time (min): 33 min     Billable Minutes: Gait Training 15 and Therapeutic Exercise 18    Treatment Type: Treatment  PT/PTA: PT     PTA Visit Number: 0     Albina Calvo, PT  08/17/2020

## 2020-08-17 NOTE — PROGRESS NOTES
NEUROSURGERY PROGRESS NOTE    ID:  S/p L5-S1 lami for release of tethered cord    INTERVAL HISTORY:  No overnight events    SUBJECTIVE:  Pain controlled  Doing better each day  Worked better with PT yesterday  No postural HA or wound drainage    EXAM:  Vitals:    08/17/20 1129   BP: (!) 157/69   Pulse: 65   Resp: 20   Temp: 98.7 °F (37.1 °C)     AAO3  CN 2-12 intact  Strength 5/5 BUE and BLE, 4+/5 R HF, 4+/5 DF/EHL and 5/5 PF  LT sensation intact     Incision: c/d/i, no drainage    A/P:    R leg and foot strength improved (now 4+/5). Pain controlled. No postural HA.  Weakness and clumsiness in RLE when standing but working better with PT.  PT rec inpatient rehab.    - PT/OT  - Pain control  - Rec OOB to chair as much as possible  - Inpt rehab consult

## 2020-08-17 NOTE — PLAN OF CARE
Problem: Physical Therapy Goal  Goal: Physical Therapy Goal  Description: Goals to be met by:      Patient will increase functional independence with mobility by performin. Supine to sit with Modified [place]  2. Sit to supine with Modified [place]  3. Sit to stand transfer with Modified [place]  4. Bed to chair transfer with Modified [place] using Rolling Walker  5. Gait  x 50 feet with Modified [place] using Rolling Walker.   6. Lower extremity exercise program x15 reps per handout, with supervision    Outcome: Ongoing, Progressing    7x. Inpt rehab

## 2020-08-17 NOTE — PLAN OF CARE
Problem: Occupational Therapy Goal  Goal: Occupational Therapy Goal  Description: Goals to be met by: 8/28/20    Patient will increase functional independence with ADLs by performing:    Feeding with Modified [place].  met  UE Dressing with Modified [place].  LE Dressing with Minimal Assistance and Assistive Devices as needed.  Grooming while standing at the sink with Modified [place].  Modified 8/17  Toileting from bedside commode with Modified [place] and Set-up Assistance for hygiene and clothing management.   Rolling to Bilateral with Stand-by Assistance and use of bedrail as needed.   Supine to sit with Stand-by Assistance and use of bedrail as needed.  Step transfer with Contact Guard Assistance  met  Toilet transfer to bedside commode with Contact Guard Assistance.  Upper extremity exercise program x15 reps per handout, with independence.  Educate the patient re: log roll technique and spinal precautions  met    Outcome: Ongoing, Progressing   Th patient demo improved functional mobility. The patient was able to amb using a RW to the sink to stand at the sink to brush his teeth and wash his hands and face. The patient will benefit from OT to address functional deficits. OT modified goals to reflect progress.  The patient will benefit from In Patient Rehab.

## 2020-08-17 NOTE — CONSULTS
Patient has been accepted to [facility]. Per [facility] , a bed will be available for patient Tuesday, August 18th.

## 2020-08-18 VITALS
TEMPERATURE: 98 F | OXYGEN SATURATION: 96 % | BODY MASS INDEX: 30.71 KG/M2 | RESPIRATION RATE: 20 BRPM | WEIGHT: 239.31 LBS | HEIGHT: 74 IN | DIASTOLIC BLOOD PRESSURE: 69 MMHG | SYSTOLIC BLOOD PRESSURE: 155 MMHG | HEART RATE: 69 BPM

## 2020-08-18 PROCEDURE — 25000003 PHARM REV CODE 250: Performed by: PHYSICIAN ASSISTANT

## 2020-08-18 PROCEDURE — 63600175 PHARM REV CODE 636 W HCPCS: Performed by: PHYSICIAN ASSISTANT

## 2020-08-18 RX ORDER — METHOCARBAMOL 750 MG/1
750 TABLET, FILM COATED ORAL 3 TIMES DAILY
Qty: 30 TABLET | Refills: 0 | Status: SHIPPED | OUTPATIENT
Start: 2020-08-18 | End: 2020-08-28

## 2020-08-18 RX ORDER — AMOXICILLIN 250 MG
2 CAPSULE ORAL NIGHTLY PRN
Start: 2020-08-18 | End: 2022-11-20

## 2020-08-18 RX ORDER — MAG HYDROX/ALUMINUM HYD/SIMETH 200-200-20
30 SUSPENSION, ORAL (FINAL DOSE FORM) ORAL EVERY 4 HOURS PRN
COMMUNITY
Start: 2020-08-18 | End: 2021-05-04

## 2020-08-18 RX ORDER — BISACODYL 10 MG
10 SUPPOSITORY, RECTAL RECTAL DAILY PRN
Refills: 0 | COMMUNITY
Start: 2020-08-18 | End: 2022-11-20

## 2020-08-18 RX ORDER — POLYETHYLENE GLYCOL 3350 17 G/17G
17 POWDER, FOR SOLUTION ORAL DAILY
Refills: 0
Start: 2020-08-18 | End: 2022-11-20

## 2020-08-18 RX ORDER — OXYCODONE AND ACETAMINOPHEN 10; 325 MG/1; MG/1
1 TABLET ORAL EVERY 4 HOURS PRN
Qty: 42 TABLET | Refills: 0
Start: 2020-08-18 | End: 2021-05-04

## 2020-08-18 RX ORDER — ACETAMINOPHEN 325 MG/1
650 TABLET ORAL EVERY 6 HOURS PRN
Refills: 0
Start: 2020-08-18

## 2020-08-18 RX ADMIN — HEPARIN SODIUM 5000 UNITS: 5000 INJECTION INTRAVENOUS; SUBCUTANEOUS at 05:08

## 2020-08-18 RX ADMIN — TAMSULOSIN HYDROCHLORIDE 0.4 MG: 0.4 CAPSULE ORAL at 08:08

## 2020-08-18 RX ADMIN — METHOCARBAMOL 750 MG: 750 TABLET ORAL at 08:08

## 2020-08-18 RX ADMIN — LISINOPRIL 40 MG: 20 TABLET ORAL at 08:08

## 2020-08-18 RX ADMIN — ATORVASTATIN CALCIUM 80 MG: 40 TABLET, FILM COATED ORAL at 08:08

## 2020-08-18 RX ADMIN — AMLODIPINE BESYLATE 10 MG: 5 TABLET ORAL at 08:08

## 2020-08-18 NOTE — DISCHARGE INSTRUCTIONS
Please follow ONLY the instructions that are checked below.    Activity Restrictions:  [x]  Return to work will be determined on an individual basis.  [x]  No lifting greater than 10 pounds.  [x]  Avoid bending and twisting the area of your surgery more than 45 degrees from neutral position in any direction.  [x]  No driving or operating machinery:  [x]  until cleared by your surgeon.  []  while taking narcotic pain medications or muscle relaxants.  [x]  No cervical collar, soft collar, or lumbar brace required.  []  Wear your brace at all times. You may be given an extra brace or soft collar to wear when showering.  []  Wear your brace at all times except when flat in bed.  []  Wear brace for comfort only.  [x]  Increase ambulation over the next 2 weeks so that you are walking 2 miles per day at 2 weeks post-operatively.  [x]  Walk on paved surfaces only. It is okay to walk up and down stairs while holding onto a side rail.  [x]  No sexual activity for 2-3 weeks.    Discharge Medication/Follow-up:  [x]  Please refer to discharge medication reconciliation form.  [x]  Do not take ANY non-steroidal anti-inflammatory drugs (NSAIDS), including the following: ibuprofen, naprosyn, Aleve, Advil, Indocin, Mobic, or Celebrex for:  [x]  2 weeks  []  8 weeks  []  6 months  [x]  Prescriptions for appropriate medication will be given upon discharge.   [x]  Pain control:             [x]  Muscle relaxer:            [x]  Take docusate (Colace 100 mg): take one capsule a day as needed for constipation. You can get this over the counter.  [x]  Follow-up appointment:  [x]  10-14 days post-op for wound check by physician assistant/nurse  [x]  4-6 weeks with MD:  []  with x-rays  [x]  without x-rays  []  An appointment will be mailed to you.    Wound Care:  []  Remove dressing or bandaid in    days.  [x]  No bandage required. Keep your incision open to the air.  [x]  You may shower on the 4th day after your surgery. Have the force of  water hit you opposite from the incision. Pat the incision dry after your shower; do not scrub the incision.  [x]  You cannot take a bath until 8 weeks after surgery.  []  Apply bacitracin to incision twice a day for    more days.    Call your doctor or go to the Emergency Room for any signs of infection, including: increased redness, drainage, pain, or fever (temperature ?101.5 for 24 hours). Call your doctor or go to the Emergency Room if there are any localized neurological changes; problems with speech, vision, numbness, tingling, weakness, or severe headache; or for other concerns.    Special Instructions:  [x]  No use of tobacco products.  [x]  Diet: Please eat a regular diet as tolerated.  []  Other diet:              Specific physician instructions:           Physicians need 3 days' notice for pain medicine to be refilled. Pain medicine will only be refilled between 8 AM and 5 PM, Monday through Friday, due to Food and Drug Administration regulation of documentation.    If you have any questions about this form, please call [phone].    Form No. 25753 (Revised 10/31/2013)

## 2020-08-18 NOTE — PLAN OF CARE
08/18/20 0938   Final Note   Assessment Type Final Discharge Note   Anticipated Discharge Disposition Rehab   What phone number can be called within the next 1-3 days to see how you are doing after discharge? [phone]   Right Care Referral Info   Post Acute Recommendation IRF   Referral Type IRF   Facility Name [facility]   Street [address] [place] [address], [facility] [address]   Post-Acute Status   Post-Acute Authorization Placement   Post-Acute Placement Status Set-up Complete   Discharge Delays None known at this time     Call report and travel packet given to pt's nurseDiana.

## 2020-08-18 NOTE — PLAN OF CARE
[facility]    FACILITY TRANSFER ORDERS      Patient Name: Robles Jordan  YOB: 1950    PCP: John Jackson MD   [place]P Address[place] [address] [facility] [place] [facility] [place] [address]  PCP Phone Number: [phone]  PCP Fax: [phone]    Encounter Date: 08/18/2020    Admit to: [facility]    Vital Signs:  Routine    Diagnoses:   Active Hospital Problems    Diagnosis  POA    *Tethered cord syndrome [Q06.8]  Not Applicable    Hypoxia [R09.02]  No    Obesity [E66.9]  Yes    S/P lumbar laminectomy [Z98.890]  Not Applicable     8/12/2020: open L5-S1 laminectomy for tethered cord release with Dr. Rodriguez       Right sided weakness [R53.1]  Yes     Patient's right-sided weakness is not completely explained at this time.  Scattered foci of hyperintensities seen on MRI could be indicative of chronic microvascular changes although vasculitis and other encephalitides  should be evaluated with lumbar puncture to completely evaluate his symptoms.  The area of FLAIR hyperintensity within the ventral lateral thalamus could account for some of his symptoms but the significance and etiology of this area is not completely understood at this time.  Patient certainly possesses multiple vascular risk factors that need to be further optimized to minimize the risk of ischemic disease if this is in fact a culprit.  Arrange for lumbar puncture with interventional radiology to assess for some of the possibilities listed above if negative ischemic disease seems much more likely responsible for spectrum symptoms.     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       Allergies:Review of patient's allergies indicates:  No Known  Allergies    Diet: regular diet    Activities: Commode at bedside. No lifting >10lbs, no bending, no twisting. Rolling walker for ambulation.   Out of bed at least 6 hours per day, can be divided into 2 hour intervals in chair.     Nursing: Do not submerge incision under water. Monitor wound for signs of infection.      Labs:  Per facility protocol      CONSULTS:    Physical Therapy to evaluate and treat.  and Occupational Therapy to evaluate and treat.    WOUND CARE ORDERS  Yes: Surgical Wound:  Location: lumbar spine  Monitor wound daily for signs of infection, drainage, and swelling.   Do NOT submerge incision under water.   OK to shower, but water must hit the body opposite the incision. Pat dry. No scrubbing.     Medications: Review discharge medications with patient and family and provide education.      Current Discharge Medication List      START taking these medications    Details   acetaminophen (TYLENOL) 325 MG tablet Take 2 tablets (650 mg total) by mouth every 6 (six) hours as needed for Temperature greater than (or equal to 101 degree F).  Qty:  , Refills: 0      aluminum-magnesium hydroxide-simethicone (MAALOX) 200-200-20 mg/5 mL Susp Take 30 mLs by mouth every 4 (four) hours as needed.  Qty:        bisacodyL (DULCOLAX) 10 mg Supp Place 1 suppository (10 mg total) rectally daily as needed.  Qty:  , Refills: 0      methocarbamoL (ROBAXIN) 750 MG Tab Take 1 tablet (750 mg total) by mouth 3 (three) times daily. for 10 days  Qty: 30 tablet, Refills: 0      oxyCODONE-acetaminophen (PERCOCET)  mg per tablet Take 1 tablet by mouth every 4 (four) hours as needed (pain 8-10/10).  Qty: 42 tablet, Refills: 0    Comments: Quantity prescribed more than 7 day supply? No      polyethylene glycol (GLYCOLAX) 17 gram PwPk Take 17 g by mouth once daily.  Refills: 0      senna-docusate 8.6-50 mg (PERICOLACE) 8.6-50 mg per tablet Take 2 tablets by mouth nightly as needed for Constipation.  Qty:           CONTINUE  these medications which have NOT CHANGED    Details   amLODIPine (NORVASC) 10 MG tablet Take 1 tablet (10 mg total) by mouth once daily. For blood pressure  Qty: 90 tablet, Refills: 3    Associated Diagnoses: Essential hypertension      atorvastatin (LIPITOR) 80 MG tablet TAKE 1 TABLET (80 MG TOTAL) BY MOUTH ONCE DAILY.  Qty: 90 tablet, Refills: 1    Comments: Please inactivate all prior scripts with same name and strength including on holds.  Associated Diagnoses: Hyperlipidemia, unspecified hyperlipidemia type      lisinopril (PRINIVIL,ZESTRIL) 40 MG tablet Take 1 tablet (40 mg total) by mouth once daily.  Qty: 90 tablet, Refills: 3    Associated Diagnoses: Essential hypertension      tamsulosin (FLOMAX) 0.4 mg Cap TAKE 1 CAPSULE (0.4 MG TOTAL) BY MOUTH ONCE DAILY.  Qty: 90 capsule, Refills: 3    Associated Diagnoses: Urinary frequency      sildenafil (VIAGRA) 100 MG tablet Take 1 tablet (100 mg total) by mouth daily as needed for Erectile Dysfunction.  Qty: 30 tablet, Refills: 2    Associated Diagnoses: Erectile dysfunction, unspecified erectile dysfunction type                  _________________________________  Briana Seals PA-C  08/18/2020

## 2020-08-18 NOTE — NURSING
VSS, NAD, Afebrile, voids without difficulty per urinal. Medical packet handed to transport. IV access dc. Cardiac monitoring dc. Discharged to [facility] via stretcher, surgical mask in place.

## 2020-08-18 NOTE — PT/OT/SLP DISCHARGE
Occupational Therapy Discharge Summary    Robles Jordan  MRN: [id number]   Principal Problem: Tethered cord syndrome      Patient Discharged from acute Occupational Therapy on 8/18/20.  Please refer to prior OT notes for functional status.    Assessment:      Patient appropriate for care in another setting.    Objective:     GOALS:   Multidisciplinary Problems     Occupational Therapy Goals        Problem: Occupational Therapy Goal    Goal Priority Disciplines Outcome Interventions   Occupational Therapy Goal     OT, PT/OT Ongoing, Progressing    Description: Goals to be met by: 8/28/20    Patient will increase functional independence with ADLs by performing:    Feeding with Modified [place].  met  UE Dressing with Modified [place].  LE Dressing with Minimal Assistance and Assistive Devices as needed.  Grooming while standing at the sink with Modified [place].  Modified 8/17  Toileting from bedside commode with Modified [place] and Set-up Assistance for hygiene and clothing management.   Rolling to Bilateral with Stand-by Assistance and use of bedrail as needed.   Supine to sit with Stand-by Assistance and use of bedrail as needed.  Step transfer with Contact Guard Assistance  met  Toilet transfer to bedside commode with Contact Guard Assistance.  Upper extremity exercise program x15 reps per handout, with independence.  Educate the patient re: log roll technique and spinal precautions  met                     Reasons for Discontinuation of Therapy Services  Transfer to alternate level of care.      Plan:     Patient Discharged to: Inpatient Rehab    Soledad Thornton OT  8/18/2020

## 2020-08-18 NOTE — PLAN OF CARE
Problem: Adult Inpatient Plan of Care  Goal: Plan of Care Review  Outcome: Ongoing, Progressing     Problem: Adult Inpatient Plan of Care  Goal: Absence of Hospital-Acquired Illness or Injury  Outcome: Ongoing, Progressing     Problem: Adult Inpatient Plan of Care  Goal: Readiness for Transition of Care  Outcome: Ongoing, Progressing     Problem: Fall Injury Risk  Goal: Absence of Fall and Fall-Related Injury  Outcome: Ongoing, Progressing     Problem: Skin Injury Risk Increased  Goal: Skin Health and Integrity  Outcome: Ongoing, Progressing

## 2020-08-18 NOTE — PROGRESS NOTES
ADT 30 order placed for Van Transportation.  Requested  time: 9:30AM  If transportation does not arrive at ETA time nurse will be instructed to follow protocol for transportation below:   How can I get in touch directly with dispatch, if needed?                 Non-emergent dispatch: [phone]      +++NURSING:  If Van does not arrive at requested time please call the above Non Emergent Dispatcher.  If issue not resolved please escalate to your charge nurse for further instructions.

## 2020-08-19 NOTE — DISCHARGE SUMMARY
[facility]  Neurosurgery  Discharge Summary      Patient Name: Robles Jordan  MRN: [id number]  Admission Date: 8/12/2020  Hospital Length of Stay: 6 days  Discharge Date: 8/18/2020  Attending Physician: Chico Cao DO  Discharging Provider: Briana Seals PA-C  Primary Care Provider: John Jackson MD     HPI:   Mr Jordan is a 69M with worsening gait disturbance and RLE weakness who was found to have evidence of thickened fatty filum on MRI that could represent tethered cord syndrome. He had normal urodynamic studies but last week he had a UTI, which he's never had before. CSF studies were without abnormality. While this does not fit a perfect picture for tethered cord, there are some signs and symptoms consistent with this and the workup to rule other causes has been negative.  The patient and his wife agreed that it is time to pursue tethered cord release to try to relieve some of the symptoms or stop progression. Admitted to [facility] on 8/12/2020 for L5-S1 laminectomy for tethered cord release with Dr. Cao.    Procedure(s) (LRB):  LAMINECTOMY, SPINE, LUMBAR  (L5-S1 laminectomy for tethered cord release) Microscope OPEN lami setup [facility] Microinstruments Neuromonitoring MARIBETH JUSTIN [phone]   (rectal stim, EMG, electrode nerve stimulator) (Bilateral)     Hospital Course:   L5-S1 laminectomy for tethered cord release on 8/12. Bed rest x48 hours. Cortez removed and gradual increase of HOB elevation on 8/14. PT recommending inpatient rehab. Discharged on 8/18 to [facility].      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    Specialty: Neurosurgery  Why: For wound re-check at 1:40 pm  Contact information:  [address] [facility] [address]  [address]  [place] [address]  [phone]                 Patient Instructions:      Diet Adult Regular     Lifting restrictions     Other restrictions (specify):   Order Comments: See discharge instructions     No driving until:     Notify your health care provider if you experience any of the following:  temperature >100.4     Notify your health care provider if you experience any of the following:  persistent nausea and vomiting or diarrhea     Notify your health care provider if you experience any of the following:  severe uncontrolled pain     Notify your health care provider if you experience any of the following:  redness, tenderness, or signs of infection (pain, swelling, redness, odor or green/yellow discharge around incision site)     Notify your health care provider if you experience any of the following:  difficulty breathing or increased cough     Notify your health care provider if you experience any of the following:  severe persistent headache     Notify your health care provider if you experience any of the following:  worsening rash     Notify your health care provider if you experience any of the following:  persistent dizziness, light-headedness, or visual disturbances     Notify your health care provider if you  experience any of the following:  increased confusion or weakness     No dressing needed     Activity as tolerated     Shower on day dressing removed (No bath)     Medications:  Reconciled Home Medications:      Medication List      START taking these medications    acetaminophen 325 MG tablet  Commonly known as: TYLENOL  Take 2 tablets (650 mg total) by mouth every 6 (six) hours as needed for Temperature greater than (or equal to 101 degree F).     aluminum-magnesium hydroxide-simethicone 200-200-20 mg/5 mL Susp  Commonly known as: MAALOX  Take 30 mLs by mouth every 4 (four) hours as needed.     bisacodyL 10 mg Supp  Commonly known as: DULCOLAX  Place 1 suppository (10 mg total) rectally daily as needed.     methocarbamoL 750 MG Tab  Commonly known as: ROBAXIN  Take 1 tablet (750 mg total) by mouth 3 (three) times daily. for 10 days     oxyCODONE-acetaminophen  mg per tablet  Commonly known as: PERCOCET  Take 1 tablet by mouth every 4 (four) hours as needed (pain 8-10/10).     polyethylene glycol 17 gram Pwpk  Commonly known as: GLYCOLAX  Take 17 g by mouth once daily.     senna-docusate 8.6-50 mg 8.6-50 mg per tablet  Commonly known as: PERICOLACE  Take 2 tablets by mouth nightly as needed for Constipation.        CONTINUE taking these medications    amLODIPine 10 MG tablet  Commonly known as: NORVASC  Take 1 tablet (10 mg total) by mouth once daily. For blood pressure     atorvastatin 80 MG tablet  Commonly known as: LIPITOR  TAKE 1 TABLET (80 MG TOTAL) BY MOUTH ONCE DAILY.     lisinopriL 40 MG tablet  Commonly known as: PRINIVIL,ZESTRIL  Take 1 tablet (40 mg total) by mouth once daily.     sildenafiL 100 MG tablet  Commonly known as: VIAGRA  Take 1 tablet (100 mg total) by mouth daily as needed for Erectile Dysfunction.     tamsulosin 0.4 mg Cap  Commonly known as: FLOMAX  TAKE 1 CAPSULE (0.4 MG TOTAL) BY MOUTH ONCE DAILY.            Briana Seals PA-C  Neurosurgery  [facility]

## 2020-08-31 ENCOUNTER — OFFICE VISIT (OUTPATIENT)
Dept: NEUROSURGERY | Facility: CLINIC | Age: 70
End: 2020-08-31
Payer: COMMERCIAL

## 2020-08-31 VITALS
DIASTOLIC BLOOD PRESSURE: 70 MMHG | SYSTOLIC BLOOD PRESSURE: 154 MMHG | WEIGHT: 236.13 LBS | HEART RATE: 91 BPM | HEIGHT: 74 IN | BODY MASS INDEX: 30.31 KG/M2

## 2020-08-31 DIAGNOSIS — Z98.890 S/P LUMBAR LAMINECTOMY: Primary | ICD-10-CM

## 2020-08-31 DIAGNOSIS — Q06.8 TETHERED CORD: ICD-10-CM

## 2020-08-31 PROCEDURE — 99999 PR PBB SHADOW E&M-EST. PATIENT-LVL IV: ICD-10-PCS | Mod: PBBFAC,,, | Performed by: PHYSICIAN ASSISTANT

## 2020-08-31 PROCEDURE — 99024 POSTOP FOLLOW-UP VISIT: CPT | Mod: POP,,, | Performed by: PHYSICIAN ASSISTANT

## 2020-08-31 PROCEDURE — 99024 PR POST-OP FOLLOW-UP VISIT: ICD-10-PCS | Mod: POP,,, | Performed by: PHYSICIAN ASSISTANT

## 2020-08-31 PROCEDURE — 99999 PR PBB SHADOW E&M-EST. PATIENT-LVL IV: CPT | Mod: PBBFAC,,, | Performed by: PHYSICIAN ASSISTANT

## 2020-08-31 PROCEDURE — 99214 OFFICE O/P EST MOD 30 MIN: CPT | Mod: PBBFAC | Performed by: PHYSICIAN ASSISTANT

## 2020-08-31 NOTE — PATIENT INSTRUCTIONS
-Keep incision open to air   -Call when you need a refill of pain medication or muscle relaxers  -Can shower and get incision wet, just pat dry and no vigorous scrubbing. Do not submerge incision for another 4 weeks.   -No lifting more than 10 lbs or excessive bending/twisting.   -Can drive at 6 weeks post-op   -Follow up with Dr. Rodriguez in 4 weeks   -Please call with any questions or concerns prior to your next appointment.

## 2020-08-31 NOTE — PROGRESS NOTES
Wound Check   Neurosurgery     Robles Jordan is a 69 y.o. male who presents to clinic today for 2 week wound check, s/p L5-S1 laminectomy for tethered cord release on 8/12 with Dr. Rodriguez. Following surgery, he remained on bed rest with HOB flat x48 hours. Once allowed out of bed, he complained of significant back pain but demonstrated improved leg strength. PT recommended inpatient rehab. He is scheduled to begin outpatient PT tomorrow.      Denies fevers, chills, night sweats or N/V. Further denies wound drainage or swelling. Pt has been taking Percocet 1x per day with good relief. He continues to take robaxin 3x/day. Most bothersome pain is muscle aches that begin in his low back and radiate throughout his back.     Urination and bowel movements are within normal limits.      Physical Exam:   General: well developed, well nourished, no distress  Neurologic: Alert and oriented. Thought content appropriate.   GCS: Motor: 6/Verbal: 5/Eyes: 4 GCS Total: 15   Mental Status: Awake, Alert, Oriented x3   Cranial nerves: face symmetric, tongue midline, pupils equal, round, reactive to light with accomodation, EOMI.     Motor Strength:  Strength 5/5 BUE and BLE with the exception of 4+/5 R HF, 4+/5 DF     Sensation: response to light touch throughout  Ambulating well with a cane    Incision is clean, dry and intact with no signs of erythema, swelling or purulent drainage. Dissolvable sutures are intact. All skin edges are completely approximated.       Vitals:    08/31/20 1117   BP: (!) 154/70   Pulse: 91             Assessment/Plan:   Robles Jordan is a 69 y.o. male who presents for 2 week wound check, s/p L5-S1 laminectomy for tethered cord release on 8/12 with Dr. Rodriguez. He is doing well since his discharge from the hospital and inpatient rehab. Minimal need for pain medication. Advised that we will decrease the dose if any additional refills are needed. Recommend continuing his muscle relaxer.     -Keep incision  open to air   -Call when you need a refill of pain medication or muscle relaxers  -Can shower and get incision wet, just pat dry and no vigorous scrubbing. Do not submerge incision for another 4 weeks.   -No lifting more than 10 lbs or excessive bending/twisting.   -Can drive at 6 weeks post-op   -Follow up with Dr. Rodriguez in 4 weeks   -Encouraged patient to call if they have any questions or concerns prior to next follow up appt        Briana Seals PA-C  [facility]  Department of Neurosurgery  [phone]

## 2020-09-01 ENCOUNTER — CLINICAL SUPPORT (OUTPATIENT)
Dept: REHABILITATION | Facility: HOSPITAL | Age: 70
End: 2020-09-01
Attending: PHYSICAL MEDICINE & REHABILITATION
Payer: COMMERCIAL

## 2020-09-01 DIAGNOSIS — M54.50 LUMBAR PAIN: ICD-10-CM

## 2020-09-01 DIAGNOSIS — Z74.09 IMPAIRED FUNCTIONAL MOBILITY, BALANCE, GAIT, AND ENDURANCE: Primary | ICD-10-CM

## 2020-09-01 PROCEDURE — 97110 THERAPEUTIC EXERCISES: CPT | Mod: PN

## 2020-09-01 PROCEDURE — 97161 PT EVAL LOW COMPLEX 20 MIN: CPT | Mod: PN

## 2020-09-01 NOTE — PLAN OF CARE
[facility]  Physical Therapy Initial Evaluation    Name: Robles Jordan  Clinic Number: [id number]    Therapy Diagnosis:   Encounter Diagnoses   Name Primary?    Impaired functional mobility, balance, gait, and endurance Yes    Lumbar pain        Physician: Ameena Madison MD    Physician Orders: PT Eval and Treat   Medical Diagnosis from Referral: Z74.09 (ICD-10-CM) - Impaired mobility and ADLs  Evaluation Date: 9/1/2020  Authorization Period Expiration: 12/31/20  Plan of Care Expiration: 11/15/20  Visit # / Visits authorized: 1/ 14    Time In: 10:08 am  Time Out: 10:50 am  Total Billable Time: 42 minutes    Precautions: Standard and Fall     Post-op notes from PA-C:  -No lifting more than 10 lbs or excessive bending/twisting.   -Can drive at 6 weeks post-op     Subjective   Date of onset: post tethered cord release on 8/12/20  History of current condition - Robles reports: Pt is s/p tethered cord release on 8/12/20 performed by Dr. Rodriguez. Attended acute care therapy with OT and PT which assisted pt in transfers, gait, and strengthening. Reports improvements in gait and RLE function since surgery. Still have some difficulty with R ankle DF and hip flexion but no longer has tingling radiating down RLE.       Medical History:   Past Medical History:   Diagnosis Date    BPH (benign prostatic hypertrophy)     Hx of colonic polyps 10/19/2015    Hypertension     Sleep apnea        Surgical History:   Robles Jordan  has a past surgical history that includes Multiple tooth extractions (2014); Colonoscopy (N/A, 10/19/2015); and Cystoscopy with urodynamic testing (N/A, 1/17/2020).    Medications:   Robles has a current medication list which includes the following prescription(s): acetaminophen, aluminum-magnesium hydroxide-simethicone, amlodipine, atorvastatin, bisacodyl, lisinopril, oxycodone-acetaminophen, polyethylene glycol, senna-docusate 8.6-50 mg, sildenafil, and  tamsulosin, and the following Facility-Administered Medications: mupirocin.    Allergies:   Review of patient's allergies indicates:  No Known Allergies     Imaging, None    Prior Therapy: yes prior to surgery, acute PT following  Social History:  lives with their spouse, bedroom is downstairs, 2 [address]  Occupation:    Prior Level of Function: independent   Current Level of Function: mod I, ambulates with use of SPC    Pain:   Current 0/10, worst 5/10, best 0/10   Location: bilateral back, R>L  Description: Dull, Shooting and warm  Aggravating Factors: Standing and Walking  Easing Factors: pain medication    Pts goals: walk without the cane, feel safe with standing, getting stronger      Objective       Observation: incision is clean, dry, and healing nicely. No redness or warmth present.       GAIT DEVIATIONS: Robles ambulates with SPC and displays decrease dorsiflexion and heel strike on R, decreased hip flexion    Lumbar Range of Motion:    % limitation   Flexion Mod     Extension Max     Left Side Bending Max   Right Side Bending Max   Left rotation   NT   Right Rotation   NT    *= pain    Lower Extremity Strength    Right LE  Left LE    Hip flexion: 3+/5 Hip flexion: 4-/5   Knee extension: 4/5 Knee extension: 4+/5   Knee flexion: 4-/5 Knee flexion: 4-/5   Hip IR: NT Hip IR: NT   Hip ER: NT Hip ER: NT   Hip extension:  2/5 Hip extension: 2/5   Hip abduction: 2/5 Hip abduction: 4-/5   Hip adduction: 3+/5 Hip adduction 3+/5   Ankle dorsiflexion: 3+/5 Ankle dorsiflexion: 4/5   Ankle plantarflexion: 3+/5 Ankle plantarflexion: 3+/5     5x sit<>stand: 20 seconds with use of BUE  TU.5 seconds with SPC and use of BUE  SLS: 1 second on R, 3 seconds on L  NBOS eyes open: 30 seconds  NBOS eyes closed: 30 seconds, min sway  Tandem: 30 seconds with LLE in back, 7 seconds with RLE in back     Joint Mobility: mild hypomobility in thoracic    Palpation: no TTP reported    PT Evaluation Completed?  Yes  Discussed Plan of Care with patient: Yes    CMS Impairment/Limitation/Restriction for FOTO Lumbar Survey    Therapist reviewed FOTO scores for Robles Jordan on 9/1/2020.   FOTO documents entered into [employer] - see Media section.    Limitation Score: NV%  Category: Mobility    Current :   Goal:   Discharge:          TREATMENT   Treatment Time In: 10: 35 am  Treatment Time Out: 10:50 am  Total Treatment time separate from Evaluation: 15 minutes    Robles received therapeutic exercises to develop strength, endurance, ROM, flexibility, posture and core stabilization for 15 minutes including:    Standing heel raises x 20  Seated toe raises  Supine DF  Marches in standing  SL clamshells x 20 each  TrA in supine x 10  Sit<>stand x 10    Home Exercises and Patient Education Provided    Education provided:   - Role of PT/POC  -    Written Home Exercises Provided: yes.  Exercises were reviewed and Robles was able to demonstrate them prior to the end of the session.  Robles demonstrated good  understanding of the education provided.     See EMR under Patient Instructions for exercises provided 9/1/2020.    Assessment   Robles is a 69 y.o. male referred to outpatient Physical Therapy with a medical diagnosis of impaired mobility and ADLs. Pt presents s/p L5 laminectomy for tethered cord release on 8/12/20 performed by Dr. Chico Rodriguez. Incision is clean and dry, without redness or warmth present. Pt arrives ambulating with single point cane and gait deviations including: decreased dorsiflexion, heel strike, and hip flexion on R. Pt with limitations to lumbar range of motion, trunk and BLE strength R>L, impaired balance and proprioception, and impaired functional mobility. Pt is at increased risk of falls as evidenced by TUG score of 13 seconds with use of single point cane, single leg stance bilaterally of </= 3 seconds, and tandem stance on R of 7 seconds. Pt is appropriate for PT at this time to address lumbar  strength, balance, gait, and improved functional mobility to return to prior level of function and increase independence in community.     Pt prognosis is Good.   Pt will benefit from skilled outpatient Physical Therapy to address the deficits stated above and in the chart below, provide pt/family education, and to maximize pt's level of independence.     Plan of care discussed with patient: Yes  Pt's spiritual, cultural and educational needs considered and patient is agreeable to the plan of care and goals as stated below:     Anticipated Barriers for therapy: none    Medical Necessity is demonstrated by the following  History  Co-morbidities and personal factors that may impact the plan of care Co-morbidities:   hx of tethered cord syndrome, HTN, sleep apnea, age,    Personal Factors:   age     Mod   Examination  Body Structures and Functions, activity limitations and participation restrictions that may impact the plan of care Body Regions:   back  lower extremities  trunk    Body Systems:    ROM  strength  balance  gait  transfers    Participation Restrictions:   Limitations with gait and balance    Activity limitations:   Learning and applying knowledge  no deficits    General Tasks and Commands  no deficits    Communication  no deficits    Mobility  lifting and carrying objects  walking  driving (bike, car, motorcycle)  stairs    Self care  washing oneself (bathing, drying, washing hands)  dressing  looking after one's health    Domestic Life  cooking  doing house work (cleaning house, washing dishes, laundry)    Interactions/Relationships  no deficits    Life Areas  employment    Community and Social Life  community life         Complexity: low  See FOTO outcome assessment   Clinical Presentation stable and uncomplicated low   Decision Making/ Complexity Score: low     GOALS: Short Term Goals:  5 weeks  1. Report decreased in pain at worse less than  <   / =  4 /10  to increase tolerance for functional  activities  2. Pt to perform 5 time sit<> </= 17 seconds to improve functional mobility.   3. Increased R hip flexor MMT 1/2  to improve gait.  4. Pt to demonstrate TUG score of </= 13 seconds without use of SPC.  5. Pt to tolerate HEP to improve ROM and independence with ADL's  6. Pt to improve range of motion by 25% to allow for improved functional mobility to allow for improvement in IADLs.     Long Term Goals: 10 weeks  1. Report decreased in pain at worse less than  <   / =  2 /10  to increase tolerance for functional mobility.   2 .Pt to perform TUG </= 10 seconds without use of AD.  3. Increased hip flexor and ankle DF MMT 1 grade to increase tolerance for ADL and work activities.  4. Pt will report at NV% or less limitation on FOTO Lumbar  to demonstrate decrease in disability and improvement in back pain.  5. Pt to demonstrate 5 time sit<>stand with single UE support and in </= 15 seconds to improve functional mobility.   6. Pt will perform single leg stance bilaterally for >/= 5 seconds to improve balance for gait and getting into bathtub.  7. Pt will be able to ascend/descend 2 steps without use of single handrail to be able to get into home at decreased fall risk.  8. Pt to improve range of motion by 75% to allow for improved functional mobility to allow for improvement in IADLs.       Plan   Plan of care Certification: 9/1/2020 to 11/15/20.    Outpatient Physical Therapy 2 times weekly for 10 weeks to include the following interventions: Cervical/Lumbar Traction, Gait Training, Manual Therapy, Moist Heat/ Ice, Neuromuscular Re-ed, Patient Education, Therapeutic Activites and Therapeutic Exercise.     Ada Boothe, PT, DPT

## 2020-09-09 ENCOUNTER — CLINICAL SUPPORT (OUTPATIENT)
Dept: REHABILITATION | Facility: HOSPITAL | Age: 70
End: 2020-09-09
Attending: PHYSICAL MEDICINE & REHABILITATION
Payer: COMMERCIAL

## 2020-09-09 DIAGNOSIS — M54.50 LUMBAR PAIN: ICD-10-CM

## 2020-09-09 PROCEDURE — 97110 THERAPEUTIC EXERCISES: CPT | Mod: PN

## 2020-09-09 NOTE — PROGRESS NOTES
"                                                  Physical Therapy Daily Note     Date: 09/09/2020  Name: Robles Jordan  [facility] Number: [id number]  Diagnosis:   Encounter Diagnosis   Name Primary?    Lumbar pain      Physician: Ameena Madison MD    Physician Orders: PT Eval and Treat   Medical Diagnosis from Referral: Z74.09 (ICD-10-CM) - Impaired mobility and ADLs  Evaluation Date: 9/1/2020  Authorization Period Expiration: 12/31/20  Plan of Care Expiration: 11/15/20  Visit # / Visits authorized: 2/ 14     Time In: 1115  Time Out: 1200  Total Billable Time: 42 minutes     Precautions: Standard and Fall      Post-op notes from PA-C:  -No lifting more than 10 lbs or excessive bending/twisting.   -Can drive at 6 weeks post-op       Subjective     Pt reports no pain pre-tx. The evaluation exercises wore him out but he is happy to be here and get stronger and moving. Reports he has more pain at the incision site since the surgery. He tries to back off the muscle relaxers but it wakes him up at night.    Objective       Patient received individual therapy to increase strength, endurance, ROM, flexibility, posture and core stabilization with activities as follows:     Robles received therapeutic exercises to develop strength, endurance, ROM, flexibility, posture and core stabilization for 45 minutes including:     Nustep x5 min  Standing heel raises x 20  Tandem stance 2x30" ea  Seated toe raises x20  Marches in standing 2x10  Sit to stand 24" plyo min A 2x10  TrA in supine x 20x5"  Bridges 2x10  SLR 3x5  SL clamshells x 20 each  hooklying hip adduction 20x5"  Supine hip flexor stretch x1 min B  Gait training: SPC with cane in L hand x2 3 laps    Written Home Exercises Provided: no new exercises provided this session  Pt demo good understanding of the education provided. Robles demonstrated good return demonstration of activities.     Education provided:  Robles verbalized good understanding of education " provided.   No spiritual or educational barriers to learning identified.    Assessment     Pt tolerated first full treatment session well. Added various exercises to improve strength/ROM and endurance. Pt with noticeably decreased strength and endurance RLE compared to L. Pt entered tx ambulating with SPC in E; performed gait training today to improve use of cane and decrease fall risk with cane use on stronger side. Pt with no reports of increased symptoms throughout session. Pt will continue to benefit from skilled PT in order to decrease pain, increase strength/ROM, and improve functional mobility.    GOALS: Short Term Goals:  5 weeks  1. Report decreased in pain at worse less than  <   / =  4 /10  to increase tolerance for functional activities  2. Pt to perform 5 time sit<> </= 17 seconds to improve functional mobility.   3. Increased R hip flexor MMT 1/2  to improve gait.  4. Pt to demonstrate TUG score of </= 13 seconds without use of SPC.  5. Pt to tolerate HEP to improve ROM and independence with ADL's  6. Pt to improve range of motion by 25% to allow for improved functional mobility to allow for improvement in IADLs.      Long Term Goals: 10 weeks  1. Report decreased in pain at worse less than  <   / =  2 /10  to increase tolerance for functional mobility.   2 .Pt to perform TUG </= 10 seconds without use of AD.  3. Increased hip flexor and ankle DF MMT 1 grade to increase tolerance for ADL and work activities.  4. Pt will report at NV% or less limitation on FOTO Lumbar  to demonstrate decrease in disability and improvement in back pain.  5. Pt to demonstrate 5 time sit<>stand with single UE support and in </= 15 seconds to improve functional mobility.   6. Pt will perform single leg stance bilaterally for >/= 5 seconds to improve balance for gait and getting into bathtub.  7. Pt will be able to ascend/descend 2 steps without use of single handrail to be able to get into home at decreased fall  risk.  8. Pt to improve range of motion by 75% to allow for improved functional mobility to allow for improvement in IADLs.        Plan   Continue with established Plan of Care towards PT goals.      Therapist: Magalie James, PT, DPT

## 2020-09-15 ENCOUNTER — CLINICAL SUPPORT (OUTPATIENT)
Dept: REHABILITATION | Facility: HOSPITAL | Age: 70
End: 2020-09-15
Attending: PHYSICAL MEDICINE & REHABILITATION
Payer: COMMERCIAL

## 2020-09-15 DIAGNOSIS — Z74.09 IMPAIRED FUNCTIONAL MOBILITY, BALANCE, GAIT, AND ENDURANCE: Primary | ICD-10-CM

## 2020-09-15 DIAGNOSIS — M54.50 LUMBAR PAIN: ICD-10-CM

## 2020-09-15 PROCEDURE — 97110 THERAPEUTIC EXERCISES: CPT | Mod: PN

## 2020-09-15 NOTE — PROGRESS NOTES
"                                                    Physical Therapy Daily Note     Date: 09/15/2020  Name: Robles Jordan  [facility] Number: [id number]  Diagnosis:   Encounter Diagnoses   Name Primary?    Lumbar pain     Impaired functional mobility, balance, gait, and endurance Yes       Physician: Ameena Madison MD    Physician Orders: PT Eval and Treat   Medical Diagnosis from Referral: Z74.09 (ICD-10-CM) - Impaired mobility and ADLs  Evaluation Date: 9/1/2020  Authorization Period Expiration: 12/31/20  Plan of Care Expiration: 11/15/20  Visit # / Visits authorized: 3/ 14     Time In: 10:30 am  Time Out: 11:33 am  Total Billable Time: 53 minutes     Precautions: Standard and Fall      Post-op notes from PA-C:  -No lifting more than 10 lbs or excessive bending/twisting.   -Can drive at 6 weeks post-op       Subjective     Pt reports: some tenderness above incision with some swelling noted. States swelling increases when sitting but is relieved with laying down. Pt has noticed some improvement with DF and hip flexion following surgery.    Objective       Patient received individual therapy to increase strength, endurance, ROM, flexibility, posture and core stabilization with activities as follows:     Robles received therapeutic exercises to develop strength, endurance, ROM, flexibility, posture and core stabilization for 53 minutes including:     Nustep x5 min  LTR with limited ROM x 20  Standing heel raises x 30  Tandem stance 2x30" ea  Seated toe raises x30  Marches in standing 2x10  Sit to stand 24" plyo 2x10  TrA in supine x 20x5"  Bridges 2x10  SLR 3x5  SL clamshells x 20 each  hooklying hip adduction 20x5"  Supine hip flexor stretch x1 min B  Shuttle squats with 1 black cord 3 x 10  Shuttle single leg squat with RLE 2 x 10      Gait training: SPC with cane in L hand x2 3 laps- nP    Written Home Exercises Provided: no new exercises provided this session  Pt demo good understanding of the education " provided. Arboleda demonstrated good return demonstration of activities.     Education provided:  Arboleda verbalized good understanding of education provided.   No spiritual or educational barriers to learning identified.    Assessment     Pt tolerated first full treatment session well. Arrived ambulating with SPC on R. Discussed proper gait sequence with SPC on L to assist in WB on R. Pt with noted swelling proximal to incision site with some tenderness to palpation. Addition of shuttle to improve hip strengthening and single increase of lumbar and hip mobility. Increase in select strengthening exercises with good tolerance. Use of cold pack post treatment session to decrease muscle soreness and pain to lumbar. Pt will continue to benefit from skilled PT in order to decrease pain, increase strength/ROM, and improve functional mobility.    GOALS: Short Term Goals:  5 weeksProgressing  1. Report decreased in pain at worse less than  <   / =  4 /10  to increase tolerance for functional activities  2. Pt to perform 5 time sit<> </= 17 seconds to improve functional mobility.   3. Increased R hip flexor MMT 1/2  to improve gait.  4. Pt to demonstrate TUG score of </= 13 seconds without use of SPC.  5. Pt to tolerate HEP to improve ROM and independence with ADL's  6. Pt to improve range of motion by 25% to allow for improved functional mobility to allow for improvement in IADLs.      Long Term Goals: 10 weeks  1. Report decreased in pain at worse less than  <   / =  2 /10  to increase tolerance for functional mobility.   2 .Pt to perform TUG </= 10 seconds without use of AD.  3. Increased hip flexor and ankle DF MMT 1 grade to increase tolerance for ADL and work activities.  4. Pt will report at NV% or less limitation on FOTO Lumbar  to demonstrate decrease in disability and improvement in back pain.  5. Pt to demonstrate 5 time sit<>stand with single UE support and in </= 15 seconds to improve functional  mobility.   6. Pt will perform single leg stance bilaterally for >/= 5 seconds to improve balance for gait and getting into bathtub.  7. Pt will be able to ascend/descend 2 steps without use of single handrail to be able to get into home at decreased fall risk.  8. Pt to improve range of motion by 75% to allow for improved functional mobility to allow for improvement in IADLs.        Plan   Continue with established Plan of Care towards PT goals.      Therapist: Ada Boothe, PT, DPT

## 2020-09-17 ENCOUNTER — CLINICAL SUPPORT (OUTPATIENT)
Dept: REHABILITATION | Facility: HOSPITAL | Age: 70
End: 2020-09-17
Attending: PHYSICAL MEDICINE & REHABILITATION
Payer: COMMERCIAL

## 2020-09-17 DIAGNOSIS — M54.50 LUMBAR PAIN: ICD-10-CM

## 2020-09-17 PROCEDURE — 97110 THERAPEUTIC EXERCISES: CPT | Mod: PN

## 2020-09-17 NOTE — PROGRESS NOTES
"                                                    Physical Therapy Daily Note     Date: 09/17/2020  Name: Robles Jordan  [facility] Number: [id number]  Diagnosis:   Encounter Diagnosis   Name Primary?    Lumbar pain        Physician: Ameena Madison MD    Physician Orders: PT Eval and Treat   Medical Diagnosis from Referral: Z74.09 (ICD-10-CM) - Impaired mobility and ADLs  Evaluation Date: 9/1/2020  Authorization Period Expiration: 12/31/20  Plan of Care Expiration: 11/15/20  Visit # / Visits authorized: 4/ 14     Time In: 1200  Time Out: 1257 (10 min CP)  Total Billable Time: 47 minutes     Precautions: Standard and Fall      Post-op notes from PA-C:  -No lifting more than 10 lbs or excessive bending/twisting.   -Can drive at 6 weeks post-op       Subjective     Pt reports: some tenderness above incision with some swelling noted. States swelling increases when sitting but is relieved with laying down. Pt has noticed some improvement with DF and hip flexion following surgery.    Objective       Patient received individual therapy to increase strength, endurance, ROM, flexibility, posture and core stabilization with activities as follows:     Robles received therapeutic exercises to develop strength, endurance, ROM, flexibility, posture and core stabilization for 47 minutes including:     Nustep x5 min level 2  LTR with limited ROM x 20 NP  Standing heel raises 2x10  Tandem stance 2x30" ea  Seated toe raises x30 NP  Marches in standing 2x10  Sit to stand 24" plyo 2x10 NP  TrA in supine x 20x5"  Bridges 2x10  SLR 2x10 alternating  SL clamshells x 20 each  hooklying hip adduction 20x5" NP  Supine hip flexor stretch x1 min B  Shuttle squats with 2 black cord 2 x 10  Shuttle single leg squat with RLE 1 black cord 2 x 10      Gait training: SPC with cane in L hand x2 3 laps- NP    Pt received cold back to low back x10 min post tx.    Written Home Exercises Provided: no new exercises provided this session  Pt demo " good understanding of the education provided. Arboleda demonstrated good return demonstration of activities.     Education provided: MICHELLE Arboleda verbalized good understanding of education provided.   No spiritual or educational barriers to learning identified.    Assessment     Pt tolerated treatment session well. Pt entered clinic ambulating with improved gait mechanics and SPC in LUE. Pt with swelling surrounding incision so cold pack used at end of treatment to decrease inflammation and pain. Progressed LE strengthening with increased tension on shuttle press. Pt required verbal and tactile cues to prevent genu valgus on SL shuttle press and for full knee extension. Pt will continue to benefit from skilled PT in order to decrease pain, increase strength/ROM, and improve functional mobility.    GOALS: Short Term Goals:  5 weeksProgressing  1. Report decreased in pain at worse less than  <   / =  4 /10  to increase tolerance for functional activities  2. Pt to perform 5 time sit<> </= 17 seconds to improve functional mobility.   3. Increased R hip flexor MMT 1/2  to improve gait.  4. Pt to demonstrate TUG score of </= 13 seconds without use of SPC.  5. Pt to tolerate HEP to improve ROM and independence with ADL's  6. Pt to improve range of motion by 25% to allow for improved functional mobility to allow for improvement in IADLs.      Long Term Goals: 10 weeks  1. Report decreased in pain at worse less than  <   / =  2 /10  to increase tolerance for functional mobility.   2 .Pt to perform TUG </= 10 seconds without use of AD.  3. Increased hip flexor and ankle DF MMT 1 grade to increase tolerance for ADL and work activities.  4. Pt will report at NV% or less limitation on FOTO Lumbar  to demonstrate decrease in disability and improvement in back pain.  5. Pt to demonstrate 5 time sit<>stand with single UE support and in </= 15 seconds to improve functional mobility.   6. Pt will perform single leg stance  bilaterally for >/= 5 seconds to improve balance for gait and getting into bathtub.  7. Pt will be able to ascend/descend 2 steps without use of single handrail to be able to get into home at decreased fall risk.  8. Pt to improve range of motion by 75% to allow for improved functional mobility to allow for improvement in IADLs.        Plan   Continue with established Plan of Care towards PT goals.      Therapist: Magalie James, PT, DPT

## 2020-09-22 ENCOUNTER — CLINICAL SUPPORT (OUTPATIENT)
Dept: REHABILITATION | Facility: HOSPITAL | Age: 70
End: 2020-09-22
Attending: PHYSICAL MEDICINE & REHABILITATION
Payer: COMMERCIAL

## 2020-09-22 DIAGNOSIS — M54.50 LUMBAR PAIN: ICD-10-CM

## 2020-09-22 PROCEDURE — 97110 THERAPEUTIC EXERCISES: CPT | Mod: PN

## 2020-09-22 NOTE — PROGRESS NOTES
"                                                    Physical Therapy Daily Note     Date: 09/22/2020  Name: Robles Jordan  [facility] Number: [id number]  Diagnosis:   Encounter Diagnosis   Name Primary?    Lumbar pain        Physician: Ameena Madison MD    Physician Orders: PT Eval and Treat   Medical Diagnosis from Referral: Z74.09 (ICD-10-CM) - Impaired mobility and ADLs  Evaluation Date: 9/1/2020  Authorization Period Expiration: 12/31/20  Plan of Care Expiration: 11/15/20  Visit # / Visits authorized: 5/ 14     Time In: 10:45 am  Time Out: 11:35 am  Total Billable Time: 40 minutes     Precautions: Standard and Fall      Post-op notes from PA-C:  -No lifting more than 10 lbs or excessive bending/twisting.   -Can drive at 6 weeks post-op       Subjective     Pt reports: He has some discomfort in R hip, denies any significant low back pain. Continues to have occasional swelling above incision site(none observed upon arrival today).  Objective       Patient received individual therapy to increase strength, endurance, ROM, flexibility, posture and core stabilization with activities as follows:     Robles received therapeutic exercises to develop strength, endurance, ROM, flexibility, posture and core stabilization for 40 minutes including:     Nustep x5 min level 2  LTR with limited ROM x 20 NP  Standing heel raises 2x10  Tandem stance 2x30" ea  Seated toe raises x30 NP  Marches in standing 3x10  Sit to stand 24" plyo 2x10 NP  TrA in supine x 20x5"  Bridges 3x10  SLR 2x10 alternating  SL clamshells x 30 each  hooklying hip adduction 20x5" NP  Supine hip flexor stretch x1 min B  Shuttle squats with 2 black cord 3 x 10  Shuttle single leg squat with RLE 1 black cord 3 x 10        Gait training: SPC with cane in L hand x2 3 laps- NP    Pt received cold back to low back x10 min post tx.    Written Home Exercises Provided: no new exercises provided this session  Pt demo good understanding of the education provided. " Robles demonstrated good return demonstration of activities.     Education provided: MICHELLE  Robles verbalized good understanding of education provided.   No spiritual or educational barriers to learning identified.    Assessment     Pt tolerated treatment session well. Pt entered clinic ambulating with improved gait mechanics and SPC in LUE. No swelling around incision noted today. Progressed repetitions on select exercises and increased resistance to progress hip strengthening with appropriate muscle fatigue following treatment. Pt ended treatment on cold pack to decrease inflammation, pain, and soreness following treatment. Pt will continue to benefit from skilled PT in order to decrease pain, increase strength/ROM, and improve functional mobility.    GOALS: Short Term Goals:  5 weeksProgressing  1. Report decreased in pain at worse less than  <   / =  4 /10  to increase tolerance for functional activities  2. Pt to perform 5 time sit<> </= 17 seconds to improve functional mobility.   3. Increased R hip flexor MMT 1/2  to improve gait.  4. Pt to demonstrate TUG score of </= 13 seconds without use of SPC.  5. Pt to tolerate HEP to improve ROM and independence with ADL's  6. Pt to improve range of motion by 25% to allow for improved functional mobility to allow for improvement in IADLs.      Long Term Goals: 10 weeks  1. Report decreased in pain at worse less than  <   / =  2 /10  to increase tolerance for functional mobility.   2 .Pt to perform TUG </= 10 seconds without use of AD.  3. Increased hip flexor and ankle DF MMT 1 grade to increase tolerance for ADL and work activities.  4. Pt will report at NV% or less limitation on FOTO Lumbar  to demonstrate decrease in disability and improvement in back pain.  5. Pt to demonstrate 5 time sit<>stand with single UE support and in </= 15 seconds to improve functional mobility.   6. Pt will perform single leg stance bilaterally for >/= 5 seconds to improve  balance for gait and getting into bathtub.  7. Pt will be able to ascend/descend 2 steps without use of single handrail to be able to get into home at decreased fall risk.  8. Pt to improve range of motion by 75% to allow for improved functional mobility to allow for improvement in IADLs.        Plan   Continue with established Plan of Care towards PT goals.      Ada Boothe, PT, DPT

## 2020-09-24 ENCOUNTER — CLINICAL SUPPORT (OUTPATIENT)
Dept: REHABILITATION | Facility: HOSPITAL | Age: 70
End: 2020-09-24
Attending: PHYSICAL MEDICINE & REHABILITATION
Payer: COMMERCIAL

## 2020-09-24 DIAGNOSIS — M54.50 LUMBAR PAIN: ICD-10-CM

## 2020-09-24 DIAGNOSIS — Z74.09 IMPAIRED FUNCTIONAL MOBILITY, BALANCE, GAIT, AND ENDURANCE: Primary | ICD-10-CM

## 2020-09-24 PROCEDURE — 97110 THERAPEUTIC EXERCISES: CPT | Mod: PN

## 2020-09-24 NOTE — PROGRESS NOTES
"                                                    Physical Therapy Daily Note     Date: 09/24/2020  Name: Robles Jordan  [facility] Number: [id number]  Diagnosis:   Encounter Diagnoses   Name Primary?    Lumbar pain     Impaired functional mobility, balance, gait, and endurance Yes       Physician: Ameena Madison MD    Physician Orders: PT Eval and Treat   Medical Diagnosis from Referral: Z74.09 (ICD-10-CM) - Impaired mobility and ADLs  Evaluation Date: 9/1/2020  Authorization Period Expiration: 12/31/20  Plan of Care Expiration: 11/15/20  Visit # / Visits authorized: 6/ 14     Time In: 11:15 am  Time Out: 12:10 pm  Total Billable Time: 45 minutes     Precautions: Standard and Fall      Post-op notes from PA-C:  -No lifting more than 10 lbs or excessive bending/twisting.   -Can drive at 6 weeks post-op       Subjective     Pt reports: He has some tightness in anterior hips bilaterally and some discomfort in R hip. Denies any back pain. Continues to have some occasional swelling above incision when sitting too long. Has been working on moving around more to prevent swelling.   Objective       Patient received individual therapy to increase strength, endurance, ROM, flexibility, posture and core stabilization with activities as follows:     Robles received therapeutic exercises to develop strength, endurance, ROM, flexibility, posture and core stabilization for 45 minutes including:     Nustep x7 min level 3  LTR with limited ROM x 20 NP  Standing heel raises 2x10  Tandem stance 2x30" ea  Seated toe raises x30 NP  Marches in standing 3x10  Sit to stand 24" plyo 2x10 NP  TrA in supine x 20x5"  Bridges 3x10  SAQ 2 x 10 each with 3" hold  SLR 2x10 alternating  SL clamshells RTB x 30 each  hooklying hip adduction 20x5" NP  Supine hip flexor stretch x1 min B  Shuttle squats with 2 black cord 3 x 10-NP  Shuttle single leg squat with RLE 1 black cord 3 x 10  Shuttle heel raises 1 cord x 20  Standing hip flexion over " small cone x 10 each        Gait training: SPC with cane in L hand x2 3 laps- NP    Pt received cold back to low back x10 min post tx.    Written Home Exercises Provided: no new exercises provided this session  Pt demo good understanding of the education provided. Robles demonstrated good return demonstration of activities.     Education provided: MICHELLE Arboleda verbalized good understanding of education provided.   No spiritual or educational barriers to learning identified.    Assessment     Pt tolerated treatment session well. Pt entered clinic ambulating with improved gait mechanics and SPC in LUE. No swelling around incision noted today. Pt reports some improvement with dorsiflexion while ambulating outside the clinic, says he does not drag his R toe anymore. Addition of SAQ after noting some extensor lag with SLR today. Pt ended treatment on cold pack to decrease inflammation, pain, and soreness following treatment. Pt will continue to benefit from skilled PT in order to decrease pain, increase strength/ROM, and improve functional mobility.    GOALS: Short Term Goals:  5 weeksProgressing  1. Report decreased in pain at worse less than  <   / =  4 /10  to increase tolerance for functional activities  2. Pt to perform 5 time sit<> </= 17 seconds to improve functional mobility.   3. Increased R hip flexor MMT 1/2  to improve gait.  4. Pt to demonstrate TUG score of </= 13 seconds without use of SPC.  5. Pt to tolerate HEP to improve ROM and independence with ADL's  6. Pt to improve range of motion by 25% to allow for improved functional mobility to allow for improvement in IADLs.      Long Term Goals: 10 weeks  1. Report decreased in pain at worse less than  <   / =  2 /10  to increase tolerance for functional mobility.   2 .Pt to perform TUG </= 10 seconds without use of AD.  3. Increased hip flexor and ankle DF MMT 1 grade to increase tolerance for ADL and work activities.  4. Pt will report at NV%  or less limitation on FOTO Lumbar  to demonstrate decrease in disability and improvement in back pain.  5. Pt to demonstrate 5 time sit<>stand with single UE support and in </= 15 seconds to improve functional mobility.   6. Pt will perform single leg stance bilaterally for >/= 5 seconds to improve balance for gait and getting into bathtub.  7. Pt will be able to ascend/descend 2 steps without use of single handrail to be able to get into home at decreased fall risk.  8. Pt to improve range of motion by 75% to allow for improved functional mobility to allow for improvement in IADLs.        Plan   Continue with established Plan of Care towards PT goals.      Ada Boothe, PT, DPT

## 2020-09-28 ENCOUNTER — OFFICE VISIT (OUTPATIENT)
Dept: NEUROSURGERY | Facility: CLINIC | Age: 70
End: 2020-09-28
Payer: COMMERCIAL

## 2020-09-28 VITALS
OXYGEN SATURATION: 99 % | SYSTOLIC BLOOD PRESSURE: 140 MMHG | HEART RATE: 79 BPM | DIASTOLIC BLOOD PRESSURE: 71 MMHG | WEIGHT: 241.38 LBS | TEMPERATURE: 98 F | BODY MASS INDEX: 30.99 KG/M2

## 2020-09-28 DIAGNOSIS — Z98.890 S/P LUMBAR LAMINECTOMY: Primary | ICD-10-CM

## 2020-09-28 PROCEDURE — 99024 POSTOP FOLLOW-UP VISIT: CPT | Mod: S$GLB,,, | Performed by: NEUROLOGICAL SURGERY

## 2020-09-28 PROCEDURE — 99999 PR PBB SHADOW E&M-EST. PATIENT-LVL III: ICD-10-PCS | Mod: PBBFAC,,, | Performed by: NEUROLOGICAL SURGERY

## 2020-09-28 PROCEDURE — 99024 PR POST-OP FOLLOW-UP VISIT: ICD-10-PCS | Mod: S$GLB,,, | Performed by: NEUROLOGICAL SURGERY

## 2020-09-28 PROCEDURE — 99999 PR PBB SHADOW E&M-EST. PATIENT-LVL III: CPT | Mod: PBBFAC,,, | Performed by: NEUROLOGICAL SURGERY

## 2020-09-28 NOTE — PROGRESS NOTES
CHIEF COMPLAINT:  4-6 week follow-up    HPI:    Robles Jordan is a 69 y.o.-year-old male who presents today for post-operative follow-up s/p open L5-S1 lami for tethered cord release on 8/12/20 for progressively worsening right LE weakness.    Patient reports that he is doing well.  He has no back or leg pain.  His only concern is that he has some swelling above his incision when he sits down.  He denies any postural headaches.  He still has some weakness in the right leg and foot however it is continues to get stronger with each physical therapy session.  He walks with a cane but is not sure that he actually needs it.    ROS:  As stated in the above HPI    PE:  Vitals:    09/28/20 1456   BP: (!) 140/71   Pulse: 79   Temp: 98 °F (36.7 °C)       AAOX3  NAD  CN 2-12 intact     Strength:      Deltoids Biceps Triceps Wrist Ext. Wrist Flex. Hand    RUE 5 5 5 5 5 5   LUE 5 5 5 5 5 5    Hip Flex. Knee Flex. Knee Ext. Dorsi Flex Plantar Flex EHL   RLE 4/5 5 5 5 5 5   LLE 5 5 5 5 5 5     Sensation:  Intact to light touch (All 4 extremities)    Gait:  Stable, uses cane    Lumbar incision:  Well healed, some scar tissue above incision    IMAGING:  No new imaging      ASSESSMENT:   Problem List Items Addressed This Visit        Orthopedic    S/P lumbar laminectomy - Primary        S/p  open L5-S1 lami for tethered cord release for progressively worsening right LE weakness.  Right lower extremity weakness has improved.  His wound is healed well with some mild scar tissue above the incision.  He is working well with physical therapy and improving in strength and gait stability.  Overall he is significantly improved from preop.    PLAN:   - Ok to advance activities as tolerated (ok to lift > 10 lbs, gradual return to exercise, avoid extreme twisting/bending or overly strenuous activities if possible)  - Ok to swim  - RTC in 3 months  - Cont PT

## 2020-09-29 ENCOUNTER — CLINICAL SUPPORT (OUTPATIENT)
Dept: REHABILITATION | Facility: HOSPITAL | Age: 70
End: 2020-09-29
Attending: PHYSICAL MEDICINE & REHABILITATION
Payer: COMMERCIAL

## 2020-09-29 DIAGNOSIS — M54.50 LUMBAR PAIN: ICD-10-CM

## 2020-09-29 PROCEDURE — 97110 THERAPEUTIC EXERCISES: CPT | Mod: PN

## 2020-09-29 NOTE — PROGRESS NOTES
Physical Therapy Daily Note     Date: 2020  Name: Robles Jordan  [facility] Number: [id number]  Diagnosis:   Encounter Diagnosis   Name Primary?    Lumbar pain        Physician: Ameena Madison MD    Physician Orders: PT Eval and Treat   Medical Diagnosis from Referral: Z74.09 (ICD-10-CM) - Impaired mobility and ADLs  Evaluation Date: 2020  Authorization Period Expiration: 20  Plan of Care Expiration: 11/15/20  Visit # / Visits authorized:      Time In: 12:15 pm  Time Out: 1:15 pm  Total Billable Time: 50 minutes     Precautions: Standard and Fall        Subjective     Pt reports: He denies any low back pain today but states his legs feel fatigued after running errands yesterday. Pt reports physician cleared him to start lifting more than 10 lbs and return to driving.        Objective     GAIT DEVIATIONS: Robles ambulates with SPC and displays decrease dorsiflexion and heel strike on R, decreased hip flexion     Lumbar Range of Motion:     % limitation   Flexion Min      Extension Mod- stiff on R      Left Side Bending Mod   Right Side Bending Mod   Left rotation    max   Right Rotation    max    *= pain     Lower Extremity Strength     Right LE  eval 20 Left LE  eval 20   Hip flexion: 3+/5 3+/5 Hip flexion: 4-/5 4/5   Knee extension: 4/5 4+/5 Knee extension: 4+/5 4+/5   Knee flexion: 4-/5 4/5 Knee flexion: 4-/5 4/5   Hip extension:  2/5 2+/5 Hip extension: 2/5 2+/5   Hip abduction: 2/5 3-/5 Hip abduction: 4-/5 4-/5   Hip adduction: 3+/5 3-/5 Hip adduction 3+/5 3+/5   Ankle dorsiflexion: 3+/5 3+/5 Ankle dorsiflexion: 4/5 4+/5   Ankle plantarflexion: 3+/5 4-/5 Ankle plantarflexion: 3+/5 4/5      5x sit<>stand: 17 seconds with use of BUE  TU seconds with SPC and use of Single UE to push up from chair  SLS: 1 second on R, 7 seconds on L    Robles received therapeutic exercises to develop strength, endurance, ROM, flexibility,  "posture and core stabilization for 50 minutes including:     Nustep x7 min level 3    Standing heel raises 2x10  Tandem stance 2x30" ea  Marches in standing 3x10  TrA in supine x 20x5"  Bridges 3x10  SAQ 2 x 10 each with 3" hold  SLR 2x10 alternating  SL clamshells RTB x 30 each  hooklying hip adduction 20x5  Supine hip flexor stretch x1 min B  Standing hip flexion over small cone x 10 each  Standing hip ext/abd with YTB x 12 each    NP today due to Reassessment:  Shuttle squats with 2 black cord 3 x 10-NP  Shuttle single leg squat with RLE 1 black cord 3 x 10  Shuttle heel raises 1 cord x 20-NP  LTR with limited ROM x 20 NP  Sit to stand 24" plyo 2x10 NP  Seated toe raises x30 NP    Gait training: SPC with cane in L hand x2 3 laps- NP    Pt received cold back to low back x10 min post tx.    Written Home Exercises Provided: no new exercises provided this session  Pt demo good understanding of the education provided. Robles demonstrated good return demonstration of activities.     Education provided: MICHELLE Arboleda verbalized good understanding of education provided.   No spiritual or educational barriers to learning identified.    Assessment     Pt tolerated treatment session fair. Pt was reassessed by physical therapist today, assessment period: 9/1/20-9/29/20. Pt has attended 8 treatment sessions since initial evaluation and is compliant with home exercise program. Ambulates into clinic with SPC and displays flat foot at contact, decrease hip flexion on R, and increased lateral trunk flexion when fatigued to progress RLE forward. Pt presents with significant hip weakness bilaterally, minimal improvements to hip abduction and extension but continues to effect functional mobility. Pt states on a daily basis his back pain is not significant but has increased pain today and yesterday after running errands(physician cleared him for more activity yesterday). Improvement to gait speed, pt performed TUG in 12 seconds " with use of SPC. He has difficulty with standing exercises due to poor extensor strength, requires frequent rest breaks between exercises. Pt remains appropriate for physical therapy to progress hip, core and lumbar strengthening following surgery.     GOALS: Short Term Goals:  5 weeksProgressing  1. Report decreased in pain at worse less than  <   / =  4 /10  to increase tolerance for functional activities. Ongoing, continues to report pain of 6-7/10  2. Pt to perform 5 time sit<> </= 17 seconds to improve functional mobility. Met 9/29/20  3. Increased R hip flexor MMT 1/2  to improve gait.ongoing  4. Pt to demonstrate TUG score of </= 13 seconds without use of SPC. Met 9/29/20  5. Pt to tolerate HEP to improve ROM and independence with ADL's. Met 9/29/20  6. Pt to improve range of motion by 25% to allow for improved functional mobility to allow for improvement in IADLs. Met 9/29/20     Long Term Goals: 10 weeksProgressing  1. Report decreased in pain at worse less than  <   / =  2 /10  to increase tolerance for functional mobility.   2 .Pt to perform TUG </= 10 seconds without use of AD.  3. Increased hip flexor and ankle DF MMT 1 grade to increase tolerance for ADL and work activities.  4. Pt will report at NV% or less limitation on FOTO Lumbar  to demonstrate decrease in disability and improvement in back pain.  5. Pt to demonstrate 5 time sit<>stand with single UE support and in </= 15 seconds to improve functional mobility.   6. Pt will perform single leg stance bilaterally for >/= 5 seconds to improve balance for gait and getting into bathtub.  7. Pt will be able to ascend/descend 2 steps without use of single handrail to be able to get into home at decreased fall risk.  8. Pt to improve range of motion by 75% to allow for improved functional mobility to allow for improvement in IADLs.        Plan   Continue with established Plan of Care towards PT goals.      Ada Boothe, PT, DPT

## 2020-10-01 ENCOUNTER — CLINICAL SUPPORT (OUTPATIENT)
Dept: REHABILITATION | Facility: HOSPITAL | Age: 70
End: 2020-10-01
Payer: COMMERCIAL

## 2020-10-01 DIAGNOSIS — M54.50 LUMBAR PAIN: ICD-10-CM

## 2020-10-01 DIAGNOSIS — Z74.09 IMPAIRED FUNCTIONAL MOBILITY, BALANCE, GAIT, AND ENDURANCE: Primary | ICD-10-CM

## 2020-10-01 PROCEDURE — 97110 THERAPEUTIC EXERCISES: CPT | Mod: PN

## 2020-10-01 NOTE — PROGRESS NOTES
Physical Therapy Daily Note     Date: 10/01/2020  Name: Robles Jordan  [facility] Number: [id number]  Diagnosis:   Encounter Diagnoses   Name Primary?    Lumbar pain     Impaired functional mobility, balance, gait, and endurance Yes       Physician: Ameena Madison MD    Physician Orders: PT Eval and Treat   Medical Diagnosis from Referral: Z74.09 (ICD-10-CM) - Impaired mobility and ADLs  Evaluation Date: 9/1/2020  Authorization Period Expiration: 12/31/20  Plan of Care Expiration: 11/15/20  Visit # / Visits authorized: 8/ 14     Time In: 1115   Time Out: 1200  Total Billable Time: 45 minutes     Precautions: Standard and Fall  Ok to advance activities as tolerated (ok to lift > 10 lbs, gradual return to exercise, avoid extreme twisting/bending or overly strenuous activities if possible)    Subjective     Pt reports: He is doing well. Weakness remains biggest complaint. He feels most fatigue in evening time. 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  Response to previous treatment: muscle fatigue in B LE  Functional change: improving ADL's    Pain: 0/10   Location: back and right LE       Objective     GAIT DEVIATIONS: Robles ambulates with SPC and displays decrease dorsiflexion and heel strike on R, decreased hip flexion     Lumbar Range of Motion:     % limitation   Flexion Min      Extension Mod- stiff on R      Left Side Bending Mod   Right Side Bending Mod   Left rotation    max   Right Rotation    max    *= pain     Lower Extremity Strength     Right LE  eval 9/29/20 Left LE  eval 9/29/20   Hip flexion: 3+/5 3+/5 Hip flexion: 4-/5 4/5   Knee extension: 4/5 4+/5 Knee extension: 4+/5 4+/5   Knee flexion: 4-/5 4/5 Knee flexion: 4-/5 4/5   Hip extension:  2/5 2+/5 Hip extension: 2/5 2+/5   Hip abduction: 2/5 3-/5 Hip abduction: 4-/5 4-/5   Hip adduction: 3+/5 3-/5 Hip adduction 3+/5 3+/5   Ankle  "dorsiflexion: 3+/5 3+/5 Ankle dorsiflexion: 4/5 4+/5   Ankle plantarflexion: 3+/5 4-/5 Ankle plantarflexion: 3+/5 4/5      5x sit<>stand: 17 seconds with use of BUE  TU seconds with SPC and use of Single UE to push up from chair  SLS: 1 second on R, 7 seconds on L    Robles received therapeutic exercises to develop strength, endurance, ROM, flexibility, posture and core stabilization for 50 minutes including:     Nustep x 7 min level 4    Standing heel raises 3x10  Marches in standing 3x10  +Mini Squat in parallel bars 2x10  +Step-up 4" 2x10 ea LE  Standing hip ext/abd with YTB 2x10 each  Bridges RTB 3x10  SL clamshells RTB x 30 each    Tandem stance 2x30" ea  TrA in supine x 20x5"  SAQ 2 x 10 each with 3" hold  SLR 2x10 alternating    hooklying hip adduction 20x5  Supine hip flexor stretch x1 min B  Standing hip flexion over small cone x 10 each    NP today due to Reassessment:  Shuttle squats with 2 black cord 3 x 10-NP  Shuttle single leg squat with RLE 1 black cord 3 x 10  Shuttle heel raises 1 cord x 20-NP  LTR with limited ROM x 20 NP  Sit to stand 24" plyo 2x10 NP  Seated toe raises x30 NP    Gait training: SPC with cane in L hand x2 3 laps- NP    Robles received manual therapy to lower back for 5 minutes including:   Scar Tissue mobilization    Pt received cold back to low back x10 min post tx.    Written Home Exercises Provided: no new exercises provided this session  Pt demo good understanding of the education provided. Robles demonstrated good return demonstration of activities.     Education provided: MICHELLE rAboleda verbalized good understanding of education provided.   No spiritual or educational barriers to learning identified.    Assessment     Pt tolerated treatment session well. Frequent rest breaks provided between weight-bearing activities due to generalized fatigue. Cueing provided for proper stand -> sit transfer, with use of UE on seated surface/arm rest for reduced risk of falls; Pt " understood and agreed. Increased repetitions on exercise performed for progression of muscular endurance and generalized conditioning. Pt remains appropriate for physical therapy to progress hip, core and lumbar strengthening following surgery.     GOALS: Short Term Goals:  5 weeksProgressing  1. Report decreased in pain at worse less than  <   / =  4 /10  to increase tolerance for functional activities. Ongoing, continues to report pain of 6-7/10  2. Pt to perform 5 time sit<> </= 17 seconds to improve functional mobility. Met 9/29/20  3. Increased R hip flexor MMT 1/2  to improve gait.ongoing  4. Pt to demonstrate TUG score of </= 13 seconds without use of SPC. Met 9/29/20  5. Pt to tolerate HEP to improve ROM and independence with ADL's. Met 9/29/20  6. Pt to improve range of motion by 25% to allow for improved functional mobility to allow for improvement in IADLs. Met 9/29/20     Long Term Goals: 10 weeksProgressing  1. Report decreased in pain at worse less than  <   / =  2 /10  to increase tolerance for functional mobility.   2 .Pt to perform TUG </= 10 seconds without use of AD.  3. Increased hip flexor and ankle DF MMT 1 grade to increase tolerance for ADL and work activities.  4. Pt will report at NV% or less limitation on FOTO Lumbar  to demonstrate decrease in disability and improvement in back pain.  5. Pt to demonstrate 5 time sit<>stand with single UE support and in </= 15 seconds to improve functional mobility.   6. Pt will perform single leg stance bilaterally for >/= 5 seconds to improve balance for gait and getting into bathtub.  7. Pt will be able to ascend/descend 2 steps without use of single handrail to be able to get into home at decreased fall risk.  8. Pt to improve range of motion by 75% to allow for improved functional mobility to allow for improvement in IADLs.        Plan     Continue with established Plan of Care towards PT goals.    Moustapha Gao, PT, DPT

## 2020-10-06 ENCOUNTER — CLINICAL SUPPORT (OUTPATIENT)
Dept: REHABILITATION | Facility: HOSPITAL | Age: 70
End: 2020-10-06
Payer: COMMERCIAL

## 2020-10-06 DIAGNOSIS — M54.50 LUMBAR PAIN: ICD-10-CM

## 2020-10-06 DIAGNOSIS — Z74.09 IMPAIRED FUNCTIONAL MOBILITY, BALANCE, GAIT, AND ENDURANCE: Primary | ICD-10-CM

## 2020-10-06 PROCEDURE — 97110 THERAPEUTIC EXERCISES: CPT | Mod: PN

## 2020-10-06 NOTE — PROGRESS NOTES
"                                                    Physical Therapy Daily Note     Date: 10/06/2020  Name: Robles Jordan  Clinic Number: [id number]  Diagnosis:   Encounter Diagnoses   Name Primary?    Lumbar pain     Impaired functional mobility, balance, gait, and endurance Yes       Physician: Ameena Madison MD    Physician Orders: PT Eval and Treat   Medical Diagnosis from Referral: Z74.09 (ICD-10-CM) - Impaired mobility and ADLs  Evaluation Date: 9/1/2020  Authorization Period Expiration: 12/31/20  Plan of Care Expiration: 11/15/20  Visit # / Visits authorized: 9/ 14     Time In: 10:46 am   Time Out: 11:45 am  Total Billable Time: 49 minutes     Precautions: Standard and Fall  Ok to advance activities as tolerated (ok to lift > 10 lbs, gradual return to exercise, avoid extreme twisting/bending or overly strenuous activities if possible)    Subjective     Pt reports: He is doing well. Weakness remains biggest complaint. He feels most fatigue in evening time. He plans on returning to [employer] swimming soon    He was compliant with home exercise program.  Response to previous treatment: muscle fatigue in B LE  Functional change: improving ADL's    Pain: 0/10   Location: back and right LE       Objective       Robles received therapeutic exercises to develop strength, endurance, ROM, flexibility, posture and core stabilization for 44 minutes including:     Nustep x 7 min level 4    Standing heel raises 3x10  Marches in standing 3x10  Mini Squat in parallel bars 2x10  Step-up 4" 2x10 ea LE  Standing hip ext/abd with YTB 2x12 each  Bridges RTB 3x10  SL clamshells RTB x 30 each    Tandem stance 2x30" ea  TrA in supine x 20x5"  SAQ 2 x 10 each with 3" hold  SLR 2x10 alternating    hooklying hip adduction 20x5  Supine hip flexor stretch x1 min B  Standing hip flexion over small cone x 10 each    NP today due to Reassessment:  Shuttle squats with 2 black cord 3 x 10-NP  Shuttle single leg squat with RLE 1 " "black cord 3 x 10  Shuttle heel raises 1 cord x 20-NP  LTR with limited ROM x 20 NP  Sit to stand 24" plyo 2x10 NP  Seated toe raises x30 NP    Gait training: SPC with cane in L hand x2 3 laps- NP    Robles received manual therapy to lower back for 5 minutes including:   Scar Tissue mobilization    Pt received cold pack to low back x10 min post tx.    Written Home Exercises Provided: no new exercises provided this session  Pt demo good understanding of the education provided. Arboleda demonstrated good return demonstration of activities.     Education provided: MICHELLE Arboleda verbalized good understanding of education provided.   No spiritual or educational barriers to learning identified.    Assessment     Pt tolerated treatment session well. Arrived with swelling noted in proximal to incision, decreases post exercise. Frequent rest breaks provided between weight-bearing activities due to generalized fatigue.  Increased repetitions on exercise performed for progression of muscular endurance and generalized conditioning. Pt remains appropriate for physical therapy to progress hip, core and lumbar strengthening following surgery.     GOALS: Short Term Goals:  5 weeksProgressing  1. Report decreased in pain at worse less than  <   / =  4 /10  to increase tolerance for functional activities. Ongoing, continues to report pain of 6-7/10  2. Pt to perform 5 time sit<> </= 17 seconds to improve functional mobility. Met 9/29/20  3. Increased R hip flexor MMT 1/2  to improve gait.ongoing  4. Pt to demonstrate TUG score of </= 13 seconds without use of SPC. Met 9/29/20  5. Pt to tolerate HEP to improve ROM and independence with ADL's. Met 9/29/20  6. Pt to improve range of motion by 25% to allow for improved functional mobility to allow for improvement in IADLs. Met 9/29/20     Long Term Goals: 10 weeksProgressing  1. Report decreased in pain at worse less than  <   / =  2 /10  to increase tolerance for functional " mobility.   2 .Pt to perform TUG </= 10 seconds without use of AD.  3. Increased hip flexor and ankle DF MMT 1 grade to increase tolerance for ADL and work activities.  4. Pt will report at NV% or less limitation on FOTO Lumbar  to demonstrate decrease in disability and improvement in back pain.  5. Pt to demonstrate 5 time sit<>stand with single UE support and in </= 15 seconds to improve functional mobility.   6. Pt will perform single leg stance bilaterally for >/= 5 seconds to improve balance for gait and getting into bathtub.  7. Pt will be able to ascend/descend 2 steps without use of single handrail to be able to get into home at decreased fall risk.  8. Pt to improve range of motion by 75% to allow for improved functional mobility to allow for improvement in IADLs.        Plan     Continue with established Plan of Care towards PT goals.    Ada Boothe, PT, DPT

## 2020-10-08 ENCOUNTER — CLINICAL SUPPORT (OUTPATIENT)
Dept: REHABILITATION | Facility: HOSPITAL | Age: 70
End: 2020-10-08
Payer: COMMERCIAL

## 2020-10-08 DIAGNOSIS — M54.50 LUMBAR PAIN: ICD-10-CM

## 2020-10-08 DIAGNOSIS — Z74.09 IMPAIRED FUNCTIONAL MOBILITY, BALANCE, GAIT, AND ENDURANCE: Primary | ICD-10-CM

## 2020-10-08 PROCEDURE — 97110 THERAPEUTIC EXERCISES: CPT | Mod: PN

## 2020-10-08 NOTE — PROGRESS NOTES
"                                                    Physical Therapy Daily Note     Date: 10/08/2020  Name: Robles Jordan  Clinic Number: [id number]  Diagnosis:   Encounter Diagnoses   Name Primary?    Lumbar pain     Impaired functional mobility, balance, gait, and endurance Yes       Physician: Ameena Madison MD    Physician Orders: PT Eval and Treat   Medical Diagnosis from Referral: Z74.09 (ICD-10-CM) - Impaired mobility and ADLs  Evaluation Date: 9/1/2020  Authorization Period Expiration: 12/31/20  Plan of Care Expiration: 11/15/20  Visit # / Visits authorized: 9/ 14     Time In: 11:15 am   Time Out: 12:10 pm  Total Billable Time: 42 minutes     Precautions: Standard and Fall  Ok to advance activities as tolerated (ok to lift > 10 lbs, gradual return to exercise, avoid extreme twisting/bending or overly strenuous activities if possible)    Subjective     Pt reports: He is doing fair today. Has some tightness in his R hip.    He was compliant with home exercise program.  Response to previous treatment: muscle fatigue in B LE  Functional change: improving ADL's    Pain: 1/10   Location: back and right LE       Objective       Robles received therapeutic exercises to develop strength, endurance, ROM, flexibility, posture and core stabilization for 40 minutes including:     Nustep x 7 min level 4    Standing heel raises 3x10  Marches in standing 3x10  Mini Squat in parallel bars 3x10- needs rest break each set  Step-up 4" 2x10 ea LE  Standing hip ext/abd with YTB 2x12 each  Bridges RTB 3x10  SL clamshells RTB x 30 each  +Matrix hip abd 20# 2 x 15  +Matrix rows 40# 2 x 10    Tandem stance 2x30" ea  TrA in supine x 20x5"  SAQ 2 x 10 each with 3" hold  SLR 2x10 alternating    hooklying hip adduction 20x5  Supine hip flexor stretch x1 min B  Standing hip flexion over small cone x 10 each    NP today due to Reassessment:  Shuttle squats with 2 black cord 3 x 10-NP  Shuttle single leg squat with RLE 1 black " "cord 3 x 10  Shuttle heel raises 1 cord x 20-NP  LTR with limited ROM x 20 NP  Sit to stand 24" plyo 2x10 NP  Seated toe raises x30 NP    Gait training: SPC with cane in L hand x2 3 laps- NP    Arboleda received manual therapy to lower back for 5 minutes including:     Long axis distraction on R hip    NP:  Scar Tissue mobilization    Pt received cold pack to low back x10 min post tx.    Written Home Exercises Provided: no new exercises provided this session  Pt demo good understanding of the education provided. Arboleda demonstrated good return demonstration of activities.     Education provided: MICHELLE Arboleda verbalized good understanding of education provided.   No spiritual or educational barriers to learning identified.    Assessment     Pt tolerated treatment session well. Arrived with swelling noted in proximal to incision, decreases post exercise. Frequent rest breaks provided between weight-bearing activities due to generalized fatigue. Progressed standing exercises to improve tolerance and hip strength. Addition of weight machines for hip abd and lats. Pt remains appropriate for physical therapy to progress hip, core and lumbar strengthening following surgery.     GOALS: Short Term Goals:  5 weeksProgressing  1. Report decreased in pain at worse less than  <   / =  4 /10  to increase tolerance for functional activities. Ongoing, continues to report pain of 6-7/10  2. Pt to perform 5 time sit<> </= 17 seconds to improve functional mobility. Met 9/29/20  3. Increased R hip flexor MMT 1/2  to improve gait.ongoing  4. Pt to demonstrate TUG score of </= 13 seconds without use of SPC. Met 9/29/20  5. Pt to tolerate HEP to improve ROM and independence with ADL's. Met 9/29/20  6. Pt to improve range of motion by 25% to allow for improved functional mobility to allow for improvement in IADLs. Met 9/29/20     Long Term Goals: 10 weeksProgressing  1. Report decreased in pain at worse less than  <   / =  2 " /10  to increase tolerance for functional mobility.   2 .Pt to perform TUG </= 10 seconds without use of AD.  3. Increased hip flexor and ankle DF MMT 1 grade to increase tolerance for ADL and work activities.  4. Pt will report at NV% or less limitation on FOTO Lumbar  to demonstrate decrease in disability and improvement in back pain.  5. Pt to demonstrate 5 time sit<>stand with single UE support and in </= 15 seconds to improve functional mobility.   6. Pt will perform single leg stance bilaterally for >/= 5 seconds to improve balance for gait and getting into bathtub.  7. Pt will be able to ascend/descend 2 steps without use of single handrail to be able to get into home at decreased fall risk.  8. Pt to improve range of motion by 75% to allow for improved functional mobility to allow for improvement in IADLs.        Plan     Continue with established Plan of Care towards PT goals.    Ada Boothe, PT, DPT

## 2020-10-13 ENCOUNTER — CLINICAL SUPPORT (OUTPATIENT)
Dept: REHABILITATION | Facility: HOSPITAL | Age: 70
End: 2020-10-13
Payer: COMMERCIAL

## 2020-10-13 DIAGNOSIS — Z74.09 IMPAIRED FUNCTIONAL MOBILITY, BALANCE, GAIT, AND ENDURANCE: Primary | ICD-10-CM

## 2020-10-13 DIAGNOSIS — M54.50 LUMBAR PAIN: ICD-10-CM

## 2020-10-13 PROCEDURE — 97140 MANUAL THERAPY 1/> REGIONS: CPT | Mod: PN

## 2020-10-13 PROCEDURE — 97110 THERAPEUTIC EXERCISES: CPT | Mod: PN

## 2020-10-13 NOTE — PROGRESS NOTES
"                                                    Physical Therapy Daily Note     Date: 10/13/2020  Name: Robles Jordan  Clinic Number: [id number]  Diagnosis:   Encounter Diagnoses   Name Primary?    Lumbar pain     Impaired functional mobility, balance, gait, and endurance Yes       Physician: Ameena Madison MD    Physician Orders: PT Eval and Treat   Medical Diagnosis from Referral: Z74.09 (ICD-10-CM) - Impaired mobility and ADLs  Evaluation Date: 9/1/2020  Authorization Period Expiration: 12/31/20  Plan of Care Expiration: 11/15/20  Visit # / Visits authorized: 10/ 14     Time In: 10:47 am   Time Out: 11:50 pm  Total Billable Time: 53 minutes     Precautions: Standard and Fall  Ok to advance activities as tolerated (ok to lift > 10 lbs, gradual return to exercise, avoid extreme twisting/bending or overly strenuous activities if possible)    Subjective     Pt reports: He is doing fair today. Has some tightness in his R hip.    He was compliant with home exercise program.  Response to previous treatment: muscle fatigue in B LE  Functional change: improving ADL's    Pain: 1/10   Location: back and right LE       Objective       Robles received therapeutic exercises to develop strength, endurance, ROM, flexibility, posture and core stabilization for 45 minutes including:     Nustep x 7 min level 4    Standing heel raises 3x10  Marches in standing 3x10  Mini Squat in parallel bars 3x10- needs rest break each set  Step-up 4" 2x10 ea LE  Standing hip ext/abd with YTB 2x12 each  +Chest press with 6# med ball 2 x 15  Bridges RTB 3x10  SL clamshells RTB x 30 each  +prone HS curls with Min-Mod A 3 x 10  +SLR 3 x 10 each    Matrix hip abd 20# 2 x 15  Matrix rows 40# 2 x 10      NP today:  Supine hip flexor stretch x1 min B  Tandem stance 2x30" ea  SAQ 2 x 10 each with 3" hold  Shuttle squats with 2 black cord 3 x 10-NP  Shuttle single leg squat with RLE 1 black cord 3 x 10  Shuttle heel raises 1 cord x " "20-NP  LTR with limited ROM x 20 NP  Sit to stand 24" plyo 2x10 NP  Seated toe raises x30 NP      Arboleda received manual therapy to lower back for 8 minutes including:     Scar Tissue mobilization    Pt received cold pack to low back x10 min post tx.    Written Home Exercises Provided: no new exercises provided this session  Pt demo good understanding of the education provided. Arboleda demonstrated good return demonstration of activities.     Education provided: MICHELLE Arboleda verbalized good understanding of education provided.   No spiritual or educational barriers to learning identified.    Assessment     Pt tolerated treatment session well. Arrived with swelling noted in proximal to incision, decreases post exercise. Frequent rest breaks provided between weight-bearing activities due to generalized fatigue. Continued standing hip and BLE strengthening to improve standing tolerance. Addition of standing med ball chest presses to improve core and trunk stability. Performed prone knee flexion with Min-Mod A from therapist to improve knee flexion and gait pattern. Pt remains appropriate for physical therapy to progress hip, core and lumbar strengthening following surgery.     GOALS: Short Term Goals:  5 weeksProgressing  1. Report decreased in pain at worse less than  <   / =  4 /10  to increase tolerance for functional activities. Ongoing, continues to report pain of 6-7/10  2. Pt to perform 5 time sit<> </= 17 seconds to improve functional mobility. Met 9/29/20  3. Increased R hip flexor MMT 1/2  to improve gait.ongoing  4. Pt to demonstrate TUG score of </= 13 seconds without use of SPC. Met 9/29/20  5. Pt to tolerate HEP to improve ROM and independence with ADL's. Met 9/29/20  6. Pt to improve range of motion by 25% to allow for improved functional mobility to allow for improvement in IADLs. Met 9/29/20     Long Term Goals: 10 weeksProgressing  1. Report decreased in pain at worse less than  <   / =  " 2 /10  to increase tolerance for functional mobility.   2 .Pt to perform TUG </= 10 seconds without use of AD.  3. Increased hip flexor and ankle DF MMT 1 grade to increase tolerance for ADL and work activities.  4. Pt will report at NV% or less limitation on FOTO Lumbar  to demonstrate decrease in disability and improvement in back pain.  5. Pt to demonstrate 5 time sit<>stand with single UE support and in </= 15 seconds to improve functional mobility.   6. Pt will perform single leg stance bilaterally for >/= 5 seconds to improve balance for gait and getting into bathtub.  7. Pt will be able to ascend/descend 2 steps without use of single handrail to be able to get into home at decreased fall risk.  8. Pt to improve range of motion by 75% to allow for improved functional mobility to allow for improvement in IADLs.        Plan     Continue with established Plan of Care towards PT goals.    Ada Boothe, PT, DPT

## 2020-10-15 ENCOUNTER — CLINICAL SUPPORT (OUTPATIENT)
Dept: REHABILITATION | Facility: HOSPITAL | Age: 70
End: 2020-10-15
Payer: COMMERCIAL

## 2020-10-15 DIAGNOSIS — Z74.09 IMPAIRED FUNCTIONAL MOBILITY, BALANCE, GAIT, AND ENDURANCE: Primary | ICD-10-CM

## 2020-10-15 DIAGNOSIS — M54.50 LUMBAR PAIN: ICD-10-CM

## 2020-10-15 PROCEDURE — 97110 THERAPEUTIC EXERCISES: CPT | Mod: PN

## 2020-10-15 NOTE — PROGRESS NOTES
"                                                    Physical Therapy Daily Note     Date: 10/15/2020  Name: Robles Jordan  Clinic Number: [id number]  Diagnosis:   Encounter Diagnoses   Name Primary?    Lumbar pain     Impaired functional mobility, balance, gait, and endurance Yes       Physician: Ameena Madison MD    Physician Orders: PT Eval and Treat   Medical Diagnosis from Referral: Z74.09 (ICD-10-CM) - Impaired mobility and ADLs  Evaluation Date: 9/1/2020  Authorization Period Expiration: 12/31/20  Plan of Care Expiration: 11/15/20  Visit # / Visits authorized: 11/ 14     Time In: 11:17 am   Time Out: 12:10 pm  Total Billable Time: 43 minutes     Precautions: Standard and Fall  Ok to advance activities as tolerated (ok to lift > 10 lbs, gradual return to exercise, avoid extreme twisting/bending or overly strenuous activities if possible)    Subjective     Pt reports: He is doing fair today. Has some tightness in his R hip.    He was compliant with home exercise program.  Response to previous treatment: muscle fatigue in B LE  Functional change: improving ADL's    Pain: 1/10   Location: back and right LE       Objective       Robles received therapeutic exercises to develop strength, endurance, ROM, flexibility, posture and core stabilization for 43 minutes including:     Nustep x 7 min level 4, hills    Standing heel raises 3x10  Marches in standing 3x10, 2#  Mini Squat in parallel bars 3x10  Step-up 6" 2x10 ea LE, 2#  Standing hip ext/abd with YTB 2x12 each  Chest press with 6# med ball 2 x 15  Bridges RTB 3x10  SL clamshells RTB x 30 each  prone HS curls with Min-Mod A 3 x 10  Prone hip IR/ER x 20 each with Min-Mod A  SLR 3 x 10 each    Matrix hip abd 20# 2 x 15  Matrix rows 40# 2 x 10      NP today:  Supine hip flexor stretch x1 min B  Tandem stance 2x30" ea  SAQ 2 x 10 each with 3" hold  Shuttle squats with 2 black cord 3 x 10-NP  Shuttle single leg squat with RLE 1 black cord 3 x 10  Shuttle " "heel raises 1 cord x 20-NP  LTR with limited ROM x 20 NP  Sit to stand 24" plyo 2x10 NP  Seated toe raises x30 NP      Arboleda received manual therapy to lower back for 8 minutes including:     Scar Tissue mobilization    Pt received cold pack to low back x10 min post tx.    Written Home Exercises Provided: no new exercises provided this session  Pt demo good understanding of the education provided. Arboleda demonstrated good return demonstration of activities.     Education provided: MICHELLE Dillph verbalized good understanding of education provided.   No spiritual or educational barriers to learning identified.    Assessment     Pt tolerated treatment session well. Arrived with swelling noted in proximal to incision, decreases post exercise. Frequent rest breaks provided between weight-bearing activities due to generalized fatigue. Performed prone hip IR/ER to increase hip strengthening on R side for functional strength and gait. Use of significant UE support to transition from chair when fatigued. Continues to ambulate with decreased stance time on RLE, but demonstrates improved foot clearance. Pt remains appropriate for physical therapy to progress hip, core and lumbar strengthening following surgery.     GOALS: Short Term Goals:  5 weeks Updated 9/29/20  1. Report decreased in pain at worse less than  <   / =  4 /10  to increase tolerance for functional activities. Ongoing, continues to report pain of 6-7/10  2. Pt to perform 5 time sit<> </= 17 seconds to improve functional mobility. Met 9/29/20  3. Increased R hip flexor MMT 1/2  to improve gait.ongoing  4. Pt to demonstrate TUG score of </= 13 seconds without use of SPC. Met 9/29/20  5. Pt to tolerate HEP to improve ROM and independence with ADL's. Met 9/29/20  6. Pt to improve range of motion by 25% to allow for improved functional mobility to allow for improvement in IADLs. Met 9/29/20     Long Term Goals: 10 weeksProgressing  1. Report decreased " in pain at worse less than  <   / =  2 /10  to increase tolerance for functional mobility.   2 .Pt to perform TUG </= 10 seconds without use of AD.  3. Increased hip flexor and ankle DF MMT 1 grade to increase tolerance for ADL and work activities.  4. Pt will report at NV% or less limitation on FOTO Lumbar  to demonstrate decrease in disability and improvement in back pain.  5. Pt to demonstrate 5 time sit<>stand with single UE support and in </= 15 seconds to improve functional mobility.   6. Pt will perform single leg stance bilaterally for >/= 5 seconds to improve balance for gait and getting into bathtub.  7. Pt will be able to ascend/descend 2 steps without use of single handrail to be able to get into home at decreased fall risk.  8. Pt to improve range of motion by 75% to allow for improved functional mobility to allow for improvement in IADLs.        Plan     Continue with established Plan of Care towards PT goals.    Ada Boothe, PT, DPT

## 2020-10-23 ENCOUNTER — CLINICAL SUPPORT (OUTPATIENT)
Dept: REHABILITATION | Facility: HOSPITAL | Age: 70
End: 2020-10-23
Payer: COMMERCIAL

## 2020-10-23 DIAGNOSIS — Z74.09 IMPAIRED FUNCTIONAL MOBILITY, BALANCE, GAIT, AND ENDURANCE: Primary | ICD-10-CM

## 2020-10-23 DIAGNOSIS — M54.50 LUMBAR PAIN: ICD-10-CM

## 2020-10-23 PROCEDURE — 97110 THERAPEUTIC EXERCISES: CPT | Mod: PN

## 2020-10-23 NOTE — PROGRESS NOTES
"                                                    Physical Therapy Daily Note     Date: 10/23/2020  Name: Robles Jordan  [facility] Number: [id number]  Diagnosis:   Encounter Diagnoses   Name Primary?    Lumbar pain     Impaired functional mobility, balance, gait, and endurance Yes       Physician: Ameena Madison MD    Physician Orders: PT Eval and Treat   Medical Diagnosis from Referral: Z74.09 (ICD-10-CM) - Impaired mobility and ADLs  Evaluation Date: 9/1/2020  Authorization Period Expiration: 12/31/20  Plan of Care Expiration: 11/15/20  Visit # / Visits authorized: 13/ 14     Time In: 9:18 am   Time Out: 10:24 am  Total Billable Time: 56 minutes     Precautions: Standard and Fall  Ok to advance activities as tolerated (ok to lift > 10 lbs, gradual return to exercise, avoid extreme twisting/bending or overly strenuous activities if possible)    Subjective     Pt reports: He is doing well today. He has started getting into the pool and doing exercises a couple of days this week.     He was compliant with home exercise program.  Response to previous treatment: muscle fatigue in B LE  Functional change: improving ADL's    Pain: 1/10   Location: back and right LE       Objective       Robles received therapeutic exercises to develop strength, endurance, ROM, flexibility, posture and core stabilization for 56 minutes including:     Nustep x 7 min level 4, hills    Standing heel raises 3x10  Marches in standing 3x10, 2#  Mini Squat in parallel bars 3x10  Step-up 6" 2x10 ea LE, 2#  Standing hip ext/abd with RTB x 20 each  Chest press with 6# med ball 2 x 15  Bridges RTB 3x10  SL clamshells RTB x 30 each  prone HS curls with Min-Mod A 3 x 10  Prone hip IR/ER x 20 each with Min-Mod A  SLR 3 x 10 each    Matrix hip abd 20# 2 x 15  Matrix rows 40# 2 x 10      Robles received the following supervised modalities after being cleared for contradictions: NMES Electrical Stimulation:  Robles received NMES Electrical " "Stimulation to elicit muscle contraction of the tibialis anterior. Pt received stimulation at a rate of 59 pps with symmetric current, ramp of 2 seconds with 10 second on time and 10 second off time. Patient tolerated treatment well without any adverse effects.     NP today:  Supine hip flexor stretch x1 min B  Tandem stance 2x30" ea  SAQ 2 x 10 each with 3" hold  Shuttle squats with 2 black cord 3 x 10-NP  Shuttle single leg squat with RLE 1 black cord 3 x 10  Shuttle heel raises 1 cord x 20-NP  LTR with limited ROM x 20 NP  Sit to stand 24" plyo 2x10 NP  Seated toe raises x30 NP      Arboleda received manual therapy to lower back for 00 minutes including:     Scar Tissue mobilization    Pt received cold pack to low back x10 min post tx.    Written Home Exercises Provided: no new exercises provided this session  Pt demo good understanding of the education provided. Robles demonstrated good return demonstration of activities.     Education provided: MICHELLE Arboleda verbalized good understanding of education provided.   No spiritual or educational barriers to learning identified.    Assessment     Pt tolerated treatment session well. Continues to fatigue quickly with standing exercises, needing rest breaks each set. Improvement in R knee flexion in prone with Min A after 5 repetitions and improved neuromuscular control. Performed NMES to tibialis anterior muscle today to improve dorsiflexion during gait and decrease risk of falling. Pt remains appropriate for physical therapy to progress hip, core and lumbar strengthening following surgery.     GOALS: Short Term Goals:  5 weeks Updated 9/29/20  1. Report decreased in pain at worse less than  <   / =  4 /10  to increase tolerance for functional activities. Ongoing, continues to report pain of 6-7/10  2. Pt to perform 5 time sit<> </= 17 seconds to improve functional mobility. Met 9/29/20  3. Increased R hip flexor MMT 1/2  to improve gait.ongoing  4. Pt to " demonstrate TUG score of </= 13 seconds without use of SPC. Met 9/29/20  5. Pt to tolerate HEP to improve ROM and independence with ADL's. Met 9/29/20  6. Pt to improve range of motion by 25% to allow for improved functional mobility to allow for improvement in IADLs. Met 9/29/20     Long Term Goals: 10 weeksProgressing  1. Report decreased in pain at worse less than  <   / =  2 /10  to increase tolerance for functional mobility.   2 .Pt to perform TUG </= 10 seconds without use of AD.  3. Increased hip flexor and ankle DF MMT 1 grade to increase tolerance for ADL and work activities.  4. Pt will report at NV% or less limitation on FOTO Lumbar  to demonstrate decrease in disability and improvement in back pain.  5. Pt to demonstrate 5 time sit<>stand with single UE support and in </= 15 seconds to improve functional mobility.   6. Pt will perform single leg stance bilaterally for >/= 5 seconds to improve balance for gait and getting into bathtub.  7. Pt will be able to ascend/descend 2 steps without use of single handrail to be able to get into home at decreased fall risk.  8. Pt to improve range of motion by 75% to allow for improved functional mobility to allow for improvement in IADLs.        Plan     Continue with established Plan of Care towards PT goals.    Ada Boothe, PT, DPT

## 2020-10-27 ENCOUNTER — CLINICAL SUPPORT (OUTPATIENT)
Dept: REHABILITATION | Facility: HOSPITAL | Age: 70
End: 2020-10-27
Payer: COMMERCIAL

## 2020-10-27 DIAGNOSIS — M54.50 LUMBAR PAIN: ICD-10-CM

## 2020-10-27 DIAGNOSIS — Z74.09 IMPAIRED FUNCTIONAL MOBILITY, BALANCE, GAIT, AND ENDURANCE: Primary | ICD-10-CM

## 2020-10-27 PROCEDURE — 97110 THERAPEUTIC EXERCISES: CPT | Mod: PN

## 2020-10-27 NOTE — PROGRESS NOTES
"                                                    Physical Therapy Daily Note     Date: 10/27/2020  Name: Robles Jordan  [facility] Number: [id number]  Diagnosis:   Encounter Diagnoses   Name Primary?    Lumbar pain     Impaired functional mobility, balance, gait, and endurance Yes       Physician: Ameena Madison MD    Physician Orders: PT Eval and Treat   Medical Diagnosis from Referral: Z74.09 (ICD-10-CM) - Impaired mobility and ADLs  Evaluation Date: 9/1/2020  Authorization Period Expiration: 12/31/20  Plan of Care Expiration: 11/15/20  Visit # / Visits authorized: 14/ 14     Time In: 11:15 am   Time Out: 12:00 pm  Total Billable Time: 45 minutes     Precautions: Standard and Fall  Ok to advance activities as tolerated (ok to lift > 10 lbs, gradual return to exercise, avoid extreme twisting/bending or overly strenuous activities if possible)    Subjective     Pt reports: He is doing very well today. Doesn't feel fatigued today and continues to notice improvements to hip and foot during gait. Has been getting in the pool when able to schedule.    He was compliant with home exercise program.  Response to previous treatment: muscle fatigue in B LE  Functional change: improving ADL's    Pain: 1/10   Location: back and right LE       Objective       Robles received therapeutic exercises to develop strength, endurance, ROM, flexibility, posture and core stabilization for 45 minutes including:     Nustep x 7 min level 4, hills    Standing heel raises 3x10  Marches in standing 3x10, 2#  Mini Squat in parallel bars 3x10  Step-up 6" 2x10 ea LE, 2#  Standing hip ext/abd with RTB x 20 each  Chest press with 6# med ball 2 x 10  +OH press with 6# med ball 2 x 10  Bridges RTB 3x10  SL clamshells RTB x 30 each  prone HS curls with Min-Mod A 3 x 10  Prone hip IR/ER x 20 each with Min-Mod A  SLR 3 x 10 each    Matrix hip abd 20# 2 x 15  Matrix rows 40# 2 x 10      Robles received the following supervised modalities " "after being cleared for contradictions: NMES Electrical Stimulation:  Robles received NMES Electrical Stimulation to elicit muscle contraction of the tibialis anterior. Pt received stimulation at a rate of 59 pps with symmetric current, ramp of 2 seconds with 10 second on time and 10 second off time. Patient tolerated treatment well without any adverse effects.     NP today:  Supine hip flexor stretch x1 min B  Tandem stance 2x30" ea  SAQ 2 x 10 each with 3" hold  Shuttle squats with 2 black cord 3 x 10-NP  Shuttle single leg squat with RLE 1 black cord 3 x 10  Shuttle heel raises 1 cord x 20-NP  LTR with limited ROM x 20 NP  Sit to stand 24" plyo 2x10 NP  Seated toe raises x30 NP      Arboleda received manual therapy to lower back for 00 minutes including:     Scar Tissue mobilization    Pt received cold pack to low back x10 min post tx.    Written Home Exercises Provided: no new exercises provided this session  Pt demo good understanding of the education provided. Arboleda demonstrated good return demonstration of activities.     Education provided: MICHELLE Arboleda verbalized good understanding of education provided.   No spiritual or educational barriers to learning identified.    Assessment     Pt tolerated treatment session well. Continues to fatigue quickly with standing exercises, needing rest breaks each set. IImprovement in hip muscle strength and endurance. Displayed ability to perform increase in SLR repetitions prior to rest break (20 reps). Continued NMES to tibialis anterior muscle today to improve dorsiflexion during gait and decrease risk of falling. Continue to progress standing back and core exercises to improve standing tolerance. Pt remains appropriate for physical therapy to progress hip, core and lumbar strengthening following surgery.     GOALS: Short Term Goals:  5 weeks Updated 9/29/20  1. Report decreased in pain at worse less than  <   / =  4 /10  to increase tolerance for functional " activities. Ongoing, continues to report pain of 6-7/10  2. Pt to perform 5 time sit<> </= 17 seconds to improve functional mobility. Met 9/29/20  3. Increased R hip flexor MMT 1/2  to improve gait.ongoing  4. Pt to demonstrate TUG score of </= 13 seconds without use of SPC. Met 9/29/20  5. Pt to tolerate HEP to improve ROM and independence with ADL's. Met 9/29/20  6. Pt to improve range of motion by 25% to allow for improved functional mobility to allow for improvement in IADLs. Met 9/29/20     Long Term Goals: 10 weeksProgressing  1. Report decreased in pain at worse less than  <   / =  2 /10  to increase tolerance for functional mobility.   2 .Pt to perform TUG </= 10 seconds without use of AD.  3. Increased hip flexor and ankle DF MMT 1 grade to increase tolerance for ADL and work activities.  4. Pt will report at NV% or less limitation on FOTO Lumbar  to demonstrate decrease in disability and improvement in back pain.  5. Pt to demonstrate 5 time sit<>stand with single UE support and in </= 15 seconds to improve functional mobility.   6. Pt will perform single leg stance bilaterally for >/= 5 seconds to improve balance for gait and getting into bathtub.  7. Pt will be able to ascend/descend 2 steps without use of single handrail to be able to get into home at decreased fall risk.  8. Pt to improve range of motion by 75% to allow for improved functional mobility to allow for improvement in IADLs.        Plan     Continue with established Plan of Care towards PT goals.    Ada Boothe, PT, DPT

## 2020-11-04 ENCOUNTER — CLINICAL SUPPORT (OUTPATIENT)
Dept: REHABILITATION | Facility: HOSPITAL | Age: 70
End: 2020-11-04
Payer: COMMERCIAL

## 2020-11-04 DIAGNOSIS — Z74.09 IMPAIRED FUNCTIONAL MOBILITY, BALANCE, GAIT, AND ENDURANCE: Primary | ICD-10-CM

## 2020-11-04 DIAGNOSIS — M54.50 LUMBAR PAIN: ICD-10-CM

## 2020-11-04 PROCEDURE — 97110 THERAPEUTIC EXERCISES: CPT | Mod: PN

## 2020-11-04 NOTE — PROGRESS NOTES
"                                                    Physical Therapy Daily Note     Date: 11/04/2020  Name: Robles Jordan  Clinic Number: [id number]  Diagnosis:   Encounter Diagnoses   Name Primary?    Lumbar pain     Impaired functional mobility, balance, gait, and endurance Yes       Physician: Ameena Madison MD    Physician Orders: PT Eval and Treat   Medical Diagnosis from Referral: Z74.09 (ICD-10-CM) - Impaired mobility and ADLs  Evaluation Date: 9/1/2020  Authorization Period Expiration: 12/31/20  Plan of Care Expiration: 11/15/20  Visit # / Visits authorized: 15/ 14     Time In: 9:55 am   Time Out: 10:45 am  Total Billable Time: 25 minutes     Precautions: Standard and Fall  Ok to advance activities as tolerated (ok to lift > 10 lbs, gradual return to exercise, avoid extreme twisting/bending or overly strenuous activities if possible)    Subjective     Pt reports: He is fatigued today after doing yard work over the last few days. Is getting in the pool again this weekend.     He was compliant with home exercise program.  Response to previous treatment: muscle fatigue in B LE  Functional change: improving ADL's    Pain: 1/10   Location: back and right LE       Objective       Robles received therapeutic exercises to develop strength, endurance, ROM, flexibility, posture and core stabilization for 50 minutes including:     Nustep x 7 min level 6,     Standing heel raises 3x10  Marches in standing 3x10, 2#  Mini Squat in parallel bars 3x10  Step-up 6" 2x10 ea LE, 2#  Standing hip ext/abd with RTB x 20 each  Chest press with 6# med ball 3 x 10  +OH press with 6# med ball 3 x 10  Bridges RTB 3x10  SL clamshells RTB x 30 each  prone HS curls with Min-Mod A 3 x 10  Prone hip IR/ER x 20 each with Min-Mod A  SLR 3 x 10 each  Shuttle 2.5 cords for squats x 30  Shuttle RLE squats 1.5 cords x 30      Matrix hip abd 20# 2 x 15  Matrix rows 40# 2 x 10    NP today:  Robles received the following supervised " "modalities after being cleared for contradictions: NMES Electrical Stimulation:  Robles received NMES Electrical Stimulation to elicit muscle contraction of the tibialis anterior. Pt received stimulation at a rate of 59 pps with symmetric current, ramp of 2 seconds with 10 second on time and 10 second off time. Patient tolerated treatment well without any adverse effects.   Supine hip flexor stretch x1 min B  Tandem stance 2x30" ea  SAQ 2 x 10 each with 3" hold  Shuttle heel raises 1 cord x 20-NP  LTR with limited ROM x 20 NP  Sit to stand 24" plyo 2x10 NP  Seated toe raises x30 NP      Robles received manual therapy to lower back for 00 minutes including:     Scar Tissue mobilization    Pt received cold pack to low back x10 min post tx.    Written Home Exercises Provided: no new exercises provided this session  Pt demo good understanding of the education provided. Robles demonstrated good return demonstration of activities.     Education provided: MICHELLE Arboleda verbalized good understanding of education provided.   No spiritual or educational barriers to learning identified.    Assessment     Pt tolerated treatment session fair. Arrived reporting fatigue after yard work last few days. States he feel his strength has improved until about noon during the day and then notices the continued weakness in hip and RLE. Improvement of endurance with supine leg raises but continued deficits with standing tolerance. Discussed continuation of exercises in pool to decrease weight on hip and low back in standing positions. Pt has been consistent with aquatic exercises when able to book a spot in the pool. Pt with Pt remains appropriate for physical therapy to progress hip, core and lumbar strengthening following surgery.     GOALS: Short Term Goals:  5 weeks Updated 9/29/20  1. Report decreased in pain at worse less than  <   / =  4 /10  to increase tolerance for functional activities. Ongoing, continues to report pain of " 6-7/10  2. Pt to perform 5 time sit<> </= 17 seconds to improve functional mobility. Met 9/29/20  3. Increased R hip flexor MMT 1/2  to improve gait.ongoing  4. Pt to demonstrate TUG score of </= 13 seconds without use of SPC. Met 9/29/20  5. Pt to tolerate HEP to improve ROM and independence with ADL's. Met 9/29/20  6. Pt to improve range of motion by 25% to allow for improved functional mobility to allow for improvement in IADLs. Met 9/29/20     Long Term Goals: 10 weeksProgressing  1. Report decreased in pain at worse less than  <   / =  2 /10  to increase tolerance for functional mobility.   2 .Pt to perform TUG </= 10 seconds without use of AD.  3. Increased hip flexor and ankle DF MMT 1 grade to increase tolerance for ADL and work activities.  4. Pt will report at NV% or less limitation on FOTO Lumbar  to demonstrate decrease in disability and improvement in back pain.  5. Pt to demonstrate 5 time sit<>stand with single UE support and in </= 15 seconds to improve functional mobility.   6. Pt will perform single leg stance bilaterally for >/= 5 seconds to improve balance for gait and getting into bathtub.  7. Pt will be able to ascend/descend 2 steps without use of single handrail to be able to get into home at decreased fall risk.  8. Pt to improve range of motion by 75% to allow for improved functional mobility to allow for improvement in IADLs.        Plan     Continue with established Plan of Care towards PT goals.    Ada Boothe, PT, DPT

## 2020-11-06 ENCOUNTER — CLINICAL SUPPORT (OUTPATIENT)
Dept: REHABILITATION | Facility: HOSPITAL | Age: 70
End: 2020-11-06
Payer: COMMERCIAL

## 2020-11-06 DIAGNOSIS — M54.50 LUMBAR PAIN: ICD-10-CM

## 2020-11-06 DIAGNOSIS — Z74.09 IMPAIRED FUNCTIONAL MOBILITY, BALANCE, GAIT, AND ENDURANCE: Primary | ICD-10-CM

## 2020-11-06 PROCEDURE — 97110 THERAPEUTIC EXERCISES: CPT | Mod: PN

## 2020-11-06 NOTE — PROGRESS NOTES
"                                                    Physical Therapy Daily Note     Date: 11/06/2020  Name: Robles Jordan  [facility] Number: [id number]  Diagnosis:   Encounter Diagnoses   Name Primary?    Lumbar pain     Impaired functional mobility, balance, gait, and endurance Yes       Physician: Ameena Madison MD    Physician Orders: PT Eval and Treat   Medical Diagnosis from Referral: Z74.09 (ICD-10-CM) - Impaired mobility and ADLs  Evaluation Date: 9/1/2020  Authorization Period Expiration: 12/31/20  Plan of Care Expiration: 11/15/20  Visit # / Visits authorized: 2/ 20 (16 total)     Time In: 10:00 am   Time Out: 11:02 am  Total Billable Time: 45 minutes     Precautions: Standard and Fall  Ok to advance activities as tolerated (ok to lift > 10 lbs, gradual return to exercise, avoid extreme twisting/bending or overly strenuous activities if possible)    Subjective     Pt reports: He is fatigued after performing pool workout yesterday. States he usually gives himself a day to recover from workouts.    He was compliant with home exercise program.  Response to previous treatment: muscle fatigue in B LE  Functional change: improving ADL's    Pain: 1/10   Location: back and right LE       Objective   Bolded exercise performed.    Robles received therapeutic exercises to develop strength, endurance, ROM, flexibility, posture and core stabilization for 52 minutes including:     Nustep x 7 min level 6,     Standing heel raises 3x10  Marches in standing 3x10, 2#  Mini Squat in parallel bars 3x10  Step-up 6" 2x10 ea LE, 2#  Standing hip ext/abd with RTB x 20 each  Free motion paloff press 2 x 10 each with 7#  Free motion rows with 3# 2 x 12 each  Bridges RTB 3x10  SL clamshells RTB x 30 each  prone HS curls with Min-Mod A 3 x 10  Prone hip IR/ER x 20 each with Min-Mod A  SLR 3 x 10 each  Shuttle 2.5 cords for squats x 30  Shuttle RLE squats 1.5 cords x 30    Robles received the following supervised modalities " "after being cleared for contradictions: NMES Electrical Stimulation:  Robles received NMES Electrical Stimulation to elicit muscle contraction of the tibialis anterior. Pt received stimulation at a rate of 59 pps with symmetric current, ramp of 2 seconds with 10 second on time and 10 second off time. Patient tolerated treatment well without any adverse effects.     Matrix hip abd 20# 2 x 15  Matrix rows 40# 2 x 10        Supine hip flexor stretch x1 min B  Tandem stance 2x30" ea  SAQ 2 x 10 each with 3" hold  Shuttle heel raises 1 cord x 20-NP  LTR with limited ROM x 20 NP  Sit to stand 24" plyo 2x10 NP  Seated toe raises x30 NP      Arboleda received manual therapy to lower back for 00 minutes including:     Scar Tissue mobilization    Pt received cold pack to low back x10 min post tx.    Written Home Exercises Provided: no new exercises provided this session  Pt demo good understanding of the education provided. Robles demonstrated good return demonstration of activities.     Education provided: MICHELLE Arboleda verbalized good understanding of education provided.   No spiritual or educational barriers to learning identified.    Assessment     Pt tolerated treatment session fair. Continues to display poor standing tolerance. Addition of core and thoracic strengthening today to assist in standing tolerance. Discussed plan of care going forward, modified frequency to 1x/week after next week with continuation of HEP and pool work outs independently. Focus in therapy will be progression of lumbar strength and anterior tibialis to reduce foot drop. Pt to be reassessed at next visit.    GOALS: Short Term Goals:  5 weeks Updated 9/29/20  1. Report decreased in pain at worse less than  <   / =  4 /10  to increase tolerance for functional activities. Ongoing, continues to report pain of 6-7/10  2. Pt to perform 5 time sit<> </= 17 seconds to improve functional mobility. Met 9/29/20  3. Increased R hip flexor MMT " 1/2  to improve gait.ongoing  4. Pt to demonstrate TUG score of </= 13 seconds without use of SPC. Met 9/29/20  5. Pt to tolerate HEP to improve ROM and independence with ADL's. Met 9/29/20  6. Pt to improve range of motion by 25% to allow for improved functional mobility to allow for improvement in IADLs. Met 9/29/20     Long Term Goals: 10 weeksProgressing  1. Report decreased in pain at worse less than  <   / =  2 /10  to increase tolerance for functional mobility.   2 .Pt to perform TUG </= 10 seconds without use of AD.  3. Increased hip flexor and ankle DF MMT 1 grade to increase tolerance for ADL and work activities.  4. Pt will report at NV% or less limitation on FOTO Lumbar  to demonstrate decrease in disability and improvement in back pain.  5. Pt to demonstrate 5 time sit<>stand with single UE support and in </= 15 seconds to improve functional mobility.   6. Pt will perform single leg stance bilaterally for >/= 5 seconds to improve balance for gait and getting into bathtub.  7. Pt will be able to ascend/descend 2 steps without use of single handrail to be able to get into home at decreased fall risk.  8. Pt to improve range of motion by 75% to allow for improved functional mobility to allow for improvement in IADLs.        Plan     Continue with established Plan of Care towards PT goals.    Ada Boothe, PT, DPT

## 2020-11-10 ENCOUNTER — CLINICAL SUPPORT (OUTPATIENT)
Dept: REHABILITATION | Facility: HOSPITAL | Age: 70
End: 2020-11-10
Payer: COMMERCIAL

## 2020-11-10 DIAGNOSIS — Z74.09 IMPAIRED FUNCTIONAL MOBILITY, BALANCE, GAIT, AND ENDURANCE: Primary | ICD-10-CM

## 2020-11-10 DIAGNOSIS — M54.50 LUMBAR PAIN: ICD-10-CM

## 2020-11-10 PROCEDURE — 97110 THERAPEUTIC EXERCISES: CPT | Mod: PN

## 2020-11-10 NOTE — PROGRESS NOTES
"                                                    Physical Therapy Daily Note     Date: 11/10/2020  Name: Robles Jordan  [facility] Number: [id number]  Diagnosis:   Encounter Diagnoses   Name Primary?    Lumbar pain     Impaired functional mobility, balance, gait, and endurance Yes       Physician: Ameena Madison MD    Physician Orders: PT Eval and Treat   Medical Diagnosis from Referral: Z74.09 (ICD-10-CM) - Impaired mobility and ADLs  Evaluation Date: 2020  Authorization Period Expiration: 20  Plan of Care Expiration: 11/15/20-20  Visit # / Visits authorized:  (18 total)     Time In: 10:04 am   Time Out: 10:58 am  Total Billable Time: 45 minutes     Precautions: Standard and Fall  Ok to advance activities as tolerated (ok to lift > 10 lbs, gradual return to exercise, avoid extreme twisting/bending or overly strenuous activities if possible)    Subjective     Pt reports: He is feeling okay today. Has a pool workout on Thursday.    He was compliant with home exercise program.  Response to previous treatment: muscle fatigue in B LE  Functional change: improving ADL's    Pain: 1/10   Location: back and right LE       Objective   Bolded exercise performed.    Lumbar Range of Motion:     % limitation   Flexion Min      Extension Mod      Left Side Bending Mod   Right Side Bending Mod   Left rotation    mod   Right Rotation    mod    *= pain      5x sit<>stand: 16 seconds with use of BUE  TU seconds without SPC and use of Single UE to push up from chair  SLS: NT today      Robles received therapeutic exercises to develop strength, endurance, ROM, flexibility, posture and core stabilization for 40 minutes including:     Nustep x 7 min level 6,     Standing heel raises 3x10  Marches in standing 3x10, 2#  Mini Squat in parallel bars 3x10  Step-up 6" 2x10 ea LE, 2#  Standing hip ext/abd with RTB x 20 each  Free motion paloff press 2 x 10 each with 7#  Free motion rows with 7# 2 x 12 " "each  Bridges RTB 3x10  SL clamshells RTB x 30 each  prone HS curls with Min-Mod A 3 x 10  +short to tall kneel x 10  +tall kneel with UE on blue ball 3 x 30"  Prone hip IR/ER x 20 each with Min-Mod A  SLR 3 x 10 each  Shuttle 2.5 cords for squats x 30  Shuttle RLE squats 1.5 cords x 30      NP:  Matrix hip abd 20# 2 x 15  Matrix rows 40# 2 x 10    Robles received the following supervised modalities after being cleared for contradictions: NMES Electrical Stimulation:  Robles received NMES Electrical Stimulation to elicit muscle contraction of the tibialis anterior. Pt received stimulation at a rate of 59 pps with symmetric current, ramp of 2 seconds with 10 second on time and 10 second off time. Patient tolerated treatment well without any adverse effects.   Supine hip flexor stretch x1 min B  Tandem stance 2x30" ea  SAQ 2 x 10 each with 3" hold  Shuttle heel raises 1 cord x 20-NP  LTR with limited ROM x 20 NP  Sit to stand 24" plyo 2x10 NP  Seated toe raises x30 NP      Robles received manual therapy to lower back for 05 minutes including:     STM to QL/extensors    Pt received cold pack to low back x10 min post tx.    Written Home Exercises Provided: no new exercises provided this session  Pt demo good understanding of the education provided. Robles demonstrated good return demonstration of activities.     Education provided: MICHELLE Arboleda verbalized good understanding of education provided.   No spiritual or educational barriers to learning identified.    Assessment     Pt tolerated treatment session fair. Continues to display poor standing tolerance.Pt was reassessed this treatment session with good progress of lumbar range of motion as noted above. Pt denies any back pain but will occasionally have tightness in lumbar when standing for increased periods. Improvement in functional hip flexion and dorsiflexion noted as pt is able to lift RLE during gait and to transition supine to sit. Pt able to perform " TUG in 13 seconds without AD today demonstrating improved gait. Slight improvement in 5 time sit to stand score of 16 seconds from 17 seconds demonstrating improved functional strength and endurance. 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    GOALS: Short Term Goals:  5 weeks Updated 11/10/20  1. Report decreased in pain at worse less than  <   / =  4 /10  to increase tolerance for functional activities. Met 11/10/20  2. Pt to perform 5 time sit<> </= 17 seconds to improve functional mobility. Met 9/29/20  3. Increased R hip flexor MMT 1/2  to improve gait.ongoing  4. Pt to demonstrate TUG score of </= 13 seconds without use of SPC. Met 9/29/20  5. Pt to tolerate HEP to improve ROM and independence with ADL's. Met 9/29/20  6. Pt to improve range of motion by 25% to allow for improved functional mobility to allow for improvement in IADLs. Met 9/29/20     Long Term Goals: 10 weeksProgressing  1. Report decreased in pain at worse less than  <   / =  2 /10  to increase tolerance for functional mobility. ongoing  2 .Pt to perform TUG </= 10 seconds without use of AD.Improved  3. Increased hip flexor and ankle DF MMT 1 grade to increase tolerance for ADL and work activities.ongoing  4. Pt will report at NV% or less limitation on FOTO Lumbar  to demonstrate decrease in disability and improvement in back pain.  5. Pt to demonstrate 5 time sit<>stand with single UE support and in </= 15 seconds to improve functional mobility. Improved, 16 seconds  6. Pt will perform single leg stance bilaterally for >/= 5 seconds to improve balance for gait and getting into bathtub.ongoing  7. Pt will be able to ascend/descend 2 steps without use of single handrail to be able to get into home at decreased fall risk.ongoing  8. Pt to improve range of motion by 75% to allow for improved functional  mobility to allow for improvement in IADLs. improved       Plan   Extend 12/31/20  Continue with established Plan of Care towards PT goals.    Ada Boothe, PT, DPT

## 2020-11-12 ENCOUNTER — TELEPHONE (OUTPATIENT)
Dept: INTERNAL MEDICINE | Facility: CLINIC | Age: 70
End: 2020-11-12

## 2020-11-12 NOTE — TELEPHONE ENCOUNTER
----- Message from Dominique Pichardo sent at 11/12/2020  8:33 AM CST -----  Contact: [phone]  Patient states Dr. Jackson wanted him to do labs before seeing him. Patient's talya is scheduled for 11/28 and he gets his labs over  at [employer]. Please call and advise.

## 2020-11-13 ENCOUNTER — CLINICAL SUPPORT (OUTPATIENT)
Dept: REHABILITATION | Facility: HOSPITAL | Age: 70
End: 2020-11-13
Payer: COMMERCIAL

## 2020-11-13 DIAGNOSIS — M54.50 LUMBAR PAIN: ICD-10-CM

## 2020-11-13 DIAGNOSIS — Z74.09 IMPAIRED FUNCTIONAL MOBILITY, BALANCE, GAIT, AND ENDURANCE: Primary | ICD-10-CM

## 2020-11-13 PROCEDURE — 97110 THERAPEUTIC EXERCISES: CPT | Mod: PN

## 2020-11-13 NOTE — PROGRESS NOTES
"                                                    Physical Therapy Daily Note     Date: 11/13/2020  Name: Robles Jordan  Clinic Number: [id number]  Diagnosis:   Encounter Diagnoses   Name Primary?    Lumbar pain     Impaired functional mobility, balance, gait, and endurance Yes       Physician: Ameena Madison MD    Physician Orders: PT Eval and Treat   Medical Diagnosis from Referral: Z74.09 (ICD-10-CM) - Impaired mobility and ADLs  Evaluation Date: 9/1/2020  Authorization Period Expiration: 12/31/20  Plan of Care Expiration: 11/15/20-12/31/20  Visit # / Visits authorized: 5/ 20 (19 total)     Time In: 10:00 am   Time Out: 10:55 am  Total Billable Time: 20 minutes     Precautions: Standard and Fall  Ok to advance activities as tolerated (ok to lift > 10 lbs, gradual return to exercise, avoid extreme twisting/bending or overly strenuous activities if possible)    Subjective     Pt reports: He is feeling fatigued today after pool workout yesterday.     He was compliant with home exercise program.  Response to previous treatment: muscle fatigue in B LE  Functional change: improving ADL's    Pain: 1/10   Location: back and right LE       Objective   Bolded exercise performed.    Robles received therapeutic exercises to develop strength, endurance, ROM, flexibility, posture and core stabilization for 45 minutes including:     Nustep x 7 min level 6,     Standing heel raises 3x10  Marches in standing 3x10, 2#  Mini Squat in parallel bars 3x10  Step-up 6" 2x10 ea LE, 2#  Standing hip ext/abd with RTB x 20 each  Free motion paloff press 2 x 10 each with 7#  Free motion rows with 7# 2 x 12 each  Bridges RTB 3x10  SL clamshells RTB x 30 each  prone HS curls with Min-Mod A 3 x 10  short to tall kneel 2x 10  tall kneel with UE on blue ball 3 x 30"  +quadruped kick backs x 8 each  Prone hip IR/ER x 20 each with Min-Mod A  SLR 3 x 10 each  Shuttle 2.5 cords for squats x 30  Shuttle RLE squats 1.5 cords x " "30      NP:  Matrix hip abd 20# 2 x 15  Matrix rows 40# 2 x 10    Robles received the following supervised modalities after being cleared for contradictions: NMES Electrical Stimulation:  Robles received NMES Electrical Stimulation to elicit muscle contraction of the tibialis anterior. Pt received stimulation at a rate of 59 pps with symmetric current, ramp of 2 seconds with 10 second on time and 10 second off time. Patient tolerated treatment well without any adverse effects.   Supine hip flexor stretch x1 min B  Tandem stance 2x30" ea  SAQ 2 x 10 each with 3" hold  Shuttle heel raises 1 cord x 20-NP  LTR with limited ROM x 20 NP  Sit to stand 24" plyo 2x10 NP  Seated toe raises x30 NP      Arboleda received manual therapy to lower back for 00 minutes including:     STM to QL/extensors    Pt received cold pack to low back x10 min post tx.    Written Home Exercises Provided: no new exercises provided this session  Pt demo good understanding of the education provided. Robles demonstrated good return demonstration of activities.     Education provided: MICHELLE  Arboleda verbalized good understanding of education provided.   No spiritual or educational barriers to learning identified.    Assessment     Pt tolerated treatment session fair. Poor standing tolerance due to weakness in RLE. Progressed lumbar and hip strengthening in kneeling and quadruped positions to improve strength while increasing endurance. Improvement in functional hip flexion today as pt is able to actively lift RLE into hooklying position without UE assistance. Discussed progression of pool exercises to improve carry over.     GOALS: Short Term Goals:  5 weeks Updated 11/10/20  1. Report decreased in pain at worse less than  <   / =  4 /10  to increase tolerance for functional activities. Met 11/10/20  2. Pt to perform 5 time sit<> </= 17 seconds to improve functional mobility. Met 9/29/20  3. Increased R hip flexor MMT 1/2  to improve " gait.ongoing  4. Pt to demonstrate TUG score of </= 13 seconds without use of SPC. Met 9/29/20  5. Pt to tolerate HEP to improve ROM and independence with ADL's. Met 9/29/20  6. Pt to improve range of motion by 25% to allow for improved functional mobility to allow for improvement in IADLs. Met 9/29/20     Long Term Goals: 10 weeksProgressing  1. Report decreased in pain at worse less than  <   / =  2 /10  to increase tolerance for functional mobility. ongoing  2 .Pt to perform TUG </= 10 seconds without use of AD.Improved  3. Increased hip flexor and ankle DF MMT 1 grade to increase tolerance for ADL and work activities.ongoing  4. Pt will report at NV% or less limitation on FOTO Lumbar  to demonstrate decrease in disability and improvement in back pain.  5. Pt to demonstrate 5 time sit<>stand with single UE support and in </= 15 seconds to improve functional mobility. Improved, 16 seconds  6. Pt will perform single leg stance bilaterally for >/= 5 seconds to improve balance for gait and getting into bathtub.ongoing  7. Pt will be able to ascend/descend 2 steps without use of single handrail to be able to get into home at decreased fall risk.ongoing  8. Pt to improve range of motion by 75% to allow for improved functional mobility to allow for improvement in IADLs. improved       Plan   Extend 12/31/20  Continue with established Plan of Care towards PT goals.    Ada Boothe, PT, DPT

## 2020-11-14 NOTE — TELEPHONE ENCOUNTER
Labs ordered 7/31. Should still be accessible to [employer]; please have patient call them to confirm.

## 2020-11-16 ENCOUNTER — CLINICAL SUPPORT (OUTPATIENT)
Dept: REHABILITATION | Facility: HOSPITAL | Age: 70
End: 2020-11-16
Payer: COMMERCIAL

## 2020-11-16 DIAGNOSIS — M54.50 LUMBAR PAIN: ICD-10-CM

## 2020-11-16 DIAGNOSIS — Z74.09 IMPAIRED FUNCTIONAL MOBILITY, BALANCE, GAIT, AND ENDURANCE: Primary | ICD-10-CM

## 2020-11-16 PROCEDURE — 97110 THERAPEUTIC EXERCISES: CPT | Mod: PN

## 2020-11-16 NOTE — PROGRESS NOTES
"                                                    Physical Therapy Daily Note     Date: 11/16/2020  Name: Robles Jordan  Clinic Number: [id number]  Diagnosis:   Encounter Diagnoses   Name Primary?    Lumbar pain     Impaired functional mobility, balance, gait, and endurance Yes       Physician: Ameena Madison MD    Physician Orders: PT Eval and Treat   Medical Diagnosis from Referral: Z74.09 (ICD-10-CM) - Impaired mobility and ADLs  Evaluation Date: 9/1/2020  Authorization Period Expiration: 12/31/20  Plan of Care Expiration: 11/15/20-12/31/20  Visit # / Visits authorized: 6/ 20 (20 total)     Time In: 12:15 pm   Time Out: 1:10 pm  Total Billable Time: 25 minutes     Precautions: Standard and Fall  Ok to advance activities as tolerated (ok to lift > 10 lbs, gradual return to exercise, avoid extreme twisting/bending or overly strenuous activities if possible)    Subjective     Pt reports: He is feeling sore and burning sensation to lumbar after performing yard work over weekend.    He was compliant with home exercise program.  Response to previous treatment: muscle fatigue in B LE  Functional change: improving ADL's    Pain: 1/10   Location: back and right LE       Objective   Bolded exercise performed.    Robles received therapeutic exercises to develop strength, endurance, ROM, flexibility, posture and core stabilization for 45 minutes including:     Nustep x 7 min level 6,     Standing heel raises 3x10  Marches in standing 3x10, 2#  Mini Squat in parallel bars 3x10  Step-up 6" 2x10 ea LE, 2#  Standing hip ext/abd with RTB x 20 each  Free motion paloff press 2 x 10 each with 7#  Free motion rows with 7# 2 x 12 each  Bridges RTB 3x10  SL clamshells RTB x 30 each  prone HS curls with Min-Mod A 3 x 10  short to tall kneel 2x 10  tall kneel with UE on blue ball 4 x 30"  quadruped kick backs x 8 each  Prone hip IR/ER x 20 each with Min-Mod A  SLR 3 x 10 each  Shuttle 2.5 cords for squats x 30  Shuttle RLE " "squats 1.5 cords x 30      NP:  Matrix hip abd 20# 2 x 15  Matrix rows 40# 2 x 10    Robles received the following supervised modalities after being cleared for contradictions: NMES Electrical Stimulation:  Robles received NMES Electrical Stimulation to elicit muscle contraction of the tibialis anterior. Pt received stimulation at a rate of 59 pps with symmetric current, ramp of 2 seconds with 10 second on time and 10 second off time. Patient tolerated treatment well without any adverse effects.   Supine hip flexor stretch x1 min B  Tandem stance 2x30" ea  SAQ 2 x 10 each with 3" hold  Shuttle heel raises 1 cord x 20-NP  LTR with limited ROM x 20 NP  Sit to stand 24" plyo 2x10 NP  Seated toe raises x30 NP      Robles received manual therapy to lower back for 00 minutes including:     STM to QL/extensors    Pt received cold pack to low back x10 min post tx.    Written Home Exercises Provided: no new exercises provided this session  Pt demo good understanding of the education provided. Arboleda demonstrated good return demonstration of activities.     Education provided: MICHELLE  Arboleda verbalized good understanding of education provided.   No spiritual or educational barriers to learning identified.    Assessment     Pt tolerated treatment session fair. Improvement in standing tolerance noted this treatment session as patient was able to complete 20 reps on exercises prior to rest breaks. Continued focus on lumbar and hip extensor strengthening. Improvement in hamstring curl exercise with repetition, requires Min A intermittently to perform through full range of motion. Continue to challenge posterior chain in multiple positions to improve extension tolerance.    GOALS: Short Term Goals:  5 weeks Updated 11/10/20  1. Report decreased in pain at worse less than  <   / =  4 /10  to increase tolerance for functional activities. Met 11/10/20  2. Pt to perform 5 time sit<> </= 17 seconds to improve functional " mobility. Met 9/29/20  3. Increased R hip flexor MMT 1/2  to improve gait.ongoing  4. Pt to demonstrate TUG score of </= 13 seconds without use of SPC. Met 9/29/20  5. Pt to tolerate HEP to improve ROM and independence with ADL's. Met 9/29/20  6. Pt to improve range of motion by 25% to allow for improved functional mobility to allow for improvement in IADLs. Met 9/29/20     Long Term Goals: 10 weeksProgressing  1. Report decreased in pain at worse less than  <   / =  2 /10  to increase tolerance for functional mobility. ongoing  2 .Pt to perform TUG </= 10 seconds without use of AD.Improved  3. Increased hip flexor and ankle DF MMT 1 grade to increase tolerance for ADL and work activities.ongoing  4. Pt will report at NV% or less limitation on FOTO Lumbar  to demonstrate decrease in disability and improvement in back pain.  5. Pt to demonstrate 5 time sit<>stand with single UE support and in </= 15 seconds to improve functional mobility. Improved, 16 seconds  6. Pt will perform single leg stance bilaterally for >/= 5 seconds to improve balance for gait and getting into bathtub.ongoing  7. Pt will be able to ascend/descend 2 steps without use of single handrail to be able to get into home at decreased fall risk.ongoing  8. Pt to improve range of motion by 75% to allow for improved functional mobility to allow for improvement in IADLs. improved       Plan   Extend 12/31/20  Continue with established Plan of Care towards PT goals.    Ada Boothe, PT, DPT

## 2020-11-18 ENCOUNTER — TELEPHONE (OUTPATIENT)
Dept: INTERNAL MEDICINE | Facility: CLINIC | Age: 70
End: 2020-11-18

## 2020-11-28 ENCOUNTER — OFFICE VISIT (OUTPATIENT)
Dept: INTERNAL MEDICINE | Facility: CLINIC | Age: 70
End: 2020-11-28
Payer: COMMERCIAL

## 2020-11-28 VITALS
OXYGEN SATURATION: 100 % | HEART RATE: 77 BPM | WEIGHT: 236.31 LBS | BODY MASS INDEX: 30.33 KG/M2 | SYSTOLIC BLOOD PRESSURE: 158 MMHG | DIASTOLIC BLOOD PRESSURE: 76 MMHG | HEIGHT: 74 IN

## 2020-11-28 DIAGNOSIS — I10 ESSENTIAL HYPERTENSION: Primary | ICD-10-CM

## 2020-11-28 DIAGNOSIS — Z23 NEED FOR INFLUENZA VACCINATION: ICD-10-CM

## 2020-11-28 DIAGNOSIS — E78.2 MIXED HYPERLIPIDEMIA: ICD-10-CM

## 2020-11-28 DIAGNOSIS — Z12.11 COLON CANCER SCREENING: ICD-10-CM

## 2020-11-28 DIAGNOSIS — Z98.890 S/P LUMBAR LAMINECTOMY: ICD-10-CM

## 2020-11-28 DIAGNOSIS — R73.09 ELEVATED HEMOGLOBIN A1C: ICD-10-CM

## 2020-11-28 DIAGNOSIS — M79.18 MYALGIA, OTHER SITE: ICD-10-CM

## 2020-11-28 DIAGNOSIS — Z12.5 PROSTATE CANCER SCREENING: ICD-10-CM

## 2020-11-28 PROBLEM — R09.02 HYPOXIA: Status: RESOLVED | Noted: 2020-08-15 | Resolved: 2020-11-28

## 2020-11-28 PROBLEM — E66.9 OBESITY: Status: RESOLVED | Noted: 2020-08-15 | Resolved: 2020-11-28

## 2020-11-28 PROBLEM — M25.551 RIGHT HIP PAIN: Status: RESOLVED | Noted: 2018-05-11 | Resolved: 2020-11-28

## 2020-11-28 PROBLEM — G95.89 OTHER SPECIFIED DISEASES OF SPINAL CORD: Status: ACTIVE | Noted: 2020-08-18

## 2020-11-28 PROCEDURE — 90471 IMMUNIZATION ADMIN: CPT | Mod: S$GLB,,, | Performed by: INTERNAL MEDICINE

## 2020-11-28 PROCEDURE — 99215 PR OFFICE/OUTPT VISIT, EST, LEVL V, 40-54 MIN: ICD-10-PCS | Mod: 25,S$GLB,, | Performed by: INTERNAL MEDICINE

## 2020-11-28 PROCEDURE — 99999 PR PBB SHADOW E&M-EST. PATIENT-LVL IV: CPT | Mod: PBBFAC,,, | Performed by: INTERNAL MEDICINE

## 2020-11-28 PROCEDURE — 90471 FLU VACCINE - QUADRIVALENT - ADJUVANTED: ICD-10-PCS | Mod: S$GLB,,, | Performed by: INTERNAL MEDICINE

## 2020-11-28 PROCEDURE — 99999 PR PBB SHADOW E&M-EST. PATIENT-LVL IV: ICD-10-PCS | Mod: PBBFAC,,, | Performed by: INTERNAL MEDICINE

## 2020-11-28 PROCEDURE — 90694 FLU VACCINE - QUADRIVALENT - ADJUVANTED: ICD-10-PCS | Mod: S$GLB,,, | Performed by: INTERNAL MEDICINE

## 2020-11-28 PROCEDURE — 99215 OFFICE O/P EST HI 40 MIN: CPT | Mod: 25,S$GLB,, | Performed by: INTERNAL MEDICINE

## 2020-11-28 PROCEDURE — 90694 VACC AIIV4 NO PRSRV 0.5ML IM: CPT | Mod: S$GLB,,, | Performed by: INTERNAL MEDICINE

## 2020-11-28 NOTE — PATIENT INSTRUCTIONS
Please go to [facility] for fasting lab work ordered today. For fasting labs, please avoid any food or drinks besides water for 8 hours prior to the labs, but make sure to drink at least 1 glass of water beforehand so that you are not dehydrated.    Blood Pressure Measurement:    -- Please record your blood pressure once a day for the next week. When checking, make sure you have been sitting for about 5 minutes, your legs are uncrossed, and the blood pressure cuff at the level of your heart. Record your blood pressure with the date and time in a log.  -- Goal blood pressure is top number in the 120s (or lower) and the bottom close to 80 (or below). If the top number is in the 130s, please continue with healthy diet and exercise to try to bring it down. If the top number consistently is 140 or above, or the bottom number consistently 90 or above, we will need to start/adjust medication treatment.  -- Please send the readings in a message next week for Dr Jackson to review.    For the hand cramps, there may be some carpal tunnel syndrome developing. Try wearing an over-the-counter wrist brace or splint just at night for a few weeks to see if this helps with symptoms.

## 2020-11-28 NOTE — PROGRESS NOTES
Administered high dose flu vaccine to the right deltoid, tolerated well, no c/o pain noted, no adverse reaction noted.

## 2020-11-28 NOTE — PROGRESS NOTES
Subjective:       Patient ID: Robles Jordan is a 69 y.o. male.    Chief Complaint: Follow-up      Last visit with me 7/31/2020. Upcoming appointment with [facility] and rehab.    HPI    Still working to get strength back, some neck and back pain. Tylenol works to control pain.     Was on Flomax, since stopping only getting up once overnight. No pain with urination. No urinary frequency.    Due for colonoscopy but planning to wait until after rehab for back surgery is done.     Still using CPAP regularly.     Gets cramping pain in hands and legs. Hands cramp when overuse. Denies numbness/tingling.    Review of Systems   All other systems reviewed and are negative.      Objective:      Physical Exam  Vitals signs and nursing note reviewed.   Constitutional:       General: He is not in acute distress.     Appearance: He is not diaphoretic.   HENT:      Head: Normocephalic and atraumatic.      Right Ear: External ear normal.      Left Ear: External ear normal.   Neck:      Musculoskeletal: Normal range of motion.      Thyroid: No thyromegaly.   Cardiovascular:      Rate and Rhythm: Normal rate and regular rhythm.      Heart sounds: Normal heart sounds.   Pulmonary:      Effort: Pulmonary effort is normal.      Breath sounds: Normal breath sounds. No stridor. No wheezing or rales.   Abdominal:      Palpations: Abdomen is soft.      Tenderness: There is no abdominal tenderness. There is no guarding.   Lymphadenopathy:      Cervical: No cervical adenopathy.   Skin:     General: Skin is warm and dry.      Findings: No rash.   Neurological:      General: No focal deficit present.      Mental Status: He is alert and oriented to person, place, and time.      Gait: Gait abnormal (wide steps to gait, ambulates with walking cane).   Psychiatric:         Mood and Affect: Mood normal.         Behavior: Behavior normal.         Vitals:    11/28/20 1448 11/28/20 1514   BP: (!) 164/88 (!) 158/76   BP Location: Left arm Left arm   Patient  "Position: Sitting Sitting   BP Method: Medium (Manual) Large (Manual)   Pulse: 77    SpO2: 100%    Weight: 107.2 kg (236 lb 5.3 oz)    Height: 6' 2" (1.88 m)      Body mass index is 30.34 kg/m².    I have personally checked the blood pressure and documented my findings.     RESULTS: Reviewed labs from last 12 months    Assessment:       1. Essential hypertension    2. Mixed hyperlipidemia    3. Elevated hemoglobin A1c    4. Myalgia, other site    5. Need for influenza vaccination    6. Prostate cancer screening    7. Colon cancer screening    8. S/P lumbar laminectomy        Plan:   Robles was seen today for follow-up.    Diagnoses and all orders for this visit:    Essential hypertension:  Prior diagnosis, elevated today. possibly 'white coat hypertension' with higher in-office readings than home readings. Check home readings for 1 week and send message, if not at goal adjust medications. Continue regular CPAP use every night.  -     CBC Without Differential; Future  -     Comprehensive Metabolic Panel; Future  -     CBC Without Differential  -     Comprehensive Metabolic Panel    Mixed hyperlipidemia:  Prior diagnosis, stable, well controlled on current management. No changes at this time, will continue to monitor.   -     Lipid Panel; Future  -     TSH; Future  -     Lipid Panel  -     TSH    Elevated hemoglobin A1c:  Prior diagnosis, recheck levels.  -     Hemoglobin A1C; Future  -     Hemoglobin A1C    Myalgia, other site:  Longstanding problem, in hands and legs. Check for deficiency. possibly also carpal tunnel syndrome, try wrist splint.  -     Vitamin D; Future  -     Magnesium; Future  -     Vitamin D  -     Magnesium    Need for influenza vaccination:  Give today.    Prostate cancer screening  -     PSA, Screening; Future  -     PSA, Screening    Colon cancer screening:  Patient defers to Jan, will order then.    S/P lumbar laminectomy:  Prior diagnosis, patient doing well with rehab, continue follow up " with [facility].      Follow up in about 6 months (around 5/28/2021) for follow up visit.  John Jackson MD, FACP [facility]    Portions of this note were completed using medical dictation software. Please excuse typographical or syntax errors that were missed on review.

## 2020-12-01 ENCOUNTER — CLINICAL SUPPORT (OUTPATIENT)
Dept: REHABILITATION | Facility: HOSPITAL | Age: 70
End: 2020-12-01
Payer: COMMERCIAL

## 2020-12-01 DIAGNOSIS — M54.50 LUMBAR PAIN: ICD-10-CM

## 2020-12-01 DIAGNOSIS — Z74.09 IMPAIRED FUNCTIONAL MOBILITY, BALANCE, GAIT, AND ENDURANCE: Primary | ICD-10-CM

## 2020-12-01 PROCEDURE — 97110 THERAPEUTIC EXERCISES: CPT | Mod: PN

## 2020-12-01 NOTE — PROGRESS NOTES
"                                                    Physical Therapy Daily Note     Date: 12/01/2020  Name: Robles Jordan  Clinic Number: [id number]  Diagnosis:   Encounter Diagnoses   Name Primary?    Lumbar pain     Impaired functional mobility, balance, gait, and endurance Yes       Physician: Ameena Madison MD    Physician Orders: PT Eval and Treat   Medical Diagnosis from Referral: Z74.09 (ICD-10-CM) - Impaired mobility and ADLs  Evaluation Date: 9/1/2020  Authorization Period Expiration: 12/31/20  Plan of Care Expiration: 11/15/20-12/31/20  Visit # / Visits authorized: 7/ 20 (21 total)     Time In: 10:30 am   Time Out: 11:23 am  Total Billable Time: 43 minutes     Precautions: Standard and Fall  Ok to advance activities as tolerated (ok to lift > 10 lbs, gradual return to exercise, avoid extreme twisting/bending or overly strenuous activities if possible)    Subjective     Pt reports: He feels good. Didn't swim last week due to Holidays but has been performing exercises at home.     He was compliant with home exercise program.  Response to previous treatment: muscle fatigue in B LE  Functional change: improving ADL's    Pain: 1/10   Location: back and right LE       Objective   Bolded exercise performed.    Robles received therapeutic exercises to develop strength, endurance, ROM, flexibility, posture and core stabilization for 43 minutes including:     Nustep x 7 min level 6,     Standing heel raises 3x10  Marches in standing 3x10, 2#  Mini Squat in parallel bars 3x10  Step-up 6" 2x10 ea LE, 2#  Standing hip ext/abd with RTB x 20 each  Free motion paloff press 2 x 10 each with 7#  Free motion rows with 7# 2 x 12 each  Bridges RTB 3x10  SL clamshells RTB x 30 each  prone HS curls with Min-Mod A 3 x 10  short to tall kneel 2x 10  tall kneel with UE on blue ball 5 x 30"  quadruped kick backs x 10 each  +half knee to standing with RLE propped up on green step+airex x 5 with single UE support  SL hip " "abd x 10  Prone hip IR/ER x 20 each with Min-Mod A  SLR 3 x 10 each  Shuttle 2.5 cords for squats x 30  Shuttle RLE squats 1.5 cords x 30      NP:  Matrix hip abd 20# 2 x 15  Matrix rows 40# 2 x 10    Arboleda received the following supervised modalities after being cleared for contradictions: NMES Electrical Stimulation:  Arboleda received NMES Electrical Stimulation to elicit muscle contraction of the tibialis anterior. Pt received stimulation at a rate of 59 pps with symmetric current, ramp of 2 seconds with 10 second on time and 10 second off time. Patient tolerated treatment well without any adverse effects.   Supine hip flexor stretch x1 min B  Tandem stance 2x30" ea  SAQ 2 x 10 each with 3" hold  Shuttle heel raises 1 cord x 20-NP  LTR with limited ROM x 20 NP  Sit to stand 24" plyo 2x10 NP  Seated toe raises x30 NP      Arboleda received manual therapy to lower back for 00 minutes including:     STM to QL/extensors    Pt received cold pack to low back x10 min post tx.    Written Home Exercises Provided: no new exercises provided this session  Pt demo good understanding of the education provided. Robles demonstrated good return demonstration of activities.     Education provided: MICHELLE  Arboleda verbalized good understanding of education provided.   No spiritual or educational barriers to learning identified.    Assessment     Pt tolerated treatment session fair. Improved standing tolerance with noted progress for hip extension tolerance during gait. Improvement in active hip flexion on RLE to be able to transfer into/out of car and bed mobility. Continue to challenge posterior chain in multiple positions to improve extension tolerance.     GOALS: Short Term Goals:  5 weeks Updated 11/10/20  1. Report decreased in pain at worse less than  <   / =  4 /10  to increase tolerance for functional activities. Met 11/10/20  2. Pt to perform 5 time sit<> </= 17 seconds to improve functional mobility. Met " 9/29/20  3. Increased R hip flexor MMT 1/2  to improve gait.ongoing  4. Pt to demonstrate TUG score of </= 13 seconds without use of SPC. Met 9/29/20  5. Pt to tolerate HEP to improve ROM and independence with ADL's. Met 9/29/20  6. Pt to improve range of motion by 25% to allow for improved functional mobility to allow for improvement in IADLs. Met 9/29/20     Long Term Goals: 10 weeksProgressing  1. Report decreased in pain at worse less than  <   / =  2 /10  to increase tolerance for functional mobility. ongoing  2 .Pt to perform TUG </= 10 seconds without use of AD.Improved  3. Increased hip flexor and ankle DF MMT 1 grade to increase tolerance for ADL and work activities.ongoing  4. Pt will report at NV% or less limitation on FOTO Lumbar  to demonstrate decrease in disability and improvement in back pain.  5. Pt to demonstrate 5 time sit<>stand with single UE support and in </= 15 seconds to improve functional mobility. Improved, 16 seconds  6. Pt will perform single leg stance bilaterally for >/= 5 seconds to improve balance for gait and getting into bathtub.ongoing  7. Pt will be able to ascend/descend 2 steps without use of single handrail to be able to get into home at decreased fall risk.ongoing  8. Pt to improve range of motion by 75% to allow for improved functional mobility to allow for improvement in IADLs. improved       Plan   Extend 12/31/20  Continue with established Plan of Care towards PT goals.    Ada Boothe, PT, DPT

## 2020-12-08 ENCOUNTER — CLINICAL SUPPORT (OUTPATIENT)
Dept: REHABILITATION | Facility: HOSPITAL | Age: 70
End: 2020-12-08
Payer: COMMERCIAL

## 2020-12-08 DIAGNOSIS — M54.50 LUMBAR PAIN: ICD-10-CM

## 2020-12-08 DIAGNOSIS — Z74.09 IMPAIRED FUNCTIONAL MOBILITY, BALANCE, GAIT, AND ENDURANCE: Primary | ICD-10-CM

## 2020-12-08 PROCEDURE — 97110 THERAPEUTIC EXERCISES: CPT | Mod: KX,PN

## 2020-12-08 PROCEDURE — 97116 GAIT TRAINING THERAPY: CPT | Mod: KX,PN

## 2020-12-08 NOTE — PROGRESS NOTES
"                                                    Physical Therapy Daily Note     Date: 12/08/2020  Name: Robles Jordan  Clinic Number: [id number]  Diagnosis:   Encounter Diagnoses   Name Primary?    Lumbar pain     Impaired functional mobility, balance, gait, and endurance Yes       Physician: Ameena Madison MD    Physician Orders: PT Eval and Treat   Medical Diagnosis from Referral: Z74.09 (ICD-10-CM) - Impaired mobility and ADLs  Evaluation Date: 9/1/2020  Authorization Period Expiration: 12/31/20  Plan of Care Expiration: 11/15/20-12/31/20  Visit # / Visits authorized: 8/ 20 (22 total)     Time In: 11:15 am   Time Out: 12:20 pm  Total Billable Time: 55 minutes     Precautions: Standard and Fall  Ok to advance activities as tolerated (ok to lift > 10 lbs, gradual return to exercise, avoid extreme twisting/bending or overly strenuous activities if possible)    Subjective     Pt reports: He feels good, was fatigued after previous treatment session. Has not been in the pool lately but is attempting to get back in.    He was compliant with home exercise program.  Response to previous treatment: muscle fatigue in B LE  Functional change: improving ADL's    Pain: 1/10   Location: back and right LE       Objective   Bolded exercise performed.    Robles received therapeutic exercises to develop strength, endurance, ROM, flexibility, posture and core stabilization for 47 minutes including:     Nustep x 7 min level 6,     Standing heel raises 3x10  Marches in standing 3x10, 2#  Mini Squat in parallel bars 3x10  Step-up 6" 2x10 ea LE, 2#  Standing hip ext/abd with RTB x 20 each  Free motion paloff press 2 x 10 each with 7#  Free motion rows with 7# 2 x 12 each  Bridges RTB 3x10  SL clamshells RTB x 30 each  prone HS curls with Min-Mod A 3 x 10  short to tall kneel 2x 10  tall kneel with UE on blue ball 5 x 30"  quadruped kick backs x 10 each  half knee to standing with RLE propped up on green step+airex x 5 " "with single UE support  SL hip abd 2 x 10  Prone hip IR/ER x 20 each with Min-Mod A  SLR 3 x 10 each  Shuttle 2.5 cords for squats x 30  Shuttle RLE squats 1.5 cords x 30      NP:  Matrix hip abd 20# 2 x 15  Matrix rows 40# 2 x 10    Arboleda received the following supervised modalities after being cleared for contradictions: NMES Electrical Stimulation:  Arboleda received NMES Electrical Stimulation to elicit muscle contraction of the tibialis anterior. Pt received stimulation at a rate of 59 pps with symmetric current, ramp of 2 seconds with 10 second on time and 10 second off time. Patient tolerated treatment well without any adverse effects.   Supine hip flexor stretch x1 min B  Tandem stance 2x30" ea  SAQ 2 x 10 each with 3" hold  Shuttle heel raises 1 cord x 20-NP  LTR with limited ROM x 20 NP  Sit to stand 24" plyo 2x10 NP  Seated toe raises x30 NP    Arboleda performed gait training for 8 minutes including:    Gait without SPC for 110 ft and 90 ft    Arboleda received manual therapy to lower back for 00 minutes including:     STM to QL/extensors    Pt received cold pack to low back x10 min post tx.    Written Home Exercises Provided: no new exercises provided this session  Pt demo good understanding of the education provided. Arboleda demonstrated good return demonstration of activities.     Education provided: MICHELLE Dillph verbalized good understanding of education provided.   No spiritual or educational barriers to learning identified.    Assessment     Pt tolerated treatment session well. Improvement in tolerance to exercises in standing by ability to perform multiple sets without rest breaks. Still unable to stand for more than 5-10 minutes without UE support. Performed gait without SPC, increase in lumbar side flexion to progress RLE when fatigued. Continue to challenge posterior chain in multiple positions to improve extension tolerance.     GOALS: Short Term Goals:  5 weeks Updated 11/10/20  1. " Report decreased in pain at worse less than  <   / =  4 /10  to increase tolerance for functional activities. Met 11/10/20  2. Pt to perform 5 time sit<> </= 17 seconds to improve functional mobility. Met 9/29/20  3. Increased R hip flexor MMT 1/2  to improve gait.ongoing  4. Pt to demonstrate TUG score of </= 13 seconds without use of SPC. Met 9/29/20  5. Pt to tolerate HEP to improve ROM and independence with ADL's. Met 9/29/20  6. Pt to improve range of motion by 25% to allow for improved functional mobility to allow for improvement in IADLs. Met 9/29/20     Long Term Goals: 10 weeksProgressing  1. Report decreased in pain at worse less than  <   / =  2 /10  to increase tolerance for functional mobility. ongoing  2 .Pt to perform TUG </= 10 seconds without use of AD.Improved  3. Increased hip flexor and ankle DF MMT 1 grade to increase tolerance for ADL and work activities.ongoing  4. Pt will report at NV% or less limitation on FOTO Lumbar  to demonstrate decrease in disability and improvement in back pain.  5. Pt to demonstrate 5 time sit<>stand with single UE support and in </= 15 seconds to improve functional mobility. Improved, 16 seconds  6. Pt will perform single leg stance bilaterally for >/= 5 seconds to improve balance for gait and getting into bathtub.ongoing  7. Pt will be able to ascend/descend 2 steps without use of single handrail to be able to get into home at decreased fall risk.ongoing  8. Pt to improve range of motion by 75% to allow for improved functional mobility to allow for improvement in IADLs. improved       Plan   Extend 12/31/20  Continue with established Plan of Care towards PT goals.    Ada Boothe, PT, DPT

## 2020-12-15 ENCOUNTER — CLINICAL SUPPORT (OUTPATIENT)
Dept: REHABILITATION | Facility: HOSPITAL | Age: 70
End: 2020-12-15
Payer: COMMERCIAL

## 2020-12-15 DIAGNOSIS — M54.50 LUMBAR PAIN: ICD-10-CM

## 2020-12-15 DIAGNOSIS — Z74.09 IMPAIRED FUNCTIONAL MOBILITY, BALANCE, GAIT, AND ENDURANCE: Primary | ICD-10-CM

## 2020-12-15 PROCEDURE — 97110 THERAPEUTIC EXERCISES: CPT | Mod: PN

## 2020-12-15 NOTE — PROGRESS NOTES
"                                                    Physical Therapy Daily Note     Date: 12/15/2020  Name: Robles Jordan  Clinic Number: [id number]  Diagnosis:   Encounter Diagnoses   Name Primary?    Lumbar pain     Impaired functional mobility, balance, gait, and endurance Yes       Physician: Ameena Madison MD    Physician Orders: PT Eval and Treat   Medical Diagnosis from Referral: Z74.09 (ICD-10-CM) - Impaired mobility and ADLs  Evaluation Date: 9/1/2020  Authorization Period Expiration: 12/31/20  Plan of Care Expiration: 11/15/20-12/31/20  Visit # / Visits authorized: 8/ 20 (22 total)     Time In: 1100  Time Out: 1155 pm  Total Billable Time: 45 minutes     Precautions: Standard and Fall  Ok to advance activities as tolerated (ok to lift > 10 lbs, gradual return to exercise, avoid extreme twisting/bending or overly strenuous activities if possible)    Subjective     Pt reports: He is improving slowly overall, greatest complaint remains R LE weakness/fatigue with increased activity. He will see neurosurgeon in January 2021 and determine PT plan moving forward after today.    He was compliant with home exercise program.  Response to previous treatment: muscle fatigue in B LE  Functional change: improving ADL's    Pain: 1/10   Location: back and right LE       Objective   Bolded exercise performed.    Robles received therapeutic exercises to develop strength, endurance, ROM, flexibility, posture and core stabilization for 47 minutes including:     Nustep x 7 min level 6,     Standing heel raises 3x10  Marches in standing 3x10, 2#  Mini Squat in parallel bars 3x10  Side Stepping RTB at ankles 4 laps in // bars   short to tall kneel 2x 10  tall kneel with UE on blue ball 5 x 30"  Shuttle RLE squats with hold 1 cords x 30    NP:  Step-up 6" 2x10 ea LE, 2#  Standing hip ext/abd with RTB x 20 each  Free motion paloff press 2 x 10 each with 7#  Free motion rows with 7# 2 x 12 each  Bridges RTB 3x10  SL " clamshells RTB x 30 each  prone HS curls with Min-Mod A 3 x 10  quadruped kick backs x 10 each  half knee to standing with RLE propped up on green step+airex x 5 with single UE support  SL hip abd 2 x 10  Prone hip IR/ER x 20 each with Min-Mod A  SLR 3 x 10 each  Shuttle 2.5 cords for squats x 30  Matrix hip abd 20# 2 x 15  Matrix rows 40# 2 x 10      Arboleda received manual therapy to lower back for 00 minutes including:     Pt received cold pack to low back x10 min post tx.    Written Home Exercises Provided: no new exercises provided this session  Pt demo good understanding of the education provided. Arboleda demonstrated good return demonstration of activities.     Education provided: MICHELLE Arboleda verbalized good understanding of education provided.   No spiritual or educational barriers to learning identified.    Assessment     Pt tolerated treatment session well. Pt with fair progress made during episode of care, however continues to experience significant R LE/lumbar fatigue requiring immediate sitting rest break following completion of targeted repetitions of therex program. Recent treatment sessions targeted posterior chain primarily with UE continues to be required for assistance due to core/glute weakness. Pt is compliant with HEP and will continue post-operative rehab independently until next follow-up with MD.       Improvement in tolerance to exercises in standing by ability to perform multiple sets without rest breaks. Still unable to stand for more than 5-10 minutes without UE support. Performed gait without SPC, increase in lumbar side flexion to progress RLE when fatigued. Continue to challenge posterior chain in multiple positions to improve extension tolerance.     GOALS: Short Term Goals:  5 weeks Updated 11/10/20  1. Report decreased in pain at worse less than  <   / =  4 /10  to increase tolerance for functional activities. Met 11/10/20  2. Pt to perform 5 time sit<> </= 17 seconds  to improve functional mobility. Met 9/29/20  3. Increased R hip flexor MMT 1/2  to improve gait.ongoing  4. Pt to demonstrate TUG score of </= 13 seconds without use of SPC. Met 9/29/20  5. Pt to tolerate HEP to improve ROM and independence with ADL's. Met 9/29/20  6. Pt to improve range of motion by 25% to allow for improved functional mobility to allow for improvement in IADLs. Met 9/29/20     Long Term Goals: 10 weeks Progressing  1. Report decreased in pain at worse less than  <   / =  2 /10  to increase tolerance for functional mobility. ongoing  2 .Pt to perform TUG </= 10 seconds without use of AD.Improved  3. Increased hip flexor and ankle DF MMT 1 grade to increase tolerance for ADL and work activities.ongoing  4. Pt will report at NV% or less limitation on FOTO Lumbar  to demonstrate decrease in disability and improvement in back pain.  5. Pt to demonstrate 5 time sit<>stand with single UE support and in </= 15 seconds to improve functional mobility. Improved, 16 seconds  6. Pt will perform single leg stance bilaterally for >/= 5 seconds to improve balance for gait and getting into bathtub.ongoing  7. Pt will be able to ascend/descend 2 steps without use of single handrail to be able to get into home at decreased fall risk.ongoing  8. Pt to improve range of motion by 75% to allow for improved functional mobility to allow for improvement in IADLs. improved       Plan   Extend 12/31/20  Pt is compliant with HEP and will continue post-operative rehab independently until next follow-up with MD.   Moustapha Gao, PT, DPT

## 2020-12-17 ENCOUNTER — TELEPHONE (OUTPATIENT)
Dept: INTERNAL MEDICINE | Facility: CLINIC | Age: 70
End: 2020-12-17

## 2020-12-17 DIAGNOSIS — I10 ESSENTIAL HYPERTENSION: ICD-10-CM

## 2020-12-18 NOTE — PROGRESS NOTES
Refill Routing Note   Medication(s) are not appropriate for processing by [facility] for the following reason(s):     - Required vitals are abnormal  [facility] action(s):  Defer     Medication Therapy Plan: CDMR; BP elevated at recent encounters  Medication reconciliation completed: No   Automatic Epic Generated Protocol Data:        Requested Prescriptions   Pending Prescriptions Disp Refills    amLODIPine (NORVASC) 10 MG tablet [Pharmacy Med Name: AMLODIPINE BESYLATE 10 MG TAB] 90 tablet 3     Sig: TAKE 1 TABLET (10 MG TOTAL) BY MOUTH ONCE DAILY. FOR BLOOD PRESSURE       Cardiovascular:  Calcium Channel Blockers Failed - 12/17/2020  4:03 AM        Failed - Last BP in normal range within 360 days.     BP Readings from Last 3 Encounters:   11/28/20 (!) 158/76   09/28/20 (!) 140/71   08/31/20 (!) 154/70              Passed - Patient is at least 18 years old        Passed - Office visit in past 12 months or future 90 days     Recent Outpatient Visits            2 weeks ago Essential hypertension    [facility] John Jackson MD    2 months ago S/P lumbar laminectomy    [facility] [place] [facility] Chico Rodriguez DO    3 months ago S/P lumbar laminectomy    [facility] [place] [facility] Briana Seals PA-C    4 months ago Essential hypertension    [facility] belle Jackson MD    4 months ago Tethered cord syndrome    [facility] [place] [facility] Chico Rodriguez DO          Future Appointments              In 2 weeks Chico Rodriguez DO [facility], [facility]    In 5 months MD [facility] Sheehan [facility], Haile Williamson PCW                      Appointments  past 12m or future 3m with PCP    Date Provider   Last Visit   11/28/2020 John Jackson MD   Next Visit   5/31/2021 John Jackson MD   ED visits in past 90 days: 0        Note composed:12:29 PM 12/18/2020

## 2020-12-18 NOTE — TELEPHONE ENCOUNTER
I received the refill request; please call patient to get his last 3 home blood pressure visits so I can make sure no adjustments are needed to the medication before I send it in.

## 2020-12-18 NOTE — TELEPHONE ENCOUNTER
Called and spoke to pt he was driving at the time when he gets home he will send this to us via My O

## 2020-12-19 ENCOUNTER — PATIENT MESSAGE (OUTPATIENT)
Dept: INTERNAL MEDICINE | Facility: CLINIC | Age: 70
End: 2020-12-19

## 2020-12-19 DIAGNOSIS — I10 ESSENTIAL HYPERTENSION: Primary | ICD-10-CM

## 2020-12-19 RX ORDER — CHLORTHALIDONE 25 MG/1
25 TABLET ORAL DAILY
Qty: 90 TABLET | Refills: 3 | Status: SHIPPED | OUTPATIENT
Start: 2020-12-19 | End: 2021-12-22

## 2020-12-19 RX ORDER — AMLODIPINE BESYLATE 10 MG/1
10 TABLET ORAL DAILY
Qty: 90 TABLET | Refills: 3 | Status: SHIPPED | OUTPATIENT
Start: 2020-12-19 | End: 2021-12-22

## 2020-12-22 RX ORDER — AMLODIPINE BESYLATE 10 MG/1
10 TABLET ORAL DAILY
Qty: 90 TABLET | Refills: 3 | OUTPATIENT
Start: 2020-12-22 | End: 2021-12-22

## 2021-01-12 DIAGNOSIS — Z12.11 COLON CANCER SCREENING: Primary | ICD-10-CM

## 2021-01-12 DIAGNOSIS — I10 ESSENTIAL HYPERTENSION: ICD-10-CM

## 2021-01-12 RX ORDER — LISINOPRIL 40 MG/1
TABLET ORAL
Qty: 90 TABLET | Refills: 3 | Status: SHIPPED | OUTPATIENT
Start: 2021-01-12 | End: 2022-01-28 | Stop reason: SDUPTHER

## 2021-01-20 ENCOUNTER — OFFICE VISIT (OUTPATIENT)
Dept: NEUROSURGERY | Facility: CLINIC | Age: 71
End: 2021-01-20
Payer: COMMERCIAL

## 2021-01-20 VITALS
BODY MASS INDEX: 30.05 KG/M2 | SYSTOLIC BLOOD PRESSURE: 145 MMHG | TEMPERATURE: 98 F | OXYGEN SATURATION: 99 % | WEIGHT: 234.13 LBS | HEART RATE: 98 BPM | DIASTOLIC BLOOD PRESSURE: 68 MMHG | HEIGHT: 74 IN

## 2021-01-20 DIAGNOSIS — Q06.8 TETHERED CORD SYNDROME: ICD-10-CM

## 2021-01-20 DIAGNOSIS — Z98.890 S/P LUMBAR LAMINECTOMY: Primary | ICD-10-CM

## 2021-01-20 PROCEDURE — 99999 PR PBB SHADOW E&M-EST. PATIENT-LVL IV: ICD-10-PCS | Mod: PBBFAC,,, | Performed by: NEUROLOGICAL SURGERY

## 2021-01-20 PROCEDURE — 99214 OFFICE O/P EST MOD 30 MIN: CPT | Mod: S$GLB,,, | Performed by: NEUROLOGICAL SURGERY

## 2021-01-20 PROCEDURE — 99999 PR PBB SHADOW E&M-EST. PATIENT-LVL IV: CPT | Mod: PBBFAC,,, | Performed by: NEUROLOGICAL SURGERY

## 2021-01-20 PROCEDURE — 99214 PR OFFICE/OUTPT VISIT, EST, LEVL IV, 30-39 MIN: ICD-10-PCS | Mod: S$GLB,,, | Performed by: NEUROLOGICAL SURGERY

## 2021-02-09 ENCOUNTER — CLINICAL SUPPORT (OUTPATIENT)
Dept: REHABILITATION | Facility: HOSPITAL | Age: 71
End: 2021-02-09
Attending: NEUROLOGICAL SURGERY
Payer: COMMERCIAL

## 2021-02-09 DIAGNOSIS — Z74.09 IMPAIRED FUNCTIONAL MOBILITY, BALANCE, GAIT, AND ENDURANCE: Primary | ICD-10-CM

## 2021-02-09 DIAGNOSIS — Q06.8 TETHERED CORD SYNDROME: ICD-10-CM

## 2021-02-09 DIAGNOSIS — Z98.890 S/P LUMBAR LAMINECTOMY: ICD-10-CM

## 2021-02-09 DIAGNOSIS — R26.2 DIFFICULTY WALKING: ICD-10-CM

## 2021-02-09 DIAGNOSIS — R29.898 DECREASED STRENGTH OF LOWER EXTREMITY: ICD-10-CM

## 2021-02-09 PROBLEM — M54.50 LUMBAR PAIN: Status: RESOLVED | Noted: 2020-09-01 | Resolved: 2021-02-09

## 2021-02-09 PROCEDURE — 97162 PT EVAL MOD COMPLEX 30 MIN: CPT | Mod: PN

## 2021-02-09 PROCEDURE — 97110 THERAPEUTIC EXERCISES: CPT | Mod: PN

## 2021-02-10 ENCOUNTER — TELEPHONE (OUTPATIENT)
Dept: ENDOSCOPY | Facility: HOSPITAL | Age: 71
End: 2021-02-10

## 2021-02-10 DIAGNOSIS — Z01.818 PRE-OP TESTING: Primary | ICD-10-CM

## 2021-02-22 ENCOUNTER — LAB VISIT (OUTPATIENT)
Dept: FAMILY MEDICINE | Facility: CLINIC | Age: 71
End: 2021-02-22
Payer: COMMERCIAL

## 2021-02-22 DIAGNOSIS — Z01.818 PRE-OP TESTING: ICD-10-CM

## 2021-02-22 PROCEDURE — U0005 INFEC AGEN DETEC AMPLI PROBE: HCPCS

## 2021-02-22 PROCEDURE — U0003 INFECTIOUS AGENT DETECTION BY NUCLEIC ACID (DNA OR RNA); SEVERE ACUTE RESPIRATORY SYNDROME CORONAVIRUS 2 (SARS-COV-2) (CORONAVIRUS DISEASE [COVID-19]), AMPLIFIED PROBE TECHNIQUE, MAKING USE OF HIGH THROUGHPUT TECHNOLOGIES AS DESCRIBED BY CMS-2020-01-R: HCPCS

## 2021-02-23 LAB — SARS-COV-2 RNA RESP QL NAA+PROBE: NOT DETECTED

## 2021-02-24 ENCOUNTER — CLINICAL SUPPORT (OUTPATIENT)
Dept: REHABILITATION | Facility: HOSPITAL | Age: 71
End: 2021-02-24
Attending: NEUROLOGICAL SURGERY
Payer: COMMERCIAL

## 2021-02-24 DIAGNOSIS — R26.2 DIFFICULTY WALKING: ICD-10-CM

## 2021-02-24 PROCEDURE — 97110 THERAPEUTIC EXERCISES: CPT | Mod: PN

## 2021-02-25 ENCOUNTER — HOSPITAL ENCOUNTER (OUTPATIENT)
Facility: HOSPITAL | Age: 71
Discharge: HOME | End: 2021-02-25
Attending: COLON & RECTAL SURGERY | Admitting: COLON & RECTAL SURGERY
Payer: COMMERCIAL

## 2021-02-25 ENCOUNTER — ANESTHESIA EVENT (OUTPATIENT)
Dept: ENDOSCOPY | Facility: HOSPITAL | Age: 71
End: 2021-02-25
Payer: COMMERCIAL

## 2021-02-25 ENCOUNTER — ANESTHESIA (OUTPATIENT)
Dept: ENDOSCOPY | Facility: HOSPITAL | Age: 71
End: 2021-02-25
Payer: COMMERCIAL

## 2021-02-25 VITALS
TEMPERATURE: 98 F | OXYGEN SATURATION: 99 % | BODY MASS INDEX: 30.8 KG/M2 | RESPIRATION RATE: 14 BRPM | DIASTOLIC BLOOD PRESSURE: 66 MMHG | WEIGHT: 240 LBS | HEART RATE: 84 BPM | SYSTOLIC BLOOD PRESSURE: 142 MMHG | HEIGHT: 74 IN

## 2021-02-25 DIAGNOSIS — Z12.11 COLON CANCER SCREENING: ICD-10-CM

## 2021-02-25 DIAGNOSIS — Z86.010 HISTORY OF COLONIC POLYPS: Primary | ICD-10-CM

## 2021-02-25 PROCEDURE — 88305 TISSUE EXAM BY PATHOLOGIST: CPT | Mod: 59 | Performed by: PATHOLOGY

## 2021-02-25 PROCEDURE — 27201089 HC SNARE, DISP (ANY): Performed by: COLON & RECTAL SURGERY

## 2021-02-25 PROCEDURE — 45385 PR COLONOSCOPY,REMV LESN,SNARE: ICD-10-PCS | Mod: 33,,, | Performed by: COLON & RECTAL SURGERY

## 2021-02-25 PROCEDURE — 88305 TISSUE EXAM BY PATHOLOGIST: ICD-10-PCS | Mod: 26,,, | Performed by: PATHOLOGY

## 2021-02-25 PROCEDURE — 63600175 PHARM REV CODE 636 W HCPCS: Performed by: NURSE ANESTHETIST, CERTIFIED REGISTERED

## 2021-02-25 PROCEDURE — 37000008 HC ANESTHESIA 1ST 15 MINUTES: Performed by: COLON & RECTAL SURGERY

## 2021-02-25 PROCEDURE — 45385 COLONOSCOPY W/LESION REMOVAL: CPT | Performed by: COLON & RECTAL SURGERY

## 2021-02-25 PROCEDURE — 25000003 PHARM REV CODE 250: Performed by: COLON & RECTAL SURGERY

## 2021-02-25 PROCEDURE — 45385 COLONOSCOPY W/LESION REMOVAL: CPT | Mod: 33,,, | Performed by: COLON & RECTAL SURGERY

## 2021-02-25 PROCEDURE — 88305 TISSUE EXAM BY PATHOLOGIST: CPT | Mod: 26,,, | Performed by: PATHOLOGY

## 2021-02-25 PROCEDURE — E9220 PRA ENDO ANESTHESIA: HCPCS | Mod: 33,,, | Performed by: NURSE ANESTHETIST, CERTIFIED REGISTERED

## 2021-02-25 PROCEDURE — E9220 PRA ENDO ANESTHESIA: ICD-10-PCS | Mod: 33,,, | Performed by: NURSE ANESTHETIST, CERTIFIED REGISTERED

## 2021-02-25 PROCEDURE — 37000009 HC ANESTHESIA EA ADD 15 MINS: Performed by: COLON & RECTAL SURGERY

## 2021-02-25 RX ORDER — PROPOFOL 10 MG/ML
VIAL (ML) INTRAVENOUS CONTINUOUS PRN
Status: DISCONTINUED | OUTPATIENT
Start: 2021-02-25 | End: 2021-02-25

## 2021-02-25 RX ORDER — SODIUM CHLORIDE 9 MG/ML
INJECTION, SOLUTION INTRAVENOUS CONTINUOUS
Status: DISCONTINUED | OUTPATIENT
Start: 2021-02-25 | End: 2021-02-25 | Stop reason: HOSPADM

## 2021-02-25 RX ORDER — LIDOCAINE HCL/PF 100 MG/5ML
SYRINGE (ML) INTRAVENOUS
Status: DISCONTINUED | OUTPATIENT
Start: 2021-02-25 | End: 2021-02-25

## 2021-02-25 RX ORDER — PROPOFOL 10 MG/ML
VIAL (ML) INTRAVENOUS
Status: DISCONTINUED | OUTPATIENT
Start: 2021-02-25 | End: 2021-02-25

## 2021-02-25 RX ORDER — PHENYLEPHRINE HYDROCHLORIDE 10 MG/ML
INJECTION INTRAVENOUS
Status: DISCONTINUED | OUTPATIENT
Start: 2021-02-25 | End: 2021-02-25

## 2021-02-25 RX ADMIN — PHENYLEPHRINE HYDROCHLORIDE 100 MCG: 10 INJECTION INTRAVENOUS at 12:02

## 2021-02-25 RX ADMIN — PROPOFOL 20 MG: 10 INJECTION, EMULSION INTRAVENOUS at 12:02

## 2021-02-25 RX ADMIN — Medication 100 MG: at 12:02

## 2021-02-25 RX ADMIN — PROPOFOL 50 MG: 10 INJECTION, EMULSION INTRAVENOUS at 12:02

## 2021-02-25 RX ADMIN — SODIUM CHLORIDE: 0.9 INJECTION, SOLUTION INTRAVENOUS at 10:02

## 2021-02-25 RX ADMIN — PROPOFOL 150 MCG/KG/MIN: 10 INJECTION, EMULSION INTRAVENOUS at 12:02

## 2021-03-01 LAB
FINAL PATHOLOGIC DIAGNOSIS: NORMAL
GROSS: NORMAL
Lab: NORMAL

## 2021-03-09 ENCOUNTER — CLINICAL SUPPORT (OUTPATIENT)
Dept: REHABILITATION | Facility: HOSPITAL | Age: 71
End: 2021-03-09
Payer: COMMERCIAL

## 2021-03-09 DIAGNOSIS — R26.2 DIFFICULTY WALKING: ICD-10-CM

## 2021-03-09 PROCEDURE — 97110 THERAPEUTIC EXERCISES: CPT | Mod: PN

## 2021-03-14 DIAGNOSIS — E78.5 HYPERLIPIDEMIA, UNSPECIFIED HYPERLIPIDEMIA TYPE: ICD-10-CM

## 2021-03-16 ENCOUNTER — CLINICAL SUPPORT (OUTPATIENT)
Dept: REHABILITATION | Facility: HOSPITAL | Age: 71
End: 2021-03-16
Payer: COMMERCIAL

## 2021-03-16 DIAGNOSIS — R26.2 DIFFICULTY WALKING: ICD-10-CM

## 2021-03-16 PROCEDURE — 97110 THERAPEUTIC EXERCISES: CPT | Mod: PN

## 2021-03-16 PROCEDURE — 97116 GAIT TRAINING THERAPY: CPT | Mod: PN

## 2021-03-17 RX ORDER — ATORVASTATIN CALCIUM 80 MG/1
TABLET, FILM COATED ORAL
Qty: 90 TABLET | Refills: 0 | Status: SHIPPED | OUTPATIENT
Start: 2021-03-17 | End: 2021-06-15

## 2021-03-23 ENCOUNTER — CLINICAL SUPPORT (OUTPATIENT)
Dept: REHABILITATION | Facility: HOSPITAL | Age: 71
End: 2021-03-23
Payer: COMMERCIAL

## 2021-03-23 DIAGNOSIS — Z74.09 IMPAIRED FUNCTIONAL MOBILITY, BALANCE, GAIT, AND ENDURANCE: Primary | ICD-10-CM

## 2021-03-23 DIAGNOSIS — R29.898 DECREASED STRENGTH OF LOWER EXTREMITY: ICD-10-CM

## 2021-03-23 DIAGNOSIS — R26.2 DIFFICULTY WALKING: ICD-10-CM

## 2021-03-23 PROCEDURE — 97110 THERAPEUTIC EXERCISES: CPT | Mod: PN

## 2021-03-30 ENCOUNTER — PES CALL (OUTPATIENT)
Dept: ADMINISTRATIVE | Facility: CLINIC | Age: 71
End: 2021-03-30

## 2021-03-30 ENCOUNTER — CLINICAL SUPPORT (OUTPATIENT)
Dept: REHABILITATION | Facility: HOSPITAL | Age: 71
End: 2021-03-30
Payer: COMMERCIAL

## 2021-03-30 DIAGNOSIS — R29.898 DECREASED STRENGTH OF LOWER EXTREMITY: ICD-10-CM

## 2021-03-30 DIAGNOSIS — Z74.09 IMPAIRED FUNCTIONAL MOBILITY, BALANCE, GAIT, AND ENDURANCE: Primary | ICD-10-CM

## 2021-03-30 DIAGNOSIS — R26.2 DIFFICULTY WALKING: ICD-10-CM

## 2021-03-30 PROCEDURE — 97110 THERAPEUTIC EXERCISES: CPT | Mod: PN

## 2021-04-06 ENCOUNTER — CLINICAL SUPPORT (OUTPATIENT)
Dept: REHABILITATION | Facility: HOSPITAL | Age: 71
End: 2021-04-06
Payer: COMMERCIAL

## 2021-04-06 DIAGNOSIS — Z74.09 IMPAIRED FUNCTIONAL MOBILITY, BALANCE, GAIT, AND ENDURANCE: Primary | ICD-10-CM

## 2021-04-06 DIAGNOSIS — R26.2 DIFFICULTY WALKING: ICD-10-CM

## 2021-04-06 DIAGNOSIS — R29.898 DECREASED STRENGTH OF LOWER EXTREMITY: ICD-10-CM

## 2021-04-06 PROCEDURE — 97110 THERAPEUTIC EXERCISES: CPT | Mod: PN

## 2021-04-06 PROCEDURE — 97116 GAIT TRAINING THERAPY: CPT | Mod: PN

## 2021-04-14 ENCOUNTER — PATIENT MESSAGE (OUTPATIENT)
Dept: INTERNAL MEDICINE | Facility: CLINIC | Age: 71
End: 2021-04-14

## 2021-04-20 ENCOUNTER — CLINICAL SUPPORT (OUTPATIENT)
Dept: REHABILITATION | Facility: HOSPITAL | Age: 71
End: 2021-04-20
Payer: COMMERCIAL

## 2021-04-20 DIAGNOSIS — R29.898 DECREASED STRENGTH OF LOWER EXTREMITY: ICD-10-CM

## 2021-04-20 DIAGNOSIS — Z74.09 IMPAIRED FUNCTIONAL MOBILITY, BALANCE, GAIT, AND ENDURANCE: Primary | ICD-10-CM

## 2021-04-20 DIAGNOSIS — R26.2 DIFFICULTY WALKING: ICD-10-CM

## 2021-04-20 PROCEDURE — 97110 THERAPEUTIC EXERCISES: CPT | Mod: PN

## 2021-04-29 ENCOUNTER — CLINICAL SUPPORT (OUTPATIENT)
Dept: REHABILITATION | Facility: HOSPITAL | Age: 71
End: 2021-04-29
Payer: COMMERCIAL

## 2021-04-29 DIAGNOSIS — Z74.09 IMPAIRED FUNCTIONAL MOBILITY, BALANCE, GAIT, AND ENDURANCE: Primary | ICD-10-CM

## 2021-04-29 DIAGNOSIS — R26.2 DIFFICULTY WALKING: ICD-10-CM

## 2021-04-29 DIAGNOSIS — R29.898 DECREASED STRENGTH OF LOWER EXTREMITY: ICD-10-CM

## 2021-04-29 PROCEDURE — 97110 THERAPEUTIC EXERCISES: CPT | Mod: PN

## 2021-04-29 PROCEDURE — 97140 MANUAL THERAPY 1/> REGIONS: CPT | Mod: PN

## 2021-05-04 ENCOUNTER — OFFICE VISIT (OUTPATIENT)
Dept: FAMILY MEDICINE | Facility: CLINIC | Age: 71
End: 2021-05-04
Payer: COMMERCIAL

## 2021-05-04 VITALS
BODY MASS INDEX: 28.86 KG/M2 | OXYGEN SATURATION: 98 % | HEIGHT: 74 IN | DIASTOLIC BLOOD PRESSURE: 64 MMHG | RESPIRATION RATE: 20 BRPM | TEMPERATURE: 98 F | WEIGHT: 224.88 LBS | HEART RATE: 77 BPM | SYSTOLIC BLOOD PRESSURE: 114 MMHG

## 2021-05-04 DIAGNOSIS — I10 ESSENTIAL HYPERTENSION: ICD-10-CM

## 2021-05-04 DIAGNOSIS — D69.6 THROMBOCYTOPENIA, UNSPECIFIED: ICD-10-CM

## 2021-05-04 DIAGNOSIS — R26.2 DIFFICULTY WALKING: ICD-10-CM

## 2021-05-04 DIAGNOSIS — R73.9 HYPERGLYCEMIA: ICD-10-CM

## 2021-05-04 DIAGNOSIS — E78.2 MIXED HYPERLIPIDEMIA: ICD-10-CM

## 2021-05-04 DIAGNOSIS — Z86.010 HISTORY OF COLON POLYPS: ICD-10-CM

## 2021-05-04 DIAGNOSIS — K76.89 LIVER CYST: ICD-10-CM

## 2021-05-04 DIAGNOSIS — Z74.09 IMPAIRED FUNCTIONAL MOBILITY, BALANCE, GAIT, AND ENDURANCE: ICD-10-CM

## 2021-05-04 DIAGNOSIS — Z99.89 DEPENDENCE ON OTHER ENABLING MACHINES AND DEVICES: ICD-10-CM

## 2021-05-04 DIAGNOSIS — R29.898 DECREASED STRENGTH OF LOWER EXTREMITY: Primary | ICD-10-CM

## 2021-05-04 DIAGNOSIS — G47.33 OBSTRUCTIVE SLEEP APNEA ON CPAP: ICD-10-CM

## 2021-05-04 DIAGNOSIS — D17.79 FIBROLIPOMA OF FILUM TERMINALE: ICD-10-CM

## 2021-05-04 DIAGNOSIS — G95.89 OTHER SPECIFIED DISEASES OF SPINAL CORD: ICD-10-CM

## 2021-05-04 DIAGNOSIS — Z72.0 TOBACCO USE: ICD-10-CM

## 2021-05-04 DIAGNOSIS — R26.9 ABNORMALITY OF GAIT AND MOBILITY: ICD-10-CM

## 2021-05-04 DIAGNOSIS — R53.1 RIGHT SIDED WEAKNESS: ICD-10-CM

## 2021-05-04 DIAGNOSIS — Z98.890 S/P LUMBAR LAMINECTOMY: ICD-10-CM

## 2021-05-04 DIAGNOSIS — E66.9 OBESITY (BMI 30.0-34.9): ICD-10-CM

## 2021-05-04 DIAGNOSIS — R73.09 ELEVATED HEMOGLOBIN A1C: ICD-10-CM

## 2021-05-04 DIAGNOSIS — Z00.00 ENCOUNTER FOR PREVENTIVE HEALTH EXAMINATION: ICD-10-CM

## 2021-05-04 DIAGNOSIS — Q06.8 TETHERED CORD SYNDROME: ICD-10-CM

## 2021-05-04 PROCEDURE — G0439 PPPS, SUBSEQ VISIT: HCPCS | Mod: S$GLB,,, | Performed by: NURSE PRACTITIONER

## 2021-05-04 PROCEDURE — 99999 PR PBB SHADOW E&M-EST. PATIENT-LVL V: CPT | Mod: PBBFAC,,, | Performed by: NURSE PRACTITIONER

## 2021-05-04 PROCEDURE — 99999 PR PBB SHADOW E&M-EST. PATIENT-LVL V: ICD-10-PCS | Mod: PBBFAC,,, | Performed by: NURSE PRACTITIONER

## 2021-05-04 PROCEDURE — G0439 PR MEDICARE ANNUAL WELLNESS SUBSEQUENT VISIT: ICD-10-PCS | Mod: S$GLB,,, | Performed by: NURSE PRACTITIONER

## 2021-05-05 ENCOUNTER — CLINICAL SUPPORT (OUTPATIENT)
Dept: REHABILITATION | Facility: HOSPITAL | Age: 71
End: 2021-05-05
Payer: COMMERCIAL

## 2021-05-05 DIAGNOSIS — R29.898 DECREASED STRENGTH OF LOWER EXTREMITY: ICD-10-CM

## 2021-05-05 DIAGNOSIS — R26.2 DIFFICULTY WALKING: ICD-10-CM

## 2021-05-05 DIAGNOSIS — Z74.09 IMPAIRED FUNCTIONAL MOBILITY, BALANCE, GAIT, AND ENDURANCE: Primary | ICD-10-CM

## 2021-05-05 PROCEDURE — 97110 THERAPEUTIC EXERCISES: CPT | Mod: PN

## 2021-05-11 LAB
ALBUMIN SERPL-MCNC: 4.4 G/DL (ref 3.6–5.1)
ALBUMIN/GLOB SERPL: 1.7 (CALC) (ref 1–2.5)
ALP SERPL-CCNC: 57 U/L (ref 35–144)
ALT SERPL-CCNC: 24 U/L (ref 9–46)
AST SERPL-CCNC: 22 U/L (ref 10–35)
BASOPHILS # BLD AUTO: 20 CELLS/UL (ref 0–200)
BASOPHILS NFR BLD AUTO: 0.3 %
BILIRUB SERPL-MCNC: 1 MG/DL (ref 0.2–1.2)
BUN SERPL-MCNC: 19 MG/DL (ref 7–25)
BUN/CREAT SERPL: 29 (CALC) (ref 6–22)
CALCIUM SERPL-MCNC: 9.7 MG/DL (ref 8.6–10.3)
CHLORIDE SERPL-SCNC: 98 MMOL/L (ref 98–110)
CHOLEST SERPL-MCNC: 127 MG/DL
CHOLEST/HDLC SERPL: 3.7 (CALC)
CO2 SERPL-SCNC: 29 MMOL/L (ref 20–32)
CREAT SERPL-MCNC: 0.66 MG/DL (ref 0.7–1.25)
EOSINOPHIL # BLD AUTO: 141 CELLS/UL (ref 15–500)
EOSINOPHIL NFR BLD AUTO: 2.1 %
ERYTHROCYTE [DISTWIDTH] IN BLOOD BY AUTOMATED COUNT: 12 % (ref 11–15)
GLOBULIN SER CALC-MCNC: 2.6 G/DL (CALC) (ref 1.9–3.7)
GLUCOSE SERPL-MCNC: 116 MG/DL (ref 65–99)
HBA1C MFR BLD: 6.1 % OF TOTAL HGB
HCT VFR BLD AUTO: 41.8 % (ref 38.5–50)
HDLC SERPL-MCNC: 34 MG/DL
HGB BLD-MCNC: 13.5 G/DL (ref 13.2–17.1)
LDLC SERPL CALC-MCNC: 76 MG/DL (CALC)
LYMPHOCYTES # BLD AUTO: 2566 CELLS/UL (ref 850–3900)
LYMPHOCYTES NFR BLD AUTO: 38.3 %
MCH RBC QN AUTO: 28.7 PG (ref 27–33)
MCHC RBC AUTO-ENTMCNC: 32.3 G/DL (ref 32–36)
MCV RBC AUTO: 88.7 FL (ref 80–100)
MONOCYTES # BLD AUTO: 549 CELLS/UL (ref 200–950)
MONOCYTES NFR BLD AUTO: 8.2 %
NEUTROPHILS # BLD AUTO: 3424 CELLS/UL (ref 1500–7800)
NEUTROPHILS NFR BLD AUTO: 51.1 %
NONHDLC SERPL-MCNC: 93 MG/DL (CALC)
PLATELET # BLD AUTO: 324 THOUSAND/UL (ref 140–400)
PMV BLD REES-ECKER: 8.7 FL (ref 7.5–12.5)
POTASSIUM SERPL-SCNC: 4.1 MMOL/L (ref 3.5–5.3)
PROT SERPL-MCNC: 7 G/DL (ref 6.1–8.1)
RBC # BLD AUTO: 4.71 MILLION/UL (ref 4.2–5.8)
SODIUM SERPL-SCNC: 137 MMOL/L (ref 135–146)
TRIGL SERPL-MCNC: 89 MG/DL
WBC # BLD AUTO: 6.7 THOUSAND/UL (ref 3.8–10.8)

## 2021-05-14 ENCOUNTER — CLINICAL SUPPORT (OUTPATIENT)
Dept: REHABILITATION | Facility: HOSPITAL | Age: 71
End: 2021-05-14
Payer: COMMERCIAL

## 2021-05-14 DIAGNOSIS — Z74.09 IMPAIRED FUNCTIONAL MOBILITY, BALANCE, GAIT, AND ENDURANCE: Primary | ICD-10-CM

## 2021-05-14 DIAGNOSIS — R26.2 DIFFICULTY WALKING: ICD-10-CM

## 2021-05-14 DIAGNOSIS — R29.898 DECREASED STRENGTH OF LOWER EXTREMITY: ICD-10-CM

## 2021-05-14 PROCEDURE — 97110 THERAPEUTIC EXERCISES: CPT | Mod: PN

## 2021-05-19 ENCOUNTER — OFFICE VISIT (OUTPATIENT)
Dept: FAMILY MEDICINE | Facility: CLINIC | Age: 71
End: 2021-05-19
Payer: COMMERCIAL

## 2021-05-19 VITALS
BODY MASS INDEX: 29.06 KG/M2 | TEMPERATURE: 99 F | DIASTOLIC BLOOD PRESSURE: 72 MMHG | RESPIRATION RATE: 17 BRPM | WEIGHT: 226.44 LBS | HEART RATE: 82 BPM | OXYGEN SATURATION: 98 % | SYSTOLIC BLOOD PRESSURE: 117 MMHG | HEIGHT: 74 IN

## 2021-05-19 DIAGNOSIS — Z00.00 ANNUAL PHYSICAL EXAM: Primary | ICD-10-CM

## 2021-05-19 DIAGNOSIS — R73.03 PREDIABETES: ICD-10-CM

## 2021-05-19 DIAGNOSIS — Q06.8 TETHERED CORD SYNDROME: ICD-10-CM

## 2021-05-19 DIAGNOSIS — R35.0 URINARY FREQUENCY: ICD-10-CM

## 2021-05-19 DIAGNOSIS — Z98.890 S/P LUMBAR LAMINECTOMY: ICD-10-CM

## 2021-05-19 PROCEDURE — 99214 PR OFFICE/OUTPT VISIT, EST, LEVL IV, 30-39 MIN: ICD-10-PCS | Mod: S$GLB,,, | Performed by: FAMILY MEDICINE

## 2021-05-19 PROCEDURE — 99214 OFFICE O/P EST MOD 30 MIN: CPT | Mod: S$GLB,,, | Performed by: FAMILY MEDICINE

## 2021-05-19 PROCEDURE — 99999 PR PBB SHADOW E&M-EST. PATIENT-LVL IV: ICD-10-PCS | Mod: PBBFAC,,, | Performed by: FAMILY MEDICINE

## 2021-05-19 PROCEDURE — 99999 PR PBB SHADOW E&M-EST. PATIENT-LVL IV: CPT | Mod: PBBFAC,,, | Performed by: FAMILY MEDICINE

## 2021-05-19 RX ORDER — TAMSULOSIN HYDROCHLORIDE 0.4 MG/1
0.4 CAPSULE ORAL DAILY
Qty: 90 CAPSULE | Refills: 3 | Status: SHIPPED | OUTPATIENT
Start: 2021-05-19 | End: 2022-01-28 | Stop reason: SDUPTHER

## 2021-05-19 RX ORDER — IBUPROFEN 800 MG/1
800 TABLET ORAL 3 TIMES DAILY PRN
Qty: 45 TABLET | Refills: 2 | Status: SHIPPED | OUTPATIENT
Start: 2021-05-19 | End: 2021-11-15 | Stop reason: SDUPTHER

## 2021-05-21 ENCOUNTER — CLINICAL SUPPORT (OUTPATIENT)
Dept: REHABILITATION | Facility: HOSPITAL | Age: 71
End: 2021-05-21
Payer: COMMERCIAL

## 2021-05-21 DIAGNOSIS — R29.898 DECREASED STRENGTH OF LOWER EXTREMITY: ICD-10-CM

## 2021-05-21 DIAGNOSIS — R26.2 DIFFICULTY WALKING: ICD-10-CM

## 2021-05-21 DIAGNOSIS — Z74.09 IMPAIRED FUNCTIONAL MOBILITY, BALANCE, GAIT, AND ENDURANCE: Primary | ICD-10-CM

## 2021-05-21 PROCEDURE — 97112 NEUROMUSCULAR REEDUCATION: CPT | Mod: PN

## 2021-05-21 PROCEDURE — 97110 THERAPEUTIC EXERCISES: CPT | Mod: PN

## 2021-05-28 ENCOUNTER — CLINICAL SUPPORT (OUTPATIENT)
Dept: REHABILITATION | Facility: HOSPITAL | Age: 71
End: 2021-05-28
Payer: COMMERCIAL

## 2021-05-28 DIAGNOSIS — R29.898 DECREASED STRENGTH OF LOWER EXTREMITY: ICD-10-CM

## 2021-05-28 DIAGNOSIS — R26.2 DIFFICULTY WALKING: ICD-10-CM

## 2021-05-28 DIAGNOSIS — Z74.09 IMPAIRED FUNCTIONAL MOBILITY, BALANCE, GAIT, AND ENDURANCE: Primary | ICD-10-CM

## 2021-05-28 PROCEDURE — 97110 THERAPEUTIC EXERCISES: CPT | Mod: PN

## 2021-05-28 PROCEDURE — 97140 MANUAL THERAPY 1/> REGIONS: CPT | Mod: PN

## 2021-06-08 ENCOUNTER — CLINICAL SUPPORT (OUTPATIENT)
Dept: REHABILITATION | Facility: HOSPITAL | Age: 71
End: 2021-06-08
Payer: COMMERCIAL

## 2021-06-08 DIAGNOSIS — R29.898 DECREASED STRENGTH OF LOWER EXTREMITY: ICD-10-CM

## 2021-06-08 DIAGNOSIS — Z74.09 IMPAIRED FUNCTIONAL MOBILITY, BALANCE, GAIT, AND ENDURANCE: Primary | ICD-10-CM

## 2021-06-08 DIAGNOSIS — R26.2 DIFFICULTY WALKING: ICD-10-CM

## 2021-06-08 PROCEDURE — 97112 NEUROMUSCULAR REEDUCATION: CPT | Mod: PN

## 2021-06-08 PROCEDURE — 97110 THERAPEUTIC EXERCISES: CPT | Mod: PN

## 2021-06-12 DIAGNOSIS — E78.5 HYPERLIPIDEMIA, UNSPECIFIED HYPERLIPIDEMIA TYPE: ICD-10-CM

## 2021-06-15 ENCOUNTER — CLINICAL SUPPORT (OUTPATIENT)
Dept: REHABILITATION | Facility: HOSPITAL | Age: 71
End: 2021-06-15
Payer: COMMERCIAL

## 2021-06-15 DIAGNOSIS — R29.898 DECREASED STRENGTH OF LOWER EXTREMITY: ICD-10-CM

## 2021-06-15 DIAGNOSIS — Z74.09 IMPAIRED FUNCTIONAL MOBILITY, BALANCE, GAIT, AND ENDURANCE: Primary | ICD-10-CM

## 2021-06-15 DIAGNOSIS — R26.2 DIFFICULTY WALKING: ICD-10-CM

## 2021-06-15 PROCEDURE — 97110 THERAPEUTIC EXERCISES: CPT | Mod: PN

## 2021-06-15 PROCEDURE — 97112 NEUROMUSCULAR REEDUCATION: CPT | Mod: PN

## 2021-06-15 RX ORDER — ATORVASTATIN CALCIUM 80 MG/1
TABLET, FILM COATED ORAL
Qty: 90 TABLET | Refills: 0 | Status: SHIPPED | OUTPATIENT
Start: 2021-06-15 | End: 2021-09-13

## 2021-06-22 ENCOUNTER — CLINICAL SUPPORT (OUTPATIENT)
Dept: REHABILITATION | Facility: HOSPITAL | Age: 71
End: 2021-06-22
Payer: COMMERCIAL

## 2021-06-22 DIAGNOSIS — Z74.09 IMPAIRED FUNCTIONAL MOBILITY, BALANCE, GAIT, AND ENDURANCE: Primary | ICD-10-CM

## 2021-06-22 DIAGNOSIS — R29.898 DECREASED STRENGTH OF LOWER EXTREMITY: ICD-10-CM

## 2021-06-22 DIAGNOSIS — R26.2 DIFFICULTY WALKING: ICD-10-CM

## 2021-06-22 PROCEDURE — 97110 THERAPEUTIC EXERCISES: CPT | Mod: PN

## 2021-07-01 ENCOUNTER — CLINICAL SUPPORT (OUTPATIENT)
Dept: REHABILITATION | Facility: HOSPITAL | Age: 71
End: 2021-07-01
Payer: COMMERCIAL

## 2021-07-01 DIAGNOSIS — Z74.09 IMPAIRED FUNCTIONAL MOBILITY, BALANCE, GAIT, AND ENDURANCE: Primary | ICD-10-CM

## 2021-07-01 DIAGNOSIS — R26.2 DIFFICULTY WALKING: ICD-10-CM

## 2021-07-01 DIAGNOSIS — R29.898 DECREASED STRENGTH OF LOWER EXTREMITY: ICD-10-CM

## 2021-07-01 PROCEDURE — 97110 THERAPEUTIC EXERCISES: CPT | Mod: PN

## 2021-07-08 ENCOUNTER — CLINICAL SUPPORT (OUTPATIENT)
Dept: REHABILITATION | Facility: HOSPITAL | Age: 71
End: 2021-07-08
Payer: COMMERCIAL

## 2021-07-08 DIAGNOSIS — R29.898 DECREASED STRENGTH OF LOWER EXTREMITY: ICD-10-CM

## 2021-07-08 DIAGNOSIS — R26.2 DIFFICULTY WALKING: ICD-10-CM

## 2021-07-08 DIAGNOSIS — Z74.09 IMPAIRED FUNCTIONAL MOBILITY, BALANCE, GAIT, AND ENDURANCE: Primary | ICD-10-CM

## 2021-07-08 PROCEDURE — 97140 MANUAL THERAPY 1/> REGIONS: CPT | Mod: PN

## 2021-07-08 PROCEDURE — 97110 THERAPEUTIC EXERCISES: CPT | Mod: PN

## 2021-07-13 ENCOUNTER — CLINICAL SUPPORT (OUTPATIENT)
Dept: REHABILITATION | Facility: HOSPITAL | Age: 71
End: 2021-07-13
Payer: COMMERCIAL

## 2021-07-13 DIAGNOSIS — R26.2 DIFFICULTY WALKING: ICD-10-CM

## 2021-07-13 DIAGNOSIS — R29.898 DECREASED STRENGTH OF LOWER EXTREMITY: ICD-10-CM

## 2021-07-13 DIAGNOSIS — Z74.09 IMPAIRED FUNCTIONAL MOBILITY, BALANCE, GAIT, AND ENDURANCE: Primary | ICD-10-CM

## 2021-07-13 PROCEDURE — 97110 THERAPEUTIC EXERCISES: CPT | Mod: PN,CQ

## 2021-07-20 ENCOUNTER — CLINICAL SUPPORT (OUTPATIENT)
Dept: REHABILITATION | Facility: HOSPITAL | Age: 71
End: 2021-07-20
Payer: COMMERCIAL

## 2021-07-20 ENCOUNTER — TELEPHONE (OUTPATIENT)
Dept: NEUROSURGERY | Facility: CLINIC | Age: 71
End: 2021-07-20

## 2021-07-20 DIAGNOSIS — Z74.09 IMPAIRED FUNCTIONAL MOBILITY, BALANCE, GAIT, AND ENDURANCE: Primary | ICD-10-CM

## 2021-07-20 DIAGNOSIS — R29.898 DECREASED STRENGTH OF LOWER EXTREMITY: ICD-10-CM

## 2021-07-20 DIAGNOSIS — R26.2 DIFFICULTY WALKING: ICD-10-CM

## 2021-07-20 PROCEDURE — 97110 THERAPEUTIC EXERCISES: CPT | Mod: PN

## 2021-07-21 ENCOUNTER — OFFICE VISIT (OUTPATIENT)
Dept: NEUROSURGERY | Facility: CLINIC | Age: 71
End: 2021-07-21
Payer: COMMERCIAL

## 2021-07-21 VITALS
SYSTOLIC BLOOD PRESSURE: 135 MMHG | BODY MASS INDEX: 27.59 KG/M2 | WEIGHT: 214.94 LBS | HEART RATE: 110 BPM | DIASTOLIC BLOOD PRESSURE: 61 MMHG | OXYGEN SATURATION: 95 % | HEIGHT: 74 IN

## 2021-07-21 DIAGNOSIS — Z98.890 S/P LUMBAR LAMINECTOMY: Primary | ICD-10-CM

## 2021-07-21 DIAGNOSIS — Q06.8 TETHERED CORD SYNDROME: ICD-10-CM

## 2021-07-21 DIAGNOSIS — R29.898 RIGHT LEG WEAKNESS: ICD-10-CM

## 2021-07-21 PROCEDURE — 99999 PR PBB SHADOW E&M-EST. PATIENT-LVL V: ICD-10-PCS | Mod: PBBFAC,,, | Performed by: NEUROLOGICAL SURGERY

## 2021-07-21 PROCEDURE — 99215 OFFICE O/P EST HI 40 MIN: CPT | Mod: S$GLB,,, | Performed by: NEUROLOGICAL SURGERY

## 2021-07-21 PROCEDURE — 99999 PR PBB SHADOW E&M-EST. PATIENT-LVL V: CPT | Mod: PBBFAC,,, | Performed by: NEUROLOGICAL SURGERY

## 2021-07-21 PROCEDURE — 99215 PR OFFICE/OUTPT VISIT, EST, LEVL V, 40-54 MIN: ICD-10-PCS | Mod: S$GLB,,, | Performed by: NEUROLOGICAL SURGERY

## 2021-07-30 ENCOUNTER — PROCEDURE VISIT (OUTPATIENT)
Dept: NEUROLOGY | Facility: CLINIC | Age: 71
End: 2021-07-30
Payer: COMMERCIAL

## 2021-07-30 VITALS — BODY MASS INDEX: 27.59 KG/M2 | HEIGHT: 74 IN | WEIGHT: 214.94 LBS

## 2021-07-30 DIAGNOSIS — Q06.8 TETHERED CORD SYNDROME: ICD-10-CM

## 2021-07-30 DIAGNOSIS — Z98.890 S/P LUMBAR LAMINECTOMY: ICD-10-CM

## 2021-08-05 ENCOUNTER — HOSPITAL ENCOUNTER (OUTPATIENT)
Dept: RADIOLOGY | Facility: HOSPITAL | Age: 71
Discharge: HOME | End: 2021-08-05
Attending: NEUROLOGICAL SURGERY
Payer: COMMERCIAL

## 2021-08-05 DIAGNOSIS — Q06.8 TETHERED CORD SYNDROME: ICD-10-CM

## 2021-08-05 DIAGNOSIS — Z98.890 S/P LUMBAR LAMINECTOMY: ICD-10-CM

## 2021-08-05 PROCEDURE — 25500020 PHARM REV CODE 255: Performed by: NEUROLOGICAL SURGERY

## 2021-08-05 PROCEDURE — 72158 MRI SPINE CERVICAL-THORACIC-LUMBAR W W/O CONTRAST (XPD): ICD-10-PCS | Mod: 26,,, | Performed by: INTERNAL MEDICINE

## 2021-08-05 PROCEDURE — 72158 MRI LUMBAR SPINE W/O & W/DYE: CPT | Mod: 26,,, | Performed by: INTERNAL MEDICINE

## 2021-08-05 PROCEDURE — A9585 GADOBUTROL INJECTION: HCPCS | Performed by: NEUROLOGICAL SURGERY

## 2021-08-05 PROCEDURE — 72156 MRI SPINE CERVICAL-THORACIC-LUMBAR W W/O CONTRAST (XPD): ICD-10-PCS | Mod: 26,,, | Performed by: INTERNAL MEDICINE

## 2021-08-05 PROCEDURE — 72157 MRI CHEST SPINE W/O & W/DYE: CPT | Mod: TC

## 2021-08-05 PROCEDURE — 72157 MRI SPINE CERVICAL-THORACIC-LUMBAR W W/O CONTRAST (XPD): ICD-10-PCS | Mod: 26,,, | Performed by: INTERNAL MEDICINE

## 2021-08-05 PROCEDURE — 72157 MRI CHEST SPINE W/O & W/DYE: CPT | Mod: 26,,, | Performed by: INTERNAL MEDICINE

## 2021-08-05 PROCEDURE — 72156 MRI NECK SPINE W/O & W/DYE: CPT | Mod: 26,,, | Performed by: INTERNAL MEDICINE

## 2021-08-05 PROCEDURE — 72156 MRI NECK SPINE W/O & W/DYE: CPT | Mod: TC

## 2021-08-05 RX ORDER — GADOBUTROL 604.72 MG/ML
10 INJECTION INTRAVENOUS
Status: COMPLETED | OUTPATIENT
Start: 2021-08-05 | End: 2021-08-05

## 2021-08-05 RX ADMIN — GADOBUTROL 10 ML: 604.72 INJECTION INTRAVENOUS at 05:08

## 2021-08-06 ENCOUNTER — PATIENT MESSAGE (OUTPATIENT)
Dept: NEUROSURGERY | Facility: CLINIC | Age: 71
End: 2021-08-06

## 2021-08-06 ENCOUNTER — PATIENT MESSAGE (OUTPATIENT)
Dept: NEUROLOGY | Facility: CLINIC | Age: 71
End: 2021-08-06

## 2021-08-06 ENCOUNTER — PATIENT MESSAGE (OUTPATIENT)
Dept: FAMILY MEDICINE | Facility: CLINIC | Age: 71
End: 2021-08-06

## 2021-08-06 DIAGNOSIS — E04.1 THYROID NODULE: Primary | ICD-10-CM

## 2021-08-08 ENCOUNTER — PATIENT MESSAGE (OUTPATIENT)
Dept: NEUROSURGERY | Facility: HOSPITAL | Age: 71
End: 2021-08-08

## 2021-08-09 ENCOUNTER — PATIENT MESSAGE (OUTPATIENT)
Dept: REHABILITATION | Facility: HOSPITAL | Age: 71
End: 2021-08-09

## 2021-08-09 ENCOUNTER — HOSPITAL ENCOUNTER (OUTPATIENT)
Dept: RADIOLOGY | Facility: HOSPITAL | Age: 71
Discharge: HOME | End: 2021-08-09
Attending: FAMILY MEDICINE
Payer: COMMERCIAL

## 2021-08-09 DIAGNOSIS — E04.1 THYROID NODULE: ICD-10-CM

## 2021-08-09 PROCEDURE — 76536 US EXAM OF HEAD AND NECK: CPT | Mod: TC

## 2021-08-09 PROCEDURE — 76536 US SOFT TISSUE HEAD NECK THYROID: ICD-10-PCS | Mod: 26,,, | Performed by: RADIOLOGY

## 2021-08-09 PROCEDURE — 76536 US EXAM OF HEAD AND NECK: CPT | Mod: 26,,, | Performed by: RADIOLOGY

## 2021-08-10 DIAGNOSIS — D17.79 FIBROLIPOMA OF FILUM TERMINALE: Primary | ICD-10-CM

## 2021-08-10 DIAGNOSIS — Q06.8 TETHERED CORD SYNDROME: ICD-10-CM

## 2021-08-13 ENCOUNTER — TELEPHONE (OUTPATIENT)
Dept: NEUROSURGERY | Facility: CLINIC | Age: 71
End: 2021-08-13

## 2021-08-16 ENCOUNTER — PATIENT MESSAGE (OUTPATIENT)
Dept: REHABILITATION | Facility: HOSPITAL | Age: 71
End: 2021-08-16

## 2021-08-17 ENCOUNTER — PATIENT MESSAGE (OUTPATIENT)
Dept: NEUROSURGERY | Facility: CLINIC | Age: 71
End: 2021-08-17

## 2021-08-19 ENCOUNTER — OFFICE VISIT (OUTPATIENT)
Dept: FAMILY MEDICINE | Facility: CLINIC | Age: 71
End: 2021-08-19
Payer: COMMERCIAL

## 2021-08-19 DIAGNOSIS — M51.34 BULGING OF THORACIC INTERVERTEBRAL DISC WITHOUT MYELOPATHY: ICD-10-CM

## 2021-08-19 DIAGNOSIS — M48.00 SPINAL STENOSIS, UNSPECIFIED SPINAL REGION: Primary | ICD-10-CM

## 2021-08-19 PROCEDURE — 99442 PR PHYSICIAN TELEPHONE EVALUATION 11-20 MIN: CPT | Mod: 95,,, | Performed by: FAMILY MEDICINE

## 2021-08-19 PROCEDURE — 99442 PR PHYSICIAN TELEPHONE EVALUATION 11-20 MIN: ICD-10-PCS | Mod: 95,,, | Performed by: FAMILY MEDICINE

## 2021-08-25 ENCOUNTER — OFFICE VISIT (OUTPATIENT)
Dept: FAMILY MEDICINE | Facility: CLINIC | Age: 71
End: 2021-08-25
Payer: COMMERCIAL

## 2021-08-25 VITALS
BODY MASS INDEX: 27.9 KG/M2 | RESPIRATION RATE: 16 BRPM | HEIGHT: 74 IN | DIASTOLIC BLOOD PRESSURE: 80 MMHG | OXYGEN SATURATION: 97 % | SYSTOLIC BLOOD PRESSURE: 130 MMHG | WEIGHT: 217.38 LBS | HEART RATE: 87 BPM | TEMPERATURE: 99 F

## 2021-08-25 DIAGNOSIS — R73.03 PREDIABETES: ICD-10-CM

## 2021-08-25 DIAGNOSIS — E04.2 MULTIPLE THYROID NODULES: ICD-10-CM

## 2021-08-25 DIAGNOSIS — Q06.8 TETHERED CORD SYNDROME: Primary | ICD-10-CM

## 2021-08-25 PROCEDURE — 99214 OFFICE O/P EST MOD 30 MIN: CPT | Mod: S$GLB,,, | Performed by: FAMILY MEDICINE

## 2021-08-25 PROCEDURE — 99999 PR PBB SHADOW E&M-EST. PATIENT-LVL IV: ICD-10-PCS | Mod: PBBFAC,,, | Performed by: FAMILY MEDICINE

## 2021-08-25 PROCEDURE — 99214 PR OFFICE/OUTPT VISIT, EST, LEVL IV, 30-39 MIN: ICD-10-PCS | Mod: S$GLB,,, | Performed by: FAMILY MEDICINE

## 2021-08-25 PROCEDURE — 99999 PR PBB SHADOW E&M-EST. PATIENT-LVL IV: CPT | Mod: PBBFAC,,, | Performed by: FAMILY MEDICINE

## 2021-09-03 ENCOUNTER — PATIENT MESSAGE (OUTPATIENT)
Dept: NEUROSURGERY | Facility: CLINIC | Age: 71
End: 2021-09-03

## 2021-09-06 ENCOUNTER — PATIENT MESSAGE (OUTPATIENT)
Dept: NEUROSURGERY | Facility: CLINIC | Age: 71
End: 2021-09-06

## 2021-09-22 ENCOUNTER — OFFICE VISIT (OUTPATIENT)
Dept: NEUROSURGERY | Facility: CLINIC | Age: 71
End: 2021-09-22
Payer: COMMERCIAL

## 2021-09-22 VITALS
DIASTOLIC BLOOD PRESSURE: 70 MMHG | TEMPERATURE: 98 F | WEIGHT: 217 LBS | HEART RATE: 77 BPM | BODY MASS INDEX: 27.85 KG/M2 | SYSTOLIC BLOOD PRESSURE: 137 MMHG | HEIGHT: 74 IN

## 2021-09-22 DIAGNOSIS — Z74.09 IMPAIRED FUNCTIONAL MOBILITY, BALANCE, GAIT, AND ENDURANCE: Primary | ICD-10-CM

## 2021-09-22 DIAGNOSIS — R29.898 LEG WEAKNESS, BILATERAL: ICD-10-CM

## 2021-09-22 PROCEDURE — 99215 OFFICE O/P EST HI 40 MIN: CPT | Mod: S$GLB,,, | Performed by: NEUROLOGICAL SURGERY

## 2021-09-22 PROCEDURE — 99999 PR PBB SHADOW E&M-EST. PATIENT-LVL IV: CPT | Mod: PBBFAC,,, | Performed by: NEUROLOGICAL SURGERY

## 2021-09-22 PROCEDURE — 99999 PR PBB SHADOW E&M-EST. PATIENT-LVL IV: ICD-10-PCS | Mod: PBBFAC,,, | Performed by: NEUROLOGICAL SURGERY

## 2021-09-22 PROCEDURE — 99215 PR OFFICE/OUTPT VISIT, EST, LEVL V, 40-54 MIN: ICD-10-PCS | Mod: S$GLB,,, | Performed by: NEUROLOGICAL SURGERY

## 2021-11-02 ENCOUNTER — TELEPHONE (OUTPATIENT)
Dept: NEUROSURGERY | Facility: CLINIC | Age: 71
End: 2021-11-02
Payer: COMMERCIAL

## 2021-11-08 ENCOUNTER — TELEPHONE (OUTPATIENT)
Dept: NEUROLOGY | Facility: CLINIC | Age: 71
End: 2021-11-08
Payer: COMMERCIAL

## 2021-11-15 DIAGNOSIS — Z98.890 S/P LUMBAR LAMINECTOMY: ICD-10-CM

## 2021-11-15 DIAGNOSIS — Q06.8 TETHERED CORD SYNDROME: ICD-10-CM

## 2021-11-16 RX ORDER — IBUPROFEN 800 MG/1
800 TABLET ORAL 3 TIMES DAILY PRN
Qty: 45 TABLET | Refills: 5 | Status: SHIPPED | OUTPATIENT
Start: 2021-11-16

## 2021-11-29 DIAGNOSIS — G62.9 SENSORY NEUROPATHY: Primary | ICD-10-CM

## 2021-12-09 ENCOUNTER — CLINICAL SUPPORT (OUTPATIENT)
Dept: REHABILITATION | Facility: HOSPITAL | Age: 71
End: 2021-12-09
Payer: COMMERCIAL

## 2021-12-09 DIAGNOSIS — R26.2 DIFFICULTY WALKING: ICD-10-CM

## 2021-12-09 DIAGNOSIS — Z74.09 IMPAIRED FUNCTIONAL MOBILITY, BALANCE, GAIT, AND ENDURANCE: Primary | ICD-10-CM

## 2021-12-09 PROCEDURE — 97162 PT EVAL MOD COMPLEX 30 MIN: CPT | Mod: KX,PN

## 2021-12-09 PROCEDURE — 97530 THERAPEUTIC ACTIVITIES: CPT | Mod: KX,PN

## 2021-12-18 DIAGNOSIS — I10 ESSENTIAL HYPERTENSION: ICD-10-CM

## 2021-12-18 NOTE — TELEPHONE ENCOUNTER
No new care gaps identified.  Powered by [employer]. Reference number: [id number].   12/18/2021 6:57:32 AM CST

## 2021-12-20 NOTE — TELEPHONE ENCOUNTER
Encounter Provider and Renewal Provider updated   to match PCP listed, and recent encounter details.   CDM already fired   Note composed:12:58 PM 12/20/2021

## 2021-12-21 ENCOUNTER — CLINICAL SUPPORT (OUTPATIENT)
Dept: REHABILITATION | Facility: HOSPITAL | Age: 71
End: 2021-12-21
Payer: COMMERCIAL

## 2021-12-21 DIAGNOSIS — R53.1 RIGHT SIDED WEAKNESS: ICD-10-CM

## 2021-12-21 DIAGNOSIS — R26.2 DIFFICULTY WALKING: Primary | ICD-10-CM

## 2021-12-21 DIAGNOSIS — Z74.09 IMPAIRED FUNCTIONAL MOBILITY, BALANCE, GAIT, AND ENDURANCE: ICD-10-CM

## 2021-12-21 PROCEDURE — 97110 THERAPEUTIC EXERCISES: CPT | Mod: KX,PN

## 2021-12-22 RX ORDER — AMLODIPINE BESYLATE 10 MG/1
10 TABLET ORAL DAILY
Qty: 90 TABLET | Refills: 1 | Status: SHIPPED | OUTPATIENT
Start: 2021-12-22 | End: 2022-01-28 | Stop reason: SDUPTHER

## 2021-12-22 RX ORDER — CHLORTHALIDONE 25 MG/1
TABLET ORAL
Qty: 90 TABLET | Refills: 1 | Status: SHIPPED | OUTPATIENT
Start: 2021-12-22 | End: 2022-01-28 | Stop reason: SDUPTHER

## 2021-12-22 NOTE — PROGRESS NOTES
Refill Routing Note   Medication(s) are not appropriate for processing by [facility] for the following reason(s):      - Medication not previously prescribed by PCP    [facility] action(s):  Defer          --->Care Gap information included in message below if applicable.   Medication reconciliation completed: No   Automatic Epic Generated Protocol Data:        Requested Prescriptions   Pending Prescriptions Disp Refills    chlorthalidone (HYGROTEN) 25 MG Tab [Pharmacy Med Name: CHLORTHALIDONE 25 MG TABLET] 90 tablet 3     Sig: TAKE 1 TABLET BY MOUTH EVERY DAY       Cardiovascular: Diuretics - Thiazide Passed - 12/18/2021  6:56 AM        Passed - Patient is at least 18 years old        Passed - Last BP in normal range within 360 days.     BP Readings from Last 3 Encounters:   09/22/21 137/70   08/25/21 130/80   07/21/21 135/61              Passed - Office visit in past 12 months.     Recent Visits  Date Type Provider Dept   08/25/21 Office Visit Tera Jarquin MD [facility] Family Medicine   08/19/21 Office Visit Tera Jarquin MD [facility]   05/19/21 Office Visit Tera Jarquin MD [facility]   11/28/20 Office Visit John Jackson MD [facility]   07/31/20 Office Visit John Jackson MD [facility]   06/10/20 Office Visit John Jackson MD [facility]   01/20/20 Office Visit John Jackson MD [facility]   Showing recent visits within past 720 days and meeting all other requirements  Future Appointments  No visits were found meeting these conditions.  Showing future appointments within next 150 days and meeting all other requirements      Future Appointments              In 2 weeks Yoana Mcfarlane, PT [place] [facility] [place] [facility]    In 2 weeks Kaye Anderson, PT [place] [facility] [place] [facility]    In 2 weeks Yoana Mcfarlane, PT [place] [facility] [place] [facility]    In 3 weeks Yoana Mcfarlane, PT [place] [facility] [place] [facility]     In 4 weeks Yoana Mcfarlane, PT [place] [facility] [place] [facility]    In 1 month Yoanaferoz Mcfarlane, PT [place] [facility] [place] [facility]    In 1 month Yoana Mcfarlane, PT [place] [facility] [place] [facility]    In 1 month Yoana Mcfarlane, PT [place] [facility] [place] [facility]    In 1 month Yoana Mcfarlane, PT [place] [facility] [place] [facility]                Passed - Cr is 1.4 or below and within 360 days     Lab Results   Component Value Date    CREATININE 0.66 (L) 05/10/2021    CREATININE 0.8 08/13/2020    CREATININE 0.8 08/07/2020    POCCRE 0.8 06/02/2020              Passed - K in normal range and within 360 days     Potassium   Date Value Ref Range Status   05/10/2021 4.1 3.5 - 5.3 mmol/L Final   08/13/2020 4.0 3.5 - 5.1 mmol/L Final   08/07/2020 4.1 3.5 - 5.1 mmol/L Final              Passed - Na is between 130 and 148 and within 360 days     Sodium   Date Value Ref Range Status   05/10/2021 137 135 - 146 mmol/L Final   08/13/2020 137 136 - 145 mmol/L Final   08/07/2020 140 136 - 145 mmol/L Final              Passed - eGFR within 360 days     Lab Results   Component Value Date    QGFR 89.9 02/17/2020    ESTGFRAFRICA 115 05/10/2021    ESTGFRAFRICA >60 08/13/2020    ESTGFRAFRICA >60 08/07/2020                  amLODIPine (NORVASC) 10 MG tablet [Pharmacy Med Name: AMLODIPINE BESYLATE 10 MG TAB] 90 tablet 3     Sig: TAKE 1 TABLET (10 MG TOTAL) BY MOUTH ONCE DAILY. FOR BLOOD PRESSURE       Cardiovascular:  Calcium Channel Blockers Passed - 12/18/2021  6:56 AM        Passed - Patient is at least 18 years old        Passed - Last BP in normal range within 360 days.     BP Readings from Last 3 Encounters:   09/22/21 137/70   08/25/21 130/80   07/21/21 135/61              Passed - Office visit in past 12 months.     Recent Visits  Date Type Provider Dept   08/25/21 Office Visit Tera Jarquin MD [facility]   08/19/21 Office Visit Tera Jarquin MD [facility]   05/19/21 Office Visit  Tera Jarquin MD [facility]   11/28/20 Office Visit John Jackson MD [facility] Internal [facility]   07/31/20 Office Visit John Jackson MD [facility]   06/10/20 Office Visit John Jackson MD [facility]   01/20/20 Office Visit John Jackson MD [facility]   Showing recent visits within past 720 days and meeting all other requirements  Future Appointments  No visits were found meeting these conditions.  Showing future appointments within next 150 days and meeting all other requirements      Future Appointments              In 2 weeks Yoana F Maeve, PT [place] [facility] [place] [facility]    In 2 weeks Kaye Anderson, PT [place] [facility] [place] [facility]    In 2 weeks Yoana F Maeve, PT [place] [facility] [place] [facility]    In 3 weeks Yoana F Maeve, PT [place] [facility] [place] [facility]    In 4 weeks Yoana F Maeve, PT [place] [facility] [place] [facility]    In 1 month Yoana F Maeve, PT [place] [facility] [place] [facility]    In 1 month Yoana F Maeve, PT [place] [facility] [place] [facility]    In 1 month Yoana F Maeve, PT [place] [facility] [place] [facility]    In 1 month Yoana F Maeve, PT [place] [facility] [place] [facility]                      Appointments  past 12m or future 3m with PCP    Date Provider   Last Visit   8/25/2021 Trea Jarquin MD   Next Visit   Visit date not found Tera Jarquin MD   ED visits in past 90 days: 0        Note composed:1:03 PM 12/22/2021

## 2022-01-05 ENCOUNTER — CLINICAL SUPPORT (OUTPATIENT)
Dept: REHABILITATION | Facility: HOSPITAL | Age: 72
End: 2022-01-05
Payer: COMMERCIAL

## 2022-01-05 DIAGNOSIS — R26.2 DIFFICULTY WALKING: Primary | ICD-10-CM

## 2022-01-05 DIAGNOSIS — Z74.09 IMPAIRED FUNCTIONAL MOBILITY, BALANCE, GAIT, AND ENDURANCE: ICD-10-CM

## 2022-01-05 DIAGNOSIS — R53.1 RIGHT SIDED WEAKNESS: ICD-10-CM

## 2022-01-05 PROCEDURE — 97110 THERAPEUTIC EXERCISES: CPT | Mod: PN

## 2022-01-05 NOTE — PROGRESS NOTES
"[facility]   Physical Therapy Treatment Note     Name: Robles Jordan  Clinic Number: [id number]    Therapy Diagnosis:   Encounter Diagnoses   Name Primary?    Difficulty walking Yes    Right sided weakness     Impaired functional mobility, balance, gait, and endurance      Physician: Ervin Washington     Visit Date: 1/5/2022    Physician Orders: PT Eval and Treat Neuro  Medical Diagnosis from Referral: Sensory neuropathy [G62.9]  Evaluation Date: 12/9/2021  Authorization Period Expiration: 12/31/21  Plan of Care Expiration: 3/3/22  Visit # / Visits authorized: 1/ 1, 2/20    PTA Visit #: 0/5     Time In: 1250 PM  Time Out: 130 PM  Total Billable Time: 40 minutes    Precautions: Standard, Diabetes, Fall and hx of tethered cord    SUBJECTIVE     Pt reports: ready for therapy, doing ok overall, no falls    He was compliant with home exercise program.  Response to previous treatment: fine   Functional change: ongoing     Pain: 5/10  Location: right hip       OBJECTIVE     Objective Measures updated at progress report unless specified.     Treatment     Robles received the treatments listed below:      therapeutic exercises to develop strength, endurance and ROM for 45 minutes including:    Recumbent Bike 4 minutes, BLE - AAROM with neurtral foot and RLE position throughout    Seated at Mat:    Sit to stand 5x2     At // bars:    Standing Hip ABD 5x2   Standing knee flexion 5x2 - AAROM at RLE   4" box toe taps 5x2    4" box toe taps 5x2     Supine at Mat:    Bridge 5x2    Heel slides 5x2     Seated on BOSU ball - trunk balance x 2 minutes    Patient Education and Home Exercises     Home Exercises Provided and Patient Education Provided     Education provided:   - energy conservation  - 5 reps > 10 reps  - 5 min > 10 min  - reducing strain/energy depleting tasks in order to address quality of movement and mobility and time to complete all tasks that he must throughout the day    Written " Home Exercises Provided: Patient instructed to cont prior HEP. Exercises were reviewed and Robles was able to demonstrate them prior to the end of the session.  Robles demonstrated good  understanding of the education provided. See EMR under Patient Instructions for exercises provided during therapy sessions    ASSESSMENT   Robles tolerated session fair overall with no adverse response noted. Pt with significant posterior chain weakness noted, requiring mod A during hamstring curls on R LE. Pt requiring multiple seated rest breaks throughout session, indicating poor endurance and exercise tolerance. Pt progressing well, continue standing activities as tolerated.     Robles Is progressing well towards his goals.   Pt prognosis is Fair.     Pt will continue to benefit from skilled outpatient physical therapy to address the deficits listed in the problem list box on initial evaluation, provide pt/family education and to maximize pt's level of independence in the home and community environment.     Pt's spiritual, cultural and educational needs considered and pt agreeable to plan of care and goals.     Anticipated barriers to physical therapy: medical hx     Goals:   Short Term Goals: 6 weeks   1. Patient demonstrates independence with HEP.   2. Patient demonstrates improved Timed Up and Go (TUG) by 6 seconds for i less, indep, no LOB  3. Patient will transfer sit to/from standing with RW, CGA, no use of UE and with good body mechanics to promote safe mobility within the home.   4. Patient will improve balance within condition 2 of the MCTSIB to 30s to reduce risk of falling.   5. Patient will improve 5xSTS score by 3s to promote LE functional strength.  6. Patient will improve TUG score by 6s to promote functional mobility.   Patient will improve BLE strength to 3+/5 to promote improved mobility against gravity.     Long Term Goals: 12 weeks   1. Patient will be independent with home exercise program (HEP) to  promote continued rehabilitation following discharge.  2. Patient will improve 5xSTS score by 9s to promote LE functional strength progression.  3. Patient will improve TUG score by 12s to promote functional mobility.  4. Assess gait speed and create appropriate goals to track progression and improvements in safe community ambulation.  5. Assess endurance and create appropriate goals to track progression and improvements in safe community ambulation.    PLAN   Plan of care Certification: 12/9/2021 to 3/3/21.     Outpatient Physical Therapy 2 times weekly for 12 weeks to include the following interventions: Gait Training, Manual Therapy, Moist Heat/ Ice, Neuromuscular Re-ed, Patient Education, Therapeutic Activites and Therapeutic Exercise.     Yoana Mcfarlane, PT, DPT  12/21/21

## 2022-01-06 NOTE — PROGRESS NOTES
"[facility]   Physical Therapy Treatment Note     Name: Robles Jordan  Clinic Number: [id number]    Therapy Diagnosis:   Encounter Diagnoses   Name Primary?    Difficulty walking Yes    Right sided weakness     Impaired functional mobility, balance, gait, and endurance      Physician: Ervin Washington     Visit Date: 1/7/2022    Physician Orders: PT Eval and Treat Neuro  Medical Diagnosis from Referral: Sensory neuropathy [G62.9]  Evaluation Date: 12/9/2021  Authorization Period Expiration: 12/31/21  Plan of Care Expiration: 3/3/22  Visit # / Visits authorized: 1/ 1, 2/20    PTA Visit #: 0/5     Time In: 1000  Time Out: 1045  Total Billable Time: 45 minutes    Precautions: Standard, Diabetes, Fall and hx of tethered cord    SUBJECTIVE     Pt reports: was having R leg pain at rest (more pain in sitting recently) - from hip to knee laterally. Pain fluctuates from 0 to 7/10. Fatigue from therapy lasting 1 full day to recover. Only walking around the house not outside. In AM muscles lock up and tense up (everyday).     [place] doctors appointment 1/10/22.     He was compliant with home exercise program.  Response to previous treatment: fine   Functional change: ongoing     Pain: 4/10  Location: right hip       OBJECTIVE     Objective Measures updated at progress report unless specified.     Treatment   Bolded performed today:     Robles received the treatments listed below:      therapeutic exercises to develop strength, endurance and ROM for 45 minutes including:    Nustep 4 minutes (L1) BUE/LE; 4 minutes (L2) BUE/LE    Seated at Mat:    Sit to stand 5x2    On BOSU - hip rotations ~5 minutes    At // bars:    Standing Hip ABD 5x2   Standing knee flexion 5x2 - AAROM at RLE   4" box toe taps 5x2    4" box toe taps 5x2     Supine at Mat:    Bridge + clamshell with RTB 2x10   Heel slides 2x10 with slide board and foot in pillowcase to eliminate friction    LAQ 3# ankle weight, green bolster " 2x10    Clamshells with GTB 2x10    SLR x10, x5    Patient Education and Home Exercises     Home Exercises Provided and Patient Education Provided     Education provided:   - energy conservation, quality vs quantity of motion  - 5 reps > 10 reps  - 5 min > 10 min  - reducing strain/energy depleting tasks in order to address quality of movement and mobility and time to complete all tasks that he must throughout the day    Written Home Exercises Provided: Patient instructed to cont prior HEP. Exercises were reviewed and Arboleda was able to demonstrate them prior to the end of the session.  Arboleda demonstrated good  understanding of the education provided. See EMR under Patient Instructions for exercises provided during therapy sessions    ASSESSMENT   Arboleda demonstrating improved quality of motion with supine LE exercise with 50% AROM. Pt motion is limited by R hip pain as well as increased fatigability. Pt to see [facility] at [facility] 1/10/22. Pt is educated as above and continues to be appropriate for skilled PT at this time.     Robles Is progressing well towards his goals.   Pt prognosis is Fair.     Pt will continue to benefit from skilled outpatient physical therapy to address the deficits listed in the problem list box on initial evaluation, provide pt/family education and to maximize pt's level of independence in the home and community environment.     Pt's spiritual, cultural and educational needs considered and pt agreeable to plan of care and goals.     Anticipated barriers to physical therapy: medical hx     Goals:   Short Term Goals: 6 weeks   1. Patient demonstrates independence with HEP.   2. Patient demonstrates improved Timed Up and Go (TUG) by 6 seconds for i less, indep, no LOB  3. Patient will transfer sit to/from standing with RW, CGA, no use of UE and with good body mechanics to promote safe mobility within the home.   4. Patient will improve balance within condition 2 of the MCTSIB to 30s to  reduce risk of falling.   5. Patient will improve 5xSTS score by 3s to promote LE functional strength.  6. Patient will improve TUG score by 6s to promote functional mobility.   Patient will improve BLE strength to 3+/5 to promote improved mobility against gravity.     Long Term Goals: 12 weeks   1. Patient will be independent with home exercise program (HEP) to promote continued rehabilitation following discharge.  2. Patient will improve 5xSTS score by 9s to promote LE functional strength progression.  3. Patient will improve TUG score by 12s to promote functional mobility.  4. Assess gait speed and create appropriate goals to track progression and improvements in safe community ambulation.  5. Assess endurance and create appropriate goals to track progression and improvements in safe community ambulation.    PLAN   Plan of care Certification: 12/9/2021 to 3/3/21.     Outpatient Physical Therapy 2 times weekly for 12 weeks to include the following interventions: Gait Training, Manual Therapy, Moist Heat/ Ice, Neuromuscular Re-ed, Patient Education, Therapeutic Activites and Therapeutic Exercise.     Kaye Anderson, PT, DPT  1/7/22

## 2022-01-07 ENCOUNTER — CLINICAL SUPPORT (OUTPATIENT)
Dept: REHABILITATION | Facility: HOSPITAL | Age: 72
End: 2022-01-07
Payer: COMMERCIAL

## 2022-01-07 DIAGNOSIS — R53.1 RIGHT SIDED WEAKNESS: ICD-10-CM

## 2022-01-07 DIAGNOSIS — R26.2 DIFFICULTY WALKING: Primary | ICD-10-CM

## 2022-01-07 DIAGNOSIS — Z74.09 IMPAIRED FUNCTIONAL MOBILITY, BALANCE, GAIT, AND ENDURANCE: ICD-10-CM

## 2022-01-07 PROCEDURE — 97110 THERAPEUTIC EXERCISES: CPT | Mod: PN

## 2022-01-12 ENCOUNTER — CLINICAL SUPPORT (OUTPATIENT)
Dept: REHABILITATION | Facility: HOSPITAL | Age: 72
End: 2022-01-12
Payer: COMMERCIAL

## 2022-01-12 DIAGNOSIS — R26.2 DIFFICULTY WALKING: ICD-10-CM

## 2022-01-12 DIAGNOSIS — Z74.09 IMPAIRED FUNCTIONAL MOBILITY, BALANCE, GAIT, AND ENDURANCE: Primary | ICD-10-CM

## 2022-01-12 PROCEDURE — 97110 THERAPEUTIC EXERCISES: CPT | Mod: PN

## 2022-01-12 NOTE — PROGRESS NOTES
" [facility]   Physical Therapy Treatment Note     Name: Robles Jordan  Clinic Number: [id number]    Therapy Diagnosis:   Encounter Diagnoses   Name Primary?    Impaired functional mobility, balance, gait, and endurance Yes    Difficulty walking      Physician: Ervin Washington     Visit Date: 1/12/2022    Physician Orders: PT Eval and Treat Neuro  Medical Diagnosis from Referral: Sensory neuropathy [G62.9]  Evaluation Date: 12/9/2021  Authorization Period Expiration: 12/31/21  Plan of Care Expiration: 3/3/22  Visit # / Visits authorized: 1/ 1, 3/20    PTA Visit #: 0/5     Time In: 1115 AM  Time Out: 1200 PM  Total Billable Time: 45 minutes    Precautions: Standard, Diabetes, Fall and hx of tethered cord    SUBJECTIVE     Pt reports: no significant information to share from recent Neuro appointment. States that they are going to be running a few more tests.    He was compliant with home exercise program.  Response to previous treatment: fine   Functional change: ongoing     Pain: 5/10  Location: right hip       OBJECTIVE     Objective Measures updated at progress report unless specified.     Treatment     Robles received the treatments listed below:      therapeutic exercises to develop strength, endurance and ROM for 45 minutes including:    NuStep L2 x 6 minutes     Seated at Mat:    Sit to stand 5x2     At // bars:    Standing Hip ABD 5x2   Standing knee flexion 5x2 - AAROM at RLE   4" box toe taps 5x2     Supine at Mat:    Bridge 5x2    Heel slides 5x2     Not performed:  Seated on BOSU ball - trunk balance x 2 minutes    Patient Education and Home Exercises     Home Exercises Provided and Patient Education Provided     Education provided:   - energy conservation  - reducing strain/energy depleting tasks in order to address quality of movement and mobility and time to complete all tasks that he must throughout the day    Written Home Exercises Provided: Patient instructed to " cont prior HEP. Exercises were reviewed and Robles was able to demonstrate them prior to the end of the session.  Robles demonstrated good  understanding of the education provided. See EMR under Patient Instructions for exercises provided during therapy sessions    ASSESSMENT   Robles tolerated session fair overall with no adverse response noted. Pt with significant posterior chain weakness noted, requiring mod A during hamstring curls on R LE. Pt requiring multiple seated rest breaks throughout session, indicating poor endurance and exercise tolerance. Pt progressing well, continue standing activities as tolerated.     Robles Is progressing well towards his goals.   Pt prognosis is Fair.     Pt will continue to benefit from skilled outpatient physical therapy to address the deficits listed in the problem list box on initial evaluation, provide pt/family education and to maximize pt's level of independence in the home and community environment.     Pt's spiritual, cultural and educational needs considered and pt agreeable to plan of care and goals.     Anticipated barriers to physical therapy: medical hx     Goals:   Short Term Goals: 6 weeks   1. Patient demonstrates independence with HEP.   2. Patient demonstrates improved Timed Up and Go (TUG) by 6 seconds for i less, indep, no LOB  3. Patient will transfer sit to/from standing with RW, CGA, no use of UE and with good body mechanics to promote safe mobility within the home.   4. Patient will improve balance within condition 2 of the MCTSIB to 30s to reduce risk of falling.   5. Patient will improve 5xSTS score by 3s to promote LE functional strength.  6. Patient will improve TUG score by 6s to promote functional mobility.   Patient will improve BLE strength to 3+/5 to promote improved mobility against gravity.     Long Term Goals: 12 weeks   1. Patient will be independent with home exercise program (HEP) to promote continued rehabilitation following  discharge.  2. Patient will improve 5xSTS score by 9s to promote LE functional strength progression.  3. Patient will improve TUG score by 12s to promote functional mobility.  4. Assess gait speed and create appropriate goals to track progression and improvements in safe community ambulation.  5. Assess endurance and create appropriate goals to track progression and improvements in safe community ambulation.    PLAN   Plan of care Certification: 12/9/2021 to 3/3/21.     Outpatient Physical Therapy 2 times weekly for 12 weeks to include the following interventions: Gait Training, Manual Therapy, Moist Heat/ Ice, Neuromuscular Re-ed, Patient Education, Therapeutic Activites and Therapeutic Exercise.     Yoana Mcfarlane, PT, DPT  12/21/21

## 2022-01-19 ENCOUNTER — CLINICAL SUPPORT (OUTPATIENT)
Dept: REHABILITATION | Facility: HOSPITAL | Age: 72
End: 2022-01-19
Payer: COMMERCIAL

## 2022-01-19 DIAGNOSIS — Z74.09 IMPAIRED FUNCTIONAL MOBILITY, BALANCE, GAIT, AND ENDURANCE: ICD-10-CM

## 2022-01-19 DIAGNOSIS — R26.2 DIFFICULTY WALKING: ICD-10-CM

## 2022-01-19 PROCEDURE — 97110 THERAPEUTIC EXERCISES: CPT | Mod: PN

## 2022-01-19 NOTE — PROGRESS NOTES
[facility]   Physical Therapy Treatment Note     Name: Robles Jordan  Clinic Number: [id number]    Therapy Diagnosis:   Encounter Diagnoses   Name Primary?    Difficulty walking     Impaired functional mobility, balance, gait, and endurance      Physician: Ervin Washington     Visit Date: 1/19/2022    Physician Orders: PT Eval and Treat Neuro  Medical Diagnosis from Referral: Sensory neuropathy [G62.9]  Evaluation Date: 12/9/2021  Authorization Period Expiration: 12/31/21  Plan of Care Expiration: 3/3/22  Visit # / Visits authorized: 1/ 1, 3/20    PTA Visit #: 3/5     Time In: 1115 AM  Time Out: 1200 PM  Total Billable Time: 45 minutes    Precautions: Standard, Diabetes, Fall and hx of tethered cord    SUBJECTIVE     Pt reports: Pt reports that he is feeling okay today, with some tightness and pain in the back.    He was compliant with home exercise program.  Response to previous treatment: fine   Functional change: ongoing     Pain: 5/10  Location: right hip       OBJECTIVE     Objective Measures updated at progress report unless specified.     Treatment     Robles received the treatments listed below:      therapeutic exercises to develop strength, endurance and ROM for 45 minutes including:    NuStep L2.5 x 6 minutes     Seated at Mat:    Sit to stand 5x2     At // bars:    Standing Hip ABD 2x10   Standing hip ext 2x10   Standing knee flexion 5x2 - PT mod assist with RLE to achieve 90 deg flexion    Supine at Mat:    Bridge 5x2    Heel slides 5x2    LTR 1 min    Not performed:  Seated on BOSU ball - trunk balance x 2 minutes    Patient Education and Home Exercises     Home Exercises Provided and Patient Education Provided     Education provided:   -Medication side effects and the implications of taking doses at different times of the day may have on energy levels.    Written Home Exercises Provided: Patient instructed to cont prior HEP. Exercises were reviewed and Arboleda was  able to demonstrate them prior to the end of the session.  Robles demonstrated good  understanding of the education provided. See EMR under Patient Instructions for exercises provided during therapy sessions    ASSESSMENT   Pt tolerated exercises well but did report increased LBP during sit to stands that necessitated a rest break. Pt was noted to have improved AROM when performing standing hip extensions and knee flexions, particularly with the RLE. He still requires frequent seated rest breaks between sets of his standing exercises. Future therapy sessions will continue to focus on improving AROM in the RLE and strength in both LEs in order to meet pt's functional goals.    Robles Is progressing well towards his goals.   Pt prognosis is Fair.     Pt will continue to benefit from skilled outpatient physical therapy to address the deficits listed in the problem list box on initial evaluation, provide pt/family education and to maximize pt's level of independence in the home and community environment.     Pt's spiritual, cultural and educational needs considered and pt agreeable to plan of care and goals.     Anticipated barriers to physical therapy: medical hx     Goals:   Short Term Goals: 6 weeks   1. Patient demonstrates independence with HEP.   2. Patient demonstrates improved Timed Up and Go (TUG) by 6 seconds for i less, indep, no LOB  3. Patient will transfer sit to/from standing with RW, CGA, no use of UE and with good body mechanics to promote safe mobility within the home.   4. Patient will improve balance within condition 2 of the MCTSIB to 30s to reduce risk of falling.   5. Patient will improve 5xSTS score by 3s to promote LE functional strength.  6. Patient will improve TUG score by 6s to promote functional mobility.   Patient will improve BLE strength to 3+/5 to promote improved mobility against gravity.     Long Term Goals: 12 weeks   1. Patient will be independent with home exercise program  (HEP) to promote continued rehabilitation following discharge.  2. Patient will improve 5xSTS score by 9s to promote LE functional strength progression.  3. Patient will improve TUG score by 12s to promote functional mobility.  4. Assess gait speed and create appropriate goals to track progression and improvements in safe community ambulation.  5. Assess endurance and create appropriate goals to track progression and improvements in safe community ambulation.    PLAN   Plan of care Certification: 12/9/2021 to 3/3/21.     Outpatient Physical Therapy 2 times weekly for 12 weeks to include the following interventions: Gait Training, Manual Therapy, Moist Heat/ Ice, Neuromuscular Re-ed, Patient Education, Therapeutic Activites and Therapeutic Exercise.     Arden Chicas, SPT  1/19/2022    I certify that I was present in the room directing the student in service delivery and guiding them using my skilled judgment. As the co-signing therapist I have reviewed the students documentation and am responsible for the treatment, assessment, and plan.     Yoana Mcfarlane, PT, DPT  1/19/2022

## 2022-01-22 DIAGNOSIS — I10 ESSENTIAL HYPERTENSION: ICD-10-CM

## 2022-01-22 NOTE — TELEPHONE ENCOUNTER
No new care gaps identified.  Powered by [employer]. Reference number: [id number].   1/22/2022 7:58:35 AM CST

## 2022-01-23 NOTE — TELEPHONE ENCOUNTER
Encounter Provider and Renewal Provider updated   to match PCP listed, and recent encounter details.   CDM already fired   Note composed:4:46 PM 01/23/2022

## 2022-01-24 ENCOUNTER — CLINICAL SUPPORT (OUTPATIENT)
Dept: REHABILITATION | Facility: HOSPITAL | Age: 72
End: 2022-01-24
Payer: COMMERCIAL

## 2022-01-24 DIAGNOSIS — R26.2 DIFFICULTY WALKING: ICD-10-CM

## 2022-01-24 DIAGNOSIS — Z74.09 IMPAIRED FUNCTIONAL MOBILITY, BALANCE, GAIT, AND ENDURANCE: ICD-10-CM

## 2022-01-24 PROCEDURE — 97110 THERAPEUTIC EXERCISES: CPT | Mod: PN

## 2022-01-24 NOTE — PROGRESS NOTES
[facility]   Physical Therapy Treatment Note     Name: Robles Jordan  Clinic Number: [id number]    Therapy Diagnosis:   Encounter Diagnoses   Name Primary?    Difficulty walking     Impaired functional mobility, balance, gait, and endurance      Physician: Ervin Washington     Visit Date: 1/24/2022    Physician Orders: PT Eval and Treat Neuro  Medical Diagnosis from Referral: Sensory neuropathy [G62.9]  Evaluation Date: 12/9/2021  Authorization Period Expiration: 12/31/21  Plan of Care Expiration: 3/3/22  Visit # / Visits authorized: 1/ 1, 3/20    PTA Visit #: 3/5    Time In: 10:00 AM  Time Out: 10:45 PM  Total Billable Time: 45 minutes    Precautions: Standard, Diabetes, Fall and hx of tethered cord    SUBJECTIVE     Pt reports: Pt reports feling tired tody but with little pain compared to last visit. He states that he did not do much over the last weekend. He was very tired following last therapy session.    He was compliant with home exercise program.  Response to previous treatment: fine   Functional change: ongoing     Pain: 5/10  Location: right hip       OBJECTIVE     Objective Measures updated at progress report unless specified.     Treatment     Robles received the treatments listed below:      therapeutic exercises to develop strength, endurance and ROM for 45 minutes including:    NuStep L2.5 x 6 minutes   Rec bike L1 x 6 minutes - req multiple breaks to re-insert R foot into pedal strap      Seated at Mat:    Sit to stand 5x2    +Knee extensions 2x10   +Seated heel slides 2x10    At // bars:    Standing Hip ABD 2x10   Standing hip ext 2x10   Standing knee flexion 5x2 - PT mod assist with RLE to achieve 90 deg flexion    Supine at Mat:    Bridge 10x2    Heel slides 10x2    LTR 1 min    Not performed:  Seated on BOSU ball - trunk balance x 2 minutes    Patient Education and Home Exercises     Home Exercises Provided and Patient Education Provided     Education provided:    -Medication side effects and the implications of taking doses at different times of the day may have on energy levels.    Written Home Exercises Provided: Patient instructed to cont prior HEP. Exercises were reviewed and Robles was able to demonstrate them prior to the end of the session.  Robles demonstrated good  understanding of the education provided. See EMR under Patient Instructions for exercises provided during therapy sessions    ASSESSMENT   Pt complains of R hip pain during RLE exercises, but he states that this is not as intense as it has been in the past. Requires more asssistance from PT during knee flexion AROM exercises today than he did last visit. Requires frequent seated breaks during standing exercises; however, pt was noted to require less total rest time during this visit as compared to last session. Introduced two new seated strengthening exercises in order to continue developing strength and muscular coordination. Future therapy sessions will continue to focus on developing endurance and strength in his RLE while managing his R hip pain and LBP.    Robles Is progressing well towards his goals.   Pt prognosis is Fair.     Pt will continue to benefit from skilled outpatient physical therapy to address the deficits listed in the problem list box on initial evaluation, provide pt/family education and to maximize pt's level of independence in the home and community environment.     Pt's spiritual, cultural and educational needs considered and pt agreeable to plan of care and goals.     Anticipated barriers to physical therapy: medical hx     Goals:   Short Term Goals: 6 weeks   1. Patient demonstrates independence with HEP.   2. Patient demonstrates improved Timed Up and Go (TUG) by 6 seconds for i less, indep, no LOB  3. Patient will transfer sit to/from standing with RW, CGA, no use of UE and with good body mechanics to promote safe mobility within the home.   4. Patient will improve  balance within condition 2 of the MCTSIB to 30s to reduce risk of falling.   5. Patient will improve 5xSTS score by 3s to promote LE functional strength.  6. Patient will improve TUG score by 6s to promote functional mobility.   Patient will improve BLE strength to 3+/5 to promote improved mobility against gravity.     Long Term Goals: 12 weeks   1. Patient will be independent with home exercise program (HEP) to promote continued rehabilitation following discharge.  2. Patient will improve 5xSTS score by 9s to promote LE functional strength progression.  3. Patient will improve TUG score by 12s to promote functional mobility.  4. Assess gait speed and create appropriate goals to track progression and improvements in safe community ambulation.  5. Assess endurance and create appropriate goals to track progression and improvements in safe community ambulation.    PLAN   Plan of care Certification: 12/9/2021 to 3/3/21.     Outpatient Physical Therapy 2 times weekly for 12 weeks to include the following interventions: Gait Training, Manual Therapy, Moist Heat/ Ice, Neuromuscular Re-ed, Patient Education, Therapeutic Activites and Therapeutic Exercise.     Arden Chicas, SPT  1/24/2022    I certify that I was present in the room directing the student in service delivery and guiding them using my skilled judgment. As the co-signing therapist I have reviewed the students documentation and am responsible for the treatment, assessment, and plan.     Yoana Mcfarlane, PT, DPT  1/24/2022

## 2022-01-26 ENCOUNTER — PATIENT MESSAGE (OUTPATIENT)
Dept: FAMILY MEDICINE | Facility: CLINIC | Age: 72
End: 2022-01-26
Payer: COMMERCIAL

## 2022-01-26 ENCOUNTER — CLINICAL SUPPORT (OUTPATIENT)
Dept: REHABILITATION | Facility: HOSPITAL | Age: 72
End: 2022-01-26
Payer: COMMERCIAL

## 2022-01-26 DIAGNOSIS — R26.2 DIFFICULTY WALKING: ICD-10-CM

## 2022-01-26 DIAGNOSIS — Z74.09 IMPAIRED FUNCTIONAL MOBILITY, BALANCE, GAIT, AND ENDURANCE: ICD-10-CM

## 2022-01-26 PROCEDURE — 97110 THERAPEUTIC EXERCISES: CPT | Mod: PN

## 2022-01-26 NOTE — PROGRESS NOTES
[facility]   Physical Therapy Treatment Note     Name: Robles Jordan  Clinic Number: [id number]    Therapy Diagnosis:   Encounter Diagnoses   Name Primary?    Difficulty walking     Impaired functional mobility, balance, gait, and endurance      Physician: Ervin Washington     Visit Date: 1/26/2022    Physician Orders: PT Eval and Treat Neuro  Medical Diagnosis from Referral: Sensory neuropathy [G62.9]  Evaluation Date: 12/9/2021  Authorization Period Expiration: 12/31/21  Plan of Care Expiration: 3/3/22  Visit # / Visits authorized: 1/ 1, 3/20    PTA Visit #: 3/5    Time In: 10:30 AM  Time Out: 11:15 PM  Total Billable Time: 45 minutes    Precautions: Standard, Diabetes, Fall and hx of tethered cord    SUBJECTIVE     Pt reports: Pt reports feeling okay today, better than most days. He is not having as much back pain recently but his R hip pain is getting more intense.    He was compliant with home exercise program.  Response to previous treatment: fine   Functional change: ongoing     Pain: 5/10  Location: right hip       OBJECTIVE     Objective Measures updated at progress report unless specified.     Treatment     Robles received the treatments listed below:      therapeutic exercises to develop strength, endurance and ROM for 40 minutes including:    NuStep L2.5 x 6 minutes   Rec bike L1 x 6 minutes - req multiple breaks to re-insert R foot into pedal strap      Seated at Mat:    Sit to stand 5x2    Knee extensions 1# 2x10   Seated heel slides 2x10    At // bars:    Standing Hip ABD 2x10   Standing hip ext 2x10   Standing knee flexion 10x2 - PT mod assist with RLE to achieve 90 deg flexion    Supine at Mat:    Bridge 10x2    LTR 1 min   Heel slides 10x2     Manual therapy to develop strength, endurance and ROM for 5 minutes including    +Manual distraction, R hip, 5x40 seconds    Not performed:  Seated on BOSU ball - trunk balance x 2 minutes    Patient Education and Home  Exercises     Home Exercises Provided and Patient Education Provided     Education provided:   -Education on different medications    Written Home Exercises Provided: Patient instructed to cont prior HEP. Exercises were reviewed and Robles was able to demonstrate them prior to the end of the session.  Robles demonstrated good  understanding of the education provided. See EMR under Patient Instructions for exercises provided during therapy sessions    ASSESSMENT   Pt states that he is feeling stronger everywhere except for his R hamstring, which still feels very weak. Pt was noted to have increased AROM without therapist assistance during his standing knee flexion exercise. Pt reported significant increase in R hip pain with several exercises, and states that this pain has been getting worse with activity over the past few weeks. Hip PROM was screened and pt was found to have significant lack of R internal rotation, along with pain with IR and compression. Pt was encouraged to let his MD know about his recent increase in hip symptoms. Future therapy sessions will continue monitoring R hip symptoms while continuing to strengthen R hamstring and functional capacity of RLE.    Robles Is progressing well towards his goals.   Pt prognosis is Fair.     Pt will continue to benefit from skilled outpatient physical therapy to address the deficits listed in the problem list box on initial evaluation, provide pt/family education and to maximize pt's level of independence in the home and community environment.     Pt's spiritual, cultural and educational needs considered and pt agreeable to plan of care and goals.     Anticipated barriers to physical therapy: medical hx     Goals:   Short Term Goals: 6 weeks   1. Patient demonstrates independence with HEP.   2. Patient demonstrates improved Timed Up and Go (TUG) by 6 seconds for i less, indep, no LOB  3. Patient will transfer sit to/from standing with RW, CGA, no use of UE  and with good body mechanics to promote safe mobility within the home.   4. Patient will improve balance within condition 2 of the MCTSIB to 30s to reduce risk of falling.   5. Patient will improve 5xSTS score by 3s to promote LE functional strength.  6. Patient will improve TUG score by 6s to promote functional mobility.   Patient will improve BLE strength to 3+/5 to promote improved mobility against gravity.     Long Term Goals: 12 weeks   1. Patient will be independent with home exercise program (HEP) to promote continued rehabilitation following discharge.  2. Patient will improve 5xSTS score by 9s to promote LE functional strength progression.  3. Patient will improve TUG score by 12s to promote functional mobility.  4. Assess gait speed and create appropriate goals to track progression and improvements in safe community ambulation.  5. Assess endurance and create appropriate goals to track progression and improvements in safe community ambulation.    PLAN   Plan of care Certification: 12/9/2021 to 3/3/21.     Outpatient Physical Therapy 2 times weekly for 12 weeks to include the following interventions: Gait Training, Manual Therapy, Moist Heat/ Ice, Neuromuscular Re-ed, Patient Education, Therapeutic Activites and Therapeutic Exercise.     Arden Chicas, SPT  1/26/2022     I certify that I was present in the room directing the student in service delivery and guiding them using my skilled judgment. As the co-signing therapist I have reviewed the students documentation and am responsible for the treatment, assessment, and plan.     Yoana Mcfarlane, PT, DPT  1/26/2022

## 2022-01-27 ENCOUNTER — PATIENT MESSAGE (OUTPATIENT)
Dept: FAMILY MEDICINE | Facility: CLINIC | Age: 72
End: 2022-01-27
Payer: COMMERCIAL

## 2022-01-28 ENCOUNTER — OFFICE VISIT (OUTPATIENT)
Dept: FAMILY MEDICINE | Facility: CLINIC | Age: 72
End: 2022-01-28
Payer: COMMERCIAL

## 2022-01-28 VITALS
WEIGHT: 216 LBS | HEIGHT: 74 IN | RESPIRATION RATE: 20 BRPM | SYSTOLIC BLOOD PRESSURE: 121 MMHG | HEART RATE: 80 BPM | DIASTOLIC BLOOD PRESSURE: 66 MMHG | OXYGEN SATURATION: 98 % | BODY MASS INDEX: 27.72 KG/M2 | TEMPERATURE: 99 F

## 2022-01-28 DIAGNOSIS — D17.79 FIBROLIPOMA OF FILUM TERMINALE: ICD-10-CM

## 2022-01-28 DIAGNOSIS — Q06.8 TETHERED CORD SYNDROME: ICD-10-CM

## 2022-01-28 DIAGNOSIS — B02.8 HERPES ZOSTER WITH COMPLICATION: ICD-10-CM

## 2022-01-28 DIAGNOSIS — R73.03 PREDIABETES: Primary | ICD-10-CM

## 2022-01-28 DIAGNOSIS — R35.0 URINARY FREQUENCY: ICD-10-CM

## 2022-01-28 DIAGNOSIS — I10 ESSENTIAL HYPERTENSION: ICD-10-CM

## 2022-01-28 PROCEDURE — 99214 OFFICE O/P EST MOD 30 MIN: CPT | Mod: S$GLB,,, | Performed by: FAMILY MEDICINE

## 2022-01-28 PROCEDURE — 99214 PR OFFICE/OUTPT VISIT, EST, LEVL IV, 30-39 MIN: ICD-10-PCS | Mod: S$GLB,,, | Performed by: FAMILY MEDICINE

## 2022-01-28 PROCEDURE — 99999 PR PBB SHADOW E&M-EST. PATIENT-LVL III: ICD-10-PCS | Mod: PBBFAC,,, | Performed by: FAMILY MEDICINE

## 2022-01-28 PROCEDURE — 99999 PR PBB SHADOW E&M-EST. PATIENT-LVL III: CPT | Mod: PBBFAC,,, | Performed by: FAMILY MEDICINE

## 2022-01-28 RX ORDER — VALACYCLOVIR HYDROCHLORIDE 1 G/1
1000 TABLET, FILM COATED ORAL 3 TIMES DAILY
Qty: 21 TABLET | Refills: 0 | Status: SHIPPED | OUTPATIENT
Start: 2022-01-28 | End: 2023-02-28

## 2022-01-28 RX ORDER — TAMSULOSIN HYDROCHLORIDE 0.4 MG/1
0.4 CAPSULE ORAL DAILY
Qty: 90 CAPSULE | Refills: 3 | Status: SHIPPED | OUTPATIENT
Start: 2022-01-28 | End: 2023-02-14

## 2022-01-28 RX ORDER — AMLODIPINE BESYLATE 10 MG/1
10 TABLET ORAL DAILY
Qty: 90 TABLET | Refills: 3 | Status: SHIPPED | OUTPATIENT
Start: 2022-01-28 | End: 2023-02-28 | Stop reason: SDUPTHER

## 2022-01-28 RX ORDER — LISINOPRIL 40 MG/1
40 TABLET ORAL DAILY
Qty: 90 TABLET | Refills: 3 | Status: SHIPPED | OUTPATIENT
Start: 2022-01-28 | End: 2023-01-25 | Stop reason: SDUPTHER

## 2022-01-28 RX ORDER — GABAPENTIN 100 MG/1
CAPSULE ORAL
COMMUNITY
Start: 2022-01-10

## 2022-01-28 RX ORDER — CHLORTHALIDONE 25 MG/1
25 TABLET ORAL DAILY
Qty: 90 TABLET | Refills: 3 | Status: SHIPPED | OUTPATIENT
Start: 2022-01-28 | End: 2022-08-31

## 2022-01-28 NOTE — PROGRESS NOTES
Subjective:       Patient ID: Robles Jordan is a 71 y.o. male.    Chief Complaint: Hypertension and Hyperlipidemia      HPI  71-year-old male presents for establishing care.  States he needs refills on his medications.  Patient is worried about a rash on his abdomen.  States his started few days ago.  Denies any fever chills.  States he has pain around the rash.  Has a history of shingles and feels it is similar.  Review of Systems   Constitutional: Negative.    HENT: Negative.    Respiratory: Negative.    Cardiovascular: Negative.    Gastrointestinal: Negative.    Endocrine: Negative.    Genitourinary: Negative.    Musculoskeletal: Negative.    Skin: Positive for rash.   Neurological: Negative.    Psychiatric/Behavioral: Negative.           Past Medical History:   Diagnosis Date    Back pain     BPH (benign prostatic hypertrophy)     Hx of colonic polyps 10/19/2015    Hyperlipidemia     Hypertension     Hypertension     Sleep apnea      Past Surgical History:   Procedure Laterality Date    COLONOSCOPY N/A 10/19/2015    Procedure: COLONOSCOPY;  Surgeon: Antonino Bernstein MD;  Location: [address]);  Service: Endoscopy;  Laterality: N/A;    COLONOSCOPY N/A 2/25/2021    Procedure: COLONOSCOPY;  Surgeon: Blanca Don MD;  Location: [address]);  Service: Endoscopy;  Laterality: N/A;  requesting ASAP  COVID test at [place] on 2/22-GT.2/24 called pt. move up to earlier spot. pt. did NOT want to come earlier.EC    CYSTOSCOPY WITH URODYNAMIC TESTING N/A 1/17/2020    Procedure: CYSTOSCOPY,WITH URODYNAMIC TESTING;  Surgeon: Christianne Israel MD;  Location: [facility];  Service: Urology;  Laterality: N/A;  RN PREOP 1/13/2020    MULTIPLE TOOTH EXTRACTIONS  2014     Family History   Problem Relation Age of Onset    Cancer Mother         Breast    Stroke Sister 65        Fatal    Cancer Maternal Uncle         Pancreas    Cancer Paternal Uncle         Pancreas    Cancer Maternal Grandmother          Breast    Hypertension Father     Hyperlipidemia Father      Social History     Socioeconomic History    Marital status:      Spouse name: Neeru     Number of children: 2    Highest education level: Bachelor's degree (e.g., BA, AB, BS)   Occupational History    Occupation: retired    Tobacco Use    Smoking status: Light Tobacco Smoker     Packs/day: 0.25     Types: Cigarettes    Smokeless tobacco: Never Used    Tobacco comment: 3 cigarettes a day    Substance and Sexual Activity    Alcohol use: Yes     Alcohol/week: 1.0 standard drink     Types: 1 Cans of beer per week     Comment: very seldom    Drug use: No    Sexual activity: Not Currently     Partners: Female   Social History Narrative     has 2 step children is a       Social Determinants of Health     Financial Resource Strain: Low Risk     Difficulty of Paying Living Expenses: Not hard at all   Food Insecurity: No Food Insecurity    Worried About Running Out of Food in the Last Year: Never true    Ran Out of Food in the Last Year: Never true   Transportation Needs: No Transportation Needs    Lack of Transportation (Medical): No    Lack of Transportation (Non-Medical): No   Physical Activity: Inactive    Days of Exercise per Week: 0 days    Minutes of Exercise per Session: 90 min   Stress: No Stress Concern Present    Feeling of Stress : Not at all   Social Connections: Moderately Isolated    Frequency of Communication with Friends and Family: Twice a week    Frequency of Social Gatherings with Friends and Family: Once a week    Attends [employer] Services: Never    Active Member of Clubs or Organizations: No    Attends Club or Organization Meetings: Never    Marital Status:    Housing Stability: Unknown    Unable to Pay for Housing in the Last Year: No    Unstable Housing in the Last Year: No       Current Outpatient Medications:     acetaminophen (TYLENOL) 325 MG tablet, Take 2 tablets (650 mg  total) by mouth every 6 (six) hours as needed for Temperature greater than (or equal to 101 degree F)., Disp:  , Rfl: 0    gabapentin (NEURONTIN) 100 MG capsule, , Disp: , Rfl:     ibuprofen (ADVIL,MOTRIN) 800 MG tablet, Take 1 tablet (800 mg total) by mouth 3 (three) times daily as needed for Pain., Disp: 45 tablet, Rfl: 5    amLODIPine (NORVASC) 10 MG tablet, Take 1 tablet (10 mg total) by mouth once daily. For blood pressure, Disp: 90 tablet, Rfl: 3    atorvastatin (LIPITOR) 80 MG tablet, TAKE 1 TABLET BY MOUTH EVERY DAY (Patient not taking: Reported on 1/28/2022), Disp: 90 tablet, Rfl: 0    bisacodyL (DULCOLAX) 10 mg Supp, Place 1 suppository (10 mg total) rectally daily as needed. (Patient not taking: No sig reported), Disp:  , Rfl: 0    chlorthalidone (HYGROTEN) 25 MG Tab, Take 1 tablet (25 mg total) by mouth once daily., Disp: 90 tablet, Rfl: 3    lisinopriL (PRINIVIL,ZESTRIL) 40 MG tablet, Take 1 tablet (40 mg total) by mouth once daily., Disp: 90 tablet, Rfl: 3    polyethylene glycol (GLYCOLAX) 17 gram PwPk, Take 17 g by mouth once daily. (Patient not taking: Reported on 9/22/2021), Disp: , Rfl: 0    senna-docusate 8.6-50 mg (PERICOLACE) 8.6-50 mg per tablet, Take 2 tablets by mouth nightly as needed for Constipation. (Patient not taking: Reported on 9/22/2021), Disp:  , Rfl:     sildenafil (VIAGRA) 100 MG tablet, Take 1 tablet (100 mg total) by mouth daily as needed for Erectile Dysfunction., Disp: 30 tablet, Rfl: 2    tamsulosin (FLOMAX) 0.4 mg Cap, Take 1 capsule (0.4 mg total) by mouth once daily., Disp: 90 capsule, Rfl: 3    valACYclovir (VALTREX) 1000 MG tablet, Take 1 tablet (1,000 mg total) by mouth 3 (three) times daily. for 7 days, Disp: 21 tablet, Rfl: 0  No current facility-administered medications for this visit.    Facility-Administered Medications Ordered in Other Visits:     mupirocin 2 % ointment, , Nasal, On Call Procedure, Briana Seals PA-C   Objective:      Vitals:  "   01/28/22 0851   BP: 121/66   BP Location: Left arm   Patient Position: Sitting   BP Method: Medium (Automatic)   Pulse: 80   Resp: 20   Temp: 99 °F (37.2 °C)   TempSrc: Oral   SpO2: 98%   Weight: 98 kg (216 lb)   Height: 6' 2" (1.88 m)       Physical Exam  Constitutional:       General: He is not in acute distress.  HENT:      Head: Normocephalic and atraumatic.   Eyes:      Conjunctiva/sclera: Conjunctivae normal.   Cardiovascular:      Rate and Rhythm: Normal rate and regular rhythm.      Heart sounds: Normal heart sounds. No murmur heard.  No friction rub. No gallop.    Pulmonary:      Effort: Pulmonary effort is normal.      Breath sounds: Normal breath sounds. No wheezing or rales.   Musculoskeletal:      Cervical back: Neck supple.   Skin:     General: Skin is warm and dry.      Findings: Rash present.   Neurological:      Mental Status: He is alert and oriented to person, place, and time.   Psychiatric:         Mood and Affect: Mood and affect normal.         Behavior: Behavior normal.         Thought Content: Thought content normal.         Judgment: Judgment normal.            Assessment:       1. Prediabetes    2. Essential hypertension    3. Urinary frequency    4. Fibrolipoma of filum terminale    5. Tethered cord syndrome    6. Herpes zoster with complication        Plan:       Prediabetes    Essential hypertension  -     amLODIPine (NORVASC) 10 MG tablet; Take 1 tablet (10 mg total) by mouth once daily. For blood pressure  Dispense: 90 tablet; Refill: 3  -     chlorthalidone (HYGROTEN) 25 MG Tab; Take 1 tablet (25 mg total) by mouth once daily.  Dispense: 90 tablet; Refill: 3  -     lisinopriL (PRINIVIL,ZESTRIL) 40 MG tablet; Take 1 tablet (40 mg total) by mouth once daily.  Dispense: 90 tablet; Refill: 3    Urinary frequency  -     tamsulosin (FLOMAX) 0.4 mg Cap; Take 1 capsule (0.4 mg total) by mouth once daily.  Dispense: 90 capsule; Refill: 3    Fibrolipoma of filum terminale    Tethered cord " syndrome    Herpes zoster with complication  -     valACYclovir (VALTREX) 1000 MG tablet; Take 1 tablet (1,000 mg total) by mouth 3 (three) times daily. for 7 days  Dispense: 21 tablet; Refill: 0      Cont meds. Given valtrex for shingles. Advised pt to take gabapentin prn for his pain.          Patient note was created using [employer].  Any errors in syntax or even information may not have been identified and edited on initial review prior to signing this note.

## 2022-01-31 ENCOUNTER — CLINICAL SUPPORT (OUTPATIENT)
Dept: REHABILITATION | Facility: HOSPITAL | Age: 72
End: 2022-01-31
Payer: COMMERCIAL

## 2022-01-31 DIAGNOSIS — R26.2 DIFFICULTY WALKING: ICD-10-CM

## 2022-01-31 DIAGNOSIS — Z74.09 IMPAIRED FUNCTIONAL MOBILITY, BALANCE, GAIT, AND ENDURANCE: ICD-10-CM

## 2022-01-31 PROCEDURE — 97110 THERAPEUTIC EXERCISES: CPT | Mod: PN

## 2022-01-31 NOTE — PROGRESS NOTES
[facility]   Physical Therapy Treatment Note     Name: Robles Jordan  Clinic Number: [id number]    Therapy Diagnosis:   Encounter Diagnoses   Name Primary?    Difficulty walking     Impaired functional mobility, balance, gait, and endurance      Physician: Ervin Washington     Visit Date: 1/31/2022    Physician Orders: PT Eval and Treat Neuro  Medical Diagnosis from Referral: Sensory neuropathy [G62.9]  Evaluation Date: 12/9/2021  Authorization Period Expiration: 12/31/21  Plan of Care Expiration: 3/3/22  Visit # / Visits authorized: 1/ 1, 4/20    PTA Visit #: 3/5    Time In: 10:15 AM  Time Out: 11:00 PM  Total Billable Time: 45 minutes    Precautions: Standard, Diabetes, Fall and hx of tethered cord    SUBJECTIVE     Pt reports: Pt reports he is very tired today and in some pain but not more than normal. He had to get up early and go downtown for an MD appointment and thinks this disrupted his rhythm. His R hip pain has not been bothering him too much recently.    He was compliant with home exercise program.  Response to previous treatment: fine   Functional change: ongoing     Pain: 5/10  Location: right hip       OBJECTIVE     Objective Measures updated at progress report unless specified.     Treatment     Robles received the treatments listed below:      therapeutic exercises to develop strength, endurance and ROM for 40 minutes including:    NuStep L1 x 6 minutes   Rec bike L1 x 6 minutes - req multiple breaks to re-insert R foot into pedal strap      Seated at Mat:    Sit to stand 5x2    Knee extensions 2# 3x10   Seated heel slides 2x10    At // bars:    Standing Hip ABD 2x10   Standing hip ext 2x10   Standing knee flexion 10x2 - no PT assist    Supine at Mat:    Bridge 10x2    LTR 2 min   Heel slides 10x2     Manual therapy to control pain and increase ROM for 5 minutes including    +Manual distraction, R hip, 5x40 seconds    Not performed:  Seated on BOSU ball - trunk  balance x 2 minutes    Patient Education and Home Exercises     Home Exercises Provided and Patient Education Provided     Education provided:   -Education on different medications    Written Home Exercises Provided: Patient instructed to cont prior HEP. Exercises were reviewed and Robles was able to demonstrate them prior to the end of the session.  Robles demonstrated good  understanding of the education provided. See EMR under Patient Instructions for exercises provided during therapy sessions    ASSESSMENT   Pt tolerated exercises well despite his reports of fatigue upon entering therapy. He required the usual amount of seated rest breaks. Pt did not require PT assistance during his standing knee flexion exercise, achieving about 45 degrees of flexion. He was also able to tolerate an increased amount of weight for his seated knee extension. Pt endurance remains a limiting factor in therapy and in his daily life. Future therapy sessions therefore may focus more on developing endurance. Pt noted that he felt less tired upon leaving therapy and was educated on the benefits of daily exercise towards improving energy levels and general malaise.      Robles Is progressing well towards his goals.   Pt prognosis is Fair.     Pt will continue to benefit from skilled outpatient physical therapy to address the deficits listed in the problem list box on initial evaluation, provide pt/family education and to maximize pt's level of independence in the home and community environment.     Pt's spiritual, cultural and educational needs considered and pt agreeable to plan of care and goals.     Anticipated barriers to physical therapy: medical hx     Goals:   Short Term Goals: 6 weeks   1. Patient demonstrates independence with HEP.   2. Patient demonstrates improved Timed Up and Go (TUG) by 6 seconds for i less, indep, no LOB  3. Patient will transfer sit to/from standing with RW, CGA, no use of UE and with good body  mechanics to promote safe mobility within the home.   4. Patient will improve balance within condition 2 of the MCTSIB to 30s to reduce risk of falling.   5. Patient will improve 5xSTS score by 3s to promote LE functional strength.  6. Patient will improve TUG score by 6s to promote functional mobility.   Patient will improve BLE strength to 3+/5 to promote improved mobility against gravity.     Long Term Goals: 12 weeks   1. Patient will be independent with home exercise program (HEP) to promote continued rehabilitation following discharge.  2. Patient will improve 5xSTS score by 9s to promote LE functional strength progression.  3. Patient will improve TUG score by 12s to promote functional mobility.  4. Assess gait speed and create appropriate goals to track progression and improvements in safe community ambulation.  5. Assess endurance and create appropriate goals to track progression and improvements in safe community ambulation.    PLAN   Plan of care Certification: 12/9/2021 to 3/3/21.     Outpatient Physical Therapy 2 times weekly for 12 weeks to include the following interventions: Gait Training, Manual Therapy, Moist Heat/ Ice, Neuromuscular Re-ed, Patient Education, Therapeutic Activites and Therapeutic Exercise.     Arden Chicas, SPT  1/31/2022

## 2022-02-02 ENCOUNTER — CLINICAL SUPPORT (OUTPATIENT)
Dept: REHABILITATION | Facility: HOSPITAL | Age: 72
End: 2022-02-02
Payer: COMMERCIAL

## 2022-02-02 DIAGNOSIS — Z74.09 IMPAIRED FUNCTIONAL MOBILITY, BALANCE, GAIT, AND ENDURANCE: ICD-10-CM

## 2022-02-02 DIAGNOSIS — R26.2 DIFFICULTY WALKING: ICD-10-CM

## 2022-02-02 PROCEDURE — 97110 THERAPEUTIC EXERCISES: CPT | Mod: PN

## 2022-02-02 NOTE — PROGRESS NOTES
[facility]   Physical Therapy Treatment Note     Name: Robles Jordan  Clinic Number: [id number]    Therapy Diagnosis:   Encounter Diagnoses   Name Primary?    Difficulty walking     Impaired functional mobility, balance, gait, and endurance      Physician: Ervin Washington     Visit Date: 2/2/2022    Physician Orders: PT Eval and Treat Neuro  Medical Diagnosis from Referral: Sensory neuropathy [G62.9]  Evaluation Date: 12/9/2021  Authorization Period Expiration: 12/31/21  Plan of Care Expiration: 3/3/22  Visit # / Visits authorized: 1/ 1, 5/20    PTA Visit #: 3/5    Time In: 10:15 AM  Time Out: 11:00 PM  Total Billable Time: 00 minutes    Precautions: Standard, Diabetes, Fall and hx of tethered cord    SUBJECTIVE     Pt reports: Pt states giovanny is feeling much better than Monday. He used his bike at home yesterday and verbalizes that he thinks exercise helps him feel better. Pain is manageable today, he did not need any extra medication to control it today.    He was compliant with home exercise program.  Response to previous treatment: fine   Functional change: ongoing     Pain: 5/10  Location: right hip       OBJECTIVE     Objective Measures updated at progress report unless specified.     Treatment     Robles received the treatments listed below:      therapeutic exercises to develop strength, endurance and ROM for 40 minutes including:    NuStep L1 x 6 minutes   Rec bike L1 x 6 minutes - req multiple breaks to re-insert R foot into pedal strap      Seated at Mat:    Sit to stand 5x2    Knee extensions 2# 3x10   Seated heel slides 3x10    At // bars:    Standing Hip ABD 2x10   Standing hip ext 2x10   Standing knee flexion 10x2 - no PT assist   +Forward stepping, CGA, 2x10 ea LE - one UE assist on bars     Supine at Mat:    Bridge 10x2    LTR 2 min   Heel slides 10x2     Manual therapy to control pain and increase ROM for 5 minutes including    Manual distraction, R hip, 3x40  seconds   +active hip hike contractions during distraction    Not performed:  Seated on BOSU ball - trunk balance x 2 minutes    Patient Education and Home Exercises     Home Exercises Provided and Patient Education Provided     Education provided:   -Education on positive affects of consistent exercise.    Written Home Exercises Provided: Patient instructed to cont prior HEP. Exercises were reviewed and Robles was able to demonstrate them prior to the end of the session.  Robles demonstrated good  understanding of the education provided. See EMR under Patient Instructions for exercises provided during therapy sessions    ASSESSMENT   Pt displayed much greater energy level in today's session as compared to Monday. He was able to complete a new stepping exercises in parallel bars to encourage more weight bearing and weight shifting through his LEs. Still complains of R hip pain every time he stands up. Pt states that his R hip pain nearly completely subsided after manual distractions with pt actively hip hiking. Future therapy sessions will introduce a standing hip hike exercise in parallel bars to continue with weight bearing progressions while targeting this muscle group. Patient was again educated on the benefits of consistently exercising each day and verbalized an understanding on this.     Robles Is progressing well towards his goals.   Pt prognosis is Fair.     Pt will continue to benefit from skilled outpatient physical therapy to address the deficits listed in the problem list box on initial evaluation, provide pt/family education and to maximize pt's level of independence in the home and community environment.     Pt's spiritual, cultural and educational needs considered and pt agreeable to plan of care and goals.     Anticipated barriers to physical therapy: medical hx     Goals:   Short Term Goals: 6 weeks   1. Patient demonstrates independence with HEP.   2. Patient demonstrates improved Timed Up  and Go (TUG) by 6 seconds for i less, indep, no LOB  3. Patient will transfer sit to/from standing with RW, CGA, no use of UE and with good body mechanics to promote safe mobility within the home.   4. Patient will improve balance within condition 2 of the MCTSIB to 30s to reduce risk of falling.   5. Patient will improve 5xSTS score by 3s to promote LE functional strength.  6. Patient will improve TUG score by 6s to promote functional mobility.   Patient will improve BLE strength to 3+/5 to promote improved mobility against gravity.     Long Term Goals: 12 weeks   1. Patient will be independent with home exercise program (HEP) to promote continued rehabilitation following discharge.  2. Patient will improve 5xSTS score by 9s to promote LE functional strength progression.  3. Patient will improve TUG score by 12s to promote functional mobility.  4. Assess gait speed and create appropriate goals to track progression and improvements in safe community ambulation.  5. Assess endurance and create appropriate goals to track progression and improvements in safe community ambulation.    PLAN   Plan of care Certification: 12/9/2021 to 3/3/21.     Outpatient Physical Therapy 2 times weekly for 12 weeks to include the following interventions: Gait Training, Manual Therapy, Moist Heat/ Ice, Neuromuscular Re-ed, Patient Education, Therapeutic Activites and Therapeutic Exercise.     Arden Chicas, SPT  2/2/2022     I certify that I was present in the room directing the student in service delivery and guiding them using my skilled judgment. As the co-signing therapist I have reviewed the students documentation and am responsible for the treatment, assessment, and plan.      Yoana Mcfarlane, PT, DPT  2/2/2022

## 2022-02-08 RX ORDER — LISINOPRIL 40 MG/1
TABLET ORAL
Qty: 90 TABLET | Refills: 3 | OUTPATIENT
Start: 2022-02-08

## 2022-02-09 ENCOUNTER — CLINICAL SUPPORT (OUTPATIENT)
Dept: REHABILITATION | Facility: HOSPITAL | Age: 72
End: 2022-02-09
Payer: COMMERCIAL

## 2022-02-09 DIAGNOSIS — Z74.09 IMPAIRED FUNCTIONAL MOBILITY, BALANCE, GAIT, AND ENDURANCE: ICD-10-CM

## 2022-02-09 DIAGNOSIS — R35.0 URINARY FREQUENCY: ICD-10-CM

## 2022-02-09 DIAGNOSIS — R26.2 DIFFICULTY WALKING: ICD-10-CM

## 2022-02-09 PROCEDURE — 97110 THERAPEUTIC EXERCISES: CPT | Mod: PN

## 2022-02-09 RX ORDER — TAMSULOSIN HYDROCHLORIDE 0.4 MG/1
0.4 CAPSULE ORAL DAILY
Qty: 90 CAPSULE | Refills: 3 | Status: CANCELLED | OUTPATIENT
Start: 2022-02-09 | End: 2023-02-09

## 2022-02-09 NOTE — TELEPHONE ENCOUNTER
No new care gaps identified.  Powered by [employer]. Reference number: [id number].   2/09/2022 11:34:12 AM CST

## 2022-02-09 NOTE — PROGRESS NOTES
[facility]   Physical Therapy Treatment Note     Name: Robles Jordan  Clinic Number: [id number]    Therapy Diagnosis:   Encounter Diagnoses   Name Primary?    Difficulty walking     Impaired functional mobility, balance, gait, and endurance      Physician: Ervin Washington     Visit Date: 2/9/2022    Physician Orders: PT Eval and Treat Neuro  Medical Diagnosis from Referral: Sensory neuropathy [G62.9]  Evaluation Date: 12/9/2021  Authorization Period Expiration: 12/31/21  Plan of Care Expiration: 3/3/22  Visit # / Visits authorized: 1/ 1, 8/20    PTA Visit #: 3/5    Time In: 12:10  Time Out: 12:50  Total Billable Time: 40 minutes    Precautions: Standard, Diabetes, Fall and hx of tethered cord    SUBJECTIVE     Pt reports: Pt feeling last 24 hours has been much weaker in BLE; Pt is without pain. RLE sensation mild improvement; muscle twitches mildly improving throughout    1. Last night took medications at 4pm, went out and about with spouse (outside of normal routine) B knees gave out and was in crouched position holding onto walker.   2. Walked into house through garage and went to sit d/t fatigue and BLE weakness and chair slide out from under him and he landed on floor (controlled) having to crawl to nearby step as well as assist from wife to get back into upright position     He was compliant with home exercise program.  Response to previous treatment: fine   Functional change: ongoing     Pain: 0/10  Location: right hip       OBJECTIVE     Objective Measures updated at progress report unless specified.     Treatment     Robles received the treatments listed below:      therapeutic exercises to develop strength, endurance and ROM for 45 minutes including:    NuStep L2.6 x 8 minutes    Vitals:    SpO2: 98%   HR: 111 BPM  Rec bike L1 x 6 minutes - req multiple breaks to re-insert R foot into pedal strap      Seated at Mat:    Sit to stand 2x5    Knee extensions 2# 3x10   Seated  heel slides 2# and GTB LLE, RTB RLE 3x10    At // bars:    Standing Hip ABD 2x10   Standing hip ext 2x10   Standing knee flexion 10x2 - no PT assist   +Forward stepping, CGA, 2x10 ea LE - one UE assist on bars     Supine at Mat:    Bridge 10x3 - 5-6/10 (R Hip Pain)    LTR 2 min   Clamshells with GTB 10x3 - 4/10 (R Hip Pain)    Heel slides 10x2     Manual therapy to control pain and increase ROM for 5 minutes including    Manual distraction, R hip, 3x40 seconds   +active hip hike contractions during distraction    Not performed:  Seated on BOSU ball - trunk balance x 2 minutes    Patient Education and Home Exercises     Home Exercises Provided and Patient Education Provided     Education provided:   -Education on positive affects of consistent exercise.    Written Home Exercises Provided: Patient instructed to cont prior HEP. Exercises were reviewed and Robles was able to demonstrate them prior to the end of the session.  Arboleda demonstrated good  understanding of the education provided. See EMR under Patient Instructions for exercises provided during therapy sessions    ASSESSMENT   Arboleda demonstrated increased fatigue and generalized decrease in strength throughout BLE. Pt requiring multiple short restbreaks throughout session. Pt noting 2 instances in which BLE gave out and 1 of which ended with him on the floor having to complete floor transfer with spouse. Pt notes this was due to outside of regularly scheduled activities as well as feeling generally weak the past 24 hours. Pt able to tolerate increased time and resistance on nustep, increased resistance with BLE exercises and verbalizes improved gait mechanics on walk out of gym at end of session. Pt defers standing exercise this session due to fatigue and weakness. Pt remains appropriate for skilled PT at this time. Pt awaiting MRI results.      Robles Is progressing well towards his goals.   Pt prognosis is Fair.     Pt will continue to benefit from  skilled outpatient physical therapy to address the deficits listed in the problem list box on initial evaluation, provide pt/family education and to maximize pt's level of independence in the home and community environment.     Pt's spiritual, cultural and educational needs considered and pt agreeable to plan of care and goals.     Anticipated barriers to physical therapy: medical hx     Goals:   Short Term Goals: 6 weeks   1. Patient demonstrates independence with HEP.   2. Patient demonstrates improved Timed Up and Go (TUG) by 6 seconds for i less, indep, no LOB  3. Patient will transfer sit to/from standing with RW, CGA, no use of UE and with good body mechanics to promote safe mobility within the home.   4. Patient will improve balance within condition 2 of the MCTSIB to 30s to reduce risk of falling.   5. Patient will improve 5xSTS score by 3s to promote LE functional strength.  6. Patient will improve TUG score by 6s to promote functional mobility.   Patient will improve BLE strength to 3+/5 to promote improved mobility against gravity.     Long Term Goals: 12 weeks   1. Patient will be independent with home exercise program (HEP) to promote continued rehabilitation following discharge.  2. Patient will improve 5xSTS score by 9s to promote LE functional strength progression.  3. Patient will improve TUG score by 12s to promote functional mobility.  4. Assess gait speed and create appropriate goals to track progression and improvements in safe community ambulation.  5. Assess endurance and create appropriate goals to track progression and improvements in safe community ambulation.    PLAN   Plan of care Certification: 12/9/2021 to 3/3/21.     Outpatient Physical Therapy 2 times weekly for 12 weeks to include the following interventions: Gait Training, Manual Therapy, Moist Heat/ Ice, Neuromuscular Re-ed, Patient Education, Therapeutic Activites and Therapeutic Exercise.     Kaye Anderson, PT,  DPT  2/9/2022

## 2022-02-11 ENCOUNTER — CLINICAL SUPPORT (OUTPATIENT)
Dept: REHABILITATION | Facility: HOSPITAL | Age: 72
End: 2022-02-11
Payer: COMMERCIAL

## 2022-02-11 DIAGNOSIS — R26.2 DIFFICULTY WALKING: ICD-10-CM

## 2022-02-11 DIAGNOSIS — Z74.09 IMPAIRED FUNCTIONAL MOBILITY, BALANCE, GAIT, AND ENDURANCE: ICD-10-CM

## 2022-02-11 PROCEDURE — 97110 THERAPEUTIC EXERCISES: CPT | Mod: PN

## 2022-02-11 NOTE — PROGRESS NOTES
[facility]   Physical Therapy Treatment Note     Name: Robles Jordan  Clinic Number: [id number]    Therapy Diagnosis:   Encounter Diagnoses   Name Primary?    Difficulty walking     Impaired functional mobility, balance, gait, and endurance      Physician: Ervin Washington     Visit Date: 2/11/2022    Physician Orders: PT Eval and Treat Neuro  Medical Diagnosis from Referral: Sensory neuropathy [G62.9]  Evaluation Date: 12/9/2021  Authorization Period Expiration: 12/31/21  Plan of Care Expiration: 3/3/22  Visit # / Visits authorized: 1/ 1, 9/20    PTA Visit #: 3/5    Time In: 10:15  Time Out: 11:00  Total Billable Time: 45 minutes    Precautions: Standard, Diabetes, Fall and hx of tethered cord    SUBJECTIVE     Pt reports: Pt doing 15 minutes of bike exercise per night; doing well, no LOB, no falls     He was compliant with home exercise program.  Response to previous treatment: fine   Functional change: ongoing     Pain: 4/10 --> 0/10 end of session  Location: right hip       OBJECTIVE     Objective Measures updated at progress report unless specified.     Treatment     Robles received the treatments listed below:      therapeutic exercises to develop strength, endurance and ROM for 45 minutes including:    NuStep L2.5 x 9 minutes   Rec bike L1 x 6 minutes - req multiple breaks to re-insert R foot into pedal strap      Seated at Mat:    Sit to stand 2x10   Knee extensions 2# 3x10   Seated heel slides 2# and GTB LLE, RTB RLE 3x10   Hip flexion 2#LLE, 0# RLE 1x10     At // bars:    Standing Hip ABD 2x10   Standing hip ext 2x10   Standing knee flexion 5x4 - 2# at LLE, 0# RLE    Forward stepping, CGA, 2x10 ea LE - one UE assist on bars     Supine at Mat:    Bridge 10x3 - 5-6/10 (R Hip Pain)    LTR 2 min   Clamshells with GTB 10x3 - 4/10 (R Hip Pain)    Heel slides 10x2     Manual therapy to control pain and increase ROM for 5 minutes including    Manual distraction, R hip, 2x60  seconds   +active hip hike contractions during distraction    Not performed:  Seated on BOSU ball - trunk balance x 2 minutes    Patient Education and Home Exercises     Home Exercises Provided and Patient Education Provided     Education provided:   -Education on positive affects of consistent exercise.    Written Home Exercises Provided: Patient instructed to cont prior HEP. Exercises were reviewed and Robles was able to demonstrate them prior to the end of the session.  Robles demonstrated good  understanding of the education provided. See EMR under Patient Instructions for exercises provided during therapy sessions    ASSESSMENT   Robles demonstrating improved fatigue and is able to participate in upright exercise this date however is limited by amount of energy expended in order to complete. Pt strength is limited at the RLE versus LLE requiring limited resistance to complete exercise within full ROM. Pt remains appropriate for skilled PT at this time. Pt awaiting MRI results.      Robles Is progressing well towards his goals.   Pt prognosis is Fair.     Pt will continue to benefit from skilled outpatient physical therapy to address the deficits listed in the problem list box on initial evaluation, provide pt/family education and to maximize pt's level of independence in the home and community environment.     Pt's spiritual, cultural and educational needs considered and pt agreeable to plan of care and goals.     Anticipated barriers to physical therapy: medical hx     Goals:   Short Term Goals: 6 weeks   1. Patient demonstrates independence with HEP.   2. Patient demonstrates improved Timed Up and Go (TUG) by 6 seconds for i less, indep, no LOB  3. Patient will transfer sit to/from standing with RW, CGA, no use of UE and with good body mechanics to promote safe mobility within the home.   4. Patient will improve balance within condition 2 of the MCTSIB to 30s to reduce risk of falling.   5. Patient  will improve 5xSTS score by 3s to promote LE functional strength.  6. Patient will improve TUG score by 6s to promote functional mobility.   Patient will improve BLE strength to 3+/5 to promote improved mobility against gravity.     Long Term Goals: 12 weeks   1. Patient will be independent with home exercise program (HEP) to promote continued rehabilitation following discharge.  2. Patient will improve 5xSTS score by 9s to promote LE functional strength progression.  3. Patient will improve TUG score by 12s to promote functional mobility.  4. Assess gait speed and create appropriate goals to track progression and improvements in safe community ambulation.  5. Assess endurance and create appropriate goals to track progression and improvements in safe community ambulation.    PLAN   Plan of care Certification: 12/9/2021 to 3/3/21.     Outpatient Physical Therapy 2 times weekly for 12 weeks to include the following interventions: Gait Training, Manual Therapy, Moist Heat/ Ice, Neuromuscular Re-ed, Patient Education, Therapeutic Activites and Therapeutic Exercise.     Kaye Anderson, PT, DPT  2/11/2022

## 2022-02-16 ENCOUNTER — CLINICAL SUPPORT (OUTPATIENT)
Dept: REHABILITATION | Facility: HOSPITAL | Age: 72
End: 2022-02-16
Payer: COMMERCIAL

## 2022-02-16 DIAGNOSIS — R26.2 DIFFICULTY WALKING: ICD-10-CM

## 2022-02-16 DIAGNOSIS — Z74.09 IMPAIRED FUNCTIONAL MOBILITY, BALANCE, GAIT, AND ENDURANCE: ICD-10-CM

## 2022-02-16 PROCEDURE — 97110 THERAPEUTIC EXERCISES: CPT | Mod: PN

## 2022-02-16 NOTE — PROGRESS NOTES
[facility]   Physical Therapy Treatment Note     Name: Robles Jordan  Clinic Number: [id number]    Therapy Diagnosis:   Encounter Diagnoses   Name Primary?    Difficulty walking     Impaired functional mobility, balance, gait, and endurance      Physician: Ervin Washington     Visit Date: 2/16/2022    Physician Orders: PT Eval and Treat Neuro  Medical Diagnosis from Referral: Sensory neuropathy [G62.9]  Evaluation Date: 12/9/2021  Authorization Period Expiration: 12/31/21  Plan of Care Expiration: 3/3/22  Visit # / Visits authorized: 1/ 1, 12/20     PTA Visit #: 3/5    Time In: 12:22  Time Out: 1:15  Total Billable Time: 50 minutes  Total Time: 53 minutes     Precautions: Standard, Diabetes, Fall and hx of tethered cord    SUBJECTIVE     Pt reports: Continuing to complete bike for 15 min at home, no falls, no LOB, conserving energy more    He was compliant with home exercise program.  Response to previous treatment: fine   Functional change: ongoing     Pain: 0/10  Location: right hip       OBJECTIVE     Objective Measures updated at progress report unless specified.     Treatment     Robles received the treatments listed below:      therapeutic exercises to develop strength, endurance and ROM for 42 minutes including:    NuStep L2.6 x 9 minutes   Rec bike L1 x 6 minutes - req multiple breaks to re-insert R foot into pedal strap    Seated at Mat:    Sit to stand 2x10   Knee extensions 2# 3x10   Seated heel slides 2# and GTB LLE, RTB RLE 3x10   Hip flexion 2#LLE, 0# RLE 1x10    +Hip ADD GTB 3x10 B    +Hip ABD GTB 3x10 B     At // bars:    Standing Hip ABD 2x10   Standing hip ext 2x10   Standing knee flexion 5x4 - 2# at LLE, 0# RLE    Forward stepping, CGA, 2x10 ea LE - one UE assist on bars     Supine at Mat:    Bridge 10x3    LTR 2 min   Clamshells 10x3   Heel slides 10x2     Manual therapy to control pain and increase ROM for 8 minutes including:     Manual distraction, R hip, 2x60  seconds   +active hip hike contractions during distraction    Not performed:  Seated on BOSU ball - trunk balance x 2 minutes    Patient Education and Home Exercises     Home Exercises Provided and Patient Education Provided     Education provided:   -Education on positive affects of consistent exercise.    Written Home Exercises Provided: Patient instructed to cont prior HEP. Exercises were reviewed and Robles was able to demonstrate them prior to the end of the session.  Robles demonstrated good  understanding of the education provided. See EMR under Patient Instructions for exercises provided during therapy sessions    ASSESSMENT   Arboleda demonstrating improved fatigue continued from last session and able to participate in bike, sitting and supine exercises, while deferring further standing exercises at this time. Pt challenged by sit to stands due to energy expenditure, noted improvement in quality of sitting and supine exercises, and improved pain this date. Pt remains appropriate for skilled PT at this time. Pt awaiting MRI results.      Robles Is progressing well towards his goals.   Pt prognosis is Fair.     Pt will continue to benefit from skilled outpatient physical therapy to address the deficits listed in the problem list box on initial evaluation, provide pt/family education and to maximize pt's level of independence in the home and community environment.     Pt's spiritual, cultural and educational needs considered and pt agreeable to plan of care and goals.     Anticipated barriers to physical therapy: medical hx     Goals:   Short Term Goals: 6 weeks   1. Patient demonstrates independence with HEP.   2. Patient demonstrates improved Timed Up and Go (TUG) by 6 seconds for i less, indep, no LOB  3. Patient will transfer sit to/from standing with RW, CGA, no use of UE and with good body mechanics to promote safe mobility within the home.   4. Patient will improve balance within condition 2 of  the MCTSIB to 30s to reduce risk of falling.   5. Patient will improve 5xSTS score by 3s to promote LE functional strength.  6. Patient will improve TUG score by 6s to promote functional mobility.   Patient will improve BLE strength to 3+/5 to promote improved mobility against gravity.     Long Term Goals: 12 weeks   1. Patient will be independent with home exercise program (HEP) to promote continued rehabilitation following discharge.  2. Patient will improve 5xSTS score by 9s to promote LE functional strength progression.  3. Patient will improve TUG score by 12s to promote functional mobility.  4. Assess gait speed and create appropriate goals to track progression and improvements in safe community ambulation.  5. Assess endurance and create appropriate goals to track progression and improvements in safe community ambulation.    PLAN   Plan of care Certification: 12/9/2021 to 3/3/21.     Outpatient Physical Therapy 2 times weekly for 12 weeks to include the following interventions: Gait Training, Manual Therapy, Moist Heat/ Ice, Neuromuscular Re-ed, Patient Education, Therapeutic Activites and Therapeutic Exercise.     Kaye Anderson, PT, DPT  2/16/2022

## 2022-02-18 ENCOUNTER — CLINICAL SUPPORT (OUTPATIENT)
Dept: REHABILITATION | Facility: HOSPITAL | Age: 72
End: 2022-02-18
Payer: COMMERCIAL

## 2022-02-18 DIAGNOSIS — R26.2 DIFFICULTY WALKING: ICD-10-CM

## 2022-02-18 DIAGNOSIS — Z74.09 IMPAIRED FUNCTIONAL MOBILITY, BALANCE, GAIT, AND ENDURANCE: ICD-10-CM

## 2022-02-18 PROCEDURE — 97530 THERAPEUTIC ACTIVITIES: CPT | Mod: PN

## 2022-02-18 NOTE — PROGRESS NOTES
[facility]   Physical Therapy Treatment Note - Progress Note     Name: Robles Jordan  Clinic Number: [id number]    Therapy Diagnosis:   Encounter Diagnoses   Name Primary?    Difficulty walking     Impaired functional mobility, balance, gait, and endurance      Physician: Ervin Washington     Visit Date: 2/18/2022    Physician Orders: PT Eval and Treat Neuro  Medical Diagnosis from Referral: Sensory neuropathy [G62.9]  Evaluation Date: 12/9/2021  Authorization Period Expiration: 12/31/21  Plan of Care Expiration: 3/3/22  Visit # / Visits authorized: 1/ 1, 12/20     PTA Visit #: 0/5    Time In: 10:15  Time Out: 11:00  Total Billable Time: 45 minutes    Precautions: Standard, Diabetes, Fall and hx of tethered cord    SUBJECTIVE     Pt reports: Took a day and a half to recover from last session 2 days ago; no falls, no LOB, conserving energy more; has upcoming appt with MD Washington on 3/7/22.     He was compliant with home exercise program.  Response to previous treatment: fine   Functional change: ongoing     Pain: 0/10  Location: right hip       OBJECTIVE     Objective Measures updated at progress report unless specified.     Lower Extremity Strength    RLE LLE   Hip Flexion: 2+/5 (2-/5) 3+/5 (3/5)   Hip Extension:  2/5 3+/5    Hip Abduction: 2+/5 (2/5)  2+/5 (2/5)   Hip Adduction: 2+/5 (2/5) 2+/5 (2/5)   Knee Extension: 4+/5 (4-/5) 5/5 (4/5)   Knee Flexion: 4-/5  4+/5 (4/5)   Ankle Dorsiflexion: 2/5 (trace) 3/5   Ankle Plantarflexion: 2/5 4-/5      7/8/21: LOWER EXTREMITY STRENGTH:    Left Right   Quadriceps 4+/5 4/5   Hamstrings 4+/5 4-/5      Iliopsoas 4/5 4-/5   Glute Med 3/5 3-/5   Hip Ext 3-/5 2+/5   Ankle DF 4-/5 3+/5   Ankle PF 4+/5 4-/5      Abdominal Strength: 4/5       Evaluation   Single Limb Stance R LE NT  (<10 sec = HIGH FALL RISK)   Single Limb Stance L LE NT  (<10 sec = HIGH FALL RISK)   5 times sit-stand 24 seconds (31 seconds) - RW  >12 sec= fall risk for general  elderly  >16 sec= fall risk for Parkinson's disease  >10 sec= balance/vestibular dysfunction (<59 y/o)  >14.2 sec= balance/vestibular dysfunction (>59 y/o)  >12 sec= fall risk for CVA     7/8/21: 30 Second Sit to Stand: 7 reps    Montes/ FGA/ Tinetti NT      Postural control:  MCTSIB:  1. Eyes Open/feet together/Firm: 30 seconds  2. Eyes Closed/feet together/Firm: 30 seconds   3. Eyes Open/feet together/Foam: 30 seconds - mild sway  4. Eyes Closed/feet together/Foam: 3 seconds - moderate sway      Gait Assessment:   - AD used: RW  - Assistance: SBA  - Distance: 50 ft     GAIT DEVIATIONS:  Gait component performance:    - Decreased speed   - Improved step length, step width, stride length, stance time bilaterally, B foot clearance   - 3 point gait with RW, increased used of BUE, limited trunk rotation,       Endurance Deficit: YES       Evaluation   Timed Up and Go 31 sec (41 sec) - use of hands and RW  < 20 sec safe for independent transfers,     < 30 sec assist required for transfers     7/8/21: Timed Up and Go (TUG): 16 sec with AD and without AD    5 meter walk test 0.22 m/s   6 min walk test NT      Functional Mobility (Bed mobility, transfers)  Bed mobility: Mod I  Supine to sit: Mod I  Sit to supine: Mod I  Rolling: Mod I  Sit to stand:  Mod I  Stand pivot:  Mod I     CMS Impairment/Limitation/Restriction for FOTO Peripheral Nervous System Disorders/Injuries Survey     Therapist reviewed FOTO scores for Robles Jordan on 12/9/2021.   FOTO documents entered into [employer] - see Media section.     Limitation Score: 81% (89%)   Category: Mobility , Other         Treatment     Robles received the treatments listed below:      therapeutic exercises to develop strength, endurance and ROM for 00 minutes including:    NuStep L2.6 x 9 minutes   Rec bike L1 x 6 minutes - req multiple breaks to re-insert R foot into pedal strap    Seated at Mat:    Sit to stand 2x10   Knee extensions 2# 3x10   Seated heel slides 2# and GTB  LLE, RTB RLE 3x10   Hip flexion 2#LLE, 0# RLE 1x10    +Hip ADD GTB 3x10 B    +Hip ABD GTB 3x10 B     At // bars:    Standing Hip ABD 2x10   Standing hip ext 2x10   Standing knee flexion 5x4 - 2# at LLE, 0# RLE    Forward stepping, CGA, 2x10 ea LE - one UE assist on bars     Supine at Mat:    Bridge 10x3    LTR 2 min   Clamshells 10x3   Heel slides 10x2     Manual therapy to control pain and increase ROM for 8 minutes including:     Manual distraction, R hip, 2x60 seconds   +active hip hike contractions during distraction    Not performed:  Seated on BOSU ball - trunk balance x 2 minutes    Patient Education and Home Exercises     Home Exercises Provided and Patient Education Provided     Education provided:   -Education on positive affects of consistent exercise.    Written Home Exercises Provided: Patient instructed to cont prior HEP. Exercises were reviewed and Robles was able to demonstrate them prior to the end of the session.  Robles demonstrated good  understanding of the education provided. See EMR under Patient Instructions for exercises provided during therapy sessions    ASSESSMENT   Robles seen today for progress note and is demonstrating improved isolated muscular strength, 10 second improvement on TUG outcome measure for functional mobility, 7 second improvement on 5x STS outcome measure and is able to tolerate first 3 conditions of the MCTSIB for static balance. Pt improved 8% on FOTO score and is noticing some mild improvements in function and overall mobility within daily tasks. Pt continues to meet and work toward current goals with new goal for gait speed. Pt remains appropriate for skilled PT at this time. Pt awaiting MRI results.      Robles Is progressing well towards his goals.   Pt prognosis is Fair.     Pt will continue to benefit from skilled outpatient physical therapy to address the deficits listed in the problem list box on initial evaluation, provide pt/family education and to  maximize pt's level of independence in the home and community environment.     Pt's spiritual, cultural and educational needs considered and pt agreeable to plan of care and goals.     Anticipated barriers to physical therapy: medical hx     Goals:   Short Term Goals: 6 weeks   1. Patient demonstrates independence with HEP. - MET 2/18/22  2. Patient will transfer sit to/from standing with RW, CGA, no use of UE and with good body mechanics to promote safe mobility within the home. - ongoing  3. Patient will improve balance within condition 2 of the MCTSIB to 30s to reduce risk of falling. - MET 2/18/22  4. Patient will improve 5xSTS score by 3s to promote LE functional strength. - MET 2/18/22  5. Patient will improve TUG score by 6s to promote functional mobility. - MET 2/18/22   Patient will improve BLE strength to 3+/5 to promote improved mobility against gravity. - ongoing      Long Term Goals: 12 weeks   1. Patient will be independent with home exercise program (HEP) to promote continued rehabilitation following discharge.  2. Patient will improve 5xSTS score by 9s to promote LE functional strength progression. - ongoing   3. Patient will improve TUG score by 12s to promote functional mobility. - ongoing  4. Patient will improve gait speed to 0.4 m/s progressing toward safe household ambulation. - ongoing  5. Assess endurance and create appropriate goals to track progression and improvements in safe community ambulation.    PLAN   Plan of care Certification: 12/9/2021 to 3/3/21.     Outpatient Physical Therapy 2 times weekly for 12 weeks to include the following interventions: Gait Training, Manual Therapy, Moist Heat/ Ice, Neuromuscular Re-ed, Patient Education, Therapeutic Activites and Therapeutic Exercise.     Kaye Anderson, PT, DPT  2/18/2022

## 2022-02-23 ENCOUNTER — CLINICAL SUPPORT (OUTPATIENT)
Dept: REHABILITATION | Facility: HOSPITAL | Age: 72
End: 2022-02-23
Payer: COMMERCIAL

## 2022-02-23 DIAGNOSIS — R26.2 DIFFICULTY WALKING: Primary | ICD-10-CM

## 2022-02-23 DIAGNOSIS — Z74.09 IMPAIRED FUNCTIONAL MOBILITY, BALANCE, GAIT, AND ENDURANCE: ICD-10-CM

## 2022-02-23 PROCEDURE — 97110 THERAPEUTIC EXERCISES: CPT | Mod: PN

## 2022-02-23 NOTE — PROGRESS NOTES
[facility]   Physical Therapy Treatment Note    Name: Robles Jordan  Clinic Number: [id number]    Therapy Diagnosis:   Encounter Diagnoses   Name Primary?    Difficulty walking Yes    Impaired functional mobility, balance, gait, and endurance      Physician: Ervin Washington     Visit Date: 2/23/2022    Physician Orders: PT Eval and Treat Neuro  Medical Diagnosis from Referral: Sensory neuropathy [G62.9]  Evaluation Date: 12/9/2021  Authorization Period Expiration: 12/31/21  Plan of Care Expiration: 3/3/22  Visit # / Visits authorized: 1/ 1, 13/20     PTA Visit #: 0/5    Time In: 11:40  Time Out: 12:25  Total Billable Time: 45 minutes    Precautions: Standard, Diabetes, Fall and hx of tethered cord    SUBJECTIVE     Pt reports: 2 days of soreness following last session; has not biked in a week due to fatigue; has upcoming appt with MD Washington on 3/7/22. Pt is taking gabapentin 2x/day and was prescribed 3x/day but notes that he has not needed it     He was compliant with home exercise program.  Response to previous treatment: fine   Functional change: ongoing     Pain: 0/10  Location: right hip       OBJECTIVE     Objective Measures updated at progress report unless specified.      Treatment   Bolded performed today:   Italics not performed today:     Robles received the treatments listed below:      therapeutic exercises to develop strength, endurance and ROM for 40 minutes including:    NuStep L2.6 x 8 minutes   Rec bike L1 x 6 minutes - req multiple breaks to re-insert R foot into pedal strap    Seated at Mat:    Sit to stand 2x10   Knee extensions 2# LLE, 1# RLE 3x10   Knee flexion GTB 3x10 B    Seated knee flexion heel slides 2# and GTB LLE, RTB RLE 3x10   Hip flexion 2#LLE, 0# RLE 1x10    Hip ADD GTB 3x10 B    Hip ABD GTB 3x10 B     At // bars:    Standing Hip ABD 2x10   Standing hip ext 2x10   Standing knee flexion 5x4 - 2# at LLE, 0# RLE    Forward stepping, CGA, 2x10 ea LE -  one UE assist on bars     Supine at Mat:    Bridge 10x3    LTR 2 min   Clamshells 10x3   Heel slides 10x2     Manual therapy to control pain and increase ROM for 5 minutes including:     Manual distraction, R hip, 2x60 seconds   +active hip hike contractions during distraction    Not performed:  Seated on BOSU ball - trunk balance x 2 minutes    Patient Education and Home Exercises     Home Exercises Provided and Patient Education Provided     Education provided:   -Education on positive affects of consistent exercise.    Written Home Exercises Provided: Patient instructed to cont prior HEP. Exercises were reviewed and Arboleda was able to demonstrate them prior to the end of the session.  Arboleda demonstrated good  understanding of the education provided. See EMR under Patient Instructions for exercises provided during therapy sessions    ASSESSMENT   Arboleda demonstrating increased fatigue with only seated exercise able to be performed. Of note, pt is noting has not been able to bike in home due to energy and has been taking 2 of 3 gabapentin prescribed per day due to less pain he has been experiencing. Pt is educated on possible aquatic therapy as well as quality versus quantity of movement. Pt remains appropriate for skilled PT at this time. Pt awaiting MRI results.      Robles Is progressing well towards his goals.   Pt prognosis is Fair.     Pt will continue to benefit from skilled outpatient physical therapy to address the deficits listed in the problem list box on initial evaluation, provide pt/family education and to maximize pt's level of independence in the home and community environment.     Pt's spiritual, cultural and educational needs considered and pt agreeable to plan of care and goals.     Anticipated barriers to physical therapy: medical hx     Goals:   Short Term Goals: 6 weeks   1. Patient demonstrates independence with HEP. - MET 2/18/22  2. Patient will transfer sit to/from standing with  RW, CGA, no use of UE and with good body mechanics to promote safe mobility within the home. - ongoing  3. Patient will improve balance within condition 2 of the MCTSIB to 30s to reduce risk of falling. - MET 2/18/22  4. Patient will improve 5xSTS score by 3s to promote LE functional strength. - MET 2/18/22  5. Patient will improve TUG score by 6s to promote functional mobility. - MET 2/18/22   Patient will improve BLE strength to 3+/5 to promote improved mobility against gravity. - ongoing      Long Term Goals: 12 weeks   1. Patient will be independent with home exercise program (HEP) to promote continued rehabilitation following discharge.  2. Patient will improve 5xSTS score by 9s to promote LE functional strength progression. - ongoing   3. Patient will improve TUG score by 12s to promote functional mobility. - ongoing  4. Patient will improve gait speed to 0.4 m/s progressing toward safe household ambulation. - ongoing  5. Assess endurance and create appropriate goals to track progression and improvements in safe community ambulation.    PLAN   Plan of care Certification: 12/9/2021 to 3/3/21.     Outpatient Physical Therapy 2 times weekly for 12 weeks to include the following interventions: Gait Training, Manual Therapy, Moist Heat/ Ice, Neuromuscular Re-ed, Patient Education, Therapeutic Activites and Therapeutic Exercise.     Kaye Anderson, PT, DPT  2/23/2022

## 2022-03-09 ENCOUNTER — CLINICAL SUPPORT (OUTPATIENT)
Dept: REHABILITATION | Facility: HOSPITAL | Age: 72
End: 2022-03-09
Payer: COMMERCIAL

## 2022-03-09 DIAGNOSIS — Z74.09 IMPAIRED FUNCTIONAL MOBILITY, BALANCE, GAIT, AND ENDURANCE: ICD-10-CM

## 2022-03-09 DIAGNOSIS — R26.2 DIFFICULTY WALKING: Primary | ICD-10-CM

## 2022-03-09 PROCEDURE — 97110 THERAPEUTIC EXERCISES: CPT | Mod: PN

## 2022-03-09 NOTE — PROGRESS NOTES
[facility]   Physical Therapy Treatment Note - UPDATE POC    Name: Robles Jordan  Clinic Number: [id number]    Therapy Diagnosis:   Encounter Diagnoses   Name Primary?    Difficulty walking Yes    Impaired functional mobility, balance, gait, and endurance      Physician: Ervin Washington     Visit Date: 3/9/2022    Physician Orders: PT Eval and Treat Neuro  Medical Diagnosis from Referral: Sensory neuropathy [G62.9]  Evaluation Date: 12/9/2021  Authorization Period Expiration: 12/31/21  Plan of Care Expiration: 3/3/22  NEW POC: 3/9/22 to 5/18/22  Visit # / Visits authorized: 1/ 1, 14/20   PN: 3/18/22     PTA Visit #: 0/5    Time In: 12:30  Time Out: 1:15  Total Billable Time: 45 minutes    Precautions: Standard, Diabetes, Fall and hx of tethered cord    SUBJECTIVE     Pt reports: pt noted saw provider and looking to test nerve, needs more blood work and only received MRI; is fatigued but has been riding bike at home    He was compliant with home exercise program.  Response to previous treatment: fine   Functional change: ongoing     Pain: 5/10 --> 2/10 (end of session)   Location: right leg and right side of back     Fatigue: 8/10 (end of session)    OBJECTIVE     Objective Measures updated at progress report unless specified.      Treatment   Bolded performed today:   Italics not performed today:     Robles received the treatments listed below:      therapeutic exercises to develop strength, endurance and ROM for 40 minutes including:    NuStep L2.6 x 8 minutes   Rec bike L1 x 6 minutes - req multiple breaks to re-insert R foot into pedal strap    Seated at Mat:    Sit to stand 4x5   Knee extensions 2# LLE, 2# RLE 3x10   Knee flexion GTB 3x10 B    Seated knee flexion heel slides 2# and GTB LLE, RTB RLE 3x10   Hip flexion 2#LLE, 0# RLE 1x10    Hip ADD GTB 3x10 B    Hip ABD GTB 3x10 B     At // bars:    Standing Hip ABD 2x10   Standing hip ext 2x10   Standing knee flexion 5x4 - 2# at  LLE, 0# RLE    Forward stepping, CGA, 2x10 ea LE - one UE assist on bars    Standing marches 2x10 B     Supine at Mat:    Bridge 10x3    LTR 2 min   Clamshells 10x3   Heel slides 10x2     Manual therapy to control pain and increase ROM for 5 minutes including:     Manual distraction, R hip, 2x60 seconds   +active hip hike contractions during distraction    Not performed:  Seated on BOSU ball - trunk balance x 2 minutes    Patient Education and Home Exercises     Home Exercises Provided and Patient Education Provided     Education provided:   -Education on positive affects of consistent exercise.  -ALTER-G use and positioning     Written Home Exercises Provided: Patient instructed to cont prior HEP. Exercises were reviewed and Arboleda was able to demonstrate them prior to the end of the session.  Arboleda demonstrated good  understanding of the education provided. See EMR under Patient Instructions for exercises provided during therapy sessions    ASSESSMENT   Arboleda demonstrating increased tolerance to upright mobility however limited by significant RLE weakness versus that of left. Pt is educated on trial of ALTER-G treadmill for unweighting of BLE to increase tolerance to upright forward and backward walking. Pt remains appropriate for skilled PT at this time. Pt awaiting MRI results and blood results. Pt POC was not updated at last progress note and will be updated this date following progression on this note and complexity of care required and continuum of care.      Robles Is progressing well towards his goals.   Pt prognosis is Fair.     Pt will continue to benefit from skilled outpatient physical therapy to address the deficits listed in the problem list box on initial evaluation, provide pt/family education and to maximize pt's level of independence in the home and community environment.     Pt's spiritual, cultural and educational needs considered and pt agreeable to plan of care and goals.      Anticipated barriers to physical therapy: medical hx     Goals: as of 2/18/22  Short Term Goals: 6 weeks   1. Patient demonstrates independence with HEP. - MET 2/18/22  2. Patient will transfer sit to/from standing with RW, CGA, no use of UE and with good body mechanics to promote safe mobility within the home. - ongoing  3. Patient will improve balance within condition 2 of the MCTSIB to 30s to reduce risk of falling. - MET 2/18/22  4. Patient will improve 5xSTS score by 3s to promote LE functional strength. - MET 2/18/22  5. Patient will improve TUG score by 6s to promote functional mobility. - MET 2/18/22   Patient will improve BLE strength to 3+/5 to promote improved mobility against gravity. - ongoing      Long Term Goals: 12 weeks   1. Patient will be independent with home exercise program (HEP) to promote continued rehabilitation following discharge.  2. Patient will improve 5xSTS score by 9s to promote LE functional strength progression. - ongoing   3. Patient will improve TUG score by 12s to promote functional mobility. - ongoing  4. Patient will improve gait speed to 0.4 m/s progressing toward safe household ambulation. - ongoing  5. Assess endurance and create appropriate goals to track progression and improvements in safe community ambulation.    PLAN   Plan of care Certification: 12/9/2021 to 3/3/21. UPDATED POC: 3/9/22 to 5/18/22.      Outpatient Physical Therapy 2 times weekly for 12 weeks to include the following interventions: Gait Training, Manual Therapy, Moist Heat/ Ice, Neuromuscular Re-ed, Patient Education, Therapeutic Activites and Therapeutic Exercise.     ALTER-G trial in upcoming sessions for unweighting of BLE and antigravity mobility.     Kaye Anderson, PT, DPT  3/9/2022     I CERTIFY THE NEED FOR THESE SERVICES FURNISHED UNDER THIS PLAN OF TREATMENT AND WHILE UNDER MY CARE    Physician's comments:        Physician's Signature: ___________________________________________________

## 2022-03-14 ENCOUNTER — CLINICAL SUPPORT (OUTPATIENT)
Dept: REHABILITATION | Facility: HOSPITAL | Age: 72
End: 2022-03-14
Payer: COMMERCIAL

## 2022-03-14 DIAGNOSIS — R26.2 DIFFICULTY WALKING: Primary | ICD-10-CM

## 2022-03-14 DIAGNOSIS — Z74.09 IMPAIRED FUNCTIONAL MOBILITY, BALANCE, GAIT, AND ENDURANCE: ICD-10-CM

## 2022-03-14 PROCEDURE — 97110 THERAPEUTIC EXERCISES: CPT | Mod: PN

## 2022-03-14 NOTE — PROGRESS NOTES
[facility]   Physical Therapy Treatment Note    Name: Robles Jordan  Clinic Number: [id number]    Therapy Diagnosis:   Encounter Diagnoses   Name Primary?    Difficulty walking Yes    Impaired functional mobility, balance, gait, and endurance      Physician: Ervin Washington     Visit Date: 3/14/2022    Physician Orders: PT Eval and Treat Neuro  Medical Diagnosis from Referral: Sensory neuropathy [G62.9]  Evaluation Date: 12/9/2021  Authorization Period Expiration: 12/31/21  Plan of Care Expiration: 3/3/22  NEW POC: 3/9/22 to 5/18/22  Visit # / Visits authorized: 1/ 1, 14/20   PN: 3/18/22     PTA Visit #: 0/5    Time In: 12:30  Time Out: 1:15  Total Billable Time: 42 minutes  Total Time: 45 minutes     Precautions: Standard, Diabetes, Fall and hx of tethered cord    SUBJECTIVE     Pt reports: has more energy today; nothing new to report; pain is a little more tolerable     He was compliant with home exercise program.  Response to previous treatment: 1.5 days of fatigue   Functional change: ongoing     Pain: 2/10 --> 2/10  Location: right leg    Fatigue: 0/10 --> 8/10 (end of session)    OBJECTIVE     Objective Measures updated at progress report unless specified.      Treatment   Bolded performed today:   Italics not performed today:     Robles received the treatments listed below:      therapeutic exercises to develop strength, endurance and ROM for 40 minutes including:    NuStep L2.5 x 8 minutes   Rec bike L1 x 6 minutes - req multiple breaks to re-insert R foot into pedal strap    Seated at Mat:    Sit to stand 4x5   Knee extensions 2# LLE, 2# RLE 3x10   Knee flexion GTB 3x10 B    Seated knee flexion heel slides 2# and GTB LLE, RTB RLE 3x10   Hip flexion 2#LLE, 0# RLE 1x10    Hip ADD GTB 3x10 B    Hip ABD GTB 3x10 B     At // bars:    Standing Hip ABD 2x10   Standing hip ext 2x10   Standing knee flexion 5x4 - 2# at LLE, 0# RLE    Forward stepping, CGA, 2x10 ea LE - one UE assist  on bars    Standing marches 2x10 B     Supine at Mat:    Bridge 10x3    LTR 2 min   Clamshells 10x3   Heel slides 10x2     Manual therapy to control pain and increase ROM for 2 minutes including:     Manual distraction, R hip, holds    +active hip hike contractions during distraction    Not performed:  Seated on BOSU ball - trunk balance x 2 minutes    Patient Education and Home Exercises     Home Exercises Provided and Patient Education Provided     Education provided:   -Education on positive affects of consistent exercise.  -ALTER-G use and positioning     Written Home Exercises Provided: Patient instructed to cont prior HEP. Exercises were reviewed and Arboleda was able to demonstrate them prior to the end of the session.  Arboleda demonstrated good  understanding of the education provided. See EMR under Patient Instructions for exercises provided during therapy sessions    ASSESSMENT   Arboleda demonstrating increased upright tolerance however is limited by fatigue this session. Pt did have 1.5 days of fatigue following last session and will limited upright exercises including number of sit to stands and will monitor fatigue from day to day. Pt continues to be appropriate for skilled PT at this time.       Robles Is progressing well towards his goals.   Pt prognosis is Fair.     Pt will continue to benefit from skilled outpatient physical therapy to address the deficits listed in the problem list box on initial evaluation, provide pt/family education and to maximize pt's level of independence in the home and community environment.     Pt's spiritual, cultural and educational needs considered and pt agreeable to plan of care and goals.     Anticipated barriers to physical therapy: medical hx     Goals: as of 2/18/22  Short Term Goals: 6 weeks   1. Patient demonstrates independence with HEP. - MET 2/18/22  2. Patient will transfer sit to/from standing with RW, CGA, no use of UE and with good body mechanics to  promote safe mobility within the home. - ongoing  3. Patient will improve balance within condition 2 of the MCTSIB to 30s to reduce risk of falling. - MET 2/18/22  4. Patient will improve 5xSTS score by 3s to promote LE functional strength. - MET 2/18/22  5. Patient will improve TUG score by 6s to promote functional mobility. - MET 2/18/22   Patient will improve BLE strength to 3+/5 to promote improved mobility against gravity. - ongoing      Long Term Goals: 12 weeks   1. Patient will be independent with home exercise program (HEP) to promote continued rehabilitation following discharge.  2. Patient will improve 5xSTS score by 9s to promote LE functional strength progression. - ongoing   3. Patient will improve TUG score by 12s to promote functional mobility. - ongoing  4. Patient will improve gait speed to 0.4 m/s progressing toward safe household ambulation. - ongoing  5. Assess endurance and create appropriate goals to track progression and improvements in safe community ambulation.    PLAN   Plan of care Certification: 12/9/2021 to 3/3/21. UPDATED POC: 3/9/22 to 5/18/22.      Outpatient Physical Therapy 2 times weekly for 12 weeks to include the following interventions: Gait Training, Manual Therapy, Moist Heat/ Ice, Neuromuscular Re-ed, Patient Education, Therapeutic Activites and Therapeutic Exercise.     ALTER-G trial in upcoming sessions for unweighting of BLE and antigravity mobility.     Kaye Anderson, PT, DPT  3/14/2022

## 2022-03-18 ENCOUNTER — CLINICAL SUPPORT (OUTPATIENT)
Dept: REHABILITATION | Facility: HOSPITAL | Age: 72
End: 2022-03-18
Payer: COMMERCIAL

## 2022-03-18 DIAGNOSIS — R26.2 DIFFICULTY WALKING: Primary | ICD-10-CM

## 2022-03-18 DIAGNOSIS — Z74.09 IMPAIRED FUNCTIONAL MOBILITY, BALANCE, GAIT, AND ENDURANCE: ICD-10-CM

## 2022-03-18 PROCEDURE — 97110 THERAPEUTIC EXERCISES: CPT | Mod: PN

## 2022-03-18 NOTE — PROGRESS NOTES
[facility]   Physical Therapy Treatment Note    Name: Robles Jordan  Clinic Number: [id number]    Therapy Diagnosis:   Encounter Diagnoses   Name Primary?    Difficulty walking Yes    Impaired functional mobility, balance, gait, and endurance      Physician: Ervin Washington     Visit Date: 3/18/2022    Physician Orders: PT Eval and Treat Neuro  Medical Diagnosis from Referral: Sensory neuropathy [G62.9]  Evaluation Date: 12/9/2021  Authorization Period Expiration: 12/31/21  Plan of Care Expiration: 3/3/22  NEW POC: 3/9/22 to 5/18/22  Visit # / Visits authorized: 1/ 1, 14/20   PN: 3/18/22 - postponed to next session    PTA Visit #: 0/5    Time In: 11:46  Time Out: 12:31  Total Billable Time: 45 minutes  Total Time: 45 minutes     Precautions: Standard, Diabetes, Fall and hx of tethered cord    SUBJECTIVE     Pt reports: not feeling well today; increased fatigue with busy and stressful week of appointments; received imaging with impression and showed therapist - will have nerve biopsy in future weeks     Clothes/everything seems heavier on the body.   B feet burn and then they get cold - sometimes they switch off.     He was compliant with home exercise program.  Response to previous treatment: 1.5 days of fatigue   Functional change: ongoing     Pain: 0/10 --> 2/10  Location: right leg    Fatigue: 7/10 --> 5/10 (end of session)    OBJECTIVE     Objective Measures updated at progress report unless specified.      Treatment   Bolded performed today:   Italics not performed today:     INITIAL:   BP: 141/80 +2 min 115/76 both in sitting   HR: 100 bpm  SpO2: 96%     Robles received the treatments listed below:      therapeutic exercises to develop strength, endurance and ROM for 45 minutes including:    NuStep L2.5 x 8 minutes   Rec bike L1 x 6 minutes - req multiple breaks to re-insert R foot into pedal strap    Seated at Mat:    Sit to stand x10   Knee extensions 2# LLE, 2# RLE  3x10   Knee flexion GTB 3x10 B    Seated knee flexion heel slides 2# and GTB LLE, RTB RLE 3x10   Hip flexion 2#LLE, 0# RLE 1x10    Hip ADD GTB 3x12 B    Hip ABD GTB 3x10 B    Marches 3x12 B    Ankle pumps 3x12 B    Heel lifts 3x12 B    Pelvic rotations on bosu ball 2x10 rotations     FINAL:   HR: 94  SpO2: 97  BP: 116/79    At // bars:    Standing Hip ABD 2x10   Standing hip ext 2x10   Standing knee flexion 5x4 - 2# at LLE, 0# RLE    Forward stepping, CGA, 2x10 ea LE - one UE assist on bars    Standing marches 2x10 B     Supine at Mat:    Bridge 10x3    LTR 2 min   Clamshells 10x3   Heel slides 10x2     Manual therapy to control pain and increase ROM for 00 minutes including:     Manual distraction, R hip, holds    +active hip hike contractions during distraction    Not performed:  Seated on BOSU ball - trunk balance x 2 minutes    Patient Education and Home Exercises     Home Exercises Provided and Patient Education Provided     Education provided:   -Education on positive affects of consistent exercise.  -ALTER-G use and positioning     Written Home Exercises Provided: Patient instructed to cont prior HEP. Exercises were reviewed and Robles was able to demonstrate them prior to the end of the session.  Arboleda demonstrated good  understanding of the education provided. See EMR under Patient Instructions for exercises provided during therapy sessions    ASSESSMENT   Robles demonstrating increased fatigue this session after busy week of medical appointments. Pt requires exercise in sitting versus standing. Pt does have heightened BP post walk into clinic and nustep however decreases with 2 minutes of sitting and rest. Pt to have biopsy following MRI and blood work. Pt continues to be appropriate for skilled PT at this time.       Robles Is progressing well towards his goals.   Pt prognosis is Fair.     Pt will continue to benefit from skilled outpatient physical therapy to address the deficits listed in the  problem list box on initial evaluation, provide pt/family education and to maximize pt's level of independence in the home and community environment.     Pt's spiritual, cultural and educational needs considered and pt agreeable to plan of care and goals.     Anticipated barriers to physical therapy: medical hx     Goals: as of 2/18/22  Short Term Goals: 6 weeks   1. Patient demonstrates independence with HEP. - MET 2/18/22  2. Patient will transfer sit to/from standing with RW, CGA, no use of UE and with good body mechanics to promote safe mobility within the home. - ongoing  3. Patient will improve balance within condition 2 of the MCTSIB to 30s to reduce risk of falling. - MET 2/18/22  4. Patient will improve 5xSTS score by 3s to promote LE functional strength. - MET 2/18/22  5. Patient will improve TUG score by 6s to promote functional mobility. - MET 2/18/22   Patient will improve BLE strength to 3+/5 to promote improved mobility against gravity. - ongoing      Long Term Goals: 12 weeks   1. Patient will be independent with home exercise program (HEP) to promote continued rehabilitation following discharge.  2. Patient will improve 5xSTS score by 9s to promote LE functional strength progression. - ongoing   3. Patient will improve TUG score by 12s to promote functional mobility. - ongoing  4. Patient will improve gait speed to 0.4 m/s progressing toward safe household ambulation. - ongoing  5. Assess endurance and create appropriate goals to track progression and improvements in safe community ambulation.    PLAN   Plan of care Certification: 12/9/2021 to 3/3/21. UPDATED POC: 3/9/22 to 5/18/22.      Outpatient Physical Therapy 2 times weekly for 12 weeks to include the following interventions: Gait Training, Manual Therapy, Moist Heat/ Ice, Neuromuscular Re-ed, Patient Education, Therapeutic Activites and Therapeutic Exercise.     ALTER-G trial in upcoming sessions for unweighting of BLE and antigravity  mobility.     Kaye Anderson, PT, DPT  3/18/2022

## 2022-03-21 ENCOUNTER — CLINICAL SUPPORT (OUTPATIENT)
Dept: REHABILITATION | Facility: HOSPITAL | Age: 72
End: 2022-03-21
Payer: COMMERCIAL

## 2022-03-21 DIAGNOSIS — R26.2 DIFFICULTY WALKING: Primary | ICD-10-CM

## 2022-03-21 DIAGNOSIS — Z74.09 IMPAIRED FUNCTIONAL MOBILITY, BALANCE, GAIT, AND ENDURANCE: ICD-10-CM

## 2022-03-21 PROCEDURE — 97110 THERAPEUTIC EXERCISES: CPT | Mod: PN

## 2022-03-21 NOTE — PROGRESS NOTES
[facility]   Physical Therapy Treatment Note    Name: Robles Jordan  Clinic Number: [id number]    Therapy Diagnosis:   Encounter Diagnoses   Name Primary?    Difficulty walking Yes    Impaired functional mobility, balance, gait, and endurance      Physician: Ervin Washington     Visit Date: 3/21/2022    Physician Orders: PT Eval and Treat Neuro  Medical Diagnosis from Referral: Sensory neuropathy [G62.9]  Evaluation Date: 12/9/2021  Authorization Period Expiration: 12/31/21  Plan of Care Expiration: 3/3/22  NEW POC: 3/9/22 to 5/18/22  Visit # / Visits authorized: 1/ 1, 15/20   PN: 3/18/22 - postponed to next session    PTA Visit #: 0/5    Time In: 11:45 AM  Time Out: 12:25 PM  Total Billable Time: 40 minutes  Total Time: 40 minutes     Precautions: Standard, Diabetes, Fall and hx of tethered cord    SUBJECTIVE     Pt reports: feeling better today compared to last session, still with some fatigue today    Clothes/everything seems heavier on the body.   B feet burn and then they get cold - sometimes they switch off.     He was compliant with home exercise program.  Response to previous treatment:   Functional change: ongoing      Pain: 0/10  Location: right leg    Fatigue: 4-5/10 --> 6.5/10 end of session    OBJECTIVE     Objective Measures updated at progress report unless specified.      Treatment   Bolded performed today:   Italics not performed today:     Robles received the treatments listed below:      therapeutic exercises to develop strength, endurance and ROM for 40 minutes including:     NuStep L2.5 x 8 minutes   Rec bike L1 x 6 minutes - req multiple breaks to re-insert R foot into pedal strap    Seated at Mat:    Sit to stand x10   Knee extensions 2# LLE, 2# RLE 3x10   Knee flexion GTB 3x10 B    Seated knee flexion heel slides 2# and GTB LLE, RTB RLE 3x10   Hip flexion 2#LLE, 0# RLE 1x10    Hip ADD GTB 3x12 B    Hip ABD GTB 3x10 B    Marches 3x8 B -min A from PT on  R   Ankle pumps 3x10 B - R LE slid je from mat   Heel lifts 3x8 B -min A from PT on R LE   Pelvic rotations on bosu ball 2x10 rotations     At // bars:    Standing Hip ABD 2x10   Standing hip ext 2x10   Standing knee flexion 5x4 - 2# at LLE, 0# RLE    Forward stepping, CGA, 2x10 ea LE - one UE assist on bars    Standing marches 2x10 B     Supine at Mat:    Bridge 10x3    LTR 2 min   Clamshells 10x3   Heel slides 10x2     Manual therapy to control pain and increase ROM for 00 minutes including:     Manual distraction, R hip, holds    +active hip hike contractions during distraction    Not performed:  Seated on BOSU ball - trunk balance x 2 minutes    Patient Education and Home Exercises     Home Exercises Provided and Patient Education Provided     Education provided:   -Education on positive affects of consistent exercise.  -ALTER-G use and positioning     Written Home Exercises Provided: Patient instructed to cont prior HEP. Exercises were reviewed and Arboleda was able to demonstrate them prior to the end of the session.  Arboleda demonstrated good  understanding of the education provided. See EMR under Patient Instructions for exercises provided during therapy sessions    ASSESSMENT   Arboleda tolerated session fair today overall. Pt requested exercise in sitting versus standing today. Pt with appropriate challenge noted to all seated exercises today. Requiring min A for seated marching to achieve full ROM. Improved tolerance to session, less fatigue reported today overall. Pt continues to be appropriate for skilled PT at this time.       Robles Is progressing well towards his goals.   Pt prognosis is Fair.     Pt will continue to benefit from skilled outpatient physical therapy to address the deficits listed in the problem list box on initial evaluation, provide pt/family education and to maximize pt's level of independence in the home and community environment.     Pt's spiritual, cultural and educational  needs considered and pt agreeable to plan of care and goals.     Anticipated barriers to physical therapy: medical hx     Goals: as of 2/18/22  Short Term Goals: 6 weeks   1. Patient demonstrates independence with HEP. - MET 2/18/22  2. Patient will transfer sit to/from standing with RW, CGA, no use of UE and with good body mechanics to promote safe mobility within the home. - ongoing  3. Patient will improve balance within condition 2 of the MCTSIB to 30s to reduce risk of falling. - MET 2/18/22  4. Patient will improve 5xSTS score by 3s to promote LE functional strength. - MET 2/18/22  5. Patient will improve TUG score by 6s to promote functional mobility. - MET 2/18/22   Patient will improve BLE strength to 3+/5 to promote improved mobility against gravity. - ongoing      Long Term Goals: 12 weeks   1. Patient will be independent with home exercise program (HEP) to promote continued rehabilitation following discharge.  2. Patient will improve 5xSTS score by 9s to promote LE functional strength progression. - ongoing   3. Patient will improve TUG score by 12s to promote functional mobility. - ongoing  4. Patient will improve gait speed to 0.4 m/s progressing toward safe household ambulation. - ongoing  5. Assess endurance and create appropriate goals to track progression and improvements in safe community ambulation.    PLAN   Plan of care Certification: 12/9/2021 to 3/3/21. UPDATED POC: 3/9/22 to 5/18/22.      Outpatient Physical Therapy 2 times weekly for 12 weeks to include the following interventions: Gait Training, Manual Therapy, Moist Heat/ Ice, Neuromuscular Re-ed, Patient Education, Therapeutic Activites and Therapeutic Exercise.     ALTER-G trial in upcoming sessions for unweighting of BLE and antigravity mobility.     Yoana Mcfarlane, PT, DPT  3/21/2022

## 2022-03-23 ENCOUNTER — CLINICAL SUPPORT (OUTPATIENT)
Dept: REHABILITATION | Facility: HOSPITAL | Age: 72
End: 2022-03-23
Payer: COMMERCIAL

## 2022-03-23 DIAGNOSIS — Z74.09 IMPAIRED FUNCTIONAL MOBILITY, BALANCE, GAIT, AND ENDURANCE: ICD-10-CM

## 2022-03-23 DIAGNOSIS — R26.2 DIFFICULTY WALKING: Primary | ICD-10-CM

## 2022-03-23 PROCEDURE — 97110 THERAPEUTIC EXERCISES: CPT | Mod: PN

## 2022-03-23 NOTE — PROGRESS NOTES
[facility]   Physical Therapy Treatment Note    Name: Robles Jordan  Clinic Number: [id number]    Therapy Diagnosis:   Encounter Diagnoses   Name Primary?    Difficulty walking Yes    Impaired functional mobility, balance, gait, and endurance      Physician: Ervin Washington     Visit Date: 3/23/2022    Physician Orders: PT Eval and Treat Neuro  Medical Diagnosis from Referral: Sensory neuropathy [G62.9]  Evaluation Date: 12/9/2021  Authorization Period Expiration: 12/31/21  Plan of Care Expiration: 3/3/22  NEW POC: 3/9/22 to 5/18/22  Visit # / Visits authorized: 1/ 1, 16/20   PN: 3/18/22 - postponed to next session    PTA Visit #: 0/5    Time In: 11:47 AM  Time Out: 12:30 PM  Total Billable Time: 43 minutes  Total Time: 43 minutes     Precautions: Standard, Diabetes, Fall and hx of tethered cord    SUBJECTIVE     Pt reports: feeling ok today, no new reports or complaints    He was compliant with home exercise program.  Response to previous treatment:   Functional change: ongoing      Pain: 0/10  Location: right leg    OBJECTIVE     Objective Measures updated at progress report unless specified.      Treatment   Bolded performed today:   Italics not performed today:     Robles received the treatments listed below:      therapeutic exercises to develop strength, endurance and ROM for 43 minutes including:     NuStep L2.5 x 8 minutes   Rec bike L1 x 6 minutes - req multiple breaks to re-insert R foot into pedal strap    Seated at Mat:    Sit to stand x10   Knee extensions 2# LLE, 2# RLE 3x10   Knee flexion GTB 3x10 B    Hip ADD GTB 3x12 B    Hip ABD GTB 3x10 B    Ankle pumps 3x10 B - R LE slid je from mat   Heel lifts 3x8 B -min A from PT on R LE   Pelvic rotations on bosu ball 2x10 rotations        Seated knee flexion heel slides 2# and GTB LLE, RTB RLE 3x10   Hip flexion 2#LLE, 0# RLE 1x10     Marches 3x8 B -min A from PT on R    At // bars:    Standing Hip ABD 2x10   Standing hip  ext 2x10   Standing marches 2x10 B      Standing knee flexion 5x4 - 2# at LLE, 0# RLE    Forward stepping, CGA, 2x10 ea LE - one UE assist on bars      Supine at Mat:    Bridge 10x3    LTR 2 min   Clamshells 10x3   Heel slides 10x2     Manual therapy to control pain and increase ROM for 00 minutes including:     Manual distraction, R hip, holds    +active hip hike contractions during distraction    Not performed:  Seated on BOSU ball - trunk balance x 2 minutes    Patient Education and Home Exercises     Home Exercises Provided and Patient Education Provided     Education provided:   -Education on positive affects of consistent exercise.  -ALTER-G use and positioning     Written Home Exercises Provided: Patient instructed to cont prior HEP. Exercises were reviewed and Robles was able to demonstrate them prior to the end of the session.  Robles demonstrated good  understanding of the education provided. See EMR under Patient Instructions for exercises provided during therapy sessions    ASSESSMENT   Robles tolerated session fair today overall. Pt with appropriate challenge noted to all seated/standing exercises today. Pt able to demo standing exercises today, requiring seated breaks between ea activity. Minimal pain reported in B low back, LE today. Improved tolerance to session, less fatigue reported today overall. Pt continues to be appropriate for skilled PT at this time.       Robles Is progressing well towards his goals.   Pt prognosis is Fair.     Pt will continue to benefit from skilled outpatient physical therapy to address the deficits listed in the problem list box on initial evaluation, provide pt/family education and to maximize pt's level of independence in the home and community environment.     Pt's spiritual, cultural and educational needs considered and pt agreeable to plan of care and goals.     Anticipated barriers to physical therapy: medical hx     Goals: as of 2/18/22  Short Term Goals:  6 weeks   1. Patient demonstrates independence with HEP. - MET 2/18/22  2. Patient will transfer sit to/from standing with RW, CGA, no use of UE and with good body mechanics to promote safe mobility within the home. - ongoing  3. Patient will improve balance within condition 2 of the MCTSIB to 30s to reduce risk of falling. - MET 2/18/22  4. Patient will improve 5xSTS score by 3s to promote LE functional strength. - MET 2/18/22  5. Patient will improve TUG score by 6s to promote functional mobility. - MET 2/18/22   Patient will improve BLE strength to 3+/5 to promote improved mobility against gravity. - ongoing      Long Term Goals: 12 weeks   1. Patient will be independent with home exercise program (HEP) to promote continued rehabilitation following discharge.  2. Patient will improve 5xSTS score by 9s to promote LE functional strength progression. - ongoing   3. Patient will improve TUG score by 12s to promote functional mobility. - ongoing  4. Patient will improve gait speed to 0.4 m/s progressing toward safe household ambulation. - ongoing  5. Assess endurance and create appropriate goals to track progression and improvements in safe community ambulation.    PLAN   Plan of care Certification: 12/9/2021 to 3/3/21. UPDATED POC: 3/9/22 to 5/18/22.      Outpatient Physical Therapy 2 times weekly for 12 weeks to include the following interventions: Gait Training, Manual Therapy, Moist Heat/ Ice, Neuromuscular Re-ed, Patient Education, Therapeutic Activites and Therapeutic Exercise.     ALTER-G trial in upcoming sessions for unweighting of BLE and antigravity mobility.      Yoana Mcfarlane, PT, DPT  3/23/2022

## 2022-03-28 ENCOUNTER — CLINICAL SUPPORT (OUTPATIENT)
Dept: REHABILITATION | Facility: HOSPITAL | Age: 72
End: 2022-03-28
Payer: COMMERCIAL

## 2022-03-28 DIAGNOSIS — Z74.09 IMPAIRED FUNCTIONAL MOBILITY, BALANCE, GAIT, AND ENDURANCE: ICD-10-CM

## 2022-03-28 DIAGNOSIS — R26.2 DIFFICULTY WALKING: Primary | ICD-10-CM

## 2022-03-28 PROCEDURE — 97110 THERAPEUTIC EXERCISES: CPT | Mod: PN

## 2022-03-30 ENCOUNTER — CLINICAL SUPPORT (OUTPATIENT)
Dept: REHABILITATION | Facility: HOSPITAL | Age: 72
End: 2022-03-30
Payer: COMMERCIAL

## 2022-03-30 DIAGNOSIS — Z74.09 IMPAIRED FUNCTIONAL MOBILITY, BALANCE, GAIT, AND ENDURANCE: ICD-10-CM

## 2022-03-30 DIAGNOSIS — R26.2 DIFFICULTY WALKING: Primary | ICD-10-CM

## 2022-03-30 PROCEDURE — 97110 THERAPEUTIC EXERCISES: CPT | Mod: PN

## 2022-03-30 NOTE — PROGRESS NOTES
[facility]   Physical Therapy Treatment Note - Progress Note - UPDATED POC    Name: Robles Jordan  Clinic Number: [id number]    Therapy Diagnosis:   Encounter Diagnoses   Name Primary?    Difficulty walking Yes    Impaired functional mobility, balance, gait, and endurance      Physician: Ervin Washington     Visit Date: 3/30/2022    Physician Orders: PT Eval and Treat Neuro  Medical Diagnosis from Referral: Sensory neuropathy [G62.9]  Evaluation Date: 12/9/2021  Authorization Period Expiration: 12/31/21  Plan of Care Expiration: 3/3/22  NEW POC: 3/9/22 to 6/22/22  Visit # / Visits authorized: 1/ 1, 18/20   PN: 4/30/22    PTA Visit #: 0/5    Time In: 12:25  Time Out: 1:05  Total Billable Time: 40 minutes  Total Time: 45 minutes     Precautions: Standard, Diabetes, Fall and hx of tethered cord    SUBJECTIVE     Pt reports: feeling okay; rode bike 1x; was sore after last session    He was compliant with home exercise program.  Response to previous treatment: fine   Functional change: ongoing      Pain: 0/10  Location: right leg    Fatigue: 4-5/10    OBJECTIVE     Objective Measures updated at progress report unless specified.     Lower Extremity Strength    RLE LLE   Hip Flexion: 2+/5 (2-/5) 3+/5 (3/5)   Hip Extension:  2/5 3+/5    Hip Abduction: 2+/5 (2/5)  2+/5 (2/5)   Hip Adduction: 2+/5 (2/5) 3+/5 (2/5)   Knee Extension: 4+/5 (4-/5) 5/5 (4/5)   Knee Flexion: 4-/5  4+/5 (4/5)   Ankle Dorsiflexion: 2/5 (trace) 3/5   Ankle Plantarflexion: 2/5 4-/5      7/8/21: LOWER EXTREMITY STRENGTH:    Left Right   Quadriceps 4+/5 4/5   Hamstrings 4+/5 4-/5      Iliopsoas 4/5 4-/5   Glute Med 3/5 3-/5   Hip Ext 3-/5 2+/5   Ankle DF 4-/5 3+/5   Ankle PF 4+/5 4-/5      Abdominal Strength: 4/5       Evaluation   Single Limb Stance R LE NT  (<10 sec = HIGH FALL RISK)   Single Limb Stance L LE NT  (<10 sec = HIGH FALL RISK)   5 times sit-stand 24 seconds (24) (31) - RW  >12 sec= fall risk for general  elderly  >16 sec= fall risk for Parkinson's disease  >10 sec= balance/vestibular dysfunction (<59 y/o)  >14.2 sec= balance/vestibular dysfunction (>59 y/o)  >12 sec= fall risk for CVA     7/8/21: 30 Second Sit to Stand: 7 reps    Montes/ FGA/ Tinetti NT      Postural control:  MCTSIB:  1. Eyes Open/feet together/Firm: 30 seconds  2. Eyes Closed/feet together/Firm: 30 seconds   3. Eyes Open/feet together/Foam: 30 seconds - mild sway  4. Eyes Closed/feet together/Foam: 3 seconds - moderate sway      Gait Assessment:   - AD used: RW  - Assistance: SBA  - Distance: 50 ft     GAIT DEVIATIONS:  Gait component performance:               - Decreased speed              - Improved step length, step width, stride length, stance time bilaterally, B foot clearance              - 3 point gait with RW, increased used of BUE, limited trunk rotation,                  Endurance Deficit: YES       Evaluation   Timed Up and Go 34 sec (31) (41 sec) - use of hands and RW  < 20 sec safe for independent transfers,     < 30 sec assist required for transfers     7/8/21: Timed Up and Go (TUG): 16 sec with AD and without AD    5 meter walk test 0.21 m/s (0.22 m/s)   6 min walk test NT      Functional Mobility (Bed mobility, transfers)  Bed mobility: Mod I  Supine to sit: Mod I  Sit to supine: Mod I  Rolling: Mod I  Sit to stand:  Mod I  Stand pivot:  Mod I     CMS Impairment/Limitation/Restriction for FOTO Peripheral Nervous System Disorders/Injuries Survey     Therapist reviewed FOTO scores for Robles Jordan on 12/9/2021.   FOTO documents entered into [employer] - see Media section.     Limitation Score: 61% (81%) (89%)   Category: Mobility , Other         Treatment   Bolded performed today:   Italics not performed today:     Robles received the treatments listed below:      therapeutic exercises to develop strength, endurance and ROM for 40 minutes including:     NuStep L3 x 4 minutes     Objective as above    Rec bike L1 x 6 minutes - req  multiple breaks to re-insert R foot into pedal strap    Seated at Mat:    Sit to stand x10 x5    Knee extensions 2# LLE, 2# RLE 3x10   Knee flexion GTB 3x10 B    Seated knee flexion heel slides 2# and GTB LLE, RTB RLE 3x10   Hip flexion 2#LLE, 0# RLE 1x10    Hip ADD GTB 3x12 B    Hip ABD GTB 3x10 B    Marches 3x8 B -min A from PT on R   Ankle pumps 3x10 B - R LE slid je from mat   Heel lifts 3x8 B -min A from PT on R LE   Pelvic rotations on bosu ball 2x10 rotations     2x10 AP    2x10 lateral     At // bars:    Standing Hip ABD 2x10   Standing hip ext 2x10   Standing knee flexion 5x4 - 2# at LLE, 0# RLE    Forward stepping, CGA, 2x10 ea LE - one UE assist on bars    Standing marches 2x10 B     Supine at Mat:    Bridge 10x1    10x2 green ball at B knees     10x2 light green ball under calves    Dead bug position     Isometric holds with light green ball     10x 5s holds    Quadruped    2x5 leg extensions     2x10 arm extensions         LTR 2 min   Clamshells 10x3   Heel slides 10x2     Manual therapy to control pain and increase ROM for 00 minutes including:     Manual distraction, R hip, holds    +active hip hike contractions during distraction    Not performed:  Seated on BOSU ball - trunk balance x 2 minutes    Patient Education and Home Exercises     Home Exercises Provided and Patient Education Provided     Education provided:   -Education on positive affects of consistent exercise.  -ALTER-G use and positioning     Written Home Exercises Provided: Patient instructed to cont prior HEP. Exercises were reviewed and Arboleda was able to demonstrate them prior to the end of the session.  Arboleda demonstrated good  understanding of the education provided. See EMR under Patient Instructions for exercises provided during therapy sessions    ASSESSMENT   Arboleda demonstrating maintenance of current strength, functional strength, functional mobility and ambulation, however this is noted strength gains in the L hip  with isolated testing and patient does have subjective report of 20% increase in overall function. Within discussion patient does note in home tasks have improved, however fatigue and increased challenge with upright mobility limits speed of progress at this point in time. Will continue to promote skilled PT services with this patient, will trial ALTER-G for ambulation when equipment is up and running in clinic and if progress continues to plateau with progress notes will visit possible maintenance therapy in later months. Pt continues to be appropriate for skilled PT at this time.       Robles Is progressing well towards his goals.   Pt prognosis is Fair.     Pt will continue to benefit from skilled outpatient physical therapy to address the deficits listed in the problem list box on initial evaluation, provide pt/family education and to maximize pt's level of independence in the home and community environment.     Pt's spiritual, cultural and educational needs considered and pt agreeable to plan of care and goals.     Anticipated barriers to physical therapy: medical hx     Goals: as of 3/30/22  Short Term Goals: 6 weeks   1. Patient demonstrates independence with HEP. - MET 2/18/22  2. Patient will transfer sit to/from standing with RW, CGA, no use of UE and with good body mechanics to promote safe mobility within the home. - ongoing  3. Patient will improve balance within condition 2 of the MCTSIB to 30s to reduce risk of falling. - MET 2/18/22  4. Patient will improve 5xSTS score by 3s to promote LE functional strength. - MET 2/18/22  5. Patient will improve TUG score by 6s to promote functional mobility. - MET 2/18/22   Patient will improve BLE strength to 3+/5 to promote improved mobility against gravity. - ongoing      Long Term Goals: 12 weeks   1. Patient will be independent with home exercise program (HEP) to promote continued rehabilitation following discharge.  2. Patient will improve 5xSTS score by  9s to promote LE functional strength progression. - ongoing   3. Patient will improve TUG score by 12s to promote functional mobility. - ongoing  4. Patient will improve gait speed to 0.4 m/s progressing toward safe household ambulation. - ongoing  5. Assess endurance and create appropriate goals to track progression and improvements in safe community ambulation.    PLAN   Plan of care Certification: 12/9/2021 to 3/3/21. UPDATED POC: 3/9/22 to 6/22/22.      Outpatient Physical Therapy 2 times weekly for 12 weeks to include the following interventions: Gait Training, Manual Therapy, Moist Heat/ Ice, Neuromuscular Re-ed, Patient Education, Therapeutic Activites and Therapeutic Exercise.     ALTER-G trial in upcoming sessions for unweighting of BLE and antigravity mobility.     Kaye Anderson, PT, DPT  3/30/2022     I CERTIFY THE NEED FOR THESE SERVICES FURNISHED UNDER THIS PLAN OF TREATMENT AND WHILE UNDER MY CARE    Physician's comments:        Physician's Signature: ___________________________________________________

## 2022-04-04 ENCOUNTER — CLINICAL SUPPORT (OUTPATIENT)
Dept: REHABILITATION | Facility: HOSPITAL | Age: 72
End: 2022-04-04
Payer: COMMERCIAL

## 2022-04-04 DIAGNOSIS — Z74.09 IMPAIRED FUNCTIONAL MOBILITY, BALANCE, GAIT, AND ENDURANCE: ICD-10-CM

## 2022-04-04 DIAGNOSIS — R26.2 DIFFICULTY WALKING: Primary | ICD-10-CM

## 2022-04-04 PROCEDURE — 97110 THERAPEUTIC EXERCISES: CPT | Mod: PN

## 2022-04-04 NOTE — PROGRESS NOTES
[facility]   Physical Therapy Treatment Note    Name: Robles Jordan  Clinic Number: [id number]    Therapy Diagnosis:   Encounter Diagnoses   Name Primary?    Difficulty walking Yes    Impaired functional mobility, balance, gait, and endurance      Physician: Ervin Washington     Visit Date: 4/4/2022    Physician Orders: PT Eval and Treat Neuro  Medical Diagnosis from Referral: Sensory neuropathy [G62.9]  Evaluation Date: 12/9/2021  Authorization Period Expiration: 12/31/21  Plan of Care Expiration: 3/3/22  NEW POC: 3/9/22 to 6/22/22  Visit # / Visits authorized: 1/ 1, 19/20   PN: 4/30/22    PTA Visit #: 0/5    Time In: 10:10   Time Out: 11:00  Total Billable Time: 40 minutes  Total Time: 50 minutes     Precautions: Standard, Diabetes, Fall and hx of tethered cord    SUBJECTIVE     Pt reports: feels stiff and sore in the low back and circular region around the low trunk     He was compliant with home exercise program.  Response to previous treatment: fine   Functional change: ongoing      Pain: 0/10  Location: right leg    Fatigue: 4-5/10    OBJECTIVE     Objective Measures updated at progress report unless specified.     Treatment   Bolded performed today:   Italics not performed today:     Robles received the treatments listed below:      therapeutic exercises to develop strength, endurance and ROM for 45 minutes including:     NuStep L3 x 8 minutes     Rec bike L1 x 6 minutes - req multiple breaks to re-insert R foot into pedal strap    Seated at Mat:    Sit to stand x3 x3    Knee extensions 2# LLE, 2# RLE 3x10   Knee flexion GTB 3x10 B    Seated knee flexion heel slides 2# and GTB LLE, RTB RLE 3x10   Hip flexion 2#LLE, 0# RLE 1x10    Hip ADD GTB 3x12 B    Hip ABD GTB 3x10 B    Marches 3x8 B -min A from PT on R   Ankle pumps 3x10 B - R LE slid je from mat   Heel lifts 3x8 B -min A from PT on R LE   Pelvic rotations on bosu ball 2x10 rotations     2x10 AP    2x10 lateral     At //  "bars:    Standing Hip ABD 2x10   Standing hip ext 2x10   Standing knee flexion 5x4 - 2# at LLE, 0# RLE    Forward stepping, CGA, 2x10 ea LE - one UE assist on bars    Standing marches 2x10 B     Supine at Mat:    Bridge:     10x2 green ball at B knees     10x2 light green ball under calves    Dead bug position     Green therapy ball     2x10 "full body cruches" against ball    Quadruped    2x10 leg extensions     2x10 arm extensions     LTR 10x2    Clamshells 10x2   Heel slides 10x2     Manual therapy to control pain and increase ROM for 00 minutes including:     Manual distraction, R hip, holds    +active hip hike contractions during distraction    Not performed:  Seated on BOSU ball - trunk balance x 2 minutes    Patient Education and Home Exercises     Home Exercises Provided and Patient Education Provided     Education provided:   -Education on positive affects of consistent exercise.  -ALTER-G use and positioning     Written Home Exercises Provided: Patient instructed to cont prior HEP. Exercises were reviewed and Arboleda was able to demonstrate them prior to the end of the session.  Arboleda demonstrated good  understanding of the education provided. See EMR under Patient Instructions for exercises provided during therapy sessions    ASSESSMENT   Arboleda demonstrating increased fatigue with bed mobility rolling and mobilizing from prone into quadruped positioning. Pt challenged with leg and arm extensions in quadruped however is able to complete 2 sets of 10 bilaterally this date. Pt does report some soreness in trunk region with target of core/trunk stability and strength to assist in overall posture and energy. Will continue to promote skilled PT services with this patient, will trial ALTER-G for ambulation when equipment is up and running in clinic. Pt continues to be appropriate for skilled PT at this time.       Robles Is progressing well towards his goals.   Pt prognosis is Fair.     Pt will continue " to benefit from skilled outpatient physical therapy to address the deficits listed in the problem list box on initial evaluation, provide pt/family education and to maximize pt's level of independence in the home and community environment.     Pt's spiritual, cultural and educational needs considered and pt agreeable to plan of care and goals.     Anticipated barriers to physical therapy: medical hx     Goals: as of 3/30/22  Short Term Goals: 6 weeks   1. Patient demonstrates independence with HEP. - MET 2/18/22  2. Patient will transfer sit to/from standing with RW, CGA, no use of UE and with good body mechanics to promote safe mobility within the home. - ongoing  3. Patient will improve balance within condition 2 of the MCTSIB to 30s to reduce risk of falling. - MET 2/18/22  4. Patient will improve 5xSTS score by 3s to promote LE functional strength. - MET 2/18/22  5. Patient will improve TUG score by 6s to promote functional mobility. - MET 2/18/22   Patient will improve BLE strength to 3+/5 to promote improved mobility against gravity. - ongoing      Long Term Goals: 12 weeks   1. Patient will be independent with home exercise program (HEP) to promote continued rehabilitation following discharge.  2. Patient will improve 5xSTS score by 9s to promote LE functional strength progression. - ongoing   3. Patient will improve TUG score by 12s to promote functional mobility. - ongoing  4. Patient will improve gait speed to 0.4 m/s progressing toward safe household ambulation. - ongoing  5. Assess endurance and create appropriate goals to track progression and improvements in safe community ambulation.    PLAN   Plan of care Certification: 12/9/2021 to 3/3/21. UPDATED POC: 3/9/22 to 6/22/22.      Outpatient Physical Therapy 2 times weekly for 12 weeks to include the following interventions: Gait Training, Manual Therapy, Moist Heat/ Ice, Neuromuscular Re-ed, Patient Education, Therapeutic Activites and Therapeutic  Exercise.     ALTER-G trial in upcoming sessions for unweighting of BLE and antigravity mobility.     Kaye Anderson, PT, DPT  4/4/2022

## 2022-04-08 ENCOUNTER — CLINICAL SUPPORT (OUTPATIENT)
Dept: REHABILITATION | Facility: HOSPITAL | Age: 72
End: 2022-04-08
Payer: COMMERCIAL

## 2022-04-08 DIAGNOSIS — Z74.09 IMPAIRED FUNCTIONAL MOBILITY, BALANCE, GAIT, AND ENDURANCE: ICD-10-CM

## 2022-04-08 DIAGNOSIS — R26.2 DIFFICULTY WALKING: Primary | ICD-10-CM

## 2022-04-08 PROCEDURE — 97110 THERAPEUTIC EXERCISES: CPT | Mod: KX,PN

## 2022-04-08 NOTE — PROGRESS NOTES
[facility]   Physical Therapy Treatment Note    Name: Robles Jordan  Clinic Number: [id number]    Therapy Diagnosis:   Encounter Diagnoses   Name Primary?    Difficulty walking Yes    Impaired functional mobility, balance, gait, and endurance      Physician: Ervin Washington     Visit Date: 4/8/2022    Physician Orders: PT Eval and Treat Neuro  Medical Diagnosis from Referral: Sensory neuropathy [G62.9]  Evaluation Date: 12/9/2021  Authorization Period Expiration: 12/31/21  Plan of Care Expiration: 3/3/22  NEW POC: 3/9/22 to 6/22/22  Visit # / Visits authorized: 1/ 1, 20/20   PN: 4/30/22    PTA Visit #: 0/5    Time In: 11:30  Time Out: 12:15  Total Billable Time: 45 minutes    Precautions: Standard, Diabetes, Fall and hx of tethered cord    SUBJECTIVE     Pt reports: feels like mat exercises are wearing him out; pre-op on 26th; surgery on 2nd (muscle biopsy)     He was compliant with home exercise program.  Response to previous treatment: sore for 2 days   Functional change: ongoing      Pain: 0/10  Location: NA    Fatigue: 0/10 (start)    OBJECTIVE     Objective Measures updated at progress report unless specified.     Treatment   Bolded performed today:   Italics not performed today:     Robles received the treatments listed below:      therapeutic exercises to develop strength, endurance and ROM for 45 minutes including:     NuStep L3.5 x 8 minutes     Rec bike L1 x 6 minutes - req multiple breaks to re-insert R foot into pedal strap    Seated at Mat:    Sit to stand x3 x3    Knee extensions 2# LLE, 2# RLE 3x10   Knee flexion GTB 3x10 B    Seated knee flexion heel slides 2# and GTB LLE, RTB RLE 3x10   Hip flexion 2#LLE, 0# RLE 1x10    Hip ADD GTB 3x12 B    Hip ABD GTB 3x10 B    Marches 3x8 B -min A from PT on R   Ankle pumps 3x10 B - R LE slid je from mat   Heel lifts 3x8 B -min A from PT on R LE   Pelvic rotations on bosu ball 2x10 rotations     2x10 AP    2x10 lateral     At  "// bars:    Standing Hip ABD 2x10   Standing hip ext 2x10   Standing knee flexion 5x4 - 2# at LLE, 0# RLE    Forward stepping, CGA, 2x10 ea LE - one UE assist on bars    Standing marches 2x10 B     Supine at Mat:    Bridge:     10x3 green ball at B knees    Dead bug position     Green therapy ball     2x10 "full body cruches" against ball    Quadruped    1x10 leg extensions     1x20 arm extensions     Clamshells 10x3   Heel slides 10x2    LTR x20    SLR 5x4    jeni pose into cobra x5    Manual therapy to control pain and increase ROM for 00 minutes including:     Manual distraction, R hip, holds    +active hip hike contractions during distraction    Not performed:  Seated on BOSU ball - trunk balance x 2 minutes    Patient Education and Home Exercises     Home Exercises Provided and Patient Education Provided     Education provided:   -Education on positive affects of consistent exercise.  -ALTER-G use and positioning     Written Home Exercises Provided: Patient instructed to cont prior HEP. Exercises were reviewed and Arboleda was able to demonstrate them prior to the end of the session.  Arboleda demonstrated good  understanding of the education provided. See EMR under Patient Instructions for exercises provided during therapy sessions    ASSESSMENT   Arboleda demonstrating mild improvement in fatigue with mat exercises with increased number of repetitions in quadruped and SLR exercise. Will continue to promote skilled PT services with this patient, will trial ALTER-G for ambulation when equipment is up and running in clinic. Pt continues to be appropriate for skilled PT at this time.       Robles Is progressing well towards his goals.   Pt prognosis is Fair.     Pt will continue to benefit from skilled outpatient physical therapy to address the deficits listed in the problem list box on initial evaluation, provide pt/family education and to maximize pt's level of independence in the home and community " environment.     Pt's spiritual, cultural and educational needs considered and pt agreeable to plan of care and goals.     Anticipated barriers to physical therapy: medical hx     Goals: as of 3/30/22  Short Term Goals: 6 weeks   1. Patient demonstrates independence with HEP. - MET 2/18/22  2. Patient will transfer sit to/from standing with RW, CGA, no use of UE and with good body mechanics to promote safe mobility within the home. - ongoing  3. Patient will improve balance within condition 2 of the MCTSIB to 30s to reduce risk of falling. - MET 2/18/22  4. Patient will improve 5xSTS score by 3s to promote LE functional strength. - MET 2/18/22  5. Patient will improve TUG score by 6s to promote functional mobility. - MET 2/18/22   Patient will improve BLE strength to 3+/5 to promote improved mobility against gravity. - ongoing      Long Term Goals: 12 weeks   1. Patient will be independent with home exercise program (HEP) to promote continued rehabilitation following discharge.  2. Patient will improve 5xSTS score by 9s to promote LE functional strength progression. - ongoing   3. Patient will improve TUG score by 12s to promote functional mobility. - ongoing  4. Patient will improve gait speed to 0.4 m/s progressing toward safe household ambulation. - ongoing  5. Assess endurance and create appropriate goals to track progression and improvements in safe community ambulation.    PLAN   Plan of care Certification: 12/9/2021 to 3/3/21. UPDATED POC: 3/9/22 to 6/22/22.      Outpatient Physical Therapy 2 times weekly for 12 weeks to include the following interventions: Gait Training, Manual Therapy, Moist Heat/ Ice, Neuromuscular Re-ed, Patient Education, Therapeutic Activites and Therapeutic Exercise.     ALTER-G trial in upcoming sessions for unweighting of BLE and antigravity mobility.     Kaye Anderson, PT, DPT  4/8/2022

## 2022-04-11 ENCOUNTER — CLINICAL SUPPORT (OUTPATIENT)
Dept: REHABILITATION | Facility: HOSPITAL | Age: 72
End: 2022-04-11
Payer: COMMERCIAL

## 2022-04-11 DIAGNOSIS — R26.2 DIFFICULTY WALKING: Primary | ICD-10-CM

## 2022-04-11 DIAGNOSIS — Z74.09 IMPAIRED FUNCTIONAL MOBILITY, BALANCE, GAIT, AND ENDURANCE: ICD-10-CM

## 2022-04-11 PROCEDURE — 97110 THERAPEUTIC EXERCISES: CPT | Mod: KX,PN

## 2022-04-11 NOTE — PROGRESS NOTES
[facility]   Physical Therapy Treatment Note    Name: Robles Jordan  Clinic Number: [id number]    Therapy Diagnosis:   Encounter Diagnoses   Name Primary?    Difficulty walking Yes    Impaired functional mobility, balance, gait, and endurance      Physician: Ervin Washington     Visit Date: 4/11/2022    Physician Orders: PT Eval and Treat Neuro  Medical Diagnosis from Referral: Sensory neuropathy [G62.9]  Evaluation Date: 12/9/2021  Authorization Period Expiration: 12/31/21  Plan of Care Expiration: 3/3/22  NEW POC: 3/9/22 to 6/22/22  Visit # / Visits authorized: 1/ 1, 21/20   PN: 4/30/22    PTA Visit #: 0/5    Time In: 10:00  Time Out: 10:45  Total Billable Time: 45 minutes    Precautions: Standard, Diabetes, Fall and hx of tethered cord    SUBJECTIVE     Pt reports: 2 days of soreness following session; doing okay     He was compliant with home exercise program.  Response to previous treatment: sore for 2 days   Functional change: ongoing      Pain: 0/10  Location: NA    Fatigue: 0/10    OBJECTIVE     Objective Measures updated at progress report unless specified.     Treatment   Bolded performed today:   Italics not performed today:     Robles received the treatments listed below:      therapeutic exercises to develop strength, endurance and ROM for 45 minutes including:     NuStep L3.5 x 8 minutes     Seated at Mat:    Sit to stand x10   Knee extensions 2# BLE 2x10   Knee flexion GTB 1x20 LLE, 2x10 RLE    Seated knee flexion heel slides 2# and GTB LLE, RTB RLE 3x10   Hip flexion 2# alt 2x10    Hip ADD green ball 2x10    Hip ABD GTB 2x10 B    Heel lift/Toe lift 2x10 BLE   Pelvic rotations on bosu ball 2x10 rotations     2x10 AP    2x10 lateral     At // bars:    Standing Hip ABD 2x10   Standing hip ext 2x10   Standing knee flexion 5x4 - 2# at LLE, 0# RLE    Forward stepping, CGA, 2x10 ea LE - one UE assist on bars    Standing marches 2x10 B     Supine at Mat:    Bridge:     10x2  "green ball at B knees    Dead bug position     Green therapy ball     2x10 "full body cruches" against ball    Quadruped    1x10 leg extensions     1x20 arm extensions     Clamshells 10x3   Heel slides 10x2    LTR x20    SLR 5x4    jeni pose into cobra x5     Manual therapy to control pain and increase ROM for 00 minutes including:     Manual distraction, R hip, holds    +active hip hike contractions during distraction    Not performed:  Seated on BOSU ball - trunk balance x 2 minutes    Patient Education and Home Exercises     Home Exercises Provided and Patient Education Provided     Education provided:   -Education on positive affects of consistent exercise.  -ALTER-G use and positioning     Written Home Exercises Provided: Patient instructed to cont prior HEP. Exercises were reviewed and Robles was able to demonstrate them prior to the end of the session.  Robles demonstrated good  understanding of the education provided. See EMR under Patient Instructions for exercises provided during therapy sessions    ASSESSMENT   Arboleda demonstrating increased weakness this session with moderate tolerance to all exercises performed. Pt is given mix of supine, sitting and standing exercise to negotiation tolerance this date. Will continue to promote skilled PT services with this patient, will trial ALTER-G for ambulation when equipment is up and running in clinic. Pt continues to be appropriate for skilled PT at this time.       Robles Is progressing well towards his goals.   Pt prognosis is Fair.     Pt will continue to benefit from skilled outpatient physical therapy to address the deficits listed in the problem list box on initial evaluation, provide pt/family education and to maximize pt's level of independence in the home and community environment.     Pt's spiritual, cultural and educational needs considered and pt agreeable to plan of care and goals.     Anticipated barriers to physical therapy: medical hx "     Goals: as of 3/30/22  Short Term Goals: 6 weeks   1. Patient demonstrates independence with HEP. - MET 2/18/22  2. Patient will transfer sit to/from standing with RW, CGA, no use of UE and with good body mechanics to promote safe mobility within the home. - ongoing  3. Patient will improve balance within condition 2 of the MCTSIB to 30s to reduce risk of falling. - MET 2/18/22  4. Patient will improve 5xSTS score by 3s to promote LE functional strength. - MET 2/18/22  5. Patient will improve TUG score by 6s to promote functional mobility. - MET 2/18/22   Patient will improve BLE strength to 3+/5 to promote improved mobility against gravity. - ongoing      Long Term Goals: 12 weeks   1. Patient will be independent with home exercise program (HEP) to promote continued rehabilitation following discharge.  2. Patient will improve 5xSTS score by 9s to promote LE functional strength progression. - ongoing   3. Patient will improve TUG score by 12s to promote functional mobility. - ongoing  4. Patient will improve gait speed to 0.4 m/s progressing toward safe household ambulation. - ongoing  5. Assess endurance and create appropriate goals to track progression and improvements in safe community ambulation.    PLAN   Plan of care Certification: 12/9/2021 to 3/3/21. UPDATED POC: 3/9/22 to 6/22/22.      Outpatient Physical Therapy 2 times weekly for 12 weeks to include the following interventions: Gait Training, Manual Therapy, Moist Heat/ Ice, Neuromuscular Re-ed, Patient Education, Therapeutic Activites and Therapeutic Exercise.     ALTER-G trial in upcoming sessions for unweighting of BLE and antigravity mobility.     Kaye Anderson, PT, DPT  4/11/2022

## 2022-04-13 ENCOUNTER — CLINICAL SUPPORT (OUTPATIENT)
Dept: REHABILITATION | Facility: HOSPITAL | Age: 72
End: 2022-04-13
Payer: COMMERCIAL

## 2022-04-13 DIAGNOSIS — R26.2 DIFFICULTY WALKING: Primary | ICD-10-CM

## 2022-04-13 DIAGNOSIS — Z74.09 IMPAIRED FUNCTIONAL MOBILITY, BALANCE, GAIT, AND ENDURANCE: ICD-10-CM

## 2022-04-13 PROCEDURE — 97110 THERAPEUTIC EXERCISES: CPT | Mod: KX,PN

## 2022-04-13 NOTE — PROGRESS NOTES
[facility]   Physical Therapy Treatment Note    Name: Robles Jordan  Clinic Number: [id number]    Therapy Diagnosis:   Encounter Diagnoses   Name Primary?    Difficulty walking Yes    Impaired functional mobility, balance, gait, and endurance      Physician: Ervin Washington     Visit Date: 4/13/2022    Physician Orders: PT Eval and Treat Neuro  Medical Diagnosis from Referral: Sensory neuropathy [G62.9]  Evaluation Date: 12/9/2021  Authorization Period Expiration: 12/31/21  Plan of Care Expiration: 3/3/22  NEW POC: 3/9/22 to 6/22/22  Visit # / Visits authorized: 1/ 1, 20/20, 2/pending    PN: 4/30/22    PTA Visit #: 0/5    Time In: 10:05  Time Out: 10:50  Total Billable Time: 45 minutes    Precautions: Standard, Diabetes, Fall and hx of tethered cord    SUBJECTIVE     Pt reports: 1 day of soreness; bike for 10 minutes; felt good yesterday - notes more muscle spasms in BLE (they do not impede motion/mobility); RLE feels like it is getting muscle bulk    - B feet go from cold to burning with leg use     He was compliant with home exercise program.  Response to previous treatment: sore for 2 days   Functional change: ongoing      Pain: 0/10  Location: NA    Fatigue: 0/10    OBJECTIVE     Objective Measures updated at progress report unless specified.     Treatment   Bolded performed today:   Italics not performed today:     Robles received the treatments listed below:      therapeutic exercises to develop strength, endurance and ROM for 45 minutes including:     NuStep L3.5 x 8 minutes     Seated at Mat:    Sit to stand x10   Knee extensions 2# BLE 2x10   Knee flexion GTB 1x20 LLE, 2x10 RLE    Seated knee flexion heel slides 2# and GTB LLE, RTB RLE 3x10   Hip flexion 2# alt 2x10    Hip ADD GTB 2x10    Hip ABD GTB 2x10 B    Heel lift/Toe lift 2x10 BLE   Pelvic rotations on bosu ball 2x10 rotations     2x10 AP    2x10 lateral     At // bars:    Standing Hip ABD 2x10   Standing hip ext  "2x10   Standing knee flexion 5x4 - 2# at LLE, 0# RLE    Forward stepping, CGA, 2x10 ea LE - one UE assist on bars    Standing marches 2x10 B     Supine at Mat:    Bridge:     10x2 green ball at B knees    Dead bug position     Green therapy ball     2x10 "full body cruches" against ball    Quadruped    1x10 leg extensions     1x20 arm extensions     Clamshells 10x3   Heel slides 10x2    LTR x20    SLR 5x4    jeni pose into cobra x5     Manual therapy to control pain and increase ROM for 00 minutes including:     Manual distraction, R hip, holds    +active hip hike contractions during distraction    Not performed:  Seated on BOSU ball - trunk balance x 2 minutes    Patient Education and Home Exercises     Home Exercises Provided and Patient Education Provided     Education provided:   -Education on positive affects of consistent exercise.  -ALTER-G use and positioning     Written Home Exercises Provided: Patient instructed to cont prior HEP. Exercises were reviewed and Arboleda was able to demonstrate them prior to the end of the session.  Arboleda demonstrated good  understanding of the education provided. See EMR under Patient Instructions for exercises provided during therapy sessions    ASSESSMENT   Arboleda demonstrating increased fatigue with performance of core exercise which seems to be very challenging, however patient is noting decreased length of soreness following appointments and increased energy levels to participate in biking at home. Will continue to promote skilled PT services with this patient, will trial ALTER-G for ambulation when equipment is up and running in clinic. Pt continues to be appropriate for skilled PT at this time.       Robles Is progressing well towards his goals.   Pt prognosis is Fair.     Pt will continue to benefit from skilled outpatient physical therapy to address the deficits listed in the problem list box on initial evaluation, provide pt/family education and to maximize " pt's level of independence in the home and community environment.     Pt's spiritual, cultural and educational needs considered and pt agreeable to plan of care and goals.     Anticipated barriers to physical therapy: medical hx     Goals: as of 3/30/22  Short Term Goals: 6 weeks   1. Patient demonstrates independence with HEP. - MET 2/18/22  2. Patient will transfer sit to/from standing with RW, CGA, no use of UE and with good body mechanics to promote safe mobility within the home. - ongoing  3. Patient will improve balance within condition 2 of the MCTSIB to 30s to reduce risk of falling. - MET 2/18/22  4. Patient will improve 5xSTS score by 3s to promote LE functional strength. - MET 2/18/22  5. Patient will improve TUG score by 6s to promote functional mobility. - MET 2/18/22   Patient will improve BLE strength to 3+/5 to promote improved mobility against gravity. - ongoing      Long Term Goals: 12 weeks   1. Patient will be independent with home exercise program (HEP) to promote continued rehabilitation following discharge.  2. Patient will improve 5xSTS score by 9s to promote LE functional strength progression. - ongoing   3. Patient will improve TUG score by 12s to promote functional mobility. - ongoing  4. Patient will improve gait speed to 0.4 m/s progressing toward safe household ambulation. - ongoing  5. Assess endurance and create appropriate goals to track progression and improvements in safe community ambulation.    PLAN   Plan of care Certification: 12/9/2021 to 3/3/21. UPDATED POC: 3/9/22 to 6/22/22.      Outpatient Physical Therapy 2 times weekly for 12 weeks to include the following interventions: Gait Training, Manual Therapy, Moist Heat/ Ice, Neuromuscular Re-ed, Patient Education, Therapeutic Activites and Therapeutic Exercise.     ALTER-G trial in upcoming sessions for unweighting of BLE and antigravity mobility.     Kaye Anderson, PT, DPT  4/13/2022

## 2022-04-18 ENCOUNTER — CLINICAL SUPPORT (OUTPATIENT)
Dept: REHABILITATION | Facility: HOSPITAL | Age: 72
End: 2022-04-18
Payer: COMMERCIAL

## 2022-04-18 DIAGNOSIS — Z74.09 IMPAIRED FUNCTIONAL MOBILITY, BALANCE, GAIT, AND ENDURANCE: ICD-10-CM

## 2022-04-18 DIAGNOSIS — R26.2 DIFFICULTY WALKING: Primary | ICD-10-CM

## 2022-04-18 PROCEDURE — 97110 THERAPEUTIC EXERCISES: CPT | Mod: KX,PN

## 2022-04-18 NOTE — PROGRESS NOTES
"[facility]   Physical Therapy Treatment Note    Name: Robles Jordan  Clinic Number: [id number]    Therapy Diagnosis:   Encounter Diagnoses   Name Primary?    Difficulty walking Yes    Impaired functional mobility, balance, gait, and endurance      Physician: Ervin Washington     Visit Date: 4/18/2022    Physician Orders: PT Eval and Treat Neuro  Medical Diagnosis from Referral: Sensory neuropathy [G62.9]  Evaluation Date: 12/9/2021  Authorization Period Expiration: 12/31/21  Plan of Care Expiration: 3/3/22  NEW POC: 3/9/22 to 6/22/22  Visit # / Visits authorized: 1/ 1, 20/20, 3/pending    PN: 4/30/22    PTA Visit #: 0/5    Time In: 11:40  Time Out: 12:30  Total Billable Time: 50 minutes    Precautions: Standard, Diabetes, Fall and hx of tethered cord    SUBJECTIVE     Pt reports:  Feet have been burning more lately; 1 day of soreness following last session and was able to use bike day after session   - muscle twitches more (R>L; posterior>anterior)   - R quad ("feels empty"; "feels weak"; feels less active)     He was compliant with home exercise program.  Response to previous treatment: sore for 2 days   Functional change: ongoing      Pain: 0/10  Location: NA    Fatigue: 0/10    OBJECTIVE     Objective Measures updated at progress report unless specified.     Treatment   Bolded performed today:   Italics not performed today:     Robles received the treatments listed below:      therapeutic exercises to develop strength, endurance and ROM for 45 minutes including:     NuStep L2.5 x 8 minutes     Seated at Mat:    Sit to stand x10   Knee extensions 2# BLE 2x10   Knee flexion GTB 1x20 LLE, 2x10 RLE    Seated knee flexion heel slides 2# and GTB LLE, RTB RLE 3x10   Hip flexion 2# alt 2x10    Hip ADD GTB 2x10    Hip ABD GTB 2x10 B    Heel lift/Toe lift 2x10 BLE   Pelvic rotations on bosu ball 2x10 rotations     2x10 AP    2x10 lateral     At // bars:    Standing Hip ABD 2x10   Standing " "hip ext 2x10   Standing knee flexion 5x4 - 2# at LLE, 0# RLE    Forward stepping, CGA, 2x10 ea LE - one UE assist on bars    Standing marches 2x10 B     Supine at Mat:    Bridge:     10x2 green ball at B knees    Dead bug position     Green therapy ball     2x10 (last set x5, x5) "full body cruches" against ball    Quadruped    1x5 leg extensions     1x10 arm extensions     Clamshells 10x2    - GTB at knees    Heel slides 10x2 RLE, 20x1 LLE    - slide board + pillow case at foot    LTR x20    SAQ     - bolster     - 2# ankle weights     - 2x10 B     - eccentric control    SLR 5x4    jeni pose into cobra x5     Manual therapy to control pain and increase ROM for 05 minutes including:     Manual distraction, R hip, holds    +active hip hike contractions during distraction    Not performed:  Seated on BOSU ball - trunk balance x 2 minutes    Patient Education and Home Exercises     Home Exercises Provided and Patient Education Provided     Education provided:   -Education on positive affects of consistent exercise.  -ALTER-G use and positioning     Written Home Exercises Provided: Patient instructed to cont prior HEP. Exercises were reviewed and Robles was able to demonstrate them prior to the end of the session.  Robles demonstrated good  understanding of the education provided. See EMR under Patient Instructions for exercises provided during therapy sessions    ASSESSMENT   Arboleda demonstrating mild fatigue after long weekend with visitors however was able to ride bike 1 day after last session with is an improvement in fatigue following treatment. Pt limited by back stiffness, however notes he feels this has improved at end of session. Will continue to promote skilled PT services with this patient, will trial ALTER-G for ambulation when equipment is up and running in clinic. Pt continues to be appropriate for skilled PT at this time.       Arboleda Is progressing well towards his goals.   Pt prognosis is " Fair.     Pt will continue to benefit from skilled outpatient physical therapy to address the deficits listed in the problem list box on initial evaluation, provide pt/family education and to maximize pt's level of independence in the home and community environment.     Pt's spiritual, cultural and educational needs considered and pt agreeable to plan of care and goals.     Anticipated barriers to physical therapy: medical hx     Goals: as of 3/30/22  Short Term Goals: 6 weeks   1. Patient demonstrates independence with HEP. - MET 2/18/22  2. Patient will transfer sit to/from standing with RW, CGA, no use of UE and with good body mechanics to promote safe mobility within the home. - ongoing  3. Patient will improve balance within condition 2 of the MCTSIB to 30s to reduce risk of falling. - MET 2/18/22  4. Patient will improve 5xSTS score by 3s to promote LE functional strength. - MET 2/18/22  5. Patient will improve TUG score by 6s to promote functional mobility. - MET 2/18/22   Patient will improve BLE strength to 3+/5 to promote improved mobility against gravity. - ongoing      Long Term Goals: 12 weeks   1. Patient will be independent with home exercise program (HEP) to promote continued rehabilitation following discharge.  2. Patient will improve 5xSTS score by 9s to promote LE functional strength progression. - ongoing   3. Patient will improve TUG score by 12s to promote functional mobility. - ongoing  4. Patient will improve gait speed to 0.4 m/s progressing toward safe household ambulation. - ongoing  5. Assess endurance and create appropriate goals to track progression and improvements in safe community ambulation.    PLAN   Plan of care Certification: 12/9/2021 to 3/3/21. UPDATED POC: 3/9/22 to 6/22/22.      Outpatient Physical Therapy 2 times weekly for 12 weeks to include the following interventions: Gait Training, Manual Therapy, Moist Heat/ Ice, Neuromuscular Re-ed, Patient Education, Therapeutic  Activites and Therapeutic Exercise.     ALTER-G trial in upcoming sessions for unweighting of BLE and antigravity mobility.     Kaye Anderson, PT, DPT  4/18/2022

## 2022-04-21 ENCOUNTER — CLINICAL SUPPORT (OUTPATIENT)
Dept: REHABILITATION | Facility: HOSPITAL | Age: 72
End: 2022-04-21
Payer: COMMERCIAL

## 2022-04-21 DIAGNOSIS — R26.2 DIFFICULTY WALKING: Primary | ICD-10-CM

## 2022-04-21 DIAGNOSIS — Z74.09 IMPAIRED FUNCTIONAL MOBILITY, BALANCE, GAIT, AND ENDURANCE: ICD-10-CM

## 2022-04-21 PROCEDURE — 97110 THERAPEUTIC EXERCISES: CPT | Mod: KX,PN

## 2022-04-21 NOTE — PROGRESS NOTES
[facility]   Physical Therapy Treatment Note    Name: Robles Jordan  Clinic Number: [id number]    Therapy Diagnosis:   Encounter Diagnoses   Name Primary?    Difficulty walking Yes    Impaired functional mobility, balance, gait, and endurance      Physician: Ervin Washington     Visit Date: 4/21/2022    Physician Orders: PT Eval and Treat Neuro  Medical Diagnosis from Referral: Sensory neuropathy [G62.9]  Evaluation Date: 12/9/2021  Authorization Period Expiration: 12/31/21  Plan of Care Expiration: 3/3/22  NEW POC: 3/9/22 to 6/22/22  Visit # / Visits authorized: 1/ 1, 20/20, 4/pending    PN: 4/30/22    PTA Visit #: 0/5    Time In: 12:32  Time Out: 1:17  Total Billable Time: 45 minutes    Precautions: Standard, Diabetes, Fall and hx of tethered cord    SUBJECTIVE     Pt reports: feet still burning more lately; 1 day of soreness following last appt; no bike use; no muscle twitches in a few days     He was compliant with home exercise program.  Response to previous treatment: sore for 2 days   Functional change: ongoing      Pain: 0/10  Location: NA    Fatigue: 4/10 --> 8/10 (end of session)     OBJECTIVE     Objective Measures updated at progress report unless specified.     Treatment   Bolded performed today:   Italics not performed today:     Robles received the treatments listed below:      therapeutic exercises to develop strength, endurance and ROM for 45 minutes including:     NuStep L3.5 x 8 minutes     Seated at Mat:    Sit to stand x10   Knee extensions 2# BLE 2x10   Knee flexion GTB 1x20 LLE, 2x10 RLE    Seated knee flexion heel slides 2# and GTB LLE, RTB RLE 3x10   Hip flexion 2# LLE, 2x10    Hip ADD GTB 2x10    Hip ABD GTB 2x10 B    Heel lift/Toe lift 2x10 BLE   Pelvic rotations on bosu ball 2x10 rotations     2x10 AP    2x10 lateral    Roll Out with Blue Theraball     x10 FWD     x10 Right; x10 Left      At // bars:    Standing Hip ABD 2x10   Standing hip ext  "2x10   Standing knee flexion 5x4 - 2# at LLE, 0# RLE    Forward stepping, CGA, 2x10 ea LE - one UE assist on bars    Standing marches x20 alt     Supine at Mat:    Bridge:     10x2 green ball at B knees    Dead bug position     Green therapy ball     2x10 (last set x5, x5) "full body cruches" against ball    Quadruped    1x5 leg extensions     1x10 arm extensions     Clamshells 10x2    - GTB at knees    Heel slides 10x2 RLE, 20x1 LLE    - slide board + pillow case at foot    LTR x20    SAQ     - bolster     - 2# ankle weights     - 2x10 B     - eccentric control    SLR 5x4    jeni pose into cobra x5     Manual therapy to control pain and increase ROM for 00 minutes including:     Manual distraction, R hip, holds    +active hip hike contractions during distraction    Not performed:  Seated on BOSU ball - trunk balance x 2 minutes    Patient Education and Home Exercises     Home Exercises Provided and Patient Education Provided     Education provided:   -Education on positive affects of consistent exercise.  -ALTER-G use and positioning     Written Home Exercises Provided: Patient instructed to cont prior HEP. Exercises were reviewed and Arboleda was able to demonstrate them prior to the end of the session.  Arboleda demonstrated good  understanding of the education provided. See EMR under Patient Instructions for exercises provided during therapy sessions    ASSESSMENT   Arboleda demonstrating mild improvement in energy levels with ability to tolerate increased standing exercise and nustep resistance with only 1 day of soreness following last 2 sessions. Mixture of standing and sitting exercise this date targeting ROM and strength. Pt continues to be appropriate for skilled PT at this time.       Robles Is progressing well towards his goals.   Pt prognosis is Fair.     Pt will continue to benefit from skilled outpatient physical therapy to address the deficits listed in the problem list box on initial evaluation, " provide pt/family education and to maximize pt's level of independence in the home and community environment.     Pt's spiritual, cultural and educational needs considered and pt agreeable to plan of care and goals.     Anticipated barriers to physical therapy: medical hx     Goals: as of 3/30/22  Short Term Goals: 6 weeks   1. Patient demonstrates independence with HEP. - MET 2/18/22  2. Patient will transfer sit to/from standing with RW, CGA, no use of UE and with good body mechanics to promote safe mobility within the home. - ongoing  3. Patient will improve balance within condition 2 of the MCTSIB to 30s to reduce risk of falling. - MET 2/18/22  4. Patient will improve 5xSTS score by 3s to promote LE functional strength. - MET 2/18/22  5. Patient will improve TUG score by 6s to promote functional mobility. - MET 2/18/22   Patient will improve BLE strength to 3+/5 to promote improved mobility against gravity. - ongoing      Long Term Goals: 12 weeks   1. Patient will be independent with home exercise program (HEP) to promote continued rehabilitation following discharge.  2. Patient will improve 5xSTS score by 9s to promote LE functional strength progression. - ongoing   3. Patient will improve TUG score by 12s to promote functional mobility. - ongoing  4. Patient will improve gait speed to 0.4 m/s progressing toward safe household ambulation. - ongoing  5. Assess endurance and create appropriate goals to track progression and improvements in safe community ambulation.    PLAN   Plan of care Certification: 12/9/2021 to 3/3/21. UPDATED POC: 3/9/22 to 6/22/22.      Outpatient Physical Therapy 2 times weekly for 12 weeks to include the following interventions: Gait Training, Manual Therapy, Moist Heat/ Ice, Neuromuscular Re-ed, Patient Education, Therapeutic Activites and Therapeutic Exercise.     ALTER-G trial in upcoming sessions for unweighting of BLE and antigravity mobility.     Kaye Anderson, PT, DPT  4/21/2022

## 2022-04-25 ENCOUNTER — CLINICAL SUPPORT (OUTPATIENT)
Dept: REHABILITATION | Facility: HOSPITAL | Age: 72
End: 2022-04-25
Payer: COMMERCIAL

## 2022-04-25 DIAGNOSIS — R26.2 DIFFICULTY WALKING: Primary | ICD-10-CM

## 2022-04-25 DIAGNOSIS — Z74.09 IMPAIRED FUNCTIONAL MOBILITY, BALANCE, GAIT, AND ENDURANCE: ICD-10-CM

## 2022-04-25 PROCEDURE — 97110 THERAPEUTIC EXERCISES: CPT | Mod: KX,PN

## 2022-04-25 NOTE — PROGRESS NOTES
[facility]   Physical Therapy Treatment Note    Name: Robles Jordan  Clinic Number: [id number]    Therapy Diagnosis:   Encounter Diagnoses   Name Primary?    Difficulty walking Yes    Impaired functional mobility, balance, gait, and endurance      Physician: Ervin Washington     Visit Date: 4/25/2022    Physician Orders: PT Eval and Treat Neuro  Medical Diagnosis from Referral: Sensory neuropathy [G62.9]  Evaluation Date: 12/9/2021  Authorization Period Expiration: 12/31/21  Plan of Care Expiration: 3/3/22  NEW POC: 3/9/22 to 6/22/22  Visit # / Visits authorized: 1/ 1, 20/20, 5/pending    PN: 4/30/22    PTA Visit #: 0/5    Time In: 11:45  Time Out: 12:30  Total Billable Time: 45 minutes    Precautions: Standard, Diabetes, Fall and hx of tethered cord    SUBJECTIVE     Pt reports: a little weak today but feel fine; back started bothering him     He was compliant with home exercise program.  Response to previous treatment: sore for 2 days   Functional change: ongoing      Pain: 0/10  Location: NA    Fatigue: 5/10 --> 8/10 (end of session)     OBJECTIVE     Objective Measures updated at progress report unless specified.     Treatment   Bolded performed today:   Italics not performed today:     Robles received the treatments listed below:      therapeutic exercises to develop strength, endurance and ROM for 45 minutes including:     NuStep L2.5 x 8 minutes     Seated at Mat:    Sit to stand x5 x5   Knee extensions 2# BLE 2x10   Knee flexion GTB 1x20 LLE, 2x10 RLE    Seated knee flexion heel slides 2# and GTB LLE, RTB RLE 3x10   Hip flexion 2# LLE, 2x10    Hip ADD GTB 2x10    Hip ABD GTB 2x10 B    Heel lift/Toe lift 2x10 BLE   Pelvic rotations on bosu ball 2x10 rotations     2x10 AP    2x10 lateral    Roll Out with Blue Theraball     x10 FWD     x10 Right; x10 Left      At // bars:    Standing Hip ABD 2x10   Standing hip ext 2x10   Standing knee flexion 5x4 - 2# at LLE, 0# RLE    Forward  "stepping, CGA, 2x10 ea LE - one UE assist on bars    Standing marches x20 alt     Supine at Mat:    Bridge:     10x2 green ball at B knees    Dead bug position     Green therapy ball     X5, X5, X5, X5 "full body cruches" against ball    Quadruped    1x5 leg extensions     2x10 arm extensions     Clamshells 10x2    - GTB at knees    Heel slides 10x2 RLE, 20x1 LLE    - slide board + pillow case at foot     - Marie for positioning of R foot    LTR x20    SAQ     - bolster     - 2# ankle weights     - 2x10 B     - eccentric control    SLR 5x2 B    jeni pose into cobra x2     Manual therapy to control pain and increase ROM for 00 minutes including:     Manual distraction, R hip, holds    +active hip hike contractions during distraction    Not performed:  Seated on BOSU ball - trunk balance x 2 minutes    Patient Education and Home Exercises     Home Exercises Provided and Patient Education Provided     Education provided:   -Education on positive affects of consistent exercise.  -ALTER-G use and positioning     Written Home Exercises Provided: Patient instructed to cont prior HEP. Exercises were reviewed and Robles was able to demonstrate them prior to the end of the session.  Arboleda demonstrated good  understanding of the education provided. See EMR under Patient Instructions for exercises provided during therapy sessions    ASSESSMENT   Arboleda demonstrating fluctuation in energy levels and LE muscular fatigability with supine and sitting exercises this date, however overall soreness following exercises has improved.  Pt continues to be appropriate for skilled PT at this time.       Robles Is progressing well towards his goals.   Pt prognosis is Fair.     Pt will continue to benefit from skilled outpatient physical therapy to address the deficits listed in the problem list box on initial evaluation, provide pt/family education and to maximize pt's level of independence in the home and community environment. "     Pt's spiritual, cultural and educational needs considered and pt agreeable to plan of care and goals.     Anticipated barriers to physical therapy: medical hx     Goals: as of 3/30/22  Short Term Goals: 6 weeks   1. Patient demonstrates independence with HEP. - MET 2/18/22  2. Patient will transfer sit to/from standing with RW, CGA, no use of UE and with good body mechanics to promote safe mobility within the home. - ongoing  3. Patient will improve balance within condition 2 of the MCTSIB to 30s to reduce risk of falling. - MET 2/18/22  4. Patient will improve 5xSTS score by 3s to promote LE functional strength. - MET 2/18/22  5. Patient will improve TUG score by 6s to promote functional mobility. - MET 2/18/22   Patient will improve BLE strength to 3+/5 to promote improved mobility against gravity. - ongoing      Long Term Goals: 12 weeks   1. Patient will be independent with home exercise program (HEP) to promote continued rehabilitation following discharge.  2. Patient will improve 5xSTS score by 9s to promote LE functional strength progression. - ongoing   3. Patient will improve TUG score by 12s to promote functional mobility. - ongoing  4. Patient will improve gait speed to 0.4 m/s progressing toward safe household ambulation. - ongoing  5. Assess endurance and create appropriate goals to track progression and improvements in safe community ambulation.    PLAN   Plan of care Certification: 12/9/2021 to 3/3/21. UPDATED POC: 3/9/22 to 6/22/22.      Outpatient Physical Therapy 2 times weekly for 12 weeks to include the following interventions: Gait Training, Manual Therapy, Moist Heat/ Ice, Neuromuscular Re-ed, Patient Education, Therapeutic Activites and Therapeutic Exercise.     ALTER-G trial in upcoming sessions for unweighting of BLE and antigravity mobility.     Kaye Anderson, PT, DPT  4/25/2022

## 2022-04-26 ENCOUNTER — PES CALL (OUTPATIENT)
Dept: ADMINISTRATIVE | Facility: CLINIC | Age: 72
End: 2022-04-26
Payer: COMMERCIAL

## 2022-04-27 ENCOUNTER — CLINICAL SUPPORT (OUTPATIENT)
Dept: REHABILITATION | Facility: HOSPITAL | Age: 72
End: 2022-04-27
Payer: COMMERCIAL

## 2022-04-27 DIAGNOSIS — R26.2 DIFFICULTY WALKING: Primary | ICD-10-CM

## 2022-04-27 DIAGNOSIS — Z74.09 IMPAIRED FUNCTIONAL MOBILITY, BALANCE, GAIT, AND ENDURANCE: ICD-10-CM

## 2022-04-27 PROCEDURE — 97110 THERAPEUTIC EXERCISES: CPT | Mod: KX,PN

## 2022-04-27 NOTE — PROGRESS NOTES
[facility]   Physical Therapy Treatment Note    Name: Robles Jordan  Clinic Number: [id number]    Therapy Diagnosis:   Encounter Diagnoses   Name Primary?    Difficulty walking Yes    Impaired functional mobility, balance, gait, and endurance      Physician: Ervin Washington     Visit Date: 4/27/2022    Physician Orders: PT Eval and Treat Neuro  Medical Diagnosis from Referral: Sensory neuropathy [G62.9]  Evaluation Date: 12/9/2021  Authorization Period Expiration: 12/31/21  Plan of Care Expiration: 3/3/22  NEW POC: 3/9/22 to 6/22/22  Visit # / Visits authorized: 1/ 1, 20/20, 6/pending    PN: 4/30/22    PTA Visit #: 0/5    Time In: 12:30  Time Out: 1:15  Total Billable Time: 45 minutes    Precautions: Standard, Diabetes, Fall and hx of tethered cord    SUBJECTIVE     Pt reports: weakness today following pre-op yesterday; needs to cancel next weeks appointments due to biopsy on Monday 5/2/22    He was compliant with home exercise program.  Response to previous treatment: sore for 1 day   Functional change: ongoing      Pain: 0/10  Location: NA    Fatigue: 0/10    OBJECTIVE     Objective Measures updated at progress report unless specified.     Treatment   Bolded performed today:   Italics not performed today:     Robles received the treatments listed below:      therapeutic exercises to develop strength, endurance and ROM for 45 minutes including:     NuStep L2.5 x 8 minutes     Seated at Mat:    Sit to stand x5 x5   Knee extensions 2# BLE 2x10   Knee flexion GTB 1x20 LLE, 2x10 RLE    Seated knee flexion heel slides 2# and GTB LLE, RTB RLE 3x10   Hip flexion 2# LLE, 2x10    Hip ADD GTB 2x10    Hip ABD GTB 2x10 B    Heel lift/Toe lift 2x10 BLE   Pelvic rotations on bosu ball 2x10 rotations     2x10 AP    2x10 lateral    Roll Out with Blue Theraball     x10 FWD     x10 Right; x10 Left      At // bars:    Standing Hip ABD 2x10   Standing hip ext 2x10   Standing knee flexion 5x4 - 2# at  "LLE, 0# RLE    Forward stepping, CGA, 2x10 ea LE - one UE assist on bars    Standing marches x20 alt     Supine at Mat:    Bridge:     5x4 green ball at B knees    Hip ADD:     5x4 green ball at B knees    Hip ABD/clamshell:     5x4 green TB at B knees    Hip ABD/straight leg:     5x4     Marie at RLE    Slideboard + pillow case    Dead bug position     Green therapy ball     X5, X5, X5, X5 "full body cruches" against ball    Quadruped    1x5 leg extensions     2x10 arm extensions     Heel slides 5x4 RLE, 5x4 LLE    - slide board + pillow case at foot     - Marie for positioning of R foot    LTR 10x2   SAQ     - bolster     - 2# ankle weights     - 5x4 B     - eccentric control    SLR 5x2 B    jeni pose into cobra x2     Manual therapy to control pain and increase ROM for 00 minutes including:     Manual distraction, R hip, holds    +active hip hike contractions during distraction    Not performed:  Seated on BOSU ball - trunk balance x 2 minutes    Patient Education and Home Exercises     Home Exercises Provided and Patient Education Provided     Education provided:   -Education on positive affects of consistent exercise.  -ALTER-G use and positioning     Written Home Exercises Provided: Patient instructed to cont prior HEP. Exercises were reviewed and Arboleda was able to demonstrate them prior to the end of the session.  Arboleda demonstrated good  understanding of the education provided. See EMR under Patient Instructions for exercises provided during therapy sessions    ASSESSMENT   Arboleda demonstrating increased fatigue with current exercise regime so we decreased repetitions and sets and added short restbreaks between sets to allow for better fatigue and muscular fatigue management. Pt is educated on participating in this same program at home - low sets, low reps, low resistance within HEP and biking as well as fatigue management with energy conservation and watching activities including increased heat. Pt " participating in all LE exercise this date with SBA to Marie for positioning. Pt continues to be appropriate for skilled PT at this time.       Robles Is progressing well towards his goals.   Pt prognosis is Fair.     Pt will continue to benefit from skilled outpatient physical therapy to address the deficits listed in the problem list box on initial evaluation, provide pt/family education and to maximize pt's level of independence in the home and community environment.     Pt's spiritual, cultural and educational needs considered and pt agreeable to plan of care and goals.     Anticipated barriers to physical therapy: medical hx     Goals: as of 3/30/22  Short Term Goals: 6 weeks   1. Patient demonstrates independence with HEP. - MET 2/18/22  2. Patient will transfer sit to/from standing with RW, CGA, no use of UE and with good body mechanics to promote safe mobility within the home. - ongoing  3. Patient will improve balance within condition 2 of the MCTSIB to 30s to reduce risk of falling. - MET 2/18/22  4. Patient will improve 5xSTS score by 3s to promote LE functional strength. - MET 2/18/22  5. Patient will improve TUG score by 6s to promote functional mobility. - MET 2/18/22   Patient will improve BLE strength to 3+/5 to promote improved mobility against gravity. - ongoing      Long Term Goals: 12 weeks   1. Patient will be independent with home exercise program (HEP) to promote continued rehabilitation following discharge.  2. Patient will improve 5xSTS score by 9s to promote LE functional strength progression. - ongoing   3. Patient will improve TUG score by 12s to promote functional mobility. - ongoing  4. Patient will improve gait speed to 0.4 m/s progressing toward safe household ambulation. - ongoing  5. Assess endurance and create appropriate goals to track progression and improvements in safe community ambulation.    PLAN   Plan of care Certification: 12/9/2021 to 3/3/21. UPDATED POC: 3/9/22 to  6/22/22.      Outpatient Physical Therapy 2 times weekly for 12 weeks to include the following interventions: Gait Training, Manual Therapy, Moist Heat/ Ice, Neuromuscular Re-ed, Patient Education, Therapeutic Activites and Therapeutic Exercise.     ALTER-G trial in upcoming sessions for unweighting of BLE and antigravity mobility. LOW sets, LOW reps, LOW resistance.     Kaye Anderson, PT, DPT  4/27/2022

## 2022-05-23 ENCOUNTER — CLINICAL SUPPORT (OUTPATIENT)
Dept: REHABILITATION | Facility: HOSPITAL | Age: 72
End: 2022-05-23
Payer: COMMERCIAL

## 2022-05-23 DIAGNOSIS — R26.2 DIFFICULTY WALKING: Primary | ICD-10-CM

## 2022-05-23 DIAGNOSIS — Z74.09 IMPAIRED FUNCTIONAL MOBILITY, BALANCE, GAIT, AND ENDURANCE: ICD-10-CM

## 2022-05-23 PROCEDURE — 97110 THERAPEUTIC EXERCISES: CPT | Mod: PN

## 2022-05-23 NOTE — PROGRESS NOTES
[facility]   Physical Therapy Progress Note - plan of care (POC) UPDATE    Name: Robles Jordan  Clinic Number: [id number]    Therapy Diagnosis:   Encounter Diagnoses   Name Primary?    Difficulty walking Yes    Impaired functional mobility, balance, gait, and endurance      Physician: Ervin Washington     Visit Date: 5/23/2022    Physician Orders: PT Eval and Treat Neuro  Medical Diagnosis from Referral: Sensory neuropathy [G62.9]  Evaluation Date: 12/9/2021  Authorization Period Expiration: 12/31/21  Plan of Care Expiration: NEW POC: 5/23/22 to 8/15/22  Visit # / Visits authorized: 28/40 (+1 eval)   PN: 6/23/22    PTA Visit #: 0/5    Time In: 11:45  Time Out: 12:20  Total Billable Time: 35 minutes    Precautions: Standard, Diabetes, Fall and hx of tethered cord    SUBJECTIVE     Pt reports: bilateral foot swelling, increased burning in bilateral feet, bilateral lower extremity pain 8/10 - following biopsy   - patient feels very weak today     He was compliant with home exercise program.  Response to previous treatment: sore for 1 day   Functional change: ongoing      Pain: 8/10  Location: bilateral lower extremity and bilateral feet     Fatigue: 8/10 (moving); 6/10 (at rest)     OBJECTIVE     Objective Measures updated at progress report unless specified.     Lower Extremity Strength 5/23/22    RLE LLE   Hip Flexion: 2-/5  3+/5   Hip Extension:  Bridge with mild resistance  Bridge with mild resistance    Hip Abduction: 2/5 2/5   Hip Adduction: 2/5 2/5   Knee Extension: 4-/5  4/5    Knee Flexion: 3+/5  4/5    Ankle Dorsiflexion: 2-/5  2+/5   Ankle Plantarflexion: 2-/5 3+/5     Lower Extremity Strength 3/30/22    RLE LLE   Hip Flexion: 2+/5  3+/5    Hip Extension:  2/5 3+/5    Hip Abduction: 2+/5  2+/5    Hip Adduction: 2+/5  3+/5    Knee Extension: 4+/5  5/5    Knee Flexion: 4-/5  4+/5    Ankle Dorsiflexion: 2/5  3/5   Ankle Plantarflexion: 2/5 4-/5     Lower Extremity Strength  2/18/22    RLE LLE   Hip Flexion: 2+/5  3+/5    Hip Extension:  2/5 3+/5    Hip Abduction: 2+/5  2+/5    Hip Adduction: 2+/5  2+/5    Knee Extension: 4+/5  5/5    Knee Flexion: 4-/5  4+/5    Ankle Dorsiflexion: 2/5  3/5   Ankle Plantarflexion: 2/5 4-/5     Lower Extremity Strength - EVAL 12/9/21    RLE LLE   Hip Flexion: 2-/5 3+/5   Hip Extension:  Able to bridge  Able to bridge    Hip Abduction: 2/5 2/5   Hip Adduction: 2/5 2/5   Knee Extension: 4-/5 4/5   Knee Flexion: 4-/5 4/5   Ankle Dorsiflexion: trace 3/5   Ankle Plantarflexion: 2/5 4-/5     7/8/21: LOWER EXTREMITY STRENGTH:    Left Right   Quadriceps 4+/5 4/5   Hamstrings 4+/5 4-/5      Iliopsoas 4/5 4-/5   Glute Med 3/5 3-/5   Hip Ext 3-/5 2+/5   Ankle DF 4-/5 3+/5   Ankle PF 4+/5 4-/5        7/28/21 Evaluation 2/18/22 3/30/22 5/23/22   5 times sit-stand 30 second sit to stand: 7 reps 31 sec - RW  >12 sec= fall risk for general elderly  >16 sec= fall risk for Parkinson's disease  >10 sec= balance/vestibular dysfunction (<59 y/o)  >14.2 sec= balance/vestibular dysfunction (>59 y/o)  >12 sec= fall risk for CVA    24  24 30 second sit to stand: 4 reps   Timed Up and Go 16 sec with and without assistive device  41 sec  < 20 sec safe for independent transfers,     < 30 sec assist required for transfers   31 sec 34 sec 46 sec   5 meter walk test   0.22 m/s 0.21 m/s 0.15 m/s       CMS Impairment/Limitation/Restriction for FOTO Peripheral Nervous System Disorders/Injuries Survey     Therapist reviewed FOTO scores for Robles Jordan on 12/9/2021.   FOTO documents entered into [employer] - see Media section.     Limitation Score: NT today; past scores from 89% down to 61%   Category: Mobility , Other        Treatment   Bolded performed today:   Italics not performed today:     Arboleda received the treatments listed below:      Not performed this date.     Patient Education and Home Exercises     Home Exercises Provided and Patient Education Provided     Education  provided:   -Education on positive affects of consistent exercise.  -ALTER-G use and positioning     Written Home Exercises Provided: Patient instructed to cont prior HEP. Exercises were reviewed and Robles was able to demonstrate them prior to the end of the session.  Robles demonstrated good  understanding of the education provided. See EMR under Patient Instructions for exercises provided during therapy sessions    ASSESSMENT   Arboleda demonstrating significant fatigue, pain and decrease in daily function, strength and mobility. Pt notes that this week is better following biopsy however that last week was miserable with limitations in daily upright standing, walking and time out of bed. Pt educated on performing daily tasks as able with moderate to lowe fatigue levels and with exercise - low reps, low sets, low weight / use of body weight not external weights. Pt with significant decline in all outcome measures with 2-3 weeks without skilled PT services and this is why plan of care (POC) completely updated and reworked to apply to future sessions that are needed for this patient. We are awaiting biopsy results and discussion with Dr. Washington in upcoming weeks to give better picture of prognosis medically at this time.       Robles Is progressing well towards his goals.   Pt prognosis is Fair.     Pt will continue to benefit from skilled outpatient physical therapy to address the deficits listed in the problem list box on initial evaluation, provide pt/family education and to maximize pt's level of independence in the home and community environment.     Pt's spiritual, cultural and educational needs considered and pt agreeable to plan of care and goals.     Anticipated barriers to physical therapy: medical hx     Goals: as of 5/23/22  Short Term Goals: 6 weeks   1. Patient demonstrates independence with HEP. - MET 2/18/22  2. Patient will transfer sit to/from standing with RW, CGA, no use of UE and with good  body mechanics to promote safe mobility within the home. - ongoing  3. Patient will improve balance within condition 2 of the MCTSIB to 30s to reduce risk of falling. - MET 2/18/22  4. Patient will improve 5xSTS score by 3s to promote LE functional strength. - ongoing   5. Patient will improve TUG score by 6s to promote functional mobility. - ongoing    Patient will improve BLE strength to 3+/5 to promote improved mobility against gravity. - ongoing      Long Term Goals: 12 weeks   1. Patient will be independent with home exercise program (HEP) to promote continued rehabilitation following discharge.  2. Patient will improve 5xSTS score by 9s to promote LE functional strength progression. - ongoing   3. Patient will improve TUG score by 12s to promote functional mobility. - ongoing  4. Patient will improve gait speed to 0.4 m/s progressing toward safe household ambulation. - ongoing  5. Assess endurance and create appropriate goals to track progression and improvements in safe community ambulation.    PLAN   Plan of care Certification: UPDATED POC: 5/23/22 to 8/15/22     Outpatient Physical Therapy 2 times weekly for 12 weeks to include the following interventions: Gait Training, Manual Therapy, Moist Heat/ Ice, Neuromuscular Re-ed, Patient Education, Therapeutic Activites and Therapeutic Exercise.     ALTER-G trial in upcoming sessions for unweighting of BLE and antigravity mobility. LOW sets, LOW reps, LOW resistance.     Kaye Anderson, PT, DPT  5/23/2022     I CERTIFY THE NEED FOR THESE SERVICES FURNISHED UNDER THIS PLAN OF TREATMENT AND WHILE UNDER MY CARE    Physician's comments:        Physician's Signature: ___________________________________________________

## 2022-05-26 ENCOUNTER — PES CALL (OUTPATIENT)
Dept: ADMINISTRATIVE | Facility: CLINIC | Age: 72
End: 2022-05-26
Payer: COMMERCIAL

## 2022-05-27 ENCOUNTER — CLINICAL SUPPORT (OUTPATIENT)
Dept: REHABILITATION | Facility: HOSPITAL | Age: 72
End: 2022-05-27
Payer: COMMERCIAL

## 2022-05-27 DIAGNOSIS — Z74.09 IMPAIRED FUNCTIONAL MOBILITY, BALANCE, GAIT, AND ENDURANCE: ICD-10-CM

## 2022-05-27 DIAGNOSIS — R26.2 DIFFICULTY WALKING: Primary | ICD-10-CM

## 2022-05-27 PROCEDURE — 97110 THERAPEUTIC EXERCISES: CPT | Mod: PN

## 2022-05-27 NOTE — PROGRESS NOTES
[facility]   Physical Therapy Progress Note    Name: Robles Jordan  Clinic Number: [id number]    Therapy Diagnosis:   Encounter Diagnoses   Name Primary?    Difficulty walking Yes    Impaired functional mobility, balance, gait, and endurance      Physician: Ervin Washington     Visit Date: 5/27/2022    Physician Orders: PT Eval and Treat Neuro  Medical Diagnosis from Referral: Sensory neuropathy [G62.9]  Evaluation Date: 12/9/2021  Authorization Period Expiration: 12/31/21  Plan of Care Expiration: NEW POC: 5/23/22 to 8/15/22  Visit # / Visits authorized: 29/40 (+1 eval)  PN: 6/23/22    PTA Visit #: 0/5    Time In: 1145  Time Out: 1230  Total Billable Time: 45 minutes    Precautions: Standard, Diabetes, Fall and hx of tethered cord    SUBJECTIVE     Pt reports: Feeling weak but not as bad as the other day.     He was compliant with home exercise program.  Response to previous treatment: sore for 1 day   Functional change: ongoing      Pain: 0/10  Location: bilateral lower extremity and bilateral feet entering clinic    Fatigue: 5/10     OBJECTIVE     Objective Measures updated at progress report unless specified.       Treatment   Bolded performed today:   Italics not performed today:     Robles received the treatments listed below:      Robles received therapeutic exercises to develop strength, endurance and ROM for 45 minutes including:     Seated at Mat:               Sit to stand x5 x5              Knee extensions 2# BLE 2x10              Knee flexion GTB 1x20 LLE, 2x10 RLE               Seated knee flexion heel slides 2# and GTB LLE, RTB RLE 3x10              Hip flexion 2# LLE, 2x10               Hip ADD GTB 2x10               Hip ABD GTB 2x10 B               Heel lift/Toe lift 2x10 BLE              Pelvic rotations on bosu ball 2x10 rotations                           2x10 AP                          2x10 lateral               Roll Out with Blue Theraball                     "       x10 FWD                           x10 Right; x10 Left                 At // bars:               Standing Hip ABD 2x10              Standing hip ext 2x10              Standing knee flexion 5x4 - 2# at LLE, 0# RLE               Forward stepping, CGA, 2x10 ea LE - one UE assist on bars               Standing marches x20 alt      Supine at Mat:               Bridge: x13 no ball                           x13 green ball at B knees               Hip ADD:                           2x13 green ball at B knees               Hip ABD/clamshell in hooklying                          2 x 13 green TB at B knees   Hip flexion marches in hooklying      2 x 13 green theraband   Heel slides with slideboard and tactile cues for alignment    2 x 8 right only     SAQ                           - bolster                           - 3# ankle weights                           - 2 x 10 B                           - eccentric control cues     Standing at Rolling Walker (RW):    Standing mini squats     2 x 5 - cues for technique   Heel rises bilateral upper extremity support     2 x 10        Seated edge of mat right ankle heel cord very mild stretch with pt pulling right heel back under mat to tolerance 3 x 20s. Education on scar massage and desensitization during stretching.                 Hip ABD/straight leg:                           5x4                           Marie at RLE                          Slideboard + pillow case               Dead bug position                           Green therapy ball                           X5, X5, X5, X5 "full body cruches" against ball               Quadruped                          1x5 leg extensions                           2x10 arm extensions                Heel slides 5x4 RLE, 5x4 LLE                          - slide board + pillow case at foot                           - Marie for positioning of R foot               LTR 10x2                           SLR 5x2 B               jeni pose into " cobra x2       Patient Education and Home Exercises     Home Exercises Provided and Patient Education Provided     Education provided:   -Education on positive affects of consistent exercise.  -ALTER-G use and positioning     Written Home Exercises Provided: Patient instructed to cont prior HEP. Exercises were reviewed and Robles was able to demonstrate them prior to the end of the session.  Robles demonstrated good  understanding of the education provided. See EMR under Patient Instructions for exercises provided during therapy sessions    ASSESSMENT   Robles had improved energy today compared to reports last visit. He tolerated increased reps and sets with minimal fatigue. He tolerated 1# increase on SAQ but still needed cues for full ROM. Pillowcase wrapped between weights and skin due to hypersensitivity. Hypersensitivity around scars from biopsy also noted with initial PROM attempt - explained scar massage and desensitization techniques during PROM. Pt walked out with Rolling Walker (RW) at supervision/Clari. Continue treatment.     Robles Is progressing well towards his goals.   Pt prognosis is Fair.     Pt will continue to benefit from skilled outpatient physical therapy to address the deficits listed in the problem list box on initial evaluation, provide pt/family education and to maximize pt's level of independence in the home and community environment.     Pt's spiritual, cultural and educational needs considered and pt agreeable to plan of care and goals.     Anticipated barriers to physical therapy: medical hx     Goals: as of 5/23/22  Short Term Goals: 6 weeks   1. Patient demonstrates independence with HEP. - MET 2/18/22  2. Patient will transfer sit to/from standing with RW, CGA, no use of UE and with good body mechanics to promote safe mobility within the home. - ongoing  3. Patient will improve balance within condition 2 of the MCTSIB to 30s to reduce risk of falling. - MET 2/18/22  4.  Patient will improve 5xSTS score by 3s to promote LE functional strength. - ongoing   5. Patient will improve TUG score by 6s to promote functional mobility. - ongoing    Patient will improve BLE strength to 3+/5 to promote improved mobility against gravity. - ongoing      Long Term Goals: 12 weeks   1. Patient will be independent with home exercise program (HEP) to promote continued rehabilitation following discharge.  2. Patient will improve 5xSTS score by 9s to promote LE functional strength progression. - ongoing   3. Patient will improve TUG score by 12s to promote functional mobility. - ongoing  4. Patient will improve gait speed to 0.4 m/s progressing toward safe household ambulation. - ongoing  5. Assess endurance and create appropriate goals to track progression and improvements in safe community ambulation.    PLAN     ALTER-G trial in upcoming sessions for unweighting of BLE and antigravity mobility. LOW sets, LOW reps, LOW resistance.     Magalie Anderson, PT, DPT  5/27/2022

## 2022-05-30 ENCOUNTER — CLINICAL SUPPORT (OUTPATIENT)
Dept: REHABILITATION | Facility: HOSPITAL | Age: 72
End: 2022-05-30
Payer: COMMERCIAL

## 2022-05-30 DIAGNOSIS — Z74.09 IMPAIRED FUNCTIONAL MOBILITY, BALANCE, GAIT, AND ENDURANCE: ICD-10-CM

## 2022-05-30 DIAGNOSIS — R26.2 DIFFICULTY WALKING: Primary | ICD-10-CM

## 2022-05-30 PROCEDURE — 97110 THERAPEUTIC EXERCISES: CPT | Mod: PN

## 2022-05-30 NOTE — PROGRESS NOTES
[facility]   Physical Therapy Treatment Note    Name: Robles Jordan  Clinic Number: [id number]    Therapy Diagnosis:   Encounter Diagnoses   Name Primary?    Difficulty walking Yes    Impaired functional mobility, balance, gait, and endurance      Physician: Ervin Washington     Visit Date: 5/30/2022    Physician Orders: PT Eval and Treat Neuro  Medical Diagnosis from Referral: Sensory neuropathy [G62.9]  Evaluation Date: 12/9/2021  Authorization Period Expiration: 12/31/21  Plan of Care Expiration: NEW POC: 5/23/22 to 8/15/22  Visit # / Visits authorized: 31/40 (+1 eval)  PN: 6/23/22    PTA Visit #: 0/5    Time In: 11:45  Time Out: 12:30  Total Billable Time: 45 minutes    Precautions: Standard, Diabetes, Fall and hx of tethered cord    SUBJECTIVE     Pt reports: reports bilateral lower extremities giving out on him when stepping up into house from garage and was able to maintain with BUE to lower self to ground. Pt crawled to safe spot for floor transfer. Pt noting weakness in BLE and pain in low back.     He was compliant with home exercise program.  Response to previous treatment: sore for 1 day   Functional change: ongoing      Pain: 5/10 --> 0/10   Location: low back pain     Fatigue: 5/10     OBJECTIVE     Objective Measures updated at progress report unless specified.     Treatment   Bolded performed today:   Italics not performed today:     Robles received the treatments listed below:      Robles received therapeutic exercises to develop strength, endurance and ROM for 45 minutes including:      Supine at Mat:               Bridge: x13 no ball                           x13 green ball at B knees               Hip ADD:                           2x13 green ball at B knees               Hip ABD/clamshell in hooklying                          2 x 13 green TB at B knees   Hip flexion marches in hooklying      2 x 13 green theraband   Heel slides with slideboard and tactile cues  "for alignment    2 x 8 BLE     SAQ                           - bolster                           - 3# ankle weights                           - 2 x 10 B                           - eccentric control cues     Standing at Rolling Walker (RW):    Standing mini squats     2 x 5 - cues for technique   Heel rises bilateral upper extremity support     2 x 10      QL contractions x10 bilaterally - with PT resisting    Patient in supine       Seated edge of mat right calf stretch with strap very mild stretch in knee flexion and in knee extension 3 x 20s. Education on scar massage and desensitization during stretching.     NOT PERFORMED:    Seated at Mat:               Sit to stand x5 x5              Knee extensions 2# BLE 2x10              Knee flexion GTB 1x20 LLE, 2x10 RLE               Seated knee flexion heel slides 2# and GTB LLE, RTB RLE 3x10              Hip flexion 2# LLE, 2x10               Hip ADD GTB 2x10               Hip ABD GTB 2x10 B               Heel lift/Toe lift 2x10 BLE              Pelvic rotations on bosu ball 2x10 rotations                           2x10 AP                          2x10 lateral               Roll Out with Blue Theraball                           x10 FWD                           x10 Right; x10 Left   At // bars:               Standing Hip ABD 2x10              Standing hip ext 2x10              Standing knee flexion 5x4 - 2# at LLE, 0# RLE               Forward stepping, CGA, 2x10 ea LE - one UE assist on bars               Standing marches x20 alt                 Hip ABD/straight leg:                           5x4                           Marie at RLE                          Slideboard + pillow case               Dead bug position                           Green therapy ball                           X5, X5, X5, X5 "full body cruches" against ball               Quadruped                          1x5 leg extensions                           2x10 arm extensions                Heel " slides 5x4 RLE, 5x4 LLE                          - slide board + pillow case at foot                           - Marie for positioning of R foot               LTR 10x2                           SLR 5x2 B               jeni pose into cobra x2     Patient Education and Home Exercises     Home Exercises Provided and Patient Education Provided     Education provided:   -Education on positive affects of consistent exercise.  -ALTER-G use and positioning     Written Home Exercises Provided: Patient instructed to cont prior HEP. Exercises were reviewed and Robles was able to demonstrate them prior to the end of the session.  Arboleda demonstrated good  understanding of the education provided. See EMR under Patient Instructions for exercises provided during therapy sessions    ASSESSMENT   Arboleda continues to demonstrate mild increase in energy however is limited from performing full sit to stand exercises and nustep use. Pillowcase wrapped between weights and skin due to hypersensitivity and noted has been performing scar massage / desensitization techniques. Pt walked out with Rolling Walker (RW) at supervision/Clari. Continue treatment.       Robles Is progressing well towards his goals.   Pt prognosis is Fair.     Pt will continue to benefit from skilled outpatient physical therapy to address the deficits listed in the problem list box on initial evaluation, provide pt/family education and to maximize pt's level of independence in the home and community environment.     Pt's spiritual, cultural and educational needs considered and pt agreeable to plan of care and goals.     Anticipated barriers to physical therapy: medical hx     Goals: as of 5/23/22  Short Term Goals: 6 weeks   1. Patient demonstrates independence with HEP. - MET 2/18/22  2. Patient will transfer sit to/from standing with RW, CGA, no use of UE and with good body mechanics to promote safe mobility within the home. - ongoing  3. Patient will  improve balance within condition 2 of the MCTSIB to 30s to reduce risk of falling. - MET 2/18/22  4. Patient will improve 5xSTS score by 3s to promote LE functional strength. - ongoing   5. Patient will improve TUG score by 6s to promote functional mobility. - ongoing    Patient will improve BLE strength to 3+/5 to promote improved mobility against gravity. - ongoing      Long Term Goals: 12 weeks   1. Patient will be independent with home exercise program (HEP) to promote continued rehabilitation following discharge.  2. Patient will improve 5xSTS score by 9s to promote LE functional strength progression. - ongoing   3. Patient will improve TUG score by 12s to promote functional mobility. - ongoing  4. Patient will improve gait speed to 0.4 m/s progressing toward safe household ambulation. - ongoing  5. Assess endurance and create appropriate goals to track progression and improvements in safe community ambulation.    PLAN     ALTER-G trial in upcoming sessions for unweighting of BLE and antigravity mobility. LOW sets, LOW reps, LOW resistance.     Kaye Anderson, PT, DPT  5/30/2022

## 2022-06-01 ENCOUNTER — CLINICAL SUPPORT (OUTPATIENT)
Dept: REHABILITATION | Facility: HOSPITAL | Age: 72
End: 2022-06-01
Payer: COMMERCIAL

## 2022-06-01 DIAGNOSIS — R26.2 DIFFICULTY WALKING: Primary | ICD-10-CM

## 2022-06-01 DIAGNOSIS — Z74.09 IMPAIRED FUNCTIONAL MOBILITY, BALANCE, GAIT, AND ENDURANCE: ICD-10-CM

## 2022-06-01 PROCEDURE — 97110 THERAPEUTIC EXERCISES: CPT | Mod: PN,CQ

## 2022-06-01 NOTE — PROGRESS NOTES
"[facility]   Physical Therapy Treatment Note    Name: Robles Jordan  Clinic Number: [id number]    Therapy Diagnosis:   Encounter Diagnoses   Name Primary?    Difficulty walking Yes    Impaired functional mobility, balance, gait, and endurance      Physician: Ervin Washington     Visit Date: 6/1/2022    Physician Orders: PT Eval and Treat Neuro  Medical Diagnosis from Referral: Sensory neuropathy [G62.9]  Evaluation Date: 12/9/2021  Authorization Period Expiration: 12/31/21  Plan of Care Expiration: NEW POC: 5/23/22 to 8/15/22  Visit # / Visits authorized: 32/40 (+1 eval)  PN: 6/23/22    PTA Visit #: 1/5    Time In: 11:45  Time Out: 12:30  Total Billable Time: 45 minutes    Precautions: Standard, Diabetes, Fall and hx of tethered cord    SUBJECTIVE     Pt reports: agreeable to PT session, "I'll do my best".      He was compliant with home exercise program.  Response to previous treatment: sore for 1 day   Functional change: ongoing      Pain: /510 --> 0/10   Location: low back pain     Fatigue: 5/10     OBJECTIVE     Objective Measures updated at progress report unless specified.     Treatment   Bolded performed today:   Italics not performed today:     Robles received the treatments listed below:      Robles received therapeutic exercises to develop strength, endurance and ROM for 45 minutes including:      Supine at Mat:               Bridge: x13 no ball                           x13 green ball at B knees               Hip ADD:                           2x13 green ball at B knees               Hip ABD/clamshell in hooklying                          2 x 15 green TB at B knees   Hip flexion marches in hooklying      2 x 13 green theraband   Heel slides with slideboard and tactile cues for alignment    2 x 8 BLE     SAQ                           - bolster                           - 3# ankle weights                           - 2 x 10 B                           - eccentric control " "cues     Standing at Rolling Walker (RW):    Standing mini squats     2 x 5 - cues for technique   Heel rises bilateral upper extremity support     2 x 10      QL contractions x10 bilaterally - with PT resisting  NOT PERFORMED TODAY   Patient in supine       Seated edge of mat right calf stretch with strap very mild stretch in knee flexion and in knee extension 3 x 20s. Education on scar massage and desensitization during stretching.     NOT PERFORMED:    Seated at Mat:               Sit to stand x5 x5              Knee extensions 2# BLE 2x10              Knee flexion GTB 1x20 LLE, 2x10 RLE               Seated knee flexion heel slides 2# and GTB LLE, RTB RLE 3x10              Hip flexion 2# LLE, 2x10               Hip ADD GTB 2x10               Hip ABD GTB 2x10 B               Heel lift/Toe lift 2x10 BLE              Pelvic rotations on bosu ball 2x10 rotations                           2x10 AP                          2x10 lateral               Roll Out with Blue Theraball                           x10 FWD                           x10 Right; x10 Left   At // bars:               Standing Hip ABD 2x10              Standing hip ext 2x10              Standing knee flexion 5x4 - 2# at LLE, 0# RLE               Forward stepping, CGA, 2x10 ea LE - one UE assist on bars               Standing marches x20 alt                 Hip ABD/straight leg:                           5x4                           Marie at RLE                          Slideboard + pillow case               Dead bug position                           Green therapy ball                           X5, X5, X5, X5 "full body cruches" against ball               Quadruped                          1x5 leg extensions                           2x10 arm extensions                Heel slides 5x4 RLE, 5x4 LLE                          - slide board + pillow case at foot                           - Marie for positioning of R foot               LTR 10x2                  "          SLR 5x2 B               jeni pose into cobra x2     Patient Education and Home Exercises     Home Exercises Provided and Patient Education Provided     Education provided:   -Education on positive affects of consistent exercise.  -ALTER-G use and positioning     Written Home Exercises Provided: Patient instructed to cont prior HEP. Exercises were reviewed and Robles was able to demonstrate them prior to the end of the session.  Robles demonstrated good  understanding of the education provided. See EMR under Patient Instructions for exercises provided during therapy sessions    ASSESSMENT   Robles completed today's session with no adverse response. He did experience difficulty with initial portion on sit to stand from mat today. He experienced episode of R knee buckling while in standing in RW, he was able to recover with     Arboleda Is progressing well towards his goals.   Pt prognosis is Fair.     Pt will continue to benefit from skilled outpatient physical therapy to address the deficits listed in the problem list box on initial evaluation, provide pt/family education and to maximize pt's level of independence in the home and community environment.     Pt's spiritual, cultural and educational needs considered and pt agreeable to plan of care and goals.     Anticipated barriers to physical therapy: medical hx     Goals: as of 5/23/22  Short Term Goals: 6 weeks   1. Patient demonstrates independence with HEP. - MET 2/18/22  2. Patient will transfer sit to/from standing with RW, CGA, no use of UE and with good body mechanics to promote safe mobility within the home. - ongoing  3. Patient will improve balance within condition 2 of the MCTSIB to 30s to reduce risk of falling. - MET 2/18/22  4. Patient will improve 5xSTS score by 3s to promote LE functional strength. - ongoing   5. Patient will improve TUG score by 6s to promote functional mobility. - ongoing    Patient will improve BLE strength to  3+/5 to promote improved mobility against gravity. - ongoing      Long Term Goals: 12 weeks   1. Patient will be independent with home exercise program (HEP) to promote continued rehabilitation following discharge.  2. Patient will improve 5xSTS score by 9s to promote LE functional strength progression. - ongoing   3. Patient will improve TUG score by 12s to promote functional mobility. - ongoing  4. Patient will improve gait speed to 0.4 m/s progressing toward safe household ambulation. - ongoing  5. Assess endurance and create appropriate goals to track progression and improvements in safe community ambulation.    PLAN     ALTER-G trial in upcoming sessions for unweighting of BLE and antigravity mobility. LOW sets, LOW reps, LOW resistance. NEXT SESSION    Orlin Summers,PTA  6/1/2022

## 2022-06-06 ENCOUNTER — CLINICAL SUPPORT (OUTPATIENT)
Dept: REHABILITATION | Facility: HOSPITAL | Age: 72
End: 2022-06-06
Payer: COMMERCIAL

## 2022-06-06 DIAGNOSIS — Z74.09 IMPAIRED FUNCTIONAL MOBILITY, BALANCE, GAIT, AND ENDURANCE: ICD-10-CM

## 2022-06-06 DIAGNOSIS — R26.2 DIFFICULTY WALKING: Primary | ICD-10-CM

## 2022-06-06 PROCEDURE — 97110 THERAPEUTIC EXERCISES: CPT | Mod: PN

## 2022-06-06 NOTE — PROGRESS NOTES
[facility]   Physical Therapy Treatment Note    Name: Robles Jordan  Clinic Number: [id number]    Therapy Diagnosis:   Encounter Diagnoses   Name Primary?    Difficulty walking Yes    Impaired functional mobility, balance, gait, and endurance      Physician: Ervin Washington     Visit Date: 6/6/2022    Physician Orders: PT Eval and Treat Neuro  Medical Diagnosis from Referral: Sensory neuropathy [G62.9]  Evaluation Date: 12/9/2021  Authorization Period Expiration: 12/31/21  Plan of Care Expiration: NEW POC: 5/23/22 to 8/15/22  Visit # / Visits authorized: 33/40 (+1 eval)   PN: 6/23/22    PTA Visit #: 0/5    Time In: 11:45  Time Out: 12:30  Total Billable Time: 45 minutes    Precautions: Standard, Diabetes, Fall and hx of tethered cord    SUBJECTIVE     Pt reports: doing okay, has not used bike yet       He was compliant with home exercise program.  Response to previous treatment: sore for 1 day   Functional change: ongoing      Pain: 2/10  Location: low back pain - stiff/dull pain     Fatigue: 0/10    OBJECTIVE     Objective Measures updated at progress report unless specified.     Treatment   Bolded performed today:   Italics not performed today:     Robles received the treatments listed below:      Robles received therapeutic exercises to develop strength, endurance and ROM for 45 minutes including:      Supine at Mat:               LTRs in Supine    x13 each way   Bridge:                          2x13 green ball at B knees               Hip ADD:                           2x13 green ball at B knees               Hip ABD/clamshell in hooklying                          2 x 15 green TB at B knees   Hip flexion marches in hooklying      2 x 13 green theraband   Heel slides with slideboard and tactile cues for alignment    2 x 8 BLE     SAQ                           - bolster                           - 3# ankle weights                           - 2 x 10 B                           -  eccentric control cues     Standing at Rolling Walker (RW):    Standing mini squats     2 x 5 - cues for technique   Heel rises bilateral upper extremity support     2 x 5    QL contractions x10 right - with PT resisting   Patient in supine       BOSU ball AP, lateral, circles - seated ~5 minutes    Patient Education and Home Exercises     Home Exercises Provided and Patient Education Provided     Education provided:   -Education on positive affects of consistent exercise.  -ALTER-G use and positioning     Written Home Exercises Provided: Patient instructed to cont prior HEP. Exercises were reviewed and Robles was able to demonstrate them prior to the end of the session.  Robles demonstrated good  understanding of the education provided. See EMR under Patient Instructions for exercises provided during therapy sessions    ASSESSMENT   Arboleda tolerating session fair to well with 1 instance of rest required during right lower extremity heel slide. Able to manage pain well this session with QL contract relax, BOSU ball pelvic, lower trunk rotations in hooklying exercises. Continue skilled PT services at this time.     Robles Is progressing well towards his goals.   Pt prognosis is Fair.     Pt will continue to benefit from skilled outpatient physical therapy to address the deficits listed in the problem list box on initial evaluation, provide pt/family education and to maximize pt's level of independence in the home and community environment.     Pt's spiritual, cultural and educational needs considered and pt agreeable to plan of care and goals.     Anticipated barriers to physical therapy: medical hx     Goals: as of 5/23/22  Short Term Goals: 6 weeks   1. Patient demonstrates independence with HEP. - MET 2/18/22  2. Patient will transfer sit to/from standing with RW, CGA, no use of UE and with good body mechanics to promote safe mobility within the home. - ongoing  3. Patient will improve balance within  condition 2 of the MCTSIB to 30s to reduce risk of falling. - MET 2/18/22  4. Patient will improve 5xSTS score by 3s to promote LE functional strength. - ongoing   5. Patient will improve TUG score by 6s to promote functional mobility. - ongoing    Patient will improve BLE strength to 3+/5 to promote improved mobility against gravity. - ongoing      Long Term Goals: 12 weeks   1. Patient will be independent with home exercise program (HEP) to promote continued rehabilitation following discharge.  2. Patient will improve 5xSTS score by 9s to promote LE functional strength progression. - ongoing   3. Patient will improve TUG score by 12s to promote functional mobility. - ongoing  4. Patient will improve gait speed to 0.4 m/s progressing toward safe household ambulation. - ongoing  5. Assess endurance and create appropriate goals to track progression and improvements in safe community ambulation.    PLAN   Plan of care Certification: UPDATED POC: 5/23/22 to 8/15/22     Outpatient Physical Therapy 2 times weekly for 12 weeks to include the following interventions: Gait Training, Manual Therapy, Moist Heat/ Ice, Neuromuscular Re-ed, Patient Education, Therapeutic Activites and Therapeutic Exercise.     ALTER-G trial in upcoming sessions for unweighting of BLE and antigravity mobility. LOW sets, LOW reps, LOW resistance.    Kaye Anderson, PT, DPT  6/6/2022

## 2022-06-13 ENCOUNTER — CLINICAL SUPPORT (OUTPATIENT)
Dept: REHABILITATION | Facility: HOSPITAL | Age: 72
End: 2022-06-13
Payer: COMMERCIAL

## 2022-06-13 DIAGNOSIS — R26.2 DIFFICULTY WALKING: Primary | ICD-10-CM

## 2022-06-13 DIAGNOSIS — Z74.09 IMPAIRED FUNCTIONAL MOBILITY, BALANCE, GAIT, AND ENDURANCE: ICD-10-CM

## 2022-06-13 PROCEDURE — 97110 THERAPEUTIC EXERCISES: CPT | Mod: PN

## 2022-06-13 NOTE — PROGRESS NOTES
[facility]   Physical Therapy Treatment Note    Name: Robles Jordan  Clinic Number: [id number]    Therapy Diagnosis:   Encounter Diagnoses   Name Primary?    Difficulty walking Yes    Impaired functional mobility, balance, gait, and endurance      Physician: Ervin Washington     Visit Date: 6/13/2022    Physician Orders: PT Eval and Treat Neuro  Medical Diagnosis from Referral: Sensory neuropathy [G62.9]  Evaluation Date: 12/9/2021  Authorization Period Expiration: 12/31/21  Plan of Care Expiration: NEW POC: 5/23/22 to 8/15/22  Visit # / Visits authorized: 34/40 (+1 eval)   PN: 6/23/22    PTA Visit #: 0/5    Time In: 11:45  Time Out: 12:30  Total Billable Time: 45 minutes    Precautions: Standard, Diabetes, Fall and hx of tethered cord    SUBJECTIVE     Pt reports: fatigued and back pain that has been moving around       He was compliant with home exercise program.  Response to previous treatment: sore for 2 days   Functional change: ongoing      Pain: 4/10  Location: low back pain     Fatigue: 5/10    OBJECTIVE     Objective Measures updated at progress report unless specified.     Treatment   Bolded performed today:   Italics not performed today:     Robles received the treatments listed below:      Robles received therapeutic exercises to develop strength, endurance and ROM for 45 minutes including:      Supine at Mat:               LTRs in Supine    x13 each way   Bridge:                          2x13 green ball at B knees               Hip ADD:                           2x13 green ball at B knees               Hip ABD/clamshell in hooklying                          2 x 13 green TB at B knees   Hip flexion marches in hooklying      2 x 13 green theraband   Heel slides with slideboard and tactile cues for alignment    2 x 8 BLE    Hip ABD with slideboard and tactile cues for alignment     2 x 10 BLE    SAQ                           - bolster                           - 3# ankle  weights                           - 2 x 13 B                           - eccentric control cues     Standing at Rolling Walker (RW):    Standing mini squats     x5, x2, x5 - cues for technique   Heel rises bilateral upper extremity support     2 x 5      BOSU ball AP, lateral, circles - seated ~5 minutes    Patient Education and Home Exercises     Home Exercises Provided and Patient Education Provided     Education provided:   -Education on positive affects of consistent exercise.  -ALTER-G use and positioning     Written Home Exercises Provided: Patient instructed to cont prior HEP. Exercises were reviewed and Robles was able to demonstrate them prior to the end of the session.  Robles demonstrated good  understanding of the education provided. See EMR under Patient Instructions for exercises provided during therapy sessions    ASSESSMENT   Robles tolerating session fair to well without full rest and with addition of hip abduction in supine. Pain managed well with BOSU and lower trunk rotations in hooklying exercises. Continue skilled PT services. Additional discussion of mobility for future today.     Robles Is progressing well towards his goals.   Pt prognosis is Fair.     Pt will continue to benefit from skilled outpatient physical therapy to address the deficits listed in the problem list box on initial evaluation, provide pt/family education and to maximize pt's level of independence in the home and community environment.     Pt's spiritual, cultural and educational needs considered and pt agreeable to plan of care and goals.     Anticipated barriers to physical therapy: medical hx     Goals: as of 5/23/22  Short Term Goals: 6 weeks   1. Patient demonstrates independence with HEP. - MET 2/18/22  2. Patient will transfer sit to/from standing with RW, CGA, no use of UE and with good body mechanics to promote safe mobility within the home. - ongoing  3. Patient will improve balance within condition 2 of  the MCTSIB to 30s to reduce risk of falling. - MET 2/18/22  4. Patient will improve 5xSTS score by 3s to promote LE functional strength. - ongoing   5. Patient will improve TUG score by 6s to promote functional mobility. - ongoing    Patient will improve BLE strength to 3+/5 to promote improved mobility against gravity. - ongoing      Long Term Goals: 12 weeks   1. Patient will be independent with home exercise program (HEP) to promote continued rehabilitation following discharge.  2. Patient will improve 5xSTS score by 9s to promote LE functional strength progression. - ongoing   3. Patient will improve TUG score by 12s to promote functional mobility. - ongoing  4. Patient will improve gait speed to 0.4 m/s progressing toward safe household ambulation. - ongoing  5. Assess endurance and create appropriate goals to track progression and improvements in safe community ambulation.    PLAN   Plan of care Certification: UPDATED POC: 5/23/22 to 8/15/22     Outpatient Physical Therapy 2 times weekly for 12 weeks to include the following interventions: Gait Training, Manual Therapy, Moist Heat/ Ice, Neuromuscular Re-ed, Patient Education, Therapeutic Activites and Therapeutic Exercise.     LOW sets, LOW reps, LOW resistance.    Kaye Anderson, PT, DPT  6/13/2022

## 2022-06-22 ENCOUNTER — CLINICAL SUPPORT (OUTPATIENT)
Dept: REHABILITATION | Facility: HOSPITAL | Age: 72
End: 2022-06-22
Payer: COMMERCIAL

## 2022-06-22 ENCOUNTER — OFFICE VISIT (OUTPATIENT)
Dept: FAMILY MEDICINE | Facility: CLINIC | Age: 72
End: 2022-06-22
Payer: COMMERCIAL

## 2022-06-22 VITALS
BODY MASS INDEX: 27.73 KG/M2 | DIASTOLIC BLOOD PRESSURE: 62 MMHG | HEIGHT: 74 IN | TEMPERATURE: 99 F | HEART RATE: 92 BPM | RESPIRATION RATE: 18 BRPM | SYSTOLIC BLOOD PRESSURE: 126 MMHG | WEIGHT: 216.06 LBS | OXYGEN SATURATION: 96 %

## 2022-06-22 DIAGNOSIS — Z00.00 ENCOUNTER FOR PREVENTIVE HEALTH EXAMINATION: Primary | ICD-10-CM

## 2022-06-22 DIAGNOSIS — I10 ESSENTIAL HYPERTENSION: ICD-10-CM

## 2022-06-22 DIAGNOSIS — R26.2 DIFFICULTY WALKING: Primary | ICD-10-CM

## 2022-06-22 DIAGNOSIS — E78.2 MIXED HYPERLIPIDEMIA: ICD-10-CM

## 2022-06-22 DIAGNOSIS — Z74.09 IMPAIRED FUNCTIONAL MOBILITY, BALANCE, GAIT, AND ENDURANCE: ICD-10-CM

## 2022-06-22 DIAGNOSIS — Z23 NEED FOR VACCINATION AGAINST STREPTOCOCCUS PNEUMONIAE: ICD-10-CM

## 2022-06-22 DIAGNOSIS — Q06.8 TETHERED CORD SYNDROME: ICD-10-CM

## 2022-06-22 DIAGNOSIS — G95.89 OTHER SPECIFIED DISEASES OF SPINAL CORD: ICD-10-CM

## 2022-06-22 DIAGNOSIS — I70.0 AORTIC ATHEROSCLEROSIS: ICD-10-CM

## 2022-06-22 PROCEDURE — 99999 PR PBB SHADOW E&M-EST. PATIENT-LVL V: ICD-10-PCS | Mod: PBBFAC,,, | Performed by: NURSE PRACTITIONER

## 2022-06-22 PROCEDURE — 97110 THERAPEUTIC EXERCISES: CPT | Mod: PN

## 2022-06-22 PROCEDURE — G0439 PPPS, SUBSEQ VISIT: HCPCS | Mod: S$GLB,,, | Performed by: NURSE PRACTITIONER

## 2022-06-22 PROCEDURE — 90732 PNEUMOCOCCAL POLYSACCHARIDE VACCINE 23-VALENT =>2YO SQ IM: ICD-10-PCS | Mod: S$GLB,,, | Performed by: NURSE PRACTITIONER

## 2022-06-22 PROCEDURE — 90471 IMMUNIZATION ADMIN: CPT | Mod: S$GLB,,, | Performed by: NURSE PRACTITIONER

## 2022-06-22 PROCEDURE — G0439 PR MEDICARE ANNUAL WELLNESS SUBSEQUENT VISIT: ICD-10-PCS | Mod: S$GLB,,, | Performed by: NURSE PRACTITIONER

## 2022-06-22 PROCEDURE — 90471 PNEUMOCOCCAL POLYSACCHARIDE VACCINE 23-VALENT =>2YO SQ IM: ICD-10-PCS | Mod: S$GLB,,, | Performed by: NURSE PRACTITIONER

## 2022-06-22 PROCEDURE — 90732 PPSV23 VACC 2 YRS+ SUBQ/IM: CPT | Mod: S$GLB,,, | Performed by: NURSE PRACTITIONER

## 2022-06-22 PROCEDURE — 99999 PR PBB SHADOW E&M-EST. PATIENT-LVL V: CPT | Mod: PBBFAC,,, | Performed by: NURSE PRACTITIONER

## 2022-06-22 NOTE — PATIENT INSTRUCTIONS
Counseling and Referral of Other Preventative  (Italic type indicates deductible and co-insurance are waived)    Patient Name: Robles Jordan  Today's Date: 6/22/2022    Health Maintenance       Date Due Completion Date    Shingles Vaccine (1 of 2) Never done ---    Pneumococcal Vaccines (Age 65+) (3 - PPSV23 or PCV20) 10/14/2021 11/12/2018    Influenza Vaccine (Season Ended) 09/01/2022 11/28/2020    Colorectal Cancer Screening 02/25/2024 2/25/2021    Lipid Panel 11/22/2026 11/22/2021    Override on 9/1/2015: Done    TETANUS VACCINE 11/12/2028 11/12/2018    Override on 11/12/2018: Done        No orders of the defined types were placed in this encounter.    The following information is provided to all patients.  This information is to help you find resources for any of the problems found today that may be affecting your health:                Living healthy guide: www.[facility].[place].gov      Understanding Diabetes: www.diabetes.org      Eating healthy: www.cdc.gov/healthyweight      CDC home safety checklist: www.cdc.gov/steadi/patient.html      Agency on Aging: www.goea.[place].[facility]      Alcoholics anonymous (AA): www.aa.org      Physical Activity: www.sia.nih.gov/sz7xypg      Tobacco use: www.quitwithusla.org

## 2022-06-22 NOTE — PROGRESS NOTES
[facility]   Physical Therapy Treatment Note    Name: Robles Jordan  Clinic Number: [id number]    Therapy Diagnosis:   Encounter Diagnoses   Name Primary?    Difficulty walking Yes    Impaired functional mobility, balance, gait, and endurance      Physician: Ervin Washington     Visit Date: 6/22/2022    Physician Orders: PT Eval and Treat Neuro  Medical Diagnosis from Referral: Sensory neuropathy [G62.9]  Evaluation Date: 12/9/2021  Authorization Period Expiration: 12/31/21  Plan of Care Expiration: NEW POC: 5/23/22 to 8/15/22  Visit # / Visits authorized: 36/40 (+1 eval)   PN: 6/23/22    PTA Visit #: 0/5    Time In: 11:45  Time Out: 12:30  Total Billable Time: 45 minutes    Precautions: Standard, Diabetes, Fall and hx of tethered cord    SUBJECTIVE     Pt reports: walking is getting harder. His medicine is also effecting his mood and making him feel bad. He is still walking to get around the house. He has an upcoming appointment with Neurologist to discuss test results and medication.     He was compliant with home exercise program.  Response to previous treatment: sore but it decreased after a day   Functional change: ongoing      Pain: 0/10  Location: NA     Fatigue: 6-7/10    OBJECTIVE     Objective Measures updated at progress report unless specified.     Treatment   Bolded performed today:   Italics not performed today:     Robles received the treatments listed below:      Robles received therapeutic exercises to develop strength, endurance and ROM for 45 minutes including:     -NuStep x5 min, Level 2     Supine at Mat:               -LTRs in Supine    x13 each way   -Bridge:                          2x13 green ball at B knees               -Hip ADD in hooklying:                           2x13 green ball at B knees               -Hip ABD/clamshell in hooklying                          2 x 13 green TB at B knees   -Hip flexion marches in hooklying      2 x 13 green  theraband   Heel slides with slideboard and tactile cues for alignment (NP today, time)    2 x 8 BLE    Hip ABD with slideboard and tactile cues for alignment (NP today, time)    2 x 10 BLE    -SAQ                           - bolster                           - 3# ankle weights                           - 2 x 13 B                           - eccentric control cues     Standing at Rolling Walker (RW):    Standing mini squats (NP today, time)    x5, x2, x5 - cues for technique   Heel rises bilateral upper extremity support (NP today, time)    2 x 5   -Sit to Stands 2x5 reps with rolling walker      -BOSU ball AP, lateral, circles - seated ~5 minutes      Patient Education and Home Exercises     Home Exercises Provided and Patient Education Provided     Education provided:   -Education on positive affects of consistent exercise.  -ALTER-G use and positioning     Written Home Exercises Provided: Patient instructed to cont prior HEP. Exercises were reviewed and Arboleda was able to demonstrate them prior to the end of the session.  Arboleda demonstrated good  understanding of the education provided. See EMR under Patient Instructions for exercises provided during therapy sessions    ASSESSMENT   Arboleda with decreased endurance and tolerance to standing. Tolerated exercises with no exacerbation of pain. Pt complaints of decreased ability to ambulate around house. Addressed endurance deficit with Nustep followed by sit to stands - seated rest break required after first set. Will continue to progress exercises as tolerated.     Robles Is progressing well towards his goals.   Pt prognosis is Fair.     Pt will continue to benefit from skilled outpatient physical therapy to address the deficits listed in the problem list box on initial evaluation, provide pt/family education and to maximize pt's level of independence in the home and community environment.     Pt's spiritual, cultural and educational needs considered and pt  agreeable to plan of care and goals.     Anticipated barriers to physical therapy: medical hx     Goals: as of 5/23/22  Short Term Goals: 6 weeks   1. Patient demonstrates independence with HEP. - MET 2/18/22  2. Patient will transfer sit to/from standing with RW, CGA, no use of UE and with good body mechanics to promote safe mobility within the home. - ongoing  3. Patient will improve balance within condition 2 of the MCTSIB to 30s to reduce risk of falling. - MET 2/18/22  4. Patient will improve 5xSTS score by 3s to promote LE functional strength. - ongoing   5. Patient will improve TUG score by 6s to promote functional mobility. - ongoing    Patient will improve BLE strength to 3+/5 to promote improved mobility against gravity. - ongoing      Long Term Goals: 12 weeks   1. Patient will be independent with home exercise program (HEP) to promote continued rehabilitation following discharge.  2. Patient will improve 5xSTS score by 9s to promote LE functional strength progression. - ongoing   3. Patient will improve TUG score by 12s to promote functional mobility. - ongoing  4. Patient will improve gait speed to 0.4 m/s progressing toward safe household ambulation. - ongoing  5. Assess endurance and create appropriate goals to track progression and improvements in safe community ambulation.    PLAN   Plan of care Certification: UPDATED POC: 5/23/22 to 8/15/22     Outpatient Physical Therapy 2 times weekly for 12 weeks to include the following interventions: Gait Training, Manual Therapy, Moist Heat/ Ice, Neuromuscular Re-ed, Patient Education, Therapeutic Activites and Therapeutic Exercise.     LOW sets, LOW reps, LOW resistance.    RODRIGO Aguilera    I certify that I was present in the room directing the student in service delivery and guiding them using my skilled judgment. As the co-signing therapist I have reviewed the students documentation and am responsible for the treatment, assessment, and plan.      Kaye Anderson, PT, DPT   6/22/2022

## 2022-06-22 NOTE — PROGRESS NOTES
"Robles Jordan presented for a  Medicare AWV and comprehensive Health Risk Assessment today. The following components were reviewed and updated:    · Medical history  · Family History  · Social history  · Allergies and Current Medications  · Health Risk Assessment  · Health Maintenance  · Care Team         ** See Completed Assessments for Annual Wellness Visit within the encounter summary.**         The following assessments were completed:  · Living Situation  · CAGE  · Depression Screening  · Timed Get Up and Go  · Whisper Test  · Cognitive Function Screening  · Nutrition Screening  · ADL Screening  · PAQ Screening        Vitals:    06/22/22 1308   BP: 126/62   BP Location: Right arm   Patient Position: Sitting   BP Method: Medium (Manual)   Pulse: 92   Resp: 18   Temp: 99 °F (37.2 °C)   TempSrc: Oral   SpO2: 96%   Weight: 98 kg (216 lb 0.8 oz)   Height: 6' 2" (1.88 m)     Body mass index is 27.74 kg/m².  Physical Exam  Vitals reviewed.   Constitutional:       General: He is not in acute distress.     Appearance: Normal appearance. He is not ill-appearing, toxic-appearing or diaphoretic.   Cardiovascular:      Pulses: Normal pulses.   Pulmonary:      Effort: Pulmonary effort is normal.   Skin:     General: Skin is warm and dry.   Neurological:      Mental Status: He is alert and oriented to person, place, and time.   Psychiatric:         Mood and Affect: Mood normal.         Behavior: Behavior normal.                   Diagnoses and health risks identified today and associated recommendations/orders:    1. Encounter for preventive health examination  Provided patient with a 5-10 year written screening schedule and personal prevention plan. Recommendations were developed using the USPSTF age appropriate recommendations. Education, counseling, and referrals were provided as needed. After Visit Summary printed and given to patient which includes a list of additional screenings\tests needed.    2. Tethered cord " syndrome  No acute Concerns.  In physical therapy currently.    3. Other specified diseases of spinal cord  No acute Concerns.  In physical therapy currently.    4. Essential hypertension  No acute concerns.    5. Aortic atherosclerosis  On statin.    6. Mixed hyperlipidemia  On statin.    7. Impaired functional mobility, balance, gait, and endurance  As above.    8. Need for vaccination against Streptococcus pneumoniae  Will administer.  - (In Office Administered) Pneumococcal Polysaccharide Vaccine (23 Valent) (SQ/IM)      Provided Robles with a 5-10 year written screening schedule and personal prevention plan. Recommendations were developed using the USPSTF age appropriate recommendations. Education, counseling, and referrals were provided as needed. After Visit Summary printed and given to patient which includes a list of additional screenings\tests needed.    Follow up in about 1 year (around 6/22/2023) for AWV.    Yevgeniy Ricketts, BALJINDER  I offered to discuss advanced care planning, including how to pick a person who would make decisions for you if you were unable to make them for yourself, called a health care power of , and what kind of decisions you might make such as use of life sustaining treatments such as ventilators and tube feeding when faced with a life limiting illness recorded on a living will that they will need to know. (How you want to be cared for as you near the end of your natural life)     X Patient is interested in learning more about how to make advanced directives.  I provided them paperwork and offered to discuss this with them.

## 2022-06-27 ENCOUNTER — CLINICAL SUPPORT (OUTPATIENT)
Dept: REHABILITATION | Facility: HOSPITAL | Age: 72
End: 2022-06-27
Payer: COMMERCIAL

## 2022-06-27 DIAGNOSIS — R26.2 DIFFICULTY WALKING: Primary | ICD-10-CM

## 2022-06-27 DIAGNOSIS — Z74.09 IMPAIRED FUNCTIONAL MOBILITY, BALANCE, GAIT, AND ENDURANCE: ICD-10-CM

## 2022-06-27 PROCEDURE — 97110 THERAPEUTIC EXERCISES: CPT | Mod: PN

## 2022-06-27 NOTE — PROGRESS NOTES
"[facility]   Physical Therapy Treatment Note - Progress Note    Name: Robles Jordan  Clinic Number: [id number]    Therapy Diagnosis:   Encounter Diagnoses   Name Primary?    Difficulty walking Yes    Impaired functional mobility, balance, gait, and endurance      Physician: Ervin Washington     Visit Date: 6/27/2022    Physician Orders: PT Eval and Treat Neuro  Medical Diagnosis from Referral: Sensory neuropathy [G62.9]  Evaluation Date: 12/9/2021  Authorization Period Expiration: 12/31/21  Plan of Care Expiration: NEW POC: 5/23/22 to 8/15/22  Visit # / Visits authorized: 37/40 (+1 eval)   PN: 7/27/22    PTA Visit #: 0/5    Time In: 11:45  Time Out: 12:30  Total Billable Time: 45 minutes    Precautions: Standard, Diabetes, Fall and hx of tethered cord    SUBJECTIVE     Pt reports: he was "extra tired" after last session due to the doctor's appointment he had after. His right leg feels weaker than the left. It tightens up sometimes but he lays down and stretches and that helps.     He was compliant with home exercise program.  Response to previous treatment: sore but it decreased after a couple of days    Functional change: ongoing      Pain: 0/10  Location: NA     Fatigue: 0/10 "just weak"    OBJECTIVE     Objective Measures updated at progress report unless specified.     Lower Extremity Strength 6/27/22    RLE LLE   Hip Flexion: 3/5  3+/5   Hip Extension:  Bridge with mild resistance  Bridge with mild resistance    Hip Abduction: 2-/5 2/5   Hip Adduction: 2-/5 2/5   Knee Extension: 3/5  4/5    Knee Flexion: 3/5  4/5    Ankle Dorsiflexion: 2-/5  2+/5   Ankle Plantarflexion: 2-/5 3-/5      Lower Extremity Strength 5/23/22    RLE LLE   Hip Flexion: 2-/5  3+/5   Hip Extension:  Bridge with mild resistance  Bridge with mild resistance    Hip Abduction: 2/5 2/5   Hip Adduction: 2/5 2/5   Knee Extension: 4-/5  4/5    Knee Flexion: 3+/5  4/5    Ankle Dorsiflexion: 2-/5  2+/5   Ankle " Plantarflexion: 2-/5 3+/5      Lower Extremity Strength 3/30/22    RLE LLE   Hip Flexion: 2+/5  3+/5    Hip Extension:  2/5 3+/5    Hip Abduction: 2+/5  2+/5    Hip Adduction: 2+/5  3+/5    Knee Extension: 4+/5  5/5    Knee Flexion: 4-/5  4+/5    Ankle Dorsiflexion: 2/5  3/5   Ankle Plantarflexion: 2/5 4-/5      Lower Extremity Strength 2/18/22    RLE LLE   Hip Flexion: 2+/5  3+/5    Hip Extension:  2/5 3+/5    Hip Abduction: 2+/5  2+/5    Hip Adduction: 2+/5  2+/5    Knee Extension: 4+/5  5/5    Knee Flexion: 4-/5  4+/5    Ankle Dorsiflexion: 2/5  3/5   Ankle Plantarflexion: 2/5 4-/5      Lower Extremity Strength - EVAL 12/9/21    RLE LLE   Hip Flexion: 2-/5 3+/5   Hip Extension:  Able to bridge  Able to bridge    Hip Abduction: 2/5 2/5   Hip Adduction: 2/5 2/5   Knee Extension: 4-/5 4/5   Knee Flexion: 4-/5 4/5   Ankle Dorsiflexion: trace 3/5   Ankle Plantarflexion: 2/5 4-/5      7/8/21: LOWER EXTREMITY STRENGTH:    Left Right   Quadriceps 4+/5 4/5   Hamstrings 4+/5 4-/5      Iliopsoas 4/5 4-/5   Glute Med 3/5 3-/5   Hip Ext 3-/5 2+/5   Ankle DF 4-/5 3+/5   Ankle PF 4+/5 4-/5        7/28/21 Evaluation 2/18/22 3/30/22 5/23/22 6/27/22   5 times sit-stand 30 second sit to stand: 7 reps 31 sec - RW  >12 sec= fall risk for general elderly  >16 sec= fall risk for Parkinson's disease  >10 sec= balance/vestibular dysfunction (<61 y/o)  >14.2 sec= balance/vestibular dysfunction (>61 y/o)  >12 sec= fall risk for CVA    24  24 30 second sit to stand: 4 reps 30 second sit to stand: 1 rep   Timed Up and Go 16 sec with and without assistive device  41 sec  < 20 sec safe for independent transfers,     < 30 sec assist required for transfers    31 sec 34 sec 46 sec 44 sec   5 meter walk test     0.22 m/s 0.21 m/s 0.15 m/s 0.16 m/s       CMS Impairment/Limitation/Restriction for FOTO Peripheral Nervous System Disorders/Injuries Survey     Therapist reviewed FOTO scores for Robles Jordan on 12/9/2021.   FOTO documents entered  into [employer] - see Media section.     Limitation Score: NT today; past scores from 89% down to 61% back to 89% today   Category: Mobility , Other        Objective performed by PT Justin    Treatment   Bolded performed today:   Italics not performed today:     Robles received the treatments listed below:      Arboleda received therapeutic exercises to develop strength, endurance and ROM for 45 minutes including:     -NuStep x5 min, Level 2    Objective as above      Supine at Mat:               LTRs in Supine    x5 each way   Bridge:                          x5 green ball at B knees               Hip ADD in hooklying:                           x5 green ball at B knees               Hip ABD/clamshell in hooklying                          x5 green TB at B knees   Hip flexion marches in hooklying      x5 green theraband     Heel slides with slideboard and tactile cues for alignment (NP today, time)    2 x 8 BLE    Hip ABD with slideboard and tactile cues for alignment (NP today, time)    2 x 10 BLE      SAQ                           - bolster                           - 3# ankle weights                           - x5 B      Standing at Rolling Walker (RW): (NP today, time)   Standing mini squats     x5, x2, x5 - cues for technique   Heel rises bilateral upper extremity support     2 x 5   Sit to Stands 2x5 reps with rolling walker      BOSU ball AP, lateral, circles - seated ~5 minutes      Patient Education and Home Exercises     Home Exercises Provided and Patient Education Provided     Education provided:   -Education on positive affects of consistent exercise.  -ALTER-G use and positioning     Written Home Exercises Provided: Patient instructed to cont prior HEP. Exercises were reviewed and Arboleda was able to demonstrate them prior to the end of the session.  Arboleda demonstrated good  understanding of the education provided. See EMR under Patient Instructions for exercises provided during therapy  sessions    ASSESSMENT   Robles demonstrating mild decline in isolated muscular strength as well as functional strength at this time which is impeding functional mobility and ambulation requiring SBA versus supervision. Maintenance of Timed Up and Go and Gait Speed documented as above. Decreased all reps for exercise and home exercise program to 5 from 10 to improve muscular fatigability moving forward. Pt to see MD this week to discuss biopsy results. Mobility assistance moving forward discussed pending results.     Robles Is progressing well towards his goals.   Pt prognosis is Fair.     Pt will continue to benefit from skilled outpatient physical therapy to address the deficits listed in the problem list box on initial evaluation, provide pt/family education and to maximize pt's level of independence in the home and community environment.     Pt's spiritual, cultural and educational needs considered and pt agreeable to plan of care and goals.     Anticipated barriers to physical therapy: medical hx     Goals: as of 5/23/22  Short Term Goals: 6 weeks   1. Patient demonstrates independence with HEP. - MET 2/18/22  2. Patient will transfer sit to/from standing with RW, CGA, no use of UE and with good body mechanics to promote safe mobility within the home. - ongoing  3. Patient will improve balance within condition 2 of the MCTSIB to 30s to reduce risk of falling. - MET 2/18/22  4. Patient will improve 5xSTS score by 3s to promote LE functional strength. - ongoing   5. Patient will improve TUG score by 6s to promote functional mobility. - ongoing    Patient will improve BLE strength to 3+/5 to promote improved mobility against gravity. - ongoing      Long Term Goals: 12 weeks   1. Patient will be independent with home exercise program (HEP) to promote continued rehabilitation following discharge.  2. Patient will improve 5xSTS score by 9s to promote LE functional strength progression. - ongoing   3. Patient will  improve TUG score by 12s to promote functional mobility. - ongoing  4. Patient will improve gait speed to 0.4 m/s progressing toward safe household ambulation. - ongoing  5. Assess endurance and create appropriate goals to track progression and improvements in safe community ambulation.    PLAN   Plan of care Certification: UPDATED POC: 5/23/22 to 8/15/22     Outpatient Physical Therapy 2 times weekly for 12 weeks to include the following interventions: Gait Training, Manual Therapy, Moist Heat/ Ice, Neuromuscular Re-ed, Patient Education, Therapeutic Activites and Therapeutic Exercise.     LOW sets, LOW reps, LOW resistance.    RODRIGO Aguilera    I certify that I was present in the room directing the student in service delivery and guiding them using my skilled judgment. As the co-signing therapist I have reviewed the students documentation and am responsible for the treatment, assessment, and plan.     Kaye Anderson, PT, DPT   6/27/22

## 2022-06-27 NOTE — PROGRESS NOTES
Lower Extremity Strength 6/27/22    RLE LLE   Hip Flexion: 3/5  3+/5   Hip Extension:  Bridge with mild resistance  Bridge with mild resistance    Hip Abduction: 2-/5 2/5   Hip Adduction: 2-/5 2/5   Knee Extension: 3/5  4/5    Knee Flexion: 3/5  4/5    Ankle Dorsiflexion: 2-/5  2+/5   Ankle Plantarflexion: 2-/5 3-/5      Lower Extremity Strength 5/23/22    RLE LLE   Hip Flexion: 2-/5  3+/5   Hip Extension:  Bridge with mild resistance  Bridge with mild resistance    Hip Abduction: 2/5 2/5   Hip Adduction: 2/5 2/5   Knee Extension: 4-/5  4/5    Knee Flexion: 3+/5  4/5    Ankle Dorsiflexion: 2-/5  2+/5   Ankle Plantarflexion: 2-/5 3+/5      Lower Extremity Strength 3/30/22    RLE LLE   Hip Flexion: 2+/5  3+/5    Hip Extension:  2/5 3+/5    Hip Abduction: 2+/5  2+/5    Hip Adduction: 2+/5  3+/5    Knee Extension: 4+/5  5/5    Knee Flexion: 4-/5  4+/5    Ankle Dorsiflexion: 2/5  3/5   Ankle Plantarflexion: 2/5 4-/5      Lower Extremity Strength 2/18/22    RLE LLE   Hip Flexion: 2+/5  3+/5    Hip Extension:  2/5 3+/5    Hip Abduction: 2+/5  2+/5    Hip Adduction: 2+/5  2+/5    Knee Extension: 4+/5  5/5    Knee Flexion: 4-/5  4+/5    Ankle Dorsiflexion: 2/5  3/5   Ankle Plantarflexion: 2/5 4-/5      Lower Extremity Strength - EVAL 12/9/21    RLE LLE   Hip Flexion: 2-/5 3+/5   Hip Extension:  Able to bridge  Able to bridge    Hip Abduction: 2/5 2/5   Hip Adduction: 2/5 2/5   Knee Extension: 4-/5 4/5   Knee Flexion: 4-/5 4/5   Ankle Dorsiflexion: trace 3/5   Ankle Plantarflexion: 2/5 4-/5      7/8/21: LOWER EXTREMITY STRENGTH:    Left Right   Quadriceps 4+/5 4/5   Hamstrings 4+/5 4-/5      Iliopsoas 4/5 4-/5   Glute Med 3/5 3-/5   Hip Ext 3-/5 2+/5   Ankle DF 4-/5 3+/5   Ankle PF 4+/5 4-/5        7/28/21 Evaluation 2/18/22 3/30/22 5/23/22 6/27/22   5 times sit-stand 30 second sit to stand: 7 reps 31 sec - RW  >12 sec= fall risk for general elderly  >16 sec= fall risk for Parkinson's disease  >10 sec= balance/vestibular  dysfunction (<59 y/o)  >14.2 sec= balance/vestibular dysfunction (>59 y/o)  >12 sec= fall risk for CVA    24  24 30 second sit to stand: 4 reps 30 second sit to stand: 1 rep   Timed Up and Go 16 sec with and without assistive device  41 sec  < 20 sec safe for independent transfers,     < 30 sec assist required for transfers    31 sec 34 sec 46 sec 44 sec   5 meter walk test     0.22 m/s 0.21 m/s 0.15 m/s 0.16 m/s       CMS Impairment/Limitation/Restriction for FOTO Peripheral Nervous System Disorders/Injuries Survey     Therapist reviewed FOTO scores for Robles Jordan on 12/9/2021.   FOTO documents entered into [employer] - see Media section.     Limitation Score: NT today; past scores from 89% down to 61% back to 89% today   Category: Mobility , Other

## 2022-07-06 ENCOUNTER — CLINICAL SUPPORT (OUTPATIENT)
Dept: REHABILITATION | Facility: HOSPITAL | Age: 72
End: 2022-07-06
Payer: COMMERCIAL

## 2022-07-06 DIAGNOSIS — R26.2 DIFFICULTY WALKING: Primary | ICD-10-CM

## 2022-07-06 DIAGNOSIS — Z74.09 IMPAIRED FUNCTIONAL MOBILITY, BALANCE, GAIT, AND ENDURANCE: ICD-10-CM

## 2022-07-06 PROCEDURE — 97110 THERAPEUTIC EXERCISES: CPT | Mod: PN

## 2022-07-06 NOTE — PROGRESS NOTES
"[facility]   Physical Therapy Treatment Note - Progress Note    Name: Robles Jordan  Clinic Number: [id number]    Therapy Diagnosis:   Encounter Diagnoses   Name Primary?    Difficulty walking Yes    Impaired functional mobility, balance, gait, and endurance      Physician: Ervin Washington     Visit Date: 7/6/2022    Physician Orders: PT Eval and Treat Neuro  Medical Diagnosis from Referral: Sensory neuropathy [G62.9]  Evaluation Date: 12/9/2021  Authorization Period Expiration: 12/31/21  Plan of Care Expiration: NEW POC: 5/23/22 to 8/15/22  Visit # / Visits authorized: 38/40 (+1 eval)   PN: 7/27/22    PTA Visit #: 0/5    Time In: 10:15  Time Out: 11:00  Total Billable Time: 45 minutes    Precautions: Standard, Diabetes, Fall and hx of tethered cord    SUBJECTIVE     Pt reports: [facility] put him Duloxetine and that has been making him feel week.  My meds have been making me feel woozy.  Takes me about a day to recover from PHYSICAL THERAPY.      He was compliant with home exercise program.  Response to previous treatment: sore but it decreased after a couple of days    Functional change: ongoing      Pain: 0/10  Location: NA     Fatigue: 0/10 "just weak"    OBJECTIVE     Objective Measures updated at progress report unless specified.       Treatment   Bolded performed today:   Italics not performed today:     Robles received the treatments listed below:      Robles received therapeutic exercises to develop strength, endurance and ROM for 45 minutes including:      Supine at Mat:    Supine posterior pelvic tilts x 10   Supine posterior pelvic tilt with lower extremity marching x 10     Supine upper extremity reach across body to roll into sidelyinex 7 each direction                LTRs in Supine    x10 each way   Bridge:                          x10 green ball at B knees               Hip ADD in hooklying:                           x10 green ball at B knees               "   Hip ABD/clamshell in hooklying                          x5 green TB at B knees   Hip flexion marches in hooklying      x5 green theraband     Heel slides with slideboard and tactile cues for alignment (NP today, time)    2 x 8 BLE    Hip ABD with slideboard and tactile cues for alignment (NP today, time)    2 x 10 BLE    SAQ                           - bolster                           - 3# ankle weights                           - x5 B      Prone   Active assist knee flexion x 5 both sides   Manual stretch bilateral quad x 60 sec both sides   Knees in flexion, heel squeeze 2 sec hold x 5    Seated    Hamstring stretch 30 sec bilateral lower extremity    Lateral Elbow lean x 8 both sides      /81  HR 87    Patient ambulated ~20ft RW to from waiting room with supervision    Standing at Rolling Walker (RW): (NP today, time)   Standing mini squats     x5, x2, x5 - cues for technique   Heel rises bilateral upper extremity support     2 x 5   Sit to Stands 2x5 reps with rolling walker      BOSU ball AP, lateral, circles - seated ~5 minutes      Patient Education and Home Exercises     Home Exercises Provided and Patient Education Provided     Education provided:   -Education on positive affects of consistent exercise.  -ALTER-G use and positioning     Written Home Exercises Provided: Patient instructed to cont prior HEP. Exercises were reviewed and Robles was able to demonstrate them prior to the end of the session.  Robles demonstrated good  understanding of the education provided. See EMR under Patient Instructions for exercises provided during therapy sessions    ASSESSMENT   Mr. Jordan requires multiple rest breaks during session due to fatigue.  He has increased right lower extremity weakness with prone knee flexion, requires increased assist.  He demo's good core activation with posterior pelvic tilt as well as supine rolling exercises.      Robles Is progressing well towards his goals.   Pt  prognosis is Fair.     Pt will continue to benefit from skilled outpatient physical therapy to address the deficits listed in the problem list box on initial evaluation, provide pt/family education and to maximize pt's level of independence in the home and community environment.     Pt's spiritual, cultural and educational needs considered and pt agreeable to plan of care and goals.     Anticipated barriers to physical therapy: medical hx     Goals: as of 5/23/22  Short Term Goals: 6 weeks   1. Patient demonstrates independence with HEP. - MET 2/18/22  2. Patient will transfer sit to/from standing with RW, CGA, no use of UE and with good body mechanics to promote safe mobility within the home. - ongoing  3. Patient will improve balance within condition 2 of the MCTSIB to 30s to reduce risk of falling. - MET 2/18/22  4. Patient will improve 5xSTS score by 3s to promote LE functional strength. - ongoing   5. Patient will improve TUG score by 6s to promote functional mobility. - ongoing    Patient will improve BLE strength to 3+/5 to promote improved mobility against gravity. - ongoing      Long Term Goals: 12 weeks   1. Patient will be independent with home exercise program (HEP) to promote continued rehabilitation following discharge.  2. Patient will improve 5xSTS score by 9s to promote LE functional strength progression. - ongoing   3. Patient will improve TUG score by 12s to promote functional mobility. - ongoing  4. Patient will improve gait speed to 0.4 m/s progressing toward safe household ambulation. - ongoing  5. Assess endurance and create appropriate goals to track progression and improvements in safe community ambulation.    PLAN   Plan of care Certification: UPDATED POC: 5/23/22 to 8/15/22     Outpatient Physical Therapy 2 times weekly for 12 weeks to include the following interventions: Gait Training, Manual Therapy, Moist Heat/ Ice, Neuromuscular Re-ed, Patient Education, Therapeutic Activites and  Therapeutic Exercise.     LOW sets, LOW reps, LOW resistance.    Seth Soto, SPTA    I certify that I was present in the room directing the student in service delivery and guiding them using my skilled judgment. As the co-signing therapist I have reviewed the students documentation and am responsible for the treatment, assessment, and plan.     Azalea Tran, PT, DPT   6/27/22

## 2022-07-08 ENCOUNTER — CLINICAL SUPPORT (OUTPATIENT)
Dept: REHABILITATION | Facility: HOSPITAL | Age: 72
End: 2022-07-08
Payer: COMMERCIAL

## 2022-07-08 DIAGNOSIS — R26.2 DIFFICULTY WALKING: Primary | ICD-10-CM

## 2022-07-08 DIAGNOSIS — Z74.09 IMPAIRED FUNCTIONAL MOBILITY, BALANCE, GAIT, AND ENDURANCE: ICD-10-CM

## 2022-07-08 PROCEDURE — 97110 THERAPEUTIC EXERCISES: CPT | Mod: PN

## 2022-07-08 NOTE — PROGRESS NOTES
"[facility]   Physical Therapy Treatment Note    Name: Robles Jordan  Clinic Number: [id number]    Therapy Diagnosis:   Encounter Diagnoses   Name Primary?    Difficulty walking Yes    Impaired functional mobility, balance, gait, and endurance      Physician: Ervin Washington     Visit Date: 7/8/2022    Physician Orders: PT Eval and Treat Neuro  Medical Diagnosis from Referral: Sensory neuropathy [G62.9]  Evaluation Date: 12/9/2021  Authorization Period Expiration: 12/31/21  Plan of Care Expiration: NEW POC: 5/23/22 to 8/15/22  Visit # / Visits authorized: 39/40 (+1 eval)   PN: 7/27/22    PTA Visit #: 0/5    Time In: 11:45  Time Out: 12:30  Total Billable Time: 45 minutes    Precautions: Standard, Diabetes, Fall and hx of tethered cord    SUBJECTIVE     Pt reports: Duloxetine was continuing to make him weak and "out of it" however is not taking and is feeling better - is not taking as he is having a second lumbar puncture next week. Pt notes fell stepping out of shower because chair and mat were out of place.       He was compliant with home exercise program.  Response to previous treatment: 1 day of muscle soreness / stiffness    Functional change: ongoing      Pain: 5/10  Location: low back pain     Fatigue: 4/10     OBJECTIVE     Objective Measures updated at progress report unless specified.       Treatment   Bolded performed today:   Italics not performed today:     Robles received the treatments listed below:      Robles received therapeutic exercises to develop strength, endurance and ROM for 45 minutes including:      Supine at Mat:    Supine posterior pelvic tilts 5x2   Supine posterior pelvic tilt with lower extremity marching 5x2     Supine upper extremity reach across body to roll into sidelying 10 each direction                LTRs in Supine x10 each way x2 sets    Bridge: 5x2 green ball at B knees               Hip ADD in hooklying: 5x2 green ball at B knees       SAQ " with large bolster 5x2     Prone   quadruped to child's pose x5    knees in flexion 2 sec hold 5x2     BOSU ball AP, lateral, circles - seated ~5 minutes    Patient ambulated ~20ft RW to from waiting room with supervision      Standing at Rolling Walker (RW): (NP today, time)   Standing mini squats     x5, x2, x5 - cues for technique   Heel rises bilateral upper extremity support     2 x 5   Sit to Stands 2x5 reps with rolling walker      Patient Education and Home Exercises     Home Exercises Provided and Patient Education Provided     Education provided:   -Education on positive affects of consistent exercise.  -ALTER-G use and positioning     Written Home Exercises Provided: Patient instructed to cont prior HEP. Exercises were reviewed and Robles was able to demonstrate them prior to the end of the session.  Robles demonstrated good  understanding of the education provided. See EMR under Patient Instructions for exercises provided during therapy sessions    ASSESSMENT   Mr. Jordan demonstrating improvement in energy and engagement in session. All exercises regressed to accommodate for undiagnosed neuromuscular disease at this point in time - no weights, low reps, low sets. Pt very fatigable with position changes. Pt did have a fall stepping out of shower at home and is educated on decreasing space for falls to occur, making space safe, printout of tub transfer bench issued to assist in initiating in home planning moving forward. Discussed equipment needs and what therapy will look like moving forward.      Robles Is progressing well towards his goals.   Pt prognosis is Fair.     Pt will continue to benefit from skilled outpatient physical therapy to address the deficits listed in the problem list box on initial evaluation, provide pt/family education and to maximize pt's level of independence in the home and community environment.     Pt's spiritual, cultural and educational needs considered and pt  agreeable to plan of care and goals.     Anticipated barriers to physical therapy: medical hx     Goals: as of 5/23/22  Short Term Goals: 6 weeks   1. Patient demonstrates independence with HEP. - MET 2/18/22  2. Patient will transfer sit to/from standing with RW, CGA, no use of UE and with good body mechanics to promote safe mobility within the home. - ongoing  3. Patient will improve balance within condition 2 of the MCTSIB to 30s to reduce risk of falling. - MET 2/18/22  4. Patient will improve 5xSTS score by 3s to promote LE functional strength. - ongoing   5. Patient will improve TUG score by 6s to promote functional mobility. - ongoing    Patient will improve BLE strength to 3+/5 to promote improved mobility against gravity. - ongoing      Long Term Goals: 12 weeks   1. Patient will be independent with home exercise program (HEP) to promote continued rehabilitation following discharge.  2. Patient will improve 5xSTS score by 9s to promote LE functional strength progression. - ongoing   3. Patient will improve TUG score by 12s to promote functional mobility. - ongoing  4. Patient will improve gait speed to 0.4 m/s progressing toward safe household ambulation. - ongoing  5. Assess endurance and create appropriate goals to track progression and improvements in safe community ambulation.    PLAN   Plan of care Certification: UPDATED POC: 5/23/22 to 8/15/22     Outpatient Physical Therapy 2 times weekly for 12 weeks to include the following interventions: Gait Training, Manual Therapy, Moist Heat/ Ice, Neuromuscular Re-ed, Patient Education, Therapeutic Activites and Therapeutic Exercise.     LOW sets, LOW reps, LOW resistance.    Kaye Anderson, PT, DPT, NCS  07/08/2022

## 2022-07-11 ENCOUNTER — CLINICAL SUPPORT (OUTPATIENT)
Dept: REHABILITATION | Facility: HOSPITAL | Age: 72
End: 2022-07-11
Payer: COMMERCIAL

## 2022-07-11 DIAGNOSIS — Z74.09 IMPAIRED FUNCTIONAL MOBILITY, BALANCE, GAIT, AND ENDURANCE: ICD-10-CM

## 2022-07-11 DIAGNOSIS — R26.2 DIFFICULTY WALKING: Primary | ICD-10-CM

## 2022-07-11 PROCEDURE — 97110 THERAPEUTIC EXERCISES: CPT | Mod: PN

## 2022-07-11 NOTE — PROGRESS NOTES
"[facility]   Physical Therapy Treatment Note    Name: Robles Jordan  Clinic Number: [id number]    Therapy Diagnosis:   Encounter Diagnoses   Name Primary?    Difficulty walking Yes    Impaired functional mobility, balance, gait, and endurance      Physician: Ervin Washington     Visit Date: 7/11/2022    Physician Orders: PT Eval and Treat Neuro  Medical Diagnosis from Referral: Sensory neuropathy [G62.9]  Evaluation Date: 12/9/2021  Authorization Period Expiration: 12/31/21  Plan of Care Expiration: NEW POC: 5/23/22 to 8/15/22  Visit # / Visits authorized: 40/40 (+1 eval)   PN: 7/27/22    PTA Visit #: 0/5    Time In: 11:45  Time Out: 12:30  Total Billable Time: 45 minutes    Precautions: Standard, Diabetes, Fall and hx of tethered cord    SUBJECTIVE     Pt reports: he is still recovering from his Gabapentin yesterday. He fixed his shower set up so he won't have anymore falls.       He was compliant with home exercise program.  Response to previous treatment: 1 day of muscle soreness / stiffness    Functional change: ongoing      Pain: 8/10 "burning"  Location: bilateral feet     Fatigue: 6/10     OBJECTIVE     Objective Measures updated at progress report unless specified.       Treatment   Bolded performed today:   Italics not performed today:     Robles received the treatments listed below:      Robles received therapeutic exercises to develop strength, endurance and ROM for 45 minutes including:      Supine at Mat:   -Posterior pelvic tilts 5x2  -Posterior pelvic tilt with lower extremity marching 5x2  -Upper extremity reach across body to roll into sidelying 10 each direction  -LTRs x10 each way x2 sets   -Bridge: 5x2 green ball at B knees   -Hip ADD in hooklying: 5x2 green ball at B knees   -Heel Slides with slideboard 5x2   -SAQ with large bolster 5x2     Prone:  -Quadruped to child's pose x5    (NP today)    -BOSU ball AP, lateral, circles - seated ~5 minutes    -Patient " ambulated ~25 ft RW to/from waiting room with supervision      Standing at Rolling Walker (RW): (NP today, time)   Standing mini squats     x5, x2, x5 - cues for technique   Heel rises bilateral upper extremity support     2 x 5   Sit to Stands 2x5 reps with rolling walker      Patient Education and Home Exercises     Home Exercises Provided and Patient Education Provided     Education provided:   -Education on positive affects of consistent exercise.  -ALTER-G use and positioning     Written Home Exercises Provided: Patient instructed to cont prior HEP. Exercises were reviewed and Robles was able to demonstrate them prior to the end of the session.  Robles demonstrated good  understanding of the education provided. See EMR under Patient Instructions for exercises provided during therapy sessions    ASSESSMENT   Nikki presents with decreased strength and endurance and is very fatigued. Exercises were performed with low reps with no additional resistance secondary to undiagnosed neuromuscular disease. Rest breaks were encouraged between each set to prevent overexertion.  Heel slides were performed in supine with mod-max assistance to prevent right hip external rotation - pt demonstrated increased fatigue and was unable to perform second set without an extended rest break.     Robles Is progressing well towards his goals.   Pt prognosis is Fair.     Pt will continue to benefit from skilled outpatient physical therapy to address the deficits listed in the problem list box on initial evaluation, provide pt/family education and to maximize pt's level of independence in the home and community environment.     Pt's spiritual, cultural and educational needs considered and pt agreeable to plan of care and goals.     Anticipated barriers to physical therapy: medical hx     Goals: as of 5/23/22  Short Term Goals: 6 weeks   1. Patient demonstrates independence with HEP. - MET 2/18/22  2. Patient will transfer sit  to/from standing with RW, CGA, no use of UE and with good body mechanics to promote safe mobility within the home. - ongoing  3. Patient will improve balance within condition 2 of the MCTSIB to 30s to reduce risk of falling. - MET 2/18/22  4. Patient will improve 5xSTS score by 3s to promote LE functional strength. - ongoing   5. Patient will improve TUG score by 6s to promote functional mobility. - ongoing    Patient will improve BLE strength to 3+/5 to promote improved mobility against gravity. - ongoing      Long Term Goals: 12 weeks   1. Patient will be independent with home exercise program (HEP) to promote continued rehabilitation following discharge.  2. Patient will improve 5xSTS score by 9s to promote LE functional strength progression. - ongoing   3. Patient will improve TUG score by 12s to promote functional mobility. - ongoing  4. Patient will improve gait speed to 0.4 m/s progressing toward safe household ambulation. - ongoing  5. Assess endurance and create appropriate goals to track progression and improvements in safe community ambulation.    PLAN   Plan of care Certification: UPDATED POC: 5/23/22 to 8/15/22     Outpatient Physical Therapy 2 times weekly for 12 weeks to include the following interventions: Gait Training, Manual Therapy, Moist Heat/ Ice, Neuromuscular Re-ed, Patient Education, Therapeutic Activites and Therapeutic Exercise.     LOW sets, LOW reps, LOW resistance.    RODRIGO Aguilera    I certify that I was present in the room directing the student in service delivery and guiding them using my skilled judgment. As the co-signing therapist I have reviewed the students documentation and am responsible for the treatment, assessment, and plan.     Kaye Anderson, PT, DPT, NCS   07/11/2022

## 2022-07-20 ENCOUNTER — CLINICAL SUPPORT (OUTPATIENT)
Dept: REHABILITATION | Facility: HOSPITAL | Age: 72
End: 2022-07-20
Payer: COMMERCIAL

## 2022-07-20 DIAGNOSIS — Z74.09 IMPAIRED FUNCTIONAL MOBILITY, BALANCE, GAIT, AND ENDURANCE: ICD-10-CM

## 2022-07-20 DIAGNOSIS — R26.2 DIFFICULTY WALKING: Primary | ICD-10-CM

## 2022-07-20 PROCEDURE — 97140 MANUAL THERAPY 1/> REGIONS: CPT | Mod: PN

## 2022-07-20 NOTE — PROGRESS NOTES
[facility]   Physical Therapy Treatment Note    Name: Robles Jordan  Clinic Number: [id number]    Therapy Diagnosis:   Encounter Diagnoses   Name Primary?    Difficulty walking Yes    Impaired functional mobility, balance, gait, and endurance      Physician: Ervin Washington     Visit Date: 7/20/2022    Physician Orders: PT Eval and Treat Neuro  Medical Diagnosis from Referral: Sensory neuropathy [G62.9]  Evaluation Date: 12/9/2021  Authorization Period Expiration: 12/31/21  Plan of Care Expiration: NEW POC: 5/23/22 to 8/15/22  Visit # / Visits authorized: 40/40 (+1 eval)   PN: 7/27/22    PTA Visit #: 0/5    Time In: 12:30  Time Out: 1:15  Total Billable Time: 45 minutes    Precautions: Standard, Diabetes, Fall and hx of tethered cord - DO NOT INCREASE REPS or WEIGHT    SUBJECTIVE     Pt reports: feels weaker, is tired from lumbar puncture the other day. Feels tired with medication from neurologist.       He was compliant with home exercise program.  Response to previous treatment: fine   Functional change: ongoing      Pain: no burning reported   Location: bilateral feet     Fatigue: 6/10     OBJECTIVE     Objective Measures updated at progress report unless specified.       Treatment   Bolded performed today:   Italics not performed today:       manual therapy techniques: PROM/flexibility were performed for 45 minutes, including:    PROM: gentle, dependent, bilateral lower extremities, 30 second holds x2    - hip flexion   - hip IR   - hip ER   - knee flexion (90/90)   - dorsiflexion (gastroc)    PROM: gentle, dependent, bilateral upper extremities, 30 second holds x2    - shoulder flexion   - shoulder abduction   - shoulder IR - 90 degrees abduction   - shoulder ER - 90 degrees abduction   - elbow flexion   - elbow extension   - wrist flexion   - wrist extension   - digit extension       Robles received therapeutic exercises to develop strength, endurance and ROM for 00 minutes  including:      NOT PERFORMED TODAY:   Supine at Mat:   Posterior pelvic tilts 5x2  Posterior pelvic tilt with lower extremity marching 5x2  Upper extremity reach across body to roll into sidelying 10 each direction  LTRs x10 each way x2 sets   Bridge: 5x2 green ball at B knees   Hip ADD in hooklying: 5x2 green ball at B knees   Heel Slides with slideboard 5x2   SAQ with large bolster 5x2   Prone:  Quadruped to child's pose x5   BOSU ball AP, lateral, circles - seated ~5 minutes  Patient ambulated ~25 ft RW to/from waiting room with supervision      Patient Education and Home Exercises     Home Exercises Provided and Patient Education Provided     Education provided:   -Education on positive affects of consistent exercise.  -ALTER-G use and positioning   -Education on power mobility, fatigue impact, exercise at this time, moving forward with equipment needs, discussion on current diagnosis progress and what therapy can do to help    Written Home Exercises Provided: Patient instructed to cont prior HEP. Exercises were reviewed and Arboleda was able to demonstrate them prior to the end of the session.  Arboleda demonstrated good  understanding of the education provided. See EMR under Patient Instructions for exercises provided during therapy sessions    ASSESSMENT   Nikki demonstrating mild progression in lower extremity weakness, new onset of upper extremity weakness, and continue generalized fatigue. Pt is SBA into and out of clinic with Rolling Walker (RW) with increased time and several standing restbreaks. PROM completed in supine at bilateral upper extremity and lower extremity without adverse reaction to treatment. Education regarding power mobility, equipment, exercise prescription and discussion with pt spouse/patient for therapy services moving forward. Educated on appropriate goals for home mobility and exercise. Continue skilled PT services, may be looking into equipment needs however pending current  diagnosis.     Robles Is progressing well towards his goals.   Pt prognosis is Fair.     Pt will continue to benefit from skilled outpatient physical therapy to address the deficits listed in the problem list box on initial evaluation, provide pt/family education and to maximize pt's level of independence in the home and community environment.     Pt's spiritual, cultural and educational needs considered and pt agreeable to plan of care and goals.     Anticipated barriers to physical therapy: medical hx     Goals: as of 5/23/22  Short Term Goals: 6 weeks   1. Patient demonstrates independence with HEP. - MET 2/18/22  2. Patient will transfer sit to/from standing with RW, CGA, no use of UE and with good body mechanics to promote safe mobility within the home. - ongoing  3. Patient will improve balance within condition 2 of the MCTSIB to 30s to reduce risk of falling. - MET 2/18/22  4. Patient will improve 5xSTS score by 3s to promote LE functional strength. - ongoing   5. Patient will improve TUG score by 6s to promote functional mobility. - ongoing    Patient will improve BLE strength to 3+/5 to promote improved mobility against gravity. - ongoing      Long Term Goals: 12 weeks   1. Patient will be independent with home exercise program (HEP) to promote continued rehabilitation following discharge.  2. Patient will improve 5xSTS score by 9s to promote LE functional strength progression. - ongoing   3. Patient will improve TUG score by 12s to promote functional mobility. - ongoing  4. Patient will improve gait speed to 0.4 m/s progressing toward safe household ambulation. - ongoing  5. Assess endurance and create appropriate goals to track progression and improvements in safe community ambulation.    PLAN   Plan of care Certification: UPDATED POC: 5/23/22 to 8/15/22     Outpatient Physical Therapy 2 times weekly for 12 weeks to include the following interventions: Gait Training, Manual Therapy, Moist Heat/ Ice,  Neuromuscular Re-ed, Patient Education, Therapeutic Activites and Therapeutic Exercise.     LOW sets, LOW reps, NO weight/bands.     Kaye Anderson, PT, DPT, NCS   07/21/2022

## 2022-07-25 ENCOUNTER — CLINICAL SUPPORT (OUTPATIENT)
Dept: REHABILITATION | Facility: HOSPITAL | Age: 72
End: 2022-07-25
Payer: COMMERCIAL

## 2022-07-25 DIAGNOSIS — R26.2 DIFFICULTY WALKING: Primary | ICD-10-CM

## 2022-07-25 DIAGNOSIS — Z74.09 IMPAIRED FUNCTIONAL MOBILITY, BALANCE, GAIT, AND ENDURANCE: ICD-10-CM

## 2022-07-25 PROCEDURE — 97110 THERAPEUTIC EXERCISES: CPT | Mod: PN

## 2022-07-25 PROCEDURE — 97140 MANUAL THERAPY 1/> REGIONS: CPT | Mod: PN

## 2022-07-25 NOTE — PROGRESS NOTES
[facility]   Physical Therapy Treatment Note    Name: Robles Jordan  Clinic Number: [id number]    Therapy Diagnosis:   Encounter Diagnoses   Name Primary?    Difficulty walking Yes    Impaired functional mobility, balance, gait, and endurance      Physician: Ervin Washington     Visit Date: 7/25/2022    Physician Orders: PT Eval and Treat Neuro  Medical Diagnosis from Referral: Sensory neuropathy [G62.9]  Evaluation Date: 12/9/2021  Authorization Period Expiration: 12/31/21  Plan of Care Expiration: NEW POC: 5/23/22 to 8/15/22  Visit # / Visits authorized: 41/40 (+1 eval)   PN: 7/27/22    PTA Visit #: 0/5    Time In: 11:00 AM  Time Out: 11:40 AM  Total Billable Time: 40 minutes    Precautions: Standard, Diabetes, Fall and hx of tethered cord - DO NOT INCREASE REPS or WEIGHT    SUBJECTIVE     Pt reports: feels about the same today, fatigued.       He was compliant with home exercise program.  Response to previous treatment: fine   Functional change: ongoing      Pain: no burning reported   Location: bilateral feet     Fatigue: 6/10     OBJECTIVE     Objective Measures updated at progress report unless specified.       Treatment   Bolded performed today:   Italics not performed today:     manual therapy techniques: PROM/flexibility were performed for 20 minutes, including:    PROM: gentle, dependent, bilateral lower extremities, 30 second holds x2    - hip flexion   - hip IR   - hip ER   - knee flexion (90/90)   - dorsiflexion (gastroc)      Robles received therapeutic exercises to develop strength, endurance and ROM for 20 minutes including:      Supine at Mat:   Posterior pelvic tilts 5x2  Posterior pelvic tilt with lower extremity marching 5x2  LTRs x10 each way x2 sets   Bridge: 5x2 green ball at B knees   Hip ADD in hooklying: 5x2 green ball at B knees   Heel Slides with slideboard 5x2   SAQ with large bolster 5x2     NOT PERFORMED TODAY:   Upper extremity reach across body to  roll into sidelying 10 each direction  Prone:  Quadruped to child's pose x5   BOSU ball AP, lateral, circles - seated ~5 minutes  Patient ambulated ~25 ft RW to/from waiting room with supervision      Patient Education and Home Exercises     Home Exercises Provided and Patient Education Provided     Education provided:   -Education on positive affects of consistent exercise.  -ALTER-G use and positioning   -Education on power mobility, fatigue impact, exercise at this time, moving forward with equipment needs, discussion on current diagnosis progress and what therapy can do to help    Written Home Exercises Provided: Patient instructed to cont prior HEP. Exercises were reviewed and Arboleda was able to demonstrate them prior to the end of the session.  Arboleda demonstrated good  understanding of the education provided. See EMR under Patient Instructions for exercises provided during therapy sessions    ASSESSMENT   Nikki tolerated session fairly with no adverse response. Continuation of gentle exercise/PROM with light repetitions today secondary to inconclusive diagnoses and increasing weakness/fatigue.  Pt with significant fatigue noted throughout session, requiring rest breaks between ea activity in supine/seated. Continue skilled PT services, may be looking into equipment needs however pending current diagnosis.     Robles Is progressing well towards his goals.   Pt prognosis is Fair.     Pt will continue to benefit from skilled outpatient physical therapy to address the deficits listed in the problem list box on initial evaluation, provide pt/family education and to maximize pt's level of independence in the home and community environment.     Pt's spiritual, cultural and educational needs considered and pt agreeable to plan of care and goals.     Anticipated barriers to physical therapy: medical hx     Goals: as of 5/23/22  Short Term Goals: 6 weeks   1. Patient demonstrates independence with HEP. - MET  2/18/22  2. Patient will transfer sit to/from standing with RW, CGA, no use of UE and with good body mechanics to promote safe mobility within the home. - ongoing  3. Patient will improve balance within condition 2 of the MCTSIB to 30s to reduce risk of falling. - MET 2/18/22  4. Patient will improve 5xSTS score by 3s to promote LE functional strength. - ongoing   5. Patient will improve TUG score by 6s to promote functional mobility. - ongoing    Patient will improve BLE strength to 3+/5 to promote improved mobility against gravity. - ongoing      Long Term Goals: 12 weeks   1. Patient will be independent with home exercise program (HEP) to promote continued rehabilitation following discharge.  2. Patient will improve 5xSTS score by 9s to promote LE functional strength progression. - ongoing   3. Patient will improve TUG score by 12s to promote functional mobility. - ongoing  4. Patient will improve gait speed to 0.4 m/s progressing toward safe household ambulation. - ongoing  5. Assess endurance and create appropriate goals to track progression and improvements in safe community ambulation.    PLAN   Plan of care Certification: UPDATED POC: 5/23/22 to 8/15/22     Outpatient Physical Therapy 2 times weekly for 12 weeks to include the following interventions: Gait Training, Manual Therapy, Moist Heat/ Ice, Neuromuscular Re-ed, Patient Education, Therapeutic Activites and Therapeutic Exercise.     LOW sets, LOW reps, NO weight/bands.     Yoana Mcfarlane, PT, DPT, NCS   07/28/2022

## 2022-07-27 ENCOUNTER — CLINICAL SUPPORT (OUTPATIENT)
Dept: REHABILITATION | Facility: HOSPITAL | Age: 72
End: 2022-07-27
Payer: COMMERCIAL

## 2022-07-27 DIAGNOSIS — R26.2 DIFFICULTY WALKING: Primary | ICD-10-CM

## 2022-07-27 DIAGNOSIS — Z74.09 IMPAIRED FUNCTIONAL MOBILITY, BALANCE, GAIT, AND ENDURANCE: ICD-10-CM

## 2022-07-27 PROCEDURE — 97140 MANUAL THERAPY 1/> REGIONS: CPT | Mod: PN

## 2022-07-27 PROCEDURE — 97110 THERAPEUTIC EXERCISES: CPT | Mod: PN

## 2022-07-27 NOTE — PROGRESS NOTES
"[facility]   Physical Therapy Treatment Note    Name: Robles Jordan  [facility] Number: [id number]    Therapy Diagnosis:   Encounter Diagnoses   Name Primary?    Difficulty walking Yes    Impaired functional mobility, balance, gait, and endurance      Physician: Ervin Washington     Visit Date: 7/27/2022    Physician Orders: PT Eval and Treat Neuro  Medical Diagnosis from Referral: Sensory neuropathy [G62.9]  Evaluation Date: 12/9/2021  Authorization Period Expiration: 12/31/21  Plan of Care Expiration: NEW POC: 5/23/22 to 8/15/22  Visit # / Visits authorized: 42/40 (+1 eval)   PN: 7/27/22 - postponed due to fatigue and pain    PTA Visit #: 0/5    Time In: 11:45  Time Out: 12:30  Total Billable Time: 45 minutes    Precautions: Standard, Diabetes, Fall and hx of tethered cord - DO NOT INCREASE REPS or WEIGHT    SUBJECTIVE     Pt reports: he goes to see his primary tomorrow for a check up. He also has plans to go to a family [place]      He was compliant with home exercise program.  Response to previous treatment: fine   Functional change: ongoing      Pain: no burning reported   Location: bilateral feet     Fatigue: 5/10     OBJECTIVE     Objective Measures updated at progress report unless specified.       Treatment   Bolded performed today:   Italics not performed today:       manual therapy techniques: PROM/flexibility were performed for 37 minutes, including:    PROM: gentle, dependent, bilateral lower extremities, 30 second holds x2    - hip flexion   - hip IR   - hip ER   - knee flexion (90/90)   - dorsiflexion (gastroc)    PROM: gentle, dependent, bilateral upper extremities, 30 second holds x2    - shoulder flexion   - shoulder abduction   - shoulder IR - 90 degrees abduction   - shoulder ER - 90 degrees abduction   - elbow flexion   - elbow extension   - wrist flexion   - wrist extension   - digit extension     Cervical spine PROM, gentle soft tissue " massage, and myofascial release to the following structures, 30 second holds x1-2    -levator scaps   -upper traps   -cervical paraspinals   -suboccipitals          Robles received therapeutic exercises to develop strength, endurance and ROM for 8 minutes including:     Supine at Mat:   -Posterior pelvic tilts 5x2  -LTRs x10 each way x2 sets      NOT PERFORMED TODAY:   Posterior pelvic tilt with lower extremity marching 5x2  Upper extremity reach across body to roll into sidelying 10 each direction  Bridge: 5x2 green ball at B knees   Hip ADD in hooklying: 5x2 green ball at B knees   Heel Slides with slideboard 5x2   SAQ with large bolster 5x2   Prone:  Quadruped to child's pose x5   BOSU ball AP, lateral, circles - seated ~5 minutes  Patient ambulated ~25 ft RW to/from waiting room with supervision      Patient Education and Home Exercises     Home Exercises Provided and Patient Education Provided     Education provided:   -Education on positive affects of consistent exercise.  -ALTER-G use and positioning   -Education on power mobility, fatigue impact, exercise at this time, moving forward with equipment needs, discussion on current diagnosis progress and what therapy can do to help    Written Home Exercises Provided: Patient instructed to cont prior HEP. Exercises were reviewed and Arboleda was able to demonstrate them prior to the end of the session.  Arboleda demonstrated good  understanding of the education provided. See EMR under Patient Instructions for exercises provided during therapy sessions    ASSESSMENT   Focus of today's session was passive range of motion and gentle stretching of upper and lower extremities, as well as the cervical spine. Increased muscle tension and limited passive range of motion on right side of body versus left noted with both upper and lower extremities.Pt had tension with palpatible trigger point in right suboccipital with complaints of neck stiffness and discomfort -  prolonged pressure was applied to trigger point and pt reported feeling improvement. Pt was educated on the importance of utilizing wheelchair when out of town next week to prevent falls when on uneven terrain or when fatigued - pt vocalized understanding.      Robles Is progressing well towards his goals.   Pt prognosis is Fair.     Pt will continue to benefit from skilled outpatient physical therapy to address the deficits listed in the problem list box on initial evaluation, provide pt/family education and to maximize pt's level of independence in the home and community environment.     Pt's spiritual, cultural and educational needs considered and pt agreeable to plan of care and goals.     Anticipated barriers to physical therapy: medical hx     Goals: as of 5/23/22  Short Term Goals: 6 weeks   1. Patient demonstrates independence with HEP. - MET 2/18/22  2. Patient will transfer sit to/from standing with RW, CGA, no use of UE and with good body mechanics to promote safe mobility within the home. - ongoing  3. Patient will improve balance within condition 2 of the MCTSIB to 30s to reduce risk of falling. - MET 2/18/22  4. Patient will improve 5xSTS score by 3s to promote LE functional strength. - ongoing   5. Patient will improve TUG score by 6s to promote functional mobility. - ongoing    Patient will improve BLE strength to 3+/5 to promote improved mobility against gravity. - ongoing      Long Term Goals: 12 weeks   1. Patient will be independent with home exercise program (HEP) to promote continued rehabilitation following discharge.  2. Patient will improve 5xSTS score by 9s to promote LE functional strength progression. - ongoing   3. Patient will improve TUG score by 12s to promote functional mobility. - ongoing  4. Patient will improve gait speed to 0.4 m/s progressing toward safe household ambulation. - ongoing  5. Assess endurance and create appropriate goals to track progression and improvements in  safe community ambulation.    PLAN   Plan of care Certification: UPDATED POC: 5/23/22 to 8/15/22     Outpatient Physical Therapy 2 times weekly for 12 weeks to include the following interventions: Gait Training, Manual Therapy, Moist Heat/ Ice, Neuromuscular Re-ed, Patient Education, Therapeutic Activites and Therapeutic Exercise.     LOW sets, LOW reps, NO weight/bands.     Seth Soto, RODRIGO   07/27/2022     I certify that I was present in the room directing the student in service delivery and guiding them using my skilled judgment. As the co-signing therapist I have reviewed the students documentation and am responsible for the treatment, assessment, and plan.     Kaye Anderson, PT, DPT, NCS  07/27/2022

## 2022-07-28 ENCOUNTER — LAB VISIT (OUTPATIENT)
Dept: LAB | Facility: HOSPITAL | Age: 72
End: 2022-07-28
Attending: FAMILY MEDICINE
Payer: COMMERCIAL

## 2022-07-28 ENCOUNTER — PATIENT MESSAGE (OUTPATIENT)
Dept: FAMILY MEDICINE | Facility: CLINIC | Age: 72
End: 2022-07-28
Payer: COMMERCIAL

## 2022-07-28 ENCOUNTER — OFFICE VISIT (OUTPATIENT)
Dept: FAMILY MEDICINE | Facility: CLINIC | Age: 72
End: 2022-07-28
Payer: COMMERCIAL

## 2022-07-28 VITALS
DIASTOLIC BLOOD PRESSURE: 64 MMHG | SYSTOLIC BLOOD PRESSURE: 130 MMHG | RESPIRATION RATE: 18 BRPM | TEMPERATURE: 98 F | OXYGEN SATURATION: 98 % | BODY MASS INDEX: 27.74 KG/M2 | HEIGHT: 74 IN | HEART RATE: 88 BPM

## 2022-07-28 DIAGNOSIS — I10 ESSENTIAL HYPERTENSION: ICD-10-CM

## 2022-07-28 DIAGNOSIS — I70.0 AORTIC ATHEROSCLEROSIS: ICD-10-CM

## 2022-07-28 DIAGNOSIS — Z12.5 SCREENING PSA (PROSTATE SPECIFIC ANTIGEN): ICD-10-CM

## 2022-07-28 DIAGNOSIS — E78.2 MIXED HYPERLIPIDEMIA: ICD-10-CM

## 2022-07-28 DIAGNOSIS — R97.20 ELEVATED PSA: Primary | ICD-10-CM

## 2022-07-28 DIAGNOSIS — G89.29 CHRONIC NEUROPATHIC PAIN: ICD-10-CM

## 2022-07-28 DIAGNOSIS — R73.09 ELEVATED HEMOGLOBIN A1C: ICD-10-CM

## 2022-07-28 DIAGNOSIS — G89.29 CHRONIC NEUROPATHIC PAIN: Primary | ICD-10-CM

## 2022-07-28 DIAGNOSIS — M79.2 CHRONIC NEUROPATHIC PAIN: ICD-10-CM

## 2022-07-28 DIAGNOSIS — M79.2 CHRONIC NEUROPATHIC PAIN: Primary | ICD-10-CM

## 2022-07-28 LAB
ALBUMIN SERPL BCP-MCNC: 4.2 G/DL (ref 3.5–5.2)
ALP SERPL-CCNC: 50 U/L (ref 55–135)
ALT SERPL W/O P-5'-P-CCNC: 22 U/L (ref 10–44)
ANION GAP SERPL CALC-SCNC: 12 MMOL/L (ref 8–16)
AST SERPL-CCNC: 23 U/L (ref 10–40)
BASOPHILS # BLD AUTO: 0.03 K/UL (ref 0–0.2)
BASOPHILS NFR BLD: 0.5 % (ref 0–1.9)
BILIRUB SERPL-MCNC: 0.9 MG/DL (ref 0.1–1)
BUN SERPL-MCNC: 15 MG/DL (ref 8–23)
CALCIUM SERPL-MCNC: 10 MG/DL (ref 8.7–10.5)
CHLORIDE SERPL-SCNC: 102 MMOL/L (ref 95–110)
CHOLEST SERPL-MCNC: 215 MG/DL (ref 120–199)
CHOLEST/HDLC SERPL: 5.1 {RATIO} (ref 2–5)
CO2 SERPL-SCNC: 21 MMOL/L (ref 23–29)
COMPLEXED PSA SERPL-MCNC: 9.4 NG/ML (ref 0–4)
CREAT SERPL-MCNC: 0.7 MG/DL (ref 0.5–1.4)
DIFFERENTIAL METHOD: ABNORMAL
EOSINOPHIL # BLD AUTO: 0.1 K/UL (ref 0–0.5)
EOSINOPHIL NFR BLD: 1.4 % (ref 0–8)
ERYTHROCYTE [DISTWIDTH] IN BLOOD BY AUTOMATED COUNT: 13 % (ref 11.5–14.5)
EST. GFR  (AFRICAN AMERICAN): >60 ML/MIN/1.73 M^2
EST. GFR  (NON AFRICAN AMERICAN): >60 ML/MIN/1.73 M^2
ESTIMATED AVG GLUCOSE: 123 MG/DL (ref 68–131)
GLUCOSE SERPL-MCNC: 119 MG/DL (ref 70–110)
HBA1C MFR BLD: 5.9 % (ref 4–5.6)
HCT VFR BLD AUTO: 47.3 % (ref 40–54)
HDLC SERPL-MCNC: 42 MG/DL (ref 40–75)
HDLC SERPL: 19.5 % (ref 20–50)
HGB BLD-MCNC: 15.1 G/DL (ref 14–18)
IMM GRANULOCYTES # BLD AUTO: 0.1 K/UL (ref 0–0.04)
IMM GRANULOCYTES NFR BLD AUTO: 1.6 % (ref 0–0.5)
LDLC SERPL CALC-MCNC: 149.6 MG/DL (ref 63–159)
LYMPHOCYTES # BLD AUTO: 2.1 K/UL (ref 1–4.8)
LYMPHOCYTES NFR BLD: 32.6 % (ref 18–48)
MCH RBC QN AUTO: 28.8 PG (ref 27–31)
MCHC RBC AUTO-ENTMCNC: 31.9 G/DL (ref 32–36)
MCV RBC AUTO: 90 FL (ref 82–98)
MONOCYTES # BLD AUTO: 0.6 K/UL (ref 0.3–1)
MONOCYTES NFR BLD: 10.2 % (ref 4–15)
NEUTROPHILS # BLD AUTO: 3.4 K/UL (ref 1.8–7.7)
NEUTROPHILS NFR BLD: 53.7 % (ref 38–73)
NONHDLC SERPL-MCNC: 173 MG/DL
NRBC BLD-RTO: 0 /100 WBC
PLATELET # BLD AUTO: 339 K/UL (ref 150–450)
PMV BLD AUTO: 9.2 FL (ref 9.2–12.9)
POTASSIUM SERPL-SCNC: 4.1 MMOL/L (ref 3.5–5.1)
PROT SERPL-MCNC: 7.4 G/DL (ref 6–8.4)
RBC # BLD AUTO: 5.24 M/UL (ref 4.6–6.2)
SODIUM SERPL-SCNC: 135 MMOL/L (ref 136–145)
TRIGL SERPL-MCNC: 117 MG/DL (ref 30–150)
TSH SERPL DL<=0.005 MIU/L-ACNC: 0.98 UIU/ML (ref 0.4–4)
WBC # BLD AUTO: 6.28 K/UL (ref 3.9–12.7)

## 2022-07-28 PROCEDURE — 36415 COLL VENOUS BLD VENIPUNCTURE: CPT | Mod: PO | Performed by: FAMILY MEDICINE

## 2022-07-28 PROCEDURE — 84443 ASSAY THYROID STIM HORMONE: CPT | Performed by: FAMILY MEDICINE

## 2022-07-28 PROCEDURE — 83036 HEMOGLOBIN GLYCOSYLATED A1C: CPT | Performed by: FAMILY MEDICINE

## 2022-07-28 PROCEDURE — 99214 OFFICE O/P EST MOD 30 MIN: CPT | Mod: S$GLB,,, | Performed by: FAMILY MEDICINE

## 2022-07-28 PROCEDURE — 80061 LIPID PANEL: CPT | Performed by: FAMILY MEDICINE

## 2022-07-28 PROCEDURE — 80053 COMPREHEN METABOLIC PANEL: CPT | Performed by: FAMILY MEDICINE

## 2022-07-28 PROCEDURE — 99214 PR OFFICE/OUTPT VISIT, EST, LEVL IV, 30-39 MIN: ICD-10-PCS | Mod: S$GLB,,, | Performed by: FAMILY MEDICINE

## 2022-07-28 PROCEDURE — 84153 ASSAY OF PSA TOTAL: CPT | Performed by: FAMILY MEDICINE

## 2022-07-28 PROCEDURE — 85025 COMPLETE CBC W/AUTO DIFF WBC: CPT | Performed by: FAMILY MEDICINE

## 2022-07-28 PROCEDURE — 99999 PR PBB SHADOW E&M-EST. PATIENT-LVL IV: ICD-10-PCS | Mod: PBBFAC,,, | Performed by: FAMILY MEDICINE

## 2022-07-28 PROCEDURE — 99999 PR PBB SHADOW E&M-EST. PATIENT-LVL IV: CPT | Mod: PBBFAC,,, | Performed by: FAMILY MEDICINE

## 2022-07-28 RX ORDER — DULOXETIN HYDROCHLORIDE 30 MG/1
CAPSULE, DELAYED RELEASE ORAL
COMMUNITY
Start: 2022-06-29 | End: 2023-02-28

## 2022-07-28 RX ORDER — CYCLOBENZAPRINE HCL 10 MG
TABLET ORAL
COMMUNITY
Start: 2022-05-02 | End: 2022-11-20

## 2022-07-28 RX ORDER — TIZANIDINE 2 MG/1
TABLET ORAL
COMMUNITY
Start: 2022-05-08 | End: 2023-02-28

## 2022-07-28 RX ORDER — OXYCODONE HYDROCHLORIDE 5 MG/1
5 TABLET ORAL EVERY 4 HOURS PRN
COMMUNITY
Start: 2022-05-02 | End: 2022-12-19

## 2022-07-28 NOTE — PROGRESS NOTES
Health Maintenance Due   Topic     Shingles Vaccine (1 of 2)  hx chicken pox. Notified pt can get vaccine at pharmacy

## 2022-07-29 NOTE — TELEPHONE ENCOUNTER
Elevated PSA. Will refer to uro. Chol is elevated. I think it would be ok for patient to restart chol med. Prediabetes improved. Keep appt for next month.

## 2022-07-29 NOTE — TELEPHONE ENCOUNTER
Left voicemail for pt to return call to clinic.     Message sent via [facility] with detail information.

## 2022-07-29 NOTE — PROGRESS NOTES
Subjective:       Patient ID: Robles Jordan is a 71 y.o. male.    Chief Complaint: Follow-up      HPI  72 yo male presents for 6 month f/u. Follows at [facility] neuro for chronic neuropathy. Pt had multiple imaging. Wife states he has a f/u w/ neuro next month. Pt continues to have worsening weakness. States he has numbness in his toes.     Review of Systems   Constitutional: Negative.    HENT: Negative.    Respiratory: Negative.    Cardiovascular: Negative.    Gastrointestinal: Negative.    Endocrine: Negative.    Genitourinary: Negative.    Musculoskeletal: Negative.    Neurological: Positive for weakness and numbness.   Psychiatric/Behavioral: Negative.           Past Medical History:   Diagnosis Date    Back pain     BPH (benign prostatic hypertrophy)     Hx of colonic polyps 10/19/2015    Hyperlipidemia     Hypertension     Hypertension     Sleep apnea     Spinal cord disease      Past Surgical History:   Procedure Laterality Date    COLONOSCOPY N/A 10/19/2015    Procedure: COLONOSCOPY;  Surgeon: Antonino Bernstein MD;  Location: [address]);  Service: Endoscopy;  Laterality: N/A;    COLONOSCOPY N/A 2/25/2021    Procedure: COLONOSCOPY;  Surgeon: Blanca Don MD;  Location: [address]);  Service: Endoscopy;  Laterality: N/A;  requesting ASAP  COVID test at [place] on 2/22-GT.2/24 called pt. move up to earlier spot. pt. did NOT want to come earlier.EC    CYSTOSCOPY WITH URODYNAMIC TESTING N/A 1/17/2020    Procedure: CYSTOSCOPY,WITH URODYNAMIC TESTING;  Surgeon: Christianne Israel MD;  Location: [facility];  Service: Urology;  Laterality: N/A;  RN PREOP 1/13/2020    MULTIPLE TOOTH EXTRACTIONS  2014     Family History   Problem Relation Age of Onset    Cancer Mother         Breast    Stroke Sister 65        Fatal    Cancer Maternal Uncle         Pancreas    Cancer Paternal Uncle         Pancreas    Cancer Maternal Grandmother         Breast    Hypertension Father     Hyperlipidemia  Father      Social History     Socioeconomic History    Marital status:      Spouse name: Neeru     Number of children: 2    Highest education level: Bachelor's degree (e.g., BA, AB, BS)   Occupational History    Occupation: retired    Tobacco Use    Smoking status: Light Tobacco Smoker     Types: Cigarettes    Smokeless tobacco: Never Used    Tobacco comment: 3 cigarettes a day    Substance and Sexual Activity    Alcohol use: Yes     Alcohol/week: 1.0 standard drink     Types: 1 Cans of beer per week     Comment: very seldom    Drug use: No    Sexual activity: Not Currently     Partners: Female   Social History Narrative     has 2 step children is a       Social Determinants of Health     Financial Resource Strain: Low Risk     Difficulty of Paying Living Expenses: Not hard at all   Food Insecurity: No Food Insecurity    Worried About Running Out of Food in the Last Year: Never true    Ran Out of Food in the Last Year: Never true   Transportation Needs: No Transportation Needs    Lack of Transportation (Medical): No    Lack of Transportation (Non-Medical): No   Physical Activity: Insufficiently Active    Days of Exercise per Week: 3 days    Minutes of Exercise per Session: 30 min   Stress: No Stress Concern Present    Feeling of Stress : Only a little   Social Connections: Moderately Isolated    Frequency of Communication with Friends and Family: More than three times a week    Frequency of Social Gatherings with Friends and Family: Once a week    Attends Temple Services: Never    Active Member of Clubs or Organizations: No    Attends Club or Organization Meetings: Never    Marital Status:    Housing Stability: Low Risk     Unable to Pay for Housing in the Last Year: No    Number of Places Lived in the Last Year: 1    Unstable Housing in the Last Year: No       Current Outpatient Medications:     amLODIPine (NORVASC) 10 MG tablet, Take 1 tablet (10 mg  total) by mouth once daily. For blood pressure, Disp: 90 tablet, Rfl: 3    chlorthalidone (HYGROTEN) 25 MG Tab, Take 1 tablet (25 mg total) by mouth once daily., Disp: 90 tablet, Rfl: 3    DULoxetine (CYMBALTA) 30 MG capsule, TAKE 1 CAPSULE BY MOUTH EVERY DAY FOR 30 DAYS, Disp: , Rfl:     gabapentin (NEURONTIN) 100 MG capsule, , Disp: , Rfl:     ibuprofen (ADVIL,MOTRIN) 800 MG tablet, Take 1 tablet (800 mg total) by mouth 3 (three) times daily as needed for Pain., Disp: 45 tablet, Rfl: 5    lisinopriL (PRINIVIL,ZESTRIL) 40 MG tablet, Take 1 tablet (40 mg total) by mouth once daily., Disp: 90 tablet, Rfl: 3    tamsulosin (FLOMAX) 0.4 mg Cap, Take 1 capsule (0.4 mg total) by mouth once daily., Disp: 90 capsule, Rfl: 3    acetaminophen (TYLENOL) 325 MG tablet, Take 2 tablets (650 mg total) by mouth every 6 (six) hours as needed for Temperature greater than (or equal to 101 degree F)., Disp:  , Rfl: 0    atorvastatin (LIPITOR) 80 MG tablet, TAKE 1 TABLET BY MOUTH EVERY DAY, Disp: 90 tablet, Rfl: 0    bisacodyL (DULCOLAX) 10 mg Supp, Place 1 suppository (10 mg total) rectally daily as needed., Disp:  , Rfl: 0    cyclobenzaprine (FLEXERIL) 10 MG tablet, Take by mouth., Disp: , Rfl:     oxyCODONE (ROXICODONE) 5 MG immediate release tablet, Take 5 mg by mouth every 4 (four) hours as needed., Disp: , Rfl:     polyethylene glycol (GLYCOLAX) 17 gram PwPk, Take 17 g by mouth once daily., Disp: , Rfl: 0    senna-docusate 8.6-50 mg (PERICOLACE) 8.6-50 mg per tablet, Take 2 tablets by mouth nightly as needed for Constipation., Disp:  , Rfl:     sildenafil (VIAGRA) 100 MG tablet, Take 1 tablet (100 mg total) by mouth daily as needed for Erectile Dysfunction., Disp: 30 tablet, Rfl: 2    tiZANidine (ZANAFLEX) 2 MG tablet, SMARTSI Tablet(s) By Mouth 1 to 3 Times Daily PRN, Disp: , Rfl:     valACYclovir (VALTREX) 1000 MG tablet, Take 1 tablet (1,000 mg total) by mouth 3 (three) times daily. for 7 days, Disp: 21  "tablet, Rfl: 0  No current facility-administered medications for this visit.    Facility-Administered Medications Ordered in Other Visits:     mupirocin 2 % ointment, , Nasal, On Call Procedure, Briana Seals PA-C   Objective:      Vitals:    07/28/22 0943   BP: 130/64   BP Location: Left arm   Patient Position: Sitting   BP Method: Small (Manual)   Pulse: 88   Resp: 18   Temp: 98.2 °F (36.8 °C)   TempSrc: Oral   SpO2: 98%   Height: 6' 2" (1.88 m)       Physical Exam  Constitutional:       General: He is not in acute distress.     Appearance: He is not diaphoretic.   HENT:      Head: Normocephalic and atraumatic.   Eyes:      Conjunctiva/sclera: Conjunctivae normal.   Pulmonary:      Effort: Pulmonary effort is normal.   Musculoskeletal:      Cervical back: Neck supple.      Comments: In a wheelchair   Skin:     Findings: No rash.   Neurological:      Mental Status: He is alert and oriented to person, place, and time.   Psychiatric:         Behavior: Behavior normal.         Thought Content: Thought content normal.         Judgment: Judgment normal.            Assessment:       1. Chronic neuropathic pain    2. Elevated hemoglobin A1c    3. Aortic atherosclerosis    4. Essential hypertension    5. Mixed hyperlipidemia    6. Screening PSA (prostate specific antigen)        Plan:       Chronic neuropathic pain  -     CBC Auto Differential; Future; Expected date: 07/28/2022  -     Comprehensive Metabolic Panel; Future; Expected date: 07/28/2022  -     Hemoglobin A1C; Future; Expected date: 07/28/2022  -     TSH; Future; Expected date: 07/28/2022  -     Lipid Panel; Future; Expected date: 07/28/2022    Elevated hemoglobin A1c  -     CBC Auto Differential; Future; Expected date: 07/28/2022  -     Comprehensive Metabolic Panel; Future; Expected date: 07/28/2022  -     Hemoglobin A1C; Future; Expected date: 07/28/2022  -     TSH; Future; Expected date: 07/28/2022  -     Lipid Panel; Future; Expected date: " 07/28/2022    Aortic atherosclerosis  -     CBC Auto Differential; Future; Expected date: 07/28/2022  -     Comprehensive Metabolic Panel; Future; Expected date: 07/28/2022  -     Hemoglobin A1C; Future; Expected date: 07/28/2022  -     TSH; Future; Expected date: 07/28/2022  -     Lipid Panel; Future; Expected date: 07/28/2022    Essential hypertension  -     CBC Auto Differential; Future; Expected date: 07/28/2022  -     Comprehensive Metabolic Panel; Future; Expected date: 07/28/2022  -     Hemoglobin A1C; Future; Expected date: 07/28/2022  -     TSH; Future; Expected date: 07/28/2022  -     Lipid Panel; Future; Expected date: 07/28/2022    Mixed hyperlipidemia  -     CBC Auto Differential; Future; Expected date: 07/28/2022  -     Comprehensive Metabolic Panel; Future; Expected date: 07/28/2022  -     Hemoglobin A1C; Future; Expected date: 07/28/2022  -     TSH; Future; Expected date: 07/28/2022  -     Lipid Panel; Future; Expected date: 07/28/2022    Screening PSA (prostate specific antigen)  -     PSA, SCREENING; Future; Expected date: 07/28/2022      F/u labs. F/u in 1 month. Will attempt to get [facility] from [facility].            Patient note was created using [employer] MXR.  Any errors in syntax or even information may not have been identified and edited on initial review prior to signing this note.

## 2022-08-15 ENCOUNTER — TELEPHONE (OUTPATIENT)
Dept: REHABILITATION | Facility: HOSPITAL | Age: 72
End: 2022-08-15
Payer: COMMERCIAL

## 2022-08-17 ENCOUNTER — OFFICE VISIT (OUTPATIENT)
Dept: UROLOGY | Facility: CLINIC | Age: 72
End: 2022-08-17
Payer: COMMERCIAL

## 2022-08-17 VITALS — BODY MASS INDEX: 27.25 KG/M2 | WEIGHT: 212.31 LBS | HEIGHT: 74 IN

## 2022-08-17 DIAGNOSIS — R97.20 ELEVATED PSA: ICD-10-CM

## 2022-08-17 PROCEDURE — 81003 POCT URINALYSIS, DIPSTICK, AUTOMATED, W/O SCOPE: ICD-10-PCS | Mod: QW,S$GLB,, | Performed by: STUDENT IN AN ORGANIZED HEALTH CARE EDUCATION/TRAINING PROGRAM

## 2022-08-17 PROCEDURE — 99999 PR PBB SHADOW E&M-EST. PATIENT-LVL IV: ICD-10-PCS | Mod: PBBFAC,,, | Performed by: STUDENT IN AN ORGANIZED HEALTH CARE EDUCATION/TRAINING PROGRAM

## 2022-08-17 PROCEDURE — 51798 US URINE CAPACITY MEASURE: CPT | Mod: S$GLB,,, | Performed by: STUDENT IN AN ORGANIZED HEALTH CARE EDUCATION/TRAINING PROGRAM

## 2022-08-17 PROCEDURE — 99214 PR OFFICE/OUTPT VISIT, EST, LEVL IV, 30-39 MIN: ICD-10-PCS | Mod: S$GLB,,, | Performed by: STUDENT IN AN ORGANIZED HEALTH CARE EDUCATION/TRAINING PROGRAM

## 2022-08-17 PROCEDURE — 81003 URINALYSIS AUTO W/O SCOPE: CPT | Mod: QW,S$GLB,, | Performed by: STUDENT IN AN ORGANIZED HEALTH CARE EDUCATION/TRAINING PROGRAM

## 2022-08-17 PROCEDURE — 99214 OFFICE O/P EST MOD 30 MIN: CPT | Mod: S$GLB,,, | Performed by: STUDENT IN AN ORGANIZED HEALTH CARE EDUCATION/TRAINING PROGRAM

## 2022-08-17 PROCEDURE — 51798 POCT BLADDER SCAN: ICD-10-PCS | Mod: S$GLB,,, | Performed by: STUDENT IN AN ORGANIZED HEALTH CARE EDUCATION/TRAINING PROGRAM

## 2022-08-17 PROCEDURE — 99999 PR PBB SHADOW E&M-EST. PATIENT-LVL IV: CPT | Mod: PBBFAC,,, | Performed by: STUDENT IN AN ORGANIZED HEALTH CARE EDUCATION/TRAINING PROGRAM

## 2022-08-17 RX ORDER — ENEMA 19; 7 G/133ML; G/133ML
1 ENEMA RECTAL ONCE
Qty: 133 ML | Refills: 0 | Status: SHIPPED | OUTPATIENT
Start: 2022-08-31 | End: 2022-08-31

## 2022-08-17 RX ORDER — CIPROFLOXACIN 500 MG/1
500 TABLET ORAL EVERY 12 HOURS
Qty: 6 TABLET | Refills: 0 | Status: SHIPPED | OUTPATIENT
Start: 2022-08-31 | End: 2022-09-03

## 2022-08-17 NOTE — PROGRESS NOTES
Patient ID: Robles Jordan is a 71 y.o. male.    Chief Complaint: Elevated PSA    Referral: Tera Jarquin MD  [address] [place] [address]     HPI  71 y.o. who presents to the Urology clinic for evaluation of  Elevated PSA  9.4 checked 7/2022. PSA previously 1.8 - 2 years ago, 2.6 3 years ago.   He was previously seen by Dr. Israel in 2020. 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   No UTIs since last appt.   Denies dysuria, hematuria, nausea, vomiting, flank pain, new abdominal pain  Uncle with prostate cancer, other family members with pancreatic/colon/ breast cancer.   He is wheelchair bound  IPSS [id number], pleased     Medically Necessary ROS documented in HPI    Past Medical History  Active Ambulatory Problems     Diagnosis Date Noted    Special screening for malignant neoplasms, colon 10/19/2015    Hyperlipidemia 10/23/2015    Essential hypertension 10/23/2015    Obesity (BMI 30.0-34.9) 10/23/2015    Elevated hemoglobin A1c 01/22/2016    Obstructive sleep apnea on CPAP 01/22/2016    Tobacco use 11/12/2018    History of colon polyps 11/12/2018    Impaired functional mobility, balance, gait, and endurance 12/04/2019    Right leg weakness 12/04/2019    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    Liver cyst -- check ultrasound 08/2020 02/19/2020    Right sided weakness 06/21/2020    Tethered cord syndrome 07/23/2020    S/P lumbar laminectomy 08/12/2020    Other specified diseases of spinal cord 08/18/2020    Difficulty walking 02/09/2021    Colon cancer screening 02/25/2021    Hyperglycemia 05/04/2021    Prediabetes 05/19/2021    Aortic atherosclerosis 06/22/2022     Resolved Ambulatory Problems     Diagnosis Date Noted    Positive depression screening 05/11/2018    Right hip pain 05/11/2018    Hypoxia 08/15/2020    Obesity 08/15/2020    Lumbar pain 09/01/2020    Thrombocytopenia, unspecified 05/04/2021     Past Medical  History:   Diagnosis Date    Back pain     BPH (benign prostatic hypertrophy)     Hx of colonic polyps 10/19/2015    Hypertension     Hypertension     Sleep apnea     Spinal cord disease          Past Surgical History  Past Surgical History:   Procedure Laterality Date    COLONOSCOPY N/A 10/19/2015    Procedure: COLONOSCOPY;  Surgeon: Antonino Bernstein MD;  Location: [facility] (4TH FLR);  Service: Endoscopy;  Laterality: N/A;    COLONOSCOPY N/A 2/25/2021    Procedure: COLONOSCOPY;  Surgeon: Blanca Don MD;  [facility]  Service: Endoscopy;  Laterality: N/A;  requesting ASAP  COVID test at [place] on 2/22-GT.2/24 called pt. move up to earlier spot. pt. did NOT want to come earlier.EC    CYSTOSCOPY WITH URODYNAMIC TESTING N/A 1/17/2020    Procedure: CYSTOSCOPY,WITH URODYNAMIC TESTING;  Surgeon: Christianne Israel MD;  [facility];  Service: Urology;  Laterality: N/A;  RN PREOP 1/13/2020    MULTIPLE TOOTH EXTRACTIONS  2014       Social History  Social Connections: Moderately Isolated    Frequency of Communication with Friends and Family: More than three times a week    Frequency of Social Gatherings with Friends and Family: Once a week    Attends Mu-ism Services: Never    Active Member of Clubs or Organizations: No    Attends Club or Organization Meetings: Never    Marital Status:        Medications    Current Outpatient Medications:     acetaminophen (TYLENOL) 325 MG tablet, Take 2 tablets (650 mg total) by mouth every 6 (six) hours as needed for Temperature greater than (or equal to 101 degree F)., Disp:  , Rfl: 0    amLODIPine (NORVASC) 10 MG tablet, Take 1 tablet (10 mg total) by mouth once daily. For blood pressure, Disp: 90 tablet, Rfl: 3    atorvastatin (LIPITOR) 80 MG tablet, TAKE 1 TABLET BY MOUTH EVERY DAY, Disp: 90 tablet, Rfl: 0    bisacodyL (DULCOLAX) 10 mg Supp, Place 1 suppository (10 mg total) rectally daily as needed., Disp:  , Rfl: 0     chlorthalidone (HYGROTEN) 25 MG Tab, Take 1 tablet (25 mg total) by mouth once daily., Disp: 90 tablet, Rfl: 3    cyclobenzaprine (FLEXERIL) 10 MG tablet, Take by mouth., Disp: , Rfl:     DULoxetine (CYMBALTA) 30 MG capsule, TAKE 1 CAPSULE BY MOUTH EVERY DAY FOR 30 DAYS, Disp: , Rfl:     gabapentin (NEURONTIN) 100 MG capsule, , Disp: , Rfl:     ibuprofen (ADVIL,MOTRIN) 800 MG tablet, Take 1 tablet (800 mg total) by mouth 3 (three) times daily as needed for Pain., Disp: 45 tablet, Rfl: 5    lisinopriL (PRINIVIL,ZESTRIL) 40 MG tablet, Take 1 tablet (40 mg total) by mouth once daily., Disp: 90 tablet, Rfl: 3    oxyCODONE (ROXICODONE) 5 MG immediate release tablet, Take 5 mg by mouth every 4 (four) hours as needed., Disp: , Rfl:     polyethylene glycol (GLYCOLAX) 17 gram PwPk, Take 17 g by mouth once daily., Disp: , Rfl: 0    senna-docusate 8.6-50 mg (PERICOLACE) 8.6-50 mg per tablet, Take 2 tablets by mouth nightly as needed for Constipation., Disp:  , Rfl:     sildenafil (VIAGRA) 100 MG tablet, Take 1 tablet (100 mg total) by mouth daily as needed for Erectile Dysfunction., Disp: 30 tablet, Rfl: 2    tamsulosin (FLOMAX) 0.4 mg Cap, Take 1 capsule (0.4 mg total) by mouth once daily., Disp: 90 capsule, Rfl: 3    tiZANidine (ZANAFLEX) 2 MG tablet, SMARTSI Tablet(s) By Mouth 1 to 3 Times Daily PRN, Disp: , Rfl:     valACYclovir (VALTREX) 1000 MG tablet, Take 1 tablet (1,000 mg total) by mouth 3 (three) times daily. for 7 days, Disp: 21 tablet, Rfl: 0  No current facility-administered medications for this visit.    Facility-Administered Medications Ordered in Other Visits:     mupirocin 2 % ointment, , Nasal, On Call Procedure, Briana Seals PA-C    Allergies  Review of patient's allergies indicates:  No Known Allergies    Patient's PMH, FH, Social hx, Medications, allergies reviewed and updated as pertinent to today's visit    Objective:      Physical Exam  Constitutional:       General: He is not  in acute distress.     Appearance: He is well-developed. He is not ill-appearing, toxic-appearing or diaphoretic.   HENT:      Head: Normocephalic and atraumatic.      Mouth/Throat:      Mouth: Mucous membranes are moist.   Eyes:      Conjunctiva/sclera: Conjunctivae normal.   Pulmonary:      Effort: Pulmonary effort is normal. No respiratory distress.   Abdominal:      General: There is no distension.      Palpations: Abdomen is soft. There is no mass.      Tenderness: There is no abdominal tenderness. There is no guarding.   Musculoskeletal:         General: No swelling or deformity.      Cervical back: Neck supple.      Comments: Wheelchair bound   Skin:     General: Skin is warm.      Capillary Refill: Capillary refill takes less than 2 seconds.      Findings: No rash.   Neurological:      Mental Status: He is alert and oriented to person, place, and time.   Psychiatric:         Mood and Affect: Mood normal.         Thought Content: Thought content normal.         Judgment: Judgment normal.         Assessment:       1. Elevated PSA        Plan:       POCT UA no signs of UTI  PVR 38 cc    Discussed risks and benefits of PSA for prostate cancer screening. Discussed elevated PSA is concerning for malignancy, but the differential includes benign causes. Discussed TRUS prostate biopsy is the definitive way to determine prostate cancer. Will repeat PSA and plan for transrectal ultrasound of prostate with prostate biopsy if PSA still greater than 4    Discussed the role of biopsy, how the procedure is performed with transrectal ultrasound. Anticipate  blood in urine, semen, stool for 6-8 weeks post procedure. Discussed the risk of sepsis post prostate biopsy.     Patient given Rx for antibiotics to start taking day before, day of and day after procedure.

## 2022-08-18 LAB
BILIRUB UR QL STRIP: NEGATIVE
GLUCOSE UR QL STRIP: NEGATIVE
KETONES UR QL STRIP: NEGATIVE
LEUKOCYTE ESTERASE UR QL STRIP: NEGATIVE
PH, POC UA: 7
POC BLOOD, URINE: NEGATIVE
POC NITRATES, URINE: NEGATIVE
POC RESIDUAL URINE VOLUME: 38 ML (ref 0–100)
PROT UR QL STRIP: POSITIVE
SP GR UR STRIP: 1.01 (ref 1–1.03)
UROBILINOGEN UR STRIP-ACNC: NORMAL (ref 0.3–2.2)

## 2022-08-30 NOTE — PROGRESS NOTES
Health Maintenance Due   Topic     Shingles Vaccine (1 of 2)  hx chicken pox. Notified pt can get vaccine at pharmacy

## 2022-08-31 ENCOUNTER — OFFICE VISIT (OUTPATIENT)
Dept: FAMILY MEDICINE | Facility: CLINIC | Age: 72
End: 2022-08-31
Payer: COMMERCIAL

## 2022-08-31 ENCOUNTER — LAB VISIT (OUTPATIENT)
Dept: LAB | Facility: HOSPITAL | Age: 72
End: 2022-08-31
Attending: STUDENT IN AN ORGANIZED HEALTH CARE EDUCATION/TRAINING PROGRAM
Payer: COMMERCIAL

## 2022-08-31 VITALS
BODY MASS INDEX: 27.26 KG/M2 | TEMPERATURE: 99 F | RESPIRATION RATE: 18 BRPM | OXYGEN SATURATION: 97 % | DIASTOLIC BLOOD PRESSURE: 60 MMHG | SYSTOLIC BLOOD PRESSURE: 114 MMHG | HEART RATE: 86 BPM | HEIGHT: 74 IN

## 2022-08-31 DIAGNOSIS — I70.0 AORTIC ATHEROSCLEROSIS: ICD-10-CM

## 2022-08-31 DIAGNOSIS — E78.5 HYPERLIPIDEMIA, UNSPECIFIED HYPERLIPIDEMIA TYPE: ICD-10-CM

## 2022-08-31 DIAGNOSIS — Z98.890 S/P LUMBAR LAMINECTOMY: ICD-10-CM

## 2022-08-31 DIAGNOSIS — R97.20 ELEVATED PSA: ICD-10-CM

## 2022-08-31 DIAGNOSIS — J01.90 ACUTE NON-RECURRENT SINUSITIS, UNSPECIFIED LOCATION: ICD-10-CM

## 2022-08-31 DIAGNOSIS — R97.20 ELEVATED PSA: Primary | ICD-10-CM

## 2022-08-31 DIAGNOSIS — R73.03 PREDIABETES: ICD-10-CM

## 2022-08-31 DIAGNOSIS — Q06.8 TETHERED CORD SYNDROME: ICD-10-CM

## 2022-08-31 LAB — COMPLEXED PSA SERPL-MCNC: 8.7 NG/ML (ref 0–4)

## 2022-08-31 PROCEDURE — 99999 PR PBB SHADOW E&M-EST. PATIENT-LVL IV: CPT | Mod: PBBFAC,,, | Performed by: FAMILY MEDICINE

## 2022-08-31 PROCEDURE — 99999 PR PBB SHADOW E&M-EST. PATIENT-LVL IV: ICD-10-PCS | Mod: PBBFAC,,, | Performed by: FAMILY MEDICINE

## 2022-08-31 PROCEDURE — 36415 COLL VENOUS BLD VENIPUNCTURE: CPT | Mod: PO | Performed by: STUDENT IN AN ORGANIZED HEALTH CARE EDUCATION/TRAINING PROGRAM

## 2022-08-31 PROCEDURE — 99214 PR OFFICE/OUTPT VISIT, EST, LEVL IV, 30-39 MIN: ICD-10-PCS | Mod: S$GLB,,, | Performed by: FAMILY MEDICINE

## 2022-08-31 PROCEDURE — 99214 OFFICE O/P EST MOD 30 MIN: CPT | Mod: S$GLB,,, | Performed by: FAMILY MEDICINE

## 2022-08-31 PROCEDURE — 84153 ASSAY OF PSA TOTAL: CPT | Performed by: STUDENT IN AN ORGANIZED HEALTH CARE EDUCATION/TRAINING PROGRAM

## 2022-08-31 RX ORDER — AZITHROMYCIN 250 MG/1
TABLET, FILM COATED ORAL
Qty: 6 TABLET | Refills: 0 | Status: SHIPPED | OUTPATIENT
Start: 2022-08-31 | End: 2022-09-05

## 2022-08-31 RX ORDER — ATORVASTATIN CALCIUM 20 MG/1
20 TABLET, FILM COATED ORAL DAILY
Qty: 90 TABLET | Refills: 1 | Status: SHIPPED | OUTPATIENT
Start: 2022-08-31 | End: 2023-02-28 | Stop reason: SDUPTHER

## 2022-09-01 ENCOUNTER — TELEPHONE (OUTPATIENT)
Dept: UROLOGY | Facility: CLINIC | Age: 72
End: 2022-09-01
Payer: COMMERCIAL

## 2022-09-01 ENCOUNTER — ANESTHESIA EVENT (OUTPATIENT)
Dept: SURGERY | Facility: HOSPITAL | Age: 72
End: 2022-09-01
Payer: COMMERCIAL

## 2022-09-01 DIAGNOSIS — R97.20 ELEVATED PSA: Primary | ICD-10-CM

## 2022-09-01 NOTE — TELEPHONE ENCOUNTER
Called pts wife no answer/no voicemail.      ----- Message from Bernice Mg sent at 9/1/2022  9:07 AM CDT -----  Type: Patient Call Back    Who called: wife Neeru     What is the request in detail: Requesting to speak with a nurse regarding her 's medications. Would also like to discuss patient's PSA results.     Can the clinic reply by [facility]? No     Would the patient rather a call back or a response via [facility]? Call back     Best call back number: [phone]

## 2022-09-01 NOTE — PROGRESS NOTES
Subjective:       Patient ID: Robles Jordan is a 71 y.o. male.    Chief Complaint: Follow-up      Follow-up  Associated symptoms include congestion and a sore throat.     71-year-old male presents for one-month follow-up.  Patient was found to have elevated PSA old was referred to Urology for low blood work today.  Follows with physical therapy.  Has a follow-up with Neurology later this month.  Has URIs symptoms for about 2 weeks.      Review of Systems   Constitutional: Negative.    HENT:  Positive for congestion and sore throat.    Respiratory: Negative.     Cardiovascular: Negative.    Gastrointestinal: Negative.    Endocrine: Negative.    Genitourinary: Negative.    Musculoskeletal: Negative.    Neurological: Negative.    Psychiatric/Behavioral: Negative.          Past Medical History:   Diagnosis Date    Back pain     BPH (benign prostatic hypertrophy)     Hx of colonic polyps 10/19/2015    Hyperlipidemia     Hypertension     Hypertension     Sleep apnea     Spinal cord disease      Past Surgical History:   Procedure Laterality Date    COLONOSCOPY N/A 10/19/2015    Procedure: COLONOSCOPY;  Surgeon: Antonino Bernstein MD;  Location: [address]);  Service: Endoscopy;  Laterality: N/A;    COLONOSCOPY N/A 2/25/2021    Procedure: COLONOSCOPY;  Surgeon: Blanca Don MD;  Location: [address]);  Service: Endoscopy;  Laterality: N/A;  requesting ASAP  COVID test at [place] on 2/22-GT.2/24 called pt. move up to earlier spot. pt. did NOT want to come earlier.EC    CYSTOSCOPY WITH URODYNAMIC TESTING N/A 1/17/2020    Procedure: CYSTOSCOPY,WITH URODYNAMIC TESTING;  Surgeon: Christianne Israel MD;  Location: [facility];  Service: Urology;  Laterality: N/A;  RN PREOP 1/13/2020    MULTIPLE TOOTH EXTRACTIONS  2014     Family History   Problem Relation Age of Onset    Cancer Mother         Breast    Stroke Sister 65        Fatal    Cancer Maternal Uncle         Pancreas    Cancer Paternal Uncle          Pancreas    Cancer Maternal Grandmother         Breast    Hypertension Father     Hyperlipidemia Father      Social History     Socioeconomic History    Marital status:      Spouse name: Neeru     Number of children: 2    Highest education level: Bachelor's degree (e.g., BA, AB, BS)   Occupational History    Occupation: retired    Tobacco Use    Smoking status: Light Smoker     Types: Cigarettes    Smokeless tobacco: Never    Tobacco comments:     3 cigarettes a day    Substance and Sexual Activity    Alcohol use: Yes     Alcohol/week: 1.0 standard drink     Types: 1 Cans of beer per week     Comment: very seldom    Drug use: No    Sexual activity: Not Currently     Partners: Female   Social History Narrative     has 2 step children is a       Social Determinants of Health     Financial Resource Strain: Low Risk     Difficulty of Paying Living Expenses: Not hard at all   Food Insecurity: No Food Insecurity    Worried About Running Out of Food in the Last Year: Never true    Ran Out of Food in the Last Year: Never true   Transportation Needs: No Transportation Needs    Lack of Transportation (Medical): No    Lack of Transportation (Non-Medical): No   Physical Activity: Insufficiently Active    Days of Exercise per Week: 3 days    Minutes of Exercise per Session: 30 min   Stress: No Stress Concern Present    Feeling of Stress : Only a little   Social Connections: Moderately Isolated    Frequency of Communication with Friends and Family: More than three times a week    Frequency of Social Gatherings with Friends and Family: Once a week    Attends Church Services: Never    Active Member of Clubs or Organizations: No    Attends Club or Organization Meetings: Never    Marital Status:    Housing Stability: Low Risk     Unable to Pay for Housing in the Last Year: No    Number of Places Lived in the Last Year: 1    Unstable Housing in the Last Year: No       Current Outpatient Medications:      amLODIPine (NORVASC) 10 MG tablet, Take 1 tablet (10 mg total) by mouth once daily. For blood pressure, Disp: 90 tablet, Rfl: 3    bisacodyL (DULCOLAX) 10 mg Supp, Place 1 suppository (10 mg total) rectally daily as needed., Disp:  , Rfl: 0    ciprofloxacin HCl (CIPRO) 500 MG tablet, Take 1 tablet (500 mg total) by mouth every 12 (twelve) hours. TAKE DAY BEFORE, DAY OF  AND DAY AFTER PROCEDURE DATE for 3 days, Disp: 6 tablet, Rfl: 0    cyclobenzaprine (FLEXERIL) 10 MG tablet, Take by mouth., Disp: , Rfl:     DULoxetine (CYMBALTA) 30 MG capsule, TAKE 1 CAPSULE BY MOUTH EVERY DAY FOR 30 DAYS, Disp: , Rfl:     gabapentin (NEURONTIN) 100 MG capsule, , Disp: , Rfl:     ibuprofen (ADVIL,MOTRIN) 800 MG tablet, Take 1 tablet (800 mg total) by mouth 3 (three) times daily as needed for Pain., Disp: 45 tablet, Rfl: 5    lisinopriL (PRINIVIL,ZESTRIL) 40 MG tablet, Take 1 tablet (40 mg total) by mouth once daily., Disp: 90 tablet, Rfl: 3    oxyCODONE (ROXICODONE) 5 MG immediate release tablet, Take 5 mg by mouth every 4 (four) hours as needed., Disp: , Rfl:     polyethylene glycol (GLYCOLAX) 17 gram PwPk, Take 17 g by mouth once daily., Disp: , Rfl: 0    senna-docusate 8.6-50 mg (PERICOLACE) 8.6-50 mg per tablet, Take 2 tablets by mouth nightly as needed for Constipation., Disp:  , Rfl:     tamsulosin (FLOMAX) 0.4 mg Cap, Take 1 capsule (0.4 mg total) by mouth once daily., Disp: 90 capsule, Rfl: 3    tiZANidine (ZANAFLEX) 2 MG tablet, SMARTSI Tablet(s) By Mouth 1 to 3 Times Daily PRN, Disp: , Rfl:     acetaminophen (TYLENOL) 325 MG tablet, Take 2 tablets (650 mg total) by mouth every 6 (six) hours as needed for Temperature greater than (or equal to 101 degree F)., Disp:  , Rfl: 0    atorvastatin (LIPITOR) 20 MG tablet, Take 1 tablet (20 mg total) by mouth once daily., Disp: 90 tablet, Rfl: 1    azithromycin (Z-SHABNAM) 250 MG tablet, Take 2 tablets by mouth on day 1; Take 1 tablet by mouth on days 2-5, Disp: 6 tablet, Rfl:  "0    sildenafil (VIAGRA) 100 MG tablet, Take 1 tablet (100 mg total) by mouth daily as needed for Erectile Dysfunction., Disp: 30 tablet, Rfl: 2    valACYclovir (VALTREX) 1000 MG tablet, Take 1 tablet (1,000 mg total) by mouth 3 (three) times daily. for 7 days, Disp: 21 tablet, Rfl: 0  No current facility-administered medications for this visit.    Facility-Administered Medications Ordered in Other Visits:     mupirocin 2 % ointment, , Nasal, On Call Procedure, Briana Seals PA-C   Objective:      Vitals:    08/31/22 0746   BP: 114/60   BP Location: Left arm   Patient Position: Sitting   BP Method: Small (Manual)   Pulse: 86   Resp: 18   Temp: 98.5 °F (36.9 °C)   TempSrc: Oral   SpO2: 97%   Height: 6' 2" (1.88 m)       Physical Exam  Constitutional:       General: He is not in acute distress.     Appearance: He is not diaphoretic.   HENT:      Head: Normocephalic and atraumatic.   Eyes:      Conjunctiva/sclera: Conjunctivae normal.   Pulmonary:      Effort: Pulmonary effort is normal.   Musculoskeletal:      Cervical back: Neck supple.   Skin:     Findings: No rash.   Neurological:      Mental Status: He is alert and oriented to person, place, and time.   Psychiatric:         Behavior: Behavior normal.         Thought Content: Thought content normal.         Judgment: Judgment normal.          Assessment:       1. Elevated PSA    2. Prediabetes    3. Hyperlipidemia, unspecified hyperlipidemia type    4. Aortic atherosclerosis    5. Tethered cord syndrome    6. S/P lumbar laminectomy    7. Acute non-recurrent sinusitis, unspecified location          Plan:       Elevated PSA    Prediabetes  -     Hemoglobin A1C; Future; Expected date: 08/31/2022  -     Lipid Panel; Future; Expected date: 08/31/2022  -     Comprehensive Metabolic Panel; Future; Expected date: 08/31/2022    Hyperlipidemia, unspecified hyperlipidemia type  -     atorvastatin (LIPITOR) 20 MG tablet; Take 1 tablet (20 mg total) by mouth once daily. "  Dispense: 90 tablet; Refill: 1  -     Hemoglobin A1C; Future; Expected date: 08/31/2022  -     Lipid Panel; Future; Expected date: 08/31/2022  -     Comprehensive Metabolic Panel; Future; Expected date: 08/31/2022    Aortic atherosclerosis    Tethered cord syndrome    S/P lumbar laminectomy    Acute non-recurrent sinusitis, unspecified location  -     azithromycin (Z-SHABNAM) 250 MG tablet; Take 2 tablets by mouth on day 1; Take 1 tablet by mouth on days 2-5  Dispense: 6 tablet; Refill: 0    Given zpak. Cont chol med. doubtful medications a cause of neuropathy.  Follow-up labs in 6 months.  Continue follow-up with specialists.          Future Appointments   Date Time Provider Department Center   9/16/2022 12:30 PM Kaye Anderson PT [facility]   9/21/2022 10:15 AM Kaye Anderson PT [facility]   9/28/2022  9:30 AM Yoana Mcfarlane PT [facility]   2/24/2023  8:00 AM LAB, [facility] [place]   2/28/2023  8:00 AM Tera Jarquin MD Located within [facility] [place]       Patient note was created using [employer].  Any errors in syntax or even information may not have been identified and edited on initial review prior to signing this note.

## 2022-09-12 ENCOUNTER — HOSPITAL ENCOUNTER (OUTPATIENT)
Dept: PREADMISSION TESTING | Facility: HOSPITAL | Age: 72
Discharge: HOME | End: 2022-09-12
Attending: STUDENT IN AN ORGANIZED HEALTH CARE EDUCATION/TRAINING PROGRAM
Payer: COMMERCIAL

## 2022-09-12 ENCOUNTER — TELEPHONE (OUTPATIENT)
Dept: UROLOGY | Facility: CLINIC | Age: 72
End: 2022-09-12
Payer: COMMERCIAL

## 2022-09-12 VITALS
HEIGHT: 74 IN | OXYGEN SATURATION: 99 % | WEIGHT: 216 LBS | RESPIRATION RATE: 16 BRPM | SYSTOLIC BLOOD PRESSURE: 129 MMHG | HEART RATE: 81 BPM | TEMPERATURE: 97 F | DIASTOLIC BLOOD PRESSURE: 74 MMHG | BODY MASS INDEX: 27.72 KG/M2

## 2022-09-12 DIAGNOSIS — R97.20 ELEVATED PSA: ICD-10-CM

## 2022-09-12 DIAGNOSIS — Z01.818 PREOPERATIVE TESTING: Primary | ICD-10-CM

## 2022-09-12 LAB
ANION GAP SERPL CALC-SCNC: 10 MMOL/L (ref 8–16)
BASOPHILS # BLD AUTO: 0.02 K/UL (ref 0–0.2)
BASOPHILS NFR BLD: 0.3 % (ref 0–1.9)
BUN SERPL-MCNC: 10 MG/DL (ref 8–23)
CALCIUM SERPL-MCNC: 9.9 MG/DL (ref 8.7–10.5)
CHLORIDE SERPL-SCNC: 110 MMOL/L (ref 95–110)
CO2 SERPL-SCNC: 22 MMOL/L (ref 23–29)
CREAT SERPL-MCNC: 0.7 MG/DL (ref 0.5–1.4)
DIFFERENTIAL METHOD: ABNORMAL
EOSINOPHIL # BLD AUTO: 0.1 K/UL (ref 0–0.5)
EOSINOPHIL NFR BLD: 2.1 % (ref 0–8)
ERYTHROCYTE [DISTWIDTH] IN BLOOD BY AUTOMATED COUNT: 13.2 % (ref 11.5–14.5)
EST. GFR  (NO RACE VARIABLE): >60 ML/MIN/1.73 M^2
GLUCOSE SERPL-MCNC: 103 MG/DL (ref 70–110)
HCT VFR BLD AUTO: 47.9 % (ref 40–54)
HGB BLD-MCNC: 15.4 G/DL (ref 14–18)
IMM GRANULOCYTES # BLD AUTO: 0.04 K/UL (ref 0–0.04)
IMM GRANULOCYTES NFR BLD AUTO: 0.7 % (ref 0–0.5)
LYMPHOCYTES # BLD AUTO: 1.7 K/UL (ref 1–4.8)
LYMPHOCYTES NFR BLD: 28.1 % (ref 18–48)
MCH RBC QN AUTO: 28.8 PG (ref 27–31)
MCHC RBC AUTO-ENTMCNC: 32.2 G/DL (ref 32–36)
MCV RBC AUTO: 90 FL (ref 82–98)
MONOCYTES # BLD AUTO: 0.5 K/UL (ref 0.3–1)
MONOCYTES NFR BLD: 7.7 % (ref 4–15)
NEUTROPHILS # BLD AUTO: 3.7 K/UL (ref 1.8–7.7)
NEUTROPHILS NFR BLD: 61.1 % (ref 38–73)
NRBC BLD-RTO: 0 /100 WBC
PLATELET # BLD AUTO: 290 K/UL (ref 150–450)
PMV BLD AUTO: 8.6 FL (ref 9.2–12.9)
POTASSIUM SERPL-SCNC: 4 MMOL/L (ref 3.5–5.1)
RBC # BLD AUTO: 5.35 M/UL (ref 4.6–6.2)
SODIUM SERPL-SCNC: 142 MMOL/L (ref 136–145)
WBC # BLD AUTO: 6.08 K/UL (ref 3.9–12.7)

## 2022-09-12 PROCEDURE — 36415 COLL VENOUS BLD VENIPUNCTURE: CPT | Performed by: STUDENT IN AN ORGANIZED HEALTH CARE EDUCATION/TRAINING PROGRAM

## 2022-09-12 PROCEDURE — 93010 EKG 12-LEAD: ICD-10-PCS | Mod: ,,, | Performed by: INTERNAL MEDICINE

## 2022-09-12 PROCEDURE — 93010 ELECTROCARDIOGRAM REPORT: CPT | Mod: ,,, | Performed by: INTERNAL MEDICINE

## 2022-09-12 PROCEDURE — 80048 BASIC METABOLIC PNL TOTAL CA: CPT | Performed by: STUDENT IN AN ORGANIZED HEALTH CARE EDUCATION/TRAINING PROGRAM

## 2022-09-12 PROCEDURE — 85025 COMPLETE CBC W/AUTO DIFF WBC: CPT | Performed by: STUDENT IN AN ORGANIZED HEALTH CARE EDUCATION/TRAINING PROGRAM

## 2022-09-12 PROCEDURE — 93005 ELECTROCARDIOGRAM TRACING: CPT

## 2022-09-12 NOTE — ANESTHESIA PREPROCEDURE EVALUATION
09/12/2022  Robles Jordan is a 71 y.o., male scheduled for ULTRASOUND, RECTAL APPROACH, WITH PROSTATE BIOPSY AND PROSTATE VOLUME DETERMINATION  On 9/19/2022.    Past Medical History:   Diagnosis Date    Back pain     BPH (benign prostatic hypertrophy)     Hx of colonic polyps 10/19/2015    Hyperlipidemia     Hypertension     Hypertension     Sleep apnea     Spinal cord disease        Past Surgical History:   Procedure Laterality Date    COLONOSCOPY N/A 10/19/2015    Procedure: COLONOSCOPY;  Surgeon: Antonino Bernstein MD;  Location: [address]);  Service: Endoscopy;  Laterality: N/A;    COLONOSCOPY N/A 2/25/2021    Procedure: COLONOSCOPY;  Surgeon: Blanca Don MD;  Location: [address]);  Service: Endoscopy;  Laterality: N/A;  requesting ASAP  COVID test at [place] on 2/22-GT.2/24 called pt. move up to earlier spot. pt. did NOT want to come earlier.EC    CYSTOSCOPY WITH URODYNAMIC TESTING N/A 1/17/2020    Procedure: CYSTOSCOPY,WITH URODYNAMIC TESTING;  Surgeon: Christianne Israel MD;  Location: [facility];  Service: Urology;  Laterality: N/A;  RN PREOP 1/13/2020    MULTIPLE TOOTH EXTRACTIONS  2014           Pre-op Assessment    I have reviewed the Patient Summary Reports.     I have reviewed the Nursing Notes. I have reviewed the NPO Status.   I have reviewed the Medications.     Review of Systems  Anesthesia Hx:  No problems with previous Anesthesia  Denies Family Hx of Anesthesia complications.   Denies Personal Hx of Anesthesia complications.   Social:  No Alcohol Use, Former Smoker    Hematology/Oncology:  Hematology Normal   Oncology Normal     EENT/Dental:EENT/Dental Normal   Cardiovascular:   Hypertension Denies MI.    Denies CHF.  Denies MA.  Functional Capacity Trasnfer only due to Idiopathic progressive polyneuropathy      Pulmonary:   Sleep Apnea, CPAP     Renal/:  Renal/ Normal     Hepatic/GI:  Hepatic/GI Normal    Musculoskeletal:  Musculoskeletal Normal    Neurological:   Hx of open L5-S1 lami for tethered cord release on 8/12/20, Idiopathic progressive polyneuropathy   Peripheral Neuropathy    Endocrine:  Endocrine Normal    Dermatological:  Skin Normal    Psych:  Psychiatric Normal              Anesthesia Plan  Type of Anesthesia, risks & benefits discussed:    Anesthesia Type: MAC  Intra-op Monitoring Plan: Standard ASA Monitors  Post Op Pain Control Plan: multimodal analgesia  Informed Consent: Patient consented to blood products? Yes  ASA Score: 2    Ready For Surgery From Anesthesia Perspective.     .

## 2022-09-12 NOTE — DISCHARGE INSTRUCTIONS
Before 7 AM, enter through the Emergency Entrance..   After 7 AM enter through the Main Entrance.      Your procedure  is scheduled for __9/19/2022________.    Call [phone] between 2pm and 5pm on __9/16/2022_____to find out your arrival time for the day of surgery.    You may use the main entrance to the hospital on the [facility] side, or the entrance that is next to the [facility].    You may have two visitors.  Visiting hours for non-COVID-19 patients expanded to 24/7 (still restricted to one visitor)  Youth visitation changed from age 18 to age 12.      You will be going to the Same Day Surgery Unit on the 2nd floor of the hospital.    Important instructions:  Do not eat anything after midnight.  You may have plain water, non carbonated.  You may also have Gatorade or Powerade after midnight.    Stop all fluids 2 hours before your surgery.    It is okay to brush your teeth.  Do not have gum, candy or mints.    SEE MEDICATION SHEET.   TAKE MEDICATIONS AS DIRECTED WITH SIPS OF WATER.    STOP taking Aspirin, Ibuprofen,  Advil, Motrin, Mobic(meloxicam), Aleve (naproxen), Fish oil, and Vitamin E for at least 7 days before your surgery.     You may take Tylenol if needed which is not a blood thinner.    Please shower the night before and the morning of your surgery.      One Dulcolax suppository the night before procedure, and two Fleets enemas the morning of procedure.    Contact lenses and removable denture work may not be worn during your procedure.    You may wear deodorant only. If you are having breast surgery, do not wear deodorant on the operative side.    Do not wear powder, body lotion, perfume/cologne or make-up.    Do not wear any jewelry or have any metal on your body.    You will be asked to remove any dentures or partials for the procedure.    If you are going home on the same day of surgery, you must arrange for a family member or a friend to drive you home.  Public transportation is  prohibited.  You will not be able to drive home if you were given anesthesia or sedation.    Patients who want to have their Post-op prescriptions filled from our in-house [facility], bring a Credit/Debit Card or cash with you. A co-pay may be required.  The pharmacy closes at 5:30 pm.    Please leave money and valuables home.      You may bring your cell phone.    Call the doctor if fever or illness should occur before your surgery.    Call [phone] to contact us here if needed.

## 2022-09-12 NOTE — TELEPHONE ENCOUNTER
Pt notified his abx was sent and confirmed by the pharmacy on 8/17/22.  He is going to call them.          ----- Message from Mono Quintero MD sent at 9/12/2022 10:40 AM CDT -----  Regarding: RE: meds for bx  Did he not  the medications prescribed as evidenced below?    ciprofloxacin HCl (CIPRO) 500 MG tablet 6 tablet 0 8/31/2022 9/3/2022 --  Sig - Route: Take 1 tablet (500 mg total) by mouth every 12 (twelve) hours. TAKE DAY BEFORE, DAY OF  AND DAY AFTER PROCEDURE DATE for 3 days - Oral  Sent to pharmacy as: ciprofloxacin HCl (CIPRO) 500 MG tablet  Class: Normal  Order: [id number]  Date/Time Signed: 8/17/2022 10:18      E-Prescribing Status: Receipt confirmed by pharmacy (8/17/2022 10:18 AM CDT)            ----- Message -----  From: Carolann Asher RN  Sent: 9/12/2022   8:52 AM CDT  To: Mono Quintero MD  Subject: meds for bx                                      SDS called pt needs antibiotics and prep sent to his pharmacy for prostate bx.  Thanks

## 2022-09-14 ENCOUNTER — PATIENT MESSAGE (OUTPATIENT)
Dept: FAMILY MEDICINE | Facility: CLINIC | Age: 72
End: 2022-09-14
Payer: COMMERCIAL

## 2022-09-19 ENCOUNTER — ANESTHESIA (OUTPATIENT)
Dept: SURGERY | Facility: HOSPITAL | Age: 72
End: 2022-09-19
Payer: COMMERCIAL

## 2022-09-19 ENCOUNTER — TELEPHONE (OUTPATIENT)
Dept: UROLOGY | Facility: CLINIC | Age: 72
End: 2022-09-19
Payer: COMMERCIAL

## 2022-09-19 NOTE — TELEPHONE ENCOUNTER
Pt requested that this Rx be mailed to the pharmacy-[facility] on [facility]  Mailed this Rx   Pts wife notified prostate bx for today cancelled due to probe not working.  Will call pt back later today with a new surgery date.

## 2022-09-19 NOTE — PROGRESS NOTES
Called patient to notify him that the equipment is down in his prostate biopsy will need to be rescheduled. Wife took the message and has related to her

## 2022-09-21 DIAGNOSIS — R97.20 ELEVATED PSA: Primary | ICD-10-CM

## 2022-09-21 RX ORDER — ENEMA 19; 7 G/133ML; G/133ML
1 ENEMA RECTAL ONCE
Qty: 133 ML | Refills: 0 | Status: SHIPPED | OUTPATIENT
Start: 2022-09-21 | End: 2022-09-21

## 2022-09-21 RX ORDER — CIPROFLOXACIN 500 MG/1
500 TABLET ORAL EVERY 12 HOURS
Qty: 6 TABLET | Refills: 0 | Status: SHIPPED | OUTPATIENT
Start: 2022-09-21 | End: 2022-09-24

## 2022-09-26 ENCOUNTER — HOSPITAL ENCOUNTER (OUTPATIENT)
Facility: HOSPITAL | Age: 72
Discharge: HOME | End: 2022-09-26
Attending: STUDENT IN AN ORGANIZED HEALTH CARE EDUCATION/TRAINING PROGRAM | Admitting: STUDENT IN AN ORGANIZED HEALTH CARE EDUCATION/TRAINING PROGRAM
Payer: COMMERCIAL

## 2022-09-26 VITALS
WEIGHT: 216 LBS | HEART RATE: 77 BPM | SYSTOLIC BLOOD PRESSURE: 124 MMHG | DIASTOLIC BLOOD PRESSURE: 64 MMHG | TEMPERATURE: 98 F | RESPIRATION RATE: 18 BRPM | BODY MASS INDEX: 27.73 KG/M2 | OXYGEN SATURATION: 97 %

## 2022-09-26 DIAGNOSIS — R97.20 ELEVATED PSA: Primary | ICD-10-CM

## 2022-09-26 PROCEDURE — 76872 US TRANSRECTAL: CPT | Mod: 26,,, | Performed by: STUDENT IN AN ORGANIZED HEALTH CARE EDUCATION/TRAINING PROGRAM

## 2022-09-26 PROCEDURE — 63600175 PHARM REV CODE 636 W HCPCS: Performed by: STUDENT IN AN ORGANIZED HEALTH CARE EDUCATION/TRAINING PROGRAM

## 2022-09-26 PROCEDURE — 88305 TISSUE EXAM BY PATHOLOGIST: CPT | Performed by: PATHOLOGY

## 2022-09-26 PROCEDURE — 25000003 PHARM REV CODE 250: Performed by: NURSE ANESTHETIST, CERTIFIED REGISTERED

## 2022-09-26 PROCEDURE — 27201423 OPTIME MED/SURG SUP & DEVICES STERILE SUPPLY: Performed by: STUDENT IN AN ORGANIZED HEALTH CARE EDUCATION/TRAINING PROGRAM

## 2022-09-26 PROCEDURE — 55700 PR BIOPSY OF PROSTATE,NEEDLE/PUNCH: CPT | Mod: ,,, | Performed by: STUDENT IN AN ORGANIZED HEALTH CARE EDUCATION/TRAINING PROGRAM

## 2022-09-26 PROCEDURE — 37000009 HC ANESTHESIA EA ADD 15 MINS: Performed by: STUDENT IN AN ORGANIZED HEALTH CARE EDUCATION/TRAINING PROGRAM

## 2022-09-26 PROCEDURE — 63600175 PHARM REV CODE 636 W HCPCS: Performed by: NURSE ANESTHETIST, CERTIFIED REGISTERED

## 2022-09-26 PROCEDURE — 36000705 HC OR TIME LEV I EA ADD 15 MIN: Performed by: STUDENT IN AN ORGANIZED HEALTH CARE EDUCATION/TRAINING PROGRAM

## 2022-09-26 PROCEDURE — 55700 PR BIOPSY OF PROSTATE,NEEDLE/PUNCH: ICD-10-PCS | Mod: ,,, | Performed by: STUDENT IN AN ORGANIZED HEALTH CARE EDUCATION/TRAINING PROGRAM

## 2022-09-26 PROCEDURE — 88305 TISSUE EXAM BY PATHOLOGIST: CPT | Mod: 26,,, | Performed by: PATHOLOGY

## 2022-09-26 PROCEDURE — 36000704 HC OR TIME LEV I 1ST 15 MIN: Performed by: STUDENT IN AN ORGANIZED HEALTH CARE EDUCATION/TRAINING PROGRAM

## 2022-09-26 PROCEDURE — 88305 TISSUE EXAM BY PATHOLOGIST: ICD-10-PCS | Mod: 26,,, | Performed by: PATHOLOGY

## 2022-09-26 PROCEDURE — 63600175 PHARM REV CODE 636 W HCPCS: Performed by: ANESTHESIOLOGY

## 2022-09-26 PROCEDURE — 71000015 HC POSTOP RECOV 1ST HR: Performed by: STUDENT IN AN ORGANIZED HEALTH CARE EDUCATION/TRAINING PROGRAM

## 2022-09-26 PROCEDURE — 76872 PR US TRANSRECTAL: ICD-10-PCS | Mod: 26,,, | Performed by: STUDENT IN AN ORGANIZED HEALTH CARE EDUCATION/TRAINING PROGRAM

## 2022-09-26 PROCEDURE — 37000008 HC ANESTHESIA 1ST 15 MINUTES: Performed by: STUDENT IN AN ORGANIZED HEALTH CARE EDUCATION/TRAINING PROGRAM

## 2022-09-26 RX ORDER — MIDAZOLAM HYDROCHLORIDE 1 MG/ML
INJECTION INTRAMUSCULAR; INTRAVENOUS
Status: DISCONTINUED | OUTPATIENT
Start: 2022-09-26 | End: 2022-09-26

## 2022-09-26 RX ORDER — GENTAMICIN SULFATE 80 MG/100ML
80 INJECTION, SOLUTION INTRAVENOUS
Status: COMPLETED | OUTPATIENT
Start: 2022-09-26 | End: 2022-09-26

## 2022-09-26 RX ORDER — CIPROFLOXACIN 500 MG/1
500 TABLET ORAL 2 TIMES DAILY
COMMUNITY
End: 2022-11-20

## 2022-09-26 RX ORDER — LIDOCAINE HYDROCHLORIDE 10 MG/ML
1 INJECTION, SOLUTION EPIDURAL; INFILTRATION; INTRACAUDAL; PERINEURAL ONCE
Status: DISCONTINUED | OUTPATIENT
Start: 2022-09-26 | End: 2022-09-26 | Stop reason: HOSPADM

## 2022-09-26 RX ORDER — LIDOCAINE HCL/PF 100 MG/5ML
SYRINGE (ML) INTRAVENOUS
Status: DISCONTINUED | OUTPATIENT
Start: 2022-09-26 | End: 2022-09-26

## 2022-09-26 RX ORDER — PROPOFOL 10 MG/ML
INJECTION, EMULSION INTRAVENOUS
Status: DISCONTINUED | OUTPATIENT
Start: 2022-09-26 | End: 2022-09-26

## 2022-09-26 RX ORDER — SODIUM CHLORIDE, SODIUM LACTATE, POTASSIUM CHLORIDE, CALCIUM CHLORIDE 600; 310; 30; 20 MG/100ML; MG/100ML; MG/100ML; MG/100ML
INJECTION, SOLUTION INTRAVENOUS CONTINUOUS
Status: DISCONTINUED | OUTPATIENT
Start: 2022-09-26 | End: 2022-09-26 | Stop reason: HOSPADM

## 2022-09-26 RX ADMIN — PROPOFOL 50 MG: 10 INJECTION, EMULSION INTRAVENOUS at 12:09

## 2022-09-26 RX ADMIN — LIDOCAINE HYDROCHLORIDE 100 MG: 20 INJECTION, SOLUTION INTRAVENOUS at 12:09

## 2022-09-26 RX ADMIN — PROPOFOL 50 MG: 10 INJECTION, EMULSION INTRAVENOUS at 01:09

## 2022-09-26 RX ADMIN — SODIUM CHLORIDE, SODIUM LACTATE, POTASSIUM CHLORIDE, AND CALCIUM CHLORIDE: .6; .31; .03; .02 INJECTION, SOLUTION INTRAVENOUS at 12:09

## 2022-09-26 RX ADMIN — GENTAMICIN SULFATE 80 MG: 80 INJECTION, SOLUTION INTRAVENOUS at 12:09

## 2022-09-26 RX ADMIN — SODIUM CHLORIDE, SODIUM LACTATE, POTASSIUM CHLORIDE, AND CALCIUM CHLORIDE: .6; .31; .03; .02 INJECTION, SOLUTION INTRAVENOUS at 11:09

## 2022-09-26 RX ADMIN — MIDAZOLAM HYDROCHLORIDE 1 MG: 1 INJECTION, SOLUTION INTRAMUSCULAR; INTRAVENOUS at 12:09

## 2022-09-26 NOTE — SUBJECTIVE & OBJECTIVE
Past Medical History:   Diagnosis Date    Back pain     BPH (benign prostatic hypertrophy)     Hx of colonic polyps 10/19/2015    Hyperlipidemia     Hypertension     Hypertension     Sleep apnea     Spinal cord disease        Past Surgical History:   Procedure Laterality Date    COLONOSCOPY N/A 10/19/2015    Procedure: COLONOSCOPY;  Surgeon: Antonino Bernstein MD;  [address]  Service: Endoscopy;  Laterality: N/A;    COLONOSCOPY N/A 2/25/2021    Procedure: COLONOSCOPY;  Surgeon: Blanca Don MD;  Location: [facility] ([address] [place] [address]);  Service: Endoscopy;  Laterality: N/A;  requesting ASAP  COVID test at [place] on 2/22-GT.2/24 called pt. move up to earlier spot. pt. did NOT want to come earlier.EC    CYSTOSCOPY WITH URODYNAMIC TESTING N/A 1/17/2020    Procedure: CYSTOSCOPY,WITH URODYNAMIC TESTING;  Surgeon: Christianne Israel MD;  Location: [facility];  Service: Urology;  Laterality: N/A;  RN PREOP 1/13/2020    MULTIPLE TOOTH EXTRACTIONS  2014       Review of patient's allergies indicates:  No Known Allergies    Family History       Problem Relation (Age of Onset)    Cancer Mother, Maternal Uncle, Paternal Uncle, Maternal Grandmother    Hyperlipidemia Father    Hypertension Father    Stroke Sister (65)            Tobacco Use    Smoking status: Some Days     Types: Cigarettes    Smokeless tobacco: Never    Tobacco comments:     3 cigarettes a day    Substance and Sexual Activity    Alcohol use: Yes     Alcohol/week: 1.0 standard drink     Types: 1 Cans of beer per week     Comment: very seldom    Drug use: No    Sexual activity: Not Currently     Partners: Female       Review of Systems   Constitutional:  Negative for activity change, appetite change, chills, diaphoresis and fever.   HENT:  Negative for congestion and rhinorrhea.    Eyes:  Negative for visual disturbance.   Respiratory:  Negative for choking and shortness of breath.    Gastrointestinal:  Negative for abdominal distention, abdominal pain,  constipation, diarrhea, nausea and vomiting.   Genitourinary:  Negative for difficulty urinating, dysuria, flank pain, hematuria, scrotal swelling, testicular pain and urgency.   Skin:  Negative for color change, pallor and wound.   Neurological:  Negative for dizziness and syncope.   Psychiatric/Behavioral:  Negative for confusion and hallucinations.      Objective:     Temp:  [98.4 °F (36.9 °C)] 98.4 °F (36.9 °C)  Pulse:  [83] 83  Resp:  [18] 18  SpO2:  [98 %] 98 %  BP: (158)/(76) 158/76     Body mass index is 27.73 kg/m².    No intake/output data recorded.       Drains       None                   Physical Exam  Constitutional:       General: He is not in acute distress.     Appearance: He is well-developed. He is not ill-appearing, toxic-appearing or diaphoretic.   HENT:      Head: Normocephalic and atraumatic.      Mouth/Throat:      Mouth: Mucous membranes are moist.   Eyes:      Conjunctiva/sclera: Conjunctivae normal.   Pulmonary:      Effort: Pulmonary effort is normal. No respiratory distress.   Abdominal:      General: Abdomen is flat. There is no distension.      Palpations: Abdomen is soft.   Musculoskeletal:         General: No swelling or deformity.      Cervical back: Neck supple.   Skin:     General: Skin is warm.      Capillary Refill: Capillary refill takes less than 2 seconds.      Findings: No rash.   Neurological:      Mental Status: He is alert and oriented to person, place, and time.   Psychiatric:         Mood and Affect: Mood normal.         Thought Content: Thought content normal.         Judgment: Judgment normal.       Significant Labs:    BMP:  No results for input(s): NA, K, CL, CO2, BUN, CREATININE, LABGLOM, GLUCOSE, CALCIUM in the last 168 hours.    CBC:  No results for input(s): WBC, HGB, HCT, PLT in the last 168 hours.

## 2022-09-26 NOTE — HPI
71 y.o. who presents to the Urology clinic for evaluation of  Elevated PSA  9.4 checked 7/2022. PSA previously 1.8 - 2 years ago, 2.6 3 years ago.   He was previously seen by Dr. Israel in 2020. 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   No UTIs since last appt.   Denies dysuria, hematuria, nausea, vomiting, flank pain, new abdominal pain  Uncle with prostate cancer, other family members with pancreatic/colon/ breast cancer.   He is wheelchair bound  Denies blood thinner usage

## 2022-09-26 NOTE — H&P
[facility]  Urology  History & Physical    Patient Name: Robles Jordan  MRN: [id number]  Admission Date: 9/26/2022  Code Status: No Order   Attending Provider: Mono Quintero MD   Primary Care Physician: Tera Jarquin MD  Principal Problem:<principal problem not specified>    Subjective:     HPI:    71 y.o. who presents to the Urology clinic for evaluation of  Elevated PSA  9.4 checked 7/2022. PSA previously 1.8 - 2 years ago, 2.6 3 years ago.   He was previously seen by Dr. Israel in 2020. 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   No UTIs since last appt.   Denies dysuria, hematuria, nausea, vomiting, flank pain, new abdominal pain  Uncle with prostate cancer, other family members with pancreatic/colon/ breast cancer.   He is wheelchair bound  Denies blood thinner usage      Past Medical History:   Diagnosis Date    Back pain     BPH (benign prostatic hypertrophy)     Hx of colonic polyps 10/19/2015    Hyperlipidemia     Hypertension     Hypertension     Sleep apnea     Spinal cord disease        Past Surgical History:   Procedure Laterality Date    COLONOSCOPY N/A 10/19/2015    Procedure: COLONOSCOPY;  Surgeon: Antonino Bernstein MD;  Location: [address]);  Service: Endoscopy;  Laterality: N/A;    COLONOSCOPY N/A 2/25/2021    Procedure: COLONOSCOPY;  Surgeon: Blanca Don MD;  Location: [address]);  Service: Endoscopy;  Laterality: N/A;  requesting ASAP  COVID test at [place] on 2/22-GT.2/24 called pt. move up to earlier spot. pt. did NOT want to come earlier.EC    CYSTOSCOPY WITH URODYNAMIC TESTING N/A 1/17/2020    Procedure: CYSTOSCOPY,WITH URODYNAMIC TESTING;  Surgeon: Christianne Israel MD;  Location: [facility];  Service: Urology;  Laterality: N/A;  RN PREOP 1/13/2020    MULTIPLE TOOTH EXTRACTIONS  2014       Review of patient's allergies indicates:  No Known Allergies    Family History       Problem Relation (Age of  Onset)    Cancer Mother, Maternal Uncle, Paternal Uncle, Maternal Grandmother    Hyperlipidemia Father    Hypertension Father    Stroke Sister (65)            Tobacco Use    Smoking status: Some Days     Types: Cigarettes    Smokeless tobacco: Never    Tobacco comments:     3 cigarettes a day    Substance and Sexual Activity    Alcohol use: Yes     Alcohol/week: 1.0 standard drink     Types: 1 Cans of beer per week     Comment: very seldom    Drug use: No    Sexual activity: Not Currently     Partners: Female       Review of Systems   Constitutional:  Negative for activity change, appetite change, chills, diaphoresis and fever.   HENT:  Negative for congestion and rhinorrhea.    Eyes:  Negative for visual disturbance.   Respiratory:  Negative for choking and shortness of breath.    Gastrointestinal:  Negative for abdominal distention, abdominal pain, constipation, diarrhea, nausea and vomiting.   Genitourinary:  Negative for difficulty urinating, dysuria, flank pain, hematuria, scrotal swelling, testicular pain and urgency.   Skin:  Negative for color change, pallor and wound.   Neurological:  Negative for dizziness and syncope.   Psychiatric/Behavioral:  Negative for confusion and hallucinations.      Objective:     Temp:  [98.4 °F (36.9 °C)] 98.4 °F (36.9 °C)  Pulse:  [83] 83  Resp:  [18] 18  SpO2:  [98 %] 98 %  BP: (158)/(76) 158/76     Body mass index is 27.73 kg/m².    No intake/output data recorded.       Drains       None                   Physical Exam  Constitutional:       General: He is not in acute distress.     Appearance: He is well-developed. He is not ill-appearing, toxic-appearing or diaphoretic.   HENT:      Head: Normocephalic and atraumatic.      Mouth/Throat:      Mouth: Mucous membranes are moist.   Eyes:      Conjunctiva/sclera: Conjunctivae normal.   Pulmonary:      Effort: Pulmonary effort is normal. No respiratory distress.   Abdominal:      General: Abdomen is flat. There is no  distension.      Palpations: Abdomen is soft.   Musculoskeletal:         General: No swelling or deformity.      Cervical back: Neck supple.   Skin:     General: Skin is warm.      Capillary Refill: Capillary refill takes less than 2 seconds.      Findings: No rash.   Neurological:      Mental Status: He is alert and oriented to person, place, and time.   Psychiatric:         Mood and Affect: Mood normal.         Thought Content: Thought content normal.         Judgment: Judgment normal.       Significant Labs:    BMP:  No results for input(s): NA, K, CL, CO2, BUN, CREATININE, LABGLOM, GLUCOSE, CALCIUM in the last 168 hours.    CBC:  No results for input(s): WBC, HGB, HCT, PLT in the last 168 hours.          Assessment and Plan:     Elevated PSA  Plan for TRUS biopsy of prostate  abx  Informed consent reviewed        VTE Risk Mitigation (From admission, onward)         Ordered     IP VTE LOW RISK PATIENT  Once         09/26/22 1013                Mono Quintero MD  Urology  [facility] [place] - Surgery

## 2022-09-26 NOTE — TRANSFER OF CARE
Anesthesia Transfer of Care Note    Patient: Robles Jordan    Procedure(s) Performed: Procedure(s) (LRB):  ULTRASOUND, RECTAL APPROACH, WITH PROSTATE BIOPSY AND PROSTATE VOLUME DETERMINATION (N/A)    Patient location: OPS    Anesthesia Type: MAC    Transport from OR: Transported from OR on room air with adequate spontaneous ventilation    Post pain: adequate analgesia    Post assessment: no apparent anesthetic complications and tolerated procedure well    Post vital signs: stable    Level of consciousness: awake, alert and oriented    Nausea/Vomiting: no nausea/vomiting    Complications: none    Transfer of care protocol was followed      Last vitals:   Visit Vitals  /64   Pulse 77   Temp 36.6 °C (97.9 °F)   Resp 18   Wt 98 kg (216 lb)   SpO2 97%   BMI 27.73 kg/m²

## 2022-09-26 NOTE — PLAN OF CARE
Pre-op plan of care reviewed with patient and spouse. Admit assessment complete. Questions encouraged and answered. Post-op education begun with pt.

## 2022-09-26 NOTE — ANESTHESIA POSTPROCEDURE EVALUATION
Anesthesia Post Evaluation    Patient: Robles Jordan    Procedure(s) Performed: Procedure(s) (LRB):  ULTRASOUND, RECTAL APPROACH, WITH PROSTATE BIOPSY AND PROSTATE VOLUME DETERMINATION (N/A)    Final Anesthesia Type: MAC      Patient location during evaluation: PACU  Patient participation: Yes- Able to Participate  Level of consciousness: awake and alert, oriented and awake  Post-procedure vital signs: reviewed and stable  Pain management: adequate  Airway patency: patent    PONV status at discharge: No PONV  Anesthetic complications: no      Cardiovascular status: blood pressure returned to baseline, hemodynamically stable and stable  Respiratory status: unassisted and spontaneous ventilation  Hydration status: euvolemic  Follow-up not needed.          Vitals Value Taken Time   /64 09/26/22 1318   Temp 36.6 °C (97.9 °F) 09/26/22 1318   Pulse 77 09/26/22 1318   Resp 18 09/26/22 1318   SpO2 97 % 09/26/22 1318         No case tracking events are documented in the log.      Pain/Nae Score: No data recorded      
If you are a smoker, it is important for your health to stop smoking. Please be aware that second hand smoke is also harmful.

## 2022-09-26 NOTE — OP NOTE
[facility] - Surgery  Brief Operative Note     Surgery Date: 9/26/2022      Surgeon(s) and Role:     * Mono Quintero MD - Primary     Assisting Surgeon: None     Pre-op Diagnosis:  Elevated PSA [R97.20]     Post-op Diagnosis:  Post-Op Diagnosis Codes:     * Elevated PSA [R97.20]     Procedure(s) (LRB):  ULTRASOUND, RECTAL APPROACH, WITH PROSTATE BIOPSY AND PROSTATE VOLUME DETERMINATION (N/A)     Anesthesia: Local     Operative Findings: see op note     Estimated Blood Loss: < 2c             CLINICAL HISTORY: PSA 8.7. Risks and benefits of biopsy discussed. Consented to the procedure.    OPERATIVE PROCEDURE: The patient was brought to the procedure room and after identification by name and number was placed supine on the procedure room table. The patient was moved into the lateral decubitis position. Rectal numbing jelly was administered.     The transrectal ultrasound probe was inserted through the patient's anus and into his rectum without difficulty. Measurement of the prostate revealed a prostate of approximately 52 grams in size. The neurovascular bundles were identified bilaterally and injected on each side with 5 mL of 1% lidocaine for good anesthesia block. No median lobe.    A series of prostate needle core biopsies were then performed. A bilateral sextant template was performed with a total of 12 biopsies. These specimens were labeled by side and location and sent to pathology for further analysis.     Transrectal ultrasound probe was removed, a repeat digital rectal exam did not reveal any expanding hematoma or brisk bleeding. The patient was cleaned up and returned to the supine position. Patient to be monitored for at least 30 minutes post procedure to ensure stability    ESTIMATED BLOOD LOSS: Minimal.     COMPLICATIONS: None.        OPERATIVE FINDINGS:   1. 12 cores of prostate were removed for analysis.   2. 52 grams prostate by ultrasound measurement.     FOLLOWUP: Post procedure antibiotics were  prescribed. Patient encouraged to report to hospital if he develops symptoms suggestive of infection ( Fever, chills, rigors). The patient will follow up to discuss biopsy results.

## 2022-09-26 NOTE — DISCHARGE INSTRUCTIONS
ACTIVITY LEVEL: If you have received sedation or an anesthetic, you may feel sleepy for several hours. Rest until you are more awake. Gradually resume your normal activities.       DIET: You may resume your home diet. If nausea is present, increase your diet gradually with fluids and bland foods.      Medications: Pain medication should be taken only if needed and as directed. If antibiotics are prescribed, the medication should be taken until completed. You will be given an updated list of you medications.  No driving, alcoholic beverages or signing legal documents for next 24 hours or while taking pain medication        CALL THE DOCTOR:       Fever over 101°F  Severe pain that doesnt go away with medication.  Upset stomach and vomiting that is persistent.  Problems urinating-unable to urinate or heavy bleeding (with or without clots)

## 2022-09-26 NOTE — BRIEF OP NOTE
[facility]  Brief Operative Note    Surgery Date: 9/26/2022     Surgeon(s) and Role:     * Mono Quintero MD - Primary    Assisting Surgeon: None    Pre-op Diagnosis:  Elevated PSA [R97.20]    Post-op Diagnosis:  Post-Op Diagnosis Codes:     * Elevated PSA [R97.20]    Procedure(s) (LRB):  ULTRASOUND, RECTAL APPROACH, WITH PROSTATE BIOPSY AND PROSTATE VOLUME DETERMINATION (N/A)    Anesthesia: Local    Operative Findings: see op note    Estimated Blood Loss: < 2c         Specimens:   Specimen (24h ago, onward)       Start     Ordered    09/26/22 1308  Specimen to Pathology, Surgery Urology  Once        Comments: Pre-op Diagnosis: Elevated PSA [R97.20]Procedure(s):ULTRASOUND, RECTAL APPROACH, WITH PROSTATE BIOPSY AND PROSTATE VOLUME DETERMINATION Number of specimens: 12Name of specimens: Right apex lateral, Right mid lateral, Right base lateral, Right apex medial, Right mid medial, Right base medial, left apex lateral, left mid lateral, left base lateral, left apex medial, left mid medial, left base medial     References:    Click here for ordering Quick Tip   Question Answer Comment   Procedure Type: Urology    Which provider would you like to cc? MONO QUINTERO    Release to patient Immediate        09/26/22 1307                      Discharge Note    OUTCOME: Patient tolerated treatment/procedure well without complication and is now ready for discharge.    DISPOSITION: Home or Self Care    FINAL DIAGNOSIS:  Elevated PSA    FOLLOWUP: In clinic    DISCHARGE INSTRUCTIONS:    Discharge Procedure Orders   Diet general     No dressing needed     Call MD for:  temperature >100.4     Call MD for:  persistent nausea and vomiting     Activity as tolerated

## 2022-10-03 ENCOUNTER — TELEPHONE (OUTPATIENT)
Dept: UROLOGY | Facility: CLINIC | Age: 72
End: 2022-10-03
Payer: COMMERCIAL

## 2022-10-03 LAB
FINAL PATHOLOGIC DIAGNOSIS: NORMAL
GROSS: NORMAL
Lab: NORMAL

## 2022-10-03 NOTE — TELEPHONE ENCOUNTER
Pt notified of bx results      ----- Message from Mono Quintero MD sent at 10/3/2022 12:18 PM CDT -----  Please alert patient no evidence of prostate cancer on recent biopsy, thanks

## 2022-10-19 ENCOUNTER — CLINICAL SUPPORT (OUTPATIENT)
Dept: REHABILITATION | Facility: HOSPITAL | Age: 72
End: 2022-10-19
Payer: COMMERCIAL

## 2022-10-19 DIAGNOSIS — Z74.09 IMPAIRED FUNCTIONAL MOBILITY, BALANCE, GAIT, AND ENDURANCE: Primary | ICD-10-CM

## 2022-10-19 DIAGNOSIS — R29.898 RIGHT LEG WEAKNESS: ICD-10-CM

## 2022-10-19 DIAGNOSIS — R26.2 DIFFICULTY WALKING: ICD-10-CM

## 2022-10-19 PROCEDURE — 97530 THERAPEUTIC ACTIVITIES: CPT | Mod: PN

## 2022-10-19 PROCEDURE — 97164 PT RE-EVAL EST PLAN CARE: CPT | Mod: PN

## 2022-10-19 NOTE — PROGRESS NOTES
[facility]   Physical Therapy Re-Evaluation Note and DISCHARGE NOTE    Name: Robles Jordan  Clinic Number: [id number]    Therapy Diagnosis:   Encounter Diagnoses   Name Primary?    Impaired functional mobility, balance, gait, and endurance Yes    Right leg weakness     Difficulty walking      Physician: Ervin Washington     Visit Date: 10/19/2022    Physician Orders: PT Eval and Treat Neuro  Medical Diagnosis from Referral: Sensory neuropathy [G62.9]  Evaluation Date: 12/9/2021  Authorization Period Expiration: 12/31/21  Visit # / Visits authorized: 42/40 (+1 eval)    PTA Visit #: 0/5    Time In: 1:15  Time Out: 2:00  Total Billable Time: 45 minutes    Precautions: Standard, Diabetes, Fall and hx of tethered cord - DO NOT INCREASE REPS or WEIGHT    SUBJECTIVE     Pt reports: official diagnosis of ALS and will be attending the [facility] ALS clinic on 11/1/22. Patient reports continued decline in strength in bilateral lower extremities (especially the left lower extremity) and has been utilizing Rolling Walker (RW) minimally in home and wheelchair in community. Patient requires therapist assistance to slideboard transfer from car to wheelchair to enter clinic and exit clinic this date.       He was compliant with home exercise program.  Response to previous treatment: fine   Functional change: ongoing      Pain: some, un-rated   Location: bilateral feet, back     Fatigue: 5/10     OBJECTIVE     Objective Measures updated at progress report unless specified.     Lower Extremity Strength 10/19/22    RLE LLE   Hip Flexion: 3/5  3/5   Hip Extension:  Bridge only no resistance Bridge only no resistance   Hip Abduction: 2/5 2/5   Hip Adduction: 2/5 2/5   Knee Extension: 3/5  3/5    Knee Flexion: 3/5  3/5    Ankle Dorsiflexion: 2-/5  2/5   Ankle Plantarflexion: 2-/5 2/5     Lower Extremity Strength 6/27/22    RLE LLE   Hip Flexion: 3/5  3+/5   Hip Extension:  Bridge with mild resistance  Bridge with  mild resistance    Hip Abduction: 2-/5 2/5   Hip Adduction: 2-/5 2/5   Knee Extension: 3/5  4/5    Knee Flexion: 3/5  4/5    Ankle Dorsiflexion: 2-/5  2+/5   Ankle Plantarflexion: 2-/5 3-/5      Lower Extremity Strength 5/23/22    RLE LLE   Hip Flexion: 2-/5  3+/5   Hip Extension:  Bridge with mild resistance  Bridge with mild resistance    Hip Abduction: 2/5 2/5   Hip Adduction: 2/5 2/5   Knee Extension: 4-/5  4/5    Knee Flexion: 3+/5  4/5    Ankle Dorsiflexion: 2-/5  2+/5   Ankle Plantarflexion: 2-/5 3+/5      Lower Extremity Strength 3/30/22    RLE LLE   Hip Flexion: 2+/5  3+/5    Hip Extension:  2/5 3+/5    Hip Abduction: 2+/5  2+/5    Hip Adduction: 2+/5  3+/5    Knee Extension: 4+/5  5/5    Knee Flexion: 4-/5  4+/5    Ankle Dorsiflexion: 2/5  3/5   Ankle Plantarflexion: 2/5 4-/5      Lower Extremity Strength 2/18/22    RLE LLE   Hip Flexion: 2+/5  3+/5    Hip Extension:  2/5 3+/5    Hip Abduction: 2+/5  2+/5    Hip Adduction: 2+/5  2+/5    Knee Extension: 4+/5  5/5    Knee Flexion: 4-/5  4+/5    Ankle Dorsiflexion: 2/5  3/5   Ankle Plantarflexion: 2/5 4-/5      Lower Extremity Strength - EVAL 12/9/21    RLE LLE   Hip Flexion: 2-/5 3+/5   Hip Extension:  Able to bridge  Able to bridge    Hip Abduction: 2/5 2/5   Hip Adduction: 2/5 2/5   Knee Extension: 4-/5 4/5   Knee Flexion: 4-/5 4/5   Ankle Dorsiflexion: trace 3/5   Ankle Plantarflexion: 2/5 4-/5      7/8/21: LOWER EXTREMITY STRENGTH:    Left Right   Quadriceps 4+/5 4/5   Hamstrings 4+/5 4-/5      Iliopsoas 4/5 4-/5   Glute Med 3/5 3-/5   Hip Ext 3-/5 2+/5   Ankle DF 4-/5 3+/5   Ankle PF 4+/5 4-/5        7/28/21 Evaluation 2/18/22 3/30/22 5/23/22 6/27/22 10/19/22   5 times sit-stand 30 second sit to stand: 7 reps 31 sec - RW  >12 sec= fall risk for general elderly  >16 sec= fall risk for Parkinson's disease  >10 sec= balance/vestibular dysfunction (<61 y/o)  >14.2 sec= balance/vestibular dysfunction (>61 y/o)  >12 sec= fall risk for CVA    24  24 30 second  sit to stand: 4 reps 30 second sit to stand: 1 rep UNABLE    Timed Up and Go 16 sec with and without assistive device  41 sec  < 20 sec safe for independent transfers,     < 30 sec assist required for transfers    31 sec 34 sec 46 sec 44 sec UNABLE    5 meter walk test     0.22 m/s 0.21 m/s 0.15 m/s 0.16 m/s UNABLE       CMS Impairment/Limitation/Restriction for FOTO Peripheral Nervous System Disorders/Injuries Survey     Therapist reviewed FOTO scores for Robles Jordan on 12/9/2021.   FOTO documents entered into [employer] - see Media section.     Limitation Score: NT today - inappropriate, will discharge FOTO  Category: Mobility , Other         Treatment   Bolded performed today:   Italics not performed today:     Robles received the treatments listed below:      therapeutic activities to improve functional performance for 45 minutes, including:    Objective as above     Education as above       Patient Education and Home Exercises     Home Exercises Provided and Patient Education Provided     Education provided:   -Education diagnosis of ALS, what to expect functionally   -Education on equipment needs to discuss with ALS clinic   -Education on ALS resources and to sign up with team tenzin  -Educated on what to expect at ALS clinic   -Educated on goals moving forward and transition to HHPT  -Educated on number of wheelchair vendors to be prepared if would like a certain vendor - encouraged to research all vendors listed and more     Written Home Exercises Provided: Patient instructed to cont prior HEP. Exercises were reviewed and Robles was able to demonstrate them prior to the end of the session.  Arboleda demonstrated good  understanding of the education provided. See EMR under Patient Instructions for exercises provided during therapy sessions      ASSESSMENT   Arboleda demonstrating significant decline in lower extremity strength and all functional mobility. Patient is utilizing appropriate safety awareness  and safety with equipment however is at risk for falls (severe). Patient will require several pieces of equipment as noted in Patient Instructions and educated on process moving forward. Patient to discharge at this time into the care of the [facility] clinic on 11/1/2022 with Dr. Mark Anthony Cordova MD. All goals inappropriate at this time/moving forward. Would benefit from equipment management/ordering and HHPT - will discuss at ALS clinic with therapy team.     Goals: as of 10/19/2022  Short Term Goals: 6 weeks   1. Patient demonstrates independence with HEP. - MET 2/18/22  2. Patient will transfer sit to/from standing with RW, CGA, no use of UE and with good body mechanics to promote safe mobility within the home. - ongoing  3. Patient will improve balance within condition 2 of the MCTSIB to 30s to reduce risk of falling. - ongoing  4. Patient will improve 5xSTS score by 3s to promote LE functional strength. - ongoing   5. Patient will improve TUG score by 6s to promote functional mobility. - ongoing    Patient will improve BLE strength to 3+/5 to promote improved mobility against gravity. - ongoing      Long Term Goals: 12 weeks   1. Patient will be independent with home exercise program (HEP) to promote continued rehabilitation following discharge.  2. Patient will improve 5xSTS score by 9s to promote LE functional strength progression. - ongoing   3. Patient will improve TUG score by 12s to promote functional mobility. - ongoing  4. Patient will improve gait speed to 0.4 m/s progressing toward safe household ambulation. - ongoing  5. Assess endurance and create appropriate goals to track progression and improvements in safe community ambulation.    PLAN   Discharge from skilled OP PT services. Will follow up with ALS clinic therapy team and obtain orders for equipment and HHPT.     Kaye Anderson, PT, DPT, NCS  10/19/2022

## 2022-10-26 ENCOUNTER — OFFICE VISIT (OUTPATIENT)
Dept: UROLOGY | Facility: CLINIC | Age: 72
End: 2022-10-26
Payer: COMMERCIAL

## 2022-10-26 VITALS — WEIGHT: 216.06 LBS | HEIGHT: 74 IN | BODY MASS INDEX: 27.73 KG/M2

## 2022-10-26 DIAGNOSIS — R97.20 ELEVATED PSA: Primary | ICD-10-CM

## 2022-10-26 PROCEDURE — 99999 PR PBB SHADOW E&M-EST. PATIENT-LVL IV: ICD-10-PCS | Mod: PBBFAC,,, | Performed by: STUDENT IN AN ORGANIZED HEALTH CARE EDUCATION/TRAINING PROGRAM

## 2022-10-26 PROCEDURE — 99214 OFFICE O/P EST MOD 30 MIN: CPT | Mod: S$GLB,,, | Performed by: STUDENT IN AN ORGANIZED HEALTH CARE EDUCATION/TRAINING PROGRAM

## 2022-10-26 PROCEDURE — 99999 PR PBB SHADOW E&M-EST. PATIENT-LVL IV: CPT | Mod: PBBFAC,,, | Performed by: STUDENT IN AN ORGANIZED HEALTH CARE EDUCATION/TRAINING PROGRAM

## 2022-10-26 PROCEDURE — 99214 PR OFFICE/OUTPT VISIT, EST, LEVL IV, 30-39 MIN: ICD-10-PCS | Mod: S$GLB,,, | Performed by: STUDENT IN AN ORGANIZED HEALTH CARE EDUCATION/TRAINING PROGRAM

## 2022-10-26 RX ORDER — RILUZOLE 50 MG/1
50 TABLET, FILM COATED ORAL EVERY 12 HOURS
COMMUNITY

## 2022-10-26 NOTE — PROGRESS NOTES
Patient ID: Robles Jordan is a 71 y.o. male.    Chief Complaint:  Review bx results        HPI      71 y.o. who presents to the Urology clinic for evaluation of  Elevated PSA  9.4 checked 7/2022. PSA previously 1.8 - 2 years ago, 2.6 3 years ago.   He was previously seen by Dr. Israel in 2020. Has new dx of ALS since last appt.  Tolerated prostate bx without complication, here today to review results.   Denies dysuria, hematuria, nausea, vomiting, flank pain, new abdominal pain  Uncle with prostate cancer, other family members with pancreatic/colon/ breast cancer.   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    Liver cyst -- check ultrasound 08/2020 02/19/2020    Right sided weakness 06/21/2020    Tethered cord syndrome 07/23/2020    S/P lumbar laminectomy 08/12/2020    Other specified diseases of spinal cord 08/18/2020    Difficulty walking 02/09/2021    Colon cancer screening 02/25/2021    Hyperglycemia 05/04/2021    Prediabetes 05/19/2021    Aortic atherosclerosis 06/22/2022    Elevated PSA 09/26/2022     Resolved Ambulatory Problems     Diagnosis Date Noted    Positive depression screening 05/11/2018    Right hip pain 05/11/2018    Hypoxia 08/15/2020    Obesity 08/15/2020    Lumbar pain 09/01/2020    Thrombocytopenia, unspecified 05/04/2021     Past Medical History:   Diagnosis Date    Back pain     BPH (benign prostatic  hypertrophy)     Hx of colonic polyps 10/19/2015    Hypertension     Hypertension     Sleep apnea     Spinal cord disease          Past Surgical History  Past Surgical History:   Procedure Laterality Date    COLONOSCOPY N/A 10/19/2015    Procedure: COLONOSCOPY;  Surgeon: Antonino Bernstein MD;  Location: [facility] ENDO (4TH FLR);  Service: Endoscopy;  Laterality: N/A;    COLONOSCOPY N/A 2/25/2021    Procedure: COLONOSCOPY;  Surgeon: Blanca Don MD;  [facility]  Service: Endoscopy;  Laterality: N/A;  requesting ASAP  COVID test at [place] on 2/22-GT.2/24 called pt. move up to earlier spot. pt. did NOT want to come earlier.EC    CYSTOSCOPY WITH URODYNAMIC TESTING N/A 1/17/2020    Procedure: CYSTOSCOPY,WITH URODYNAMIC TESTING;  Surgeon: Christianne Israel MD;  Location: [facility];  Service: Urology;  Laterality: N/A;  RN PREOP 1/13/2020    MULTIPLE TOOTH EXTRACTIONS  2014       Social History  Social Connections: Moderately Isolated    Frequency of Communication with Friends and Family: More than three times a week    Frequency of Social Gatherings with Friends and Family: Once a week    Attends Islam Services: Never    Active Member of Clubs or Organizations: No    Attends Club or Organization Meetings: Never    Marital Status:        Medications    Current Outpatient Medications:     acetaminophen (TYLENOL) 325 MG tablet, Take 2 tablets (650 mg total) by mouth every 6 (six) hours as needed for Temperature greater than (or equal to 101 degree F)., Disp:  , Rfl: 0    amLODIPine (NORVASC) 10 MG tablet, Take 1 tablet (10 mg total) by mouth once daily. For blood pressure, Disp: 90 tablet, Rfl: 3    atorvastatin (LIPITOR) 20 MG tablet, Take 1 tablet (20 mg total) by mouth once daily., Disp: 90 tablet, Rfl: 1    bisacodyL (DULCOLAX) 10 mg Supp, Place 1 suppository (10 mg total) rectally daily as needed., Disp:  , Rfl: 0    ciprofloxacin HCl (CIPRO) 500 MG tablet, Take 500 mg by mouth 2 (two) times  daily., Disp: , Rfl:     cyclobenzaprine (FLEXERIL) 10 MG tablet, Take by mouth., Disp: , Rfl:     DULoxetine (CYMBALTA) 30 MG capsule, TAKE 1 CAPSULE BY MOUTH EVERY DAY FOR 30 DAYS, Disp: , Rfl:     gabapentin (NEURONTIN) 100 MG capsule, , Disp: , Rfl:     ibuprofen (ADVIL,MOTRIN) 800 MG tablet, Take 1 tablet (800 mg total) by mouth 3 (three) times daily as needed for Pain., Disp: 45 tablet, Rfl: 5    lisinopriL (PRINIVIL,ZESTRIL) 40 MG tablet, Take 1 tablet (40 mg total) by mouth once daily., Disp: 90 tablet, Rfl: 3    oxyCODONE (ROXICODONE) 5 MG immediate release tablet, Take 5 mg by mouth every 4 (four) hours as needed., Disp: , Rfl:     polyethylene glycol (GLYCOLAX) 17 gram PwPk, Take 17 g by mouth once daily., Disp: , Rfl: 0    riluzole 50 mg Tab tablet, Take 50 mg by mouth every 12 (twelve) hours., Disp: , Rfl:     senna-docusate 8.6-50 mg (PERICOLACE) 8.6-50 mg per tablet, Take 2 tablets by mouth nightly as needed for Constipation., Disp:  , Rfl:     tamsulosin (FLOMAX) 0.4 mg Cap, Take 1 capsule (0.4 mg total) by mouth once daily., Disp: 90 capsule, Rfl: 3    tiZANidine (ZANAFLEX) 2 MG tablet, SMARTSI Tablet(s) By Mouth 1 to 3 Times Daily PRN, Disp: , Rfl:     sildenafil (VIAGRA) 100 MG tablet, Take 1 tablet (100 mg total) by mouth daily as needed for Erectile Dysfunction., Disp: 30 tablet, Rfl: 2    valACYclovir (VALTREX) 1000 MG tablet, Take 1 tablet (1,000 mg total) by mouth 3 (three) times daily. for 7 days, Disp: 21 tablet, Rfl: 0  No current facility-administered medications for this visit.    Facility-Administered Medications Ordered in Other Visits:     mupirocin 2 % ointment, , Nasal, On Call Procedure, Briana Seals PA-C    Allergies  Review of patient's allergies indicates:  No Known Allergies  Patient's PMH, FH, Social hx, Medications, allergies reviewed and updated as pertinent to today's visit.    Objective:      Physical Exam  Constitutional:       General: He is not in acute  distress.     Appearance: He is well-developed. He is not ill-appearing, toxic-appearing or diaphoretic.   HENT:      Head: Normocephalic and atraumatic.      Mouth/Throat:      Mouth: Mucous membranes are moist.   Eyes:      Conjunctiva/sclera: Conjunctivae normal.   Cardiovascular:      Rate and Rhythm: Normal rate and regular rhythm.   Pulmonary:      Effort: Pulmonary effort is normal. No respiratory distress.   Abdominal:      General: There is no distension.      Palpations: Abdomen is soft. There is no mass.      Tenderness: There is no abdominal tenderness. There is no guarding.   Musculoskeletal:         General: No swelling or deformity.      Cervical back: Neck supple.   Skin:     General: Skin is warm.      Capillary Refill: Capillary refill takes less than 2 seconds.      Findings: No rash.   Neurological:      Mental Status: He is alert and oriented to person, place, and time.      Comments: Wheelchair   Psychiatric:         Mood and Affect: Mood normal.         Thought Content: Thought content normal.         Judgment: Judgment normal.         Lab Results   Component Value Date    PSADIAG 8.7 (H) 08/31/2022    PSADIAG 1.8 09/20/2019    PSADIAG 2.6 11/20/2018       1. Prostate, left base lateral, biopsy:   Benign prostatic tissue   2. Prostate, left base medial, biopsy:   Benign prostatic tissue   3. Prostate, left mid lateral, biopsy:   Benign prostatic tissue   4. Prostate, left mid medial, biopsy:   Benign prostatic tissue   5. Prostate, left apex lateral, biopsy:   Benign prostatic tissue   6. Prostate, left apex medial, biopsy:   Benign prostatic tissue   7. Prostate, right base lateral, biopsy:   Benign prostatic tissue   8. Prostate, right base medial, biopsy:   Benign prostatic tissue   9. Prostate, right mid lateral, biopsy:   Benign prostatic tissue   10. Prostate, right mid medial, biopsy:   Benign prostatic tissue   11. Prostate, right apex lateral, biopsy:   Benign prostatic tissue   12.  Prostate, right apex medial, biopsy:   Benign prostatic tissue    Comment: Interp By Antonia Damon D.O., Signed on 10/03/2022 at 10:18     Assessment:       1. Elevated PSA              Plan:           Elevated PSA  Discussed differential  Neg bx 9/26/22  Recheck PSA in 6 months, if increasing will obtain prostate MRI to eval for clinically significant lesions to tailor repeat biopsy  If stable/lowered can consider surveillance

## 2022-10-27 NOTE — PLAN OF CARE
[facility]   Physical Therapy Re-Evaluation Note and DISCHARGE NOTE    Name: Robles Jordan  Clinic Number: [id number]    Therapy Diagnosis:   Encounter Diagnoses   Name Primary?    Impaired functional mobility, balance, gait, and endurance Yes    Right leg weakness     Difficulty walking      Physician: Ervin Washington     Visit Date: 10/19/2022    Physician Orders: PT Eval and Treat Neuro  Medical Diagnosis from Referral: Sensory neuropathy [G62.9]  Evaluation Date: 12/9/2021  Authorization Period Expiration: 12/31/21  Visit # / Visits authorized: 42/40 (+1 eval)    PTA Visit #: 0/5    Time In: 1:15  Time Out: 2:00  Total Billable Time: 45 minutes    Precautions: Standard, Diabetes, Fall and hx of tethered cord - DO NOT INCREASE REPS or WEIGHT    SUBJECTIVE     Pt reports: official diagnosis of ALS and will be attending the [facility] ALS clinic on 11/1/22. Patient reports continued decline in strength in bilateral lower extremities (especially the left lower extremity) and has been utilizing Rolling Walker (RW) minimally in home and wheelchair in community. Patient requires therapist assistance to slideboard transfer from car to wheelchair to enter clinic and exit clinic this date.       He was compliant with home exercise program.  Response to previous treatment: fine   Functional change: ongoing      Pain: some, un-rated   Location: bilateral feet, back     Fatigue: 5/10     OBJECTIVE     Objective Measures updated at progress report unless specified.     Lower Extremity Strength 10/19/22    RLE LLE   Hip Flexion: 3/5  3/5   Hip Extension:  Bridge only no resistance Bridge only no resistance   Hip Abduction: 2/5 2/5   Hip Adduction: 2/5 2/5   Knee Extension: 3/5  3/5    Knee Flexion: 3/5  3/5    Ankle Dorsiflexion: 2-/5  2/5   Ankle Plantarflexion: 2-/5 2/5     Lower Extremity Strength 6/27/22    RLE LLE   Hip Flexion: 3/5  3+/5   Hip Extension:  Bridge with mild resistance  Bridge with  mild resistance    Hip Abduction: 2-/5 2/5   Hip Adduction: 2-/5 2/5   Knee Extension: 3/5  4/5    Knee Flexion: 3/5  4/5    Ankle Dorsiflexion: 2-/5  2+/5   Ankle Plantarflexion: 2-/5 3-/5      Lower Extremity Strength 5/23/22    RLE LLE   Hip Flexion: 2-/5  3+/5   Hip Extension:  Bridge with mild resistance  Bridge with mild resistance    Hip Abduction: 2/5 2/5   Hip Adduction: 2/5 2/5   Knee Extension: 4-/5  4/5    Knee Flexion: 3+/5  4/5    Ankle Dorsiflexion: 2-/5  2+/5   Ankle Plantarflexion: 2-/5 3+/5      Lower Extremity Strength 3/30/22    RLE LLE   Hip Flexion: 2+/5  3+/5    Hip Extension:  2/5 3+/5    Hip Abduction: 2+/5  2+/5    Hip Adduction: 2+/5  3+/5    Knee Extension: 4+/5  5/5    Knee Flexion: 4-/5  4+/5    Ankle Dorsiflexion: 2/5  3/5   Ankle Plantarflexion: 2/5 4-/5      Lower Extremity Strength 2/18/22    RLE LLE   Hip Flexion: 2+/5  3+/5    Hip Extension:  2/5 3+/5    Hip Abduction: 2+/5  2+/5    Hip Adduction: 2+/5  2+/5    Knee Extension: 4+/5  5/5    Knee Flexion: 4-/5  4+/5    Ankle Dorsiflexion: 2/5  3/5   Ankle Plantarflexion: 2/5 4-/5      Lower Extremity Strength - EVAL 12/9/21    RLE LLE   Hip Flexion: 2-/5 3+/5   Hip Extension:  Able to bridge  Able to bridge    Hip Abduction: 2/5 2/5   Hip Adduction: 2/5 2/5   Knee Extension: 4-/5 4/5   Knee Flexion: 4-/5 4/5   Ankle Dorsiflexion: trace 3/5   Ankle Plantarflexion: 2/5 4-/5      7/8/21: LOWER EXTREMITY STRENGTH:    Left Right   Quadriceps 4+/5 4/5   Hamstrings 4+/5 4-/5      Iliopsoas 4/5 4-/5   Glute Med 3/5 3-/5   Hip Ext 3-/5 2+/5   Ankle DF 4-/5 3+/5   Ankle PF 4+/5 4-/5        7/28/21 Evaluation 2/18/22 3/30/22 5/23/22 6/27/22 10/19/22   5 times sit-stand 30 second sit to stand: 7 reps 31 sec - RW  >12 sec= fall risk for general elderly  >16 sec= fall risk for Parkinson's disease  >10 sec= balance/vestibular dysfunction (<59 y/o)  >14.2 sec= balance/vestibular dysfunction (>59 y/o)  >12 sec= fall risk for CVA    24  24 30 second  sit to stand: 4 reps 30 second sit to stand: 1 rep UNABLE    Timed Up and Go 16 sec with and without assistive device  41 sec  < 20 sec safe for independent transfers,     < 30 sec assist required for transfers    31 sec 34 sec 46 sec 44 sec UNABLE    5 meter walk test     0.22 m/s 0.21 m/s 0.15 m/s 0.16 m/s UNABLE       CMS Impairment/Limitation/Restriction for FOTO Peripheral Nervous System Disorders/Injuries Survey     Therapist reviewed FOTO scores for Robles Jordan on 12/9/2021.   FOTO documents entered into [employer] - see Media section.     Limitation Score: NT today - inappropriate, will discharge FOTO  Category: Mobility , Other         Treatment   Bolded performed today:   Italics not performed today:     Robles received the treatments listed below:      therapeutic activities to improve functional performance for 45 minutes, including:    Objective as above     Education as above       Patient Education and Home Exercises     Home Exercises Provided and Patient Education Provided     Education provided:   -Education diagnosis of ALS, what to expect functionally   -Education on equipment needs to discuss with ALS clinic   -Education on ALS resources and to sign up with team tenzin  -Educated on what to expect at ALS clinic   -Educated on goals moving forward and transition to HHPT  -Educated on number of wheelchair vendors to be prepared if would like a certain vendor - encouraged to research all vendors listed and more     Written Home Exercises Provided: Patient instructed to cont prior HEP. Exercises were reviewed and Robles was able to demonstrate them prior to the end of the session.  Arboleda demonstrated good  understanding of the education provided. See EMR under Patient Instructions for exercises provided during therapy sessions      ASSESSMENT   Arboleda demonstrating significant decline in lower extremity strength and all functional mobility. Patient is utilizing appropriate safety awareness  and safety with equipment however is at risk for falls (severe). Patient will require several pieces of equipment as noted in Patient Instructions and educated on process moving forward. Patient to discharge at this time into the care of [place] [facility] [place] 11[place]1/2022 with Dr. Mark Anthony Cordova MD. All goals inappropriate at this time/moving forward. Would benefit from equipment management/ordering and HHPT - will discuss at ALS clinic with therapy team.     Goals: as of 10/19/2022  Short Term Goals: 6 weeks   1. Patient demonstrates independence with HEP. - MET 2/18/22  2. Patient will transfer sit to/from standing with RW, CGA, no use of UE and with good body mechanics to promote safe mobility within the home. - ongoing  3. Patient will improve balance within condition 2 of the MCTSIB to 30s to reduce risk of falling. - ongoing  4. Patient will improve 5xSTS score by 3s to promote LE functional strength. - ongoing   5. Patient will improve TUG score by 6s to promote functional mobility. - ongoing    Patient will improve BLE strength to 3+/5 to promote improved mobility against gravity. - ongoing      Long Term Goals: 12 weeks   1. Patient will be independent with home exercise program (HEP) to promote continued rehabilitation following discharge.  2. Patient will improve 5xSTS score by 9s to promote LE functional strength progression. - ongoing   3. Patient will improve TUG score by 12s to promote functional mobility. - ongoing  4. Patient will improve gait speed to 0.4 m/s progressing toward safe household ambulation. - ongoing  5. Assess endurance and create appropriate goals to track progression and improvements in safe community ambulation.    PLAN   Discharge from skilled OP PT services. Will follow up with ALS clinic therapy team and obtain orders for equipment and HHPT.     Kaye Anderson, PT, DPT, NCS  10/19/2022

## 2022-11-20 ENCOUNTER — HOSPITAL ENCOUNTER (INPATIENT)
Facility: HOSPITAL | Age: 72
LOS: 3 days | Discharge: HOME HEALTH | DRG: 872 | End: 2022-11-23
Attending: EMERGENCY MEDICINE | Admitting: STUDENT IN AN ORGANIZED HEALTH CARE EDUCATION/TRAINING PROGRAM
Payer: COMMERCIAL

## 2022-11-20 DIAGNOSIS — R31.9 URINARY TRACT INFECTION WITH HEMATURIA, SITE UNSPECIFIED: ICD-10-CM

## 2022-11-20 DIAGNOSIS — R07.9 CHEST PAIN: ICD-10-CM

## 2022-11-20 DIAGNOSIS — R78.81 BACTEREMIA: Primary | ICD-10-CM

## 2022-11-20 DIAGNOSIS — Z74.09 IMPAIRED FUNCTIONAL MOBILITY, BALANCE, GAIT, AND ENDURANCE: ICD-10-CM

## 2022-11-20 DIAGNOSIS — N39.0 URINARY TRACT INFECTION WITH HEMATURIA, SITE UNSPECIFIED: ICD-10-CM

## 2022-11-20 DIAGNOSIS — R54 AGE-RELATED PHYSICAL DEBILITY: ICD-10-CM

## 2022-11-20 PROBLEM — F32.A DEPRESSION: Status: ACTIVE | Noted: 2022-11-20

## 2022-11-20 PROBLEM — G12.21 ALS (AMYOTROPHIC LATERAL SCLEROSIS): Status: ACTIVE | Noted: 2022-11-20

## 2022-11-20 PROBLEM — A41.9 SEPSIS: Status: ACTIVE | Noted: 2022-11-20

## 2022-11-20 LAB
ALBUMIN SERPL BCP-MCNC: 3.6 G/DL (ref 3.5–5.2)
ALLENS TEST: NORMAL
ALP SERPL-CCNC: 81 U/L (ref 55–135)
ALT SERPL W/O P-5'-P-CCNC: 24 U/L (ref 10–44)
ANION GAP SERPL CALC-SCNC: 14 MMOL/L (ref 8–16)
AST SERPL-CCNC: 27 U/L (ref 10–40)
BACTERIA #/AREA URNS HPF: ABNORMAL /HPF
BASOPHILS # BLD AUTO: 0.03 K/UL (ref 0–0.2)
BASOPHILS NFR BLD: 0.2 % (ref 0–1.9)
BILIRUB SERPL-MCNC: 1 MG/DL (ref 0.1–1)
BILIRUB UR QL STRIP: NEGATIVE
BUN SERPL-MCNC: 15 MG/DL (ref 8–23)
CALCIUM SERPL-MCNC: 9.6 MG/DL (ref 8.7–10.5)
CHLORIDE SERPL-SCNC: 106 MMOL/L (ref 95–110)
CLARITY UR: ABNORMAL
CO2 SERPL-SCNC: 21 MMOL/L (ref 23–29)
COLOR UR: YELLOW
CREAT SERPL-MCNC: 0.8 MG/DL (ref 0.5–1.4)
DELSYS: NORMAL
DIFFERENTIAL METHOD: ABNORMAL
EOSINOPHIL # BLD AUTO: 0.1 K/UL (ref 0–0.5)
EOSINOPHIL NFR BLD: 0.6 % (ref 0–8)
ERYTHROCYTE [DISTWIDTH] IN BLOOD BY AUTOMATED COUNT: 13.2 % (ref 11.5–14.5)
EST. GFR  (NO RACE VARIABLE): >60 ML/MIN/1.73 M^2
GLUCOSE SERPL-MCNC: 97 MG/DL (ref 70–110)
GLUCOSE UR QL STRIP: NEGATIVE
HCT VFR BLD AUTO: 42.8 % (ref 40–54)
HGB BLD-MCNC: 13.6 G/DL (ref 14–18)
HGB UR QL STRIP: ABNORMAL
HYALINE CASTS #/AREA URNS LPF: 0 /LPF
IMM GRANULOCYTES # BLD AUTO: 0.21 K/UL (ref 0–0.04)
IMM GRANULOCYTES NFR BLD AUTO: 1.3 % (ref 0–0.5)
KETONES UR QL STRIP: ABNORMAL
LACTATE SERPL-SCNC: 1.3 MMOL/L (ref 0.5–2.2)
LDH SERPL L TO P-CCNC: 1.28 MMOL/L (ref 0.5–2.2)
LEUKOCYTE ESTERASE UR QL STRIP: ABNORMAL
LYMPHOCYTES # BLD AUTO: 1.4 K/UL (ref 1–4.8)
LYMPHOCYTES NFR BLD: 8.1 % (ref 18–48)
MCH RBC QN AUTO: 28.2 PG (ref 27–31)
MCHC RBC AUTO-ENTMCNC: 31.8 G/DL (ref 32–36)
MCV RBC AUTO: 89 FL (ref 82–98)
MICROSCOPIC COMMENT: ABNORMAL
MODE: NORMAL
MONOCYTES # BLD AUTO: 1.2 K/UL (ref 0.3–1)
MONOCYTES NFR BLD: 7 % (ref 4–15)
NEUTROPHILS # BLD AUTO: 13.7 K/UL (ref 1.8–7.7)
NEUTROPHILS NFR BLD: 82.8 % (ref 38–73)
NITRITE UR QL STRIP: NEGATIVE
NRBC BLD-RTO: 0 /100 WBC
PH UR STRIP: 6 [PH] (ref 5–8)
PLATELET # BLD AUTO: 236 K/UL (ref 150–450)
PMV BLD AUTO: 9.4 FL (ref 9.2–12.9)
POTASSIUM SERPL-SCNC: 3.9 MMOL/L (ref 3.5–5.1)
PROCALCITONIN SERPL IA-MCNC: 5.18 NG/ML
PROT SERPL-MCNC: 7.3 G/DL (ref 6–8.4)
PROT UR QL STRIP: ABNORMAL
RBC # BLD AUTO: 4.83 M/UL (ref 4.6–6.2)
RBC #/AREA URNS HPF: 46 /HPF (ref 0–4)
SAMPLE: NORMAL
SITE: NORMAL
SODIUM SERPL-SCNC: 141 MMOL/L (ref 136–145)
SP GR UR STRIP: 1.03 (ref 1–1.03)
URN SPEC COLLECT METH UR: ABNORMAL
UROBILINOGEN UR STRIP-ACNC: NEGATIVE EU/DL
WBC # BLD AUTO: 16.59 K/UL (ref 3.9–12.7)
WBC #/AREA URNS HPF: >100 /HPF (ref 0–5)
WBC CLUMPS URNS QL MICRO: ABNORMAL

## 2022-11-20 PROCEDURE — 83605 ASSAY OF LACTIC ACID: CPT | Performed by: EMERGENCY MEDICINE

## 2022-11-20 PROCEDURE — 25000003 PHARM REV CODE 250: Performed by: EMERGENCY MEDICINE

## 2022-11-20 PROCEDURE — 99900035 HC TECH TIME PER 15 MIN (STAT)

## 2022-11-20 PROCEDURE — 93010 EKG 12-LEAD: ICD-10-PCS | Mod: ,,, | Performed by: INTERNAL MEDICINE

## 2022-11-20 PROCEDURE — 83605 ASSAY OF LACTIC ACID: CPT

## 2022-11-20 PROCEDURE — 96365 THER/PROPH/DIAG IV INF INIT: CPT

## 2022-11-20 PROCEDURE — 87040 BLOOD CULTURE FOR BACTERIA: CPT | Performed by: EMERGENCY MEDICINE

## 2022-11-20 PROCEDURE — 36415 COLL VENOUS BLD VENIPUNCTURE: CPT | Performed by: EMERGENCY MEDICINE

## 2022-11-20 PROCEDURE — 11000001 HC ACUTE MED/SURG PRIVATE ROOM

## 2022-11-20 PROCEDURE — 81000 URINALYSIS NONAUTO W/SCOPE: CPT | Performed by: EMERGENCY MEDICINE

## 2022-11-20 PROCEDURE — 85025 COMPLETE CBC W/AUTO DIFF WBC: CPT | Performed by: EMERGENCY MEDICINE

## 2022-11-20 PROCEDURE — 87086 URINE CULTURE/COLONY COUNT: CPT | Performed by: EMERGENCY MEDICINE

## 2022-11-20 PROCEDURE — 84145 PROCALCITONIN (PCT): CPT | Performed by: EMERGENCY MEDICINE

## 2022-11-20 PROCEDURE — 80053 COMPREHEN METABOLIC PANEL: CPT | Performed by: EMERGENCY MEDICINE

## 2022-11-20 PROCEDURE — 93005 ELECTROCARDIOGRAM TRACING: CPT

## 2022-11-20 PROCEDURE — 63600175 PHARM REV CODE 636 W HCPCS: Performed by: EMERGENCY MEDICINE

## 2022-11-20 PROCEDURE — 93010 ELECTROCARDIOGRAM REPORT: CPT | Mod: ,,, | Performed by: INTERNAL MEDICINE

## 2022-11-20 PROCEDURE — 63600175 PHARM REV CODE 636 W HCPCS: Performed by: STUDENT IN AN ORGANIZED HEALTH CARE EDUCATION/TRAINING PROGRAM

## 2022-11-20 PROCEDURE — 99285 EMERGENCY DEPT VISIT HI MDM: CPT | Mod: 25

## 2022-11-20 RX ORDER — LANOLIN ALCOHOL/MO/W.PET/CERES
800 CREAM (GRAM) TOPICAL
Status: DISCONTINUED | OUTPATIENT
Start: 2022-11-20 | End: 2022-11-23 | Stop reason: HOSPADM

## 2022-11-20 RX ORDER — GABAPENTIN 100 MG/1
100 CAPSULE ORAL 2 TIMES DAILY PRN
Status: DISCONTINUED | OUTPATIENT
Start: 2022-11-20 | End: 2022-11-23 | Stop reason: HOSPADM

## 2022-11-20 RX ORDER — TAMSULOSIN HYDROCHLORIDE 0.4 MG/1
0.4 CAPSULE ORAL DAILY
Status: DISCONTINUED | OUTPATIENT
Start: 2022-11-21 | End: 2022-11-23 | Stop reason: HOSPADM

## 2022-11-20 RX ORDER — SODIUM CHLORIDE 0.9 % (FLUSH) 0.9 %
10 SYRINGE (ML) INJECTION EVERY 12 HOURS PRN
Status: DISCONTINUED | OUTPATIENT
Start: 2022-11-20 | End: 2022-11-23 | Stop reason: HOSPADM

## 2022-11-20 RX ORDER — HYDROCODONE BITARTRATE AND ACETAMINOPHEN 5; 325 MG/1; MG/1
1 TABLET ORAL EVERY 6 HOURS PRN
Status: DISCONTINUED | OUTPATIENT
Start: 2022-11-20 | End: 2022-11-23 | Stop reason: HOSPADM

## 2022-11-20 RX ORDER — IPRATROPIUM BROMIDE AND ALBUTEROL SULFATE 2.5; .5 MG/3ML; MG/3ML
3 SOLUTION RESPIRATORY (INHALATION) EVERY 4 HOURS PRN
Status: DISCONTINUED | OUTPATIENT
Start: 2022-11-20 | End: 2022-11-23 | Stop reason: HOSPADM

## 2022-11-20 RX ORDER — PROCHLORPERAZINE EDISYLATE 5 MG/ML
5 INJECTION INTRAMUSCULAR; INTRAVENOUS EVERY 6 HOURS PRN
Status: DISCONTINUED | OUTPATIENT
Start: 2022-11-20 | End: 2022-11-23 | Stop reason: HOSPADM

## 2022-11-20 RX ORDER — TIZANIDINE 2 MG/1
2 TABLET ORAL EVERY 8 HOURS PRN
Status: DISCONTINUED | OUTPATIENT
Start: 2022-11-20 | End: 2022-11-23 | Stop reason: HOSPADM

## 2022-11-20 RX ORDER — ATORVASTATIN CALCIUM 10 MG/1
20 TABLET, FILM COATED ORAL DAILY
Status: DISCONTINUED | OUTPATIENT
Start: 2022-11-21 | End: 2022-11-23 | Stop reason: HOSPADM

## 2022-11-20 RX ORDER — ACETAMINOPHEN 325 MG/1
650 TABLET ORAL EVERY 4 HOURS PRN
Status: DISCONTINUED | OUTPATIENT
Start: 2022-11-20 | End: 2022-11-23 | Stop reason: HOSPADM

## 2022-11-20 RX ORDER — DULOXETIN HYDROCHLORIDE 30 MG/1
30 CAPSULE, DELAYED RELEASE ORAL DAILY
Status: DISCONTINUED | OUTPATIENT
Start: 2022-11-21 | End: 2022-11-23 | Stop reason: HOSPADM

## 2022-11-20 RX ORDER — GLUCAGON 1 MG
1 KIT INJECTION
Status: DISCONTINUED | OUTPATIENT
Start: 2022-11-20 | End: 2022-11-23 | Stop reason: HOSPADM

## 2022-11-20 RX ORDER — BISACODYL 10 MG
10 SUPPOSITORY, RECTAL RECTAL DAILY PRN
Status: DISCONTINUED | OUTPATIENT
Start: 2022-11-20 | End: 2022-11-23 | Stop reason: HOSPADM

## 2022-11-20 RX ORDER — IBUPROFEN 200 MG
16 TABLET ORAL
Status: DISCONTINUED | OUTPATIENT
Start: 2022-11-20 | End: 2022-11-23 | Stop reason: HOSPADM

## 2022-11-20 RX ORDER — SODIUM,POTASSIUM PHOSPHATES 280-250MG
2 POWDER IN PACKET (EA) ORAL
Status: DISCONTINUED | OUTPATIENT
Start: 2022-11-20 | End: 2022-11-23 | Stop reason: HOSPADM

## 2022-11-20 RX ORDER — IBUPROFEN 200 MG
24 TABLET ORAL
Status: DISCONTINUED | OUTPATIENT
Start: 2022-11-20 | End: 2022-11-23 | Stop reason: HOSPADM

## 2022-11-20 RX ORDER — AMLODIPINE BESYLATE 5 MG/1
10 TABLET ORAL DAILY
Status: DISCONTINUED | OUTPATIENT
Start: 2022-11-21 | End: 2022-11-23 | Stop reason: HOSPADM

## 2022-11-20 RX ORDER — SIMETHICONE 80 MG
1 TABLET,CHEWABLE ORAL 4 TIMES DAILY PRN
Status: DISCONTINUED | OUTPATIENT
Start: 2022-11-20 | End: 2022-11-23 | Stop reason: HOSPADM

## 2022-11-20 RX ORDER — RILUZOLE 50 MG/1
50 TABLET, FILM COATED ORAL EVERY 12 HOURS
Status: DISCONTINUED | OUTPATIENT
Start: 2022-11-20 | End: 2022-11-23 | Stop reason: HOSPADM

## 2022-11-20 RX ORDER — LISINOPRIL 20 MG/1
40 TABLET ORAL DAILY
Status: DISCONTINUED | OUTPATIENT
Start: 2022-11-21 | End: 2022-11-23 | Stop reason: HOSPADM

## 2022-11-20 RX ORDER — NALOXONE HCL 0.4 MG/ML
0.02 VIAL (ML) INJECTION
Status: DISCONTINUED | OUTPATIENT
Start: 2022-11-20 | End: 2022-11-23 | Stop reason: HOSPADM

## 2022-11-20 RX ORDER — ONDANSETRON 2 MG/ML
4 INJECTION INTRAMUSCULAR; INTRAVENOUS EVERY 8 HOURS PRN
Status: DISCONTINUED | OUTPATIENT
Start: 2022-11-20 | End: 2022-11-23 | Stop reason: HOSPADM

## 2022-11-20 RX ORDER — TALC
6 POWDER (GRAM) TOPICAL NIGHTLY PRN
Status: DISCONTINUED | OUTPATIENT
Start: 2022-11-20 | End: 2022-11-23 | Stop reason: HOSPADM

## 2022-11-20 RX ORDER — ACETAMINOPHEN 325 MG/1
650 TABLET ORAL EVERY 8 HOURS PRN
Status: DISCONTINUED | OUTPATIENT
Start: 2022-11-20 | End: 2022-11-23 | Stop reason: HOSPADM

## 2022-11-20 RX ORDER — MAG HYDROX/ALUMINUM HYD/SIMETH 200-200-20
30 SUSPENSION, ORAL (FINAL DOSE FORM) ORAL 4 TIMES DAILY PRN
Status: DISCONTINUED | OUTPATIENT
Start: 2022-11-20 | End: 2022-11-23 | Stop reason: HOSPADM

## 2022-11-20 RX ORDER — POLYETHYLENE GLYCOL 3350 17 G/17G
17 POWDER, FOR SOLUTION ORAL DAILY
Status: DISCONTINUED | OUTPATIENT
Start: 2022-11-21 | End: 2022-11-23 | Stop reason: HOSPADM

## 2022-11-20 RX ORDER — APIXABAN 2.5 MG/1
2.5 TABLET, FILM COATED ORAL 2 TIMES DAILY
COMMUNITY
Start: 2022-11-01 | End: 2023-11-07

## 2022-11-20 RX ORDER — HEPARIN SODIUM 5000 [USP'U]/ML
7500 INJECTION, SOLUTION INTRAVENOUS; SUBCUTANEOUS EVERY 8 HOURS
Status: DISCONTINUED | OUTPATIENT
Start: 2022-11-20 | End: 2022-11-23 | Stop reason: HOSPADM

## 2022-11-20 RX ORDER — ASPIRIN 81 MG/1
81 TABLET ORAL DAILY
COMMUNITY

## 2022-11-20 RX ADMIN — CEFTRIAXONE SODIUM 2 G: 2 INJECTION, POWDER, FOR SOLUTION INTRAMUSCULAR; INTRAVENOUS at 07:11

## 2022-11-20 RX ADMIN — SODIUM CHLORIDE, SODIUM LACTATE, POTASSIUM CHLORIDE, AND CALCIUM CHLORIDE 2859 ML: .6; .31; .03; .02 INJECTION, SOLUTION INTRAVENOUS at 07:11

## 2022-11-20 RX ADMIN — HEPARIN SODIUM 7500 UNITS: 5000 INJECTION INTRAVENOUS; SUBCUTANEOUS at 10:11

## 2022-11-21 LAB
ANION GAP SERPL CALC-SCNC: 11 MMOL/L (ref 8–16)
BASOPHILS # BLD AUTO: 0.02 K/UL (ref 0–0.2)
BASOPHILS NFR BLD: 0.2 % (ref 0–1.9)
BUN SERPL-MCNC: 12 MG/DL (ref 8–23)
CALCIUM SERPL-MCNC: 9.1 MG/DL (ref 8.7–10.5)
CHLORIDE SERPL-SCNC: 109 MMOL/L (ref 95–110)
CO2 SERPL-SCNC: 21 MMOL/L (ref 23–29)
CREAT SERPL-MCNC: 0.6 MG/DL (ref 0.5–1.4)
DIFFERENTIAL METHOD: ABNORMAL
EOSINOPHIL # BLD AUTO: 0.2 K/UL (ref 0–0.5)
EOSINOPHIL NFR BLD: 1.4 % (ref 0–8)
ERYTHROCYTE [DISTWIDTH] IN BLOOD BY AUTOMATED COUNT: 13.2 % (ref 11.5–14.5)
EST. GFR  (NO RACE VARIABLE): >60 ML/MIN/1.73 M^2
GLUCOSE SERPL-MCNC: 103 MG/DL (ref 70–110)
HCT VFR BLD AUTO: 38.6 % (ref 40–54)
HGB BLD-MCNC: 12.4 G/DL (ref 14–18)
IMM GRANULOCYTES # BLD AUTO: 0.15 K/UL (ref 0–0.04)
IMM GRANULOCYTES NFR BLD AUTO: 1.2 % (ref 0–0.5)
LACTATE SERPL-SCNC: 1 MMOL/L (ref 0.5–2.2)
LYMPHOCYTES # BLD AUTO: 1.7 K/UL (ref 1–4.8)
LYMPHOCYTES NFR BLD: 14.2 % (ref 18–48)
MAGNESIUM SERPL-MCNC: 2 MG/DL (ref 1.6–2.6)
MCH RBC QN AUTO: 28.6 PG (ref 27–31)
MCHC RBC AUTO-ENTMCNC: 32.1 G/DL (ref 32–36)
MCV RBC AUTO: 89 FL (ref 82–98)
MONOCYTES # BLD AUTO: 1.2 K/UL (ref 0.3–1)
MONOCYTES NFR BLD: 10.3 % (ref 4–15)
NEUTROPHILS # BLD AUTO: 8.8 K/UL (ref 1.8–7.7)
NEUTROPHILS NFR BLD: 72.7 % (ref 38–73)
NRBC BLD-RTO: 0 /100 WBC
PHOSPHATE SERPL-MCNC: 2.8 MG/DL (ref 2.7–4.5)
PLATELET # BLD AUTO: 216 K/UL (ref 150–450)
PMV BLD AUTO: 9.6 FL (ref 9.2–12.9)
POTASSIUM SERPL-SCNC: 3.6 MMOL/L (ref 3.5–5.1)
RBC # BLD AUTO: 4.34 M/UL (ref 4.6–6.2)
SODIUM SERPL-SCNC: 141 MMOL/L (ref 136–145)
WBC # BLD AUTO: 12.04 K/UL (ref 3.9–12.7)

## 2022-11-21 PROCEDURE — 27000190 HC CPAP FULL FACE MASK W/VALVE

## 2022-11-21 PROCEDURE — 97165 OT EVAL LOW COMPLEX 30 MIN: CPT

## 2022-11-21 PROCEDURE — 97162 PT EVAL MOD COMPLEX 30 MIN: CPT

## 2022-11-21 PROCEDURE — 36415 COLL VENOUS BLD VENIPUNCTURE: CPT | Performed by: STUDENT IN AN ORGANIZED HEALTH CARE EDUCATION/TRAINING PROGRAM

## 2022-11-21 PROCEDURE — 63700000 PHARM REV CODE 250 ALT 637 W/O HCPCS: Performed by: STUDENT IN AN ORGANIZED HEALTH CARE EDUCATION/TRAINING PROGRAM

## 2022-11-21 PROCEDURE — 97110 THERAPEUTIC EXERCISES: CPT

## 2022-11-21 PROCEDURE — 85025 COMPLETE CBC W/AUTO DIFF WBC: CPT | Performed by: STUDENT IN AN ORGANIZED HEALTH CARE EDUCATION/TRAINING PROGRAM

## 2022-11-21 PROCEDURE — 25000003 PHARM REV CODE 250: Performed by: STUDENT IN AN ORGANIZED HEALTH CARE EDUCATION/TRAINING PROGRAM

## 2022-11-21 PROCEDURE — 94660 CPAP INITIATION&MGMT: CPT

## 2022-11-21 PROCEDURE — 80048 BASIC METABOLIC PNL TOTAL CA: CPT | Performed by: STUDENT IN AN ORGANIZED HEALTH CARE EDUCATION/TRAINING PROGRAM

## 2022-11-21 PROCEDURE — 11000001 HC ACUTE MED/SURG PRIVATE ROOM

## 2022-11-21 PROCEDURE — 99900035 HC TECH TIME PER 15 MIN (STAT)

## 2022-11-21 PROCEDURE — 97535 SELF CARE MNGMENT TRAINING: CPT

## 2022-11-21 PROCEDURE — 83735 ASSAY OF MAGNESIUM: CPT | Performed by: STUDENT IN AN ORGANIZED HEALTH CARE EDUCATION/TRAINING PROGRAM

## 2022-11-21 PROCEDURE — 84100 ASSAY OF PHOSPHORUS: CPT | Performed by: STUDENT IN AN ORGANIZED HEALTH CARE EDUCATION/TRAINING PROGRAM

## 2022-11-21 PROCEDURE — 63600175 PHARM REV CODE 636 W HCPCS: Performed by: STUDENT IN AN ORGANIZED HEALTH CARE EDUCATION/TRAINING PROGRAM

## 2022-11-21 PROCEDURE — 94761 N-INVAS EAR/PLS OXIMETRY MLT: CPT

## 2022-11-21 RX ADMIN — BISACODYL 10 MG: 10 SUPPOSITORY RECTAL at 10:11

## 2022-11-21 RX ADMIN — CEFTRIAXONE 2 G: 2 INJECTION, POWDER, FOR SOLUTION INTRAMUSCULAR; INTRAVENOUS at 09:11

## 2022-11-21 RX ADMIN — LISINOPRIL 40 MG: 20 TABLET ORAL at 08:11

## 2022-11-21 RX ADMIN — TAMSULOSIN HYDROCHLORIDE 0.4 MG: 0.4 CAPSULE ORAL at 08:11

## 2022-11-21 RX ADMIN — HEPARIN SODIUM 7500 UNITS: 5000 INJECTION INTRAVENOUS; SUBCUTANEOUS at 01:11

## 2022-11-21 RX ADMIN — POLYETHYLENE GLYCOL 3350 17 G: 17 POWDER, FOR SOLUTION ORAL at 08:11

## 2022-11-21 RX ADMIN — ATORVASTATIN CALCIUM 20 MG: 10 TABLET, FILM COATED ORAL at 08:11

## 2022-11-21 RX ADMIN — RILUZOLE 50 MG: 50 TABLET, FILM COATED ORAL at 09:11

## 2022-11-21 RX ADMIN — DULOXETINE 30 MG: 30 CAPSULE, DELAYED RELEASE ORAL at 08:11

## 2022-11-21 RX ADMIN — AMLODIPINE BESYLATE 10 MG: 5 TABLET ORAL at 08:11

## 2022-11-21 RX ADMIN — HEPARIN SODIUM 7500 UNITS: 5000 INJECTION INTRAVENOUS; SUBCUTANEOUS at 05:11

## 2022-11-21 RX ADMIN — HEPARIN SODIUM 7500 UNITS: 5000 INJECTION INTRAVENOUS; SUBCUTANEOUS at 09:11

## 2022-11-21 NOTE — PLAN OF CARE
Problem: Glycemic Control Impaired (Sepsis/Septic Shock)  Goal: Blood Glucose Level Within Desired Range  Outcome: Ongoing, Progressing     Problem: Infection Progression (Sepsis/Septic Shock)  Goal: Absence of Infection Signs and Symptoms  Outcome: Ongoing, Progressing     Problem: Nutrition Impaired (Sepsis/Septic Shock)  Goal: Optimal Nutrition Intake  Outcome: Ongoing, Progressing     Problem: Skin Injury Risk Increased  Goal: Skin Health and Integrity  Outcome: Ongoing, Progressing

## 2022-11-21 NOTE — NURSING
[facility]  Nurses Note -- 4 Eyes      11/20/2022       Skin assessed on: Admit      [x] No Pressure Injuries Present    []Prevention Measures Documented    [] Yes LDA  for Pressure Injury Previously documented     [] Yes New Pressure Injury Discovered   [] LDA for New Pressure Injury Added      Attending RN:  Carolina Swann LPN     Second RN:  Jeanne Oh RN

## 2022-11-21 NOTE — ED TRIAGE NOTES
Patient comes in secondary to  be called by PA that blood cultures taken last night, was seen secondary to fever chills and foul, pus like urine, DX yesterday with sepsis sent home on ABX. Patient with ALS, wheel chair bound.

## 2022-11-21 NOTE — PT/OT/SLP EVAL
Physical Therapy Evaluation    Patient Name:  Robles Jordan   MRN:  [id number]    Recommendations:     Discharge Recommendations:  home health PT (with family assistance)   Discharge Equipment Recommendations: none   Barriers to discharge: None    Assessment:     Robles Jordan is a 71 y.o. male admitted with a medical diagnosis of Sepsis.  He presents with the following impairments/functional limitations:  weakness, impaired endurance, impaired sensation, impaired self care skills, impaired functional mobility, gait instability, impaired balance, decreased coordination, decreased upper extremity function, decreased lower extremity function, abnormal tone, decreased ROM, impaired fine motor.    Rehab Prognosis: Fair; patient would benefit from acute skilled PT services to address these deficits and reach maximum level of function.    Recent Surgery: * No surgery found *      Plan:     During this hospitalization, patient to be seen 5 x/week to address the identified rehab impairments via gait training, therapeutic activities, therapeutic exercises, wheelchair management/training and progress toward the following goals:    Plan of Care Expires:  12/05/22    Subjective     Chief Complaint: RLE weakness  Patient/Family Comments/goals: Pt agreeable to therapy.   Pain/Comfort:  Pain Rating 1: 0/10      Living Environment:  Pt lives with spouse (works 3x/week) in a 2SH with 1 NOLVIA at entry.  Pt stays on the 1st floor.   Prior to admission, patients level of function was mod I with household ambulation using rollator.  Pt mod I with w/c tranfers/mobility, slide board PRN.  Pt does not drive, spouse provides transportation.  Pt stopped driving ~1.5 years ago.  Equipment used at home: walker, rolling, rollator, bedside commode, slide board, wheelchair with cushion, and walk-in-shower with seat.  Upon discharge, patient will have assistance from spouse.  Children are out of state.     Objective:     Patient found HOB  elevated with bed alarm, peripheral IV, telemetry upon PT entry to room.    General Precautions: Standard, fall, respiratory   Orthopedic Precautions:N/A   Braces: N/A  Respiratory Status: Room air    Exams:  Cognitive Exam:  Patient was able to follow multiple commands.   Gross Motor Coordination:  impaired  Postural Exam:  Patient presented with the following abnormalities:    -       No postural abnormalities identified  Sensation:    -       Intact  light/touch BLE (Pt reported LT stronger to LLE.  Pt reported some numbness/burning sensation to B feet.)  Skin Integrity/Edema:      -       Skin integrity: Visible skin intact  -       Edema: None noted BLE  BLE ROM: LLE WFL except decreased ankle DF; RLE decreased hip flex and ankle DF; Pt able to wiggle B toes.   BLE Strength: L hip flex 3+/5, R hip flex 1/5, B knee ext 3-/5, B knee flex 3+/5, and B ankle DF 1/5    Functional Mobility:  Pt very pleasant and cooperative with therapy, reported RLE appeared weaker this time.    Bed Mobility:     Rolling Left:  minimum assistance and moderate assistance  Scooting: minimum assistance for anterior and lateral scooting  Bridging: stand by assistance  Supine to Sit: minimum assistance; Pt using his BUE to maneuver BLE off EOB.  Sit to Supine: minimum assistance; Pt using his BUE to maneuver BLE into the bed.   Transfers:     Sit to Stand:  moderate assistance and of 2 persons with bed elevated and pushing up on siderails during 1st trial; min A of 2 persons with bed elevated and using RW during 2nd trial   Gait: Pt ambulated ~4 small sidesteps along bedside with min A of 2 persons using RW.  Pt with decreased weight shifting, foot clearance, and step length.  Pt required mod A to advance RLE.    Balance: Pt with fair static sit balance and fair- static stand balance.       AM-PAC 6 CLICK MOBILITY  Total Score:11       Treatment & Education:  BLE supine therex x10 reps: AP, QS, and GS.  Pt encouraged to perform supine LE  therex throughout the day.     Pt educated on acute skilled PT services and goals.  Pt educated on bed positioning and turning schedule to prevent pressure injury.  Pt verbalized good understanding.     Patient left left sidelying and HOB elevated with all lines intact, call button in reach, bed alarm on, and Areli, [facility] notified.  Tray table close by.  Wedge and pressure relief boots initiated.      GOALS:   Multidisciplinary Problems       Physical Therapy Goals          Problem: Physical Therapy    Goal Priority Disciplines Outcome Goal Variances Interventions   Physical Therapy Goal     PT, PT/OT Ongoing, Progressing     Description: Goals to be met by: 22     Patient will increase functional independence with mobility by performin. Supine to sit with Modified [place]  2. Rolling to Left and Right with Modified [place]  3. Sit to stand transfer with Modified [place] using RW  4. Bed to chair transfer with Modified [place] using Rolling Walker  5. Gait  x50 feet with Modified [place] using Rolling Walker   6. Wheelchair propulsion x150 feet with Modified [place] using bilateral upper extremities  7. Lower extremity exercise program 2 sets x15 reps per handout, with independence                         History:     Past Medical History:   Diagnosis Date    Back pain     BPH (benign prostatic hypertrophy)     Depression 2022    Hx of colonic polyps 10/19/2015    Hyperlipidemia     Hypertension     Hypertension     Sleep apnea     Spinal cord disease        Past Surgical History:   Procedure Laterality Date    COLONOSCOPY N/A 10/19/2015    Procedure: COLONOSCOPY;  Surgeon: Antonino Bernstein MD;  Location: [address]);  Service: Endoscopy;  Laterality: N/A;    COLONOSCOPY N/A 2021    Procedure: COLONOSCOPY;  Surgeon: Blanca Don MD;  Location: [address]);  Service: Endoscopy;  Laterality: N/A;  requesting ASAP  COVID test at [facility] on  2/22-GT.2/24 called pt. move up to earlier spot. pt. did NOT want to come earlier.EC    CYSTOSCOPY WITH URODYNAMIC TESTING N/A 1/17/2020    Procedure: CYSTOSCOPY,WITH URODYNAMIC TESTING;  Surgeon: Christianne Israel MD;  Location: [facility];  Service: Urology;  Laterality: N/A;  RN PREOP 1/13/2020    MULTIPLE TOOTH EXTRACTIONS  2014       Time Tracking:     PT Received On: 11/21/22  PT Start Time: 0946     PT Stop Time: 1009  PT Total Time (min): 23 min     Billable Minutes: Evaluation 15 min co-eval with OT and Therapeutic Exercise 8 min      11/21/2022

## 2022-11-21 NOTE — ED PROVIDER NOTES
Encounter Date: 11/20/2022       History     Chief Complaint   Patient presents with    Abnormal Lab     Seen in ED yesterday and d/c reports positive blood cultures and instructed to return. Per family urine still discolored and pt. Having chills, been taken ibuprofen for fever.      Robles Jordan is a 71 y.o. male, with a past medical history of HTN, HLD, and ALS, who presents to the ED after being called back secondary to positive blood cultures.  States fever earlier today about 1:00 p.m..  [place].  States no major congestion.  Head shortness of breath last night.  No major coughing per patient.  Denies any pain at this time.  Denies any current fever.  Patient took 1 dose of the Cipro earlier today.  No back pain.  No falls or trauma.  No chest pain.  No shortness of breath.    Review of patient's allergies indicates:  No Known Allergies  Past Medical History:   Diagnosis Date    Back pain     BPH (benign prostatic hypertrophy)     Hx of colonic polyps 10/19/2015    Hyperlipidemia     Hypertension     Hypertension     Sleep apnea     Spinal cord disease      Past Surgical History:   Procedure Laterality Date    COLONOSCOPY N/A 10/19/2015    Procedure: COLONOSCOPY;  Surgeon: Antonino Bernstein MD;  Location: [address]);  Service: Endoscopy;  Laterality: N/A;    COLONOSCOPY N/A 2/25/2021    Procedure: COLONOSCOPY;  Surgeon: Blanca Don MD;  Location: [address]);  Service: Endoscopy;  Laterality: N/A;  requesting ASAP  COVID test at [place] on 2/22-GT.2/24 called pt. move up to earlier spot. pt. did NOT want to come earlier.EC    CYSTOSCOPY WITH URODYNAMIC TESTING N/A 1/17/2020    Procedure: CYSTOSCOPY,WITH URODYNAMIC TESTING;  Surgeon: Christianne Israel MD;  [facility] OR;  Service: Urology;  Laterality: N/A;  RN PREOP 1/13/2020    MULTIPLE TOOTH EXTRACTIONS  2014     Family History   Problem Relation Age of Onset    Cancer Mother         Breast    Stroke Sister 65         Fatal    Cancer Maternal Uncle         Pancreas    Cancer Paternal Uncle         Pancreas    Cancer Maternal Grandmother         Breast    Hypertension Father     Hyperlipidemia Father      Social History     Tobacco Use    Smoking status: Former     Types: Cigarettes    Smokeless tobacco: Never    Tobacco comments:     3 cigarettes a day    Substance Use Topics    Alcohol use: Yes     Alcohol/week: 1.0 standard drink     Types: 1 Cans of beer per week     Comment: very seldom    Drug use: No     Review of Systems   Constitutional:  Positive for fatigue and fever.   HENT:  Positive for rhinorrhea. Negative for sore throat.    Respiratory:  Negative for shortness of breath.    Cardiovascular:  Negative for chest pain.   Gastrointestinal:  Negative for nausea.   Genitourinary:  Negative for dysuria.   Musculoskeletal:  Negative for back pain.   Skin:  Negative for rash.   Neurological:  Negative for weakness.   Hematological:  Does not bruise/bleed easily.   Psychiatric/Behavioral:  Negative for agitation.    All other systems reviewed and are negative.    Physical Exam     Initial Vitals [11/20/22 1826]   BP Pulse Resp Temp SpO2   (!) 122/59 95 16 98.8 °F (37.1 °C) 97 %      MAP       --         Physical Exam    Nursing note and vitals reviewed.  Constitutional: He appears well-developed and well-nourished.   HENT:   Head: Normocephalic and atraumatic.   Mouth/Throat: Oropharynx is clear and moist.   Eyes: EOM are normal. Pupils are equal, round, and reactive to light.   Neck:   Normal range of motion.  Cardiovascular:  Normal rate and regular rhythm.           Pulmonary/Chest: Breath sounds normal. No stridor. No respiratory distress. He has no wheezes.   Abdominal: Abdomen is soft. Bowel sounds are normal. He exhibits no distension. There is no abdominal tenderness.   Musculoskeletal:         General: No tenderness or edema. Normal range of motion.      Cervical back: Normal range of motion.     Neurological: He is  alert and oriented to person, place, and time. He has normal strength. GCS score is 15. GCS eye subscore is 4. GCS verbal subscore is 5. GCS motor subscore is 6.   Skin: Skin is warm and dry. Capillary refill takes less than 2 seconds.   Psychiatric: He has a normal mood and affect. Thought content normal.       ED Course   Procedures  Labs Reviewed   CBC W/ AUTO DIFFERENTIAL - Abnormal; Notable for the following components:       Result Value    WBC 16.59 (*)     Hemoglobin 13.6 (*)     MCHC 31.8 (*)     Immature Granulocytes 1.3 (*)     Gran # (ANC) 13.7 (*)     Immature Grans (Abs) 0.21 (*)     Mono # 1.2 (*)     Gran % 82.8 (*)     Lymph % 8.1 (*)     All other components within normal limits   COMPREHENSIVE METABOLIC PANEL - Abnormal; Notable for the following components:    CO2 21 (*)     All other components within normal limits   URINALYSIS, REFLEX TO URINE CULTURE - Abnormal; Notable for the following components:    Appearance, UA Hazy (*)     Protein, UA 1+ (*)     Ketones, UA Trace (*)     Occult Blood UA 2+ (*)     Leukocytes, UA 3+ (*)     All other components within normal limits    Narrative:     Specimen Source->Urine   URINALYSIS MICROSCOPIC - Abnormal; Notable for the following components:    RBC, UA 46 (*)     WBC, UA >100 (*)     WBC Clumps, UA Few (*)     All other components within normal limits    Narrative:     Specimen Source->Urine   CULTURE, BLOOD   CULTURE, BLOOD   CULTURE, URINE   LACTIC ACID, PLASMA   PROCALCITONIN   ISTAT LACTATE     EKG Readings: (Independently Interpreted)   EKG done 1854 showing normal sinus rhythm rate 87.  No ST elevation.  Flat T-waves diffusely.  Normal axis QRS.  Nonspecific EKG.     Imaging Results              X-Ray Chest AP Portable (Final result)  Result time 11/20/22 19:08:28      Final result by Katelyn Oorpeza MD (11/20/22 19:08:28)                   Impression:      No acute cardiopulmonary process identified.  No significant  change.      Electronically signed by: Katelyn Oropeza MD  Date:    11/20/2022  Time:    19:08               Narrative:    EXAMINATION:  XR CHEST AP PORTABLE    CLINICAL HISTORY:  Sepsis;    TECHNIQUE:  Single frontal view of the chest was performed.    COMPARISON:  11/19/2022.    FINDINGS:  Cardiac silhouette is normal in size.  Lungs are symmetrically expanded.  No evidence of new focal consolidative process, pneumothorax, or significant pleural effusion.  No acute osseous abnormality identified.                                       Medications   lactated ringers bolus 2,859 mL (2,859 mLs Intravenous New Bag 11/20/22 1940)   cefTRIAXone (ROCEPHIN) 2 g/50 mL D5W IVPB (2 g Intravenous New Bag 11/20/22 1940)     Medical Decision Making:   Initial Assessment:   71-year-old male presenting today secondary to positive blood cultures.  Gram-negative rods.  IV ceftriaxone.  Getting labs on the patient.  To be admitted.    Labs and imaging notable for elevated white count.  lactic acid reassuring.  Admitted further workup management.    Please put in 35 minutes of critical care due to patient having a high risk of cardiac failure.   Separate from teaching and exclusive of procedure and ekg time  Includes:  Time at bedside  Time reviewing test results  Time discussing case with staff  Time documenting the medical record  Time spent with family members  Time spent with consults  Management    Clinical Tests:   Lab Tests: Reviewed and Ordered  Radiological Study: Ordered and Reviewed  Medical Tests: Reviewed and Ordered                        Clinical Impression:   Final diagnoses:  [R78.81] Bacteremia (Primary)  [N39.0, R31.9] Urinary tract infection with hematuria, site unspecified        ED Disposition Condition    Admit Stable                Guero Avina MD  11/20/22 2028

## 2022-11-21 NOTE — PT/OT/SLP EVAL
"Occupational Therapy   Evaluation    Name: Robles Jordan  MRN: [id number]  Admitting Diagnosis:  Sepsis  Recent Surgery: * No surgery found *      Recommendations:     Discharge Recommendations: home health OT (with caregiver assistance)  Discharge Equipment Recommendations:  none  Barriers to discharge:   (pt will require caregiver assistance)    Assessment:     Robles Jordan is a 71 y.o. male with a medical diagnosis of Sepsis. Performance deficits affecting function: weakness, impaired endurance, decreased ROM, abnormal tone, impaired self care skills, impaired balance, gait instability, impaired functional mobility, decreased coordination, impaired sensation, impaired fine motor, decreased upper extremity function, decreased lower extremity function, decreased safety awareness.      Rehab Prognosis: Fair; patient would benefit from acute skilled OT services to address these deficits and reach maximum level of function.       Plan:     Patient to be seen 5 x/week to address the above listed problems via self-care/home management, therapeutic activities, therapeutic exercises  Plan of Care Expires: 12/05/22  Plan of Care Reviewed with: patient    Subjective     Chief Complaint: R leg feels like "nothing is there"   Patient/Family Comments/goals: has gotten much weaker in the past couple of days; thought he was going to have a BM since receiving stool softener but then didn't have to    Occupational Profile:  Living Environment: pt lives with his wife in a 2SH with 1 NOLVIA and pt bed/bath are on the first floor. Bathroom set-up: walk-in shower with shower chair   Previous level of function: pt can ambulate ~50 ft within the home prior to needing a seated rest break MOD I with rollator. Pt MOD I with ADLs. Pt uses w/c in the community. Pt uses a slide board for car transfers. Pt has filled out paperwork with [facility] and in the process of getting a customized w/c. Pt wears CPAP nightly.   Roles and Routines: " stopped driving 1.5 years ago and had to sell his red sports car   Equipment Used at Home:  walker, rolling, rollator, bedside commode, wheelchair, slide board (with cushion, walk-in-shower with seat)  Assistance upon Discharge: wife (goes into work 3 days/week); adult children not local     Pain/Comfort:  Pain Rating 1:  (c/o BLE numb and burning)  Pain Addressed 1: Reposition    Patients cultural, spiritual, Evangelical conflicts given the current situation: no    Objective:     Patient found HOB elevated with bed alarm, telemetry, peripheral IV upon OT entry to room.    General Precautions: Standard, fall, respiratory   Orthopedic Precautions:N/A   Braces: N/A  Respiratory Status: Room air    Occupational Performance:    Bed Mobility:  pt compensated with bed mobility using BUE to move BLE with mobility.   Patient completed Scooting anteriorly/laterally with minimum assistance  Patient completed Supine to Sit with minimum assistance and HOB elevated  Patient completed Sit to Supine with stand by assistance    Functional Mobility/Transfers:  Patient completed Sit <> Stand Transfer with moderate assistance and of 2 persons  with  no assistive device ; pt stood with RW with MIN A x2   Functional Mobility: Gait belt donned prior to transfer for safety during mobility/transfers. pt took a few sidesteps using RW with MIN A x2.     Activities of Daily Living:  Upper Body Dressing: minimum assistance to don back gown while seated unsupported at EOB   Lower Body Dressing: pt able to bridge with use of BUE to position BLE at bed level to doff underwear with minimum assistance       Cognitive/Visual Perceptual:  Cognitive/Psychosocial Skills:     -       Follows Commands/attention:follows simple commands  -       Communication: clear/fluent  -       Memory: No Deficits noted  -       Safety awareness/insight to disability: mildly impaired   -       Mood/Affect/Coping skills/emotional control: Cooperative and  Pleasant  Visual/Perceptual:      -Intact  R/L discrimination, acuity      Physical Exam:  Balance:    -       seated: SBA/CGA; standing: MIN- MOD A x2 with RW  Postural examination/scapula alignment:    -       Forward head  Skin integrity: Visible skin intact  Edema:  no BUE edema noted  Sensation:    -       Intact  light/touch BUE; pt reports numbness and burning to BLE   Dominant hand:    -       Right  Upper Extremity Range of Motion:     -       R/L Upper Extremity: WFL except shoulder flexion AROM WFL horizontal plane; shoulder flexion to ~partial range against gravity   Upper Extremity Strength:    -       R/L Upper Extremity:  elbow flexion 4/5, elbow extension 4/5, shoulder flexion 3/5   Strength:    -       R/L Upper Extremity: WFL  Fine Motor Coordination:    -       Intact  Left hand thumb/finger opposition skills, Right hand thumb/finger opposition skills, Left hand, manipulation of objects, and Right hand, manipulation of objects  Gross motor coordination:   impaired    AMPAC 6 Click ADL:  AMPAC Total Score: 19    Treatment & Education:  Pt educated on OT role/POC.   Importance of OOB activity with therapy assistance. OT d/w CNA use of bed pan with nursing at this time.   Safety during functional t/f and mobility   OT demo and pt demo'd back BUE AROM HEP to complete 10-15 reps, 2-3x/day: shoulder flex/ext, shoulder horizontal ab/dduction, forward punches  Multiple self-care tasks/functional mobility completed- assistance level noted above   OT retrieved pressure relief boots and wedge and edu on importance of pressure relief to pt   All questions/concerns answered within OT scope of practice       Patient left  HOB elevated L sidelying with B/L pressure relief boots on  with all lines intact, call button in reach, bed alarm on, CNA, Areli, notified, and all needs met/within reach    GOALS:   Multidisciplinary Problems       Occupational Therapy Goals          Problem: Occupational Therapy     Goal Priority Disciplines Outcome Interventions   Occupational Therapy Goal     OT, PT/OT Ongoing, Progressing    Description: Goals to be met by: 12/05/22     Patient will increase functional independence with ADLs by performing:    UE Dressing with Modified [place].  LE Dressing with Minimal Assistance and Assistive Devices as needed.  Grooming while seated with Modified [place].  Toileting from bedside commode with Stand-by Assistance for hygiene and clothing management.   Supine to sit with Supervision.  Stand pivot transfers with Stand-by Assistance.  Step transfer with Stand-by Assistance  Toilet transfer to bedside commode with Stand-by Assistance.  Upper extremity exercise program x15 reps per handout, with independence.                         History:     Past Medical History:   Diagnosis Date    Back pain     BPH (benign prostatic hypertrophy)     Depression 11/20/2022    Hx of colonic polyps 10/19/2015    Hyperlipidemia     Hypertension     Hypertension     Sleep apnea     Spinal cord disease          Past Surgical History:   Procedure Laterality Date    COLONOSCOPY N/A 10/19/2015    Procedure: COLONOSCOPY;  Surgeon: Antonino Bernstein MD;  Location: [address]);  Service: Endoscopy;  Laterality: N/A;    COLONOSCOPY N/A 2/25/2021    Procedure: COLONOSCOPY;  Surgeon: Blanca Don MD;  Location: [address]);  Service: Endoscopy;  Laterality: N/A;  requesting ASAP  COVID test at [place] on 2/22-GT.2/24 called pt. move up to earlier spot. pt. did NOT want to come earlier.[facility]    CYSTOSCOPY WITH URODYNAMIC TESTING N/A 1/17/2020    Procedure: CYSTOSCOPY,WITH URODYNAMIC TESTING;  Surgeon: Christianne Israel MD;  Location: [facility];  Service: Urology;  Laterality: N/A;  RN PREOP 1/13/2020    MULTIPLE TOOTH EXTRACTIONS  2014       Time Tracking:     OT Date of Treatment: 11/21/22  OT Start Time: 0945  OT Stop Time: 1009  OT Total Time (min): 24 min    Billable Minutes:Evaluation 15  min  Self Care/Home Management 9 min  Total Time 24 min (co-eval with PT)    11/21/2022

## 2022-11-21 NOTE — H&P
[facility]  History & Physical    Patient Name: Robles Jordan  MRN: [id number]  Patient Class: IP- Inpatient  Admission Date: 11/20/2022  Attending Physician: Daphne Jean MD   Primary Care Provider: Tera Jarquin MD         Patient information was obtained from patient and ER records.     Subjective:     Principal Problem:Sepsis    Chief Complaint:   Chief Complaint   Patient presents with    Abnormal Lab     Seen in ED yesterday and d/c reports positive blood cultures and instructed to return. Per family urine still discolored and pt. Having chills, been taken ibuprofen for fever.         HPI: This is a 71 year old male with a PMHx of ALS (wheelchair dependent), HTN, HLD, HEAVEN on CPAP, on prophylactic apixiban (for high DVT risk) who presents with sepsis. The patient initially presented to the ED on 11/19 with SOB, fevers and fatigue. He was noted to have suprapubic tenderness. Work up showed a positive UA. The patient was given ceftriaxone and prescribed Ciprofloxacin. The patient returns to the ED on 11/20 after his blood cultures returned positive for gram negative rods. The patient endorses some improvement of his symptoms but still reports having chills. In the ED, he was HDS. Labs redemonstrated leukocytosis (16.5), negative lactic acid (1.3), positive UA (>100 WBCs, 46 RBCs, few WBC). CXR was negative. He was given ceftriaxone and fluids. The patient was admitted for further management.       Past Medical History:   Diagnosis Date    Back pain     BPH (benign prostatic hypertrophy)     Hx of colonic polyps 10/19/2015    Hyperlipidemia     Hypertension     Hypertension     Sleep apnea     Spinal cord disease        Past Surgical History:   Procedure Laterality Date    COLONOSCOPY N/A 10/19/2015    Procedure: COLONOSCOPY;  Surgeon: Antonino Bernstein MD;  Location: [address];  Service: Endoscopy;  Laterality: N/A;    COLONOSCOPY N/A 2/25/2021     Procedure: COLONOSCOPY;  Surgeon: Blanca Don MD;  Location: [facility] ([address] [place] [address]);  Service: Endoscopy;  Laterality: N/A;  requesting ASAP  COVID test at [place] on -GT. called pt. move up to earlier spot. pt. did NOT want to come earlier.EC    CYSTOSCOPY WITH URODYNAMIC TESTING N/A 2020    Procedure: CYSTOSCOPY,WITH URODYNAMIC TESTING;  Surgeon: Christianne Israel MD;  Location: [facility] OR;  Service: Urology;  Laterality: N/A;  RN PREOP 2020    MULTIPLE TOOTH EXTRACTIONS         Review of patient's allergies indicates:  No Known Allergies    Current Facility-Administered Medications on File Prior to Encounter   Medication    [COMPLETED] acetaminophen tablet 1,000 mg    [COMPLETED] cefTRIAXone (ROCEPHIN) 1 g/50 mL D5W IVPB    mupirocin 2 % ointment     Current Outpatient Medications on File Prior to Encounter   Medication Sig    amLODIPine (NORVASC) 10 MG tablet Take 1 tablet (10 mg total) by mouth once daily. For blood pressure    aspirin (ECOTRIN) 81 MG EC tablet Take 81 mg by mouth once daily.    atorvastatin (LIPITOR) 20 MG tablet Take 1 tablet (20 mg total) by mouth once daily.    DULoxetine (CYMBALTA) 30 MG capsule TAKE 1 CAPSULE BY MOUTH EVERY DAY FOR 30 DAYS    ELIQUIS 2.5 mg Tab Take 2.5 mg by mouth 2 (two) times daily.    gabapentin (NEURONTIN) 100 MG capsule     ibuprofen (ADVIL,MOTRIN) 800 MG tablet Take 1 tablet (800 mg total) by mouth 3 (three) times daily as needed for Pain.    lisinopriL (PRINIVIL,ZESTRIL) 40 MG tablet Take 1 tablet (40 mg total) by mouth once daily.    riluzole 50 mg Tab tablet Take 50 mg by mouth every 12 (twelve) hours.    tamsulosin (FLOMAX) 0.4 mg Cap Take 1 capsule (0.4 mg total) by mouth once daily.    tiZANidine (ZANAFLEX) 2 MG tablet SMARTSI Tablet(s) By Mouth 1 to 3 Times Daily PRN    acetaminophen (TYLENOL) 325 MG tablet Take 2 tablets (650 mg total) by mouth every 6 (six) hours as needed for Temperature greater than (or  equal to 101 degree F).    bisacodyL (DULCOLAX) 10 mg Supp Place 1 suppository (10 mg total) rectally daily as needed.    ciprofloxacin HCl (CIPRO) 500 MG tablet Take 1 tablet (500 mg total) by mouth 2 (two) times daily. for 7 days    cyclobenzaprine (FLEXERIL) 10 MG tablet Take by mouth.    oxyCODONE (ROXICODONE) 5 MG immediate release tablet Take 5 mg by mouth every 4 (four) hours as needed.    polyethylene glycol (GLYCOLAX) 17 gram PwPk Take 17 g by mouth once daily.    senna-docusate 8.6-50 mg (PERICOLACE) 8.6-50 mg per tablet Take 2 tablets by mouth nightly as needed for Constipation.    sildenafil (VIAGRA) 100 MG tablet Take 1 tablet (100 mg total) by mouth daily as needed for Erectile Dysfunction.    valACYclovir (VALTREX) 1000 MG tablet Take 1 tablet (1,000 mg total) by mouth 3 (three) times daily. for 7 days    [DISCONTINUED] ciprofloxacin HCl (CIPRO) 500 MG tablet Take 500 mg by mouth 2 (two) times daily.     Family History       Problem Relation (Age of Onset)    Cancer Mother, Maternal Uncle, Paternal Uncle, Maternal Grandmother    Hyperlipidemia Father    Hypertension Father    Stroke Sister (65)          Tobacco Use    Smoking status: Former     Types: Cigarettes    Smokeless tobacco: Never    Tobacco comments:     3 cigarettes a day    Substance and Sexual Activity    Alcohol use: Yes     Alcohol/week: 1.0 standard drink     Types: 1 Cans of beer per week     Comment: very seldom    Drug use: No    Sexual activity: Not Currently     Partners: Female     Review of Systems   Constitutional:  Positive for appetite change, fatigue and fever.   HENT: Negative.     Eyes: Negative.    Respiratory:  Positive for shortness of breath. Negative for cough.    Cardiovascular:  Positive for leg swelling.   Gastrointestinal: Negative.    Endocrine: Negative.    Genitourinary: Negative.  Negative for dysuria.        Cloudy urine   Musculoskeletal: Negative.    Skin: Negative.    Allergic/Immunologic:  Negative.    Neurological:  Positive for weakness and headaches.   Psychiatric/Behavioral: Negative.     Objective:     Vital Signs (Most Recent):  Temp: 98.8 °F (37.1 °C) (11/20/22 1826)  Pulse: 82 (11/20/22 1924)  Resp: 18 (11/20/22 1924)  BP: 109/63 (11/20/22 1924)  SpO2: 95 % (11/20/22 1924) Vital Signs (24h Range):  Temp:  [97.8 °F (36.6 °C)-100.7 °F (38.2 °C)] 98.8 °F (37.1 °C)  Pulse:  [] 82  Resp:  [16-38] 18  SpO2:  [94 %-98 %] 95 %  BP: (109-136)/(59-72) 109/63     Weight: 95.3 kg (210 lb)  Body mass index is 26.96 kg/m².    Physical Exam  Vitals and nursing note reviewed.   Constitutional:       General: He is not in acute distress.     Appearance: Normal appearance. He is not ill-appearing.   HENT:      Head: Normocephalic and atraumatic.      Nose: Nose normal.      Mouth/Throat:      Mouth: Mucous membranes are moist.   Eyes:      Extraocular Movements: Extraocular movements intact.   Cardiovascular:      Rate and Rhythm: Normal rate.      Pulses: Normal pulses.      Heart sounds: No murmur heard.  Pulmonary:      Effort: Pulmonary effort is normal. No respiratory distress.   Abdominal:      General: Abdomen is flat.      Palpations: Abdomen is soft.      Tenderness: There is no abdominal tenderness.   Musculoskeletal:      Right lower leg: No edema.      Left lower leg: No edema.   Skin:     General: Skin is warm.      Capillary Refill: Capillary refill takes less than 2 seconds.   Neurological:      General: No focal deficit present.      Mental Status: He is alert.      Comments: Bilateral lower extremity weakness    Psychiatric:         Mood and Affect: Mood normal.           Significant Labs: All pertinent labs within the past 24 hours have been reviewed.    Significant Imaging: I have reviewed all pertinent imaging results/findings within the past 24 hours.    Assessment/Plan:     * Sepsis  This patient does have evidence of infective focus  My overall impression is sepsis. Vital signs were  reviewed and noted in progress note.  Antibiotics given-   Antibiotics (From admission, onward)    Start     Stop Route Frequency Ordered    11/21/22 2000  cefTRIAXone (ROCEPHIN) 2 g/50 mL D5W IVPB         -- IV Every 24 hours (non-standard times) 11/20/22 2101        Cultures were taken-   Microbiology Results (last 7 days)     Procedure Component Value Units Date/Time    Urine culture [[id number]] Collected: 11/20/22 1943    Order Status: No result Specimen: Urine Updated: 11/20/22 2019    Blood culture x two cultures. Draw prior to antibiotics. [[id number]] Collected: 11/20/22 1939    Order Status: Sent Specimen: Blood from Peripheral, Antecubital, Right Updated: 11/1952    Blood culture x two cultures. Draw prior to antibiotics. [[id number]] Collected: 11/20/22 1939    Order Status: Sent Specimen: Blood from Peripheral, Hand, Left Updated: 11/20/22 1951        Latest lactate reviewed, they are-  Recent Labs   Lab 11/19/22 2239 11/20/22 1939   LACTATE 1.1 1.3       Organ dysfunction indicated by N/A  Source- Urinary    Source control Achieved by- Antibiotics      Depression  Resume home medications      ALS (amyotrophic lateral sclerosis)  Wheelchair dependent   Resume home Riluzole   OT/PT      Gram-negative bacteremia  Likely urinary in source   Resume ceftriaxone   Follow up cultures and susceptibilities       Obstructive sleep apnea on CPAP  Continue CPAP      Essential hypertension  Resume home medications      Hyperlipidemia  Resume home statin        VTE Risk Mitigation (From admission, onward)         Ordered     heparin (porcine) injection 7,500 Units  Every 8 hours         11/20/22 2058     IP VTE LOW RISK PATIENT  Once         11/20/22 2058     Place sequential compression device  Until discontinued         11/20/22 2058                   Antonio Cormier MD  Department of Hospital Medicine   [facility] - Emergency Dept

## 2022-11-21 NOTE — PLAN OF CARE
Problem: Physical Therapy  Goal: Physical Therapy Goal  Description: Goals to be met by: 22     Patient will increase functional independence with mobility by performin. Supine to sit with Modified [place]  2. Rolling to Left and Right with Modified [place]  3. Sit to stand transfer with Modified [place] using RW  4. Bed to chair transfer with Modified [place] using Rolling Walker  5. Gait  x50 feet with Modified [place] using Rolling Walker   6. Wheelchair propulsion x150 feet with Modified [place] using bilateral upper extremities  7. Lower extremity exercise program 2 sets x15 reps per handout, with independence    Outcome: Ongoing, Progressing     Pt able to take a few sidesteps along bedside with min A of 2 persons using RW.

## 2022-11-21 NOTE — HOSPITAL COURSE
Pt admitted for bacteremia after ED blood cultures turned positive. Presumed source is urine. Started on CTX, repeat blood cultures drawn. Patient grew proteus M. Repeat cultures were NG. Patient went home on 12 days of Bactrim. Activity as tolerated. Diet- low NA. Follow up with PCP in one week

## 2022-11-21 NOTE — HPI
This is a 71 year old male with a PMHx of ALS (wheelchair dependent), HTN, HLD, HEAVEN on CPAP, on prophylactic apixiban (for high DVT risk) who presents with sepsis. The patient initially presented to the ED on 11/19 with SOB, fevers and fatigue. He was noted to have suprapubic tenderness. Work up showed a positive UA. The patient was given ceftriaxone and prescribed Ciprofloxacin. The patient returns to the ED on 11/20 after his blood cultures returned positive for gram negative rods. The patient endorses some improvement of his symptoms but still reports having chills. In the ED, he was HDS. Labs redemonstrated leukocytosis (16.5), negative lactic acid (1.3), positive UA (>100 WBCs, 46 RBCs, few WBC). CXR was negative. He was given ceftriaxone and fluids. The patient was admitted for further management.

## 2022-11-21 NOTE — PHARMACY MED REC
"Admission Medication History     The home medication history was taken by Cheyanne Cox.    You may go to "Admission" then "Reconcile Home Medications" tabs to review and/or act upon these items.     The home medication list has been updated by the Pharmacy department.   Please read ALL comments highlighted in yellow.   Please address this information as you see fit.    Feel free to contact us if you have any questions or require assistance.      The medications listed below were removed from the home medication list. Please reorder if appropriate:  Patient reports no longer taking the following medication(s):  Flexeril    Medications listed below were obtained from: Patient/family, Medications brought from home, and Analytic software- [employer] and added to home medication:   Eliquis   Aspirin    The medication reconciliation was completed by the patient's bedside.      Cheyanne Cox  [phone]                    .        "

## 2022-11-21 NOTE — PLAN OF CARE
Problem: Occupational Therapy  Goal: Occupational Therapy Goal  Description: Goals to be met by: 12/05/22     Patient will increase functional independence with ADLs by performing:    UE Dressing with Modified [place].  LE Dressing with Minimal Assistance and Assistive Devices as needed.  Grooming while seated with Modified [place].  Toileting from bedside commode with Stand-by Assistance for hygiene and clothing management.   Supine to sit with Supervision.  Stand pivot transfers with Stand-by Assistance.  Step transfer with Stand-by Assistance  Toilet transfer to bedside commode with Stand-by Assistance.  Upper extremity exercise program x15 reps per handout, with independence.    Outcome: Ongoing, Progressing     OT rec HHOT with caregiver assistance at d/c in order to increase safety and independence with ADLs and all aspects of functional mobility.

## 2022-11-21 NOTE — ASSESSMENT & PLAN NOTE
This patient does have evidence of infective focus  My overall impression is sepsis. Vital signs were reviewed and noted in progress note.  Antibiotics given-   Antibiotics (From admission, onward)    Start     Stop Route Frequency Ordered    11/21/22 2000  cefTRIAXone (ROCEPHIN) 2 g/50 mL D5W IVPB         -- IV Every 24 hours (non-standard times) 11/20/22 2101        Cultures were taken-   Microbiology Results (last 7 days)     Procedure Component Value Units Date/Time    Urine culture [[id number]] Collected: 11/20/22 1943    Order Status: No result Specimen: Urine Updated: 11/20/22 2019    Blood culture x two cultures. Draw prior to antibiotics. [[id number]] Collected: 11/20/22 1939    Order Status: Sent Specimen: Blood from Peripheral, Antecubital, Right Updated: 11/1952    Blood culture x two cultures. Draw prior to antibiotics. [[id number]] Collected: 11/20/22 1939    Order Status: Sent Specimen: Blood from Peripheral, Hand, Left Updated: 11/20/22 1951        Latest lactate reviewed, they are-  Recent Labs   Lab 11/19/22 2239 11/20/22 1939   LACTATE 1.1 1.3       Organ dysfunction indicated by N/A  Source- Urinary    Source control Achieved by- Antibiotics

## 2022-11-21 NOTE — NURSING
Pt arrived on unit via stretcher, awake alert and oriented. IV site noted; IVF. Pt on room air; no distress. Skin assessed and intact. Vitals assessed.  Pt oriented to room. Bed locked and low position for pt safety. Call light within reach. Bed alarm set. Safety measures maintained. Will cont to monitor

## 2022-11-21 NOTE — ED NOTES
Patient:   AKASH OLVERA            MRN: [id number]            FIN: [id number]              Age:   81 years     Sex:  FEMALE     :  42   Associated Diagnoses:   None   Author:   JONELLE LOPEZ     Basic Information   History source: Patient.     History of Present Illness   No complaints. Feels well..       Physical Examination               Vital signs   Vital Signs   20 05:40 CDT Temperature - VS 37.3 deg_C  Normal    Temperature Source - VS Oral    Post PRN Med Temp Source Oral    Heart/Pulse Rate 66  Normal    Pulse Source Monitor    Respiration Rate 16 breaths/min  Normal    SpO2 95 %  Normal    NIBP Systolic 144  HI    NIBP Diastolic 75  Normal    NIBP Source Left Arm    NIBP MAP 98  Normal    SN Alarms Set and Appropriate Patient Alarms Set And Appropriate For Patient  .   General:  Alert, no acute distress.    Skin:  Warm, dry, intact.    Cardiovascular:  Regular rate and rhythm, No murmur, Normal peripheral perfusion, No edema.   Respiratory:  Lungs are clear to auscultation, respirations are non-labored, breath sounds are equal.   Gastrointestinal:  Soft, Nontender, Non distended, Normal bowel sounds.   Neurological:  Alert and oriented to person, place, time, and situation, No focal neurological deficit observed, CN II-XII intact, normal speech observed.   Patient in COVID-19 unit, access limited     Medical Decision Making   Results review:    Labs between:  26-MAR-2020 12:34 to 27-MAR-2020 12:34  BMP:                 Na  Cl  BUN  Glu   27-MAR-2020                                     K  CO2  Cr  Ca                              4.6         POC GLU:                 Latest Result  Latest Date  Minimum  Min Date  Maximum  Max Date                             (H) 129  27-MAR-2020 (H) 129  27-MAR-2020 98 26-MAR-2020                            I & O between:  26-MAR-2020 12:34 TO 27-MAR-2020 12:34  Med Dosing Weight:  106.1  kg   20-MAR-2020  24 Hour Intake:   0.00  ( 0.00 mL/kg )  24 Hour  Sepsis Alert paged overhead by .   Output:   0.00           24 Hour Urine/Stool Output:   0.0  24 Hour Balance:   0.00           24 Hour Urine Output:   0.00  ( 0.00 mL/kg/hr )                    Stool Count:  1      .   Rationale:    ASSESSMENT AND PLAN:  COVID-19 positive Pneumonia with sepsis  detected 3/23  CXR (3/23) showed relative accentuation of the upper lobe pulmonary vascularity suggestive of an element of residual cardiac decompensation, volume overload, etc. which accounting for difference in technical factors, appears similar to the findings observed on the previous exam 3/20.  Influenza A/B negative   Blood culture x2 (3/20): Negative   - pt intermittently febrile, Tmax 39.4C (3/23), last fever 03/26 at 8AM  - PRN Tylenol    - Pulm/CC and ID are following   Chronic respiratory failure 2/2 Severe Pulmonary HTN    - Continue home 2L NCO2   - Satting 93-96%   - Respiratory support with Tessalon Perles  ESRD on HD   - Cr 9.18 -> 7.00 (3/26)    - HD MWF    - Nephrology is following  Non-Obstructing CAD    - Continue lisinopril, hydralazine, and isosorbide dinitrate  Primary hypertension    - -163 within past 24hrs   - Continue hydralazine, isordil, and lisinopril   Chronic diastolic heart dysfunction   - Continue isordil and lisinopril   Diabetes mellitus, type 2    - BS  in 24 hrs   - Continue 8U Lantus and 3U Humalog with LDSS to cover   Hx of Unprovoked DVT    - Continue Eliquis   Anemia of chronic disease with iron deficiency    - hgb 11.5 (3/26)   Thrombocytopenia    - plt 125 (3/26)   Scattered lung nodules noted on CT C/A/P  CT C/A/P (3/23) showed scattered lung nodules largest measuring 6mm. pt pt is low risk i.e. greater than 35 yrs old with minimal or absent smoking history, normal immune function and no h/o malignancy   - CT f/u in 12 months, if pt is high risk i.e. h/o extrathoracic malignancy or PMHx of lung cancer f/u in 6-12 months. If no change, CT f/u in 18-24 months recommended  Lower back pain - continue  lidocaine patch, gabapentin, and PRN norco  Gout - Continue w/ allopurinol   Neuropathy - Continue w/ gabapentin   NIMA   CODE STATUS: Full Code   DVT PPx: Eliquis   DISPOSITION: COVID pneumonia. Will remain in hospital until outpatient COVID positive dialysis is arranged.  PCP: Dr. Dread Mayer   All labs and imaging reviewed.  All patient concerns addressed  Charting performed by vinh Chinchilla for Dr. Darlene Tim,   All medical record entries made by the scribe were at my direction. I have reviewed the chart  and agree that the record accurately reflects my personal performance of the history, physical  exam, hospital course, and assessment and plan.   .   ACP

## 2022-11-21 NOTE — SUBJECTIVE & OBJECTIVE
Past Medical History:   Diagnosis Date    Back pain     BPH (benign prostatic hypertrophy)     Hx of colonic polyps 10/19/2015    Hyperlipidemia     Hypertension     Hypertension     Sleep apnea     Spinal cord disease        Past Surgical History:   Procedure Laterality Date    COLONOSCOPY N/A 10/19/2015    Procedure: COLONOSCOPY;  Surgeon: Antonino Bernstein MD;  Location: [facility] (4TH FLR);  Service: Endoscopy;  Laterality: N/A;    COLONOSCOPY N/A 2/25/2021    Procedure: COLONOSCOPY;  Surgeon: Blanca Don MD;  [facility]  Service: Endoscopy;  Laterality: N/A;  requesting ASAP  COVID test at [place] on 2/22-GT.2/24 called pt. move up to earlier spot. pt. did NOT want to come earlier.EC    CYSTOSCOPY WITH URODYNAMIC TESTING N/A 1/17/2020    Procedure: CYSTOSCOPY,WITH URODYNAMIC TESTING;  Surgeon: Christianne Israel MD;  Location: [facility];  Service: Urology;  Laterality: N/A;  RN PREOP 1/13/2020    MULTIPLE TOOTH EXTRACTIONS  2014       Review of patient's allergies indicates:  No Known Allergies    Current Facility-Administered Medications on File Prior to Encounter   Medication    [COMPLETED] acetaminophen tablet 1,000 mg    [COMPLETED] cefTRIAXone (ROCEPHIN) 1 g/50 mL D5W IVPB    mupirocin 2 % ointment     Current Outpatient Medications on File Prior to Encounter   Medication Sig    amLODIPine (NORVASC) 10 MG tablet Take 1 tablet (10 mg total) by mouth once daily. For blood pressure    aspirin (ECOTRIN) 81 MG EC tablet Take 81 mg by mouth once daily.    atorvastatin (LIPITOR) 20 MG tablet Take 1 tablet (20 mg total) by mouth once daily.    DULoxetine (CYMBALTA) 30 MG capsule TAKE 1 CAPSULE BY MOUTH EVERY DAY FOR 30 DAYS    ELIQUIS 2.5 mg Tab Take 2.5 mg by mouth 2 (two) times daily.    gabapentin (NEURONTIN) 100 MG capsule     ibuprofen (ADVIL,MOTRIN) 800 MG tablet Take 1 tablet (800 mg total) by mouth 3 (three) times daily as needed for Pain.    lisinopriL (PRINIVIL,ZESTRIL) 40 MG tablet  Take 1 tablet (40 mg total) by mouth once daily.    riluzole 50 mg Tab tablet Take 50 mg by mouth every 12 (twelve) hours.    tamsulosin (FLOMAX) 0.4 mg Cap Take 1 capsule (0.4 mg total) by mouth once daily.    tiZANidine (ZANAFLEX) 2 MG tablet SMARTSI Tablet(s) By Mouth 1 to 3 Times Daily PRN    acetaminophen (TYLENOL) 325 MG tablet Take 2 tablets (650 mg total) by mouth every 6 (six) hours as needed for Temperature greater than (or equal to 101 degree F).    bisacodyL (DULCOLAX) 10 mg Supp Place 1 suppository (10 mg total) rectally daily as needed.    ciprofloxacin HCl (CIPRO) 500 MG tablet Take 1 tablet (500 mg total) by mouth 2 (two) times daily. for 7 days    cyclobenzaprine (FLEXERIL) 10 MG tablet Take by mouth.    oxyCODONE (ROXICODONE) 5 MG immediate release tablet Take 5 mg by mouth every 4 (four) hours as needed.    polyethylene glycol (GLYCOLAX) 17 gram PwPk Take 17 g by mouth once daily.    senna-docusate 8.6-50 mg (PERICOLACE) 8.6-50 mg per tablet Take 2 tablets by mouth nightly as needed for Constipation.    sildenafil (VIAGRA) 100 MG tablet Take 1 tablet (100 mg total) by mouth daily as needed for Erectile Dysfunction.    valACYclovir (VALTREX) 1000 MG tablet Take 1 tablet (1,000 mg total) by mouth 3 (three) times daily. for 7 days    [DISCONTINUED] ciprofloxacin HCl (CIPRO) 500 MG tablet Take 500 mg by mouth 2 (two) times daily.     Family History       Problem Relation (Age of Onset)    Cancer Mother, Maternal Uncle, Paternal Uncle, Maternal Grandmother    Hyperlipidemia Father    Hypertension Father    Stroke Sister (65)          Tobacco Use    Smoking status: Former     Types: Cigarettes    Smokeless tobacco: Never    Tobacco comments:     3 cigarettes a day    Substance and Sexual Activity    Alcohol use: Yes     Alcohol/week: 1.0 standard drink     Types: 1 Cans of beer per week     Comment: very seldom    Drug use: No    Sexual activity: Not Currently     Partners: Female     Review of  Systems   Constitutional:  Positive for appetite change, fatigue and fever.   HENT: Negative.     Eyes: Negative.    Respiratory:  Positive for shortness of breath. Negative for cough.    Cardiovascular:  Positive for leg swelling.   Gastrointestinal: Negative.    Endocrine: Negative.    Genitourinary: Negative.  Negative for dysuria.        Cloudy urine   Musculoskeletal: Negative.    Skin: Negative.    Allergic/Immunologic: Negative.    Neurological:  Positive for weakness and headaches.   Psychiatric/Behavioral: Negative.     Objective:     Vital Signs (Most Recent):  Temp: 98.8 °F (37.1 °C) (11/20/22 1826)  Pulse: 82 (11/20/22 1924)  Resp: 18 (11/20/22 1924)  BP: 109/63 (11/20/22 1924)  SpO2: 95 % (11/20/22 1924) Vital Signs (24h Range):  Temp:  [97.8 °F (36.6 °C)-100.7 °F (38.2 °C)] 98.8 °F (37.1 °C)  Pulse:  [] 82  Resp:  [16-38] 18  SpO2:  [94 %-98 %] 95 %  BP: (109-136)/(59-72) 109/63     Weight: 95.3 kg (210 lb)  Body mass index is 26.96 kg/m².    Physical Exam  Vitals and nursing note reviewed.   Constitutional:       General: He is not in acute distress.     Appearance: Normal appearance. He is not ill-appearing.   HENT:      Head: Normocephalic and atraumatic.      Nose: Nose normal.      Mouth/Throat:      Mouth: Mucous membranes are moist.   Eyes:      Extraocular Movements: Extraocular movements intact.   Cardiovascular:      Rate and Rhythm: Normal rate.      Pulses: Normal pulses.      Heart sounds: No murmur heard.  Pulmonary:      Effort: Pulmonary effort is normal. No respiratory distress.   Abdominal:      General: Abdomen is flat.      Palpations: Abdomen is soft.      Tenderness: There is no abdominal tenderness.   Musculoskeletal:      Right lower leg: No edema.      Left lower leg: No edema.   Skin:     General: Skin is warm.      Capillary Refill: Capillary refill takes less than 2 seconds.   Neurological:      General: No focal deficit present.      Mental Status: He is alert.       Comments: Bilateral lower extremity weakness    Psychiatric:         Mood and Affect: Mood normal.           Significant Labs: All pertinent labs within the past 24 hours have been reviewed.    Significant Imaging: I have reviewed all pertinent imaging results/findings within the past 24 hours.

## 2022-11-21 NOTE — PROGRESS NOTES
"[facility]  Progress Note    Patient Name: Robles Jordan  MRN: [id number]  Patient Class: IP- Inpatient   Admission Date: 11/20/2022  Length of Stay: 1 days  Attending Physician: Kurt Lopez MD  Primary Care Provider: Tera Jarquin MD        Subjective:     Principal Problem:Sepsis        HPI:  This is a 71 year old male with a PMHx of ALS (wheelchair dependent), HTN, HLD, HEAVEN on CPAP, on prophylactic apixiban (for high DVT risk) who presents with sepsis. The patient initially presented to the ED on 11/19 with SOB, fevers and fatigue. He was noted to have suprapubic tenderness. Work up showed a positive UA. The patient was given ceftriaxone and prescribed Ciprofloxacin. The patient returns to the ED on 11/20 after his blood cultures returned positive for gram negative rods. The patient endorses some improvement of his symptoms but still reports having chills. In the ED, he was HDS. Labs redemonstrated leukocytosis (16.5), negative lactic acid (1.3), positive UA (>100 WBCs, 46 RBCs, few WBC). CXR was negative. He was given ceftriaxone and fluids. The patient was admitted for further management.       Overview/Hospital Course:  Pt admitted for bacteremia after ED blood cultures turned positive. Presumed source is urine. Started on CTX, repeat blood cultures drawn      Interval History: No events overnight. Feels "off" this AM.    Review of Systems   Constitutional:  Negative for chills and fever.   Respiratory:  Negative for cough and shortness of breath.    Cardiovascular:  Negative for chest pain and leg swelling.   Gastrointestinal:  Negative for abdominal distention and abdominal pain.   Objective:     Vital Signs (Most Recent):  Temp: 98 °F (36.7 °C) (11/21/22 0722)  Pulse: 88 (11/21/22 0839)  Resp: 19 (11/21/22 0722)  BP: (!) 125/58 (11/21/22 0722)  SpO2: 97 % (11/21/22 0839)   Vital Signs (24h Range):  Temp:  [98 °F (36.7 °C)-98.8 °F (37.1 °C)] 98 °F (36.7 °C)  Pulse:  " [70-95] 88  Resp:  [16-38] 19  SpO2:  [95 %-98 %] 97 %  BP: (109-135)/(58-65) 125/58     Weight: 95.3 kg (210 lb)  Body mass index is 26.96 kg/m².    Intake/Output Summary (Last 24 hours) at 11/21/2022 1114  Last data filed at 11/21/2022 0851  Gross per 24 hour   Intake 480 ml   Output 500 ml   Net -20 ml      Physical Exam  Constitutional:       General: He is not in acute distress.     Appearance: He is ill-appearing. He is not toxic-appearing or diaphoretic.   Cardiovascular:      Rate and Rhythm: Normal rate and regular rhythm.      Heart sounds: No murmur heard.    No gallop.   Pulmonary:      Effort: Pulmonary effort is normal. No respiratory distress.      Breath sounds: Normal breath sounds. No wheezing.   Abdominal:      General: Bowel sounds are normal. There is no distension.      Palpations: Abdomen is soft.       Significant Labs: All pertinent labs within the past 24 hours have been reviewed.    Significant Imaging: I have reviewed all pertinent imaging results/findings within the past 24 hours.      Assessment/Plan:      * Sepsis  Presumed urosepsis. On CTX. Awaiting culture data.    Depression  Resume home medications      ALS (amyotrophic lateral sclerosis)  Wheelchair dependent   Resume home Riluzole   OT/PT      Gram-negative bacteremia  Likely urinary in source   Resume ceftriaxone   Follow up cultures and susceptibilities       Obstructive sleep apnea on CPAP  Continue CPAP      Essential hypertension  Resume home medications      Hyperlipidemia  Resume home statin        VTE Risk Mitigation (From admission, onward)         Ordered     heparin (porcine) injection 7,500 Units  Every 8 hours         11/20/22 2058     IP VTE LOW RISK PATIENT  Once         11/20/22 2058     Place sequential compression device  Until discontinued         11/20/22 2058                Discharge Planning   DANIEL:      Code Status: Full Code   Is the patient medically ready for discharge?:     Reason for patient still in  hospital (select all that apply): Patient trending condition, Laboratory test, Treatment, Consult recommendations and Pending disposition                     Kurt Lopez MD  Department of Hospital Medicine   [facility]

## 2022-11-22 LAB — BACTERIA UR CULT: NO GROWTH

## 2022-11-22 PROCEDURE — 11000001 HC ACUTE MED/SURG PRIVATE ROOM

## 2022-11-22 PROCEDURE — 63700000 PHARM REV CODE 250 ALT 637 W/O HCPCS: Performed by: STUDENT IN AN ORGANIZED HEALTH CARE EDUCATION/TRAINING PROGRAM

## 2022-11-22 PROCEDURE — 97530 THERAPEUTIC ACTIVITIES: CPT | Mod: CQ

## 2022-11-22 PROCEDURE — 25000003 PHARM REV CODE 250: Performed by: NURSE PRACTITIONER

## 2022-11-22 PROCEDURE — 97530 THERAPEUTIC ACTIVITIES: CPT

## 2022-11-22 PROCEDURE — 63600175 PHARM REV CODE 636 W HCPCS: Performed by: STUDENT IN AN ORGANIZED HEALTH CARE EDUCATION/TRAINING PROGRAM

## 2022-11-22 PROCEDURE — 99900035 HC TECH TIME PER 15 MIN (STAT)

## 2022-11-22 PROCEDURE — 97535 SELF CARE MNGMENT TRAINING: CPT

## 2022-11-22 PROCEDURE — 63600175 PHARM REV CODE 636 W HCPCS: Performed by: EMERGENCY MEDICINE

## 2022-11-22 PROCEDURE — 25000003 PHARM REV CODE 250: Performed by: STUDENT IN AN ORGANIZED HEALTH CARE EDUCATION/TRAINING PROGRAM

## 2022-11-22 PROCEDURE — 94660 CPAP INITIATION&MGMT: CPT

## 2022-11-22 RX ORDER — LACTULOSE 10 G/15ML
20 SOLUTION ORAL ONCE
Status: COMPLETED | OUTPATIENT
Start: 2022-11-22 | End: 2022-11-22

## 2022-11-22 RX ORDER — METOCLOPRAMIDE HYDROCHLORIDE 5 MG/ML
10 INJECTION INTRAMUSCULAR; INTRAVENOUS ONCE
Status: COMPLETED | OUTPATIENT
Start: 2022-11-22 | End: 2022-11-22

## 2022-11-22 RX ORDER — SYRING-NEEDL,DISP,INSUL,0.3 ML 29 G X1/2"
296 SYRINGE, EMPTY DISPOSABLE MISCELLANEOUS
Status: DISCONTINUED | OUTPATIENT
Start: 2022-11-22 | End: 2022-11-22

## 2022-11-22 RX ADMIN — AMLODIPINE BESYLATE 10 MG: 5 TABLET ORAL at 09:11

## 2022-11-22 RX ADMIN — HEPARIN SODIUM 7500 UNITS: 5000 INJECTION INTRAVENOUS; SUBCUTANEOUS at 08:11

## 2022-11-22 RX ADMIN — POLYETHYLENE GLYCOL 3350 17 G: 17 POWDER, FOR SOLUTION ORAL at 09:11

## 2022-11-22 RX ADMIN — TAMSULOSIN HYDROCHLORIDE 0.4 MG: 0.4 CAPSULE ORAL at 09:11

## 2022-11-22 RX ADMIN — SIMETHICONE 80 MG: 80 TABLET, CHEWABLE ORAL at 02:11

## 2022-11-22 RX ADMIN — ATORVASTATIN CALCIUM 20 MG: 10 TABLET, FILM COATED ORAL at 09:11

## 2022-11-22 RX ADMIN — HEPARIN SODIUM 7500 UNITS: 5000 INJECTION INTRAVENOUS; SUBCUTANEOUS at 06:11

## 2022-11-22 RX ADMIN — RILUZOLE 50 MG: 50 TABLET, FILM COATED ORAL at 09:11

## 2022-11-22 RX ADMIN — RILUZOLE 50 MG: 50 TABLET, FILM COATED ORAL at 08:11

## 2022-11-22 RX ADMIN — LISINOPRIL 40 MG: 20 TABLET ORAL at 09:11

## 2022-11-22 RX ADMIN — CEFTRIAXONE 2 G: 2 INJECTION, POWDER, FOR SOLUTION INTRAMUSCULAR; INTRAVENOUS at 08:11

## 2022-11-22 RX ADMIN — HEPARIN SODIUM 7500 UNITS: 5000 INJECTION INTRAVENOUS; SUBCUTANEOUS at 02:11

## 2022-11-22 RX ADMIN — DULOXETINE 30 MG: 30 CAPSULE, DELAYED RELEASE ORAL at 09:11

## 2022-11-22 RX ADMIN — METOCLOPRAMIDE HYDROCHLORIDE 10 MG: 5 INJECTION INTRAMUSCULAR; INTRAVENOUS at 02:11

## 2022-11-22 RX ADMIN — LACTULOSE 20 G: 10 SOLUTION ORAL at 04:11

## 2022-11-22 NOTE — PLAN OF CARE
Problem: Physical Therapy  Goal: Physical Therapy Goal  Description: Goals to be met by: 22     Patient will increase functional independence with mobility by performin. Supine to sit with Modified [place]  2. Rolling to Left and Right with Modified [place]  3. Sit to stand transfer with Modified [place] using RW  4. Bed to chair transfer with Modified [place] using Rolling Walker  5. Gait  x50 feet with Modified [place] using Rolling Walker   6. Wheelchair propulsion x150 feet with Modified [place] using bilateral upper extremities  7. Lower extremity exercise program 2 sets x15 reps per handout, with independence    Outcome: Ongoing, Progressing   Sup to sit with mod A, Sit to stand with min A with rw from elevated bed. Transfer bed to [facility] with rw with spt with CGA

## 2022-11-22 NOTE — PT/OT/SLP PROGRESS
"Physical Therapy Treatment    Patient Name:  Robles Jordan   MRN:  [id number]    Recommendations:     Discharge Recommendations:  home health PT (with family assistance)   Discharge Equipment Recommendations: none   Barriers to discharge: None    Assessment:     Robles Jordan is a 71 y.o. male admitted with a medical diagnosis of Sepsis.  He presents with the following impairments/functional limitations:  weakness, impaired endurance, impaired self care skills, impaired functional mobility, gait instability, impaired balance, decreased lower extremity function, decreased upper extremity function, pain, impaired cardiopulmonary response to activity.    Rehab Prognosis: Good; patient would benefit from acute skilled PT services to address these deficits and reach maximum level of function.    Recent Surgery: * No surgery found *      Plan:     During this hospitalization, patient to be seen 5 x/week to address the identified rehab impairments via gait training, therapeutic activities, therapeutic exercises, wheelchair management/training and progress toward the following goals:    Plan of Care Expires:  12/05/22    Subjective     Chief Complaint: Stomach pain.  "I need to use the bathroom."  Patient/Family Comments/goals: "my legs are too weak.  I don't trust my legs."  Pain/Comfort:  Pain Rating 1: 10/10  Location - Orientation 1: generalized  Location 1: abdomen  Pain Addressed 1: Pre-medicate for activity, Reposition, Distraction, Cessation of Activity  Pain Rating Post-Intervention 1: 0/10      Objective:     Communicated with nurse Moreau prior to session.  Patient found HOB elevated with telemetry, peripheral IV, pressure relief boots, bed alarm upon PT entry to room.     General Precautions: Standard, fall, respiratory   Orthopedic Precautions:N/A   Braces: N/A  Respiratory Status: Room air     Functional Mobility:  Bed Mobility:     Scooting: minimum assistance  Supine to Sit: moderate assistance  Sit to " Supine: stand by assistance  Transfers:     Sit to Stand:  minimum assistance and of 2 persons with rolling walker  Gait: 8 steps with RW with SPT to BSC, then 12 steps side ways from foot of bed to HOB  Balance: standing F, sitting F+      AM-PAC 6 CLICK MOBILITY  Turning over in bed (including adjusting bedclothes, sheets and blankets)?: 3  Sitting down on and standing up from a chair with arms (e.g., wheelchair, bedside commode, etc.): 3  Moving from lying on back to sitting on the side of the bed?: 2  Moving to and from a bed to a chair (including a wheelchair)?: 3  Need to walk in hospital room?: 3  Climbing 3-5 steps with a railing?: 1  Basic Mobility Total Score: 15       Treatment & Education:  Sit to stand with rw x 4 trials with MNI A x 2. Transfer bed <> bsc  Sat EOB with F+ balance for set up    Patient left HOB elevated with all lines intact, call button in reach, bed alarm on, and nurse notified..    GOALS:   Multidisciplinary Problems       Physical Therapy Goals          Problem: Physical Therapy    Goal Priority Disciplines Outcome Goal Variances Interventions   Physical Therapy Goal     PT, PT/OT Ongoing, Progressing     Description: Goals to be met by: 22     Patient will increase functional independence with mobility by performin. Supine to sit with Modified [place]  2. Rolling to Left and Right with Modified [place]  3. Sit to stand transfer with Modified [place] using RW  4. Bed to chair transfer with Modified [place] using Rolling Walker  5. Gait  x50 feet with Modified [place] using Rolling Walker   6. Wheelchair propulsion x150 feet with Modified [place] using bilateral upper extremities  7. Lower extremity exercise program 2 sets x15 reps per handout, with independence                         Time Tracking:     PT Received On: 22  PT Start Time: 1437     PT Stop Time: 1515  PT Total Time (min): 38 min     Billable Minutes: Gait Training 8,  Therapeutic Activity 15, and Therapeutic Exercise 15  [place] with OT    Co- treatment performed due to patient's multiple deficits requiring two skilled therapists to appropriately and safely assess patient's strength and endurance while facilitating functional tasks in addition to accommodating for patient's activity tolerance     Treatment Type: Treatment  PT/PTA: PTA     PTA Visit Number: 1 11/22/2022

## 2022-11-22 NOTE — NURSING
Consulted with  throughout the day. Notified  patient was constipated and was requesting medication to assist with BM.  Scheduled Miralx given, patient has 3 small BMs.  During physical therapy patient was assisted to commode and the patient had a large bowel movement. Per the patient he felt relief.  Will continue to monitor.

## 2022-11-22 NOTE — PLAN OF CARE
[facility] - Med Surg  Initial Discharge Assessment  Home with spouse, dme:  rw, rollator, slice board, wc, has cpap he will call to manufacture on machine at home.  Plan A:  Home with home health   PlanB:  Home with home health  Primary Care Provider: Tera Jarquin MD    Admission Diagnosis: Bacteremia [R78.81]  Chest pain [R07.9]  Urinary tract infection with hematuria, site unspecified [N39.0, R31.9]    Admission Date: 11/20/2022  Expected Discharge Date:     Discharge Barriers Identified: None    Payor: AETNA / Plan: AETNA CHOICE POS / Product Type: Commercial /     Extended Emergency Contact Information  Primary Emergency Contact: Neeru Jordan  Address: [address] [place] [address] [facility]  Home Phone: [phone]  Mobile Phone: [phone]  Relation: Spouse    Discharge Plan A: Home with family, Home Health  Discharge Plan B: Home Health, Home with family      [id number] [place], [place] - [address] [employer] [facility]  [address] [address]  Phone: [phone] Fax: [phone]      Initial Assessment (most recent)       Adult Discharge Assessment - 11/21/22 1542          Discharge Assessment    Assessment Type Discharge Planning Assessment     Confirmed/corrected address, phone number and insurance Yes     Confirmed Demographics Correct on Facesheet     Source of Information patient     Communicated DANIEL with patient/caregiver Yes     Reason For Admission Bacteremia     Lives With spouse     Walking or Climbing Stairs Difficulty ambulation difficulty, requires equipment     Mobility Management rw, rollator, wc     Dressing/Bathing Difficulty bathing difficulty, requires equipment     Dressing/Bathing Management slide board     Home Accessibility wheelchair accessible     Equipment Currently Used at Home CPAP;slide board;walker, rolling;wheelchair     Readmission within 30 days? No     Patient currently being followed by outpatient  case management? Unable to determine (comments)     Do you currently have service(s) that help you manage your care at home? No     Do you take prescription medications? Yes     Do you have prescription coverage? Yes     Coverage AETNA - AETNA CHOICE POS     Do you have any problems affording any of your prescribed medications? No     Who is going to help you get home at discharge? Neeru Jordan (Spouse)   [phone] (Mobile)     How do you get to doctors appointments? family or friend will provide     Are you on dialysis? No     Do you take coumadin? No     Discharge Plan A Home with family;Home Health     Discharge Plan B Home Health;Home with family     DME Needed Upon Discharge  none     Discharge Plan discussed with: Patient     Discharge Barriers Identified None        Relationship/Environment    Name(s) of Who Lives With Patient eNeru Jordan (Spouse)   [phone] (Mobile)

## 2022-11-22 NOTE — PLAN OF CARE
Problem: Adult Inpatient Plan of Care  Goal: Plan of Care Review  Outcome: Ongoing, Progressing  Goal: Absence of Hospital-Acquired Illness or Injury  Outcome: Ongoing, Progressing  Goal: Optimal Comfort and Wellbeing  Outcome: Ongoing, Progressing     Problem: Adjustment to Illness (Sepsis/Septic Shock)  Goal: Optimal Coping  Outcome: Ongoing, Progressing     Problem: Infection Progression (Sepsis/Septic Shock)  Goal: Absence of Infection Signs and Symptoms  Outcome: Ongoing, Progressing     Problem: Nutrition Impaired (Sepsis/Septic Shock)  Goal: Optimal Nutrition Intake  Outcome: Ongoing, Progressing     Problem: Skin Injury Risk Increased  Goal: Skin Health and Integrity  Outcome: Ongoing, Progressing

## 2022-11-22 NOTE — PROGRESS NOTES
[facility] Hospital Medicine  Progress Note      Admit Date: 2022  LOS: 2  Code Status: Full Code   TODAY'S Date 2022  : 1950  Age: 71 y.o.  Weight:   Wt Readings from Last 1 Encounters:   22 95.3 kg (210 lb)     Sex: male  [facility]   MRN: [id number]  Attending Physician: Nolvia Mueller MD    Subjective:      Principal Problem:Sepsis           HPI:  This is a 71 year old male with a PMHx of ALS (wheelchair dependent), HTN, HLD, HEAVEN on CPAP, on prophylactic apixiban (for high DVT risk) who presents with sepsis. The patient initially presented to the ED on  with SOB, fevers and fatigue. He was noted to have suprapubic tenderness. Work up showed a positive UA. The patient was given ceftriaxone and prescribed Ciprofloxacin. The patient returns to the ED on  after his blood cultures returned positive for gram negative rods. The patient endorses some improvement of his symptoms but still reports having chills. In the ED, he was HDS. Labs redemonstrated leukocytosis (16.5), negative lactic acid (1.3), positive UA (>100 WBCs, 46 RBCs, few WBC). CXR was negative. He was given ceftriaxone and fluids. The patient was admitted for further management.         Overview/Hospital Course:  Pt admitted for bacteremia after ED blood cultures turned positive. Presumed source is urine. Started on CTX, repeat blood cultures drawn        Interval History:  constipated. Patient states he is only have small bowel movement.   Review of Systems   Constitutional:  Positive for malaise/fatigue. Negative for chills, diaphoresis, fever and weight loss.   Cardiovascular:  Positive for leg swelling. Negative for palpitations, orthopnea and claudication.   Gastrointestinal:  Positive for constipation. Negative for abdominal pain, blood in stool, diarrhea, melena, nausea and vomiting.   Genitourinary: Negative.    Neurological:  Positive for weakness. Negative for tingling,  tremors, sensory change and headaches.   All other systems reviewed and are negative.    O:  PHYSICAL EXAM  Vital Signs (Most Recent):  Temp: 97.4 °F (36.3 °C) (11/22/22 0729)  Pulse: 80 (11/22/22 0729)  Resp: 18 (11/22/22 0729)  BP: (!) 140/77 (11/22/22 0729)  SpO2: 96 % (11/22/22 0811)   Vital Signs (24h Range):  Temp:  [97.4 °F (36.3 °C)-99.4 °F (37.4 °C)] 97.4 °F (36.3 °C)  Pulse:  [79-91] 80  Resp:  [17-19] 18  SpO2:  [95 %-98 %] 96 %  BP: (132-148)/(63-77) 140/77       Vital signs and nursing assessment noted: afebrile    GEN:   NAD, Alert, atraumatic, well appearing, nontoxic appearing  HEENT:  PERRLA, EOMI, moist membranes, nl conjunctiva, no scleral icterus, no nystagmus, no nodes/nodules, soft, supple, FROM, no trachial deviation  CV:   RRR no m/r/g, 2+ radial pulses, <2sec cap refill, no obvious JVD  RESP:  CTA B, no w/r/r, equal and bilateral chest rise, no respiratory distress  ABD:   soft, Nontender, + distended, +BS, no guarding/rebound  :   Deferred  EXT:   FROM, DUONG x 4, 1+ pedal edema, no calf tenderness, no bony tenderness, no warmth or redness, no crepitus, no obvious deformity  LYMPH:  no gross adenopathy  NEURO:  GCS 15, CN II-XII grossly intact, BLE weakness, no obvious sensory deficit, no tremor  PSYCH:   no SI/HI, no anxiety, nl mood/affect, nl judgement/thought process  SKIN:  Warm, dry, intact, no rashes/lesions or masses, nl color, no pallor        Current Facility-Administered Medications:     acetaminophen tablet 650 mg, 650 mg, Oral, Q8H PRN, Antonio Cormier MD    acetaminophen tablet 650 mg, 650 mg, Oral, Q4H PRN, Antonio Cormier MD    albuterol-ipratropium 2.5 mg-0.5 mg/3 mL nebulizer solution 3 mL, 3 mL, Nebulization, Q4H PRN, Antonio Cormier MD    aluminum-magnesium hydroxide-simethicone 200-200-20 mg/5 mL suspension 30 mL, 30 mL, Oral, QID PRN, Antonio Cormier MD    amLODIPine tablet 10 mg, 10 mg, Oral, Daily, Antonio Cormier MD, 10 mg at 11/22/22 0927    atorvastatin tablet 20 mg, 20 mg,  Oral, Daily, Antonio Cormier MD, 20 mg at 11/22/22 0927    bisacodyL suppository 10 mg, 10 mg, Rectal, Daily PRN, Antonio Cormier MD, 10 mg at 11/21/22 2216    cefTRIAXone (ROCEPHIN) 2 g/50 mL D5W IVPB, 2 g, Intravenous, Q24H, Antonio Cormier MD, Stopped at 11/21/22 2242    dextrose 10% bolus 125 mL, 12.5 g, Intravenous, PRN, Antonio Cormier MD    dextrose 10% bolus 250 mL, 25 g, Intravenous, PRN, Antonio Cormier MD    DULoxetine DR capsule 30 mg, 30 mg, Oral, Daily, Antonio Cormier MD, 30 mg at 11/22/22 0927    gabapentin capsule 100 mg, 100 mg, Oral, BID PRN, Antonio Cormier MD    glucagon (human recombinant) injection 1 mg, 1 mg, Intramuscular, PRN, Antonio Cormier MD    glucose chewable tablet 16 g, 16 g, Oral, PRN, Antonio Cormier MD    glucose chewable tablet 24 g, 24 g, Oral, PRN, Antonio Cormier MD    heparin (porcine) injection 7,500 Units, 7,500 Units, Subcutaneous, Q8H, Antonio Cormier MD, 7,500 Units at 11/22/22 0615    HYDROcodone-acetaminophen 5-325 mg per tablet 1 tablet, 1 tablet, Oral, Q6H PRN, Antonio Cormier MD    lisinopriL tablet 40 mg, 40 mg, Oral, Daily, Antonio Cormier MD, 40 mg at 11/22/22 0927    magnesium oxide tablet 800 mg, 800 mg, Oral, PRN, Antonio Cormier MD    magnesium oxide tablet 800 mg, 800 mg, Oral, PRN, Antonio Cormier MD    melatonin tablet 6 mg, 6 mg, Oral, Nightly PRN, Antonio Cormier MD    naloxone 0.4 mg/mL injection 0.02 mg, 0.02 mg, Intravenous, PRN, Antonio Cormier MD    ondansetron injection 4 mg, 4 mg, Intravenous, Q8H PRN, Antonio Cormier MD    polyethylene glycol packet 17 g, 17 g, Oral, Daily, Antonio Cormier MD, 17 g at 11/22/22 0927    potassium bicarbonate disintegrating tablet 35 mEq, 35 mEq, Oral, PRN, Antonio Cormier MD    potassium bicarbonate disintegrating tablet 50 mEq, 50 mEq, Oral, PRN, Antonio Cormier MD    potassium bicarbonate disintegrating tablet 60 mEq, 60 mEq, Oral, PRN, Antonio Cormier MD    potassium, sodium phosphates 280-160-250 mg packet 2 packet, 2 packet, Oral, PRN, Antonio Cormier MD    potassium, sodium  phosphates 280-160-250 mg packet 2 packet, 2 packet, Oral, PRN, Antonio Cormier MD    potassium, sodium phosphates 280-160-250 mg packet 2 packet, 2 packet, Oral, PRN, Antonio Cormier MD    prochlorperazine injection Soln 5 mg, 5 mg, Intravenous, Q6H PRN, Antonio Cormier MD    riluzole tablet 50 mg, 50 mg, Oral, Q12H, Antonio Cormier MD, 50 mg at 11/22/22 0932    simethicone chewable tablet 80 mg, 1 tablet, Oral, QID PRN, Antonio Cormier MD    sodium chloride 0.9% flush 10 mL, 10 mL, Intravenous, Q12H PRN, Antonio Cormier MD    tamsulosin 24 hr capsule 0.4 mg, 0.4 mg, Oral, Daily, Antonio Cormier MD, 0.4 mg at 11/22/22 0927    tiZANidine tablet 2 mg, 2 mg, Oral, Q8H PRN, Antonio Cormier MD    Facility-Administered Medications Ordered in Other Encounters:     mupirocin 2 % ointment, , Nasal, On Call Procedure, Briana Seals PA-C    Tests     Labs and imaging studies reviewed   Latest Reference Range & Units 11/20/22 19:39 11/21/22 05:00   WBC 3.90 - 12.70 K/uL 16.59 (H) 12.04   Hemoglobin 14.0 - 18.0 g/dL 13.6 (L) 12.4 (L)      Latest Reference Range & Units 11/21/22 05:00   Sodium 136 - 145 mmol/L 141   Potassium 3.5 - 5.1 mmol/L 3.6   Chloride 95 - 110 mmol/L 109   CO2 23 - 29 mmol/L 21 (L)   Anion Gap 8 - 16 mmol/L 11   BUN 8 - 23 mg/dL 12   Creatinine 0.5 - 1.4 mg/dL 0.6   eGFR >60 mL/min/1.73 m^2 >60   Glucose 70 - 110 mg/dL 103   Calcium 8.7 - 10.5 mg/dL 9.1   Phosphorus 2.7 - 4.5 mg/dL 2.8   Magnesium 1.6 - 2.6 mg/dL 2.0      Latest Reference Range & Units 11/20/22 23:55   Lactate, Jarad 0.5 - 2.2 mmol/L 1.0     Component 3 d ago    Blood Culture, Routine Gram stain aer bottle: Gram negative rods P    Blood Culture, Routine Positive results previously called P    Blood Culture, Routine  Abnormal   GRAM NEGATIVE DEBRA, NON-LACTOSE    Identification and susceptibility pending         ASSESSMENT/PLAN:     * Sepsis  Presumed urosepsis. On CTX. Awaiting culture data.     Depression  Resume home medications        ALS  (amyotrophic lateral sclerosis)  Wheelchair dependent   Resume home Riluzole   OT/PT        Gram-negative bacteremia  Likely urinary in source   Resume ceftriaxone   Follow up cultures and susceptibilities         Obstructive sleep apnea on CPAP  Continue CPAP        Essential hypertension  Resume home medications        Hyperlipidemia  Resume home statin               VTE Risk Mitigation (From admission, onward)           Ordered       heparin (porcine) injection 7,500 Units  Every 8 hours         11/20/22 2058       IP VTE LOW RISK PATIENT  Once         11/20/22 2058       Place sequential compression device  Until discontinued         11/20/22 2058                    Discharge Planning   DANIEL:      Code Status: Full Code   Is the patient medically ready for discharge?:     Reason for patient still in hospital (select all that apply): Patient trending condition, Laboratory test, Treatment, Consult recommendations and Pending disposition    Nolvia Mueller MD  Department of Hospital Medicine   [facility]

## 2022-11-22 NOTE — PT/OT/SLP PROGRESS
Occupational Therapy   Treatment    Name: Robles Jordan  MRN: [id number]  Admitting Diagnosis:  Sepsis       Recommendations:     Discharge Recommendations: home health OT (w/ caregiver assistance)  Discharge Equipment Recommendations:  none  Barriers to discharge:   (Pt will require caregiver assistance.)    Assessment:     Robles Jordan is a 71 y.o. male with a medical diagnosis of Sepsis.  Performance deficits affecting function are weakness, impaired endurance, impaired self care skills, impaired functional mobility, gait instability, impaired balance, decreased upper extremity function, decreased lower extremity function, decreased coordination.     Pt pleasant and willing to participate in tx session this date despite complaints of abdominal discomfort and needing to have a BM. Pt was able to stand w/ min A x 2 persons for performing bed<>BSC transfer w/ min A-CGA and use of RW. Pt required rest breaks w/ standing catrachito-care due to BLE weakness but tolerated well. Pt w/ SPO2 96% and -140 bom with exertion; nurse entered room to notify therapists that monitor tech called. Pt tolerated tx session well and expressed great relief at end of session; pt will continue to benefit from skilled acute OT services to maximize functional capacity for safe performance w/ ADLs and functional mobility.     Rehab Prognosis:  Good; patient would benefit from acute skilled OT services to address these deficits and reach maximum level of function.       Plan:     Patient to be seen 5 x/week to address the above listed problems via self-care/home management, therapeutic activities, therapeutic exercises  Plan of Care Expires: 12/05/22  Plan of Care Reviewed with: patient    Subjective     Pain/Comfort:  Pain Rating 1:  (abdominal pain 2* constipation)  Pain Addressed 1: Nurse notified    Objective:     Communicated with: Nurse prior to session.  Patient found HOB elevated with one leg over EOB, bed alarm, telemetry,  "peripheral IV upon OT entry to room.    Patient found attempting to sit EOB, reporting that he was feeling SOB due to "constipation" and felt as though he needed to be sitting upright for relief. Pt was assisted w/ sitting EOB w/ SPO2 92-94% on RA; pt was encouraged to use PLB technique as he appeared anxious and was using mouth breathing technique.     General Precautions: Standard, fall, respiratory   Orthopedic Precautions:N/A   Braces: N/A  Respiratory Status: Room air     Occupational Performance:     Bed Mobility:    Patient completed Scooting hips to EOB with minimum assistance  Patient completed Supine to Sit with minimum assistance and moderate assistance; pt requried assist for managing RLE and lifting trunk   Patient completed Sit to Supine with stand by assistance     Functional Mobility/Transfers:  Patient completed Sit <> Stand Transfer x 1 trial from EOB and x 2 trials from BSC with minimum assistance and of 2 persons  with  rolling walker; pt required v/t cueing for using good hand placement prior to transitional movement  Patient completed Bed <> BSC Transfer using Step Transfer technique with contact guard assistance and of 2 persons with rolling walker  Patient completed BSC <> BedStep Transfer technique with contact guard assistance and of 2   persons with rolling walker    Activities of Daily Living:  Upper Body Dressing: minimum assistance for donning gown over back; min A doffing soiled front gown to harjinder new one while sitting on BSC  Toileting: total assistance for standing catrachito-care; pt was able to tolerate ~30-40 sec of static standing for x 2 trials to complete care; pt required x 1 seated rest break due to BLE fatigue; pt required increased time and requested privacy for having a thorough BM; PT/OT stood outside of door and provided pt w/ call light       [facility] 6 Click ADL: 19    Treatment & Education:  -Pt re-educated on role of OT and POC.   -Pt educated on importance of OOB activity w/ " assist from staff.   -Pt performed ADLs and functional mobility as noted above.   -Pt educated on safe functional mobility and ADL performance.   -Questions and concerns addressed within scope.       Patient left HOB elevated with all lines intact, call button in reach, bed alarm on, and BLE pressure relief boots in place.     GOALS:   Multidisciplinary Problems       Occupational Therapy Goals          Problem: Occupational Therapy    Goal Priority Disciplines Outcome Interventions   Occupational Therapy Goal     OT, PT/OT Ongoing, Progressing    Description: Goals to be met by: 12/05/22     Patient will increase functional independence with ADLs by performing:    UE Dressing with Modified [place].  LE Dressing with Minimal Assistance and Assistive Devices as needed.  Grooming while seated with Modified [place].  Toileting from bedside commode with Stand-by Assistance for hygiene and clothing management.   Supine to sit with Supervision.  Stand pivot transfers with Stand-by Assistance.  Step transfer with Stand-by Assistance  Toilet transfer to bedside commode with Stand-by Assistance.  Upper extremity exercise program x15 reps per handout, with independence.                         Time Tracking:     OT Date of Treatment: 11/22/22  OT Start Time: 1437  OT Stop Time: 1515  OT Total Time (min): 38 min    Billable Minutes:Self Care/Home Management 15  Therapeutic Activity 23  Total Time 38  (co-tx w/ PT)     OT/URVASHI: OT     URVASHI Visit Number: 0    11/22/2022

## 2022-11-22 NOTE — PLAN OF CARE
Problem: Occupational Therapy  Goal: Occupational Therapy Goal  Description: Goals to be met by: 12/05/22     Patient will increase functional independence with ADLs by performing:    UE Dressing with Modified [place].  LE Dressing with Minimal Assistance and Assistive Devices as needed.  Grooming while seated with Modified [place].  Toileting from bedside commode with Stand-by Assistance for hygiene and clothing management.   Supine to sit with Supervision.  Stand pivot transfers with Stand-by Assistance.  Step transfer with Stand-by Assistance  Toilet transfer to bedside commode with Stand-by Assistance.  Upper extremity exercise program x15 reps per handout, with independence.    Outcome: Ongoing, Progressing

## 2022-11-22 NOTE — PLAN OF CARE
Plan of care reviewed.    Problem: Adult Inpatient Plan of Care  Goal: Plan of Care Review  Outcome: Ongoing, Progressing  Goal: Patient-Specific Goal (Individualized)  Outcome: Ongoing, Progressing  Goal: Absence of Hospital-Acquired Illness or Injury  Outcome: Ongoing, Progressing  Goal: Optimal Comfort and Wellbeing  Outcome: Ongoing, Progressing  Goal: Readiness for Transition of Care  Outcome: Ongoing, Progressing

## 2022-11-22 NOTE — NURSING
Patient resting in bed, respirations e/u, no family at bedside. Pt c/o not being able to have a BM. Will give prn dulcolax suppository. Reinforced use of call light for needs. CLWR, BR upx2, bed low, locked in position, bed alarm engaged. Safety maintained, no s/s of distress, will continue to monitor.

## 2022-11-23 VITALS
RESPIRATION RATE: 17 BRPM | WEIGHT: 210 LBS | BODY MASS INDEX: 26.95 KG/M2 | OXYGEN SATURATION: 98 % | TEMPERATURE: 98 F | HEIGHT: 74 IN | HEART RATE: 70 BPM | DIASTOLIC BLOOD PRESSURE: 67 MMHG | SYSTOLIC BLOOD PRESSURE: 137 MMHG

## 2022-11-23 PROBLEM — A41.9 SEPSIS: Status: RESOLVED | Noted: 2022-11-20 | Resolved: 2022-11-23

## 2022-11-23 PROCEDURE — 63600175 PHARM REV CODE 636 W HCPCS: Performed by: STUDENT IN AN ORGANIZED HEALTH CARE EDUCATION/TRAINING PROGRAM

## 2022-11-23 PROCEDURE — 63700000 PHARM REV CODE 250 ALT 637 W/O HCPCS: Performed by: STUDENT IN AN ORGANIZED HEALTH CARE EDUCATION/TRAINING PROGRAM

## 2022-11-23 PROCEDURE — 25000003 PHARM REV CODE 250: Performed by: STUDENT IN AN ORGANIZED HEALTH CARE EDUCATION/TRAINING PROGRAM

## 2022-11-23 PROCEDURE — 97530 THERAPEUTIC ACTIVITIES: CPT

## 2022-11-23 PROCEDURE — 90694 VACC AIIV4 NO PRSRV 0.5ML IM: CPT | Performed by: INTERNAL MEDICINE

## 2022-11-23 PROCEDURE — 90471 IMMUNIZATION ADMIN: CPT | Performed by: INTERNAL MEDICINE

## 2022-11-23 PROCEDURE — 63600175 PHARM REV CODE 636 W HCPCS: Performed by: INTERNAL MEDICINE

## 2022-11-23 PROCEDURE — 97110 THERAPEUTIC EXERCISES: CPT

## 2022-11-23 PROCEDURE — 99900035 HC TECH TIME PER 15 MIN (STAT)

## 2022-11-23 PROCEDURE — 97530 THERAPEUTIC ACTIVITIES: CPT | Mod: CQ

## 2022-11-23 RX ORDER — SULFAMETHOXAZOLE AND TRIMETHOPRIM 800; 160 MG/1; MG/1
1 TABLET ORAL 2 TIMES DAILY
Qty: 24 TABLET | Refills: 0 | Status: SHIPPED | OUTPATIENT
Start: 2022-11-23 | End: 2022-12-05

## 2022-11-23 RX ADMIN — HEPARIN SODIUM 7500 UNITS: 5000 INJECTION INTRAVENOUS; SUBCUTANEOUS at 07:11

## 2022-11-23 RX ADMIN — LISINOPRIL 40 MG: 20 TABLET ORAL at 10:11

## 2022-11-23 RX ADMIN — POLYETHYLENE GLYCOL 3350 17 G: 17 POWDER, FOR SOLUTION ORAL at 10:11

## 2022-11-23 RX ADMIN — HEPARIN SODIUM 7500 UNITS: 5000 INJECTION INTRAVENOUS; SUBCUTANEOUS at 02:11

## 2022-11-23 RX ADMIN — TAMSULOSIN HYDROCHLORIDE 0.4 MG: 0.4 CAPSULE ORAL at 10:11

## 2022-11-23 RX ADMIN — AMLODIPINE BESYLATE 10 MG: 5 TABLET ORAL at 10:11

## 2022-11-23 RX ADMIN — ATORVASTATIN CALCIUM 20 MG: 10 TABLET, FILM COATED ORAL at 10:11

## 2022-11-23 RX ADMIN — DULOXETINE 30 MG: 30 CAPSULE, DELAYED RELEASE ORAL at 10:11

## 2022-11-23 RX ADMIN — RILUZOLE 50 MG: 50 TABLET, FILM COATED ORAL at 10:11

## 2022-11-23 RX ADMIN — INFLUENZA A VIRUS A/VICTORIA/2570/2019 IVR-215 (H1N1) ANTIGEN (FORMALDEHYDE INACTIVATED), INFLUENZA A VIRUS A/DARWIN/6/2021 IVR-227 (H3N2) ANTIGEN (FORMALDEHYDE INACTIVATED), INFLUENZA B VIRUS B/AUSTRIA/1359417/2021 BVR-26 ANTIGEN (FORMALDEHYDE INACTIVATED), INFLUENZA B VIRUS B/PHUKET/3073/2013 BVR-1B ANTIGEN (FORMALDEHYDE INACTIVATED) 0.5 ML: 15; 15; 15; 15 INJECTION, SUSPENSION INTRAMUSCULAR at 02:11

## 2022-11-23 NOTE — NURSING
Patient resting in bed, wife at bedside, respirations e/u. Pt denies pain, no s/s of distress. Reinforced use of call light for needs. CLWR, BR upx3, bed low, locked in position. Safety maintained, no s/s of distress nor pain noted.Will continue to monitor.

## 2022-11-23 NOTE — PT/OT/SLP PROGRESS
Occupational Therapy   Treatment    Name: Robles Jordan  MRN: [id number]  Admitting Diagnosis:  Sepsis       Recommendations:     Discharge Recommendations: home health OT (w/ caregiver assistance)  Discharge Equipment Recommendations:  none  Barriers to discharge:   (Pt will require caregiver assistance)    Assessment:     Robles Jordan is a 71 y.o. male with a medical diagnosis of Sepsis. Performance deficits affecting function are weakness, gait instability, decreased upper extremity function, decreased ROM, impaired balance, impaired endurance, decreased lower extremity function, decreased safety awareness, impaired self care skills, impaired functional mobility, decreased coordination.     Rehab Prognosis:  Good; patient would benefit from acute skilled OT services to address these deficits and reach maximum level of function.       Plan:     Patient to be seen 5 x/week to address the above listed problems via self-care/home management, therapeutic activities, therapeutic exercises  Plan of Care Expires: 12/05/22  Plan of Care Reviewed with: patient    Subjective     Chief complaint: fatigues easily   Patient/family comments/ goals: feels like his RLE feels stronger than at eval day; has noticed lately/at home that he has had to use the other UE to assist for functional reach 2* BUE weakness      Pain/Comfort:  Pain Rating 1: 0/10    Objective:     Communicated with: nurseJesus, prior to session.  Patient found HOB elevated with peripheral IV, pulse ox (continuous), telemetry, bed alarm upon OT entry to room.    General Precautions: Standard, fall   Orthopedic Precautions:N/A   Braces: N/A  Respiratory Status: Room air     Occupational Performance:     Bed Mobility:    Patient completed Scooting anteriorly with stand by assistance  Patient completed Supine to Sit with stand by assistance with HOB elevated   Patient completed Sit to Supine with stand by assistance     Functional  Mobility/Transfers:  Patient completed Sit <> Stand Transfer with moderate assistance, of 2 persons, and bed elevated  with  rolling walker ; at the end of the session after ambulation, pt required MAX A x2 for sit>stand from low recliner chair   Functional Mobility: Gait belt donned prior to transfer for safety during mobility/transfers. Pt completed ~48 ft using RW with CGA-MIN A with chair closely following. Pt then required a seated rest break. Pt then completed ~2 ft chair>bed with RW with CGA. Please refer to PTA note for gait assessment.     Activities of Daily Living:  Upper Body Dressing: moderate assistance to don back gown while seated unsupported EOB  Lower Body Dressing: total assistance to don B/L socks       AMPA 6 Click ADL: 18    Treatment & Education:  Pt re-educated on OT role/POC.   Importance of OOB activity with staff assistance.  Safety during functional t/f and mobility    Edu on having things at home at waist height and utilizing w/c for safety vs short distance using RW with caregiver assistance at d/c home while working with HHOT/PT with ambulation   Edu with energy conservation tips and safety tips 2* pt so fatigued after ~48 ft ambulation that stand>sit required MIN A d/t fast decent into chair. Pt reported 6 out of 10 in fatigue at end of therapy session.  Edu with demo of OT hand placement that family can carryover for BUE AAROM/PROM and stretching of shoulder joints  Seated EOB, pt completed the following BUE ROM x10:   Elbow flex/ext AROM, shoulder flex to ~just less than partial ROM/ext AROM; shoulder flex/ext AAROM, shoulder horizontal ab/dduction AAROM  Static stretch shoulder flexion full ROM x~30 sec   Multiple self-care tasks/functional mobility completed- assistance level noted above   Edu pt on importance of weight shifting at bed level and w/c level; edu for pressure relief while seated every ~15-30 min. Pt reports good compliance with rolling for pressure relief   All  questions/concerns answered within OT scope of practice       Patient left  HOB elevated with B/L pressure relief boots on  with all lines intact, call button in reach, bed alarm on, nurse, Jesus, notified, and all needs met/within reach    GOALS:   Multidisciplinary Problems       Occupational Therapy Goals          Problem: Occupational Therapy    Goal Priority Disciplines Outcome Interventions   Occupational Therapy Goal     OT, PT/OT Ongoing, Progressing    Description: Goals to be met by: 12/05/22     Patient will increase functional independence with ADLs by performing:    UE Dressing with Modified [place].  LE Dressing with Minimal Assistance and Assistive Devices as needed.  Grooming while seated with Modified [place].  Toileting from bedside commode with Stand-by Assistance for hygiene and clothing management.   Supine to sit with Supervision.  Stand pivot transfers with Stand-by Assistance.  Step transfer with Stand-by Assistance  Toilet transfer to bedside commode with Stand-by Assistance.  Upper extremity exercise program x15 reps per handout, with independence.                         Time Tracking:     OT Date of Treatment: 11/23/22  OT Start Time: 1137  OT Stop Time: 1203  OT Total Time (min): 26 min    Billable Minutes:Therapeutic Activity 13 min  Therapeutic Exercise 13 min  Total Time 26 min (partial co-treat with PTA)    OT/URVASHI: OT     URVASHI Visit Number: 0    11/23/2022

## 2022-11-23 NOTE — NURSING
Patient cleared for discharge by  and  Jerrica STEVE   Patient verbalized understanding of discharge instructions.   Patient's transportation has arrived.

## 2022-11-23 NOTE — SUBJECTIVE & OBJECTIVE
Interval History: No new issues     Review of Systems   Constitutional:  Negative for activity change.   HENT:  Negative for congestion.    Eyes:  Negative for discharge and itching.   Respiratory:  Negative for apnea.    Cardiovascular:  Negative for chest pain.   Gastrointestinal:  Negative for abdominal distention.   Genitourinary:  Negative for difficulty urinating and dysuria.   Musculoskeletal:  Negative for arthralgias.   Neurological:  Negative for dizziness.   Psychiatric/Behavioral:  Negative for agitation.    Objective:     Vital Signs (Most Recent):  Temp: 98.1 °F (36.7 °C) (11/23/22 0706)  Pulse: 71 (11/23/22 0706)  Resp: 17 (11/23/22 0706)  BP: (!) 142/69 (11/23/22 0706)  SpO2: 97 % (11/23/22 0706)   Vital Signs (24h Range):  Temp:  [97.5 °F (36.4 °C)-98.4 °F (36.9 °C)] 98.1 °F (36.7 °C)  Pulse:  [71-92] 71  Resp:  [16-23] 17  SpO2:  [93 %-97 %] 97 %  BP: (117-163)/(62-78) 142/69     Weight: 95.3 kg (210 lb)  Body mass index is 26.96 kg/m².    Intake/Output Summary (Last 24 hours) at 11/23/2022 1052  Last data filed at 11/23/2022 0824  Gross per 24 hour   Intake 720 ml   Output 1900 ml   Net -1180 ml      Physical Exam  Vitals and nursing note reviewed.   Constitutional:       General: He is not in acute distress.     Appearance: He is not ill-appearing, toxic-appearing or diaphoretic.   HENT:      Head: Normocephalic and atraumatic.   Cardiovascular:      Rate and Rhythm: Normal rate and regular rhythm.   Pulmonary:      Effort: Pulmonary effort is normal. No respiratory distress.      Breath sounds: No wheezing.   Abdominal:      General: There is no distension.      Palpations: There is no mass.      Tenderness: There is no abdominal tenderness.   Neurological:      Mental Status: He is alert and oriented to person, place, and time.      Cranial Nerves: No cranial nerve deficit.   Psychiatric:         Behavior: Behavior normal.       Significant Labs: All pertinent labs within the past 24 hours have  been reviewed.  BMP: No results for input(s): GLU, NA, K, CL, CO2, BUN, CREATININE, CALCIUM, MG in the last 48 hours.  CBC: No results for input(s): WBC, HGB, HCT, PLT in the last 48 hours.    Significant Imaging:

## 2022-11-23 NOTE — PLAN OF CARE
[place] - Med Surg  Discharge Final Note    Primary Care Provider: Tera Jarquin MD    Expected Discharge Date: 11/23/2022    All needs met. Appointments scheduled. [facility] to provide home health services.  notified nurse Chastity that patient is ready for discharge from case management standpoint.     Final Discharge Note (most recent)       Final Note - 11/23/22 1528          Final Note    Assessment Type Final Discharge Note     Anticipated Discharge Disposition Home-Health Care Svc     What phone number can be called within the next 1-3 days to see how you are doing after discharge? [phone]     Hospital Resources/Appts/Education Provided Appointments scheduled and added to AVS;Appointments scheduled by Navigator/Coordinator        Post-Acute Status    Post-Acute Authorization Home Health     Home Health Status Set-up Complete/Auth obtained     Coverage AETNA     Discharge Delays None known at this time                     Important Message from Medicare             Contact Info       [facility]   Specialty: Home Health Services, Home Therapy Services, Home Living Aide Services    [address]  [facility] [address]   Phone: [phone]       Next Steps: Follow up    Instructions: Home Health will call to schedule first visit.

## 2022-11-23 NOTE — NURSING
Pt's PIV d/c'd w/tip intact 2x2 & tape applied, pt given discharge f;/u & prescription info, pt verbalized understanding and all questions addressed, pt now awaiting ride home and assistance w/getting dressed

## 2022-11-23 NOTE — DISCHARGE SUMMARY
[facility] Medicine  Discharge Summary      Patient Name: Robles Jordan  MRN: [id number]  MERY: [id number]  Patient Class: IP- Inpatient  Admission Date: 11/20/2022  Hospital Length of Stay: 3 days  Discharge Date and Time:  11/23/2022 10:57 AM  Attending Physician: Raphael Guallpa MD   Discharging Provider: Raphael Guallpa MD  Primary Care Provider: Tera Jarquin MD    Primary Care Team: Networked reference to record PCT     HPI:   This is a 71 year old male with a PMHx of ALS (wheelchair dependent), HTN, HLD, HEAVEN on CPAP, on prophylactic apixiban (for high DVT risk) who presents with sepsis. The patient initially presented to the ED on 11/19 with SOB, fevers and fatigue. He was noted to have suprapubic tenderness. Work up showed a positive UA. The patient was given ceftriaxone and prescribed Ciprofloxacin. The patient returns to the ED on 11/20 after his blood cultures returned positive for gram negative rods. The patient endorses some improvement of his symptoms but still reports having chills. In the ED, he was HDS. Labs redemonstrated leukocytosis (16.5), negative lactic acid (1.3), positive UA (>100 WBCs, 46 RBCs, few WBC). CXR was negative. He was given ceftriaxone and fluids. The patient was admitted for further management.       * No surgery found *      Hospital Course:   Pt admitted for bacteremia after ED blood cultures turned positive. Presumed source is urine. Started on CTX, repeat blood cultures drawn. Patient grew proteus M. Repeat cultures were NG. Patient went home on 12 days of Bactrim. Activity as tolerated. Diet- low NA. Follow up with PCP in one week        Goals of Care Treatment Preferences:  Code Status: Full Code      Consults:     No new Assessment & Plan notes have been filed under this hospital service since the last note was generated.  Service: Hospital Medicine    Final Active Diagnoses:    Diagnosis Date Noted POA    Gram-negative bacteremia  [R78.81] 11/20/2022 Yes    ALS (amyotrophic lateral sclerosis) [G12.21] 11/20/2022 Yes    Depression [F32.A] 11/20/2022 Yes    Obstructive sleep apnea on CPAP [G47.33, Z99.89] 01/22/2016 Not Applicable    Hyperlipidemia [E78.5] 10/23/2015 Yes    Essential hypertension [I10] 10/23/2015 Yes      Problems Resolved During this Admission:    Diagnosis Date Noted Date Resolved POA    PRINCIPAL PROBLEM:  Sepsis [A41.9] 11/20/2022 11/23/2022 Yes       Discharged Condition: good    Disposition:  Home    Follow Up:   Follow-up Information     Tera Jarquin MD Follow up on 11/28/2022.    Specialty: Family Medicine  Why: @11:00am for a hospital follow up  Contact information:  [address] [place] [address] [phone]             Tera Jarquin MD Follow up in 1 week(s).    Specialty: Family Medicine  Contact information:  [address] [place] [address] [phone]                       Patient Instructions:   No discharge procedures on file.    Significant Diagnostic Studies:     Pending Diagnostic Studies:     None         Medications:  Reconciled Home Medications:      Medication List      START taking these medications    sulfamethoxazole-trimethoprim 800-160mg 800-160 mg Tab  Commonly known as: BACTRIM DS  Take 1 tablet by mouth 2 (two) times daily. for 12 days        CONTINUE taking these medications    acetaminophen 325 MG tablet  Commonly known as: TYLENOL  Take 2 tablets (650 mg total) by mouth every 6 (six) hours as needed for Temperature greater than (or equal to 101 degree F).     amLODIPine 10 MG tablet  Commonly known as: NORVASC  Take 1 tablet (10 mg total) by mouth once daily. For blood pressure     aspirin 81 MG EC tablet  Commonly known as: ECOTRIN  Take 81 mg by mouth once daily.     atorvastatin 20 MG tablet  Commonly known as: LIPITOR  Take 1 tablet (20 mg total) by mouth once daily.     ciprofloxacin HCl 500 MG tablet  Commonly known as: CIPRO  Take 1 tablet (500 mg  total) by mouth 2 (two) times daily. for 7 days     DULoxetine 30 MG capsule  Commonly known as: CYMBALTA  TAKE 1 CAPSULE BY MOUTH EVERY DAY FOR 30 DAYS     ELIQUIS 2.5 mg Tab  Generic drug: apixaban  Take 2.5 mg by mouth 2 (two) times daily.     gabapentin 100 MG capsule  Commonly known as: NEURONTIN     ibuprofen 800 MG tablet  Commonly known as: ADVIL,MOTRIN  Take 1 tablet (800 mg total) by mouth 3 (three) times daily as needed for Pain.     lisinopriL 40 MG tablet  Commonly known as: PRINIVIL,ZESTRIL  Take 1 tablet (40 mg total) by mouth once daily.     oxyCODONE 5 MG immediate release tablet  Commonly known as: ROXICODONE  Take 5 mg by mouth every 4 (four) hours as needed.     riluzole 50 mg Tab tablet  Take 50 mg by mouth every 12 (twelve) hours.     sildenafiL 100 MG tablet  Commonly known as: VIAGRA  Take 1 tablet (100 mg total) by mouth daily as needed for Erectile Dysfunction.     tamsulosin 0.4 mg Cap  Commonly known as: FLOMAX  Take 1 capsule (0.4 mg total) by mouth once daily.     tiZANidine 2 MG tablet  Commonly known as: ZANAFLEX  SMARTSI Tablet(s) By Mouth 1 to 3 Times Daily PRN     valACYclovir 1000 MG tablet  Commonly known as: VALTREX  Take 1 tablet (1,000 mg total) by mouth 3 (three) times daily. for 7 days            Indwelling Lines/Drains at time of discharge:   Lines/Drains/Airways     None                 Time spent on the discharge of patient: z. 35  minutes         Raphael Alcocer MD  Department of Hospital Medicine  [facility]

## 2022-11-23 NOTE — PLAN OF CARE
Problem: Physical Therapy  Goal: Physical Therapy Goal  Description: Goals to be met by: 22     Patient will increase functional independence with mobility by performin. Supine to sit with Modified [place]  2. Rolling to Left and Right with Modified [place]  3. Sit to stand transfer with Modified [place] using RW  4. Bed to chair transfer with Modified [place] using Rolling Walker  5. Gait  x50 feet with Modified [place] using Rolling Walker   6. Wheelchair propulsion x150 feet with Modified [place] using bilateral upper extremities  7. Lower extremity exercise program 2 sets x15 reps per handout, with independence    Outcome: Ongoing, Progressing   Pt demo improvement on Bed Mobility and Gait Training today with further distance. Pt required SBA for Sup<>Sit; Mod A x2 (bed raised up) for Sit<>Stand; CGA/Min A for transfer/ambulation.

## 2022-11-23 NOTE — PLAN OF CARE
Problem: Adult Inpatient Plan of Care  Goal: Plan of Care Review  Outcome: Ongoing, Progressing  Goal: Absence of Hospital-Acquired Illness or Injury  Outcome: Ongoing, Progressing  Goal: Optimal Comfort and Wellbeing  Outcome: Ongoing, Progressing     Problem: Infection Progression (Sepsis/Septic Shock)  Goal: Absence of Infection Signs and Symptoms  Outcome: Ongoing, Progressing     Problem: Skin Injury Risk Increased  Goal: Skin Health and Integrity  Outcome: Ongoing, Progressing

## 2022-11-23 NOTE — PLAN OF CARE
Problem: Occupational Therapy  Goal: Occupational Therapy Goal  Description: Goals to be met by: 12/05/22     Patient will increase functional independence with ADLs by performing:    UE Dressing with Modified [place].  LE Dressing with Minimal Assistance and Assistive Devices as needed.  Grooming while seated with Modified [place].  Toileting from bedside commode with Stand-by Assistance for hygiene and clothing management.   Supine to sit with Supervision.  Stand pivot transfers with Stand-by Assistance.  Step transfer with Stand-by Assistance  Toilet transfer to bedside commode with Stand-by Assistance.  Upper extremity exercise program x15 reps per handout, with independence.    Outcome: Ongoing, Progressing     Pt making progress towards goals.

## 2022-11-23 NOTE — PLAN OF CARE
Patient accepted by [facility]. Patient will be seen Sunday.        11/23/22 1526   Post-Acute Status   Post-Acute Authorization Home Health   Home Health Status Set-up Complete/Auth obtained   Coverage AETNA   Hospital Resources/Appts/Education Provided Appointments scheduled and added to AVS;Appointments scheduled by Navigator/Coordinator   Discharge Delays None known at this time   Discharge Plan   Discharge Plan A Home Health   Discharge Plan B Home Health

## 2022-11-23 NOTE — PT/OT/SLP PROGRESS
Physical Therapy Treatment    Patient Name:  Robles Jordan   MRN:  [id number]    Recommendations:     Discharge Recommendations:  home health PT (with family assistance)   Discharge Equipment Recommendations: none   Barriers to discharge: RLE weakness    Assessment:     Robles Jordan is a 71 y.o. male admitted with a medical diagnosis of Sepsis.  He presents with the following impairments/functional limitations:  weakness, impaired endurance, impaired functional mobility, gait instability, impaired balance, decreased coordination, decreased upper extremity function, decreased lower extremity function, decreased safety awareness, pain, decreased ROM, edema, abnormal tone.    Pt demo improvement on Bed Mobility and Gait Training today with further distance. Pt required SBA for Sup<>Sit; Mod A x2 (bed raised up) for Sit<>Stand; CGA/Min A for transfer/ambulation.    Rehab Prognosis: Good; patient would benefit from acute skilled PT services to address these deficits and reach maximum level of function.    Recent Surgery: * No surgery found *      Plan:     During this hospitalization, patient to be seen 5 x/week to address the identified rehab impairments via gait training, therapeutic activities, therapeutic exercises, wheelchair management/training and progress toward the following goals:    Plan of Care Expires:  12/05/22    Subjective     Chief Complaint: weakness  Patient/Family Comments/goals: Pt agreed to participate.  Pain/Comfort:  Pain Rating 1: 0/10  Pain Rating Post-Intervention 1: 0/10      Objective:     Communicated with nurse Lee prior to session.  Patient found HOB elevated with telemetry, peripheral IV, pressure relief boots, bed alarm upon PT entry to room.     General Precautions: Standard, fall, respiratory   Orthopedic Precautions:N/A   Braces: N/A  Respiratory Status: Room air     Functional Mobility:  Bed Mobility:     Scooting: anterior scoot to EOB and EOC with stand by  assistance  Supine to Sit: stand by assistance with HOB elevated  Transfers:     Sit to Stand: x 2 trials from EOB with moderate assistance and of 2 persons with bed raised up using rolling walker; from BS Chair with Max A of 2 persons using RW; v/t cues for proper hand placement  BS Chair to Bed: contact guard assistance and minimum assistance with  rolling walker using Step Transfer  Gait: Patient ambulated ~36  feet on level tile with Rolling Walker with Contact Guard Assistance and Minimal Assistance, BS Chair followed by OT. Pt with demonstrating a reciprocal gait with decreased rayshawn and decreased step length. Impairments contributing to gait deviations include impaired balance, decreased flexibility, and decreased strength; v/c for AD management  Balance: Fair+ Sitting; Fair Standing      AM-PAC 6 CLICK MOBILITY  Turning over in bed (including adjusting bedclothes, sheets and blankets)?: 3  Sitting down on and standing up from a chair with arms (e.g., wheelchair, bedside commode, etc.): 3  Moving from lying on back to sitting on the side of the bed?: 3  Moving to and from a bed to a chair (including a wheelchair)?: 2  Need to walk in hospital room?: 3  Climbing 3-5 steps with a railing?: 1  Basic Mobility Total Score: 15       Treatment & Education:  BLE ex in seated 10 reps AAROM AP, LAQ, Marching    Patient left sitting edge of bed with handoff to OT, call button in reach, nurse notified, and OT present.    GOALS:   Multidisciplinary Problems       Physical Therapy Goals          Problem: Physical Therapy    Goal Priority Disciplines Outcome Goal Variances Interventions   Physical Therapy Goal     PT, PT/OT Ongoing, Progressing     Description: Goals to be met by: 22     Patient will increase functional independence with mobility by performin. Supine to sit with Modified [place]  2. Rolling to Left and Right with Modified [place]  3. Sit to stand transfer with Modified [place]  using RW  4. Bed to chair transfer with Modified [place] using Rolling Walker  5. Gait  x50 feet with Modified [place] using Rolling Walker   6. Wheelchair propulsion x150 feet with Modified [place] using bilateral upper extremities  7. Lower extremity exercise program 2 sets x15 reps per handout, with independence                         Time Tracking:     PT Received On: 11/23/22  PT Start Time: 1137     PT Stop Time: 1156  PT Total Time (min): 19 min     Billable Minutes: Therapeutic Activity 19 min (Co-treat with OT)    Co- treatment performed due to patient's multiple deficits requiring two skilled therapists to appropriately and safely assess patient's strength and endurance while facilitating functional tasks in addition to accommodating for patient's activity tolerance     Treatment Type: Treatment  PT/PTA: PTA     PTA Visit Number: 2     11/23/2022

## 2022-11-23 NOTE — ASSESSMENT & PLAN NOTE
Presumed urosepsis. On CTX. Awaiting culture data.    Proteus M.  Repeated cultures are NG.  Patient wants to go home

## 2022-11-23 NOTE — PROGRESS NOTES
[facility] Medicine  Progress Note    Patient Name: Robles Jordan  MRN: [id number]  Patient Class: IP- Inpatient   Admission Date: 11/20/2022  Length of Stay: 3 days  Attending Physician: Raphael Guallpa MD  Primary Care Provider: Tera Jarquin MD        Subjective:     Principal Problem:Sepsis        HPI:  This is a 71 year old male with a PMHx of ALS (wheelchair dependent), HTN, HLD, HEAVEN on CPAP, on prophylactic apixiban (for high DVT risk) who presents with sepsis. The patient initially presented to the ED on 11/19 with SOB, fevers and fatigue. He was noted to have suprapubic tenderness. Work up showed a positive UA. The patient was given ceftriaxone and prescribed Ciprofloxacin. The patient returns to the ED on 11/20 after his blood cultures returned positive for gram negative rods. The patient endorses some improvement of his symptoms but still reports having chills. In the ED, he was HDS. Labs redemonstrated leukocytosis (16.5), negative lactic acid (1.3), positive UA (>100 WBCs, 46 RBCs, few WBC). CXR was negative. He was given ceftriaxone and fluids. The patient was admitted for further management.       Overview/Hospital Course:  Pt admitted for bacteremia after ED blood cultures turned positive. Presumed source is urine. Started on CTX, repeat blood cultures drawn. Patient grew proteus M. Repeat cultures were NG.      Interval History: No new issues     Review of Systems   Constitutional:  Negative for activity change.   HENT:  Negative for congestion.    Eyes:  Negative for discharge and itching.   Respiratory:  Negative for apnea.    Cardiovascular:  Negative for chest pain.   Gastrointestinal:  Negative for abdominal distention.   Genitourinary:  Negative for difficulty urinating and dysuria.   Musculoskeletal:  Negative for arthralgias.   Neurological:  Negative for dizziness.   Psychiatric/Behavioral:  Negative for agitation.    Objective:     Vital Signs (Most  Recent):  Temp: 98.1 °F (36.7 °C) (11/23/22 0706)  Pulse: 71 (11/23/22 0706)  Resp: 17 (11/23/22 0706)  BP: (!) 142/69 (11/23/22 0706)  SpO2: 97 % (11/23/22 0706)   Vital Signs (24h Range):  Temp:  [97.5 °F (36.4 °C)-98.4 °F (36.9 °C)] 98.1 °F (36.7 °C)  Pulse:  [71-92] 71  Resp:  [16-23] 17  SpO2:  [93 %-97 %] 97 %  BP: (117-163)/(62-78) 142/69     Weight: 95.3 kg (210 lb)  Body mass index is 26.96 kg/m².    Intake/Output Summary (Last 24 hours) at 11/23/2022 1052  Last data filed at 11/23/2022 0824  Gross per 24 hour   Intake 720 ml   Output 1900 ml   Net -1180 ml      Physical Exam  Vitals and nursing note reviewed.   Constitutional:       General: He is not in acute distress.     Appearance: He is not ill-appearing, toxic-appearing or diaphoretic.   HENT:      Head: Normocephalic and atraumatic.   Cardiovascular:      Rate and Rhythm: Normal rate and regular rhythm.   Pulmonary:      Effort: Pulmonary effort is normal. No respiratory distress.      Breath sounds: No wheezing.   Abdominal:      General: There is no distension.      Palpations: There is no mass.      Tenderness: There is no abdominal tenderness.   Neurological:      Mental Status: He is alert and oriented to person, place, and time.      Cranial Nerves: No cranial nerve deficit.   Psychiatric:         Behavior: Behavior normal.       Significant Labs: All pertinent labs within the past 24 hours have been reviewed.  BMP: No results for input(s): GLU, NA, K, CL, CO2, BUN, CREATININE, CALCIUM, MG in the last 48 hours.  CBC: No results for input(s): WBC, HGB, HCT, PLT in the last 48 hours.    Significant Imaging:       Assessment/Plan:      * Sepsis  Presumed urosepsis. On CTX. Awaiting culture data.    Proteus M.  Repeated cultures are NG.  Patient wants to go home     Depression  Resume home medications      ALS (amyotrophic lateral sclerosis)  Wheelchair dependent   Resume home Riluzole   OT/PT      Gram-negative bacteremia  Likely urinary in  source   Resume ceftriaxone   Follow up cultures and susceptibilities       Obstructive sleep apnea on CPAP  Continue CPAP      Essential hypertension  Resume home medications      Hyperlipidemia  Resume home statin        VTE Risk Mitigation (From admission, onward)         Ordered     heparin (porcine) injection 7,500 Units  Every 8 hours         11/20/22 2058     IP VTE LOW RISK PATIENT  Once         11/20/22 2058     Place sequential compression device  Until discontinued         11/20/22 2058                Discharge Planning   DANIEL: 11/24/2022     Code Status: Full Code   Is the patient medically ready for discharge?:     Reason for patient still in hospital (select all that apply):                 Raphael Alcocer MD  Department of Hospital Medicine   [facility]

## 2022-11-23 NOTE — PLAN OF CARE
Plan of care is ongoing.      Problem: Adult Inpatient Plan of Care  Goal: Plan of Care Review  Outcome: Ongoing, Progressing  Goal: Patient-Specific Goal (Individualized)  Outcome: Ongoing, Progressing  Goal: Absence of Hospital-Acquired Illness or Injury  Outcome: Ongoing, Progressing  Goal: Optimal Comfort and Wellbeing  Outcome: Ongoing, Progressing  Goal: Readiness for Transition of Care  Outcome: Ongoing, Progressing     Problem: Adjustment to Illness (Sepsis/Septic Shock)  Goal: Optimal Coping  Outcome: Ongoing, Progressing     Problem: Bleeding (Sepsis/Septic Shock)  Goal: Absence of Bleeding  Outcome: Ongoing, Progressing     Problem: Glycemic Control Impaired (Sepsis/Septic Shock)  Goal: Blood Glucose Level Within Desired Range  Outcome: Ongoing, Progressing     Problem: Infection Progression (Sepsis/Septic Shock)  Goal: Absence of Infection Signs and Symptoms  Outcome: Ongoing, Progressing     Problem: Nutrition Impaired (Sepsis/Septic Shock)  Goal: Optimal Nutrition Intake  Outcome: Ongoing, Progressing     Problem: Skin Injury Risk Increased  Goal: Skin Health and Integrity  Outcome: Ongoing, Progressing

## 2022-11-23 NOTE — NURSING
Patient resting in bed, no family at bedside, pt respirations e/u. Pt refused to wear hospital Bipap, states it is uncomfortable. Patient placed on 3L of O2 NC as oxygen saturation at 90%, increased to 93% on oxygen. Reinforced use of call light for needs. Accordion drain flushed with 5 L NS. Bloody output noted to drain container. CLWR, BR upx3, bed low, locked in position. Safety maintained, no s/s of distress nor pain noted.Will continue to monitor.

## 2022-11-23 NOTE — PLAN OF CARE
[facility]      HOME HEALTH ORDERS  FACE TO FACE ENCOUNTER    Patient Name: Robles Jordan  YOB: 1950    PCP: Tera Jarquin MD   PCP Address: [address] / [place] [address]  PCP Phone Number: [phone]  PCP Fax: [phone]    Encounter Date: 11/20/22    Admit to Home Health    Diagnoses: Bacteremma/weakness    Active Hospital Problems    Diagnosis  POA    Gram-negative bacteremia [R78.81]  Yes    ALS (amyotrophic lateral sclerosis) [G12.21]  Yes    Depression [F32.A]  Yes    Obstructive sleep apnea on CPAP [G47.33, Z99.89]  Not Applicable    Hyperlipidemia [E78.5]  Yes     ASCVD 10-year risk 29.6% (with optimal risk factors 6.5%) as of 10/23/2015       Essential hypertension [I10]  Yes      Resolved Hospital Problems    Diagnosis Date Resolved POA    *Sepsis [A41.9] 11/23/2022 Yes     Priority: 1 - High       Follow Up Appointments:  Future Appointments   Date Time Provider Department Center   11/28/2022 11:00 AM Aline Pal MD [facility]   2/24/2023  8:00 AM [facility]   2/28/2023  8:00 AM Tera Jarquin MD [facility]   4/19/2023 10:30 AM [facility]   4/26/2023 10:30 AM Mono Quintero MD [facility]       Allergies:Review of patient's allergies indicates:  No Known Allergies    Medications: Review discharge medications with patient and family and provide education.    Current Facility-Administered Medications   Medication Dose Route Frequency Provider Last Rate Last Admin    acetaminophen tablet 650 mg  650 mg Oral Q8H PRN Antonio Cormier MD        acetaminophen tablet 650 mg  650 mg Oral Q4H PRN Antonio Cormier MD        albuterol-ipratropium 2.5 mg-0.5 mg/3 mL nebulizer solution 3 mL  3 mL Nebulization Q4H PRN Antonio Cormier MD        aluminum-magnesium hydroxide-simethicone 200-200-20 mg/5 mL suspension 30 mL  30 mL Oral QID PRN Antonio Cormier MD        amLODIPine tablet 10 mg  10 mg Oral Daily  Antonio Cormier MD   10 mg at 11/23/22 1018    atorvastatin tablet 20 mg  20 mg Oral Daily Antonio Cormier MD   20 mg at 11/23/22 1018    bisacodyL suppository 10 mg  10 mg Rectal Daily PRN Antonio Cormier MD   10 mg at 11/21/22 2216    cefTRIAXone (ROCEPHIN) 2 g/50 mL D5W IVPB  2 g Intravenous Q24H Antonio Cormier MD   Stopped at 11/22/22 2138    dextrose 10% bolus 125 mL  12.5 g Intravenous PRN Antonio Cormier MD        dextrose 10% bolus 250 mL  25 g Intravenous PRN Antonio Cormier MD        DULoxetine DR capsule 30 mg  30 mg Oral Daily Antonio Cormier MD   30 mg at 11/23/22 1018    gabapentin capsule 100 mg  100 mg Oral BID PRN Antonio Cormier MD        glucagon (human recombinant) injection 1 mg  1 mg Intramuscular PRN Antonio Cormier MD        glucose chewable tablet 16 g  16 g Oral PRN Antonio Cormier MD        glucose chewable tablet 24 g  24 g Oral PRN Antonio Cormier MD        heparin (porcine) injection 7,500 Units  7,500 Units Subcutaneous Q8H Antonio Cormier MD   7,500 Units at 11/23/22 0706    HYDROcodone-acetaminophen 5-325 mg per tablet 1 tablet  1 tablet Oral Q6H PRN Antonio Cormier MD        lisinopriL tablet 40 mg  40 mg Oral Daily Antonio Cormier MD   40 mg at 11/23/22 1018    magnesium oxide tablet 800 mg  800 mg Oral PRN Antonio Cormier MD        magnesium oxide tablet 800 mg  800 mg Oral PRN Antonio Cormier MD        melatonin tablet 6 mg  6 mg Oral Nightly PRN Antonio Cormier MD        naloxone 0.4 mg/mL injection 0.02 mg  0.02 mg Intravenous PRN Antonio Cormier MD        ondansetron injection 4 mg  4 mg Intravenous Q8H PRN Antonio Cormier MD        polyethylene glycol packet 17 g  17 g Oral Daily Antonio Cormier MD   17 g at 11/23/22 1018    potassium bicarbonate disintegrating tablet 35 mEq  35 mEq Oral PRN Antonio Cormier MD        potassium bicarbonate disintegrating tablet 50 mEq  50 mEq Oral PRN Antonio Cormier MD        potassium bicarbonate disintegrating tablet 60 mEq  60 mEq Oral PRN Antonio Cormier MD        potassium, sodium phosphates 280-160-250  mg packet 2 packet  2 packet Oral PRN Antonio Cormier MD        potassium, sodium phosphates 280-160-250 mg packet 2 packet  2 packet Oral PRN Antonio Cormier MD        potassium, sodium phosphates 280-160-250 mg packet 2 packet  2 packet Oral PRN Antonio Cormier MD        prochlorperazine injection Soln 5 mg  5 mg Intravenous Q6H PRN Antonio Cormier MD        riluzole tablet 50 mg  50 mg Oral Q12H Antonio Cormier MD   50 mg at 11/23/22 1018    simethicone chewable tablet 80 mg  1 tablet Oral QID PRN Antonio Cormier MD   80 mg at 11/22/22 1423    sodium chloride 0.9% flush 10 mL  10 mL Intravenous Q12H PRN Antonio Cormier MD        tamsulosin 24 hr capsule 0.4 mg  0.4 mg Oral Daily Antonio Cormier MD   0.4 mg at 11/23/22 1018    tiZANidine tablet 2 mg  2 mg Oral Q8H PRN Antonio Cormier MD         Facility-Administered Medications Ordered in Other Encounters   Medication Dose Route Frequency Provider Last Rate Last Admin    mupirocin 2 % ointment   Nasal On Call Procedure Briana Seals PA-C         Current Discharge Medication List        START taking these medications    Details   sulfamethoxazole-trimethoprim 800-160mg (BACTRIM DS) 800-160 mg Tab Take 1 tablet by mouth 2 (two) times daily. for 12 days  Qty: 24 tablet, Refills: 0           CONTINUE these medications which have NOT CHANGED    Details   amLODIPine (NORVASC) 10 MG tablet Take 1 tablet (10 mg total) by mouth once daily. For blood pressure  Qty: 90 tablet, Refills: 3    Comments: .  Associated Diagnoses: Essential hypertension      aspirin (ECOTRIN) 81 MG EC tablet Take 81 mg by mouth once daily.      atorvastatin (LIPITOR) 20 MG tablet Take 1 tablet (20 mg total) by mouth once daily.  Qty: 90 tablet, Refills: 1    Associated Diagnoses: Hyperlipidemia, unspecified hyperlipidemia type      DULoxetine (CYMBALTA) 30 MG capsule TAKE 1 CAPSULE BY MOUTH EVERY DAY FOR 30 DAYS      ELIQUIS 2.5 mg Tab Take 2.5 mg by mouth 2 (two) times daily.      gabapentin (NEURONTIN) 100 MG  capsule       ibuprofen (ADVIL,MOTRIN) 800 MG tablet Take 1 tablet (800 mg total) by mouth 3 (three) times daily as needed for Pain.  Qty: 45 tablet, Refills: 5    Associated Diagnoses: Tethered cord syndrome; S/P lumbar laminectomy      lisinopriL (PRINIVIL,ZESTRIL) 40 MG tablet Take 1 tablet (40 mg total) by mouth once daily.  Qty: 90 tablet, Refills: 3    Comments: .  Associated Diagnoses: Essential hypertension      riluzole 50 mg Tab tablet Take 50 mg by mouth every 12 (twelve) hours.      tamsulosin (FLOMAX) 0.4 mg Cap Take 1 capsule (0.4 mg total) by mouth once daily.  Qty: 90 capsule, Refills: 3    Associated Diagnoses: Urinary frequency      tiZANidine (ZANAFLEX) 2 MG tablet SMARTSI Tablet(s) By Mouth 1 to 3 Times Daily PRN      acetaminophen (TYLENOL) 325 MG tablet Take 2 tablets (650 mg total) by mouth every 6 (six) hours as needed for Temperature greater than (or equal to 101 degree F).  Qty:  , Refills: 0      ciprofloxacin HCl (CIPRO) 500 MG tablet Take 1 tablet (500 mg total) by mouth 2 (two) times daily. for 7 days  Qty: 14 tablet, Refills: 0      oxyCODONE (ROXICODONE) 5 MG immediate release tablet Take 5 mg by mouth every 4 (four) hours as needed.      sildenafil (VIAGRA) 100 MG tablet Take 1 tablet (100 mg total) by mouth daily as needed for Erectile Dysfunction.  Qty: 30 tablet, Refills: 2    Associated Diagnoses: Erectile dysfunction, unspecified erectile dysfunction type      valACYclovir (VALTREX) 1000 MG tablet Take 1 tablet (1,000 mg total) by mouth 3 (three) times daily. for 7 days  Qty: 21 tablet, Refills: 0    Associated Diagnoses: Herpes zoster with complication               I have seen and examined this patient within the last 30 days. My clinical findings that support the need for the home health skilled services and home bound status are the following:no   Weakness/numbness causing balance and gait disturbance due to Weakness/Debility making it taxing to leave home.     Diet:   2  gram sodium diet      Referrals/ Consults  Physical Therapy to evaluate and treat. Evaluate for home safety and equipment needs; Establish/upgrade home exercise program. Perform / instruct on therapeutic exercises, gait training, transfer training, and Range of Motion.  Occupational Therapy to evaluate and treat. Evaluate home environment for safety and equipment needs. Perform/Instruct on transfers, ADL training, ROM, and therapeutic exercises.    Activities:   activity as tolerated    Nursing:   Agency to admit patient within 24 hours of hospital discharge unless specified on physician order or at patient request    SN to complete comprehensive assessment including routine vital signs. Instruct on disease process and s/s of complications to report to MD. Review/verify medication list sent home with the patient at time of discharge  and instruct patient/caregiver as needed. Frequency may be adjusted depending on start of care date.     Skilled nurse to perform up to 3 visits PRN for symptoms related to diagnosis    Notify MD if SBP > 160 or < 90; DBP > 90 or < 50; HR > 120 or < 50; Temp > 101; O2 < 88%; Other:     Ok to schedule additional visits based on staff availability and patient request on consecutive days within the home health episode.    When multiple disciplines ordered:    Start of Care occurs on Sunday - Wednesday schedule remaining discipline evaluations as ordered on separate consecutive days following the start of care.    Thursday SOC -schedule subsequent evaluations Friday and Monday the following week.     Friday - Saturday SOC - schedule subsequent discipline evaluations on consecutive days starting Monday of the following week.    For all post-discharge communication and subsequent orders please contact patient's primary care physician.           I certify that this patient is confined to his home and needs intermittent skilled nursing care, physical therapy, and occupational therapy.

## 2022-11-24 LAB
BACTERIA BLD CULT: NORMAL
BACTERIA BLD CULT: NORMAL

## 2022-11-25 ENCOUNTER — TELEPHONE (OUTPATIENT)
Dept: SLEEP MEDICINE | Facility: CLINIC | Age: 72
End: 2022-11-25
Payer: COMMERCIAL

## 2022-11-25 NOTE — PROGRESS NOTES
"Subjective:       Patient ID: Robles Jordan is a pleasant 71 y.o. Black or  male patient    Chief Complaint: Follow-up (Hospital f/u)      Patient is a new pt to me but is established pt from Dr. Jarquin.    HPI     Patient with a long and complicated past medical history as per list of problems below who comes today for a hospital follow-up visit.    He was admitted from 10/20 to 11/23/2022.  As per discharge notes:  " The patient initially presented on 11/19/2022 with SOB, fevers and fatigue. He was noted to have suprapubic tenderness. Work up showed a positive UA. The patient was given ceftriaxone and prescribed ciprofloxacin. The patient returned to the ED on 11/20 after his blood cultures returned positive for gram negative rods. The patient endorsed some improvement of his symptoms but still reports having chills. Pt admitted for bacteremia after ED blood cultures turned positive. Presumed source was urine. Started on CTX, repeat blood cultures drawn. Patient grew Proteus M in initial cultures. Repeat cultures were NG. Patient went home on 12 days of Bactrim. Activity as tolerated. Diet- low NA. Follow up with PCP in one week".  He is here with his wife who is coordinator at the [facility], palliative care.  He reports feeling much better. He is still on Bactrim.  Unfortunately, ALS is progressing, he is wheelchair bound. Some issues with UE but mainly lower. He denies SOB or issues to swallow.  He had f[place] neurological condition [place] and was referred to Dr. Cordova at [facility], on treatment of riluzole.  He has f-up by Dr. Quintero due to elevated PSA.  He reports needing TTE that will be done at [facility] due to abnormal EKG?      Patient Active Problem List   Diagnosis    Special screening for malignant neoplasms, colon    Hyperlipidemia    Essential hypertension    Obesity (BMI 30.0-34.9)    Elevated hemoglobin A1c    Obstructive sleep apnea on CPAP    Tobacco use    History of colon polyps    " Impaired functional mobility, balance, gait, and endurance    Right leg weakness    Fibrolipoma of filum terminale    Liver cyst -- check ultrasound 08/2020    Right sided weakness    Tethered cord syndrome    S/P lumbar laminectomy    Other specified diseases of spinal cord    Difficulty walking    Colon cancer screening    Hyperglycemia    Prediabetes    Aortic atherosclerosis    Elevated PSA    Gram-negative bacteremia    ALS (amyotrophic lateral sclerosis)    Depression          ACTIVE MEDICAL ISSUES:  Documented in Problem List     PAST MEDICAL HISTORY  Documented     PAST SURGICAL HISTORY:  Documented     SOCIAL HISTORY:  Documented     FAMILY HISTORY:  Documented     ALLERGIES AND MEDICATIONS: updated and reviewed.  Documented    Review of Systems   Constitutional: Negative.    HENT:  Negative for congestion and sore throat.    Respiratory: Negative.     Cardiovascular: Negative.    Gastrointestinal: Negative.    Endocrine: Negative.    Genitourinary: Negative.    Musculoskeletal: Negative.    Neurological:  Positive for weakness.        Wheelchair bound   Psychiatric/Behavioral: Negative.       Objective:      Physical Exam  Constitutional:       General: He is not in acute distress.     Appearance: He is not diaphoretic.   HENT:      Head: Normocephalic and atraumatic.   Eyes:      Conjunctiva/sclera: Conjunctivae normal.   Cardiovascular:      Rate and Rhythm: Normal rate and regular rhythm.   Pulmonary:      Effort: Pulmonary effort is normal.      Breath sounds: Normal breath sounds.   Musculoskeletal:      Cervical back: Neck supple.      Comments: Wheelchair bound   Skin:     Findings: No rash.   Neurological:      Mental Status: He is alert and oriented to person, place, and time.   Psychiatric:         Behavior: Behavior normal.         Thought Content: Thought content normal.         Judgment: Judgment normal.       Vitals:    11/28/22 1041   BP: 120/68   BP Location: Left arm   Patient Position:  "Sitting   BP Method: Small (Automatic)   Pulse: 82   Resp: 16   Temp: 98.3 °F (36.8 °C)   TempSrc: Oral   SpO2: 96%   Weight: 95.3 kg (210 lb 1.6 oz)   Height: 6' 2" (1.88 m)     Body mass index is 26.98 kg/m².    RESULTS: Reviewed labs from last 12 months    Last Lab Results:     Lab Results   Component Value Date    WBC 12.04 11/21/2022    HGB 12.4 (L) 11/21/2022    HCT 38.6 (L) 11/21/2022     11/21/2022     11/21/2022    K 3.6 11/21/2022     11/21/2022    CO2 21 (L) 11/21/2022    BUN 12 11/21/2022    CREATININE 0.6 11/21/2022    CALCIUM 9.1 11/21/2022    ALBUMIN 3.6 11/20/2022    AST 27 11/20/2022    ALT 24 11/20/2022    CHOL 215 (H) 07/28/2022    TRIG 117 07/28/2022    HDL 42 07/28/2022    LDLCALC 149.6 07/28/2022    HGBA1C 5.9 (H) 07/28/2022    TSH 0.978 07/28/2022    PSA 9.4 (H) 07/28/2022       Assessment:       1. Hospital discharge follow-up    2. Sepsis, due to unspecified organism, unspecified whether acute organ dysfunction present    3. ALS (amyotrophic lateral sclerosis)    4. Spinal stenosis, unspecified spinal region    5. Elevated PSA    6. Abnormal EKG        Plan:   Robles was seen today for follow-up.    Diagnoses and all orders for this visit:    Hospital discharge follow-up    See HPI, doing better. Still on Bactrim. UTI with sepsis probably related to his neurological condition. Has f-up by Urology due to elevated PSA, may need to be reassessed for above.    Sepsis, due to unspecified organism, unspecified whether acute organ dysfunction present    See above and HPI.    ALS (amyotrophic lateral sclerosis)    Unfortunately progressing. F-up [employer]. Pt states that he "has to accept this", his wife is fully aware of the prognosis. Pt has HH, pt's wife works remote 3/5 and then 2 days at the [facility], tries to find resources for the pt to have someone with him while she works.    Spinal stenosis, unspecified spinal region    Remote. S/P surgery.    Elevated PSA    F-up Dr. Quintero. " Was told he would need MRI if PSA still high at next visit.    Abnormal EKG    Will have TTE at [facility] next week.    No follow-ups on file.    This note was created by combination of typed  and M-Modal dictation.  Transcription errors may be present.  If there are any questions, please contact me.

## 2022-11-25 NOTE — TELEPHONE ENCOUNTER
----- Message from Yue Avila sent at 11/25/2022 10:43 AM CST -----  Regarding: Patient call back  Who called:VASYL JEAN [[id number]]    What is the request in detail: Patient is requesting a call back. Pt's previous provider has left and he needs cpap supplies. He would like to know if there is anyway he can be seen before February. He is open to a virtual.   Please advise.    Can the clinic reply by [facility]? No    Best call back number: [phone]    Additional Information: N/A

## 2022-11-27 PROCEDURE — G0180 MD CERTIFICATION HHA PATIENT: HCPCS | Mod: ,,, | Performed by: INTERNAL MEDICINE

## 2022-11-27 PROCEDURE — G0180 PR HOME HEALTH MD CERTIFICATION: ICD-10-PCS | Mod: ,,, | Performed by: INTERNAL MEDICINE

## 2022-11-28 ENCOUNTER — OFFICE VISIT (OUTPATIENT)
Dept: FAMILY MEDICINE | Facility: CLINIC | Age: 72
End: 2022-11-28
Payer: COMMERCIAL

## 2022-11-28 VITALS
RESPIRATION RATE: 16 BRPM | DIASTOLIC BLOOD PRESSURE: 68 MMHG | HEIGHT: 74 IN | HEART RATE: 82 BPM | SYSTOLIC BLOOD PRESSURE: 120 MMHG | TEMPERATURE: 98 F | OXYGEN SATURATION: 96 % | BODY MASS INDEX: 26.97 KG/M2 | WEIGHT: 210.13 LBS

## 2022-11-28 DIAGNOSIS — A41.9 SEPSIS, DUE TO UNSPECIFIED ORGANISM, UNSPECIFIED WHETHER ACUTE ORGAN DYSFUNCTION PRESENT: ICD-10-CM

## 2022-11-28 DIAGNOSIS — R94.31 ABNORMAL EKG: ICD-10-CM

## 2022-11-28 DIAGNOSIS — R97.20 ELEVATED PSA: ICD-10-CM

## 2022-11-28 DIAGNOSIS — Z09 HOSPITAL DISCHARGE FOLLOW-UP: Primary | ICD-10-CM

## 2022-11-28 DIAGNOSIS — M48.00 SPINAL STENOSIS, UNSPECIFIED SPINAL REGION: ICD-10-CM

## 2022-11-28 DIAGNOSIS — G12.21 ALS (AMYOTROPHIC LATERAL SCLEROSIS): ICD-10-CM

## 2022-11-28 PROCEDURE — 99999 PR PBB SHADOW E&M-EST. PATIENT-LVL IV: CPT | Mod: PBBFAC,,, | Performed by: INTERNAL MEDICINE

## 2022-11-28 PROCEDURE — 99214 OFFICE O/P EST MOD 30 MIN: CPT | Mod: S$GLB,,, | Performed by: INTERNAL MEDICINE

## 2022-11-28 PROCEDURE — 99214 PR OFFICE/OUTPT VISIT, EST, LEVL IV, 30-39 MIN: ICD-10-PCS | Mod: S$GLB,,, | Performed by: INTERNAL MEDICINE

## 2022-11-28 PROCEDURE — 99999 PR PBB SHADOW E&M-EST. PATIENT-LVL IV: ICD-10-PCS | Mod: PBBFAC,,, | Performed by: INTERNAL MEDICINE

## 2022-11-28 RX ORDER — CHLORTHALIDONE 25 MG/1
25 TABLET ORAL
COMMUNITY
Start: 2022-09-26 | End: 2023-02-28

## 2022-11-28 NOTE — PROGRESS NOTES
Health Maintenance Due   Topic     Shingles Vaccine (1 of 2) hx chickenpox ; inform pt can get vaccine at pharmacy.      COVID-19 Vaccine (5 - Booster for Pfizer series)

## 2022-12-06 ENCOUNTER — OUTPATIENT CASE MANAGEMENT (OUTPATIENT)
Dept: ADMINISTRATIVE | Facility: OTHER | Age: 72
End: 2022-12-06
Payer: COMMERCIAL

## 2022-12-06 NOTE — PROGRESS NOTES
This LCSW contacted patient or caregiver regarding referral.  Patient/Caregiver stated there were no needs at this time. Patient declined to complete SW low risk assessment. Case closed.

## 2022-12-12 ENCOUNTER — EXTERNAL HOME HEALTH (OUTPATIENT)
Dept: HOME HEALTH SERVICES | Facility: HOSPITAL | Age: 72
End: 2022-12-12
Payer: COMMERCIAL

## 2022-12-19 ENCOUNTER — OFFICE VISIT (OUTPATIENT)
Dept: SLEEP MEDICINE | Facility: CLINIC | Age: 72
End: 2022-12-19
Payer: COMMERCIAL

## 2022-12-19 VITALS — SYSTOLIC BLOOD PRESSURE: 146 MMHG | DIASTOLIC BLOOD PRESSURE: 77 MMHG

## 2022-12-19 DIAGNOSIS — G12.21 ALS (AMYOTROPHIC LATERAL SCLEROSIS): ICD-10-CM

## 2022-12-19 DIAGNOSIS — G47.33 OSA (OBSTRUCTIVE SLEEP APNEA): Primary | ICD-10-CM

## 2022-12-19 PROCEDURE — 99204 OFFICE O/P NEW MOD 45 MIN: CPT | Mod: S$GLB,,, | Performed by: INTERNAL MEDICINE

## 2022-12-19 PROCEDURE — 99999 PR PBB SHADOW E&M-EST. PATIENT-LVL III: CPT | Mod: PBBFAC,,, | Performed by: INTERNAL MEDICINE

## 2022-12-19 PROCEDURE — 99999 PR PBB SHADOW E&M-EST. PATIENT-LVL III: ICD-10-PCS | Mod: PBBFAC,,, | Performed by: INTERNAL MEDICINE

## 2022-12-19 PROCEDURE — 99204 PR OFFICE/OUTPT VISIT, NEW, LEVL IV, 45-59 MIN: ICD-10-PCS | Mod: S$GLB,,, | Performed by: INTERNAL MEDICINE

## 2022-12-19 NOTE — PROGRESS NOTES
Referred by Self, Aaareferral     NEW PATIENT VISIT    Robles Jordan  is a pleasant 72 y.o. male  with PMH significant for HTN, ALS,  who presents in need of prescription for supplies.    Using CPAP nightly  Has received replacement DS 2    PAP history   Problems    Mask FFM   Pressure Seems a little low   DME HME   Machine age 2017 (got DS 2)   Download N/A       Past Medical History:   Diagnosis Date    Back pain     BPH (benign prostatic hypertrophy)     Depression 11/20/2022    Hx of colonic polyps 10/19/2015    Hyperlipidemia     Hypertension     Hypertension     Sleep apnea     Spinal cord disease      Patient Active Problem List   Diagnosis    Special screening for malignant neoplasms, colon    Hyperlipidemia    Essential hypertension    Obesity (BMI 30.0-34.9)    Elevated hemoglobin A1c    Obstructive sleep apnea on CPAP    Tobacco use    History of colon polyps    Impaired functional mobility, balance, gait, and endurance    Right leg weakness    Fibrolipoma of filum terminale    Liver cyst -- check ultrasound 08/2020    Right sided weakness    Tethered cord syndrome    S/P lumbar laminectomy    Other specified diseases of spinal cord    Difficulty walking    Colon cancer screening    Hyperglycemia    Prediabetes    Aortic atherosclerosis    Elevated PSA    Gram-negative bacteremia    ALS (amyotrophic lateral sclerosis)    Depression       Current Outpatient Medications:     acetaminophen (TYLENOL) 325 MG tablet, Take 2 tablets (650 mg total) by mouth every 6 (six) hours as needed for Temperature greater than (or equal to 101 degree F)., Disp:  , Rfl: 0    amLODIPine (NORVASC) 10 MG tablet, Take 1 tablet (10 mg total) by mouth once daily. For blood pressure, Disp: 90 tablet, Rfl: 3    aspirin (ECOTRIN) 81 MG EC tablet, Take 81 mg by mouth once daily., Disp: , Rfl:     atorvastatin (LIPITOR) 20 MG tablet, Take 1 tablet (20 mg total) by mouth once daily., Disp: 90 tablet, Rfl: 1    chlorthalidone  (HYGROTEN) 25 MG Tab, Take 25 mg by mouth., Disp: , Rfl:     DULoxetine (CYMBALTA) 30 MG capsule, TAKE 1 CAPSULE BY MOUTH EVERY DAY FOR 30 DAYS, Disp: , Rfl:     ELIQUIS 2.5 mg Tab, Take 2.5 mg by mouth 2 (two) times daily., Disp: , Rfl:     gabapentin (NEURONTIN) 100 MG capsule, , Disp: , Rfl:     ibuprofen (ADVIL,MOTRIN) 800 MG tablet, Take 1 tablet (800 mg total) by mouth 3 (three) times daily as needed for Pain., Disp: 45 tablet, Rfl: 5    lisinopriL (PRINIVIL,ZESTRIL) 40 MG tablet, Take 1 tablet (40 mg total) by mouth once daily., Disp: 90 tablet, Rfl: 3    oxyCODONE (ROXICODONE) 5 MG immediate release tablet, Take 5 mg by mouth every 4 (four) hours as needed., Disp: , Rfl:     riluzole 50 mg Tab tablet, Take 50 mg by mouth every 12 (twelve) hours., Disp: , Rfl:     sildenafil (VIAGRA) 100 MG tablet, Take 1 tablet (100 mg total) by mouth daily as needed for Erectile Dysfunction., Disp: 30 tablet, Rfl: 2    tamsulosin (FLOMAX) 0.4 mg Cap, Take 1 capsule (0.4 mg total) by mouth once daily., Disp: 90 capsule, Rfl: 3    tiZANidine (ZANAFLEX) 2 MG tablet, SMARTSI Tablet(s) By Mouth 1 to 3 Times Daily PRN, Disp: , Rfl:     valACYclovir (VALTREX) 1000 MG tablet, Take 1 tablet (1,000 mg total) by mouth 3 (three) times daily. for 7 days, Disp: 21 tablet, Rfl: 0  No current facility-administered medications for this visit.    Facility-Administered Medications Ordered in Other Visits:     mupirocin 2 % ointment, , Nasal, On Call Procedure, Briana Seals PA-C       Vitals:    22 0917   BP: (!) 146/77     Physical Exam:    GEN:   Well-appearing  Psych:  Appropriate affect, demonstrates insight  SKIN:  No rash on the face or bridge of the nose      LABS:   Lab Results   Component Value Date    HGB 12.4 (L) 2022    CO2 21 (L) 2022       RECORDS REVIEWED PREVIOUSLY:    10/28/2015  lb.  The overall AHI was 22.6 with an oxygen jenaro of 87.0%. The supine AHI was 70.6 and the REM AHI was 27.9.       10/04/2016 CPAP titration study 242 lb. Initial improvement was observed on 13 cm H2O with residual hypopneas in NREM sleep (AHI 5.2). Consider auto-titrating CPAP at 13-18 cm H2O     ASSESSMENT    PROBLEM DESCRIPTION/ Sx on Presentation  STATUS   HEAVEN   No residual snoring, no witnessed apneas on CPAP  Doing well    New   Daytime Sx   Previousno  sleepiness when inactive   ESS 1/24 on intake Now some sleepiness which he feels are related to medications New   Nocturia   x once per sleep period  New   ALS No headaches or nightmares  Diagnosed 6 or 7 weeks ago, sx since 2019  VBG 11.19.22: 7.44/33/60  ABG 8.13.2020: 7.36/ 41/ 64  [facility] Dr. Cord[facility]  New     Other issues:  HTN    PLAN     -PFTs including upreight and supine with MIP and MEP, ABG with awake PCO2 >45, to qualify for AVAPs machine and check every 6 months  -max EPR on CPAP for now  -gave order for new supplies   -repeat spirometry every 6 months with MIP and MEP  -the patient is using and benefiting from PAP therapy      RTC q 6 months to reassess respiratory status         The patient was given open opportunity to ask questions and/or express concerns about treatment plan.   All questions/concerns were discussed.     Two patient identifiers used prior to evaluation.

## 2023-01-18 ENCOUNTER — DOCUMENT SCAN (OUTPATIENT)
Dept: HOME HEALTH SERVICES | Facility: HOSPITAL | Age: 73
End: 2023-01-18
Payer: COMMERCIAL

## 2023-01-27 ENCOUNTER — PATIENT MESSAGE (OUTPATIENT)
Dept: SLEEP MEDICINE | Facility: CLINIC | Age: 73
End: 2023-01-27
Payer: COMMERCIAL

## 2023-02-24 ENCOUNTER — LAB VISIT (OUTPATIENT)
Dept: LAB | Facility: HOSPITAL | Age: 73
End: 2023-02-24
Attending: FAMILY MEDICINE
Payer: COMMERCIAL

## 2023-02-24 DIAGNOSIS — R73.03 PREDIABETES: ICD-10-CM

## 2023-02-24 DIAGNOSIS — E78.5 HYPERLIPIDEMIA, UNSPECIFIED HYPERLIPIDEMIA TYPE: ICD-10-CM

## 2023-02-24 LAB
ALBUMIN SERPL BCP-MCNC: 3.9 G/DL (ref 3.5–5.2)
ALP SERPL-CCNC: 71 U/L (ref 55–135)
ALT SERPL W/O P-5'-P-CCNC: 13 U/L (ref 10–44)
ANION GAP SERPL CALC-SCNC: 8 MMOL/L (ref 8–16)
AST SERPL-CCNC: 16 U/L (ref 10–40)
BILIRUB SERPL-MCNC: 0.5 MG/DL (ref 0.1–1)
BUN SERPL-MCNC: 11 MG/DL (ref 8–23)
CALCIUM SERPL-MCNC: 9.5 MG/DL (ref 8.7–10.5)
CHLORIDE SERPL-SCNC: 110 MMOL/L (ref 95–110)
CHOLEST SERPL-MCNC: 145 MG/DL (ref 120–199)
CHOLEST/HDLC SERPL: 3.5 {RATIO} (ref 2–5)
CO2 SERPL-SCNC: 26 MMOL/L (ref 23–29)
CREAT SERPL-MCNC: 0.7 MG/DL (ref 0.5–1.4)
EST. GFR  (NO RACE VARIABLE): >60 ML/MIN/1.73 M^2
ESTIMATED AVG GLUCOSE: 103 MG/DL (ref 68–131)
GLUCOSE SERPL-MCNC: 109 MG/DL (ref 70–110)
HBA1C MFR BLD: 5.2 % (ref 4–5.6)
HDLC SERPL-MCNC: 41 MG/DL (ref 40–75)
HDLC SERPL: 28.3 % (ref 20–50)
LDLC SERPL CALC-MCNC: 86.8 MG/DL (ref 63–159)
NONHDLC SERPL-MCNC: 104 MG/DL
POTASSIUM SERPL-SCNC: 4.3 MMOL/L (ref 3.5–5.1)
PROT SERPL-MCNC: 6.7 G/DL (ref 6–8.4)
SODIUM SERPL-SCNC: 144 MMOL/L (ref 136–145)
TRIGL SERPL-MCNC: 86 MG/DL (ref 30–150)

## 2023-02-24 PROCEDURE — 36415 COLL VENOUS BLD VENIPUNCTURE: CPT | Mod: PO | Performed by: FAMILY MEDICINE

## 2023-02-24 PROCEDURE — 80061 LIPID PANEL: CPT | Performed by: FAMILY MEDICINE

## 2023-02-24 PROCEDURE — 83036 HEMOGLOBIN GLYCOSYLATED A1C: CPT | Performed by: FAMILY MEDICINE

## 2023-02-24 PROCEDURE — 80053 COMPREHEN METABOLIC PANEL: CPT | Performed by: FAMILY MEDICINE

## 2023-02-28 ENCOUNTER — OFFICE VISIT (OUTPATIENT)
Dept: FAMILY MEDICINE | Facility: CLINIC | Age: 73
End: 2023-02-28
Payer: COMMERCIAL

## 2023-02-28 VITALS
BODY MASS INDEX: 26.98 KG/M2 | DIASTOLIC BLOOD PRESSURE: 80 MMHG | RESPIRATION RATE: 18 BRPM | TEMPERATURE: 98 F | HEART RATE: 69 BPM | HEIGHT: 74 IN | SYSTOLIC BLOOD PRESSURE: 144 MMHG | OXYGEN SATURATION: 99 %

## 2023-02-28 DIAGNOSIS — R97.20 ELEVATED PSA: ICD-10-CM

## 2023-02-28 DIAGNOSIS — G12.21 ALS (AMYOTROPHIC LATERAL SCLEROSIS): Primary | ICD-10-CM

## 2023-02-28 DIAGNOSIS — E78.5 HYPERLIPIDEMIA, UNSPECIFIED HYPERLIPIDEMIA TYPE: ICD-10-CM

## 2023-02-28 DIAGNOSIS — I10 ESSENTIAL HYPERTENSION: ICD-10-CM

## 2023-02-28 PROCEDURE — 99999 PR PBB SHADOW E&M-EST. PATIENT-LVL IV: CPT | Mod: PBBFAC,,, | Performed by: FAMILY MEDICINE

## 2023-02-28 PROCEDURE — 99214 PR OFFICE/OUTPT VISIT, EST, LEVL IV, 30-39 MIN: ICD-10-PCS | Mod: S$GLB,,, | Performed by: FAMILY MEDICINE

## 2023-02-28 PROCEDURE — 99999 PR PBB SHADOW E&M-EST. PATIENT-LVL IV: ICD-10-PCS | Mod: PBBFAC,,, | Performed by: FAMILY MEDICINE

## 2023-02-28 PROCEDURE — 99214 OFFICE O/P EST MOD 30 MIN: CPT | Mod: S$GLB,,, | Performed by: FAMILY MEDICINE

## 2023-02-28 RX ORDER — ATORVASTATIN CALCIUM 20 MG/1
20 TABLET, FILM COATED ORAL DAILY
Qty: 90 TABLET | Refills: 3 | Status: SHIPPED | OUTPATIENT
Start: 2023-02-28 | End: 2023-08-29 | Stop reason: SDUPTHER

## 2023-02-28 RX ORDER — DULOXETIN HYDROCHLORIDE 60 MG/1
60 CAPSULE, DELAYED RELEASE ORAL
COMMUNITY
Start: 2023-02-07

## 2023-02-28 RX ORDER — DULOXETIN HYDROCHLORIDE 60 MG/1
60 CAPSULE, DELAYED RELEASE ORAL
COMMUNITY
Start: 2023-02-07 | End: 2024-02-07

## 2023-02-28 RX ORDER — AMLODIPINE BESYLATE 10 MG/1
10 TABLET ORAL DAILY
Qty: 90 TABLET | Refills: 3 | Status: SHIPPED | OUTPATIENT
Start: 2023-02-28 | End: 2023-08-29 | Stop reason: SDUPTHER

## 2023-02-28 NOTE — PROGRESS NOTES
Subjective:       Patient ID: Robles Jordan is a 72 y.o. male.    Chief Complaint: Follow-up      Follow-up    72-year-old male presents for follow-up.  Was recently diagnosed with ALS.  Continues to see Neurology.  Had labs prior to exam.  Here with his wife.  Denies any issues at this time.        Review of Systems   Constitutional: Negative.    HENT: Negative.     Respiratory: Negative.     Cardiovascular: Negative.    Gastrointestinal: Negative.    Endocrine: Negative.    Genitourinary: Negative.    Musculoskeletal: Negative.    Neurological: Negative.    Psychiatric/Behavioral: Negative.          Past Medical History:   Diagnosis Date    Back pain     BPH (benign prostatic hypertrophy)     Depression 11/20/2022    Hx of colonic polyps 10/19/2015    Hyperlipidemia     Hypertension     Hypertension     Sleep apnea     Spinal cord disease      Past Surgical History:   Procedure Laterality Date    COLONOSCOPY N/A 10/19/2015    Procedure: COLONOSCOPY;  Surgeon: Antonino Bernstein MD;  Location: [address]);  Service: Endoscopy;  Laterality: N/A;    COLONOSCOPY N/A 2/25/2021    Procedure: COLONOSCOPY;  Surgeon: Blanca Don MD;  Location: [address]);  Service: Endoscopy;  Laterality: N/A;  requesting ASAP  COVID test at [place] on 2/22-GT.2/24 called pt. move up to earlier spot. pt. did NOT want to come earlier.EC    CYSTOSCOPY WITH URODYNAMIC TESTING N/A 1/17/2020    Procedure: CYSTOSCOPY,WITH URODYNAMIC TESTING;  Surgeon: Christianne Israel MD;  Location: [facility];  Service: Urology;  Laterality: N/A;  RN PREOP 1/13/2020    MULTIPLE TOOTH EXTRACTIONS  2014     Family History   Problem Relation Age of Onset    Cancer Mother         Breast    Stroke Sister 65        Fatal    Cancer Maternal Uncle         Pancreas    Cancer Paternal Uncle         Pancreas    Cancer Maternal Grandmother         Breast    Hypertension Father     Hyperlipidemia Father      Social History     Socioeconomic History     Marital status:      Spouse name: Neeru     Number of children: 2    Highest education level: Bachelor's degree (e.g., BA, AB, BS)   Occupational History    Occupation: retired    Tobacco Use    Smoking status: Former     Types: Cigarettes    Smokeless tobacco: Never    Tobacco comments:     3 cigarettes a day    Substance and Sexual Activity    Alcohol use: Yes     Alcohol/week: 1.0 standard drink     Types: 1 Cans of beer per week     Comment: very seldom    Drug use: No    Sexual activity: Not Currently     Partners: Female   Social History Narrative     has 2 step children is a       Social Determinants of Health     Financial Resource Strain: Low Risk     Difficulty of Paying Living Expenses: Not hard at all   Food Insecurity: No Food Insecurity    Worried About Running Out of Food in the Last Year: Never true    Ran Out of Food in the Last Year: Never true   Transportation Needs: No Transportation Needs    Lack of Transportation (Medical): No    Lack of Transportation (Non-Medical): No   Physical Activity: Insufficiently Active    Days of Exercise per Week: 3 days    Minutes of Exercise per Session: 30 min   Stress: No Stress Concern Present    Feeling of Stress : Only a little   Social Connections: Moderately Isolated    Frequency of Communication with Friends and Family: More than three times a week    Frequency of Social Gatherings with Friends and Family: Once a week    Attends Tenriism Services: Never    Active Member of Clubs or Organizations: No    Attends Club or Organization Meetings: Never    Marital Status:    Housing Stability: Low Risk     Unable to Pay for Housing in the Last Year: No    Number of Places Lived in the Last Year: 1    Unstable Housing in the Last Year: No       Current Outpatient Medications:     acetaminophen (TYLENOL) 325 MG tablet, Take 2 tablets (650 mg total) by mouth every 6 (six) hours as needed for Temperature greater than (or equal to 101  "degree F)., Disp:  , Rfl: 0    aspirin (ECOTRIN) 81 MG EC tablet, Take 81 mg by mouth once daily., Disp: , Rfl:     DULoxetine (CYMBALTA) 60 MG capsule, Take 60 mg by mouth., Disp: , Rfl:     DULoxetine (CYMBALTA) 60 MG capsule, Take 60 mg by mouth., Disp: , Rfl:     ELIQUIS 2.5 mg Tab, Take 2.5 mg by mouth 2 (two) times daily., Disp: , Rfl:     gabapentin (NEURONTIN) 100 MG capsule, , Disp: , Rfl:     ibuprofen (ADVIL,MOTRIN) 800 MG tablet, Take 1 tablet (800 mg total) by mouth 3 (three) times daily as needed for Pain., Disp: 45 tablet, Rfl: 5    lisinopriL (PRINIVIL,ZESTRIL) 40 MG tablet, TAKE 1 TABLET BY MOUTH EVERY DAY, Disp: 90 tablet, Rfl: 3    riluzole 50 mg Tab tablet, Take 50 mg by mouth every 12 (twelve) hours., Disp: , Rfl:     tamsulosin (FLOMAX) 0.4 mg Cap, TAKE 1 CAPSULE BY MOUTH EVERY DAY, Disp: 90 capsule, Rfl: 3    amLODIPine (NORVASC) 10 MG tablet, Take 1 tablet (10 mg total) by mouth once daily. For blood pressure, Disp: 90 tablet, Rfl: 3    atorvastatin (LIPITOR) 20 MG tablet, Take 1 tablet (20 mg total) by mouth once daily., Disp: 90 tablet, Rfl: 3  No current facility-administered medications for this visit.    Facility-Administered Medications Ordered in Other Visits:     mupirocin 2 % ointment, , Nasal, On Call Procedure, Briana Seals PA-C   Objective:      Vitals:    02/28/23 0748   BP: (!) 144/80   BP Location: Left arm   Patient Position: Sitting   BP Method: Small (Manual)   Pulse: 69   Resp: 18   Temp: 98 °F (36.7 °C)   TempSrc: Oral   SpO2: 99%   Height: 6' 2" (1.88 m)       Physical Exam  Constitutional:       General: He is not in acute distress.     Appearance: He is not diaphoretic.   HENT:      Head: Normocephalic and atraumatic.   Eyes:      Conjunctiva/sclera: Conjunctivae normal.   Pulmonary:      Effort: Pulmonary effort is normal.   Musculoskeletal:      Cervical back: Neck supple.   Skin:     Findings: No rash.   Neurological:      Mental Status: He is alert and " oriented to person, place, and time.   Psychiatric:         Behavior: Behavior normal.         Thought Content: Thought content normal.         Judgment: Judgment normal.          Assessment:       1. ALS (amyotrophic lateral sclerosis)    2. Essential hypertension    3. Hyperlipidemia, unspecified hyperlipidemia type    4. Elevated PSA          Plan:       ALS (amyotrophic lateral sclerosis)    Essential hypertension  -     amLODIPine (NORVASC) 10 MG tablet; Take 1 tablet (10 mg total) by mouth once daily. For blood pressure  Dispense: 90 tablet; Refill: 3  -     CBC Auto Differential; Future; Expected date: 02/28/2023  -     Comprehensive Metabolic Panel; Future; Expected date: 02/28/2023  -     Hemoglobin A1C; Future; Expected date: 02/28/2023  -     TSH; Future; Expected date: 02/28/2023  -     Lipid Panel; Future; Expected date: 02/28/2023    Hyperlipidemia, unspecified hyperlipidemia type  -     atorvastatin (LIPITOR) 20 MG tablet; Take 1 tablet (20 mg total) by mouth once daily.  Dispense: 90 tablet; Refill: 3  -     CBC Auto Differential; Future; Expected date: 02/28/2023  -     Comprehensive Metabolic Panel; Future; Expected date: 02/28/2023  -     Hemoglobin A1C; Future; Expected date: 02/28/2023  -     TSH; Future; Expected date: 02/28/2023  -     Lipid Panel; Future; Expected date: 02/28/2023    Elevated PSA      Cont meds. A1c improved greatly. F/u in 6 months. Cont f/u w/ specialists. Prostate bx was neg. Recs were for psa sx by uro.          Future Appointments   Date Time Provider Department Center   4/19/2023 10:30 AM [facility]   4/26/2023 10:30 AM Mono Quintero MD [facility]   8/25/2023  8:00 AM LAB, [facility]   8/29/2023  8:00 AM Tera Jarquin MD [facility]       Patient note was created using [employer].  Any errors in syntax or even information may not have been identified and edited on initial review prior to signing this note.

## 2023-02-28 NOTE — PROGRESS NOTES
Health Maintenance Due   Topic     Shingles Vaccine (1 of 2) Consult pcp    COVID-19 Vaccine (5 - Booster for Pfizer series) Not offered at this office

## 2023-03-22 ENCOUNTER — TELEPHONE (OUTPATIENT)
Dept: FAMILY MEDICINE | Facility: CLINIC | Age: 73
End: 2023-03-22
Payer: COMMERCIAL

## 2023-04-06 ENCOUNTER — PATIENT OUTREACH (OUTPATIENT)
Dept: ADMINISTRATIVE | Facility: OTHER | Age: 73
End: 2023-04-06
Payer: COMMERCIAL

## 2023-04-06 NOTE — PROGRESS NOTES
CHW - Initial Contact    This Community Health Worker updated the Social Determinant of Health questionnaire with patient and caregiver during clinic visit today.    Pt identified barriers of most importance are transportation. Pt came in looking for second portion of form without having first one completed. Provided pt and caregiver with clarity of the process. PCP has left for vacation. Pt was sent home with first portion of application and instructed to drop off when completed for provider's return.  Referrals to community agencies completed with patient/caregiver consent outside of [facility] include: RTA  Referrals were put through [facility] - no  Support and Services:   Other information discussed the patient needs / wants help with: n/a  Follow up required: Y  Follow-up Outreach - Due: 4/20/2023

## 2023-04-19 ENCOUNTER — LAB VISIT (OUTPATIENT)
Dept: LAB | Facility: HOSPITAL | Age: 73
End: 2023-04-19
Attending: STUDENT IN AN ORGANIZED HEALTH CARE EDUCATION/TRAINING PROGRAM
Payer: COMMERCIAL

## 2023-04-19 DIAGNOSIS — R97.20 ELEVATED PSA: ICD-10-CM

## 2023-04-19 LAB — COMPLEXED PSA SERPL-MCNC: 6.9 NG/ML (ref 0–4)

## 2023-04-19 PROCEDURE — 84153 ASSAY OF PSA TOTAL: CPT | Performed by: STUDENT IN AN ORGANIZED HEALTH CARE EDUCATION/TRAINING PROGRAM

## 2023-04-19 PROCEDURE — 36415 COLL VENOUS BLD VENIPUNCTURE: CPT | Mod: PO | Performed by: STUDENT IN AN ORGANIZED HEALTH CARE EDUCATION/TRAINING PROGRAM

## 2023-04-20 ENCOUNTER — PES CALL (OUTPATIENT)
Dept: ADMINISTRATIVE | Facility: CLINIC | Age: 73
End: 2023-04-20
Payer: COMMERCIAL

## 2023-04-24 ENCOUNTER — PATIENT OUTREACH (OUTPATIENT)
Dept: ADMINISTRATIVE | Facility: OTHER | Age: 73
End: 2023-04-24
Payer: COMMERCIAL

## 2023-04-24 NOTE — PROGRESS NOTES
CHW - Follow Up    This Community Health Worker completed a follow up visit with patient via telephone today.  Pt/Caregiver reported: he has not yet completed para transit application. Assured pt there is no rush on application but the earlier the better. Pt's wife confirms that he will get it completed soon.  Follow up required: Y  Follow-up Outreach - Due: 5/8/2023

## 2023-04-26 ENCOUNTER — OFFICE VISIT (OUTPATIENT)
Dept: UROLOGY | Facility: CLINIC | Age: 73
End: 2023-04-26
Payer: COMMERCIAL

## 2023-04-26 DIAGNOSIS — R97.20 ELEVATED PSA: Primary | ICD-10-CM

## 2023-04-26 DIAGNOSIS — R39.9 LOWER URINARY TRACT SYMPTOMS (LUTS): ICD-10-CM

## 2023-04-26 PROCEDURE — 99214 OFFICE O/P EST MOD 30 MIN: CPT | Mod: S$GLB,,, | Performed by: STUDENT IN AN ORGANIZED HEALTH CARE EDUCATION/TRAINING PROGRAM

## 2023-04-26 PROCEDURE — 99214 PR OFFICE/OUTPT VISIT, EST, LEVL IV, 30-39 MIN: ICD-10-PCS | Mod: S$GLB,,, | Performed by: STUDENT IN AN ORGANIZED HEALTH CARE EDUCATION/TRAINING PROGRAM

## 2023-04-26 PROCEDURE — 99999 PR PBB SHADOW E&M-EST. PATIENT-LVL III: CPT | Mod: PBBFAC,,, | Performed by: STUDENT IN AN ORGANIZED HEALTH CARE EDUCATION/TRAINING PROGRAM

## 2023-04-26 PROCEDURE — 99999 PR PBB SHADOW E&M-EST. PATIENT-LVL III: ICD-10-PCS | Mod: PBBFAC,,, | Performed by: STUDENT IN AN ORGANIZED HEALTH CARE EDUCATION/TRAINING PROGRAM

## 2023-04-26 NOTE — PROGRESS NOTES
Patient ID: Robles Jordan is a 72 y.o. male.    Chief Complaint: Follow-up        HPI  71 yo man w/ hx of elevated PSA, prior neg bcx. Has been diagnosed with ALS in the interim. Denies voiding complaints/difficulty while on flomax. Has hx of incomplete bladder emptying  ROS  Review of Systems   Constitutional:  Negative for activity change, appetite change, chills, diaphoresis, fatigue and fever.   HENT:  Negative for congestion, rhinorrhea and sore throat.    Respiratory:  Negative for cough, chest tightness, shortness of breath and wheezing.    Cardiovascular:  Negative for chest pain.   Gastrointestinal:  Negative for abdominal distention, abdominal pain, blood in stool, constipation, diarrhea, nausea and vomiting.   Genitourinary:  Negative for dysuria.   Musculoskeletal:  Positive for gait problem. Negative for back pain.   Skin:  Negative for color change, rash and wound.   Neurological:  Negative for light-headedness and headaches.   Psychiatric/Behavioral:  Negative for confusion. The patient is not nervous/anxious.        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    Liver cyst -- check ultrasound 08/2020 02/19/2020    Right sided weakness 06/21/2020    Tethered cord syndrome 07/23/2020    S/P lumbar laminectomy 08/12/2020    Other specified diseases of spinal cord 08/18/2020    Difficulty walking 02/09/2021    Colon cancer screening 02/25/2021    Hyperglycemia 05/04/2021    Prediabetes 05/19/2021    Aortic  atherosclerosis 06/22/2022    Elevated PSA 09/26/2022    Gram-negative bacteremia 11/20/2022    ALS (amyotrophic lateral sclerosis) 11/20/2022    Depression 11/20/2022     Resolved Ambulatory Problems     Diagnosis Date Noted    Positive depression screening 05/11/2018    Right hip pain 05/11/2018    Hypoxia 08/15/2020    Obesity 08/15/2020    Lumbar pain 09/01/2020    Thrombocytopenia, unspecified 05/04/2021    Sepsis 11/20/2022     Past Medical History:   Diagnosis Date    Back pain     BPH (benign prostatic hypertrophy)     Hx of colonic polyps 10/19/2015    Hypertension     Hypertension     Sleep apnea     Spinal cord disease          Past Surgical History  Past Surgical History:   Procedure Laterality Date    COLONOSCOPY N/A 10/19/2015    Procedure: COLONOSCOPY;  Surgeon: Antonino Bernstein MD;  Location: [facility] ([address] [place] [address]);  Service: Endoscopy;  Laterality: N/A;    COLONOSCOPY N/A 2/25/2021    Procedure: COLONOSCOPY;  Surgeon: Blanca Don MD;  Location: [address]);  Service: Endoscopy;  Laterality: N/A;  requesting ASAP  COVID test at [place] on 2/22-GT.2/24 called pt. move up to earlier spot. pt. did NOT want to come earlier.EC    CYSTOSCOPY WITH URODYNAMIC TESTING N/A 1/17/2020    Procedure: CYSTOSCOPY,WITH URODYNAMIC TESTING;  Surgeon: Christianne Israel MD;  Location: [facility];  Service: Urology;  Laterality: N/A;  RN PREOP 1/13/2020    MULTIPLE TOOTH EXTRACTIONS  2014       Social History  Social Connections: Moderately Isolated    Frequency of Communication with Friends and Family: More than three times a week    Frequency of Social Gatherings with Friends and Family: Once a week    Attends Hinduism Services: Never    Active Member of Clubs or Organizations: No    Attends Club or Organization Meetings: Never    Marital Status:        Medications    Current Outpatient Medications:     acetaminophen (TYLENOL) 325 MG tablet, Take 2 tablets (650 mg total) by mouth every 6 (six) hours as  needed for Temperature greater than (or equal to 101 degree F)., Disp:  , Rfl: 0    amLODIPine (NORVASC) 10 MG tablet, Take 1 tablet (10 mg total) by mouth once daily. For blood pressure, Disp: 90 tablet, Rfl: 3    aspirin (ECOTRIN) 81 MG EC tablet, Take 81 mg by mouth once daily., Disp: , Rfl:     atorvastatin (LIPITOR) 20 MG tablet, Take 1 tablet (20 mg total) by mouth once daily., Disp: 90 tablet, Rfl: 3    DULoxetine (CYMBALTA) 60 MG capsule, Take 60 mg by mouth., Disp: , Rfl:     DULoxetine (CYMBALTA) 60 MG capsule, Take 60 mg by mouth., Disp: , Rfl:     ELIQUIS 2.5 mg Tab, Take 2.5 mg by mouth 2 (two) times daily., Disp: , Rfl:     gabapentin (NEURONTIN) 100 MG capsule, , Disp: , Rfl:     ibuprofen (ADVIL,MOTRIN) 800 MG tablet, Take 1 tablet (800 mg total) by mouth 3 (three) times daily as needed for Pain., Disp: 45 tablet, Rfl: 5    lisinopriL (PRINIVIL,ZESTRIL) 40 MG tablet, TAKE 1 TABLET BY MOUTH EVERY DAY, Disp: 90 tablet, Rfl: 3    riluzole 50 mg Tab tablet, Take 50 mg by mouth every 12 (twelve) hours., Disp: , Rfl:     tamsulosin (FLOMAX) 0.4 mg Cap, TAKE 1 CAPSULE BY MOUTH EVERY DAY, Disp: 90 capsule, Rfl: 3  No current facility-administered medications for this visit.    Facility-Administered Medications Ordered in Other Visits:     mupirocin 2 % ointment, , Nasal, On Call Procedure, Briana Seals PA-C    Allergies  Review of patient's allergies indicates:  No Known Allergies  Patient's PMH, FH, Social hx, Medications, allergies reviewed and updated as pertinent to today's visit.    Objective:      Physical Exam  Constitutional:       General: He is not in acute distress.     Appearance: He is well-developed. He is not ill-appearing, toxic-appearing or diaphoretic.   HENT:      Head: Normocephalic and atraumatic.      Mouth/Throat:      Mouth: Mucous membranes are moist.   Eyes:      Conjunctiva/sclera: Conjunctivae normal.   Pulmonary:      Effort: Pulmonary effort is normal. No respiratory  distress.   Abdominal:      General: There is no distension.      Palpations: Abdomen is soft. There is no mass.      Tenderness: There is no abdominal tenderness. There is no guarding.   Musculoskeletal:         General: No swelling or deformity.      Cervical back: Neck supple.   Skin:     General: Skin is warm.      Capillary Refill: Capillary refill takes less than 2 seconds.      Findings: No rash.   Neurological:      Mental Status: He is alert and oriented to person, place, and time.      Gait: Gait normal.   Psychiatric:         Mood and Affect: Mood normal.         Thought Content: Thought content normal.         Judgment: Judgment normal.         Lab Results   Component Value Date    PSADIAG 6.9 (H) 04/19/2023    PSADIAG 8.7 (H) 08/31/2022    PSADIAG 1.8 09/20/2019      Assessment:       1. Elevated PSA    2. Lower urinary tract symptoms (LUTS)            Plan:       Psa downtrending  Repeat in 3 months, MRI of prostate at that time   Given co morbidities discussed risks of screening for prostate cancer    LUTS  Continue flomax  Discussed association of symptoms of incontinence, urgency, frequency with patients who have ALS, patient to monitor for symptoms, to be screened at each follow up appt

## 2023-05-01 ENCOUNTER — TELEPHONE (OUTPATIENT)
Dept: FAMILY MEDICINE | Facility: CLINIC | Age: 73
End: 2023-05-01
Payer: COMMERCIAL

## 2023-05-01 NOTE — TELEPHONE ENCOUNTER
Last office visit 02/28/2023 up coming office visit [id number] paperwork on your desk for RTA certifications. Please advise

## 2023-05-08 ENCOUNTER — PATIENT OUTREACH (OUTPATIENT)
Dept: ADMINISTRATIVE | Facility: OTHER | Age: 73
End: 2023-05-08
Payer: COMMERCIAL

## 2023-05-08 NOTE — PROGRESS NOTES
CHW - Follow Up    This Community Health Worker completed a follow up visit with patient during clinic visit today.  Pt's wife left application with . Application was mailed and sent to organization.  Follow up required: Y  Follow-up Outreach - Due: 5/29/2023

## 2023-05-30 ENCOUNTER — PATIENT OUTREACH (OUTPATIENT)
Dept: ADMINISTRATIVE | Facility: OTHER | Age: 73
End: 2023-05-30
Payer: COMMERCIAL

## 2023-05-30 NOTE — PROGRESS NOTES
CHW - Outreach Attempt    Community Health Worker left a voicemail message for 1st attempt to contact patient regarding: SDOH  Community Health Worker to attempt to contact caregiver on: 6/13

## 2023-06-13 ENCOUNTER — PES CALL (OUTPATIENT)
Dept: ADMINISTRATIVE | Facility: CLINIC | Age: 73
End: 2023-06-13
Payer: COMMERCIAL

## 2023-06-19 ENCOUNTER — PATIENT OUTREACH (OUTPATIENT)
Dept: ADMINISTRATIVE | Facility: OTHER | Age: 73
End: 2023-06-19
Payer: COMMERCIAL

## 2023-06-19 DIAGNOSIS — R35.0 URINARY FREQUENCY: ICD-10-CM

## 2023-06-19 NOTE — PROGRESS NOTES
CHW - Follow Up    This Community Health Worker completed a follow up visit with patient via telephone today.  Pt has one step left in para transit application process. Once appt for ID photo to be taken is scheduled pt is able to schedule rides as needed. Pt also inquired about Flomax refill. After chart review, pt was informed that refills are good for a year. Pt states he never received a notification as he normally does. Pt reiterates how pharmacy has failed to answer the phone but will have his wife go in person to refill.  Follow up required: Y  Follow-up Outreach - Due: 6/23/2023

## 2023-06-19 NOTE — TELEPHONE ENCOUNTER
No care due was identified.  [facility] Embedded Care Due Messages. Reference number: [id number].   6/19/2023 11:18:08 AM CDT

## 2023-06-20 RX ORDER — TAMSULOSIN HYDROCHLORIDE 0.4 MG/1
1 CAPSULE ORAL DAILY
Qty: 90 CAPSULE | Refills: 3 | Status: SHIPPED | OUTPATIENT
Start: 2023-06-20 | End: 2023-08-29 | Stop reason: SDUPTHER

## 2023-06-26 ENCOUNTER — PATIENT OUTREACH (OUTPATIENT)
Dept: ADMINISTRATIVE | Facility: OTHER | Age: 73
End: 2023-06-26
Payer: COMMERCIAL

## 2023-06-26 NOTE — PROGRESS NOTES
CHW - Case Closure    This Community Health Worker spoke to patient via telephone today.   Pt's wife reports that they have successfully re-filled Rx.  Pt/Caregiver denied any additional needs at this time and agrees with episode closure at this time.  Provided patient with Community Health Worker's contact information and encouraged him/her to contact this Community Health Worker if additional needs arise.

## 2023-07-18 ENCOUNTER — LAB VISIT (OUTPATIENT)
Dept: LAB | Facility: HOSPITAL | Age: 73
End: 2023-07-18
Attending: STUDENT IN AN ORGANIZED HEALTH CARE EDUCATION/TRAINING PROGRAM
Payer: COMMERCIAL

## 2023-07-18 DIAGNOSIS — R97.20 ELEVATED PSA: ICD-10-CM

## 2023-07-18 LAB
ANION GAP SERPL CALC-SCNC: 10 MMOL/L (ref 8–16)
BUN SERPL-MCNC: 10 MG/DL (ref 8–23)
CALCIUM SERPL-MCNC: 9.6 MG/DL (ref 8.7–10.5)
CHLORIDE SERPL-SCNC: 108 MMOL/L (ref 95–110)
CO2 SERPL-SCNC: 25 MMOL/L (ref 23–29)
COMPLEXED PSA SERPL-MCNC: 6 NG/ML (ref 0–4)
CREAT SERPL-MCNC: 0.7 MG/DL (ref 0.5–1.4)
EST. GFR  (NO RACE VARIABLE): >60 ML/MIN/1.73 M^2
GLUCOSE SERPL-MCNC: 111 MG/DL (ref 70–110)
POTASSIUM SERPL-SCNC: 4.9 MMOL/L (ref 3.5–5.1)
SODIUM SERPL-SCNC: 143 MMOL/L (ref 136–145)

## 2023-07-18 PROCEDURE — 80048 BASIC METABOLIC PNL TOTAL CA: CPT | Performed by: STUDENT IN AN ORGANIZED HEALTH CARE EDUCATION/TRAINING PROGRAM

## 2023-07-18 PROCEDURE — 84153 ASSAY OF PSA TOTAL: CPT | Performed by: STUDENT IN AN ORGANIZED HEALTH CARE EDUCATION/TRAINING PROGRAM

## 2023-07-18 PROCEDURE — 36415 COLL VENOUS BLD VENIPUNCTURE: CPT | Mod: PO | Performed by: STUDENT IN AN ORGANIZED HEALTH CARE EDUCATION/TRAINING PROGRAM

## 2023-07-19 ENCOUNTER — HOSPITAL ENCOUNTER (OUTPATIENT)
Dept: RADIOLOGY | Facility: HOSPITAL | Age: 73
Discharge: HOME | End: 2023-07-19
Attending: STUDENT IN AN ORGANIZED HEALTH CARE EDUCATION/TRAINING PROGRAM
Payer: COMMERCIAL

## 2023-07-19 DIAGNOSIS — R97.20 ELEVATED PSA: ICD-10-CM

## 2023-07-19 PROCEDURE — 25500020 PHARM REV CODE 255: Performed by: STUDENT IN AN ORGANIZED HEALTH CARE EDUCATION/TRAINING PROGRAM

## 2023-07-19 PROCEDURE — 72197 MRI PROSTATE W W/O CONTRAST: ICD-10-PCS | Mod: 26,,, | Performed by: RADIOLOGY

## 2023-07-19 PROCEDURE — 72197 MRI PELVIS W/O & W/DYE: CPT | Mod: 26,,, | Performed by: RADIOLOGY

## 2023-07-19 PROCEDURE — 72197 MRI PELVIS W/O & W/DYE: CPT | Mod: TC

## 2023-07-19 PROCEDURE — A9585 GADOBUTROL INJECTION: HCPCS | Performed by: STUDENT IN AN ORGANIZED HEALTH CARE EDUCATION/TRAINING PROGRAM

## 2023-07-19 RX ORDER — GADOBUTROL 604.72 MG/ML
10 INJECTION INTRAVENOUS
Status: COMPLETED | OUTPATIENT
Start: 2023-07-19 | End: 2023-07-19

## 2023-07-19 RX ADMIN — GADOBUTROL 10 ML: 604.72 INJECTION INTRAVENOUS at 01:07

## 2023-07-26 ENCOUNTER — OFFICE VISIT (OUTPATIENT)
Dept: UROLOGY | Facility: CLINIC | Age: 73
End: 2023-07-26
Payer: COMMERCIAL

## 2023-07-26 DIAGNOSIS — R97.20 ELEVATED PSA: Primary | ICD-10-CM

## 2023-07-26 PROCEDURE — 99999 PR PBB SHADOW E&M-EST. PATIENT-LVL III: ICD-10-PCS | Mod: PBBFAC,,, | Performed by: STUDENT IN AN ORGANIZED HEALTH CARE EDUCATION/TRAINING PROGRAM

## 2023-07-26 PROCEDURE — 99999 PR PBB SHADOW E&M-EST. PATIENT-LVL III: CPT | Mod: PBBFAC,,, | Performed by: STUDENT IN AN ORGANIZED HEALTH CARE EDUCATION/TRAINING PROGRAM

## 2023-07-26 PROCEDURE — 99214 PR OFFICE/OUTPT VISIT, EST, LEVL IV, 30-39 MIN: ICD-10-PCS | Mod: S$GLB,,, | Performed by: STUDENT IN AN ORGANIZED HEALTH CARE EDUCATION/TRAINING PROGRAM

## 2023-07-26 PROCEDURE — 99214 OFFICE O/P EST MOD 30 MIN: CPT | Mod: S$GLB,,, | Performed by: STUDENT IN AN ORGANIZED HEALTH CARE EDUCATION/TRAINING PROGRAM

## 2023-07-26 NOTE — PROGRESS NOTES
Patient ID: Robles Jordan is a 72 y.o. male.    Chief Complaint: Results (Follow up with mri and labs.)    Referral: Aaareferral Self  No address on file     HPI  72 y.o. who presents to the Urology clinic for f/u of elevated PSA, patient underwent negative biopsy in the past. He is here for followup PSA. Patient accompanied by wife. Of note his ALS has been progressing.     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    Liver cyst -- check ultrasound 08/2020 02/19/2020    Right sided weakness 06/21/2020    Tethered cord syndrome 07/23/2020    S/P lumbar laminectomy 08/12/2020    Other specified diseases of spinal cord 08/18/2020    Difficulty walking 02/09/2021    Colon cancer screening 02/25/2021    Hyperglycemia 05/04/2021    Prediabetes 05/19/2021    Aortic atherosclerosis 06/22/2022    Elevated PSA 09/26/2022    Gram-negative bacteremia 11/20/2022    ALS (amyotrophic lateral sclerosis) 11/20/2022    Depression 11/20/2022     Resolved Ambulatory Problems     Diagnosis Date Noted    Positive depression screening 05/11/2018    Right hip pain 05/11/2018    Hypoxia 08/15/2020    Obesity 08/15/2020    Lumbar pain 09/01/2020    Thrombocytopenia, unspecified 05/04/2021    Sepsis 11/20/2022     Past Medical History:   Diagnosis Date    Back pain     BPH (benign prostatic hypertrophy)     Hx of colonic polyps 10/19/2015    Hypertension     Hypertension     Sleep apnea     Spinal cord  disease          Past Surgical History  Past Surgical History:   Procedure Laterality Date    COLONOSCOPY N/A 10/19/2015    Procedure: COLONOSCOPY;  Surgeon: Antonino Bernstein MD;  Location: [facility] (4TH FLR);  Service: Endoscopy;  Laterality: N/A;    COLONOSCOPY N/A 2/25/2021    Procedure: COLONOSCOPY;  Surgeon: Blanca Don MD;  Location: [facility] ([facility] FLR);  Service: Endoscopy;  Laterality: N/A;  requesting ASAP  COVID test at [place] on 2/22-GT.2/24 called pt. move up to earlier spot. pt. did NOT want to come earlier.EC    CYSTOSCOPY WITH URODYNAMIC TESTING N/A 1/17/2020    Procedure: CYSTOSCOPY,WITH URODYNAMIC TESTING;  Surgeon: Christianne Israel MD;  Location: [facility];  Service: Urology;  Laterality: N/A;  RN PREOP 1/13/2020    MULTIPLE TOOTH EXTRACTIONS  2014       Social History  Social Connections: Unknown    Frequency of Communication with Friends and Family: More than three times a week    Frequency of Social Gatherings with Friends and Family: More than three times a week    Attends Jew Services: Patient refused    Active Member of Clubs or Organizations: No    Attends Club or Organization Meetings: Never    Marital Status:        Medications    Current Outpatient Medications:     acetaminophen (TYLENOL) 325 MG tablet, Take 2 tablets (650 mg total) by mouth every 6 (six) hours as needed for Temperature greater than (or equal to 101 degree F)., Disp:  , Rfl: 0    amLODIPine (NORVASC) 10 MG tablet, Take 1 tablet (10 mg total) by mouth once daily. For blood pressure, Disp: 90 tablet, Rfl: 3    aspirin (ECOTRIN) 81 MG EC tablet, Take 81 mg by mouth once daily., Disp: , Rfl:     atorvastatin (LIPITOR) 20 MG tablet, Take 1 tablet (20 mg total) by mouth once daily., Disp: 90 tablet, Rfl: 3    DULoxetine (CYMBALTA) 60 MG capsule, Take 60 mg by mouth., Disp: , Rfl:     DULoxetine (CYMBALTA) 60 MG capsule, Take 60 mg by mouth., Disp: , Rfl:     ELIQUIS 2.5 mg Tab, Take 2.5 mg by mouth 2 (two)  times daily., Disp: , Rfl:     gabapentin (NEURONTIN) 100 MG capsule, , Disp: , Rfl:     ibuprofen (ADVIL,MOTRIN) 800 MG tablet, Take 1 tablet (800 mg total) by mouth 3 (three) times daily as needed for Pain., Disp: 45 tablet, Rfl: 5    lisinopriL (PRINIVIL,ZESTRIL) 40 MG tablet, TAKE 1 TABLET BY MOUTH EVERY DAY, Disp: 90 tablet, Rfl: 3    riluzole 50 mg Tab tablet, Take 50 mg by mouth every 12 (twelve) hours., Disp: , Rfl:     tamsulosin (FLOMAX) 0.4 mg Cap, Take 1 capsule (0.4 mg total) by mouth once daily., Disp: 90 capsule, Rfl: 3  No current facility-administered medications for this visit.    Facility-Administered Medications Ordered in Other Visits:     mupirocin 2 % ointment, , Nasal, On Call Procedure, Briana Seals PA-C    Allergies  Review of patient's allergies indicates:  No Known Allergies    Patient's PMH, FH, Social hx, Medications, allergies reviewed and updated as pertinent to today's visit    Objective:      Physical Exam  Constitutional:       General: He is not in acute distress.     Appearance: He is well-developed. He is not ill-appearing, toxic-appearing or diaphoretic.   HENT:      Head: Normocephalic and atraumatic.      Mouth/Throat:      Mouth: Mucous membranes are moist.   Eyes:      Conjunctiva/sclera: Conjunctivae normal.   Pulmonary:      Effort: Pulmonary effort is normal. No respiratory distress.   Abdominal:      General: Abdomen is flat. There is no distension.      Palpations: Abdomen is soft. There is no mass.      Tenderness: There is no abdominal tenderness.   Musculoskeletal:         General: No swelling.      Cervical back: Neck supple.      Right lower leg: Edema present.      Left lower leg: Edema present.   Skin:     General: Skin is warm.      Findings: No rash.   Neurological:      Mental Status: He is alert and oriented to person, place, and time.   Psychiatric:         Mood and Affect: Mood normal.         Thought Content: Thought content normal.          Judgment: Judgment normal.           Lab Results   Component Value Date    PSADIAG 6.0 (H) 07/18/2023    PSADIAG 6.9 (H) 04/19/2023    PSADIAG 8.7 (H) 08/31/2022      Imaging results:   No clinically significant lesions detected on patient's MRI  Assessment:       1. Elevated PSA        Plan:         Elevated PSA, prior neg biopsy  Given patient's progression with re: to ALS  Discussed risks/benefits of continued PCa screening  Risks current outweigh benefits, patient/wife would not be amenable to treatment if it came to that point, so continued surveillance would not   RTC 1 year for voiding symptom check or sooner if needed

## 2023-08-02 ENCOUNTER — PES CALL (OUTPATIENT)
Dept: ADMINISTRATIVE | Facility: CLINIC | Age: 73
End: 2023-08-02
Payer: COMMERCIAL

## 2023-08-25 ENCOUNTER — LAB VISIT (OUTPATIENT)
Dept: LAB | Facility: HOSPITAL | Age: 73
End: 2023-08-25
Attending: FAMILY MEDICINE
Payer: COMMERCIAL

## 2023-08-25 DIAGNOSIS — I10 ESSENTIAL HYPERTENSION: ICD-10-CM

## 2023-08-25 DIAGNOSIS — E78.5 HYPERLIPIDEMIA, UNSPECIFIED HYPERLIPIDEMIA TYPE: ICD-10-CM

## 2023-08-25 LAB
ALBUMIN SERPL BCP-MCNC: 4 G/DL (ref 3.5–5.2)
ALP SERPL-CCNC: 71 U/L (ref 55–135)
ALT SERPL W/O P-5'-P-CCNC: 19 U/L (ref 10–44)
ANION GAP SERPL CALC-SCNC: 11 MMOL/L (ref 8–16)
AST SERPL-CCNC: 21 U/L (ref 10–40)
BASOPHILS # BLD AUTO: 0.04 K/UL (ref 0–0.2)
BASOPHILS NFR BLD: 0.7 % (ref 0–1.9)
BILIRUB SERPL-MCNC: 0.7 MG/DL (ref 0.1–1)
BUN SERPL-MCNC: 9 MG/DL (ref 8–23)
CALCIUM SERPL-MCNC: 9.3 MG/DL (ref 8.7–10.5)
CHLORIDE SERPL-SCNC: 105 MMOL/L (ref 95–110)
CHOLEST SERPL-MCNC: 156 MG/DL (ref 120–199)
CHOLEST/HDLC SERPL: 3.7 {RATIO} (ref 2–5)
CO2 SERPL-SCNC: 24 MMOL/L (ref 23–29)
CREAT SERPL-MCNC: 0.6 MG/DL (ref 0.5–1.4)
DIFFERENTIAL METHOD: ABNORMAL
EOSINOPHIL # BLD AUTO: 0.2 K/UL (ref 0–0.5)
EOSINOPHIL NFR BLD: 3.2 % (ref 0–8)
ERYTHROCYTE [DISTWIDTH] IN BLOOD BY AUTOMATED COUNT: 13.2 % (ref 11.5–14.5)
EST. GFR  (NO RACE VARIABLE): >60 ML/MIN/1.73 M^2
ESTIMATED AVG GLUCOSE: 108 MG/DL (ref 68–131)
GLUCOSE SERPL-MCNC: 106 MG/DL (ref 70–110)
HBA1C MFR BLD: 5.4 % (ref 4–5.6)
HCT VFR BLD AUTO: 47.4 % (ref 40–54)
HDLC SERPL-MCNC: 42 MG/DL (ref 40–75)
HDLC SERPL: 26.9 % (ref 20–50)
HGB BLD-MCNC: 14.8 G/DL (ref 14–18)
IMM GRANULOCYTES # BLD AUTO: 0.08 K/UL (ref 0–0.04)
IMM GRANULOCYTES NFR BLD AUTO: 1.4 % (ref 0–0.5)
LDLC SERPL CALC-MCNC: 98.4 MG/DL (ref 63–159)
LYMPHOCYTES # BLD AUTO: 2.1 K/UL (ref 1–4.8)
LYMPHOCYTES NFR BLD: 38.1 % (ref 18–48)
MCH RBC QN AUTO: 28.8 PG (ref 27–31)
MCHC RBC AUTO-ENTMCNC: 31.2 G/DL (ref 32–36)
MCV RBC AUTO: 92 FL (ref 82–98)
MONOCYTES # BLD AUTO: 0.5 K/UL (ref 0.3–1)
MONOCYTES NFR BLD: 8.9 % (ref 4–15)
NEUTROPHILS # BLD AUTO: 2.7 K/UL (ref 1.8–7.7)
NEUTROPHILS NFR BLD: 47.7 % (ref 38–73)
NONHDLC SERPL-MCNC: 114 MG/DL
NRBC BLD-RTO: 0 /100 WBC
PLATELET # BLD AUTO: 306 K/UL (ref 150–450)
PMV BLD AUTO: 9.6 FL (ref 9.2–12.9)
POTASSIUM SERPL-SCNC: 4.1 MMOL/L (ref 3.5–5.1)
PROT SERPL-MCNC: 7 G/DL (ref 6–8.4)
RBC # BLD AUTO: 5.13 M/UL (ref 4.6–6.2)
SODIUM SERPL-SCNC: 140 MMOL/L (ref 136–145)
TRIGL SERPL-MCNC: 78 MG/DL (ref 30–150)
TSH SERPL DL<=0.005 MIU/L-ACNC: 1.96 UIU/ML (ref 0.4–4)
WBC # BLD AUTO: 5.62 K/UL (ref 3.9–12.7)

## 2023-08-25 PROCEDURE — 84443 ASSAY THYROID STIM HORMONE: CPT | Performed by: FAMILY MEDICINE

## 2023-08-25 PROCEDURE — 85025 COMPLETE CBC W/AUTO DIFF WBC: CPT | Performed by: FAMILY MEDICINE

## 2023-08-25 PROCEDURE — 80061 LIPID PANEL: CPT | Performed by: FAMILY MEDICINE

## 2023-08-25 PROCEDURE — 83036 HEMOGLOBIN GLYCOSYLATED A1C: CPT | Performed by: FAMILY MEDICINE

## 2023-08-25 PROCEDURE — 80053 COMPREHEN METABOLIC PANEL: CPT | Performed by: FAMILY MEDICINE

## 2023-08-25 PROCEDURE — 36415 COLL VENOUS BLD VENIPUNCTURE: CPT | Mod: PO | Performed by: FAMILY MEDICINE

## 2023-08-29 ENCOUNTER — OFFICE VISIT (OUTPATIENT)
Dept: FAMILY MEDICINE | Facility: CLINIC | Age: 73
End: 2023-08-29
Payer: COMMERCIAL

## 2023-08-29 VITALS
TEMPERATURE: 98 F | HEART RATE: 86 BPM | DIASTOLIC BLOOD PRESSURE: 82 MMHG | RESPIRATION RATE: 16 BRPM | WEIGHT: 210.13 LBS | SYSTOLIC BLOOD PRESSURE: 160 MMHG | BODY MASS INDEX: 26.97 KG/M2 | OXYGEN SATURATION: 97 % | HEIGHT: 74 IN

## 2023-08-29 DIAGNOSIS — G12.21 ALS (AMYOTROPHIC LATERAL SCLEROSIS): ICD-10-CM

## 2023-08-29 DIAGNOSIS — R35.0 URINARY FREQUENCY: ICD-10-CM

## 2023-08-29 DIAGNOSIS — Z00.00 ANNUAL PHYSICAL EXAM: Primary | ICD-10-CM

## 2023-08-29 DIAGNOSIS — I10 ESSENTIAL HYPERTENSION: ICD-10-CM

## 2023-08-29 DIAGNOSIS — I70.0 AORTIC ATHEROSCLEROSIS: ICD-10-CM

## 2023-08-29 DIAGNOSIS — Q06.8 TETHERED CORD SYNDROME: ICD-10-CM

## 2023-08-29 DIAGNOSIS — E78.5 HYPERLIPIDEMIA, UNSPECIFIED HYPERLIPIDEMIA TYPE: ICD-10-CM

## 2023-08-29 PROCEDURE — 99397 PR PREVENTIVE VISIT,EST,65 & OVER: ICD-10-PCS | Mod: S$GLB,,, | Performed by: FAMILY MEDICINE

## 2023-08-29 PROCEDURE — 99397 PER PM REEVAL EST PAT 65+ YR: CPT | Mod: S$GLB,,, | Performed by: FAMILY MEDICINE

## 2023-08-29 PROCEDURE — 99999 PR PBB SHADOW E&M-EST. PATIENT-LVL III: CPT | Mod: PBBFAC,,, | Performed by: FAMILY MEDICINE

## 2023-08-29 PROCEDURE — 99999 PR PBB SHADOW E&M-EST. PATIENT-LVL III: ICD-10-PCS | Mod: PBBFAC,,, | Performed by: FAMILY MEDICINE

## 2023-08-29 RX ORDER — AMLODIPINE BESYLATE 10 MG/1
10 TABLET ORAL DAILY
Qty: 90 TABLET | Refills: 3 | Status: SHIPPED | OUTPATIENT
Start: 2023-08-29 | End: 2024-08-28

## 2023-08-29 RX ORDER — LISINOPRIL 40 MG/1
40 TABLET ORAL DAILY
Qty: 90 TABLET | Refills: 3 | Status: SHIPPED | OUTPATIENT
Start: 2023-08-29

## 2023-08-29 RX ORDER — ATORVASTATIN CALCIUM 20 MG/1
20 TABLET, FILM COATED ORAL DAILY
Qty: 90 TABLET | Refills: 3 | Status: SHIPPED | OUTPATIENT
Start: 2023-08-29 | End: 2024-08-23

## 2023-08-29 RX ORDER — TAMSULOSIN HYDROCHLORIDE 0.4 MG/1
1 CAPSULE ORAL DAILY
Qty: 90 CAPSULE | Refills: 3 | Status: SHIPPED | OUTPATIENT
Start: 2023-08-29 | End: 2023-12-23 | Stop reason: SDUPTHER

## 2023-08-29 NOTE — PROGRESS NOTES
Health Maintenance Due   Topic     Shingles Vaccine (1 of 2) hx chickenpox ; inform pt can get vaccine at pharmacy.    COVID-19 Vaccine (5 - Pfizer series)

## 2023-08-29 NOTE — PROGRESS NOTES
Subjective:       Patient ID: Robles Jordan is a 72 y.o. male.    Chief Complaint: Follow-up (Go over results)      Follow-up      72-year-old male presents for annual exam.  Had labs prior to exam.  Follows with Neurology and other specialties for ALS.  Continues to see speech as well.  Patient is here with his wife who has for a handicap form for the patient.  States he is taking his medications.    Review of Systems   Constitutional: Negative.    HENT: Negative.     Respiratory: Negative.     Cardiovascular: Negative.    Gastrointestinal: Negative.    Endocrine: Negative.    Genitourinary: Negative.    Musculoskeletal: Negative.    Neurological: Negative.    Psychiatric/Behavioral: Negative.            Past Medical History:   Diagnosis Date    Back pain     BPH (benign prostatic hypertrophy)     Depression 11/20/2022    Hx of colonic polyps 10/19/2015    Hyperlipidemia     Hypertension     Hypertension     Sleep apnea     Spinal cord disease      Past Surgical History:   Procedure Laterality Date    COLONOSCOPY N/A 10/19/2015    Procedure: COLONOSCOPY;  Surgeon: Antonino Bernstein MD;  Location: [address]);  Service: Endoscopy;  Laterality: N/A;    COLONOSCOPY N/A 2/25/2021    Procedure: COLONOSCOPY;  Surgeon: Blanca Don MD;  Location: [address]);  Service: Endoscopy;  Laterality: N/A;  requesting ASAP  COVID test at [place] on 2/22-GT.2/24 called pt. move up to earlier spot. pt. did NOT want to come earlier.EC    CYSTOSCOPY WITH URODYNAMIC TESTING N/A 1/17/2020    Procedure: CYSTOSCOPY,WITH URODYNAMIC TESTING;  Surgeon: Christianne Israel MD;  Location: [facility];  Service: Urology;  Laterality: N/A;  RN PREOP 1/13/2020    MULTIPLE TOOTH EXTRACTIONS  2014     Family History   Problem Relation Age of Onset    Cancer Mother         Breast    Stroke Sister 65        Fatal    Cancer Maternal Uncle         Pancreas    Cancer Paternal Uncle         Pancreas    Cancer Maternal Grandmother          Breast    Hypertension Father     Hyperlipidemia Father      Social History     Socioeconomic History    Marital status:      Spouse name: Neeru     Number of children: 2    Highest education level: Bachelor's degree (e.g., BA, AB, BS)   Occupational History    Occupation: retired    Tobacco Use    Smoking status: Former     Types: Cigarettes    Smokeless tobacco: Never    Tobacco comments:     3 cigarettes a day    Substance and Sexual Activity    Alcohol use: Yes     Alcohol/week: 1.0 standard drink of alcohol     Types: 1 Cans of beer per week     Comment: very seldom    Drug use: No    Sexual activity: Not Currently     Partners: Female   Social History Narrative     has 2 step children is a       Social Determinants of Health     Financial Resource Strain: Medium Risk (6/26/2023)    Overall Financial Resource Strain (CARDIA)     Difficulty of Paying Living Expenses: Somewhat hard   Food Insecurity: Food Insecurity Present (6/26/2023)    Hunger Vital Sign     Worried About Running Out of Food in the Last Year: Sometimes true     Ran Out of Food in the Last Year: Never true   Transportation Needs: Unmet Transportation Needs (6/26/2023)    PRAPARE - Transportation     Lack of Transportation (Medical): Yes     Lack of Transportation (Non-Medical): No   Physical Activity: Insufficiently Active (6/26/2023)    Exercise Vital Sign     Days of Exercise per Week: 2 days     Minutes of Exercise per Session: 10 min   Stress: No Stress Concern Present (6/26/2023)    [employer] [employer] - Occupational Stress Questionnaire     Feeling of Stress : Only a little   Social Connections: Unknown (6/26/2023)    Social Connection and Isolation Panel [NHANES]     Frequency of Communication with Friends and Family: More than three times a week     Frequency of Social Gatherings with Friends and Family: More than three times a week     Attends Restorationism Services: Patient refused     Active  Member of Clubs or Organizations: No     Attends Club or Organization Meetings: Never     Marital Status:    Housing Stability: Low Risk  (6/26/2023)    Housing Stability Vital Sign     Unable to Pay for Housing in the Last Year: No     Number of Places Lived in the Last Year: 1     Unstable Housing in the Last Year: No       Current Outpatient Medications:     acetaminophen (TYLENOL) 325 MG tablet, Take 2 tablets (650 mg total) by mouth every 6 (six) hours as needed for Temperature greater than (or equal to 101 degree F)., Disp:  , Rfl: 0    aspirin (ECOTRIN) 81 MG EC tablet, Take 81 mg by mouth once daily., Disp: , Rfl:     DULoxetine (CYMBALTA) 60 MG capsule, Take 60 mg by mouth., Disp: , Rfl:     ELIQUIS 2.5 mg Tab, Take 2.5 mg by mouth 2 (two) times daily., Disp: , Rfl:     gabapentin (NEURONTIN) 100 MG capsule, , Disp: , Rfl:     ibuprofen (ADVIL,MOTRIN) 800 MG tablet, Take 1 tablet (800 mg total) by mouth 3 (three) times daily as needed for Pain., Disp: 45 tablet, Rfl: 5    riluzole 50 mg Tab tablet, Take 50 mg by mouth every 12 (twelve) hours., Disp: , Rfl:     amLODIPine (NORVASC) 10 MG tablet, Take 1 tablet (10 mg total) by mouth once daily. For blood pressure, Disp: 90 tablet, Rfl: 3    atorvastatin (LIPITOR) 20 MG tablet, Take 1 tablet (20 mg total) by mouth once daily., Disp: 90 tablet, Rfl: 3    DULoxetine (CYMBALTA) 60 MG capsule, Take 60 mg by mouth., Disp: , Rfl:     lisinopriL (PRINIVIL,ZESTRIL) 40 MG tablet, Take 1 tablet (40 mg total) by mouth once daily., Disp: 90 tablet, Rfl: 3    tamsulosin (FLOMAX) 0.4 mg Cap, Take 1 capsule (0.4 mg total) by mouth once daily., Disp: 90 capsule, Rfl: 3  No current facility-administered medications for this visit.    Facility-Administered Medications Ordered in Other Visits:     mupirocin 2 % ointment, , Nasal, On Call Procedure, Briana Seals PA-C   Objective:      Vitals:    08/29/23 0758   BP: (!) 160/82   BP Location: Right arm   Patient  "Position: Sitting   BP Method: Large (Manual)   Pulse: 86   Resp: 16   Temp: 98.1 °F (36.7 °C)   TempSrc: Oral   SpO2: 97%   Weight: 95.3 kg (210 lb 1.6 oz)   Height: 6' 2" (1.88 m)       Physical Exam  Constitutional:       General: He is not in acute distress.  HENT:      Head: Normocephalic and atraumatic.   Eyes:      Conjunctiva/sclera: Conjunctivae normal.   Cardiovascular:      Rate and Rhythm: Normal rate and regular rhythm.      Heart sounds: Normal heart sounds. No murmur heard.     No friction rub. No gallop.   Pulmonary:      Effort: Pulmonary effort is normal.      Breath sounds: Normal breath sounds. No wheezing or rales.   Musculoskeletal:      Cervical back: Neck supple.   Skin:     General: Skin is warm and dry.   Neurological:      Mental Status: He is alert and oriented to person, place, and time.   Psychiatric:         Behavior: Behavior normal.         Thought Content: Thought content normal.         Judgment: Judgment normal.            Assessment:       1. Annual physical exam    2. Essential hypertension    3. Hyperlipidemia, unspecified hyperlipidemia type    4. Urinary frequency    5. Tethered cord syndrome    6. Aortic atherosclerosis    7. ALS (amyotrophic lateral sclerosis)        Plan:       Annual physical exam    Essential hypertension  -     amLODIPine (NORVASC) 10 MG tablet; Take 1 tablet (10 mg total) by mouth once daily. For blood pressure  Dispense: 90 tablet; Refill: 3  -     lisinopriL (PRINIVIL,ZESTRIL) 40 MG tablet; Take 1 tablet (40 mg total) by mouth once daily.  Dispense: 90 tablet; Refill: 3    Hyperlipidemia, unspecified hyperlipidemia type  -     atorvastatin (LIPITOR) 20 MG tablet; Take 1 tablet (20 mg total) by mouth once daily.  Dispense: 90 tablet; Refill: 3    Urinary frequency  -     tamsulosin (FLOMAX) 0.4 mg Cap; Take 1 capsule (0.4 mg total) by mouth once daily.  Dispense: 90 capsule; Refill: 3    Tethered cord syndrome    Aortic atherosclerosis    ALS " (amyotrophic lateral sclerosis)    Labs continue be at his baseline.  Refill medications.  Continue follow-up with specialists for ALS.  Follow-up in 6 months.          Future Appointments   Date Time Provider Department Center   2/28/2024  9:20 AM [facility] [place]       Patient note was created using [employer].  Any errors in syntax or even information may not have been identified and edited on initial review prior to signing this note.

## 2023-11-06 DIAGNOSIS — Z91.89 OTHER SPECIFIED PERSONAL RISK FACTORS, NOT ELSEWHERE CLASSIFIED: ICD-10-CM

## 2023-11-07 RX ORDER — APIXABAN 2.5 MG/1
TABLET, FILM COATED ORAL
Qty: 180 TABLET | Refills: 3 | Status: SHIPPED | OUTPATIENT
Start: 2023-11-07

## 2023-12-07 ENCOUNTER — TELEPHONE (OUTPATIENT)
Dept: PULMONOLOGY | Facility: CLINIC | Age: 73
End: 2023-12-07
Payer: COMMERCIAL

## 2023-12-07 DIAGNOSIS — G70.9 NEUROMUSCULAR RESPIRATORY WEAKNESS: ICD-10-CM

## 2023-12-07 DIAGNOSIS — G12.21 ALS (AMYOTROPHIC LATERAL SCLEROSIS): Primary | ICD-10-CM

## 2023-12-07 DIAGNOSIS — J99 NEUROMUSCULAR RESPIRATORY WEAKNESS: ICD-10-CM

## 2023-12-07 NOTE — TELEPHONE ENCOUNTER
Scheduling more comprehensive pulmonary function testing  Upright/supine spirometry, MIP/MEP, cap blood gas.

## 2023-12-20 ENCOUNTER — PATIENT MESSAGE (OUTPATIENT)
Dept: PULMONOLOGY | Facility: CLINIC | Age: 73
End: 2023-12-20
Payer: COMMERCIAL

## 2023-12-23 DIAGNOSIS — R35.0 URINARY FREQUENCY: ICD-10-CM

## 2023-12-24 NOTE — TELEPHONE ENCOUNTER
No care due was identified.  [facility] Embedded Care Due Messages. Reference number: [id number].   12/23/2023 8:29:18 PM CST

## 2023-12-26 RX ORDER — TAMSULOSIN HYDROCHLORIDE 0.4 MG/1
1 CAPSULE ORAL DAILY
Qty: 90 CAPSULE | Refills: 2 | Status: SHIPPED | OUTPATIENT
Start: 2023-12-26

## 2023-12-26 NOTE — TELEPHONE ENCOUNTER
Refill Decision Note   Robles Jordan  is requesting a refill authorization.  Brief Assessment and Rationale for Refill:  Approve     Medication Therapy Plan:         Comments:     Note composed:11:26 AM 12/26/2023

## 2024-01-25 ENCOUNTER — HOSPITAL ENCOUNTER (OUTPATIENT)
Dept: PULMONOLOGY | Facility: CLINIC | Age: 74
Discharge: HOME | End: 2024-01-25
Payer: COMMERCIAL

## 2024-01-25 DIAGNOSIS — G70.9 NEUROMUSCULAR RESPIRATORY WEAKNESS: ICD-10-CM

## 2024-01-25 DIAGNOSIS — J99 NEUROMUSCULAR RESPIRATORY WEAKNESS: ICD-10-CM

## 2024-01-25 DIAGNOSIS — G12.21 ALS (AMYOTROPHIC LATERAL SCLEROSIS): ICD-10-CM

## 2024-01-25 LAB
ALLENS TEST: ABNORMAL
DELSYS: ABNORMAL
FEF 25 75 LLN: 0.81
FEF 25 75 PRE REF: 166.4 %
FEF 25 75 REF: 2.29
FEV05 LLN: 1.87
FEV05 REF: 3
FEV1 FVC LLN: 63
FEV1 FVC PRE REF: 113.5 %
FEV1 FVC REF: 76
FEV1 LLN: 2.1
FEV1 PRE REF: 94 %
FEV1 REF: 3.06
FIO2: 0.21
FVC LLN: 2.92
FVC PRE REF: 82.4 %
FVC REF: 4.07
HCO3 UR-SCNC: 23.8 MMOL/L (ref 24–28)
MEP PRE REF: 74 %
MEP PRE: 73.92 CMH2O (ref 83.23–116.78)
MEP REF: 100
MIP PRE REF: 64 %
MIP PRE: 48.32 CMH2O (ref 58.23–91.78)
MIP REF: 75
MODE: ABNORMAL
PCO2 BLDA: 40.1 MMHG (ref 35–45)
PEF LLN: 6.02
PEF PRE REF: 67.8 %
PEF REF: 8.98
PH SMN: 7.38 [PH] (ref 7.35–7.45)
PHYSICIAN COMMENT: ABNORMAL
PO2 BLDA: 62 MMHG (ref 50–70)
POC BE: -1 MMOL/L
POC SATURATED O2: 91 % (ref 95–100)
POC TCO2: 25 MMOL/L (ref 23–27)
PRE FEF 25 75: 3.81 L/S (ref 0.81–4.53)
PRE FET 100: 5.56 SEC
PRE FEV05 REF: 77.9 %
PRE FEV1 FVC: 85.93 % (ref 62.76–87.32)
PRE FEV1: 2.88 L (ref 2.1–3.96)
PRE FEV5: 2.34 L (ref 1.87–4.14)
PRE FVC: 3.35 L (ref 2.92–5.23)
PRE PEF: 6.09 L/S (ref 6.02–11.95)
SAMPLE: ABNORMAL
SITE: ABNORMAL
SP02: 96

## 2024-01-25 PROCEDURE — 94799 UNLISTED PULMONARY SVC/PX: CPT | Mod: S$GLB,,, | Performed by: INTERNAL MEDICINE

## 2024-01-25 PROCEDURE — 36416 COLLJ CAPILLARY BLOOD SPEC: CPT | Mod: 59,S$GLB,, | Performed by: INTERNAL MEDICINE

## 2024-01-25 PROCEDURE — 82803 BLOOD GASES ANY COMBINATION: CPT | Mod: S$GLB,,, | Performed by: INTERNAL MEDICINE

## 2024-01-25 PROCEDURE — 94010 BREATHING CAPACITY TEST: CPT | Mod: S$GLB,,, | Performed by: INTERNAL MEDICINE

## 2024-01-25 NOTE — PROGRESS NOTES
Patient unable to perform Spirometry in the Supine position. Unable to successfully move patient due to decreased mobility and difficulty supporting patient.

## 2024-03-01 ENCOUNTER — PATIENT MESSAGE (OUTPATIENT)
Dept: ADMINISTRATIVE | Facility: HOSPITAL | Age: 74
End: 2024-03-01
Payer: COMMERCIAL

## 2024-03-20 ENCOUNTER — OFFICE VISIT (OUTPATIENT)
Dept: FAMILY MEDICINE | Facility: CLINIC | Age: 74
End: 2024-03-20
Payer: COMMERCIAL

## 2024-03-20 VITALS
HEIGHT: 74 IN | SYSTOLIC BLOOD PRESSURE: 136 MMHG | TEMPERATURE: 98 F | BODY MASS INDEX: 26.98 KG/M2 | RESPIRATION RATE: 16 BRPM | HEART RATE: 104 BPM | OXYGEN SATURATION: 96 % | DIASTOLIC BLOOD PRESSURE: 78 MMHG

## 2024-03-20 DIAGNOSIS — E78.2 MIXED HYPERLIPIDEMIA: ICD-10-CM

## 2024-03-20 DIAGNOSIS — Q06.8 TETHERED CORD SYNDROME: ICD-10-CM

## 2024-03-20 DIAGNOSIS — G70.9 NEUROMUSCULAR RESPIRATORY WEAKNESS: ICD-10-CM

## 2024-03-20 DIAGNOSIS — I70.0 AORTIC ATHEROSCLEROSIS: ICD-10-CM

## 2024-03-20 DIAGNOSIS — I10 ESSENTIAL HYPERTENSION: ICD-10-CM

## 2024-03-20 DIAGNOSIS — J99 NEUROMUSCULAR RESPIRATORY WEAKNESS: ICD-10-CM

## 2024-03-20 DIAGNOSIS — G12.21 ALS (AMYOTROPHIC LATERAL SCLEROSIS): Primary | ICD-10-CM

## 2024-03-20 PROCEDURE — 99999 PR PBB SHADOW E&M-EST. PATIENT-LVL IV: CPT | Mod: PBBFAC,,, | Performed by: FAMILY MEDICINE

## 2024-03-20 PROCEDURE — 99214 OFFICE O/P EST MOD 30 MIN: CPT | Mod: S$GLB,,, | Performed by: FAMILY MEDICINE

## 2024-03-20 RX ORDER — SCOLOPAMINE TRANSDERMAL SYSTEM 1 MG/1
1 PATCH, EXTENDED RELEASE TRANSDERMAL
COMMUNITY

## 2024-03-20 RX ORDER — SODIUM PHENYLBUTYRATE/TAURURSODIOL 3; 1 G/1; G/1
POWDER, FOR SUSPENSION ORAL
COMMUNITY
Start: 2024-03-06

## 2024-03-20 RX ORDER — CETIRIZINE HYDROCHLORIDE 10 MG/1
1 TABLET ORAL DAILY
COMMUNITY
Start: 2023-09-05 | End: 2024-09-04

## 2024-03-20 NOTE — PROGRESS NOTES
Health Maintenance Due   Topic     Shingles Vaccine (1 of 2)  hx chicken pox. Notified pt can get vaccine at pharmacy    RSV Vaccine (Age 60+ and Pregnant patients) (1 - 1-dose 60+ series) Not offered at this office    Influenza Vaccine (1)     COVID-19 Vaccine (5 - 2023-24 season) Not offered at this office    Colorectal Cancer Screening  Consult pcp

## 2024-03-20 NOTE — PROGRESS NOTES
Health Maintenance Due   Topic     Shingles Vaccine (1 of 2) hx chickenpox ; inform pt can get vaccine at pharmacy.    RSV Vaccine (Age 60+ and Pregnant patients) (1 - 1-dose 60+ series)     Influenza Vaccine (1)     COVID-19 Vaccine (5 - 2023-24 season)     Colorectal Cancer Screening

## 2024-03-21 PROBLEM — J99 NEUROMUSCULAR RESPIRATORY WEAKNESS: Status: ACTIVE | Noted: 2024-03-21

## 2024-03-21 PROBLEM — G70.9 NEUROMUSCULAR RESPIRATORY WEAKNESS: Status: ACTIVE | Noted: 2024-03-21

## 2024-03-21 NOTE — PROGRESS NOTES
Subjective:       Patient ID: Robles Jordan is a 73 y.o. male.    Chief Complaint: Follow-up      Follow-up      73-year-old male presents for six-month follow-up.  Patient is here with his wife.  States new medication was started for him for ALS.  Feels that has been helping him especially with his speech.  Continues to have extremity weakness.  Patient is wondering if his visits can be changed to virtual since his difficult for him to get out of the house.  States he takes his other meds.        Review of Systems   Constitutional: Negative.    HENT: Negative.     Respiratory: Negative.     Cardiovascular: Negative.    Gastrointestinal: Negative.    Endocrine: Negative.    Genitourinary: Negative.    Musculoskeletal: Negative.    Neurological: Negative.    Psychiatric/Behavioral: Negative.            Past Medical History:   Diagnosis Date    Back pain     BPH (benign prostatic hypertrophy)     Depression 11/20/2022    Hx of colonic polyps 10/19/2015    Hyperlipidemia     Hypertension     Hypertension     Sleep apnea     Spinal cord disease      Past Surgical History:   Procedure Laterality Date    COLONOSCOPY N/A 10/19/2015    Procedure: COLONOSCOPY;  Surgeon: Antonino Bernstein MD;  Location: [address]);  Service: Endoscopy;  Laterality: N/A;    COLONOSCOPY N/A 2/25/2021    Procedure: COLONOSCOPY;  Surgeon: Blanca Don MD;  Location: [address]);  Service: Endoscopy;  Laterality: N/A;  requesting ASAP  COVID test at [place] on 2/22-GT.2/24 called pt. move up to earlier spot. pt. did NOT want to come earlier.EC    CYSTOSCOPY WITH URODYNAMIC TESTING N/A 1/17/2020    Procedure: CYSTOSCOPY,WITH URODYNAMIC TESTING;  Surgeon: Christianne Israel MD;  Location: [facility];  Service: Urology;  Laterality: N/A;  RN PREOP 1/13/2020    MULTIPLE TOOTH EXTRACTIONS  2014     Family History   Problem Relation Age of Onset    Cancer Mother         Breast    Stroke Sister 65        Fatal    Cancer Maternal Uncle          Pancreas    Cancer Paternal Uncle         Pancreas    Cancer Maternal Grandmother         Breast    Hypertension Father     Hyperlipidemia Father      Social History     Socioeconomic History    Marital status:      Spouse name: Neeru     Number of children: 2    Highest education level: Bachelor's degree (e.g., BA, AB, BS)   Occupational History    Occupation: retired    Tobacco Use    Smoking status: Former     Types: Cigarettes    Smokeless tobacco: Never    Tobacco comments:     3 cigarettes a day    Substance and Sexual Activity    Alcohol use: Yes     Alcohol/week: 1.0 standard drink of alcohol     Types: 1 Cans of beer per week     Comment: very seldom    Drug use: No    Sexual activity: Not Currently     Partners: Female   Social History Narrative     has 2 step children is a       Social Determinants of Health     Financial Resource Strain: Medium Risk (6/26/2023)    Overall Financial Resource Strain (CARDIA)     Difficulty of Paying Living Expenses: Somewhat hard   Food Insecurity: Food Insecurity Present (6/26/2023)    Hunger Vital Sign     Worried About Running Out of Food in the Last Year: Sometimes true     Ran Out of Food in the Last Year: Never true   Transportation Needs: Unmet Transportation Needs (6/26/2023)    PRAPARE - Transportation     Lack of Transportation (Medical): Yes     Lack of Transportation (Non-Medical): No   Physical Activity: Insufficiently Active (6/26/2023)    Exercise Vital Sign     Days of Exercise per Week: 2 days     Minutes of Exercise per Session: 10 min   Stress: No Stress Concern Present (6/26/2023)    [employer] - Occupational Stress Questionnaire     Feeling of Stress : Only a little   Social Connections: Unknown (6/26/2023)    Social Connection and Isolation Panel [NHANES]     Frequency of Communication with Friends and Family: More than three times a week     Frequency of Social Gatherings with Friends and  Family: More than three times a week     Attends [employer] Services: Patient declined     Active Member of Clubs or Organizations: No     Attends Club or Organization Meetings: Never     Marital Status:    Housing Stability: Low Risk  (6/26/2023)    Housing Stability Vital Sign     Unable to Pay for Housing in the Last Year: No     Number of Places Lived in the Last Year: 1     Unstable Housing in the Last Year: No       Current Outpatient Medications:     acetaminophen (TYLENOL) 325 MG tablet, Take 2 tablets (650 mg total) by mouth every 6 (six) hours as needed for Temperature greater than (or equal to 101 degree F)., Disp:  , Rfl: 0    amLODIPine (NORVASC) 10 MG tablet, Take 1 tablet (10 mg total) by mouth once daily. For blood pressure, Disp: 90 tablet, Rfl: 3    atorvastatin (LIPITOR) 20 MG tablet, Take 1 tablet (20 mg total) by mouth once daily., Disp: 90 tablet, Rfl: 3    DULoxetine (CYMBALTA) 60 MG capsule, Take 60 mg by mouth., Disp: , Rfl:     ELIQUIS 2.5 mg Tab, TAKE 1 TABLET BY MOUTH IN THE MORNING AND 1 TAB BEFORE BED FOR HIGH RISK FOR VENOUS THROMBOEMBOLISM., Disp: 180 tablet, Rfl: 3    gabapentin (NEURONTIN) 100 MG capsule, , Disp: , Rfl:     ibuprofen (ADVIL,MOTRIN) 800 MG tablet, Take 1 tablet (800 mg total) by mouth 3 (three) times daily as needed for Pain., Disp: 45 tablet, Rfl: 5    lisinopriL (PRINIVIL,ZESTRIL) 40 MG tablet, Take 1 tablet (40 mg total) by mouth once daily., Disp: 90 tablet, Rfl: 3    RELYVRIO 3-1 gram PwPk, Take by mouth., Disp: , Rfl:     riluzole 50 mg Tab tablet, Take 50 mg by mouth every 12 (twelve) hours., Disp: , Rfl:     scopolamine (TRANSDERM-SCOP) 1.3-1.5 mg (1 mg over 3 days), 1 patch Every 3 (three) days., Disp: , Rfl:     tamsulosin (FLOMAX) 0.4 mg Cap, Take 1 capsule (0.4 mg total) by mouth once daily., Disp: 90 capsule, Rfl: 2    aspirin (ECOTRIN) 81 MG EC tablet, Take 81 mg by mouth once daily., Disp: , Rfl:     cetirizine (ZYRTEC) 10 MG tablet, Take 1  "tablet by mouth once daily., Disp: , Rfl:   No current facility-administered medications for this visit.    Facility-Administered Medications Ordered in Other Visits:     mupirocin 2 % ointment, , Nasal, On Call Procedure, Briana Seals PA-C   Objective:      Vitals:    03/20/24 1532   BP: 136/78   BP Location: Left arm   Patient Position: Sitting   BP Method: Medium (Manual)   Pulse: 104   Resp: 16   Temp: 98.3 °F (36.8 °C)   TempSrc: Oral   SpO2: 96%   Height: 6' 2" (1.88 m)       Physical Exam  Constitutional:       General: He is not in acute distress.  HENT:      Head: Normocephalic and atraumatic.   Eyes:      Conjunctiva/sclera: Conjunctivae normal.   Cardiovascular:      Rate and Rhythm: Normal rate and regular rhythm.      Heart sounds: Normal heart sounds. No murmur heard.     No friction rub. No gallop.   Pulmonary:      Effort: Pulmonary effort is normal.      Breath sounds: Normal breath sounds. No wheezing or rales.   Musculoskeletal:      Cervical back: Neck supple.   Skin:     General: Skin is warm and dry.   Neurological:      Mental Status: He is alert and oriented to person, place, and time.   Psychiatric:         Behavior: Behavior normal.         Thought Content: Thought content normal.         Judgment: Judgment normal.            Assessment:       1. ALS (amyotrophic lateral sclerosis)    2. Essential hypertension    3. Mixed hyperlipidemia    4. Aortic atherosclerosis    5. Neuromuscular respiratory weakness    6. Tethered cord syndrome        Plan:       ALS (amyotrophic lateral sclerosis)    Essential hypertension    Mixed hyperlipidemia    Aortic atherosclerosis    Neuromuscular respiratory weakness    Tethered cord syndrome    Continue follow-up with Neurology.  Continue meds.  Advised patient to follow up in 6 months for annual.          No future appointments.    Patient note was created using [employer].  Any errors in syntax or even information may not have been identified and " edited on initial review prior to signing this note.

## 2024-07-11 DIAGNOSIS — E78.5 HYPERLIPIDEMIA, UNSPECIFIED HYPERLIPIDEMIA TYPE: ICD-10-CM

## 2024-07-11 RX ORDER — ATORVASTATIN CALCIUM 20 MG/1
20 TABLET, FILM COATED ORAL
Qty: 90 TABLET | Refills: 0 | Status: SHIPPED | OUTPATIENT
Start: 2024-07-11

## 2024-07-11 NOTE — TELEPHONE ENCOUNTER
Care Due:                  Date            Visit Type   Department     Provider  --------------------------------------------------------------------------------                                EP -                              PRIMARY      [facility]  Last Visit: 03-      CARE ([facility])   MEDICINE       Tera Jarquin  Next Visit: None Scheduled  None         None Found                                                            Last  Test          Frequency    Reason                     Performed    Due Date  --------------------------------------------------------------------------------    CMP.........  12 months..  atorvastatin, lisinopriL.  08- 08-    Lipid Panel.  12 months..  atorvastatin.............  08- 08-    [employer] Embedded Care Due Messages. Reference number: [id number].   7/11/2024 12:25:54 PM CDT

## 2024-07-11 NOTE — TELEPHONE ENCOUNTER
Provider Staff:  Action required for this patient    Requires labs      Please see care gap opportunities below in Care Due Message.    Thanks!  [facility]     Appointments      Date Provider   Last Visit   3/20/2024 Tera Jarquin MD   Next Visit   Visit date not found Tera Jarquin MD     Refill Decision Note   Robles Jordan  is requesting a refill authorization.  Brief Assessment and Rationale for Refill:  Approve     Medication Therapy Plan:        Comments:     Note composed:1:03 PM 07/11/2024

## 2024-09-29 DIAGNOSIS — I10 ESSENTIAL HYPERTENSION: ICD-10-CM

## 2024-09-29 NOTE — TELEPHONE ENCOUNTER
Care Due:                  Date            Visit Type   Department     Provider  --------------------------------------------------------------------------------                                EP -                              PRIMARY      [facility]  Last Visit: 03-      CARE ([facility])   MEDICINE       Tera Jarquin  Next Visit: None Scheduled  None         None Found                                                            Last  Test          Frequency    Reason                     Performed    Due Date  --------------------------------------------------------------------------------    CMP.........  12 months..  atorvastatin, lisinopriL.  08- 08-    Lipid Panel.  12 months..  atorvastatin.............  08- 08-    [employer] Embedded Care Due Messages. Reference number: [id number].   9/29/2024 7:00:07 AM CDT

## 2024-09-29 NOTE — TELEPHONE ENCOUNTER
Refill Routing Note   Medication(s) are not appropriate for processing by [facility] for the following reason(s):        Required labs outdated    [facility] action(s):  Defer     Requires labs : Yes             Appointments  past 12m or future 3m with PCP    Date Provider   Last Visit   Visit date not found Tera Jarquin MD   Next Visit   Visit date not found Tera Jarquin MD   ED visits in past 90 days: 0        Note composed:1:26 PM 09/29/2024

## 2024-10-01 RX ORDER — LISINOPRIL 40 MG/1
40 TABLET ORAL DAILY
Qty: 90 TABLET | Refills: 0 | Status: SHIPPED | OUTPATIENT
Start: 2024-10-01

## 2024-10-03 ENCOUNTER — PATIENT OUTREACH (OUTPATIENT)
Dept: ADMINISTRATIVE | Facility: CLINIC | Age: 74
End: 2024-10-03
Payer: COMMERCIAL

## 2024-10-03 NOTE — PROGRESS NOTES
C3 nurse attempted to contact Robles Jordan for a [facility] post hospital discharge follow up call. No answer. Left voicemail with callback information. The patient does not have a scheduled HOSFU appointment. Message sent to PCP staff for assistance with scheduling visit with patient.

## 2024-10-04 ENCOUNTER — TELEPHONE (OUTPATIENT)
Dept: FAMILY MEDICINE | Facility: CLINIC | Age: 74
End: 2024-10-04
Payer: COMMERCIAL

## 2024-10-20 DIAGNOSIS — I10 ESSENTIAL HYPERTENSION: ICD-10-CM

## 2024-10-20 RX ORDER — AMLODIPINE BESYLATE 10 MG/1
10 TABLET ORAL DAILY
Qty: 90 TABLET | Refills: 1 | Status: SHIPPED | OUTPATIENT
Start: 2024-10-20

## 2024-10-20 NOTE — TELEPHONE ENCOUNTER
No care due was identified.  [facility] Embedded Care Due Messages. Reference number: [id number].   10/20/2024 10:13:37 AM CDT

## 2024-10-21 NOTE — TELEPHONE ENCOUNTER
Refill Decision Note   Robles Jordan  is requesting a refill authorization.  Brief Assessment and Rationale for Refill:  Approve     Medication Therapy Plan:         Comments:     Note composed:10:20 PM 10/20/2024

## 2024-10-27 DIAGNOSIS — E78.5 HYPERLIPIDEMIA, UNSPECIFIED HYPERLIPIDEMIA TYPE: ICD-10-CM

## 2024-10-29 DIAGNOSIS — Z00.00 ENCOUNTER FOR MEDICARE ANNUAL WELLNESS EXAM: ICD-10-CM

## 2024-10-29 RX ORDER — ATORVASTATIN CALCIUM 20 MG/1
20 TABLET, FILM COATED ORAL
Qty: 90 TABLET | Refills: 0 | Status: SHIPPED | OUTPATIENT
Start: 2024-10-29

## 2024-12-11 DIAGNOSIS — F48.2 PSEUDOBULBAR AFFECT: ICD-10-CM

## 2024-12-11 RX ORDER — RILUZOLE 50 MG/1
50 TABLET, FILM COATED ORAL EVERY 12 HOURS
Qty: 180 TABLET | Refills: 1 | Status: SHIPPED | OUTPATIENT
Start: 2024-12-11

## 2024-12-11 NOTE — TELEPHONE ENCOUNTER
Refill Routing Note   Medication(s) are not appropriate for processing by [facility] for the following reason(s):        Outside of protocol  Responsible provider unclear    [facility] action(s):  Route        Medication Therapy Plan: no pcp listed on profile      Appointments  past 12m or future 3m with PCP    Date Provider   Last Visit   3/20/2024 Tera Jarquin MD   Next Visit   Visit date not found Tera Jarquin MD   ED visits in past 90 days: 0        Note composed:4:30 AM 12/11/2024

## 2024-12-22 DIAGNOSIS — I10 ESSENTIAL HYPERTENSION: ICD-10-CM

## 2024-12-23 RX ORDER — LISINOPRIL 40 MG/1
40 TABLET ORAL
Qty: 90 TABLET | Refills: 0 | Status: SHIPPED | OUTPATIENT
Start: 2024-12-23

## 2024-12-23 NOTE — TELEPHONE ENCOUNTER
Refill Routing Note   Medication(s) are not appropriate for processing by [facility] for the following reason(s):        No participating PCP listed in Epic: routing to Tera Jarquin MD  as last prescribing provider  Requirement: Established primary provider participating in [facility] program    [facility] action(s):  Route             Appointments  past 12m or future 3m with PCP    Date Provider   Last Visit   3/20/2024 Tera Jarquin MD   Next Visit   Visit date not found Tera Jarquin MD   ED visits in past 90 days: 0        Note composed:6:08 PM 12/22/2024

## 2025-01-07 DIAGNOSIS — R35.0 URINARY FREQUENCY: ICD-10-CM

## 2025-01-07 NOTE — TELEPHONE ENCOUNTER
Refill Routing Note   Medication(s) are not appropriate for processing by [facility] for the following reason(s):        Responsible provider unclear  ED/Hospital Visit since last OV with provider    [facility] action(s):  Defer               Appointments  past 12m or future 3m with PCP    Date Provider   Last Visit   3/20/2024 Tera Jarquin MD   Next Visit   Visit date not found Tera Jarquin MD   ED visits in past 90 days: 0        Note composed:5:33 PM 01/07/2025

## 2025-01-08 RX ORDER — TAMSULOSIN HYDROCHLORIDE 0.4 MG/1
1 CAPSULE ORAL
Qty: 90 CAPSULE | Refills: 0 | Status: SHIPPED | OUTPATIENT
Start: 2025-01-08

## 2025-01-24 DIAGNOSIS — E78.5 HYPERLIPIDEMIA, UNSPECIFIED HYPERLIPIDEMIA TYPE: ICD-10-CM

## 2025-01-24 RX ORDER — ATORVASTATIN CALCIUM 20 MG/1
20 TABLET, FILM COATED ORAL
Qty: 90 TABLET | Refills: 0 | Status: SHIPPED | OUTPATIENT
Start: 2025-01-24

## 2025-01-24 NOTE — TELEPHONE ENCOUNTER
Refill Routing Note   Medication(s) are not appropriate for processing by [facility] for the following reason(s):        Required labs outdated    [facility] action(s):  Defer               Appointments  past 12m or future 3m with PCP    Date Provider   Last Visit   3/20/2024 Tera Jarquin MD   Next Visit   Visit date not found Tera Jarquin MD   ED visits in past 90 days: 0        Note composed:3:05 AM 01/24/2025

## 2025-02-16 DIAGNOSIS — Z91.89 OTHER SPECIFIED PERSONAL RISK FACTORS, NOT ELSEWHERE CLASSIFIED: ICD-10-CM

## 2025-02-16 DIAGNOSIS — R35.0 URINARY FREQUENCY: ICD-10-CM

## 2025-02-16 NOTE — TELEPHONE ENCOUNTER
Refill Routing Note   Medication(s) are not appropriate for processing by [facility] for the following reason(s):        Responsible provider unclear    [facility] action(s):  Route        Medication Therapy Plan: no pcp listed on profile      Appointments  past 12m or future 3m with PCP    Date Provider   Last Visit   3/20/2024 Tera Jarquin MD   Next Visit   Visit date not found Tera Jarquin MD   ED visits in past 90 days: 0        Note composed:3:55 PM 02/16/2025

## 2025-02-17 RX ORDER — APIXABAN 2.5 MG/1
TABLET, FILM COATED ORAL
Qty: 180 TABLET | Refills: 3 | Status: SHIPPED | OUTPATIENT
Start: 2025-02-17

## 2025-02-18 RX ORDER — TAMSULOSIN HYDROCHLORIDE 0.4 MG/1
1 CAPSULE ORAL
Qty: 90 CAPSULE | Refills: 0 | Status: SHIPPED | OUTPATIENT
Start: 2025-02-18

## 2025-03-30 DIAGNOSIS — I10 ESSENTIAL HYPERTENSION: ICD-10-CM

## 2025-03-31 NOTE — TELEPHONE ENCOUNTER
Refill Routing Note   Medication(s) are not appropriate for processing by [facility] for the following reason(s):        Responsible provider unclear-Unable to assess. Patient does not have a PCP or participating provider listed in EPIC. Will defer to last known prescriber for review.    Patient not seen by provider within 15 months  Required vitals outdated    [facility] action(s):  Defer        Medication Therapy Plan: Unable to assess. Patient does not have a PCP or participating provider listed in EPIC. Will defer to last known prescriber for review.      Appointments  past 12m or future 3m with PCP    Date Provider   Last Visit   3/20/2024 Tera Jarquin MD   Next Visit   Visit date not found Tera Jarquin MD   ED visits in past 90 days: 0        Note composed:6:31 AM 03/31/2025

## 2025-04-01 RX ORDER — LISINOPRIL 40 MG/1
40 TABLET ORAL
Qty: 90 TABLET | Refills: 0 | Status: SHIPPED | OUTPATIENT
Start: 2025-04-01

## 2025-04-25 DIAGNOSIS — I10 ESSENTIAL HYPERTENSION: ICD-10-CM

## 2025-04-25 DIAGNOSIS — E78.5 HYPERLIPIDEMIA, UNSPECIFIED HYPERLIPIDEMIA TYPE: ICD-10-CM

## 2025-04-25 RX ORDER — AMLODIPINE BESYLATE 10 MG/1
10 TABLET ORAL DAILY
Qty: 90 TABLET | Refills: 0 | Status: SHIPPED | OUTPATIENT
Start: 2025-04-25

## 2025-04-25 RX ORDER — ATORVASTATIN CALCIUM 20 MG/1
20 TABLET, FILM COATED ORAL
Qty: 90 TABLET | Refills: 0 | Status: SHIPPED | OUTPATIENT
Start: 2025-04-25

## 2025-04-25 NOTE — TELEPHONE ENCOUNTER
Refill Routing Note   Medication(s) are not appropriate for processing by [facility] for the following reason(s):        Responsible provider unclear  Patient not seen by provider within 15 months  Required vitals outdated    [facility] action(s):  Defer        Medication Therapy Plan: Unable to assess. Patient does not have a PCP or participating provider listed in EPIC. Will defer to last known prescriber for review.      Appointments  past 12m or future 3m with PCP    Date Provider   Last Visit   3/20/2024 Tera Jarquin MD   Next Visit   Visit date not found Tera Jarquin MD   ED visits in past 90 days: 0        Note composed:7:06 AM 04/25/2025

## 2025-06-05 ENCOUNTER — PATIENT OUTREACH (OUTPATIENT)
Dept: ADMINISTRATIVE | Facility: CLINIC | Age: 75
End: 2025-06-05
Payer: COMMERCIAL

## (undated) DEVICE — Device

## (undated) DEVICE — SUT MONOCRYL 4.0 PS2 CP496G

## (undated) DEVICE — SYR 10CC LUER LOCK

## (undated) DEVICE — CHLORAPREP 10.5 ML APPLICATOR

## (undated) DEVICE — CATH ABD 7FR

## (undated) DEVICE — ADHESIVE DERMABOND MINI HV

## (undated) DEVICE — PACK ENDOSCOPY GENERAL

## (undated) DEVICE — JELLY LUBRICANT STERILE 4 OZ

## (undated) DEVICE — NDL SAFETY 22G X 1.5 ECLIPSE

## (undated) DEVICE — SEE MEDLINE ITEM 156905

## (undated) DEVICE — KIT POSITIONING WILSON FRAME

## (undated) DEVICE — SUT 4/0 18IN NUROLON BLK B

## (undated) DEVICE — COVER SNAP KAP 26IN

## (undated) DEVICE — UNDERGLOVE BIOGEL PI SZ 6.5 LF

## (undated) DEVICE — KIT SURGIFLO HEMOSTATIC MATRIX

## (undated) DEVICE — APPLICATOR CHLORAPREP ORN 26ML

## (undated) DEVICE — SEE MEDLINE ITEM 152502

## (undated) DEVICE — GUN BIOPSY 18GA MONOPLY

## (undated) DEVICE — SOL NS 1000CC

## (undated) DEVICE — SUPPORT ULNA NERVE PROTECTOR

## (undated) DEVICE — TRAY FOLEY 16FR INFECTION CONT

## (undated) DEVICE — DRAPE C-ARM FOR MOBILE XRAY

## (undated) DEVICE — SEE MEDLINE ITEM 157181

## (undated) DEVICE — GUIDE DUAL BIOPSY FOR 8818

## (undated) DEVICE — DRESSING ADHESIVE ISLAND 3 X 6

## (undated) DEVICE — BLADE 4IN EDGE INSULATED

## (undated) DEVICE — GLOVE BIOGEL PI MICRO SZ 6.5

## (undated) DEVICE — ELECTRODE REM PLYHSV RETURN 9

## (undated) DEVICE — CATH BLADDER/URETERAL 7FR

## (undated) DEVICE — ELECTRODE NEOTRODE II

## (undated) DEVICE — SPONGE GAUZE 16PLY 4X4

## (undated) DEVICE — GLOVE SURGICAL LATEX SZ 6.5

## (undated) DEVICE — SUT PROLENE 4-0 RB-1 BL MO

## (undated) DEVICE — SOL SOD CHLORIDE 0.9% 10ML

## (undated) DEVICE — SUT VICRYL+ 1 CT1 18IN

## (undated) DEVICE — DRAPE OPMI STERILE

## (undated) DEVICE — DIFFUSER

## (undated) DEVICE — COVER BACK TBL HD 2-TIER 72IN

## (undated) DEVICE — CORD FOR BIPOLAR FORCEPS 12

## (undated) DEVICE — KIT FIBRIN SEALANT EVICEL 5 ML

## (undated) DEVICE — GLOVE BIOGEL PI MICRO SZ 7.5

## (undated) DEVICE — SEE MEDLINE ITEM 157110

## (undated) DEVICE — BURR MIS CURVED 3.0MM

## (undated) DEVICE — SOL 9P NACL IRR PIC IL

## (undated) DEVICE — SEE MEDLINE ITEM 146292

## (undated) DEVICE — NDL SPINAL SPINOCAN 22GX3.5

## (undated) DEVICE — SPONGE PATTY SURGICAL .5X3IN

## (undated) DEVICE — SEE MEDLINE ITEM 146347

## (undated) DEVICE — TUBING FOR LABORIE PUMP

## (undated) DEVICE — SYR SLIP TIP 20CC

## (undated) DEVICE — GAUZE SPONGE 4X4 12PLY

## (undated) DEVICE — SEE MEDLINE ITEM 157117

## (undated) DEVICE — DRESSING AQUACEL FOAM 5 X 5

## (undated) DEVICE — DRAPE STERI-DRAPE 1000 17X11IN

## (undated) DEVICE — SCRUB 10% POVIDONE IODINE 4OZ

## (undated) DEVICE — DRAPE STERI INSTRUMENT 1018

## (undated) DEVICE — SEE MEDLINE ITEM 157194

## (undated) DEVICE — SUT VICRYL PLUS 2-0 CT1 18

## (undated) DEVICE — SUT VICRYL 2 0 CT 2

## (undated) DEVICE — POSITIONER IV ARMBOARD FOAM

## (undated) DEVICE — DRESSING SURGICAL 1/2X1/2

## (undated) DEVICE — TOWEL OR DISP STRL BLUE 4/PK

## (undated) DEVICE — UNDERGLOVES BIOGEL PI SIZE 8

## (undated) DEVICE — MAT QUICK 40X30 FLOOR FLUID LF

## (undated) DEVICE — SEE MEDLINE ITEM 157150

## (undated) DEVICE — NDL 18GA X1 1/2 REG BEVEL

## (undated) DEVICE — BOWL STERILE LARGE 32OZ

## (undated) DEVICE — COVER OVERHEAD SURG LT BLUE

## (undated) DEVICE — BLADE ELECTRO EDGE INSULATED